# Patient Record
Sex: MALE | Race: WHITE | NOT HISPANIC OR LATINO | Employment: OTHER | URBAN - METROPOLITAN AREA
[De-identification: names, ages, dates, MRNs, and addresses within clinical notes are randomized per-mention and may not be internally consistent; named-entity substitution may affect disease eponyms.]

---

## 2017-02-08 DIAGNOSIS — Z79.899 ENCOUNTER FOR LONG-TERM (CURRENT) USE OF MEDICATIONS: ICD-10-CM

## 2017-02-08 LAB — OSMOLALITY UR: 614 MMOL/KG (ref 100–1200)

## 2017-02-08 PROCEDURE — 83935 ASSAY OF URINE OSMOLALITY: CPT | Performed by: PSYCHIATRY & NEUROLOGY

## 2017-02-13 ENCOUNTER — OFFICE VISIT (OUTPATIENT)
Dept: PSYCHIATRY | Facility: CLINIC | Age: 57
End: 2017-02-13
Attending: PSYCHIATRY & NEUROLOGY
Payer: MEDICARE

## 2017-02-13 VITALS
SYSTOLIC BLOOD PRESSURE: 107 MMHG | DIASTOLIC BLOOD PRESSURE: 64 MMHG | HEART RATE: 98 BPM | BODY MASS INDEX: 22.37 KG/M2 | WEIGHT: 160.4 LBS

## 2017-02-13 DIAGNOSIS — F25.0 SCHIZOAFFECTIVE DISORDER, BIPOLAR TYPE (H): Primary | ICD-10-CM

## 2017-02-13 PROCEDURE — 99212 OFFICE O/P EST SF 10 MIN: CPT | Mod: ZF

## 2017-02-13 NOTE — MR AVS SNAPSHOT
After Visit Summary   2/13/2017    Caden Bush    MRN: [id number]           Patient Information     Date Of Birth          1960        Visit Information        Provider Department      2/13/2017 1:30 PM Yeny Treviño MD Psychiatry Clinic         Follow-ups after your visit        Follow-up notes from your care team     Return in about 2 months (around 4/13/2017).      Your next 10 appointments already scheduled     Apr 21, 2017  1:30 PM CDT   Schizophrenia Follow Up with Yeny Treviño MD   Psychiatry Clinic ([facility])    [address]  [address] [facility] [id number] [phone]              Who to contact     Please call your clinic at [phone] to:    Ask questions about your health    Make or cancel appointments    Discuss your medicines    Learn about your test results    Speak to your doctor   If you have compliments or concerns about an experience at your clinic, or if you wish to file a complaint, please contact [facility] Patient Relations at [phone] or email us at [email or web address]         Additional Information About Your Visit        MyChart Information     PitchEnginet gives you secure access to your electronic health record. If you see a primary care provider, you can also send messages to your care team and make appointments. If you have questions, please call your primary care clinic.  If you do not have a primary care provider, please call [phone] and they will assist you.      PitchEnginet is an electronic gateway that provides easy, online access to your medical records. With Mygeni, you can request a clinic appointment, read your test results, renew a prescription or communicate with your care team.     To access your existing account, please contact your [facility] or call [phone] for assistance.        Care EveryWhere ID     This is your  Care EveryWhere ID. This could be used by other organizations to access your [place] medical records  [id number]        Your Vitals Were     Pulse BMI (Body Mass Index)                98 22.37 kg/m2           Blood Pressure from Last 3 Encounters:   02/13/17 107/64   12/20/16 116/68   12/19/16 129/82    Weight from Last 3 Encounters:   02/13/17 72.8 kg (160 lb 6.4 oz)   12/20/16 69.4 kg (153 lb)   12/19/16 72.1 kg (159 lb)              Today, you had the following     No orders found for display       Primary Care Provider Office Phone # Fax #    Leonard Dai [phone]       [facility] [address] [facility] [address]        Thank you!     Thank you for choosing PSYCHIATRY CLINIC  for your care. Our goal is always to provide you with excellent care. Hearing back from our patients is one way we can continue to improve our services. Please take a few minutes to complete the written survey that you may receive in the mail after your visit with us. Thank you!             Your Updated Medication List - Protect others around you: Learn how to safely use, store and throw away your medicines at www.disposemymeds.org.          This list is accurate as of: 2/13/17  1:49 PM.  Always use your most recent med list.                   Brand Name Dispense Instructions for use    ACE/ARB NOT PRESCRIBED (INTENTIONAL)      ACE & ARB not prescribed due to Risk for drug interaction- lithium toxicity in the past       aspirin 81 MG EC tablet      Take 81 mg by mouth daily.       cholecalciferol 1000 UNIT tablet    vitamin D     Take 1 tablet by mouth daily.       FISH OIL      1 capsule daily       glipiZIDE 5 MG 24 hr tablet    GLUCOTROL XL    90 tablet    TAKE 1 TABLET BY MOUTH DAILY       lithium 450 MG CR tablet    ESKALITH    90 tablet    Take 1 tablet (450 mg) by mouth At Bedtime       metFORMIN 500 MG 24 hr tablet    GLUCOPHAGE-XR    360 tablet    TAKE 2 TABLETS BY MOUTH TWICE DAILY        ONE TOUCH ULTRA test strip   Generic drug:  blood glucose monitoring     100 each    TEST ONCE DAILY AS DIRECTED       pioglitazone 45 MG tablet    ACTOS    90 tablet    TAKE 1 TABLET BY MOUTH DAILY       risperiDONE 2 MG tablet    risperDAL    90 tablet    Take 1 tablet (2 mg) by mouth At Bedtime       simvastatin 40 MG tablet    ZOCOR    90 tablet    TAKE ONE TABLET BY MOUTH EVERY NIGHT AT BEDTIME       traZODone 100 MG tablet    DESYREL    90 tablet    Take 0.5-1 tablets ( mg) by mouth nightly as needed for sleep       venlafaxine 150 MG 24 hr capsule    EFFEXOR-XR    180 capsule    Take 2 capsules (300 mg) by mouth daily

## 2017-02-13 NOTE — NURSING NOTE
Chief Complaint   Patient presents with     Recheck Medication   schizoaffective disorder    Reviewed allergies, smoking status, and pharmacy preference  Administered abuse screening questions   Obtained weight, blood pressure and heart rate

## 2017-02-13 NOTE — PROGRESS NOTES
"PSYCHIATRY CLINIC TRANSFER EVALUATION NOTE          30 minute medication management   Transferring from Dr. Hill for ongoing management of schizoaffective disorder.  The initial DIAG EVAL was 11/26/08.  Date of most recent Transfer Evaluation is 08/01/2016.    INTERIM HISTORY                                                     PSYCH CRITICAL ITEM HISTORY includes suicidal ideation, psychosis [sxs include AH and paranoia], mutiple psychotropic trials and psych hosp (>5).  Mental health issues were first experienced in late 20s and mental health care was first received at that time.    Caden Bush is a 56 year old male who was last seen in clinic on 12/19/16 at which time no medication changes were made. The patient reports good treatment adherence.  History was provided by patient who was a adequate historian.  Since the last visit:  - States that he his doing \"pretty good\". No concerns today. Mood has been euthymic. Enjoying going to the movies, playing Bingo, walking, shopping.   - Anxiety is not bad.   - Looking into moving to a different housing location. Is on a list at [facility]. Was contacted and was told her is on the list and when availability comes up, he will get called. Thinks it would be a more convenient place to live in terms of transportation on buses. They also have a pool there, which he is interested in.   - No psychotic thought.   - Keeping active by walking and doing housework.   - Spending time with family occasionally.     RECENT SYMPTOMS:   DEPRESSION:  none  DENIES- depressed mood, anhedonia, insomnia , low energy, indecisiveness, feeling worthless, suicidal ideation , overwhelmed and feeling hopeless, appetite change, weight gain /loss and poor concentration /memory;  PSYCHOSIS:  none;  DENIES- hallucinations, delusions, ideas of reference and disorganized behavior  EATING DISORDER: none     SUBSTANCE USE:     ALCOHOL-  never       TOBACCO- none        CAFFEINE- 1-2 cups/day of " coffee, 1-2 sodas/day                      CANNABIS- none  OTHER ILLICIT DRUGS- none    Financial Support- SSDI for schizoaffective disorder and diabetes.     Living Situation- Currently living by self in subsidized apartment in [place] since Feb 2011         Children- none      MEDICAL ROS:  Reports excessive diaphoresis.    Denies muscle twitches, restlessness, tremor and shiver, none, akathisia, dystonia and apparent TD, polydipsia, nocturia and weight gain, headache, dry mouth, nausea, diarrhea and wt gain.    PSYCH and CD Critical Summary Points since July 2016           None    PAST PSYCH MED TRIALS   see EMR Problem List: Hx of psychiatric care    MEDICAL / SURGICAL HISTORY                                   MEDICAL TEAM:          PCP- Dr. Dai with [facility]                    Therapist-  Yeny Goff at [facility]                Ophthalmologist: Jacob Lieberman with [facility]    Patient Active Problem List   Diagnosis     Eczema     Retinopathy     Schizoaffective disorder, bipolar type (H)     Polyp of colon, adenomatous     Health Care Home     Diabetic retinopathy associated with type 2 diabetes mellitus (H)     DM (diabetes mellitus), type 2 with ophthalmic complications (H)     Hx of psychiatric care       ALLERGY                                Review of patient's allergies indicates no known allergies.    MEDICATIONS                               Current Outpatient Prescriptions   Medication Sig Dispense Refill     glipiZIDE (GLUCOTROL XL) 5 MG 24 hr tablet TAKE 1 TABLET BY MOUTH DAILY 90 tablet 1     metFORMIN (GLUCOPHAGE-XR) 500 MG 24 hr tablet TAKE 2 TABLETS BY MOUTH TWICE DAILY 360 tablet 1     lithium (ESKALITH) 450 MG CR tablet Take 1 tablet (450 mg) by mouth At Bedtime 90 tablet 1     venlafaxine (EFFEXOR-XR) 150 MG 24 hr capsule Take 2 capsules (300 mg) by mouth daily 180 capsule 1     risperiDONE (RISPERDAL) 2 MG tablet Take 1 tablet (2 mg) by mouth At Bedtime  90 tablet 1     traZODone (DESYREL) 100 MG tablet Take 0.5-1 tablets ( mg) by mouth nightly as needed for sleep 90 tablet 1     simvastatin (ZOCOR) 40 MG tablet TAKE ONE TABLET BY MOUTH EVERY NIGHT AT BEDTIME 90 tablet 3     ONE TOUCH ULTRA test strip TEST ONCE DAILY AS DIRECTED 100 each 3     pioglitazone (ACTOS) 45 MG tablet TAKE 1 TABLET BY MOUTH DAILY 90 tablet 1     ACE/ARB NOT PRESCRIBED, INTENTIONAL, ACE & ARB not prescribed due to Risk for drug interaction- lithium toxicity in the past       FISH OIL 1 capsule daily        aspirin 81 MG EC tablet Take 81 mg by mouth daily.       cholecalciferol (VITAMIN D) 1000 UNIT tablet Take 1 tablet by mouth daily.         VITALS   /64 (BP Location: Right leg, Patient Position: Chair, Cuff Size: Adult Regular)  Pulse 98  Wt 72.8 kg (160 lb 6.4 oz)  BMI 22.37 kg/m2     MENTAL STATUS EXAM                                                             Alertness: alert  and oriented  Appearance: adequately groomed  Behavior/Demeanor: cooperative, pleasant and calm, with good  eye contact  Speech: normal  Language: intact and no problems  Psychomotor: sits forward or near front of chair  Mood:  good  Affect: restricted and does smile appropriately throughout interview at times; was congruent to mood; was congruent to content  Thought Process/Associations: unremarkable  Thought Content:  Denies suicidal ideation, violent ideation and psychotic thought  Perception:  Denies hallucinations  Insight: good  Judgment: good  Cognition:  does appear grossly intact; formal cognitive testing was not done    LABS and DATA     LITHIUM LABS    [level, renal, SG, TSH, WBC]  q6 mo  Recent Labs   Lab Test  12/20/16   1301  06/09/16   1030  12/21/15   1413  09/16/15   1408   LITHIUM  0.6  0.5*  0.5*  0.5*     Recent Labs   Lab Test  12/20/16   1301  06/09/16   1030  12/21/15   1413   01/26/15   1059   CR  1.21  1.26*  1.11   < >  1.15   GFRESTIMATED   --   59*  68   --   66   NA  142   140  133   < >  135   PEE  10.1  9.4  9.2   < >  10.2*    < > = values in this interval not displayed.     Recent Labs   Lab Test  06/09/16   1040  12/21/15   1435   SG  1.014  1.007     Recent Labs   Lab Test  06/09/16   1030  12/21/15   1413  01/26/15   1059   TSH  1.70  1.69  3.39     ANTIPSYCHOTIC LABS   [glu, A1C, lipids (focus LDL), liver enzymes, WBC, ANEU, Hgb, plts]  q12mo  Recent Labs   Lab Test  12/20/16   1301  12/13/16   0949  06/09/16   1030   GLC  195*   --   181*   A1C   --   6.6*  7.3*     Recent Labs   Lab Test  06/09/16   1030  06/03/15   1015   CHOL  125  174   TRIG  55  106   LDL  45  90   HDL  69  63     Recent Labs   Lab Test  12/20/16   1301  06/09/16   1030   AST  14  15   ALT  14  27   ALKPHOS  72  98     Recent Labs   Lab Test  12/20/16   1240  06/09/16   1030  12/21/15   1413   WBC  3.7*  4.7  4.6   ANEU   --   3.5  3.4   HGB  13.6  14.7  13.4   PLT  205  175  155         PHQ9 TODAY = 0   PHQ-9 SCORE 8/1/2016 10/24/2016 12/19/2016   Total Score - - -   Total Score 3 2 1   Total Score - - -         PSYCHIATRIC DIAGNOSES                                                                                                   Schizoaffective disorder, bipolar type    ASSESSMENT                                     Pertinent Background:   See most recent Transfer Evaluation as dated above.  Stable on regimen of Lithium, Effexor, and Risperdal since 2011. Had previously been on a Lithium dose of 900mg but suffered Lithium toxicity and was hospitalized in July, 2012 after Lisinopril was added to his regimen. Lithium dose has been stable since that time (though lithium level does seem to fluctuate in setting of reported good med adherence). Trial of Risperdal dose decrease had been tried, with subsequent increase in psychosis sx. Effexor last increased in 2010 for low mood, with benefit noted. Effexor dose successfully decreased March 2015 d/t stable mood. Lithium 450 mg has been adequate for mood  stabilization although could consider increasing if mood instability becomes a problem in the future.       TODAY: Caden reports doing well with stable mood. No concerns at this time. Previous discussion with Dr. Hill included Caden returning to work with his PCP, due to stability, but Caden prefers to continue to work with a psychiatrist, and notes significant concern regarding decompensation in the past when not seeing a psychiatrist. Discussion today surrounded ways that patient could continue to stay active and seek out socialization. Will follow up again in 2 months for continued med management and brief psychotherapy.     Abnormal labs; pt follows with PCP for diabetes and elevated A1c.      PSYCHOTROPIC DRUG INTERACTIONS:   Concurrent use of VENLAFAXINE and ANTIPLATELET AGENTS (ASPIRIN) may result in an increased risk of bleeding  Concurrent use of LITHIUM and NSAIDS may result in lithium toxicity.  Concurrent use of LITHIUM and DOPAMINE-2 ANTAGONISTS (RISPERDAL) may result in weakness, dyskinesias, increased extrapyramidal symptoms, encephalopathy, and brain damage.   Concurrent use of LITHIUM and TRAZODONE may result in increased risk of serotonin syndrome (hypertension, hyperthermia, myoclonus, mental status changes).  Concurrent use of RISPERIDONE and SIMVASTATIN may result in increased simvastatin serum concentrations with an increased risk of myopathy or rhabdomyolysis.   Concurrent use of TRAZODONE and VENLAFAXINE may result in an increased risk of serotonin syndrome (hypertension, hyperthermia, myoclonus, mental status changes).    Management:  Monitoring for adverse effects, routine vitals, routine labs and patient is aware of risks, minimal use of trazodone.      PLAN                                                                                                       1) PSYCHOTROPIC MEDICATIONS:  - Continue lithium CR 450mg QHS  - Continue risperidone 2mg QHS  - Continue Effexor XR 300mg  daily  - Continue trazodone 50mg QHS PRN (uses very infrequently, about once per month)    2) THERAPY:  Seeing psychologist Yeny Goff at [facility] since 11/2015. Sees once per month.     3) LABS NEXT DUE:    CMP, TSH, lithium level, UA, urine osm Next due June 2017  CBC, fasting lipids, HbA1c Next due Dec 2017       RATING SCALES:    none    4) REFERRALS [CD, medical, other]:  none    5) :  none Previously had a CCM until 2011. Has SW consult on 10/20/15, not interested in CM or [facility] worker services at this time and seems to be managing well on his own.     6) RTC: 2 months    7) CRISIS NUMBERS: Provided in AVS today      TREATMENT RISK STATEMENT:  The risks, benefits, alternatives and potential adverse effects have been discussed and are understood by the patient/ patient's guardian. The pt understands the risks of using street drugs or alcohol.  There are no medical contraindications, the pt agrees to treatment with the ability to do so.  The patient understands to call 911 or come to the nearest ED if life threatening or urgent symptoms present.    WHODAS 2.0  08/01/2016  total score = N/A; [a 12-item WHODAS 2.0 assessment was not completed by the pt today and/or recorded in EPIC].       RESIDENT:   Yeny Treviño MD    Patient staffed in clinic with Dr. Patricio who will sign the note.  Supervisor is Dr. Porras.     I saw the patient with the resident, and participated in key portions of the service, including the mental status examination and developing the plan of care. I reviewed key portions of the history with the resident. I agree with the findings and plan as documented in this note.    Brittney Patricio MD

## 2017-02-15 ASSESSMENT — PATIENT HEALTH QUESTIONNAIRE - PHQ9: SUM OF ALL RESPONSES TO PHQ QUESTIONS 1-9: 0

## 2017-04-05 ENCOUNTER — DOCUMENTATION ONLY (OUTPATIENT)
Dept: PHARMACY | Facility: PHYSICIAN GROUP | Age: 57
End: 2017-04-05

## 2017-04-05 NOTE — PROGRESS NOTES
Clinical Consult:                                                    Caden Bush is a 56 year old male coming in for a clinical pharmacist consult.  He was referred to me from Dr. Dai.     Reason for Consult: pt is out of scope for MTM at this time, no longer ACO.     Discussion: Pt is here to check in on med list. Reviewed med list with him today and no changes made to list.     /64 today.     Blood sugars 138, 157, 154 per his report. Last a1c=6.6.     Denies any physical or psychiatric issues at this time.     Plan:  1. Pt will set up lab work for June- fasting for cholesterol, then a1c, microalbumin and CMP.     Aliyah Angeles, Pharm.D, BCACP  Medication Therapy Management Pharmacist  [phone]

## 2017-04-05 NOTE — PATIENT INSTRUCTIONS
1. Would set up fasting lab work for June for cholesterol, metabolic panel (kidney function) and a1c recheck.       Aliyah Angeles, Pharm.D, [facility]  Medication Therapy Management Pharmacist  [phone]

## 2017-04-21 ENCOUNTER — OFFICE VISIT (OUTPATIENT)
Dept: PSYCHIATRY | Facility: CLINIC | Age: 57
End: 2017-04-21
Attending: PSYCHIATRY & NEUROLOGY
Payer: MEDICARE

## 2017-04-21 VITALS
BODY MASS INDEX: 23.29 KG/M2 | DIASTOLIC BLOOD PRESSURE: 77 MMHG | HEART RATE: 86 BPM | WEIGHT: 167 LBS | SYSTOLIC BLOOD PRESSURE: 121 MMHG

## 2017-04-21 DIAGNOSIS — Z79.899 ENCOUNTER FOR LONG-TERM (CURRENT) USE OF MEDICATIONS: Primary | ICD-10-CM

## 2017-04-21 DIAGNOSIS — F25.0 SCHIZOAFFECTIVE DISORDER, BIPOLAR TYPE (H): ICD-10-CM

## 2017-04-21 PROCEDURE — 99212 OFFICE O/P EST SF 10 MIN: CPT | Mod: ZF

## 2017-04-21 RX ORDER — RISPERIDONE 2 MG/1
2 TABLET ORAL AT BEDTIME
Qty: 90 TABLET | Refills: 1 | Status: SHIPPED | OUTPATIENT
Start: 2017-04-21 | End: 2017-09-11

## 2017-04-21 RX ORDER — VENLAFAXINE HYDROCHLORIDE 150 MG/1
300 CAPSULE, EXTENDED RELEASE ORAL DAILY
Qty: 180 CAPSULE | Refills: 1 | Status: SHIPPED | OUTPATIENT
Start: 2017-04-21 | End: 2017-09-11

## 2017-04-21 RX ORDER — LITHIUM CARBONATE 450 MG
450 TABLET, EXTENDED RELEASE ORAL AT BEDTIME
Qty: 90 TABLET | Refills: 1 | Status: SHIPPED | OUTPATIENT
Start: 2017-04-21 | End: 2017-09-11

## 2017-04-21 RX ORDER — TRAZODONE HYDROCHLORIDE 100 MG/1
50-100 TABLET ORAL
Qty: 90 TABLET | Refills: 1 | Status: SHIPPED | OUTPATIENT
Start: 2017-04-21 | End: 2017-09-11

## 2017-04-21 NOTE — NURSING NOTE
Chief Complaint   Patient presents with     Recheck Medication     schizoaffective disorder, bipolar type     Reviewed allergies, smoking status, and pharmacy preference  Administered abuse screening questions   Obtained weight, blood pressure and heart rate

## 2017-04-21 NOTE — MR AVS SNAPSHOT
After Visit Summary   4/21/2017    Caden Bush    MRN: [id number]           Patient Information     Date Of Birth          1960        Visit Information        Provider Department      4/21/2017 1:30 PM Yeny Treviño MD Psychiatry Clinic        Today's Diagnoses     Encounter for long-term (current) use of medications    -  1    Schizoaffective disorder, bipolar type (H)           Follow-ups after your visit        Who to contact     Please call your clinic at [phone] to:    Ask questions about your health    Make or cancel appointments    Discuss your medicines    Learn about your test results    Speak to your doctor   If you have compliments or concerns about an experience at your clinic, or if you wish to file a complaint, please contact [facility] Patient Relations at [phone] or email us at [email or web address]         Additional Information About Your Visit        MyChart Information     PicLyft gives you secure access to your electronic health record. If you see a primary care provider, you can also send messages to your care team and make appointments. If you have questions, please call your primary care clinic.  If you do not have a primary care provider, please call [phone] and they will assist you.      DCWafers is an electronic gateway that provides easy, online access to your medical records. With DCWafers, you can request a clinic appointment, read your test results, renew a prescription or communicate with your care team.     To access your existing account, please contact your [facility] or call [phone] for assistance.        Care EveryWhere ID     This is your Care EveryWhere ID. This could be used by other organizations to access your [place] medical records  [id number]        Your Vitals Were     Pulse BMI (Body Mass Index)                86 23.29 kg/m2           Blood Pressure from  Last 3 Encounters:   04/21/17 121/77   02/13/17 107/64   12/20/16 116/68    Weight from Last 3 Encounters:   04/21/17 75.8 kg (167 lb)   02/13/17 72.8 kg (160 lb 6.4 oz)   12/20/16 69.4 kg (153 lb)                 Where to get your medicines      These medications were sent to [id number] - [facility] [address] AT [facility] [address], [facility] [address]     Phone:  [phone]     lithium 450 MG CR tablet    risperiDONE 2 MG tablet    traZODone 100 MG tablet    venlafaxine 150 MG 24 hr capsule          Primary Care Provider Office Phone # Fax #    Leonard Dai [phone]       [facility] [address]  [facility] [address]        Thank you!     Thank you for choosing PSYCHIATRY CLINIC  for your care. Our goal is always to provide you with excellent care. Hearing back from our patients is one way we can continue to improve our services. Please take a few minutes to complete the written survey that you may receive in the mail after your visit with us. Thank you!             Your Updated Medication List - Protect others around you: Learn how to safely use, store and throw away your medicines at www.disposemymeds.org.          This list is accurate as of: 4/21/17 11:59 PM.  Always use your most recent med list.                   Brand Name Dispense Instructions for use    ACE/ARB NOT PRESCRIBED (INTENTIONAL)      ACE & ARB not prescribed due to Risk for drug interaction- lithium toxicity in the past       aspirin 81 MG EC tablet      Take 81 mg by mouth daily.       cholecalciferol 1000 UNIT tablet    vitamin D     Take 1 tablet by mouth daily.       FISH OIL      1 capsule daily       glipiZIDE 5 MG 24 hr tablet    GLUCOTROL XL    90 tablet    TAKE 1 TABLET BY MOUTH DAILY       lithium 450 MG CR tablet    ESKALITH    90 tablet    Take 1 tablet (450 mg) by mouth At Bedtime       metFORMIN 500 MG 24 hr tablet    GLUCOPHAGE-XR     360 tablet    TAKE 2 TABLETS BY MOUTH TWICE DAILY       ONE TOUCH ULTRA test strip   Generic drug:  blood glucose monitoring     100 each    TEST ONCE DAILY AS DIRECTED       pioglitazone 45 MG tablet    ACTOS    90 tablet    TAKE 1 TABLET BY MOUTH DAILY       risperiDONE 2 MG tablet    risperDAL    90 tablet    Take 1 tablet (2 mg) by mouth At Bedtime       simvastatin 40 MG tablet    ZOCOR    90 tablet    TAKE ONE TABLET BY MOUTH EVERY NIGHT AT BEDTIME       traZODone 100 MG tablet    DESYREL    90 tablet    Take 0.5-1 tablets ( mg) by mouth nightly as needed for sleep       venlafaxine 150 MG 24 hr capsule    EFFEXOR-XR    180 capsule    Take 2 capsules (300 mg) by mouth daily

## 2017-04-21 NOTE — PROGRESS NOTES
"PSYCHIATRY CLINIC PROGRESS NOTE          30 minute medication management   Transferring from Dr. Hill for ongoing management of schizoaffective disorder.  The initial DIAG EVAL was 11/26/08.  Date of most recent Transfer Evaluation is 08/01/2016.    INTERIM HISTORY                                                     PSYCH CRITICAL ITEM HISTORY includes suicidal ideation, psychosis [sxs include AH and paranoia], mutiple psychotropic trials and psych hosp (>5).  Mental health issues were first experienced in late 20s and mental health care was first received at that time.    Caden Bush is a 57 year old male who was last seen in clinic on 2/13/17 at which time no medication changes were made. The patient reports good treatment adherence.  History was provided by patient who was a adequate historian.  Since the last visit:  - States that he his doing \"pretty good\". No concerns today. Mood has been euthymic,other than a day here and there where he feels down for a short time. Never last more than a day. Still enjoying watching movies, participating in activities in his housing complex.   - Anxiety is not bad. No psychotic thought.   - Keeping active by walking and doing housework.   - Spending time with family occasionally.  - Using trazodone about 2-3 nights per week. Feels like sleep is good. Not sleeping too much during the days, noting that when his depression gets worse he takes more naps. .     RECENT SYMPTOMS:   DEPRESSION:  none  DENIES- depressed mood, anhedonia, insomnia , low energy, indecisiveness, feeling worthless, suicidal ideation , overwhelmed and feeling hopeless, appetite change, weight gain /loss and poor concentration /memory;  PSYCHOSIS:  none;  DENIES- hallucinations, delusions, ideas of reference and disorganized behavior  EATING DISORDER: none     SUBSTANCE USE:     ALCOHOL-  never       TOBACCO- none        CAFFEINE- 1-2 cups/day of coffee, 1-2 sodas/day                      CANNABIS- " none  OTHER ILLICIT DRUGS- none    Financial Support- SSDI for schizoaffective disorder and diabetes.     Living Situation- Currently living by self in subsidized apartment in [place] since Feb 2011         Children- none      MEDICAL ROS:  Reports excessive diaphoresis.    Denies muscle twitches, restlessness, tremor and shiver, none, akathisia, dystonia and apparent TD, polydipsia, nocturia and weight gain, headache, dry mouth, nausea, diarrhea and wt gain.    PSYCH and CD Critical Summary Points since July 2016           None    PAST PSYCH MED TRIALS   see EMR Problem List: Hx of psychiatric care    MEDICAL / SURGICAL HISTORY                                   MEDICAL TEAM:          PCP- Dr. Dai with [facility]                    Therapist-  Yeny Goff at [facility]                Ophthalmologist: Jacob Lieberman with [facility]    Patient Active Problem List   Diagnosis     Eczema     Retinopathy     Schizoaffective disorder, bipolar type (H)     Polyp of colon, adenomatous     Health Care Home     Diabetic retinopathy associated with type 2 diabetes mellitus (H)     DM (diabetes mellitus), type 2 with ophthalmic complications (H)     Hx of psychiatric care       ALLERGY                                Review of patient's allergies indicates no known allergies.    MEDICATIONS                               Current Outpatient Prescriptions   Medication Sig Dispense Refill     pioglitazone (ACTOS) 45 MG tablet TAKE 1 TABLET BY MOUTH DAILY 90 tablet 1     glipiZIDE (GLUCOTROL XL) 5 MG 24 hr tablet TAKE 1 TABLET BY MOUTH DAILY 90 tablet 1     metFORMIN (GLUCOPHAGE-XR) 500 MG 24 hr tablet TAKE 2 TABLETS BY MOUTH TWICE DAILY 360 tablet 1     lithium (ESKALITH) 450 MG CR tablet Take 1 tablet (450 mg) by mouth At Bedtime 90 tablet 1     venlafaxine (EFFEXOR-XR) 150 MG 24 hr capsule Take 2 capsules (300 mg) by mouth daily 180 capsule 1     risperiDONE (RISPERDAL) 2 MG tablet Take 1 tablet  (2 mg) by mouth At Bedtime 90 tablet 1     traZODone (DESYREL) 100 MG tablet Take 0.5-1 tablets ( mg) by mouth nightly as needed for sleep 90 tablet 1     simvastatin (ZOCOR) 40 MG tablet TAKE ONE TABLET BY MOUTH EVERY NIGHT AT BEDTIME 90 tablet 3     ONE TOUCH ULTRA test strip TEST ONCE DAILY AS DIRECTED 100 each 3     ACE/ARB NOT PRESCRIBED, INTENTIONAL, ACE & ARB not prescribed due to Risk for drug interaction- lithium toxicity in the past       FISH OIL 1 capsule daily        aspirin 81 MG EC tablet Take 81 mg by mouth daily.       cholecalciferol (VITAMIN D) 1000 UNIT tablet Take 1 tablet by mouth daily.         VITALS   There were no vitals taken for this visit.     MENTAL STATUS EXAM                                                             Alertness: alert  and oriented  Appearance: adequately groomed  Behavior/Demeanor: cooperative, pleasant and calm, with good  eye contact  Speech: normal  Language: intact and no problems  Psychomotor: sits forward or near front of chair  Mood:  good  Affect: restricted and does smile appropriately throughout interview at times; was congruent to mood; was congruent to content  Thought Process/Associations: unremarkable  Thought Content:  Denies suicidal ideation, violent ideation and psychotic thought  Perception:  Denies hallucinations  Insight: good  Judgment: good  Cognition:  does appear grossly intact; formal cognitive testing was not done    LABS and DATA     LITHIUM LABS    [level, renal, SG, TSH, WBC]  q6 mo  Recent Labs   Lab Test  12/20/16   1301  06/09/16   1030  12/21/15   1413  09/16/15   1408   LITHIUM  0.6  0.5*  0.5*  0.5*     Recent Labs   Lab Test  12/20/16   1301  06/09/16   1030  12/21/15   1413   01/26/15   1059   CR  1.21  1.26*  1.11   < >  1.15   GFRESTIMATED   --   59*  68   --   66   NA  142  140  133   < >  135   PEE  10.1  9.4  9.2   < >  10.2*    < > = values in this interval not displayed.     Recent Labs   Lab Test  06/09/16   1040   12/21/15   1435   SG  1.014  1.007     Recent Labs   Lab Test  06/09/16   1030  12/21/15   1413  01/26/15   1059   TSH  1.70  1.69  3.39     ANTIPSYCHOTIC LABS   [glu, A1C, lipids (focus LDL), liver enzymes, WBC, ANEU, Hgb, plts]  q12mo  Recent Labs   Lab Test  12/20/16   1301  12/13/16   0949  06/09/16   1030   GLC  195*   --   181*   A1C   --   6.6*  7.3*     Recent Labs   Lab Test  06/09/16   1030  06/03/15   1015   CHOL  125  174   TRIG  55  106   LDL  45  90   HDL  69  63     Recent Labs   Lab Test  12/20/16   1301  06/09/16   1030   AST  14  15   ALT  14  27   ALKPHOS  72  98     Recent Labs   Lab Test  12/20/16   1240  06/09/16   1030  12/21/15   1413   WBC  3.7*  4.7  4.6   ANEU   --   3.5  3.4   HGB  13.6  14.7  13.4   PLT  205  175  155       PHQ9 TODAY = 0   PHQ-9 SCORE 10/24/2016 12/19/2016 2/13/2017   Total Score - - -   Total Score 2 1 0   Total Score - - -       PSYCHIATRIC DIAGNOSES                                                                                                   Schizoaffective disorder, bipolar type    ASSESSMENT                                     Pertinent Background:   See most recent Transfer Evaluation as dated above.  Stable on regimen of Lithium, Effexor, and Risperdal since 2011. Had previously been on a Lithium dose of 900mg but suffered Lithium toxicity and was hospitalized in July, 2012 after Lisinopril was added to his regimen. Lithium dose has been stable since that time (though lithium level does seem to fluctuate in setting of reported good med adherence). Trial of Risperdal dose decrease had been tried, with subsequent increase in psychosis sx. Effexor last increased in 2010 for low mood, with benefit noted. Effexor dose successfully decreased March 2015 d/t stable mood. Lithium 450 mg has been adequate for mood stabilization although could consider increasing if mood instability becomes a problem in the future.       TODAY: Caden reports doing well with stable mood.  No concerns at this time. Continues to engage in daily activities and enjoying watching movies. Previous discussion with Dr. Hill included Caden returning to work with his PCP, due to stability, but Caden prefers to continue to work with a psychiatrist, and notes significant concern regarding decompensation in the past when not seeing a psychiatrist. Discussion today surrounded ways that patient could continue to stay active and seek out socialization. Will follow up in 3 months with next resident.     Abnormal labs; pt follows with PCP for diabetes and elevated A1c. Pt will return in June to get lithium labs.      PSYCHOTROPIC DRUG INTERACTIONS:    VENLAFAXINE and ANTIPLATELET AGENTS (ASPIRIN) may result in an increased risk of bleeding  LITHIUM and NSAIDS may result in lithium toxicity.  LITHIUM and DOPAMINE-2 ANTAGONISTS (RISPERDAL) may result in weakness, dyskinesias, increased extrapyramidal symptoms, encephalopathy, and brain damage.    LITHIUM and TRAZODONE may result in increased risk of serotonin syndrome (hypertension, hyperthermia, myoclonus, mental status changes).  RISPERIDONE and SIMVASTATIN may result in increased simvastatin serum concentrations with an increased risk of myopathy or rhabdomyolysis.   TRAZODONE and VENLAFAXINE may result in an increased risk of serotonin syndrome (hypertension, hyperthermia, myoclonus, mental status changes).  Management:  Monitoring for adverse effects, routine vitals, routine labs and patient is aware of risks, minimal use of trazodone.      PLAN                                                                                                       1) PSYCHOTROPIC MEDICATIONS:  - Continue lithium CR 450mg QHS  - Continue risperidone 2mg QHS  - Continue Effexor XR 300mg daily  - Continue trazodone 50mg QHS PRN (uses very infrequently, about once per month)    2) THERAPY:  Seeing psychologist Yeny Goff at [facility] since 11/2015. Sees once per month.      3) LABS NEXT DUE:    CMP, TSH, lithium level, UA, urine osm Next due June 2017 - orders placed and pt will return in June to get labs  CBC, fasting lipids, HbA1c Next due Dec 2017       RATING SCALES:    none    4) REFERRALS [CD, medical, other]:  none    5) :  none Previously had a CCM until 2011. Has SW consult on 10/20/15, not interested in CM or [facility] worker services at this time and seems to be managing well on his own.     6) RTC: 3 months with next resident    7) CRISIS NUMBERS: Provided routinely in AVS today      TREATMENT RISK STATEMENT:  The risks, benefits, alternatives and potential adverse effects have been discussed and are understood by the patient/ patient's guardian. The pt understands the risks of using street drugs or alcohol.  There are no medical contraindications, the pt agrees to treatment with the ability to do so.  The patient understands to call 911 or come to the nearest ED if life threatening or urgent symptoms present.       RESIDENT:   Yeny Treviño MD    Patient staffed in clinic with Dr. Porras who will sign the note.  Supervisor is Dr. Porras.  I saw the patient with the resident, and participated in key portions of the service, including the mental status examination and developing the plan of care. I reviewed key portions of the history with the resident. I agree with the findings and plan as documented in this note.    Maty Porras

## 2017-04-22 ASSESSMENT — PATIENT HEALTH QUESTIONNAIRE - PHQ9: SUM OF ALL RESPONSES TO PHQ QUESTIONS 1-9: 0

## 2017-05-10 ENCOUNTER — OFFICE VISIT (OUTPATIENT)
Dept: FAMILY MEDICINE | Facility: CLINIC | Age: 57
End: 2017-05-10

## 2017-05-10 VITALS
DIASTOLIC BLOOD PRESSURE: 60 MMHG | HEART RATE: 65 BPM | BODY MASS INDEX: 22.73 KG/M2 | OXYGEN SATURATION: 99 % | WEIGHT: 163 LBS | SYSTOLIC BLOOD PRESSURE: 102 MMHG

## 2017-05-10 DIAGNOSIS — M25.532 LEFT WRIST PAIN: Primary | ICD-10-CM

## 2017-05-10 DIAGNOSIS — G56.92 NEUROPATHY OF LEFT UPPER EXTREMITY: ICD-10-CM

## 2017-05-10 PROCEDURE — 99213 OFFICE O/P EST LOW 20 MIN: CPT | Performed by: FAMILY MEDICINE

## 2017-05-10 PROCEDURE — 73110 X-RAY EXAM OF WRIST: CPT | Performed by: FAMILY MEDICINE

## 2017-05-10 NOTE — PROGRESS NOTES
Problem(s) Oriented visit        SUBJECTIVE:                                                    Caden Bush is a 57 year old male who presents to clinic today for complaint of not being able to move his wrist upward. He denies any injury. The wrist aches a bit. He woke up a week ago with the wrist not able to move properly.       Problem list, Medication list, Allergies, and Medical/Social/Surgical histories reviewed in EPIC and updated as appropriate.   Additional history: as documented    ROS:  5 point ROS completed and negative except noted above, including Gen, CV, Resp, GI, MS      Histories:   Patient Active Problem List   Diagnosis     Eczema     Retinopathy     Schizoaffective disorder, bipolar type (H)     Polyp of colon, adenomatous     Health Care Home     Diabetic retinopathy associated with type 2 diabetes mellitus (H)     DM (diabetes mellitus), type 2 with ophthalmic complications (H)     Hx of psychiatric care     Past Surgical History:   Procedure Laterality Date     APPENDECTOMY       COLONOSCOPY  2012    2 adenomatous polyps       Social History   Substance Use Topics     Smoking status: Never Smoker     Smokeless tobacco: Never Used     Alcohol use No     Family History   Problem Relation Age of Onset     OSTEOPOROSIS Mother      OSTEOPOROSIS Father      Asthma Father      Cancer - colorectal Father 70     Breast Cancer Mother 78     Rheumatoid Arthritis Sister            OBJECTIVE:                                                    /60  Pulse 65  Wt 73.9 kg (163 lb)  SpO2 99%  BMI 22.73 kg/m2  Body mass index is 22.73 kg/(m^2).   GENERAL APPEARANCE: Alert, no acute distress  MS: no peripheral edema  MS: No tenderness to palpation. He is only able to extend slightly beyond neutral actively, but passive extension is full and done without tenderness. Full active flexion is capable. Normal hand  is noted. Sensation is normal in the bilateral upper extremities.  NEURO: Alert, oriented,  speech and mentation normal  PSYCH: mentation appears normal, affect and mood normal   Labs Resulted Today:   Results for orders placed or performed in visit on 02/08/17   Osmolality urine   Result Value Ref Range    Urine Osmolality 614 100 - 1200 mmol/kg     ASSESSMENT/PLAN:                                                        Caden was seen today for hand/wrist stiffness.    Diagnoses and all orders for this visit:    Left wrist pain  -     X-ray lt Wrist G/E 3 vws*    Neuropathy of left upper extremity  -     Referral to [facility], [facility].  Xray is normal. Suspect nerve related issue, refer for EMG to further evaluate.           The following health maintenance items are reviewed in Epic and correct as of today:  Health Maintenance   Topic Date Due     NEDRA QUESTIONNAIRE 1 YEAR  12/18/2016     LIPID MONITORING Q1 YEAR( NO INBASKET)  06/09/2017     PHQ-9 Q1 MONTH (NO INBASKET)  05/21/2017     A1C Q6 MO( NO INBASKET)  06/13/2017     MICROALBUMIN Q1 YEAR( NO INBASKET)  06/17/2017     INFLUENZA VACCINE (SYSTEM ASSIGNED)  09/01/2017     EYE EXAM Q1 YEAR( NO INBASKET)  12/06/2017     FOOT EXAM Q1 YEAR( NO INBASKET)  12/20/2017     CREATININE Q1 YEAR (NO INBASKET)  12/20/2017     TSH W/ FREE T4 REFLEX Q2 YEAR (NO INBASKET)  06/09/2018     ADVANCE DIRECTIVE PLANNING Q5 YRS (NO INBASKET)  12/17/2019     COLONOSCOPY Q5 YR INBASKET MESSAGE  06/19/2020     TETANUS IMMUNIZATION (SYSTEM ASSIGNED)  12/18/2025     PNEUMOVAX 1X HI RISK PATIENT < 65 (NO IB MSG)  Completed     HEPATITIS C SCREENING  Completed       Annie Hart MD  [facility]  Family Practice  [facility]  [phone]    For any issues my office # is [phone]

## 2017-05-10 NOTE — MR AVS SNAPSHOT
After Visit Summary   5/10/2017    Caden Bush    MRN: [id number]           Patient Information     Date Of Birth          1960        Visit Information        Provider Department      5/10/2017 9:15 AM Annie Hart MD [facility]        Today's Diagnoses     Left wrist pain    -  1    Neuropathy of left upper extremity           Follow-ups after your visit        Additional Services     Referral to [facility]       Referral to [facility]  Phone:  [phone]  Reason for Referral:  EMG    Please be aware that coverage of these services is subject to the terms and limitations of your health insurance plan.  Call member services at your health plan with any benefit or coverage questions.                  Who to contact     If you have questions or need follow up information about today's clinic visit or your schedule please contact [facility] directly at [phone].  Normal or non-critical lab and imaging results will be communicated to you by [employer], letter or phone within 4 business days after the clinic has received the results. If you do not hear from us within 7 days, please contact the clinic through [employer] or phone. If you have a critical or abnormal lab result, we will notify you by phone as soon as possible.  Submit refill requests through [employer] or call your pharmacy and they will forward the refill request to us. Please allow 3 business days for your refill to be completed.          Additional Information About Your Visit        MyChart Information     [employer] gives you secure access to your electronic health record. If you see a primary care provider, you can also send messages to your care team and make appointments. If you have questions, please call your primary care clinic.  If you do not have a primary care provider, please call [phone] and they will assist you.        Care  EveryWhere ID     This is your Care EveryWhere ID. This could be used by other organizations to access your [place] medical records  [id number]        Your Vitals Were     Pulse Pulse Oximetry BMI (Body Mass Index)             65 99% 22.73 kg/m2          Blood Pressure from Last 3 Encounters:   05/10/17 102/60   12/20/16 116/68   06/17/16 115/68    Weight from Last 3 Encounters:   05/10/17 73.9 kg (163 lb)   12/20/16 69.4 kg (153 lb)   06/17/16 73.5 kg (162 lb)              We Performed the Following     Referral to [facility]     X-ray lt Wrist G/E 3 vws*        Primary Care Provider Office Phone # Fax #    Leonard Dai [phone]       [facility] [address]  [facility] [address]        Thank you!     Thank you for choosing [facility]  for your care. Our goal is always to provide you with excellent care. Hearing back from our patients is one way we can continue to improve our services. Please take a few minutes to complete the written survey that you may receive in the mail after your visit with us. Thank you!             Your Updated Medication List - Protect others around you: Learn how to safely use, store and throw away your medicines at www.disposemymeds.org.          This list is accurate as of: 5/10/17  1:15 PM.  Always use your most recent med list.                   Brand Name Dispense Instructions for use    ACE/ARB NOT PRESCRIBED (INTENTIONAL)      ACE & ARB not prescribed due to Risk for drug interaction- lithium toxicity in the past       aspirin 81 MG EC tablet      Take 81 mg by mouth daily.       cholecalciferol 1000 UNIT tablet    vitamin D     Take 1 tablet by mouth daily.       FISH OIL      1 capsule daily       glipiZIDE 5 MG 24 hr tablet    GLUCOTROL XL    90 tablet    TAKE 1 TABLET BY MOUTH DAILY       lithium 450 MG CR tablet    ESKALITH    90 tablet    Take 1 tablet (450 mg) by mouth At Bedtime        metFORMIN 500 MG 24 hr tablet    GLUCOPHAGE-XR    360 tablet    TAKE 2 TABLETS BY MOUTH TWICE DAILY       ONE TOUCH ULTRA test strip   Generic drug:  blood glucose monitoring     100 each    TEST ONCE DAILY AS DIRECTED       pioglitazone 45 MG tablet    ACTOS    90 tablet    TAKE 1 TABLET BY MOUTH DAILY       risperiDONE 2 MG tablet    risperDAL    90 tablet    Take 1 tablet (2 mg) by mouth At Bedtime       simvastatin 40 MG tablet    ZOCOR    90 tablet    TAKE ONE TABLET BY MOUTH EVERY NIGHT AT BEDTIME       traZODone 100 MG tablet    DESYREL    90 tablet    Take 0.5-1 tablets ( mg) by mouth nightly as needed for sleep       venlafaxine 150 MG 24 hr capsule    EFFEXOR-XR    180 capsule    Take 2 capsules (300 mg) by mouth daily

## 2017-05-11 ASSESSMENT — PATIENT HEALTH QUESTIONNAIRE - PHQ9: SUM OF ALL RESPONSES TO PHQ QUESTIONS 1-9: 0

## 2017-05-15 ENCOUNTER — MEDICAL CORRESPONDENCE (OUTPATIENT)
Dept: FAMILY MEDICINE | Facility: CLINIC | Age: 57
End: 2017-05-15

## 2017-05-17 ENCOUNTER — TRANSFERRED RECORDS (OUTPATIENT)
Dept: FAMILY MEDICINE | Facility: CLINIC | Age: 57
End: 2017-05-17

## 2017-05-19 ENCOUNTER — TELEPHONE (OUTPATIENT)
Dept: FAMILY MEDICINE | Facility: CLINIC | Age: 57
End: 2017-05-19

## 2017-05-19 DIAGNOSIS — R94.131 ABNORMAL EMG: Primary | ICD-10-CM

## 2017-05-19 DIAGNOSIS — G56.92 NEUROPATHY OF LEFT UPPER EXTREMITY: ICD-10-CM

## 2017-05-19 NOTE — TELEPHONE ENCOUNTER
Patient returned phone call for results.  Patient notified of abnormal EMG results per Dr. Hart.  Per Dr. Hart referral entered for MRI cervical spine for further eval, advised patient that our office will call with MRI results.  Becca Abarca

## 2017-05-19 NOTE — TELEPHONE ENCOUNTER
----- Message from Annie Hart MD sent at 5/17/2017 12:45 PM CDT -----  Abnormal EMG, but uncertain where the problem originates. Recommend MRI of the cervical spine as the next step.

## 2017-05-30 ENCOUNTER — TRANSFERRED RECORDS (OUTPATIENT)
Dept: FAMILY MEDICINE | Facility: CLINIC | Age: 57
End: 2017-05-30

## 2017-06-01 ENCOUNTER — TELEPHONE (OUTPATIENT)
Dept: FAMILY MEDICINE | Facility: CLINIC | Age: 57
End: 2017-06-01

## 2017-06-01 DIAGNOSIS — R94.131 ABNORMAL EMG: Primary | ICD-10-CM

## 2017-06-01 DIAGNOSIS — G56.92 NEUROPATHY OF LEFT UPPER EXTREMITY: ICD-10-CM

## 2017-06-01 NOTE — TELEPHONE ENCOUNTER
Patient notified of results per Dr. Hart.  Referral entered and faxed to [facility].  Patient will f/u if no improvement with patient.  Becca Abarca

## 2017-06-01 NOTE — TELEPHONE ENCOUNTER
----- Message from Annie Hart MD sent at 5/31/2017  7:40 PM CDT -----  MRI shows multiple levels of mild cervical disc disease, but nothing to explain left arm symptoms.  Recommend referral to physical therapy, and if failure to improve, refer to neurosurgery.

## 2017-06-07 ENCOUNTER — OFFICE VISIT (OUTPATIENT)
Dept: FAMILY MEDICINE | Facility: CLINIC | Age: 57
End: 2017-06-07

## 2017-06-07 VITALS
WEIGHT: 164 LBS | HEART RATE: 108 BPM | SYSTOLIC BLOOD PRESSURE: 94 MMHG | DIASTOLIC BLOOD PRESSURE: 60 MMHG | OXYGEN SATURATION: 95 % | BODY MASS INDEX: 22.87 KG/M2

## 2017-06-07 DIAGNOSIS — E11.3299 MILD NONPROLIFERATIVE DIABETIC RETINOPATHY ASSOCIATED WITH TYPE 2 DIABETES MELLITUS, MACULAR EDEMA PRESENCE UNSPECIFIED: Primary | ICD-10-CM

## 2017-06-07 PROCEDURE — 82570 ASSAY OF URINE CREATININE: CPT | Performed by: FAMILY MEDICINE

## 2017-06-07 PROCEDURE — 36415 COLL VENOUS BLD VENIPUNCTURE: CPT | Performed by: FAMILY MEDICINE

## 2017-06-07 PROCEDURE — 82044 UR ALBUMIN SEMIQUANTITATIVE: CPT | Performed by: FAMILY MEDICINE

## 2017-06-07 PROCEDURE — 99214 OFFICE O/P EST MOD 30 MIN: CPT | Performed by: FAMILY MEDICINE

## 2017-06-07 NOTE — MR AVS SNAPSHOT
After Visit Summary   6/7/2017    Caden Bush    MRN: [id number]           Patient Information     Date Of Birth          1960        Visit Information        Provider Department      6/7/2017 9:45 AM Leonard Dai MD [facility]        Today's Diagnoses     Mild nonproliferative diabetic retinopathy associated with type 2 diabetes mellitus, macular edema presence unspecified (H)    -  1       Follow-ups after your visit        Your next 10 appointments already scheduled     Jun 09, 2017 11:20 AM CDT   FLEX Extremity with Faiza Gregory PT   [facility] ([facility] [place]  )    [address] [place], [place] [id number]  [place] [address]   [phone]            Jun 16, 2017 10:40 AM CDT   FLEX Extremity with Faiza Gregory PT   [facility] ([facility] [place]  )    [address] [place], [place] [id number]  [place] [address]   [phone]            Jun 23, 2017 10:40 AM CDT   FELX Extremity with Faiza Gregory PT   [facility] ([facility] [place]  )    [address] [place], [place] [id number]  [place] [address]   [phone]            Jun 30, 2017 12:45 PM CDT   Schizophrenia Follow Up with Nam Kessler MD   [facility] ([facility])    [place] [id number] [facility] [address] [phone]              Who to contact     If you have questions or need follow up information about today's clinic visit or your schedule please contact [facility] directly at [phone].  Normal or non-critical lab and imaging results will be communicated to you by MyChart, letter or phone within 4 business days after the clinic has received the results. If you do not hear from us within 7 days, please contact the clinic through MyChart or phone. If you have a critical or abnormal lab result, we will notify you by phone as soon as possible.  Submit  refill requests through [employer] or call your pharmacy and they will forward the refill request to us. Please allow 3 business days for your refill to be completed.          Additional Information About Your Visit        Home [employer] Information     [employer] gives you secure access to your electronic health record. If you see a primary care provider, you can also send messages to your care team and make appointments. If you have questions, please call your primary care clinic.  If you do not have a primary care provider, please call [phone] and they will assist you.        Care EveryWhere ID     This is your Care EveryWhere ID. This could be used by other organizations to access your [facility] medical records  [id number]        Your Vitals Were     Pulse Pulse Oximetry BMI (Body Mass Index)             108 95% 22.87 kg/m2          Blood Pressure from Last 3 Encounters:   06/07/17 94/60   05/10/17 102/60   04/21/17 121/77    Weight from Last 3 Encounters:   06/07/17 74.4 kg (164 lb)   05/10/17 73.9 kg (163 lb)   04/21/17 75.8 kg (167 lb)              We Performed the Following     Hemoglobin A1C ([facility])     Lipid Panel ([facility])     Microalbumin ([facility])        Primary Care Provider Office Phone # Fax #    Leonard Dai [phone]       [facility] [address]  [facility] [address]        Thank you!     Thank you for choosing [facility]  for your care. Our goal is always to provide you with excellent care. Hearing back from our patients is one way we can continue to improve our services. Please take a few minutes to complete the written survey that you may receive in the mail after your visit with us. Thank you!             Your Updated Medication List - Protect others around you: Learn how to safely use, store and throw away your medicines at www.disposemymeds.org.          This list is accurate as of: 6/7/17  9:57 AM.  Always use your most recent med list.                    Brand Name Dispense Instructions for use    ACE/ARB NOT PRESCRIBED (INTENTIONAL)      ACE & ARB not prescribed due to Risk for drug interaction- lithium toxicity in the past       aspirin 81 MG EC tablet      Take 81 mg by mouth daily.       cholecalciferol 1000 UNIT tablet    vitamin D     Take 1 tablet by mouth daily.       FISH OIL      1 capsule daily       glipiZIDE 5 MG 24 hr tablet    GLUCOTROL XL    90 tablet    TAKE 1 TABLET BY MOUTH DAILY       lithium 450 MG CR tablet    ESKALITH    90 tablet    Take 1 tablet (450 mg) by mouth At Bedtime       metFORMIN 500 MG 24 hr tablet    GLUCOPHAGE-XR    360 tablet    TAKE 2 TABLETS BY MOUTH TWICE DAILY       ONE TOUCH ULTRA test strip   Generic drug:  blood glucose monitoring     100 each    TEST ONCE DAILY AS DIRECTED       pioglitazone 45 MG tablet    ACTOS    90 tablet    TAKE 1 TABLET BY MOUTH DAILY       risperiDONE 2 MG tablet    risperDAL    90 tablet    Take 1 tablet (2 mg) by mouth At Bedtime       simvastatin 40 MG tablet    ZOCOR    90 tablet    TAKE ONE TABLET BY MOUTH EVERY NIGHT AT BEDTIME       traZODone 100 MG tablet    DESYREL    90 tablet    Take 0.5-1 tablets ( mg) by mouth nightly as needed for sleep       venlafaxine 150 MG 24 hr capsule    EFFEXOR-XR    180 capsule    Take 2 capsules (300 mg) by mouth daily

## 2017-06-07 NOTE — PROGRESS NOTES
SUBJECTIVE: Caden Bush is a 57 year old male who is here for a DM 2 visit.   Lab Results   Component Value Date    A1C 6.6 12/13/2016    A1C 7.3 06/09/2016    A1C 7.3 12/18/2015    A1C 6.7 09/16/2015    A1C 9.1 06/03/2015     Glucose monitoring is done tid, with am sugars averaging 200 and pm 150. Compliance has been good with diet and medications. Has been feeling well, without polyuria, sensory or new visual changes. He tries to walk everyday. No foot issues.    GENERAL: No change in weight, sleep or appetite.  Normal energy.  No fever or chills  RESP: No coughing, wheezing or shortness of breath  CV: No chest pains or palpitations  GI: No nausea, vomiting,  heartburn, abdominal pain, diarrhea, constipation or change in bowel habits  : No urinary frequency or dysuria, bladder or kidney problems  OBJECTIVE: BP 94/60  Pulse 108  Wt 74.4 kg (164 lb)  SpO2 95%  BMI 22.87 kg/m2   Constitutional: healthy, alert and no distress  Cardiovascular: PMI normal. No lifts, heaves, or thrills. RRR. No murmurs, clicks gallops or rub, No edema or JVD.  Respiratory:  Good diaphragmatic excursion. Lungs clear  Skin: no suspicious lesions or rashes  Neurologic: Karnes not done  Psychiatric: affect slightly flat and mentation appears normal.    (E11.2188) Mild nonproliferative diabetic retinopathy associated with type 2 diabetes mellitus, macular edema presence unspecified (H)  (primary encounter diagnosis)  Comment: overall doing fairly well, good A1c in the past  Plan: Lipid Panel ([facility]), Hemoglobin A1C         ([facility]), Microalbumin ([facility])

## 2017-06-08 LAB
CHOLEST SERPL-MCNC: 143 MG/DL (ref 100–199)
HBA1C MFR BLD: 8.2 % (ref 4.8–5.6)
HDLC SERPL-MCNC: 76 MG/DL
LDL/HDL RATIO: 0.6 RATIO UNITS (ref 0–3.6)
LDLC SERPL CALC-MCNC: 46 MG/DL (ref 0–99)
TRIGL SERPL-MCNC: 105 MG/DL (ref 0–149)
VLDLC SERPL CALC-MCNC: 21 MG/DL (ref 5–40)

## 2017-06-09 ENCOUNTER — THERAPY VISIT (OUTPATIENT)
Dept: PHYSICAL THERAPY | Facility: CLINIC | Age: 57
End: 2017-06-09
Payer: MEDICARE

## 2017-06-09 DIAGNOSIS — G56.92 NEUROPATHY OF LEFT UPPER EXTREMITY: Primary | ICD-10-CM

## 2017-06-09 LAB
A/C RATIO MG/G: <30 MG/G
ALBUMIN (URINE) MG/L: 10 MG/L
INTERPRETATION: NORMAL
URINE CREATININE MG/DL - QUEST: 100 MG/DL

## 2017-06-09 PROCEDURE — G8981 BODY POS CURRENT STATUS: HCPCS | Mod: GP | Performed by: PHYSICAL THERAPIST

## 2017-06-09 PROCEDURE — 97161 PT EVAL LOW COMPLEX 20 MIN: CPT | Mod: GP | Performed by: PHYSICAL THERAPIST

## 2017-06-09 PROCEDURE — G8982 BODY POS GOAL STATUS: HCPCS | Mod: GP | Performed by: PHYSICAL THERAPIST

## 2017-06-09 PROCEDURE — 97110 THERAPEUTIC EXERCISES: CPT | Mod: GP | Performed by: PHYSICAL THERAPIST

## 2017-06-09 NOTE — PROGRESS NOTES
Subjective:    HPI Comments: C/C:  Intermittent left posterior shoulder dull pain that is provoked with laying down.  Pain is not daily.  Pt did not report what will relieve.  Denies numbness, tingling, change in sensation.  Hx:  4/17-Suffered an episode of high blood sugers. Feels may be related.  Has undergone an EMG that showed abnormal findings-please see EPIC. Checks blood sugars almost daily.  PMH:  No previous hx of arm pain or injury.  No hx of neck injury or problems.  Pt is type II diabetic.  Suffers from mental illness.  See EPIC.  General health-Good.  Pt does not work.  Walks daily.      The history is provided by the patient.                       Objective:    Standing Alignment:    Cervical/Thoracic:  Forward head, thoracic kyphosis increased and cervical spine lateral flex L  Shoulder/UE:  Rounded shoulders                                  Cervical/Thoracic Evaluation    AROM:  AROM Cervical:    Flexion:            80%  Extension:       50%  Rotation:         Left: 70%     Right: 70%  Side Bend:      Left: 80%     Right:  70%        Cervical Myotomes:        C4 (shrug):  Left: 5    Right: 5  C5 (Deltoid):  Left: 5    Right: 5  C6 (Biceps):  Left: 5    Right: 5  C7 (Triceps):  Left: 5    Right: 5  C8 (Thumb Ext): Left: 5    Right: 5  T1 (Intrinsics): Left: 5    Right: 5                       Shoulder Evaluation:  ROM:  AROM:                          Extension/Internal Rotation:  Left:  ToT10    Right:  To T10    PROM:    Flexion:  Left:  150    Right: 150      Abduction:  Left:  90    Right:  90      External Rotation:  Left:  60    Right:  60                    Strength:        Abduction:  Left: 5/5   Pain:-    Right: 5/5      Pain:-  Adduction:  Left: 5/5     Pain:-    Right: 5/5      Pain:-  Internal Rotation:  Left:5/5      Pain:-    Right: 5/5      Pain:-  External Rotation:   Left:5/5      Pain:-   Right:5/5      Pain:-      Horizontal Adduction:  Right:/5     Pain:-        Special Tests:   Special tests assessed shoulder: 3+/5 left wrist extesnors.                                           General     ROS    Assessment/Plan:      Patient is a 57 year old male with Left arm complaints.    Patient has the following significant findings with corresponding treatment plan.                Diagnosis 1:  left upper extremity neuropathy    Pain -  manual therapy, self management, education and home program  Decreased strength - therapeutic exercise and therapeutic activities  Decreased function - therapeutic activities  Impaired posture - neuro re-education    Therapy Evaluation Codes:   1) History comprised of:   Personal factors that impact the plan of care:      None.    Comorbidity factors that impact the plan of care are:      Mental illness.     Medications impacting care: None.  2) Examination of Body Systems comprised of:   Body structures and functions that impact the plan of care:      Shoulder and Wrist.   Activity limitations that impact the plan of care are:      Laying down.  3) Clinical presentation characteristics are:   Stable/Uncomplicated.  4) Decision-Making    Low complexity using standardized patient assessment instrument and/or measureable assessment of functional outcome.  Cumulative Therapy Evaluation is: Low complexity.    Previous and current functional limitations:  (See Goal Flow Sheet for this information)    Short term and Long term goals: (See Goal Flow Sheet for this information)     Communication ability:  Patient appears to be able to clearly communicate and understand verbal and written communication and follow directions correctly.  Treatment Explanation - The following has been discussed with the patient:   RX ordered/plan of care  Anticipated outcomes  Possible risks and side effects  This patient would benefit from PT intervention to resume normal activities.   Rehab potential is good.    Frequency:  1 X week, once daily  Duration:  for 8 weeks  Discharge Plan:  Achieve  all LTG.  Independent in home treatment program.  Reach maximal therapeutic benefit.    Please refer to the daily flowsheet for treatment today, total treatment time and time spent performing 1:1 timed codes.

## 2017-06-09 NOTE — PROGRESS NOTES
Subjective:    Patient is a 57 year old male presenting with rehab left ankle/foot hpi.                                      Pertinent medical history includes:  Diabetes, mental illness and depression.  Medical allergies: no.  Other surgeries include:  None reported.  Current medications:  Anti-depressants.  Current occupation is none.        Barriers include:  None as reported by patient.    Red flags:  None as reported by patient.                        Objective:    System    Physical Exam    General     ROS    Assessment/Plan:

## 2017-06-09 NOTE — LETTER
DEPARTMENT OF HEALTH AND HUMAN SERVICES  CENTERS FOR MEDICARE & MEDICAID SERVICES    PLAN/UPDATED PLAN OF PROGRESS FOR OUTPATIENT REHABILITATION  PATIENTS NAME:  Caden Bush   : 1960  PR[facility] NUMBER:    [id number]  [id number]  PROVIDER NAME[facility] PHYSICAL THERAPY  MEDICAL RECORD NUMBER: [id number]   START OF CARE DATE:  SOC Date: 17   TYPE:  PT  PRIMARY/TREATMENT DIAGNOSIS: (Pertinent Medical Diagnosis)  Neuropathy of left upper extremity  VISITS FROM START OF CARE:  1 Rxs Used: 1   Subjective:  HPI Comments: C/C:  Intermittent left posterior shoulder dull pain that is provoked with laying down.  Pain is not daily. Md orders 2017.  Pt did not report what will relieve.  Denies numbness, tingling, change in sensation.Hx:  -Suffered an episode of high blood sugers. Feels may be related.  Has undergone an EMG that showed abnormal findings-please see EPIC. Checks blood sugars almost daily.PMH:  No previous hx of arm pain or injury.  No hx of neck injury or problems.  Pt is type II diabetic.  Suffers from mental illness.  See EPIC.General health-Good.  Pt does not work.  Walks daily.  The history is provided by the patient.  Pertinent medical history includes:  Diabetes, mental illness and depression.  Medical allergies: no.  Other surgeries include:  None reported.  Current medications:  Anti-depressants.  Current occupation is none.    Barriers include:  None as reported by patient.Red flags:  None as reported by patient.  Objective:  Standing Alignment:    Cervical/Thoracic:  Forward head, thoracic kyphosis increased and cervical spine lateral flex L  Shoulder/UE:  Rounded shoulders  Cervical/Thoracic Evaluation  AROM:  AROM Cervical:  Flexion:            80%  Extension:       50%  Rotation:         Left: 70%     Right: 70%  Side Bend:      Left: 80%     Right:  70%  Cervical Myotomes:    C4 (shrug):  Left: 5    Right: 5  C5 (Deltoid):  Left: 5     Right: 5  C6 (Biceps):  Left: 5    Right: 5  C7 (Triceps):  Left: 5    Right: 5  C8 (Thumb Ext): Left: 5    Right: 5  T1 (Intrinsics): Left: 5    Right: 5  Shoulder Evaluation:  ROM:  AROM:    Extension/Internal Rotation:  Left:  ToT10    Right:  To T10    PROM:    Flexion:  Left:  150    Right: 150    PATIENTS NAME:  Caden Bush   : 1960    Abduction:  Left:  90    Right:  90  External Rotation:  Left:  60    Right:  60  Strength:    Abduction:  Left: 5/5   Pain:-    Right: 5/5      Pain:-  Adduction:  Left: 55     Pain:-    Right:       Pain:-  Internal Rotation:  Left:      Pain:-    Right:       Pain:-  External Rotation:   Left:      Pain:-   Right:      Pain:-    Horizontal Adduction:  Right:/5     Pain:-  Special Tests:  Special tests assessed shoulder: 3+/5 left wrist extesnors.  Assessment/Plan:    Patient is a 57 year old male with Left arm complaints.    Patient has the following significant findings with corresponding treatment plan.                Diagnosis 1:  left upper extremity neuropathy    Pain -  manual therapy, self management, education and home program  Decreased strength - therapeutic exercise and therapeutic activities  Decreased function - therapeutic activities  Impaired posture - neuro re-education  Therapy Evaluation Codes:   1) History comprised of:   Personal factors that impact the plan of care:      None.    Comorbidity factors that impact the plan of care are:      Mental illness.     Medications impacting care: None.  2) Examination of Body Systems comprised of:   Body structures and functions that impact the plan of care:      Shoulder and Wrist.   Activity limitations that impact the plan of care are:      Laying down.  3) Clinical presentation characteristics are:   Stable/Uncomplicated.  4) Decision-Making    Low complexity using standardized patient assessment instrument and/or measureable assessment of functional outcome.  Cumulative Therapy Evaluation  "is: Low complexity.  Previous and current functional limitations:  (See Goal Flow Sheet for this information)    Short term and Long term goals: (See Goal Flow Sheet for this information)   Communication ability:  Patient appears to be able to clearly communicate and understand verbal and written communication and follow directions correctly.  Treatment Explanation - The following has been discussed with the patient:   RX ordered/plan of care  Anticipated outcomes  Possible risks and side effects  This patient would benefit from PT intervention to resume normal activities.   Rehab potential is good.  Frequency:  1 X week, once daily  PATIENTS NAME:  Caden Bush   : 1960    Duration:  for 8 weeks  Discharge Plan:  Achieve all LTG.  Independent in home treatment program.  Reach maximal therapeutic benefit.    Caregiver Signature/Credentials _____________________________ Date ________       Treating Provider: Faiza Gregory PT, ScD, MOMT  I have reviewed and certified the need for these services and plan of treatment while under my care.        PHYSICIAN'S SIGNATURE:   ____________________________________  Date___________    Leonard Dai    Certification period:  Beginning of Cert date period: 17 to  End of Cert period date: 17     Functional Level Progress Report: Please see attached \"Goal Flow sheet for Functional level.\"    ____X____ Continue Services or       ________ DC Services                Service dates: From  SOC Date: 17 date to present                         "

## 2017-06-16 ENCOUNTER — THERAPY VISIT (OUTPATIENT)
Dept: PHYSICAL THERAPY | Facility: CLINIC | Age: 57
End: 2017-06-16
Payer: MEDICARE

## 2017-06-16 DIAGNOSIS — G56.92 NEUROPATHY OF LEFT UPPER EXTREMITY: ICD-10-CM

## 2017-06-16 PROCEDURE — 97110 THERAPEUTIC EXERCISES: CPT | Mod: GP | Performed by: PHYSICAL THERAPIST

## 2017-06-16 PROCEDURE — 97140 MANUAL THERAPY 1/> REGIONS: CPT | Mod: GP | Performed by: PHYSICAL THERAPIST

## 2017-06-20 DIAGNOSIS — Z76.0 ENCOUNTER FOR MEDICATION REFILL: ICD-10-CM

## 2017-06-20 RX ORDER — METFORMIN HCL 500 MG
TABLET, EXTENDED RELEASE 24 HR ORAL
Qty: 360 TABLET | Refills: 3 | Status: SHIPPED | OUTPATIENT
Start: 2017-06-20 | End: 2018-06-15

## 2017-06-20 NOTE — TELEPHONE ENCOUNTER
Pending Prescriptions:                       Disp   Refills    metFORMIN (GLUCOPHAGE-XR) 500 MG 24 hr tab*360 ta*0        Sig: TAKE 2 TABLETS BY MOUTH TWICE DAILY    Last OV 6/7/17  Lipid 6/7/17  CMP, TSH, CBC 12/20/16

## 2017-06-30 ENCOUNTER — OFFICE VISIT (OUTPATIENT)
Dept: PSYCHIATRY | Facility: CLINIC | Age: 57
End: 2017-06-30
Attending: PSYCHIATRY & NEUROLOGY
Payer: MEDICARE

## 2017-06-30 VITALS
BODY MASS INDEX: 21.95 KG/M2 | HEART RATE: 90 BPM | DIASTOLIC BLOOD PRESSURE: 72 MMHG | SYSTOLIC BLOOD PRESSURE: 120 MMHG | WEIGHT: 157.4 LBS

## 2017-06-30 DIAGNOSIS — F25.0 SCHIZOAFFECTIVE DISORDER, BIPOLAR TYPE (H): Primary | ICD-10-CM

## 2017-06-30 PROCEDURE — 99212 OFFICE O/P EST SF 10 MIN: CPT | Mod: ZF

## 2017-06-30 NOTE — PROGRESS NOTES
PSYCHIATRY CLINIC TRANSFER NOTE   The initial diagnostic evaluation was on 11/26/08 and the last transfer of care evaluation was 06/30/17.    Pertinent Background:  This patient first experienced mental health issues in his late 20s , initially obtained care at that time and has received treatment for Schizoaffective Disorder, bipolar type.  See transfer evaluation for detailed history.  Notably, this pt has been stable on regimen of Lithium, Effexor and Risperdal since 2011. Historically, lithium dose 450 mg has been adequate for mood stabilization although an increase could reasonably be considered if needed.  Psych critical item history includes suicidal ideation, psychosis [sxs include AH and paranoia], mutiple psychotropic trials and psych hosp (>5).     INTERIM HISTORY                                                 Caden Bush is a 57 year old male who was last seen in clinic on 4/21/17 at which time patient reported stable mood. No medication changes were made.     The patient reports good treatment adherence.  History was provided by patient who was a good historian.  Since the last visit:  Patient reports that he has been doing well. He has been going for walks, being watchful with carbs, has gone to see some movies. He plays with his sister's dogs and cats. He has applied for a new apartment where there will be pool, movie theater, transportation and chapel. He also has friends there. He is seeing his therapist regularly. He has been taking his medications regularly. He denies side affects.     RECENT SYMPTOMS:   DEPRESSION: reports-none;  DENIES- suicidal ideation  PSYCHOSIS:  reports-none;  DENIES- hallucinations and delusions  EATING DISORDER: none    RECENT SUBSTANCE USE:     ALCOHOL-  never         TOBACCO- none        CAFFEINE- 1-2 cups/day of coffee, 1-2 sodas/day     OPIOIDS- none      NARCAN KIT- N/A          CANNABIS- none          OTHER ILLICIT DRUGS- none     CURRENT SOCIAL  HISTORY:  Financial Support-  SSDI for schizoaffective disorder and diabetes.     Living Situation- Currently living by self in subsidized apartment in [place] since Feb 2011.   Children- none     MEDICAL ROS:  Reports none.  Denies GI [nausea, diarrhea, constipation,  ], headache, sedation, tremor and confusion.      SUBSTANCE USE HISTORY                                                                             REVIEWED FROM LAST PT TRANSFER EVAL 8/2016 BY Dr. FOWLER. PATIENT CONFIRMS THE LIST BELOW.    Past Use- none  Treatment [#, most recent] - none  Medical Consequences [withdrawal, sz etc] - none  HIV/Hepatitis- none  Legal Consequences- none    PSYCHIATRIC HISTORY     REVIEWED FROM LAST PT TRANSFER EVAL 8/2016 BY Dr. FOWLER. PATIENT CONFIRMS THE LIST BELOW.    SIB [method, most recent]- none  Suicidal Ideation Hx [passive, active]- Remote hx of passive SI  Suicide Attempt [#, recent, method]:   #- N/A   Most Recent- N/A     Violence/Aggression Hx- none  Psychosis Hx- hx of auditory hallucinations and paranoia  Psych Hosp [ #, most recent, committed]- about 4-5 times in the past. Last hospitalization was aug 1, 2012 due to lIthium toxicity. Last mental health admission was June 2009.   ECT [#, most recent]- none     Pertinent Outpatient Treatment: Has done DBT in the past, as well as group therapy and individual therapy.       Eating Disorder: none     SOCIAL and FAMILY HISTORY                                          patient reported     REVIEWED FROM LAST PT TRANSFER EVAL 8/2016 BY Dr. FOWLER. PATIENT CONFIRMS THE LIST BELOW.  Financial Support- SSDI for schizoaffective disorder and diabetes.  Living Situation/Family/Relationships- Currently living by self in subsidized apartment in [place];  Children- none  Trauma History (self-report)- none  Legal- none, but some financial problems for which he is working with   Social/Spiritual Support- family (parents and sister) and people in his  apartment building.   Early History/Education-  Born in [place], lived in [place]. Lived in [place] from 1st grade, then came back and finished at [employer]. Went to [employer], then vocational school, then went to [employer], then [employer], then graduated [employer] with BA in Business Administration.     Family Mental Health History-  Parents had issues with depression, unsure of diagnoses; Great grandfather with alcoholism, possible OCD.     Family Legal History-  none    PAST PSYCH MED TRIALS         Drug / Start Date   Dose (mg)   DC Reason / Date     Lithium   900   current     Risperdal      current     Abilify           Seroquel           Effexor      current     Zoloft           Wellbutrin           Buspar           trazodone      current     hydroxyzine           Ambien           Maybe Zyprexa, and Prozac, can't remember         Had previously been on a Lithium dose of 900mg but suffered Lithium toxicity and was hospitalized in July, 2012 after Lisinopril was added to his regimen. Trial of Risperdal dose decrease had been tried, with subsequent increase in psychosis sx, now at Risperdal  2 mg qhs. Effexor last increased in 2010 for low mood, with benefit noted and later successfully decreased in March 2015 to Effexor 300 mg  d/t stable mood.     MEDICAL / SURGICAL HISTORY                                   CARE TEAM:   PCP- Dr. Dai with [facility]    Therapist-  Yeny Goff at [facility]      Ophthalmologist: Jacob Lieberman with [facility]    Patient Active Problem List   Diagnosis     Eczema     Retinopathy     Schizoaffective disorder, bipolar type (H)     Polyp of colon, adenomatous     Health Care Home     Diabetic retinopathy associated with type 2 diabetes mellitus (H)     DM (diabetes mellitus), type 2 with ophthalmic complications (H)     Hx of psychiatric care     Neuropathy of left upper extremity     ALLERGY                                Review of patient's  allergies indicates no known allergies.  MEDICATIONS                               Current Outpatient Prescriptions   Medication Sig Dispense Refill     metFORMIN (GLUCOPHAGE-XR) 500 MG 24 hr tablet TAKE 2 TABLETS BY MOUTH TWICE DAILY 360 tablet 3     glipiZIDE (GLUCOTROL XL) 5 MG 24 hr tablet TAKE 1 TABLET BY MOUTH DAILY 90 tablet 1     lithium (ESKALITH) 450 MG CR tablet Take 1 tablet (450 mg) by mouth At Bedtime 90 tablet 1     venlafaxine (EFFEXOR-XR) 150 MG 24 hr capsule Take 2 capsules (300 mg) by mouth daily 180 capsule 1     risperiDONE (RISPERDAL) 2 MG tablet Take 1 tablet (2 mg) by mouth At Bedtime 90 tablet 1     traZODone (DESYREL) 100 MG tablet Take 0.5-1 tablets ( mg) by mouth nightly as needed for sleep 90 tablet 1     pioglitazone (ACTOS) 45 MG tablet TAKE 1 TABLET BY MOUTH DAILY 90 tablet 1     simvastatin (ZOCOR) 40 MG tablet TAKE ONE TABLET BY MOUTH EVERY NIGHT AT BEDTIME 90 tablet 3     ONE TOUCH ULTRA test strip TEST ONCE DAILY AS DIRECTED 100 each 3     ACE/ARB NOT PRESCRIBED, INTENTIONAL, ACE & ARB not prescribed due to Risk for drug interaction- lithium toxicity in the past       FISH OIL 1 capsule daily        aspirin 81 MG EC tablet Take 81 mg by mouth daily.       cholecalciferol (VITAMIN D) 1000 UNIT tablet Take 1 tablet by mouth daily.       VITALS   /72  Pulse 90  Wt 71.4 kg (157 lb 6.4 oz)  BMI 21.95 kg/m2   MENTAL STATUS EXAM                                                             Alertness: alert  and oriented  Appearance: well groomed  Behavior/Demeanor: cooperative, pleasant and calm, with good  eye contact   Speech: normal  Language: intact and no problems  Psychomotor: normal or unremarkable  Mood: description consistent with euthymia  Affect: full range; was congruent to mood; was congruent to content  Thought Process/Associations: unremarkable  Thought Content:  Reports none;   Denies suicidal ideation  and psychotic thought  Perception:   Reports none;   Denies auditory hallucinations (NO;  command-NO;  following command-NO and visual hallucinations (NO)  Insight: fair  Judgment: good  Cognition: does  appear grossly intact; formal cognitive testing was not done    LABS and DATA     PHQ9 TODAY = 0  PHQ-9 SCORE 2/13/2017 4/21/2017 5/10/2017   Total Score - - -   Total Score 0 0 0       RATING SCALES:  AIMS next due date needs to be determined    LITHIUM LABS     [level, renal, SG, TSH, WBC]    q6 mo  Recent Labs   Lab Test  12/20/16   1301  06/09/16   1030  12/21/15   1413  09/16/15   1408   LITHIUM  0.6  0.5*  0.5*  0.5*     Recent Labs   Lab Test  12/20/16   1301  06/09/16   1030  12/21/15   1413   01/26/15   1059   CR  1.21  1.26*  1.11   < >  1.15   GFRESTIMATED   --   59*  68   --   66   NA  142  140  133   < >  135   PEE  10.1  9.4  9.2   < >  10.2*    < > = values in this interval not displayed.     Recent Labs   Lab Test  06/09/16   1040  12/21/15   1435   SG  1.014  1.007     Recent Labs   Lab Test  06/09/16   1030  12/21/15   1413  01/26/15   1059   TSH  1.70  1.69  3.39     Recent Labs   Lab Test  12/20/16   1240  06/09/16   1030   WBC  3.7*  4.7     ANTIPSYCHOTIC LABS   [glu, A1C, lipids (focus LDL), liver enzymes, WBC, ANEU, Hgb, plts]    q12 mo  Recent Labs   Lab Test  06/07/17   1004  12/20/16   1301  12/13/16   0949  06/09/16   1030   GLC   --   195*   --   181*   A1C  8.2*   --   6.6*  7.3*     Recent Labs   Lab Test  06/07/17   1004  06/09/16   1030   CHOL  143  125   TRIG  105  55   LDL  46  45   HDL  76  69     Recent Labs   Lab Test  12/20/16   1301  06/09/16   1030   AST  14  15   ALT  14  27   ALKPHOS  72  98     Recent Labs   Lab Test  12/20/16   1240  06/09/16   1030  12/21/15   1413   WBC  3.7*  4.7  4.6   ANEU   --   3.5  3.4   HGB  13.6  14.7  13.4   PLT  205  175  155     DIAGNOSES                                                                                                   Schizoaffective disorder, bipolar type    ASSESSMENT                                        TODAY: Caden reports doing well with stable mood. No concerns at this time.     Abnormal labs - Elevated A1C; pt follows with PCP for diabetes. Pt will get lithium lab drawn, ordered on this visit.      [place] PRESCRIPTION MONITORING PROGRAM [] was not checked today and revealed: N/A    PSYCHOTROPIC DRUG INTERACTIONS:    VENLAFAXINE and ANTIPLATELET AGENTS (ASPIRIN) may result in an increased risk of bleeding  LITHIUM and NSAIDS may result in lithium toxicity.  LITHIUM and DOPAMINE-2 ANTAGONISTS (RISPERDAL) may result in weakness, dyskinesias, increased extrapyramidal symptoms, encephalopathy, and brain damage.    LITHIUM and TRAZODONE may result in increased risk of serotonin syndrome   RISPERIDONE and SIMVASTATIN may result in increased simvastatin serum concentrations with an increased risk of myopathy or rhabdomyolysis.   TRAZODONE and VENLAFAXINE may result in an increased risk of serotonin syndrome     MANAGEMENT:  Monitoring for adverse effects, routine vitals, routine labs, minimal use of [trazodone] and patient is aware of risks      PLAN                                                                                                       1) PSYCHOTROPIC MEDICATIONS:  - Continue lithium CR 450mg QHS  - Continue risperidone 2mg QHS  - Continue Effexor XR 300mg daily  - Continue trazodone 50mg QHS PRN (uses about x2 per week)    2) THERAPY:  Seeing psychologist Yeny Goff at [facility] since 11/2015. Sees once per month.     3) LABS NEXT DUE: Serum Li Level order placed today-asked pt to get it in [facility] in the next                                  1-2 weeks.                                  Will determine when other lithium and antipsychotic labs are due       RATING SCALES:  AIMS next due date needs to be determined    4) REFERRALS [MICD, medical etc.]:  none    5) MTM REFERRAL [PharmD]:  none    6) :  none Previously had a CCM until 2011. Has SW  consult on 10/20/15, not interested in CM or [facility] worker services at this time and seems to be managing well on his own.     7) RTC: 2 months    8) CRISIS NUMBERS:   Provided routinely in AVS    TREATMENT RISK STATEMENT:  The risks, benefits, alternatives and potential adverse effects have been discussed and are understood by the patient/ patient's guardian. The pt understands the risks of using street drugs or alcohol.  There are no medical contraindications, the pt agrees to treatment with the ability to do so.  The patient understands to call 911 or come to the nearest ED if life threatening or urgent symptoms present.    WHODAS 2.0  06/30/17  total score = N/A; [a 12-item WHODAS 2.0 assessment has not been completed by the pt and/or recorded in EPIC].    Nam Kessler MD    Patient staffed in clinic with Dr. Porras who will sign the note.  Supervisor is Dr. Bradley.  I saw the patient with the resident, and participated in key portions of the service, including the mental status examination and developing the plan of care. I reviewed key portions of the history with the resident. I agree with the findings and plan as documented in this note.    Maty Porras

## 2017-06-30 NOTE — MR AVS SNAPSHOT
After Visit Summary   6/30/2017    Caden Bush    MRN: [id number]           Patient Information     Date Of Birth          1960        Visit Information        Provider Department      6/30/2017 12:45 PM Nam Kessler MD Psychiatry Clinic        Today's Diagnoses     Schizoaffective disorder, bipolar type (H)    -  1      Care Instructions    No Med Changes were made    Get Li lab completed 10-12 hours after last dose, at any [place] lab.    Follow-up in 2 months              Follow-ups after your visit        Follow-up notes from your care team     Return in about 2 months (around 8/30/2017).      Your next 10 appointments already scheduled     Jul 07, 2017 11:20 AM CDT   FLEX Extremity with Faiza Gregory, PT   [facility] ([facility]  )    [address] [place] [address] [id number]  [facility] [address] [phone]            Aug 30, 2017  1:45 PM CDT   Schizophrenia Follow Up with Nam Kessler MD   Psychiatry Clinic ([email or web address] Clinics)    [place] [id number] [facility] [address] [phone]              Who to contact     Please call your clinic at [phone] to:    Ask questions about your health    Make or cancel appointments    Discuss your medicines    Learn about your test results    Speak to your doctor   If you have compliments or concerns about an experience at your clinic, or if you wish to file a complaint, please contact [facility] Patient Relations at [phone] or email us at [email or web address]         Additional Information About Your Visit        MyChart Information     [employer] gives you secure access to your electronic health record. If you see a primary care provider, you can also send messages to your care team and make appointments. If you have questions, please call your primary care clinic.  If you do not have a primary care provider, please  call [phone] and they will assist you.      [employer] is an electronic gateway that provides easy, online access to your medical records. With [employer], you can request a clinic appointment, read your test results, renew a prescription or communicate with your care team.     To access your existing account, please contact your [facility] or call [phone] for assistance.        Care EveryWhere ID     This is your Care EveryWhere ID. This could be used by other organizations to access your [facility] medical records  [id number]        Your Vitals Were     Pulse BMI (Body Mass Index)                90 21.95 kg/m2           Blood Pressure from Last 3 Encounters:   06/30/17 120/72   06/07/17 94/60   05/10/17 102/60    Weight from Last 3 Encounters:   06/30/17 71.4 kg (157 lb 6.4 oz)   06/07/17 74.4 kg (164 lb)   05/10/17 73.9 kg (163 lb)              We Performed the Following     Lithium Level        Primary Care Provider Office Phone # Fax #    Leonard Dai [phone]       [facility] [address] [facility] [address]        Equal Access to Services     JR URIAS : Hadii aad anselmo hadasho Soomaali, waaxda luqadaha, qaybta kaalmada adeegyada, marcia woods. So [facility] [phone].    ATENCIÓN: Si habla español, tiene a magaña disposición servicios gratuitos de asistencia lingüística. Maricruz al [phone].    We comply with applicable federal civil rights laws and [place] laws. We do not discriminate on the basis of race, color, national origin, age, disability sex, sexual orientation or gender identity.            Thank you!     Thank you for choosing PSYCHIATRY CLINIC  for your care. Our goal is always to provide you with excellent care. Hearing back from our patients is one way we can continue to improve our services. Please take a few minutes to complete the written survey that you may receive in the mail after  your visit with us. Thank you!             Your Updated Medication List - Protect others around you: Learn how to safely use, store and throw away your medicines at www.disposemymeds.org.          This list is accurate as of: 6/30/17 11:59 PM.  Always use your most recent med list.                   Brand Name Dispense Instructions for use Diagnosis    ACE/ARB NOT PRESCRIBED (INTENTIONAL)      ACE & ARB not prescribed due to Risk for drug interaction- lithium toxicity in the past        aspirin 81 MG EC tablet      Take 81 mg by mouth daily.        cholecalciferol 1000 UNIT tablet    vitamin D     Take 1 tablet by mouth daily.        FISH OIL      1 capsule daily        glipiZIDE 5 MG 24 hr tablet    GLUCOTROL XL    90 tablet    TAKE 1 TABLET BY MOUTH DAILY    Encounter for medication refill       lithium 450 MG CR tablet    ESKALITH    90 tablet    Take 1 tablet (450 mg) by mouth At Bedtime    Schizoaffective disorder, bipolar type (H)       metFORMIN 500 MG 24 hr tablet    GLUCOPHAGE-XR    360 tablet    TAKE 2 TABLETS BY MOUTH TWICE DAILY    Encounter for medication refill       ONE TOUCH ULTRA test strip   Generic drug:  blood glucose monitoring     100 each    TEST ONCE DAILY AS DIRECTED    Encounter for medication refill       pioglitazone 45 MG tablet    ACTOS    90 tablet    TAKE 1 TABLET BY MOUTH DAILY    Encounter for medication refill       risperiDONE 2 MG tablet    risperDAL    90 tablet    Take 1 tablet (2 mg) by mouth At Bedtime    Schizoaffective disorder, bipolar type (H)       simvastatin 40 MG tablet    ZOCOR    90 tablet    TAKE ONE TABLET BY MOUTH EVERY NIGHT AT BEDTIME    Encounter for medication refill       traZODone 100 MG tablet    DESYREL    90 tablet    Take 0.5-1 tablets ( mg) by mouth nightly as needed for sleep    Schizoaffective disorder, bipolar type (H)       venlafaxine 150 MG 24 hr capsule    EFFEXOR-XR    180 capsule    Take 2 capsules (300 mg) by mouth daily     Schizoaffective disorder, bipolar type (H)

## 2017-06-30 NOTE — PATIENT INSTRUCTIONS
No Med Changes were made    Get Li lab completed 10-12 hours after last dose, at any [place] lab.    Follow-up in 2 months

## 2017-07-06 ASSESSMENT — PATIENT HEALTH QUESTIONNAIRE - PHQ9: SUM OF ALL RESPONSES TO PHQ QUESTIONS 1-9: 0

## 2017-07-11 DIAGNOSIS — Z79.899 ENCOUNTER FOR LONG-TERM (CURRENT) USE OF MEDICATIONS: Primary | ICD-10-CM

## 2017-07-11 PROCEDURE — 36415 COLL VENOUS BLD VENIPUNCTURE: CPT | Performed by: FAMILY MEDICINE

## 2017-07-12 LAB
ALBUMIN SERPL-MCNC: 4.2 G/DL (ref 3.5–5.5)
ALBUMIN/GLOB SERPL: 2.5 {RATIO} (ref 1.2–2.2)
ALP SERPL-CCNC: 72 IU/L (ref 39–117)
ALT SERPL-CCNC: 12 IU/L (ref 0–44)
AST SERPL-CCNC: 14 IU/L (ref 0–40)
BILIRUB SERPL-MCNC: 0.3 MG/DL (ref 0–1.2)
BUN SERPL-MCNC: 28 MG/DL (ref 6–24)
BUN/CREATININE RATIO: 24 (ref 9–20)
CALCIUM SERPL-MCNC: 9.7 MG/DL (ref 8.7–10.2)
CHLORIDE SERPLBLD-SCNC: 104 MMOL/L (ref 96–106)
CHOLEST SERPL-MCNC: 126 MG/DL (ref 100–199)
CREAT SERPL-MCNC: 1.17 MG/DL (ref 0.76–1.27)
EGFR IF AFRICN AM: 79 ML/MIN/1.73
EGFR IF NONAFRICN AM: 68 ML/MIN/1.73
GLOBULIN, TOTAL: 1.7 G/DL (ref 1.5–4.5)
GLUCOSE SERPL-MCNC: 234 MG/DL (ref 65–99)
HDLC SERPL-MCNC: 76 MG/DL
LDL/HDL RATIO: 0.5 RATIO UNITS (ref 0–3.6)
LDLC SERPL CALC-MCNC: 40 MG/DL (ref 0–99)
LITHIUM SERPL-SCNC: 0.6 MMOL/L (ref 0.6–1.2)
POTASSIUM SERPL-SCNC: 4.9 MMOL/L (ref 3.5–5.2)
PROT SERPL-MCNC: 5.9 G/DL (ref 6–8.5)
SODIUM SERPL-SCNC: 141 MMOL/L (ref 134–144)
T4 FREE SERPL-MCNC: 1.12 NG/DL (ref 0.82–1.77)
TOTAL CO2: 23 MMOL/L (ref 18–28)
TRIGL SERPL-MCNC: 49 MG/DL (ref 0–149)
TSH BLD-ACNC: 1.55 UIU/ML (ref 0.45–4.5)
VLDLC SERPL CALC-MCNC: 10 MG/DL (ref 5–40)

## 2017-07-13 LAB — OSMOLALITY, URINE: 279 MOSMOL/KG

## 2017-09-11 ENCOUNTER — OFFICE VISIT (OUTPATIENT)
Dept: PSYCHIATRY | Facility: CLINIC | Age: 57
End: 2017-09-11
Attending: PSYCHIATRY & NEUROLOGY
Payer: MEDICARE

## 2017-09-11 VITALS
DIASTOLIC BLOOD PRESSURE: 75 MMHG | WEIGHT: 157.4 LBS | HEART RATE: 109 BPM | SYSTOLIC BLOOD PRESSURE: 148 MMHG | BODY MASS INDEX: 21.95 KG/M2

## 2017-09-11 DIAGNOSIS — F25.0 SCHIZOAFFECTIVE DISORDER, BIPOLAR TYPE (H): ICD-10-CM

## 2017-09-11 PROCEDURE — 99212 OFFICE O/P EST SF 10 MIN: CPT | Mod: ZF

## 2017-09-11 RX ORDER — TRAZODONE HYDROCHLORIDE 100 MG/1
50-100 TABLET ORAL
Qty: 30 TABLET | Refills: 2 | Status: SHIPPED | OUTPATIENT
Start: 2017-09-11 | End: 2017-11-27

## 2017-09-11 RX ORDER — LITHIUM CARBONATE 300 MG/1
300 TABLET, FILM COATED, EXTENDED RELEASE ORAL AT BEDTIME
Qty: 30 TABLET | Refills: 2 | Status: SHIPPED | OUTPATIENT
Start: 2017-09-11 | End: 2017-11-27 | Stop reason: DRUGHIGH

## 2017-09-11 RX ORDER — RISPERIDONE 2 MG/1
2 TABLET ORAL AT BEDTIME
Qty: 30 TABLET | Refills: 2 | Status: SHIPPED | OUTPATIENT
Start: 2017-09-11 | End: 2017-11-27

## 2017-09-11 RX ORDER — VENLAFAXINE HYDROCHLORIDE 150 MG/1
300 CAPSULE, EXTENDED RELEASE ORAL DAILY
Qty: 60 CAPSULE | Refills: 2 | Status: SHIPPED | OUTPATIENT
Start: 2017-09-11 | End: 2017-11-27

## 2017-09-11 NOTE — NURSING NOTE
Chief Complaint   Patient presents with     Recheck Medication       Schizoaffective disorder, bipolar type     Reviewed allergies, smoking status, and pharmacy preference  Administered abuse screening question  Obtained weight, blood pressure and heart rate

## 2017-09-11 NOTE — MR AVS SNAPSHOT
After Visit Summary   9/11/2017    Caden Bush    MRN: [id number]           Patient Information     Date Of Birth          1960        Visit Information        Provider Department      9/11/2017 1:15 PM Nam Kessler MD Psychiatry Clinic        Today's Diagnoses     Schizoaffective disorder, bipolar type (H)           Follow-ups after your visit        Follow-up notes from your care team     Return in about 4 weeks (around 10/9/2017).      Your next 10 appointments already scheduled     Oct 25, 2017  1:45 PM CDT   Schizophrenia Follow Up with Nam Kessler MD   Psychiatry Clinic ([facility])    [address]  [address] [facility] [id number] [phone]              Who to contact     Please call your clinic at [phone] to:    Ask questions about your health    Make or cancel appointments    Discuss your medicines    Learn about your test results    Speak to your doctor   If you have compliments or concerns about an experience at your clinic, or if you wish to file a complaint, please contact [facility] Patient Relations at [phone] or email us at [email or web address]         Additional Information About Your Visit        MyChart Information     [employer] gives you secure access to your electronic health record. If you see a primary care provider, you can also send messages to your care team and make appointments. If you have questions, please call your primary care clinic.  If you do not have a primary care provider, please call [phone] and they will assist you.      [employer] is an electronic gateway that provides easy, online access to your medical records. With [employer], you can request a clinic appointment, read your test results, renew a prescription or communicate with your care team.     To access your existing account, please contact your [facility] or call  [phone] for assistance.        Care EveryWhere ID     This is your Care EveryWhere ID. This could be used by other organizations to access your [place] medical records  [id number]        Your Vitals Were     Pulse BMI (Body Mass Index)                109 21.95 kg/m2           Blood Pressure from Last 3 Encounters:   09/11/17 148/75   06/30/17 120/72   06/07/17 94/60    Weight from Last 3 Encounters:   09/11/17 71.4 kg (157 lb 6.4 oz)   06/30/17 71.4 kg (157 lb 6.4 oz)   06/07/17 74.4 kg (164 lb)              Today, you had the following     No orders found for display         Today's Medication Changes          These changes are accurate as of: 9/11/17 11:59 PM.  If you have any questions, ask your nurse or doctor.               These medicines have changed or have updated prescriptions.        Dose/Directions    lithium 300 MG CR tablet   Commonly known as:  ESKALITH/LITHOBID   This may have changed:    - medication strength  - how much to take   Used for:  Schizoaffective disorder, bipolar type (H)   Changed by:  Nam Kessler MD        Dose:  300 mg   Take 1 tablet (300 mg) by mouth At Bedtime   Quantity:  30 tablet   Refills:  2            Where to get your medicines      These medications were sent to [facility] [address] [place], [place] - [address] AT [facility] [address]  [address] [facility] [address]     Phone:  [phone]     lithium 300 MG CR tablet    risperiDONE 2 MG tablet    traZODone 100 MG tablet    venlafaxine 150 MG 24 hr capsule                Primary Care Provider Office Phone # Fax #    Leonard Dai [phone] [address]  [facility] [address]        Equal Access to Services     JR URIAS AH: Pat Baeza, waargentinada luqadaha, qaybta kaalmada tatum, marcia woods. So [facility] [phone].    ATENCIÓN: Si habla español, tiene a magaña disposición servicios gratuitos de asistencia  lingüística. Maricruz al [phone].    We comply with applicable federal civil rights laws and [place] laws. We do not discriminate on the basis of race, color, national origin, age, disability sex, sexual orientation or gender identity.            Thank you!     Thank you for choosing PSYCHIATRY CLINIC  for your care. Our goal is always to provide you with excellent care. Hearing back from our patients is one way we can continue to improve our services. Please take a few minutes to complete the written survey that you may receive in the mail after your visit with us. Thank you!             Your Updated Medication List - Protect others around you: Learn how to safely use, store and throw away your medicines at www.disposemymeds.org.          This list is accurate as of: 9/11/17 11:59 PM.  Always use your most recent med list.                   Brand Name Dispense Instructions for use Diagnosis    ACE/ARB NOT PRESCRIBED (INTENTIONAL)      ACE & ARB not prescribed due to Risk for drug interaction- lithium toxicity in the past        aspirin 81 MG EC tablet      Take 81 mg by mouth daily.        cholecalciferol 1000 UNIT tablet    vitamin D     Take 1 tablet by mouth daily.        FISH OIL      1 capsule daily        glipiZIDE 5 MG 24 hr tablet    GLUCOTROL XL    90 tablet    TAKE 1 TABLET BY MOUTH DAILY    Encounter for medication refill       lithium 300 MG CR tablet    ESKALITH/LITHOBID    30 tablet    Take 1 tablet (300 mg) by mouth At Bedtime    Schizoaffective disorder, bipolar type (H)       metFORMIN 500 MG 24 hr tablet    GLUCOPHAGE-XR    360 tablet    TAKE 2 TABLETS BY MOUTH TWICE DAILY    Encounter for medication refill       ONE TOUCH ULTRA test strip   Generic drug:  blood glucose monitoring     100 each    TEST ONCE DAILY AS DIRECTED    Encounter for medication refill       pioglitazone 45 MG tablet    ACTOS    90 tablet    TAKE 1 TABLET BY MOUTH DAILY    Encounter for medication refill        risperiDONE 2 MG tablet    risperDAL    30 tablet    Take 1 tablet (2 mg) by mouth At Bedtime    Schizoaffective disorder, bipolar type (H)       simvastatin 40 MG tablet    ZOCOR    90 tablet    TAKE ONE TABLET BY MOUTH EVERY NIGHT AT BEDTIME    Encounter for medication refill       traZODone 100 MG tablet    DESYREL    30 tablet    Take 0.5-1 tablets ( mg) by mouth nightly as needed for sleep    Schizoaffective disorder, bipolar type (H)       venlafaxine 150 MG 24 hr capsule    EFFEXOR-XR    60 capsule    Take 2 capsules (300 mg) by mouth daily    Schizoaffective disorder, bipolar type (H)

## 2017-09-11 NOTE — PROGRESS NOTES
PSYCHIATRY CLINIC PROGRESS NOTE   The initial diagnostic evaluation was on 11/26/08 and the last transfer of care evaluation was 06/30/17.    Pertinent Background:  This patient first experienced mental health issues in his late 20s , initially obtained care at that time and has received treatment for Schizoaffective Disorder, bipolar type.  See transfer evaluation for detailed history.  Notably, this pt has been stable on regimen of Lithium, Effexor and Risperdal since 2011. Historically, lithium dose 450 mg has been adequate for mood stabilization although an increase could reasonably be considered if needed.    Psych critical item history includes suicidal ideation, psychosis [sxs include AH and paranoia], mutiple psychotropic trials and psych hosp (>5).     INTERIM HISTORY                                                 Caden Bush is a 57 year old male who was last seen in clinic on 6/30/17 at which time patient reported stable mood. No medication changes were made.     The patient reports good treatment adherence.  History was provided by patient who was a good historian.  Since the last visit:  Patient reports that he has been doing well. He has been going for walks, being watchful with carbs, has gone to see some movies. He plays with his sister's dogs and cats. He met with sisters on labor day weekend. He has applied for a new apartment where there will be pool, movie theater, transportation and chapel. He also has friends there. He is seeing his therapist regularly. He has been taking his medications regularly. He denies side affects. He will be following up with pcp for diabetes.    RECENT SYMPTOMS:   DEPRESSION: reports-none;  DENIES- suicidal ideation  PSYCHOSIS:  reports-none;  DENIES- hallucinations and delusions  EATING DISORDER: none    RECENT SUBSTANCE USE:     ALCOHOL-  never         TOBACCO- none        CAFFEINE- 1-2 cups/day of coffee, 1-2 sodas/day     OPIOIDS- none      NARCAN KIT- N/A           CANNABIS- none          OTHER ILLICIT DRUGS- none     CURRENT SOCIAL HISTORY:  Financial Support-  SSDI for schizoaffective disorder and diabetes.     Living Situation- Currently living by self in subsidized apartment in [place] since Feb 2011.   Children- none     MEDICAL ROS:  Reports none.  Denies GI [nausea, diarrhea, constipation,  ], headache, sedation, tremor and confusion.        PSYCH AND CD CRITICAL ITEM SUMMARY     Sept 2017 - Decrease Li from 450 mg to 300 mg qhs (BUN/cr was 24.)    PAST PSYCH MED TRIALS     Pl see  Problem list -Hx of Psychiatric care    MEDICAL / SURGICAL HISTORY                                   CARE TEAM:   PCP- Dr. Dai with [facility]    Therapist-  Yeny Goff at [facility]      Ophthalmologist: Jacob Lieberman with [facility]    Patient Active Problem List   Diagnosis     Eczema     Retinopathy     Schizoaffective disorder, bipolar type (H)     Polyp of colon, adenomatous     Health Care Home     Diabetic retinopathy associated with type 2 diabetes mellitus (H)     DM (diabetes mellitus), type 2 with ophthalmic complications (H)     Hx of psychiatric care     Neuropathy of left upper extremity     ALLERGY                                Review of patient's allergies indicates no known allergies.  MEDICATIONS                               Current Outpatient Prescriptions   Medication Sig Dispense Refill     pioglitazone (ACTOS) 45 MG tablet TAKE 1 TABLET BY MOUTH DAILY 90 tablet 1     metFORMIN (GLUCOPHAGE-XR) 500 MG 24 hr tablet TAKE 2 TABLETS BY MOUTH TWICE DAILY 360 tablet 3     glipiZIDE (GLUCOTROL XL) 5 MG 24 hr tablet TAKE 1 TABLET BY MOUTH DAILY 90 tablet 1     lithium (ESKALITH) 450 MG CR tablet Take 1 tablet (450 mg) by mouth At Bedtime 90 tablet 1     venlafaxine (EFFEXOR-XR) 150 MG 24 hr capsule Take 2 capsules (300 mg) by mouth daily 180 capsule 1     risperiDONE (RISPERDAL) 2 MG tablet Take 1 tablet (2 mg) by mouth At Bedtime 90  tablet 1     traZODone (DESYREL) 100 MG tablet Take 0.5-1 tablets ( mg) by mouth nightly as needed for sleep 90 tablet 1     simvastatin (ZOCOR) 40 MG tablet TAKE ONE TABLET BY MOUTH EVERY NIGHT AT BEDTIME 90 tablet 3     ONE TOUCH ULTRA test strip TEST ONCE DAILY AS DIRECTED 100 each 3     ACE/ARB NOT PRESCRIBED, INTENTIONAL, ACE & ARB not prescribed due to Risk for drug interaction- lithium toxicity in the past       FISH OIL 1 capsule daily        aspirin 81 MG EC tablet Take 81 mg by mouth daily.       cholecalciferol (VITAMIN D) 1000 UNIT tablet Take 1 tablet by mouth daily.       VITALS   /75  Pulse 109  Wt 71.4 kg (157 lb 6.4 oz)  BMI 21.95 kg/m2   MENTAL STATUS EXAM                                                             Alertness: alert  and oriented  Appearance: well groomed  Behavior/Demeanor: cooperative, pleasant and calm, with good  eye contact   Speech: normal  Language: intact and no problems  Psychomotor: normal or unremarkable  Mood: description consistent with euthymia  Affect: full range; was congruent to mood; was congruent to content  Thought Process/Associations: unremarkable  Thought Content:  Reports none;   Denies suicidal ideation  and psychotic thought  Perception:   Reports none;  Denies auditory hallucinations (NO;  command-NO;  following command-NO and visual hallucinations (NO)  Insight: fair  Judgment: good  Cognition: does  appear grossly intact; formal cognitive testing was not done    LABS and DATA     PHQ9 TODAY = 2  PHQ-9 SCORE 4/21/2017 5/10/2017 6/30/2017   Total Score - - -   Total Score 0 0 0   Total Score - - -       RATING SCALES:  AIMS next due date needs to be determined    LITHIUM LABS     [level, renal, SG, TSH, WBC]    q6 mo  Recent Labs   Lab Test  07/11/17   1034  12/20/16   1301  06/09/16   1030  12/21/15   1413   LITHIUM  0.6  0.6  0.5*  0.5*     Recent Labs   Lab Test  07/11/17   1034  12/20/16   1301  06/09/16   1030  12/21/15   1413    01/26/15   1059   CR  1.17  1.21  1.26*  1.11   < >  1.15   GFRESTIMATED   --    --   59*  68   --   66   NA  141  142  140  133   < >  135   PEE  9.7  10.1  9.4  9.2   < >  10.2*    < > = values in this interval not displayed.   **BUN 7/11/17: 28 mg/dL (rr 6-24) - HIGH**  **BUN/Creatinine ratio 7/11/17: 24 (rr 9-20) - HIGH**     BUN 12/20/16: 21 mg/dL      BUN/Creatinine ratio 12/20/16: 17    Recent Labs   Lab Test  06/09/16   1040  12/21/15   1435   SG  1.014  1.007     Recent Labs   Lab Test  06/09/16   1030  12/21/15   1413  01/26/15   1059   TSH  1.70  1.69  3.39     Recent Labs   Lab Test  12/20/16   1240  06/09/16   1030   WBC  3.7*  4.7     ANTIPSYCHOTIC LABS   [glu, A1C, lipids (focus LDL), liver enzymes, WBC, ANEU, Hgb, plts]    q12 mo  Recent Labs   Lab Test  07/11/17   1034  06/07/17   1004  12/20/16   1301  12/13/16   0949   GLC  234*   --   195*   --    A1C   --   8.2*   --   6.6*     Recent Labs   Lab Test  07/11/17   1034  06/07/17   1004   CHOL  126  143   TRIG  49  105   LDL  40  46   HDL  76  76     Recent Labs   Lab Test  07/11/17   1034  12/20/16   1301   AST  14  14   ALT  12  14   ALKPHOS  72  72     Recent Labs   Lab Test  12/20/16   1240  06/09/16   1030  12/21/15   1413   WBC  3.7*  4.7  4.6   ANEU   --   3.5  3.4   HGB  13.6  14.7  13.4   PLT  205  175  155     DIAGNOSES                                                                                                   Schizoaffective disorder, bipolar type, MRE uncertain, euthymic without current psychotic features.    ASSESSMENT                                       TODAY: Caden reports doing well with stable mood. No concerns at this time.     Abnormal labs - Elevated BUN and BUN/Cr 24, Will contact PCP. Li dose reduced to 300 mg qhs from 450 mg qhs, with plan to likely gradually discontinue Li in light of renal dysfunction. Future potential to increase risperidone for compensatory mood stabilizing regimen.  Patient advised to notify us  if mood changes noticed - discussed particular signs and symptoms to be vigilant for.     [place] PRESCRIPTION MONITORING PROGRAM [] was not checked today and revealed: N/A    PSYCHOTROPIC DRUG INTERACTIONS:    VENLAFAXINE and ANTIPLATELET AGENTS (ASPIRIN) may result in an increased risk of bleeding  LITHIUM and NSAIDS may result in lithium toxicity.  LITHIUM and DOPAMINE-2 ANTAGONISTS (RISPERDAL) may result in weakness, dyskinesias, increased extrapyramidal symptoms, encephalopathy, and brain damage.    LITHIUM and TRAZODONE may result in increased risk of serotonin syndrome   RISPERIDONE and SIMVASTATIN may result in increased simvastatin serum concentrations with an increased risk of myopathy or rhabdomyolysis.   TRAZODONE and VENLAFAXINE may result in an increased risk of serotonin syndrome     MANAGEMENT:  Monitoring for adverse effects, routine vitals, routine labs, minimal use of [trazodone] and patient is aware of risks      PLAN                                                                                                       1) PSYCHOTROPIC MEDICATIONS:  - Decrease Lithium from 450 mg to CR 300mg QHS (due to elevated BUN and BUN/Cr ratio)  - Continue risperidone 2mg QHS  - Continue Effexor XR 300mg daily  - Continue trazodone 50mg QHS PRN (uses about x2 per week)    2) THERAPY:  Seeing psychologist Yeny Goff at [facility] since 11/2015. Sees once per month.     3) LABS NEXT DUE: Anti-psychotic, Li labs Jan 2018       RATING SCALES:  AIMS next due date needs to be determined    4) REFERRALS [MICD, medical etc.]:  Recommended follow-up with primary care provider for further evaluation and recommendations re: renal dysfunction, in context of diabetes and Lithium regimen.      5) MTM REFERRAL [PharmD]:  none    6) :  none Previously had a CCM until 2011. Has SW consult on 10/20/15, not interested in CM or [facility] worker services at this time and seems to be managing well on his own.      7) RTC: 1 months    8) CRISIS NUMBERS:   Provided routinely in AVS    TREATMENT RISK STATEMENT:  The risks, benefits, alternatives and potential adverse effects have been discussed and are understood by the patient. The pt understands the risks of using street drugs or alcohol.  There are no medical contraindications, the pt agrees to treatment with the ability to do so.  The patient understands to call 911 or come to the nearest ED if life threatening or urgent symptoms present.      PSYCHIATRY CLINIC INDIVIDUAL PSYCHOTHERAPY NOTE                                    16   Start time: 1:15  End time: 1:40  Subjective: This supportive psychotherapy session addressed issues related to goals of therapy.  Patient's reaction: Preparatory in the context of mental status appropriate for ambulatory setting.  Progress: fair to good  Plan: RTC 2 months  Psychotherapy services during this visit included  myself and patient  TREATMENT  PLAN          SYMPTOMS;PROBLEMS   MEASURABLE GOALS;    FUNCTIONAL IMPROVEMENT INTERVENTIONS;    GAINS MADE DISCHARGE CRITERIA   Depression: depressed mood   get 7-8 hours of restful sleep every night Medications  Therapy  Lifestyle changes marked symptom improvement         Nma Kessler MD    Patient was staffed by attending Dr. Phillips. My supervisor is Dr. Braswell.      Supervisor Attestation:  I saw the patient with the resident, and participated in key portions of the service, including the mental status examination and developing the plan of care. I reviewed key portions of the history with the resident. I agree with the findings and plan as documented in this note.  Antolin Phillips MD

## 2017-09-12 ASSESSMENT — PATIENT HEALTH QUESTIONNAIRE - PHQ9: SUM OF ALL RESPONSES TO PHQ QUESTIONS 1-9: 2

## 2017-09-15 DIAGNOSIS — E11.319 DIABETIC RETINOPATHY ASSOCIATED WITH TYPE 2 DIABETES MELLITUS (H): Primary | ICD-10-CM

## 2017-09-15 PROCEDURE — 36415 COLL VENOUS BLD VENIPUNCTURE: CPT | Performed by: FAMILY MEDICINE

## 2017-09-16 LAB — HBA1C MFR BLD: 6.8 % (ref 4.8–5.6)

## 2017-10-05 DIAGNOSIS — Z76.0 ENCOUNTER FOR MEDICATION REFILL: ICD-10-CM

## 2017-11-27 ENCOUNTER — OFFICE VISIT (OUTPATIENT)
Dept: PSYCHIATRY | Facility: CLINIC | Age: 57
End: 2017-11-27
Attending: PSYCHIATRY & NEUROLOGY
Payer: MEDICARE

## 2017-11-27 VITALS
DIASTOLIC BLOOD PRESSURE: 85 MMHG | HEART RATE: 111 BPM | SYSTOLIC BLOOD PRESSURE: 150 MMHG | WEIGHT: 156.2 LBS | BODY MASS INDEX: 21.79 KG/M2

## 2017-11-27 DIAGNOSIS — F25.0 SCHIZOAFFECTIVE DISORDER, BIPOLAR TYPE (H): ICD-10-CM

## 2017-11-27 PROCEDURE — 99212 OFFICE O/P EST SF 10 MIN: CPT | Mod: ZF

## 2017-11-27 RX ORDER — VENLAFAXINE HYDROCHLORIDE 150 MG/1
300 CAPSULE, EXTENDED RELEASE ORAL DAILY
Qty: 60 CAPSULE | Refills: 2 | Status: SHIPPED | OUTPATIENT
Start: 2017-11-27 | End: 2017-12-21

## 2017-11-27 RX ORDER — LITHIUM CARBONATE 150 MG/1
150 CAPSULE ORAL DAILY
Qty: 30 CAPSULE | Refills: 0 | Status: SHIPPED | OUTPATIENT
Start: 2017-11-27 | End: 2017-11-27

## 2017-11-27 RX ORDER — RISPERIDONE 3 MG/1
3 TABLET ORAL AT BEDTIME
Qty: 30 TABLET | Refills: 2 | Status: SHIPPED | OUTPATIENT
Start: 2017-11-27 | End: 2017-12-21

## 2017-11-27 RX ORDER — LITHIUM CARBONATE 150 MG/1
150 CAPSULE ORAL AT BEDTIME
Qty: 30 CAPSULE | Refills: 0 | Status: SHIPPED | OUTPATIENT
Start: 2017-11-27 | End: 2017-12-21

## 2017-11-27 RX ORDER — TRAZODONE HYDROCHLORIDE 100 MG/1
50-100 TABLET ORAL
Qty: 30 TABLET | Refills: 2 | Status: SHIPPED | OUTPATIENT
Start: 2017-11-27 | End: 2017-12-22

## 2017-11-27 NOTE — MR AVS SNAPSHOT
After Visit Summary   11/27/2017    Caden Bush    MRN: [id number]           Patient Information     Date Of Birth          1960        Visit Information        Provider Department      11/27/2017 1:45 PM Nam Kessler MD Psychiatry Clinic         Follow-ups after your visit        Your next 10 appointments already scheduled     Dec 21, 2017 10:15 AM CST   Adult Med Follow UP with Nam Kessler MD   Psychiatry Clinic ([facility])    [place] [id number]  [address] [facility] [address] [phone]            Dec 27, 2017 10:00 AM CST   [facility] with Leonard Dai MD   [facility]    [address] [place] [address] [phone]              Who to contact     Please call your clinic at [phone] to:    Ask questions about your health    Make or cancel appointments    Discuss your medicines    Learn about your test results    Speak to your doctor   If you have compliments or concerns about an experience at your clinic, or if you wish to file a complaint, please contact [facility] Patient Relations at [phone] or email us at [email or web address]         Additional Information About Your Visit        MyChart Information     [employer] gives you secure access to your electronic health record. If you see a primary care provider, you can also send messages to your care team and make appointments. If you have questions, please call your primary care clinic.  If you do not have a primary care provider, please call [phone] and they will assist you.      [employer] is an electronic gateway that provides easy, online access to your medical records. With [employer], you can request a clinic appointment, read your test results, renew a prescription or communicate with your care team.     To access your existing account, please contact your [facility] or call [phone] for assistance.        Care EveryWhere ID     This is your Care EveryWhere ID. This could be used by other organizations to access your [place] medical records  [id number]        Your Vitals Were     Pulse BMI (Body Mass Index)                111 21.79 kg/m2           Blood Pressure from Last 3 Encounters:   11/27/17 150/85   09/11/17 148/75   06/30/17 120/72    Weight from Last 3 Encounters:   11/27/17 70.9 kg (156 lb 3.2 oz)   09/11/17 71.4 kg (157 lb 6.4 oz)   06/30/17 71.4 kg (157 lb 6.4 oz)              Today, you had the following     No orders found for display       Primary Care Provider Office Phone # Fax #    Leonard Dai [phone] [address] [facility] [address]        Equal Access to Services     [facility]AC[facility] : Hadii aad ku hadasho Soomaali, waaxda luqadaha, qaybta kaalmada adeegyada, waxay toreyin hayrafaelan miguelina diaz . So [facility] [phone].    ATENCIÓN: Si habla español, tiene a magaña disposición servicios gratuitos de asistencia lingüística. Llame al [phone].    We comply with applicable federal civil rights laws and [place] laws. We do not discriminate on the basis of race, color, national origin, age, disability, sex, sexual orientation, or gender identity.            Thank you!     Thank you for choosing PSYCHIATRY CLINIC  for your care. Our goal is always to provide you with excellent care. Hearing back from our patients is one way we can continue to improve our services. Please take a few minutes to complete the written survey that you may receive in the mail after your visit with us. Thank you!             Your Updated Medication List - Protect others around you: Learn how to safely use, store and throw away your medicines at www.disposemymeds.org.          This list is accurate as of: 11/27/17  2:56 PM.  Always use your most recent med list.                   Brand Name Dispense Instructions for use  Diagnosis    ACE/ARB/ARNI NOT PRESCRIBED (INTENTIONAL)      ACE & ARB not prescribed due to Risk for drug interaction- lithium toxicity in the past        aspirin 81 MG EC tablet      Take 81 mg by mouth daily.        blood glucose monitoring test strip    ONETOUCH ULTRA    100 each    TEST ONCE DAILY AS DIRECTED    Encounter for medication refill       cholecalciferol 1000 UNIT tablet    vitamin D3     Take 1 tablet by mouth daily.        FISH OIL      1 capsule daily        glipiZIDE 5 MG 24 hr tablet    GLUCOTROL XL    90 tablet    TAKE 1 TABLET BY MOUTH DAILY    Encounter for medication refill       lithium 300 MG CR tablet    ESKALITH/LITHOBID    30 tablet    Take 1 tablet (300 mg) by mouth At Bedtime    Schizoaffective disorder, bipolar type (H)       metFORMIN 500 MG 24 hr tablet    GLUCOPHAGE-XR    360 tablet    TAKE 2 TABLETS BY MOUTH TWICE DAILY    Encounter for medication refill       pioglitazone 45 MG tablet    ACTOS    90 tablet    TAKE 1 TABLET BY MOUTH DAILY    Encounter for medication refill       risperiDONE 2 MG tablet    risperDAL    30 tablet    Take 1 tablet (2 mg) by mouth At Bedtime    Schizoaffective disorder, bipolar type (H)       * simvastatin 40 MG tablet    ZOCOR    90 tablet    TAKE ONE TABLET BY MOUTH EVERY NIGHT AT BEDTIME    Encounter for medication refill       * simvastatin 40 MG tablet    ZOCOR    90 tablet    TAKE ONE TABLET BY MOUTH EVERY NIGHT AT BEDTIME    Encounter for medication refill       traZODone 100 MG tablet    DESYREL    30 tablet    Take 0.5-1 tablets ( mg) by mouth nightly as needed for sleep    Schizoaffective disorder, bipolar type (H)       venlafaxine 150 MG 24 hr capsule    EFFEXOR-XR    60 capsule    Take 2 capsules (300 mg) by mouth daily    Schizoaffective disorder, bipolar type (H)       * Notice:  This list has 2 medication(s) that are the same as other medications prescribed for you. Read the directions carefully, and ask your doctor or other care  provider to review them with you.

## 2017-11-27 NOTE — PROGRESS NOTES
PSYCHIATRY CLINIC PROGRESS NOTE   The initial diagnostic evaluation was on 11/26/08 and the last transfer of care evaluation was 06/30/17.    Pertinent Background:  This patient first experienced mental health issues in his late 20s , initially obtained care at that time and has received treatment for Schizoaffective Disorder, bipolar type.  See transfer evaluation for detailed history.  Notably, this pt has been stable on regimen of Lithium, Effexor and Risperdal since 2011. Historically, lithium dose 450 mg has been adequate for mood stabilization although an increase could reasonably be considered if needed.    Psych critical item history includes suicidal ideation, psychosis [sxs include AH and paranoia], mutiple psychotropic trials and psych hosp (>5).     INTERIM HISTORY                                                 Caden Bush is a 57 year old male who was last seen in clinic on 9/11/17 at which time patient reported stable mood. No medication changes were made.     The patient reports good treatment adherence.  History was provided by patient who was a good historian.  Since the last visit:  - Patient reports that he has been doing well.   - Went to see a movie, goes to mall for walks  - TG with family, watched foot ball game  - Haven't seen therapist since October. MA ran out, sent needed documents today to reinstate. Report SW help not necessary in this.  - He has been taking his medications regularly. He denies side affects.  - No changes in mood.    RECENT SYMPTOMS:   DEPRESSION: reports-none;  DENIES- suicidal ideation  PSYCHOSIS:  reports-none;  DENIES- hallucinations and delusions  EATING DISORDER: none    RECENT SUBSTANCE USE:     ALCOHOL-  never         TOBACCO- none        CAFFEINE- 1-2 cups/day of coffee, 1-2 sodas/day     OPIOIDS- none      NARCAN KIT- N/A          CANNABIS- none          OTHER ILLICIT DRUGS- none     CURRENT SOCIAL HISTORY:  Financial Support-  SSDI for schizoaffective  disorder and diabetes.     Living Situation- Currently living by self in subsidized apartment in [place] since Feb 2011.   Children- none     MEDICAL ROS:  Reports none.  Denies GI [nausea, diarrhea, constipation,  ], headache, sedation, tremor and confusion.        PSYCH AND CD CRITICAL ITEM SUMMARY     Sept 2017 - Decrease Li from 450 mg to 300 mg qhs (BUN/cr was 24.)  Nov 2017 - Decrease Li to 150 mg, Increase Risperidone to 3 mg    PAST PSYCH MED TRIALS     Pl see  Problem list -Hx of Psychiatric care    MEDICAL / SURGICAL HISTORY                                   CARE TEAM:   PCP- Dr. Dai with [facility]    [facility]      Ophthalmologist: Jacob Lieberman with [facility]    Patient Active Problem List   Diagnosis     Eczema     Retinopathy     Schizoaffective disorder, bipolar type (H)     Polyp of colon, adenomatous     Health Care Home     Diabetic retinopathy associated with type 2 diabetes mellitus (H)     DM (diabetes mellitus), type 2 with ophthalmic complications (H)     Hx of psychiatric care     Neuropathy of left upper extremity     ALLERGY                                Review of patient's allergies indicates no known allergies.  MEDICATIONS                               Current Outpatient Prescriptions   Medication Sig Dispense Refill     simvastatin (ZOCOR) 40 MG tablet TAKE ONE TABLET BY MOUTH EVERY NIGHT AT BEDTIME 90 tablet 3     blood glucose monitoring (ONE TOUCH ULTRA) test strip TEST ONCE DAILY AS DIRECTED 100 each 3     traZODone (DESYREL) 100 MG tablet Take 0.5-1 tablets ( mg) by mouth nightly as needed for sleep 30 tablet 2     venlafaxine (EFFEXOR-XR) 150 MG 24 hr capsule Take 2 capsules (300 mg) by mouth daily 60 capsule 2     risperiDONE (RISPERDAL) 2 MG tablet Take 1 tablet (2 mg) by mouth At Bedtime 30 tablet 2     lithium (ESKALITH/LITHOBID) 300 MG CR tablet Take 1 tablet (300 mg) by mouth At Bedtime 30 tablet 2      pioglitazone (ACTOS) 45 MG tablet TAKE 1 TABLET BY MOUTH DAILY 90 tablet 1     metFORMIN (GLUCOPHAGE-XR) 500 MG 24 hr tablet TAKE 2 TABLETS BY MOUTH TWICE DAILY 360 tablet 3     glipiZIDE (GLUCOTROL XL) 5 MG 24 hr tablet TAKE 1 TABLET BY MOUTH DAILY 90 tablet 1     simvastatin (ZOCOR) 40 MG tablet TAKE ONE TABLET BY MOUTH EVERY NIGHT AT BEDTIME 90 tablet 3     ACE/ARB NOT PRESCRIBED, INTENTIONAL, ACE & ARB not prescribed due to Risk for drug interaction- lithium toxicity in the past       FISH OIL 1 capsule daily        aspirin 81 MG EC tablet Take 81 mg by mouth daily.       cholecalciferol (VITAMIN D) 1000 UNIT tablet Take 1 tablet by mouth daily.       VITALS   /85  Pulse 111  Wt 70.9 kg (156 lb 3.2 oz)  BMI 21.79 kg/m2   MENTAL STATUS EXAM                                                             Alertness: alert  and oriented  Appearance: casually groomed and malodorous  Behavior/Demeanor: cooperative, pleasant and calm, with good  eye contact   Speech: normal  Language: intact and no problems  Psychomotor: normal or unremarkable  Mood: description consistent with euthymia  Affect: full range; was congruent to mood; was congruent to content  Thought Process/Associations: unremarkable  Thought Content:  Reports none;   Denies suicidal ideation  and psychotic thought  Perception:   Reports none;  Denies auditory hallucinations (NO;  command-NO;  following command-NO and visual hallucinations (NO)  Insight: fair  Judgment: good  Cognition: does  appear grossly intact; formal cognitive testing was not done    LABS and DATA     PHQ9 TODAY = 0  PHQ-9 SCORE 5/10/2017 6/30/2017 9/11/2017   Total Score - - -   Total Score 0 0 2   Total Score - - -       RATING SCALES:  AIMS next due date needs to be determined    LITHIUM LABS     [level, renal, SG, TSH, WBC]    q6 mo  Recent Labs   Lab Test  07/11/17   1034  12/20/16   1301  06/09/16   1030  12/21/15   1413   LITHIUM  0.6  0.6  0.5*  0.5*     Recent Labs    Lab Test  07/11/17   1034  12/20/16   1301  06/09/16   1030  12/21/15   1413   01/26/15   1059   CR  1.17  1.21  1.26*  1.11   < >  1.15   GFRESTIMATED   --    --   59*  68   --   66   NA  141  142  140  133   < >  135   PEE  9.7  10.1  9.4  9.2   < >  10.2*    < > = values in this interval not displayed.   **BUN 7/11/17: 28 mg/dL (rr 6-24) - HIGH**  **BUN/Creatinine ratio 7/11/17: 24 (rr 9-20) - HIGH**     BUN 12/20/16: 21 mg/dL      BUN/Creatinine ratio 12/20/16: 17    Recent Labs   Lab Test  06/09/16   1040  12/21/15   1435   SG  1.014  1.007     Recent Labs   Lab Test  06/09/16   1030  12/21/15   1413  01/26/15   1059   TSH  1.70  1.69  3.39     Recent Labs   Lab Test  12/20/16   1240  06/09/16   1030   WBC  3.7*  4.7     ANTIPSYCHOTIC LABS   [glu, A1C, lipids (focus LDL), liver enzymes, WBC, ANEU, Hgb, plts]    q12 mo  Recent Labs   Lab Test  09/15/17   0956  07/11/17   1034  06/07/17   1004  12/20/16   1301   GLC   --   234*   --   195*   A1C  6.8*   --   8.2*   --      Recent Labs   Lab Test  07/11/17   1034  06/07/17   1004   CHOL  126  143   TRIG  49  105   LDL  40  46   HDL  76  76     Recent Labs   Lab Test  07/11/17   1034  12/20/16   1301   AST  14  14   ALT  12  14   ALKPHOS  72  72     Recent Labs   Lab Test  12/20/16   1240  06/09/16   1030  12/21/15   1413   WBC  3.7*  4.7  4.6   ANEU   --   3.5  3.4   HGB  13.6  14.7  13.4   PLT  205  175  155     DIAGNOSES                                                                                                   Schizoaffective disorder, bipolar type, MRE uncertain, euthymic without current psychotic features.    ASSESSMENT                                       TODAY: Caden reports doing well with stable mood. No concerns at this time.     Abnormal labs - Elevated BUN 28. Li dose further reduced with plan to likely gradually discontinue Li in light of renal dysfunction. increased risperidone for compensatory mood stabilizing regimen.    Patient advised to  notify us if mood changes noticed - discussed particular signs and symptoms to be vigilant for.     [place] PRESCRIPTION MONITORING PROGRAM [] was not checked today and revealed: N/A    PSYCHOTROPIC DRUG INTERACTIONS:    VENLAFAXINE and ANTIPLATELET AGENTS (ASPIRIN) may result in an increased risk of bleeding  LITHIUM and NSAIDS may result in lithium toxicity.  LITHIUM and DOPAMINE-2 ANTAGONISTS (RISPERDAL) may result in weakness, dyskinesias, increased extrapyramidal symptoms, encephalopathy, and brain damage.    LITHIUM and TRAZODONE may result in increased risk of serotonin syndrome   RISPERIDONE and SIMVASTATIN may result in increased simvastatin serum concentrations with an increased risk of myopathy or rhabdomyolysis.   TRAZODONE and VENLAFAXINE may result in an increased risk of serotonin syndrome     MANAGEMENT:  Monitoring for adverse effects, routine vitals, routine labs, minimal use of [trazodone] and patient is aware of risks      PLAN                                                                                                       1) PSYCHOTROPIC MEDICATIONS:  - Decrease Lithium from 300 mg to CR 150mg QHS (due to elevated BUN and BUN/Cr ratio)  - Increase risperidone from 2 mg to 3mg QHS  - Continue Effexor XR 300mg daily  - Continue trazodone 50mg QHS PRN (uses about x2 per week)    2) THERAPY:  Seeing psychologist Yeny Goff at [facility] since 11/2015. Sees once per month.     3) LABS NEXT DUE: Anti-psychotic, Li labs Jan 2018       RATING SCALES:  AIMS next due date needs to be determined    4) REFERRALS [MICD, medical etc.]:  Recommended follow-up with primary care provider for further evaluation and recommendations re: renal dysfunction, in context of diabetes and Lithium regimen.      5) MTM REFERRAL [PharmD]:  none    6) :  none Previously had a CCM until 2011. Has SW consult on 10/20/15, not interested in CM or [facility] worker services at this time and seems to be  managing well on his own.     7) RTC: 1 months    8) CRISIS NUMBERS:   Provided routinely in AVS    TREATMENT RISK STATEMENT:  The risks, benefits, alternatives and potential adverse effects have been discussed and are understood by the patient. The pt understands the risks of using street drugs or alcohol.  There are no medical contraindications, the pt agrees to treatment with the ability to do so.  The patient understands to call 911 or come to the nearest ED if life threatening or urgent symptoms present.      PSYCHIATRY CLINIC INDIVIDUAL PSYCHOTHERAPY NOTE                                    16   Start time: 1:15  End time: 1:40  Subjective: This supportive psychotherapy session addressed issues related to goals of therapy.  Patient's reaction: Preparatory in the context of mental status appropriate for ambulatory setting.  Progress: fair to good  Plan: RTC 1 month  Psychotherapy services during this visit included  myself and patient  TREATMENT  PLAN          SYMPTOMS;PROBLEMS   MEASURABLE GOALS;    FUNCTIONAL IMPROVEMENT INTERVENTIONS;    GAINS MADE DISCHARGE CRITERIA   Depression: depressed mood   get 7-8 hours of restful sleep every night Medications  Therapy  Lifestyle changes marked symptom improvement     Nam Kessler MD    Patient was staffed by attending Dr. Phillips. My supervisor is Dr. Braswell.    Supervisor Attestation:  I saw the patient with the resident, and participated in key portions of the service, including the mental status examination and developing the plan of care. I reviewed key portions of the history with the resident. I agree with the findings and plan as documented in this note.  Antolin Phillips MD

## 2017-11-28 ASSESSMENT — PATIENT HEALTH QUESTIONNAIRE - PHQ9: SUM OF ALL RESPONSES TO PHQ QUESTIONS 1-9: 0

## 2017-12-08 ENCOUNTER — TRANSFERRED RECORDS (OUTPATIENT)
Dept: FAMILY MEDICINE | Facility: CLINIC | Age: 57
End: 2017-12-08

## 2017-12-21 ENCOUNTER — OFFICE VISIT (OUTPATIENT)
Dept: PSYCHIATRY | Facility: CLINIC | Age: 57
End: 2017-12-21
Attending: PSYCHIATRY & NEUROLOGY
Payer: MEDICARE

## 2017-12-21 ENCOUNTER — TELEPHONE (OUTPATIENT)
Dept: PSYCHIATRY | Facility: CLINIC | Age: 57
End: 2017-12-21

## 2017-12-21 VITALS
WEIGHT: 155.8 LBS | BODY MASS INDEX: 21.73 KG/M2 | DIASTOLIC BLOOD PRESSURE: 68 MMHG | SYSTOLIC BLOOD PRESSURE: 133 MMHG | HEART RATE: 87 BPM

## 2017-12-21 DIAGNOSIS — F25.0 SCHIZOAFFECTIVE DISORDER, BIPOLAR TYPE (H): ICD-10-CM

## 2017-12-21 PROCEDURE — 99212 OFFICE O/P EST SF 10 MIN: CPT | Mod: ZF

## 2017-12-21 RX ORDER — VENLAFAXINE HYDROCHLORIDE 150 MG/1
150 CAPSULE, EXTENDED RELEASE ORAL DAILY
Qty: 30 CAPSULE | Refills: 0 | Status: SHIPPED | OUTPATIENT
Start: 2017-12-21 | End: 2018-12-26

## 2017-12-21 RX ORDER — RISPERIDONE 3 MG/1
3 TABLET ORAL AT BEDTIME
Qty: 30 TABLET | Refills: 2 | Status: SHIPPED | OUTPATIENT
Start: 2017-12-21 | End: 2018-01-22

## 2017-12-21 RX ORDER — VENLAFAXINE HYDROCHLORIDE 75 MG/1
75 CAPSULE, EXTENDED RELEASE ORAL DAILY
Qty: 30 CAPSULE | Refills: 2 | Status: SHIPPED | OUTPATIENT
Start: 2017-12-21 | End: 2018-01-22

## 2017-12-21 RX ORDER — LITHIUM CARBONATE 150 MG/1
150 CAPSULE ORAL AT BEDTIME
Qty: 30 CAPSULE | Refills: 2 | Status: SHIPPED | OUTPATIENT
Start: 2017-12-21 | End: 2018-01-22 | Stop reason: ALTCHOICE

## 2017-12-21 ASSESSMENT — PATIENT HEALTH QUESTIONNAIRE - PHQ9: SUM OF ALL RESPONSES TO PHQ QUESTIONS 1-9: 0

## 2017-12-21 NOTE — MR AVS SNAPSHOT
After Visit Summary   12/21/2017    Caden Bush    MRN: [id number]           Patient Information     Date Of Birth          1960        Visit Information        Provider Department      12/21/2017 10:15 AM Nam Kessler MD Psychiatry Clinic        Today's Diagnoses     Schizoaffective disorder, bipolar type (H)           Follow-ups after your visit        Follow-up notes from your care team     Return in about 4 weeks (around 1/18/2018).      Your next 10 appointments already scheduled     Jan 22, 2018 12:45 PM CST   Adult Med Follow UP with Nam Kessler MD   Psychiatry Clinic ([facility])    [address]  [address] [facility] [address] [phone]            Mar 13, 2018 10:30 AM CDT   LAB with [facility]   [facility]    [address] [place] [address] [phone]           Please do not eat 10-12 hours before your appointment if you are coming in fasting for labs on lipids, cholesterol, or glucose (sugar). This does not apply to pregnant women. Water, hot tea and black coffee (with nothing added) are okay. Do not drink other fluids, diet soda or chew gum.              Who to contact     Please call your clinic at [phone] to:    Ask questions about your health    Make or cancel appointments    Discuss your medicines    Learn about your test results    Speak to your doctor   If you have compliments or concerns about an experience at your clinic, or if you wish to file a complaint, please contact [facility] Patient Relations at [phone] or email us at [email or web address]         Additional Information About Your Visit        MyChart Information     "[employer]t gives you secure access to your electronic health record. If you see a primary care provider, you can also send messages to your care team and make appointments. If you have questions,  please call your primary care clinic.  If you do not have a primary care provider, please call [phone] and they will assist you.      [employer] is an electronic gateway that provides easy, online access to your medical records. With [employer], you can request a clinic appointment, read your test results, renew a prescription or communicate with your care team.     To access your existing account, please contact your [facility] or call [phone] for assistance.        Care EveryWhere ID     This is your Care EveryWhere ID. This could be used by other organizations to access your [place] medical records  [id number]        Your Vitals Were     Pulse BMI (Body Mass Index)                87 21.73 kg/m2           Blood Pressure from Last 3 Encounters:   12/27/17 118/58   12/21/17 133/68   11/27/17 150/85    Weight from Last 3 Encounters:   12/27/17 69.9 kg (154 lb)   12/21/17 70.7 kg (155 lb 12.8 oz)   11/27/17 70.9 kg (156 lb 3.2 oz)              Today, you had the following     No orders found for display         Today's Medication Changes          These changes are accurate as of: 12/21/17 11:59 PM.  If you have any questions, ask your nurse or doctor.               These medicines have changed or have updated prescriptions.        Dose/Directions    * venlafaxine 150 MG 24 hr capsule   Commonly known as:  EFFEXOR-XR   This may have changed:    - how much to take  - additional instructions   Used for:  Schizoaffective disorder, bipolar type (H)   Changed by:  Nam Kessler MD        Dose:  150 mg   Take 1 capsule (150 mg) by mouth daily Please take with 75 mg for a total of 225 mg daily   Quantity:  30 capsule   Refills:  0       * venlafaxine 75 MG 24 hr capsule   Commonly known as:  EFFEXOR XR   This may have changed:  You were already taking a medication with the same name, and this prescription was added. Make sure you understand how and when to take each.   Used for:   Schizoaffective disorder, bipolar type (H)   Changed by:  Nam Kessler MD        Dose:  75 mg   Take 1 capsule (75 mg) by mouth daily Please take with Effexor 150 mg for a total dose of 225 mg daily   Quantity:  30 capsule   Refills:  2       * Notice:  This list has 2 medication(s) that are the same as other medications prescribed for you. Read the directions carefully, and ask your doctor or other care provider to review them with you.         Where to get your medicines      These medications were sent to [facility] [address] [facility] [address] AT [facility] [address]  [address], [facility] [address]     Phone:  [phone]     lithium 150 MG capsule    risperiDONE 3 MG tablet    venlafaxine 150 MG 24 hr capsule    venlafaxine 75 MG 24 hr capsule                Primary Care Provider Office Phone # Fax #    Leonard Dai [phone] [address]  [facility] [address]        Equal Access to Services     [facility] AH: Hadii aad ku hadasho Soomaali, waaxda luqadaha, qaybta kaalmada adeegyada, waxay idiin hayaan miguelina diaz . So [facility] [phone].    ATENCIÓN: Si habla español, tiene a magaña disposición servicios gratuitos de asistencia lingüística. Llame al [phone].    We comply with applicable federal civil rights laws and [place] laws. We do not discriminate on the basis of race, color, national origin, age, disability, sex, sexual orientation, or gender identity.            Thank you!     Thank you for choosing PSYCHIATRY CLINIC  for your care. Our goal is always to provide you with excellent care. Hearing back from our patients is one way we can continue to improve our services. Please take a few minutes to complete the written survey that you may receive in the mail after your visit with us. Thank you!             Your Updated Medication List - Protect others around you: Learn how to safely use, store and throw away your  medicines at www.disposemymeds.org.          This list is accurate as of: 12/21/17 11:59 PM.  Always use your most recent med list.                   Brand Name Dispense Instructions for use Diagnosis    ACE/ARB/ARNI NOT PRESCRIBED (INTENTIONAL)      ACE & ARB not prescribed due to Risk for drug interaction- lithium toxicity in the past        aspirin 81 MG EC tablet      Take 81 mg by mouth daily.        blood glucose monitoring test strip    ONETOUCH ULTRA    100 each    TEST ONCE DAILY AS DIRECTED    Encounter for medication refill       cholecalciferol 1000 UNIT tablet    vitamin D3     Take 1 tablet by mouth daily.        FISH OIL      1 capsule daily        glipiZIDE 5 MG 24 hr tablet    GLUCOTROL XL    90 tablet    TAKE 1 TABLET BY MOUTH DAILY    Encounter for medication refill       lithium 150 MG capsule     30 capsule    Take 1 capsule (150 mg) by mouth At Bedtime    Schizoaffective disorder, bipolar type (H)       metFORMIN 500 MG 24 hr tablet    GLUCOPHAGE-XR    360 tablet    TAKE 2 TABLETS BY MOUTH TWICE DAILY    Encounter for medication refill       pioglitazone 45 MG tablet    ACTOS    90 tablet    TAKE 1 TABLET BY MOUTH DAILY    Encounter for medication refill       risperiDONE 3 MG tablet    risperDAL    30 tablet    Take 1 tablet (3 mg) by mouth At Bedtime    Schizoaffective disorder, bipolar type (H)       * simvastatin 40 MG tablet    ZOCOR    90 tablet    TAKE ONE TABLET BY MOUTH EVERY NIGHT AT BEDTIME    Encounter for medication refill       * simvastatin 40 MG tablet    ZOCOR    90 tablet    TAKE ONE TABLET BY MOUTH EVERY NIGHT AT BEDTIME    Encounter for medication refill       * venlafaxine 150 MG 24 hr capsule    EFFEXOR-XR    30 capsule    Take 1 capsule (150 mg) by mouth daily Please take with 75 mg for a total of 225 mg daily    Schizoaffective disorder, bipolar type (H)       * venlafaxine 75 MG 24 hr capsule    EFFEXOR XR    30 capsule    Take 1 capsule (75 mg) by mouth daily Please  take with Effexor 150 mg for a total dose of 225 mg daily    Schizoaffective disorder, bipolar type (H)       * Notice:  This list has 4 medication(s) that are the same as other medications prescribed for you. Read the directions carefully, and ask your doctor or other care provider to review them with you.

## 2017-12-21 NOTE — NURSING NOTE
Chief Complaint   Patient presents with     Recheck Medication     Schizoaffective disorder, bipolar type      Reviewed allergies, smoking status, and pharmacy preference  Obtained weight, blood pressure and heart rate

## 2017-12-21 NOTE — PROGRESS NOTES
PSYCHIATRY CLINIC PROGRESS NOTE   The initial diagnostic evaluation was on 11/26/08 and the last transfer of care evaluation was 06/30/17.    Pertinent Background:  This patient first experienced mental health issues in his late 20s , initially obtained care at that time and has received treatment for Schizoaffective Disorder, bipolar type.  See transfer evaluation for detailed history.  Notably, this pt has been stable on regimen of Lithium, Effexor and Risperdal since 2011. Historically, lithium dose 450 mg has been adequate for mood stabilization although an increase could reasonably be considered if needed.    Psych critical item history includes suicidal ideation, psychosis [sxs include AH and paranoia], mutiple psychotropic trials and psych hosp (>5).     INTERIM HISTORY                                                 Caden Bush is a 57 year old male who was last seen in clinic on 11/27/17 at which time patient reported stable mood.Li was reduced, planned taper due to increase in BUN/Cr. Risperidone was increased. The patient reports good treatment adherence.  History was provided by patient who was a good historian.  Since the last visit:  - Patient reports that he has been doing well.    -MA is extended on dec 4th  -Meeting mother, sister for vaibhav  -Denies elevated mood, lower mood states, denies anxiety. Has been steady  -Walks in malls  - Planning to go to [facility]  - He has been taking his medications regularly. He denies side affects.  -Going to therapist once monthly    RECENT SYMPTOMS:   DEPRESSION: reports-none;  DENIES- suicidal ideation  PSYCHOSIS:  reports-none;  DENIES- hallucinations and delusions  EATING DISORDER: none    RECENT SUBSTANCE USE:     ALCOHOL-  never           TOBACCO- none          CAFFEINE- 1-2 cups/day of coffee, 1-2 sodas/day     OPIOIDS- none      NARCAN KIT- N/A          CANNABIS- none            OTHER ILLICIT DRUGS- none     CURRENT SOCIAL  HISTORY:  Financial Support-  SSDI for schizoaffective disorder and diabetes.       Living Situation- Currently living by self in subsidized apartment in [place] since Feb 2011.     Children- none     MEDICAL ROS:  Reports none.  Denies GI [nausea, diarrhea, constipation,  ], headache, sedation, tremor and confusion.      PSYCH AND CD CRITICAL ITEM SUMMARY     Sept 2017 - Decrease Li from 450 mg to 300 mg qhs (BUN/cr was 24.)  Nov 2017 - Decrease Li to 150 mg, Increase Risperidone to 3 mg    PAST PSYCH MED TRIALS     Pl see  Problem list -Hx of Psychiatric care    MEDICAL / SURGICAL HISTORY                                   CARE TEAM:   PCP- [facility]. Dai with [facility]    Therapist-  Yeny Goff at [facility]      Ophthalmologist: Jacob Lieberman with [facility]    Patient Active Problem List   Diagnosis     Eczema     Retinopathy     Schizoaffective disorder, bipolar type (H)     Polyp of colon, adenomatous     Health Care Home     Diabetic retinopathy associated with type 2 diabetes mellitus (H)     DM (diabetes mellitus), type 2 with ophthalmic complications (H)     Hx of psychiatric care     Neuropathy of left upper extremity     ALLERGY                                Review of patient's allergies indicates no known allergies.  MEDICATIONS                               Current Outpatient Prescriptions   Medication Sig Dispense Refill     risperiDONE (RISPERDAL) 3 MG tablet Take 1 tablet (3 mg) by mouth At Bedtime 30 tablet 2     traZODone (DESYREL) 100 MG tablet Take 0.5-1 tablets ( mg) by mouth nightly as needed for sleep 30 tablet 2     venlafaxine (EFFEXOR-XR) 150 MG 24 hr capsule Take 2 capsules (300 mg) by mouth daily 60 capsule 2     lithium 150 MG capsule Take 1 capsule (150 mg) by mouth At Bedtime 30 capsule 0     simvastatin (ZOCOR) 40 MG tablet TAKE ONE TABLET BY MOUTH EVERY NIGHT AT BEDTIME 90 tablet 3     blood glucose monitoring (ONE TOUCH ULTRA) test strip TEST  ONCE DAILY AS DIRECTED 100 each 3     pioglitazone (ACTOS) 45 MG tablet TAKE 1 TABLET BY MOUTH DAILY 90 tablet 1     metFORMIN (GLUCOPHAGE-XR) 500 MG 24 hr tablet TAKE 2 TABLETS BY MOUTH TWICE DAILY 360 tablet 3     glipiZIDE (GLUCOTROL XL) 5 MG 24 hr tablet TAKE 1 TABLET BY MOUTH DAILY 90 tablet 1     simvastatin (ZOCOR) 40 MG tablet TAKE ONE TABLET BY MOUTH EVERY NIGHT AT BEDTIME 90 tablet 3     ACE/ARB NOT PRESCRIBED, INTENTIONAL, ACE & ARB not prescribed due to Risk for drug interaction- lithium toxicity in the past       FISH OIL 1 capsule daily        aspirin 81 MG EC tablet Take 81 mg by mouth daily.       cholecalciferol (VITAMIN D) 1000 UNIT tablet Take 1 tablet by mouth daily.       VITALS   /68  Pulse 87  Wt 70.7 kg (155 lb 12.8 oz)  BMI 21.73 kg/m2   MENTAL STATUS EXAM                                                             Alertness: alert  and oriented  Appearance: casually groomed and malodorous  Behavior/Demeanor: cooperative, pleasant and calm, with good  eye contact   Speech: normal  Language: intact and no problems  Psychomotor: normal or unremarkable  Mood: description consistent with euthymia  Affect: full range; was congruent to mood; was congruent to content  Thought Process/Associations: unremarkable  Thought Content:  Reports none;   Denies suicidal ideation  and psychotic thought  Perception:   Reports none;  Denies auditory hallucinations (NO;  command-NO;  following command-NO and visual hallucinations (NO)  Insight: fair  Judgment: good  Cognition: does  appear grossly intact; formal cognitive testing was not done    LABS and DATA     PHQ9 TODAY = 0  PHQ-9 SCORE 6/30/2017 9/11/2017 11/27/2017   Total Score - - -   Total Score 0 2 0   Total Score - - -       RATING SCALES:  AIMS next due date needs to be determined    LITHIUM LABS     [level, renal, SG, TSH, WBC]    q6 mo  Recent Labs   Lab Test  07/11/17   1034  12/20/16   1301  06/09/16   1030  12/21/15   1413   LITHIUM   0.6  0.6  0.5*  0.5*     Recent Labs   Lab Test  07/11/17   1034  12/20/16   1301  06/09/16   1030  12/21/15   1413   01/26/15   1059   CR  1.17  1.21  1.26*  1.11   < >  1.15   GFRESTIMATED   --    --   59*  68   --   66   NA  141  142  140  133   < >  135   PEE  9.7  10.1  9.4  9.2   < >  10.2*    < > = values in this interval not displayed.   **BUN 7/11/17: 28 mg/dL (rr 6-24) - HIGH**  **BUN/Creatinine ratio 7/11/17: 24 (rr 9-20) - HIGH**     BUN 12/20/16: 21 mg/dL      BUN/Creatinine ratio 12/20/16: 17    Recent Labs   Lab Test  06/09/16   1040  12/21/15   1435   SG  1.014  1.007     Recent Labs   Lab Test  06/09/16   1030  12/21/15   1413  01/26/15   1059   TSH  1.70  1.69  3.39     Recent Labs   Lab Test  12/20/16   1240  06/09/16   1030   WBC  3.7*  4.7     ANTIPSYCHOTIC LABS   [glu, A1C, lipids (focus LDL), liver enzymes, WBC, ANEU, Hgb, plts]    q12 mo  Recent Labs   Lab Test  09/15/17   0956  07/11/17   1034  06/07/17   1004  12/20/16   1301   GLC   --   234*   --   195*   A1C  6.8*   --   8.2*   --      Recent Labs   Lab Test  07/11/17   1034  06/07/17   1004   CHOL  126  143   TRIG  49  105   LDL  40  46   HDL  76  76     Recent Labs   Lab Test  07/11/17   1034  12/20/16   1301   AST  14  14   ALT  12  14   ALKPHOS  72  72     Recent Labs   Lab Test  12/20/16   1240  06/09/16   1030  12/21/15   1413   WBC  3.7*  4.7  4.6   ANEU   --   3.5  3.4   HGB  13.6  14.7  13.4   PLT  205  175  155     DIAGNOSES                                                                                                   Schizoaffective disorder, bipolar type, MRE uncertain, euthymic without current psychotic features.    ASSESSMENT                                       TODAY: Caden reports doing well with stable mood. No concerns at this time.     Li dose is going to be left at 150 mg  and tapered this visit. Treatment team would investigate hx of psychiatric medication, symptoms to clearly understand the last manic, psychotic  episodes and resulting med changes and their effects.     Given that Li was reduced in the last visit due to concern in kidney function, risperidone will be continued at 3 mg and Effexor will be reduced to 225 mg to prevent an elevated mood state. Further investigation of patient's hx, renal function, will determine the future dosing of Li, Risperidone and Effexor.     Patient advised to notify us if mood changes noticed - discussed particular signs and symptoms to be vigilant for.     [place] PRESCRIPTION MONITORING PROGRAM [] was not checked today and revealed: N/A    PSYCHOTROPIC DRUG INTERACTIONS:    VENLAFAXINE and ANTIPLATELET AGENTS (ASPIRIN) may result in an increased risk of bleeding  LITHIUM and NSAIDS may result in lithium toxicity.  LITHIUM and DOPAMINE-2 ANTAGONISTS (RISPERDAL) may result in weakness, dyskinesias, increased extrapyramidal symptoms, encephalopathy, and brain damage.    LITHIUM and TRAZODONE may result in increased risk of serotonin syndrome   RISPERIDONE and SIMVASTATIN may result in increased simvastatin serum concentrations with an increased risk of myopathy or rhabdomyolysis.   TRAZODONE and VENLAFAXINE may result in an increased risk of serotonin syndrome     MANAGEMENT:  Monitoring for adverse effects, routine vitals, routine labs, minimal use of [trazodone] and patient is aware of risks      PLAN                                                                                                       1) PSYCHOTROPIC MEDICATIONS:  - Cont' Lithium CR 150mg QHS  - Cont' risperidone 3mg QHS  - Decrease Effexor XR  From 300mg daily to 225 mg daily  - Continue trazodone 50mg QHS PRN (uses about x2 per week)    2) THERAPY:  Seeing psychologist Yeny Goff at [facility] since 11/2015. Sees once per month.     3) LABS NEXT DUE: Anti-psychotic, Li labs Jan 2018       RATING SCALES:  AIMS next due date needs to be determined    4) REFERRALS [MICD, medical etc.]:  Recommended follow-up with  primary care provider for further evaluation and recommendations re: renal dysfunction, in context of diabetes and Lithium regimen.      5) MTM REFERRAL [PharmD]:  none    6) :  none Previously had a CCM until 2011. Has SW consult on 10/20/15, not interested in CM or [facility] worker services at this time and seems to be managing well on his own.     7) RTC: 1 months    8) CRISIS NUMBERS:   Provided routinely in AVS    TREATMENT RISK STATEMENT:  The risks, benefits, alternatives and potential adverse effects have been discussed and are understood by the patient. The pt understands the risks of using street drugs or alcohol.  There are no medical contraindications, the pt agrees to treatment with the ability to do so.  The patient understands to call 911 or come to the nearest ED if life threatening or urgent symptoms present.    PSYCHIATRY CLINIC INDIVIDUAL PSYCHOTHERAPY NOTE                                    16   Start time - 1:15        End time - 1:40  Date reviewed - 09/11/2017       Date next due - 09/2018    Subjective: This supportive psychotherapy session addressed issues related to goals of therapy.  Patient's reaction: Preparatory in the context of mental status appropriate for ambulatory setting.  Progress: fair to good  Plan: RTC 1 month  Psychotherapy services during this visit included  myself and patient  TREATMENT  PLAN          SYMPTOMS;PROBLEMS   MEASURABLE GOALS;    FUNCTIONAL IMPROVEMENT INTERVENTIONS;    GAINS MADE DISCHARGE CRITERIA   Depression: depressed mood   get 7-8 hours of restful sleep every night Medications  Therapy  Lifestyle changes marked symptom improvement     Nam Kessler MD    Patient was staffed by attending Dr. Porras. My supervisor is Dr. Braswell.  I saw the patient with the resident, and participated in key portions of the service, including the mental status examination and developing the plan of care. I reviewed key portions of the history with the resident.  I agree with the findings and plan as documented in this note.    Maty Porras

## 2017-12-21 NOTE — TELEPHONE ENCOUNTER
From: Tri Finley   Sent: 12/20/2017  12:19 PM    To: Aimee Johnson, RN   Subject: Cancel Med Refill                                 Pt's pharmacy is trying to fill Trazodone for the 3rd time but pt said Dr. Grover has told him he does not need to be on it anymore, he is asking that we contact the pharmacy and cancel the med refill.     Ok to leave detailed VM: Yes

## 2017-12-22 NOTE — TELEPHONE ENCOUNTER
-Writer called pharmacy who agrees to close out the RFs.    -Removed from med tab.   -Notified pt via  at [phone].

## 2017-12-27 ENCOUNTER — OFFICE VISIT (OUTPATIENT)
Dept: FAMILY MEDICINE | Facility: CLINIC | Age: 57
End: 2017-12-27

## 2017-12-27 VITALS
OXYGEN SATURATION: 97 % | DIASTOLIC BLOOD PRESSURE: 58 MMHG | BODY MASS INDEX: 21.56 KG/M2 | HEART RATE: 95 BPM | TEMPERATURE: 98.2 F | WEIGHT: 154 LBS | HEIGHT: 71 IN | SYSTOLIC BLOOD PRESSURE: 118 MMHG

## 2017-12-27 DIAGNOSIS — Z00.00 MEDICARE ANNUAL WELLNESS VISIT, SUBSEQUENT: Primary | ICD-10-CM

## 2017-12-27 PROCEDURE — G0439 PPPS, SUBSEQ VISIT: HCPCS | Performed by: FAMILY MEDICINE

## 2017-12-27 RX ORDER — TRAZODONE HYDROCHLORIDE 100 MG/1
TABLET ORAL PRN
Refills: 1 | COMMUNITY
Start: 2017-09-11 | End: 2018-01-22

## 2017-12-27 ASSESSMENT — ANXIETY QUESTIONNAIRES
7. FEELING AFRAID AS IF SOMETHING AWFUL MIGHT HAPPEN: NOT AT ALL
2. NOT BEING ABLE TO STOP OR CONTROL WORRYING: NOT AT ALL
IF YOU CHECKED OFF ANY PROBLEMS ON THIS QUESTIONNAIRE, HOW DIFFICULT HAVE THESE PROBLEMS MADE IT FOR YOU TO DO YOUR WORK, TAKE CARE OF THINGS AT HOME, OR GET ALONG WITH OTHER PEOPLE: NOT DIFFICULT AT ALL
GAD7 TOTAL SCORE: 0
1. FEELING NERVOUS, ANXIOUS, OR ON EDGE: NOT AT ALL
3. WORRYING TOO MUCH ABOUT DIFFERENT THINGS: NOT AT ALL
5. BEING SO RESTLESS THAT IT IS HARD TO SIT STILL: NOT AT ALL
6. BECOMING EASILY ANNOYED OR IRRITABLE: NOT AT ALL

## 2017-12-27 ASSESSMENT — PATIENT HEALTH QUESTIONNAIRE - PHQ9: 5. POOR APPETITE OR OVEREATING: NOT AT ALL

## 2017-12-27 NOTE — MR AVS SNAPSHOT
After Visit Summary   12/27/2017    Caden Bush    MRN: [id number]           Patient Information     Date Of Birth          1960        Visit Information        Provider Department      12/27/2017 10:00 AM Leonard aDi MD [facility]        Today's Diagnoses     Medicare annual wellness visit, subsequent    -  1      Care Instructions      Preventive Health Recommendations:       Male Ages 65 and over    Yearly exam:             See your health care provider every year in order to  o   Review health changes.   o   Discuss preventive care.    o   Review your medicines if your doctor has prescribed any.    Talk with your health care provider about whether you should have a test to screen for prostate cancer (PSA).    Every 3 years, have a diabetes test (fasting glucose). If you are at risk for diabetes, you should have this test more often.    Every 5 years, have a cholesterol test. Have this test more often if you are at risk for high cholesterol or heart disease.     Every 10 years, have a colonoscopy. Or, have a yearly FIT test (stool test). These exams will check for colon cancer.    Talk to with your health care provider about screening for Abdominal Aortic Aneurysm if you have a family history of AAA or have a history of smoking.  Shots:     Get a flu shot each year.     Get a tetanus shot every 10 years.     Talk to your doctor about your pneumonia vaccines. There are now two you should receive - Pneumovax (PPSV 23) and Prevnar (PCV 13).    Talk to your doctor about a shingles vaccine.     Talk to your doctor about the hepatitis B vaccine.  Nutrition:     Eat at least 5 servings of fruits and vegetables each day.     Eat whole-grain bread, whole-wheat pasta and brown rice instead of white grains and rice.     Talk to your doctor about Calcium and Vitamin D.   Lifestyle    Exercise for at least 150 minutes a week (30 minutes a day, 5 days a week). This will help you control  your weight and prevent disease.     Limit alcohol to one drink per day.     No smoking.     Wear sunscreen to prevent skin cancer.     See your dentist every six months for an exam and cleaning.     See your eye doctor every 1 to 2 years to screen for conditions such as glaucoma, macular degeneration and cataracts.          Follow-ups after your visit        Your next 10 appointments already scheduled     Jan 22, 2018 12:45 PM [facility] [[email or web address]]   Adult Med Follow UP with Nam Kessler MD   Psychiatry Clinic ([facility])    [address]  [address] [facility] [address] [phone]              Who to contact     If you have questions or need follow up information about today's clinic visit or your schedule please contact [facility] directly at [phone].  Normal or non-critical lab and imaging results will be communicated to you by [employer], letter or phone within 4 business days after the clinic has received the results. If you do not hear from us within 7 days, please contact the clinic through [employer] or phone. If you have a critical or abnormal lab result, we will notify you by phone as soon as possible.  Submit refill requests through [employer] or call your pharmacy and they will forward the refill request to us. Please allow 3 business days for your refill to be completed.          Additional Information About Your Visit        [employer] Information     [employer] gives you secure access to your electronic health record. If you see a primary care provider, you can also send messages to your care team and make appointments. If you have questions, please call your primary care clinic.  If you do not have a primary care provider, please call [phone] and they will assist you.        Care EveryWhere ID     This is your Care EveryWhere ID. This could be used by other organizations to access your [place] medical records  [id number]        Your Vitals Were      "Pulse Temperature Height Pulse Oximetry BMI (Body Mass Index)       95 98.2  F (36.8  C) 1.791 m (5' 10.5\") 97% 21.78 kg/m2        Blood Pressure from Last 3 Encounters:   12/27/17 118/58   12/21/17 133/68   11/27/17 150/85    Weight from Last 3 Encounters:   12/27/17 69.9 kg (154 lb)   12/21/17 70.7 kg (155 lb 12.8 oz)   11/27/17 70.9 kg (156 lb 3.2 oz)              Today, you had the following     No orders found for display       Primary Care Provider Office Phone # Fax #    Leonard Dai [phone] [address]  [facility] [address]        Equal Access to Services     [facility]: Hadii ethan briones hadasho Soomaali, waaxda luqadaha, qaybta kaalmada adeegyada, marcia diaz . So [facility] [phone].    ATENCIÓN: Si habla español, tiene a magaña disposición servicios gratuitos de asistencia lingüística. Maricruz al [phone].    We comply with applicable federal civil rights laws and [place] laws. We do not discriminate on the basis of race, color, national origin, age, disability, sex, sexual orientation, or gender identity.            Thank you!     Thank you for choosing [facility]  for your care. Our goal is always to provide you with excellent care. Hearing back from our patients is one way we can continue to improve our services. Please take a few minutes to complete the written survey that you may receive in the mail after your visit with us. Thank you!             Your Updated Medication List - Protect others around you: Learn how to safely use, store and throw away your medicines at www.disposemymeds.org.          This list is accurate as of: 12/27/17 10:40 AM.  Always use your most recent med list.                   Brand Name Dispense Instructions for use Diagnosis    ACE/ARB/ARNI NOT PRESCRIBED (INTENTIONAL)      ACE & ARB not prescribed due to Risk for drug interaction- lithium toxicity in the past        aspirin 81 MG EC tablet      " Take 81 mg by mouth daily.        blood glucose monitoring test strip    ONETOUCH ULTRA    100 each    TEST ONCE DAILY AS DIRECTED    Encounter for medication refill       cholecalciferol 1000 UNIT tablet    vitamin D3     Take 1 tablet by mouth daily.        FISH OIL      1 capsule daily        glipiZIDE 5 MG 24 hr tablet    GLUCOTROL XL    90 tablet    TAKE 1 TABLET BY MOUTH DAILY    Encounter for medication refill       lithium 150 MG capsule     30 capsule    Take 1 capsule (150 mg) by mouth At Bedtime    Schizoaffective disorder, bipolar type (H)       metFORMIN 500 MG 24 hr tablet    GLUCOPHAGE-XR    360 tablet    TAKE 2 TABLETS BY MOUTH TWICE DAILY    Encounter for medication refill       pioglitazone 45 MG tablet    ACTOS    90 tablet    TAKE 1 TABLET BY MOUTH DAILY    Encounter for medication refill       risperiDONE 3 MG tablet    risperDAL    30 tablet    Take 1 tablet (3 mg) by mouth At Bedtime    Schizoaffective disorder, bipolar type (H)       * simvastatin 40 MG tablet    ZOCOR    90 tablet    TAKE ONE TABLET BY MOUTH EVERY NIGHT AT BEDTIME    Encounter for medication refill       * simvastatin 40 MG tablet    ZOCOR    90 tablet    TAKE ONE TABLET BY MOUTH EVERY NIGHT AT BEDTIME    Encounter for medication refill       traZODone 100 MG tablet    DESYREL     as needed        * venlafaxine 150 MG 24 hr capsule    EFFEXOR-XR    30 capsule    Take 1 capsule (150 mg) by mouth daily Please take with 75 mg for a total of 225 mg daily    Schizoaffective disorder, bipolar type (H)       * venlafaxine 75 MG 24 hr capsule    EFFEXOR XR    30 capsule    Take 1 capsule (75 mg) by mouth daily Please take with Effexor 150 mg for a total dose of 225 mg daily    Schizoaffective disorder, bipolar type (H)       * Notice:  This list has 4 medication(s) that are the same as other medications prescribed for you. Read the directions carefully, and ask your doctor or other care provider to review them with you.

## 2017-12-27 NOTE — PROGRESS NOTES
SUBJECTIVE:   Caden Bush is a 57 year old male who presents for Preventive Visit.    Are you in the first 12 months of your Medicare Part B coverage?  No    Healthy Habits:    Do you get at least three servings of calcium containing foods daily (dairy, green leafy vegetables, etc.)? yes and no, taking calcium and/or vitamin D supplement: yes     Amount of exercise or daily activities, outside of work: .3 day(s) per week, occasionally     Problems taking medications regularly No    Medication side effects: No    Have you had an eye exam in the past two years? yes    Do you see a dentist twice per year? yes    Do you have sleep apnea, excessive snoring or daytime drowsiness?no      Ability to successfully perform activities of daily living: Yes, no assistance needed    Home safety:  none identified     Hearing impairment: No    Fall risk:  Fallen 2 or more times in the past year?: No  Any fall with injury in the past year?: No        HEARING FREQUENCY:   Right Ear: Passed  Left Ear:Passed      COGNITIVE SCREEN  1) Repeat 3 items (Banana, Sunrise, Chair)    2) Clock draw: NORMAL  3) 3 item recall: Recalls 2 objects   Results: NORMAL clock, 1-2 items recalled: COGNITIVE IMPAIRMENT LESS LIKELY    Mini-CogTM Copyright S Carlos. Licensed by the author for use in [facility]; reprinted with permission (maria [email or web address]). All rights reserved.              Reviewed and updated as needed this visit by Provider        Social History   Substance Use Topics     Smoking status: Never Smoker     Smokeless tobacco: Never Used     Alcohol use No       If you drink alcohol do you typically have >3 drinks per day or >7 drinks per week?                         Today's PHQ-2 Score:   PHQ-2 ( 1999 [employer]) 12/27/2017 12/20/2016   Q1: Little interest or pleasure in doing things 0 0   Q2: Feeling down, depressed or hopeless 0 1   PHQ-2 Score 0 1         Current providers sharing in care for this patient include:   Patient Care  Team:  Leonard Dai MD as PCP - General (Family Practice)  Maty Porras MD as MD (Psychiatry)  Yeny Treviño MD as Resident (Psychiatry)    The following health maintenance items are reviewed in Epic and correct as of today:  Health Maintenance   Topic Date Due     MEDICARE ANNUAL WELLNESS VISIT  12/18/2016     NEDRA QUESTIONNAIRE 1 YEAR  12/18/2016     FOOT EXAM Q1 YEAR  12/20/2017     PHQ-9 Q1 MONTH  01/21/2018     A1C Q6 MO  03/15/2018     MICROALBUMIN Q1 YEAR  06/07/2018     CREATININE Q1 YEAR  07/11/2018     LIPID MONITORING Q1 YEAR  07/11/2018     EYE EXAM Q1 YEAR  12/08/2018     TSH W/ FREE T4 REFLEX Q2 YEAR  07/11/2019     ADVANCE DIRECTIVE PLANNING Q5 YRS  12/17/2019     COLONOSCOPY Q5 YR  06/19/2020     TETANUS IMMUNIZATION (SYSTEM ASSIGNED)  12/18/2025     PNEUMOVAX 1X HI RISK PATIENT < 65 (NO IB MSG)  Completed     INFLUENZA VACCINE (SYSTEM ASSIGNED)  Addressed     HEPATITIS C SCREENING  Completed     Patient Active Problem List   Diagnosis     Eczema     Retinopathy     Schizoaffective disorder, bipolar type (H)     Polyp of colon, adenomatous     Health Care Home     Diabetic retinopathy associated with type 2 diabetes mellitus (H)     DM (diabetes mellitus), type 2 with ophthalmic complications (H)     Hx of psychiatric care     Neuropathy of left upper extremity     Past Surgical History:   Procedure Laterality Date     APPENDECTOMY       COLONOSCOPY  2012    2 adenomatous polyps       Social History   Substance Use Topics     Smoking status: Never Smoker     Smokeless tobacco: Never Used     Alcohol use No     Family History   Problem Relation Age of Onset     OSTEOPOROSIS Mother      OSTEOPOROSIS Father      Asthma Father      Cancer - colorectal Father 70     Breast Cancer Mother 78     Rheumatoid Arthritis Sister          Current Outpatient Prescriptions   Medication Sig Dispense Refill     risperiDONE (RISPERDAL) 3 MG tablet Take 1 tablet (3 mg) by mouth At Bedtime 30 tablet 2      venlafaxine (EFFEXOR-XR) 150 MG 24 hr capsule Take 1 capsule (150 mg) by mouth daily Please take with 75 mg for a total of 225 mg daily 30 capsule 0     lithium 150 MG capsule Take 1 capsule (150 mg) by mouth At Bedtime 30 capsule 2     venlafaxine (EFFEXOR XR) 75 MG 24 hr capsule Take 1 capsule (75 mg) by mouth daily Please take with Effexor 150 mg for a total dose of 225 mg daily 30 capsule 2     simvastatin (ZOCOR) 40 MG tablet TAKE ONE TABLET BY MOUTH EVERY NIGHT AT BEDTIME 90 tablet 3     blood glucose monitoring (ONE TOUCH ULTRA) test strip TEST ONCE DAILY AS DIRECTED 100 each 3     pioglitazone (ACTOS) 45 MG tablet TAKE 1 TABLET BY MOUTH DAILY 90 tablet 1     metFORMIN (GLUCOPHAGE-XR) 500 MG 24 hr tablet TAKE 2 TABLETS BY MOUTH TWICE DAILY 360 tablet 3     glipiZIDE (GLUCOTROL XL) 5 MG 24 hr tablet TAKE 1 TABLET BY MOUTH DAILY 90 tablet 1     simvastatin (ZOCOR) 40 MG tablet TAKE ONE TABLET BY MOUTH EVERY NIGHT AT BEDTIME 90 tablet 3     ACE/ARB NOT PRESCRIBED, INTENTIONAL, ACE & ARB not prescribed due to Risk for drug interaction- lithium toxicity in the past       FISH OIL 1 capsule daily        aspirin 81 MG EC tablet Take 81 mg by mouth daily.       cholecalciferol (VITAMIN D) 1000 UNIT tablet Take 1 tablet by mouth daily.       traZODone (DESYREL) 100 MG tablet as needed  1             ROS:  C: NEGATIVE for fever, chills, change in weight  I: NEGATIVE for worrisome rashes, moles or lesions  E: NEGATIVE for vision changes or irritation  E/M: NEGATIVE for ear, mouth and throat problems  R: NEGATIVE for significant cough or SOB  B: NEGATIVE for masses, tenderness or discharge  CV: NEGATIVE for chest pain, palpitations or peripheral edema  GI: NEGATIVE for nausea, abdominal pain, heartburn, or change in bowel habits  : NEGATIVE for frequency, dysuria, or hematuria  M: NEGATIVE for significant arthralgias or myalgia  N: NEGATIVE for weakness, dizziness or paresthesias  E: dry skin  H: NEGATIVE for bleeding  "problems  P: NEGATIVE for changes in mood or affect. Seeing psychiatry, and recent reductionin his meds    OBJECTIVE:   /58  Pulse 95  Temp 98.2  F (36.8  C)  Ht 1.791 m (5' 10.5\")  Wt 69.9 kg (154 lb)  SpO2 97%  BMI 21.78 kg/m2 Estimated body mass index is 21.78 kg/(m^2) as calculated from the following:    Height as of this encounter: 1.791 m (5' 10.5\").    Weight as of this encounter: 69.9 kg (154 lb).  EXAM:   GENERAL: healthy, alert and no distress  EYES: Eyes grossly normal to inspection, PERRL and conjunctivae and sclerae normal  HENT: ear canals and TM's normal, nose and mouth without ulcers or lesions  NECK: no adenopathy, no asymmetry, masses, or scars and thyroid normal to palpation  RESP: lungs clear to auscultation - no rales, rhonchi or wheezes  CV: regular rate and rhythm, normal S1 S2, no S3 or S4, no murmur, click or rub, no peripheral edema and peripheral pulses strong  ABDOMEN: soft, nontender, no hepatosplenomegaly, no masses and bowel sounds normal  MS: no gross musculoskeletal defects noted, no edema  SKIN: no suspicious lesions or rashes  NEURO: Normal strength and tone, mentation intact and speech normal  PSYCH: mentation appears normal, affect normal/bright    ASSESSMENT / PLAN:       ICD-10-CM    1. Medicare annual wellness visit, subsequent Z00.00        End of Life Planning:  Patient currently has an advanced directive: No.  I have verified the patient's ablity to prepare an advanced directive/make health care decisions.  Literature was provided to assist patient in preparing an advanced directive.    COUNSELING:          Estimated body mass index is 21.78 kg/(m^2) as calculated from the following:    Height as of this encounter: 1.791 m (5' 10.5\").    Weight as of this encounter: 69.9 kg (154 lb).       reports that he has never smoked. He has never used smokeless tobacco.      Appropriate preventive services were discussed with this patient, including applicable screening as " appropriate for cardiovascular disease, diabetes, osteopenia/osteoporosis, and glaucoma.  As appropriate for age/gender, discussed screening for colorectal cancer, prostate cancer, breast cancer, and cervical cancer. Checklist reviewing preventive services available has been given to the patient.    Reviewed patients plan of care and provided an AVS. The Basic Care Plan (routine screening as documented in Health Maintenance) for Caden meets the Care Plan requirement. This Care Plan has been established and reviewed with the Patient.    Counseling Resources:  ATP IV Guidelines  Pooled Cohorts Equation Calculator  Breast Cancer Risk Calculator  FRAX Risk Assessment  ICSI Preventive Guidelines  Dietary Guidelines for Americans, 2010  USDA's MyPlate  ASA Prophylaxis  Lung CA Screening    Leonard Dai MD  [facility]

## 2017-12-28 ASSESSMENT — ANXIETY QUESTIONNAIRES: GAD7 TOTAL SCORE: 0

## 2018-01-22 ENCOUNTER — OFFICE VISIT (OUTPATIENT)
Dept: PSYCHIATRY | Facility: CLINIC | Age: 58
End: 2018-01-22
Attending: PSYCHIATRY & NEUROLOGY
Payer: MEDICARE

## 2018-01-22 VITALS
SYSTOLIC BLOOD PRESSURE: 127 MMHG | DIASTOLIC BLOOD PRESSURE: 70 MMHG | HEART RATE: 78 BPM | WEIGHT: 154.2 LBS | BODY MASS INDEX: 21.81 KG/M2

## 2018-01-22 DIAGNOSIS — F25.0 SCHIZOAFFECTIVE DISORDER, BIPOLAR TYPE (H): ICD-10-CM

## 2018-01-22 DIAGNOSIS — Z79.899 ENCOUNTER FOR LONG-TERM (CURRENT) USE OF MEDICATIONS: ICD-10-CM

## 2018-01-22 PROCEDURE — G0463 HOSPITAL OUTPT CLINIC VISIT: HCPCS | Mod: ZF

## 2018-01-22 RX ORDER — VENLAFAXINE HYDROCHLORIDE 150 MG/1
150 CAPSULE, EXTENDED RELEASE ORAL DAILY
Qty: 30 CAPSULE | Refills: 2 | Status: SHIPPED | OUTPATIENT
Start: 2018-01-22 | End: 2018-04-06

## 2018-01-22 RX ORDER — RISPERIDONE 3 MG/1
3 TABLET ORAL AT BEDTIME
Qty: 30 TABLET | Refills: 2 | Status: SHIPPED | OUTPATIENT
Start: 2018-01-22 | End: 2018-04-06

## 2018-01-22 RX ORDER — VENLAFAXINE HYDROCHLORIDE 75 MG/1
75 CAPSULE, EXTENDED RELEASE ORAL DAILY
Qty: 30 CAPSULE | Refills: 2 | Status: SHIPPED | OUTPATIENT
Start: 2018-01-22 | End: 2018-04-06

## 2018-01-22 RX ORDER — TRAZODONE HYDROCHLORIDE 100 MG/1
TABLET ORAL
Qty: 90 TABLET | Refills: 1 | Status: SHIPPED | OUTPATIENT
Start: 2018-01-22 | End: 2018-04-06

## 2018-01-22 ASSESSMENT — PAIN SCALES - GENERAL: PAINLEVEL: NO PAIN (0)

## 2018-01-22 ASSESSMENT — PATIENT HEALTH QUESTIONNAIRE - PHQ9: SUM OF ALL RESPONSES TO PHQ QUESTIONS 1-9: 0

## 2018-01-22 NOTE — PROGRESS NOTES
PSYCHIATRY CLINIC PROGRESS NOTE   The initial diagnostic evaluation was on 11/26/08 and the last transfer of care evaluation was 06/30/17.    Pertinent Background:  This patient first experienced mental health issues in his late 20s , initially obtained care at that time and has received treatment for Schizoaffective Disorder, bipolar type.  See transfer evaluation for detailed history.  Notably, this pt has been stable on regimen of Lithium, Effexor and Risperdal since 2011. Historically, lithium dose 450 mg has been adequate for mood stabilization although an increase could reasonably be considered if needed.    Psych critical item history includes suicidal ideation, psychosis [sxs include AH and paranoia], mutiple psychotropic trials and psych hosp (>5).     INTERIM HISTORY                                                 Caden Bush is a 57 year old male who was last seen in clinic on 12/21/2017 at which time patient reported stable mood. Effexor was decreased to 225 mg. Li taper held - more chart review planned. The patient reports good treatment adherence.  History was provided by patient who was a good historian.  Since the last visit:  -Patient had a good holiday, not stressful at all. [place] with mom and sister, cousin's HW party  - Patient reports that he has been doing well. Doing puzzles, watching TV   - Sleep - 10:30 - 8 am. Soundly.  - Denies elevated mood, lower mood states, denies anxiety. Has been steady  - Haven't walked in malls - due to weather  - Havent seen Mikayla Groves as planned  - He has been taking his medications regularly. He denies side affects. Mouth is still mosit, I am not jittery  - Going to therapist once monthly    RECENT SYMPTOMS:   DEPRESSION: reports-none;  DENIES- suicidal ideation  PSYCHOSIS:  reports-none;  DENIES- hallucinations and delusions  EATING DISORDER: none    RECENT SUBSTANCE USE:     ALCOHOL-  never           TOBACCO- none          CAFFEINE- 1-2  cups/day of coffee, 1-2 sodas/day     OPIOIDS- none      NARCAN KIT- N/A          CANNABIS- none            OTHER ILLICIT DRUGS- none     CURRENT SOCIAL HISTORY:  Financial Support-  SSDI for schizoaffective disorder and diabetes.       Living Situation- Currently living by self in subsidized apartment in [place] since Feb 2011.     Children- none     MEDICAL ROS:  Reports none.  Denies GI [nausea, diarrhea, constipation,  ], headache, sedation, tremor and confusion.      PSYCH AND CD CRITICAL ITEM SUMMARY     Sept 2017 - Decrease Li from 450 mg to 300 mg qhs (BUN/cr was 24.)  Nov 2017 - Decrease Li to 150 mg, Increase Risperidone to 3 mg    PAST PSYCH MED TRIALS     Pl see  Problem list -Hx of Psychiatric care    MEDICAL / SURGICAL HISTORY                                   CARE TEAM:   PCP- Dr. Dai with [facility]    Therapist-  Yeny Goff at [facility]      Ophthalmologist: Jacob Lieberman with [facility]    Patient Active Problem List   Diagnosis     Eczema     Retinopathy     Schizoaffective disorder, bipolar type (H)     Polyp of colon, adenomatous     Health Care Home     Diabetic retinopathy associated with type 2 diabetes mellitus (H)     DM (diabetes mellitus), type 2 with ophthalmic complications (H)     Hx of psychiatric care     Neuropathy of left upper extremity     ALLERGY                                Review of patient's allergies indicates no known allergies.  MEDICATIONS                               Current Outpatient Prescriptions   Medication Sig Dispense Refill     traZODone (DESYREL) 100 MG tablet as needed  1     risperiDONE (RISPERDAL) 3 MG tablet Take 1 tablet (3 mg) by mouth At Bedtime 30 tablet 2     lithium 150 MG capsule Take 1 capsule (150 mg) by mouth At Bedtime 30 capsule 2     simvastatin (ZOCOR) 40 MG tablet TAKE ONE TABLET BY MOUTH EVERY NIGHT AT BEDTIME 90 tablet 3     blood glucose monitoring (ONE TOUCH ULTRA) test strip TEST ONCE DAILY AS  DIRECTED 100 each 3     pioglitazone (ACTOS) 45 MG tablet TAKE 1 TABLET BY MOUTH DAILY 90 tablet 1     metFORMIN (GLUCOPHAGE-XR) 500 MG 24 hr tablet TAKE 2 TABLETS BY MOUTH TWICE DAILY 360 tablet 3     glipiZIDE (GLUCOTROL XL) 5 MG 24 hr tablet TAKE 1 TABLET BY MOUTH DAILY 90 tablet 1     simvastatin (ZOCOR) 40 MG tablet TAKE ONE TABLET BY MOUTH EVERY NIGHT AT BEDTIME 90 tablet 3     ACE/ARB NOT PRESCRIBED, INTENTIONAL, ACE & ARB not prescribed due to Risk for drug interaction- lithium toxicity in the past       FISH OIL 1 capsule daily        aspirin 81 MG EC tablet Take 81 mg by mouth daily.       cholecalciferol (VITAMIN D) 1000 UNIT tablet Take 1 tablet by mouth daily.       venlafaxine (EFFEXOR XR) 75 MG 24 hr capsule Take 1 capsule (75 mg) by mouth daily Please take with Effexor 150 mg for a total dose of 225 mg daily 30 capsule 2     VITALS   /70  Pulse 78  Wt 69.9 kg (154 lb 3.2 oz)  BMI 21.81 kg/m2   MENTAL STATUS EXAM                                                             Alertness: alert  and oriented  Appearance: casually groomed and malodorous  Behavior/Demeanor: cooperative, pleasant and calm, with good  eye contact   Speech: normal  Language: intact and no problems  Psychomotor: normal or unremarkable  Mood: description consistent with euthymia  Affect: full range; was congruent to mood; was congruent to content  Thought Process/Associations: unremarkable  Thought Content:  Reports none;   Denies suicidal ideation  and psychotic thought  Perception:   Reports none;  Denies auditory hallucinations (NO;  command-NO;  following command-NO and visual hallucinations (NO)  Insight: fair  Judgment: good  Cognition: does  appear grossly intact; formal cognitive testing was not done    LABS and DATA     PHQ9 TODAY = 0  PHQ-9 SCORE 9/11/2017 11/27/2017 12/21/2017   Total Score - - -   Total Score 2 0 0   Total Score - - -       RATING SCALES:  AIMS next due date needs to be determined    LITHIUM  LABS     [level, renal, SG, TSH, WBC]    q6 mo  Recent Labs   Lab Test  07/11/17   1034  12/20/16   1301  06/09/16   1030  12/21/15   1413   LITHIUM  0.6  0.6  0.5*  0.5*     Recent Labs   Lab Test  07/11/17   1034  12/20/16   1301  06/09/16   1030  12/21/15   1413   01/26/15   1059   CR  1.17  1.21  1.26*  1.11   < >  1.15   GFRESTIMATED   --    --   59*  68   --   66   NA  141  142  140  133   < >  135   PEE  9.7  10.1  9.4  9.2   < >  10.2*    < > = values in this interval not displayed.   **BUN 7/11/17: 28 mg/dL (rr 6-24) - HIGH**  **BUN/Creatinine ratio 7/11/17: 24 (rr 9-20) - HIGH**     BUN 12/20/16: 21 mg/dL      BUN/Creatinine ratio 12/20/16: 17    Recent Labs   Lab Test  06/09/16   1040  12/21/15   1435   SG  1.014  1.007     Recent Labs   Lab Test  06/09/16   1030  12/21/15   1413  01/26/15   1059   TSH  1.70  1.69  3.39     Recent Labs   Lab Test  12/20/16   1240  06/09/16   1030   WBC  3.7*  4.7     ANTIPSYCHOTIC LABS   [glu, A1C, lipids (focus LDL), liver enzymes, WBC, ANEU, Hgb, plts]    q12 mo  Recent Labs   Lab Test  09/15/17   0956  07/11/17   1034  06/07/17   1004  12/20/16   1301   GLC   --   234*   --   195*   A1C  6.8*   --   8.2*   --      Recent Labs   Lab Test  07/11/17   1034  06/07/17   1004   CHOL  126  143   TRIG  49  105   LDL  40  46   HDL  76  76     Recent Labs   Lab Test  07/11/17   1034  12/20/16   1301   AST  14  14   ALT  12  14   ALKPHOS  72  72     Recent Labs   Lab Test  12/20/16   1240  06/09/16   1030  12/21/15   1413   WBC  3.7*  4.7  4.6   ANEU   --   3.5  3.4   HGB  13.6  14.7  13.4   PLT  205  175  155     DIAGNOSES                                                                                                   Schizoaffective disorder, bipolar type, MRE uncertain, euthymic without current psychotic features.    ASSESSMENT                                       TODAY: Caden reports doing well with stable mood, denies elevated, depressed mood. No increase in thirst, no  increase in urination. Tremor stopped. Doesn't feel blunted or foggy. Is able to enjoy movies, reading magazines, word puzzles, socializing with family.    Thorough chart review of records in epic done prior to patient's appointment. Patient has records since 2009 in [facility]. Two hospitalizations 2009 (Depression, SI, Psychosis), 2012 (Li toxicity 2/2 Lisinopril use).  No mention of hypomania or wolf in chart. Since 2012 patient has been on Li  mg qhs, Risperidone 2 mg qhs, Effexor 300 mg daily. Lower dose of Risperidone was tried in the past and patient became paranoid.    Patient has historic diagnosis of Schizoaffective disorder Bipolar type but no record of hypomanic and manic symptoms in chart, nor, per patient's recollection. He was diagnosed with Schizophrenia at the age of 28 when he heard voices, he was started on Lithium. He remembers that he was taken off Li and was on a trial of Wellbutrin, Zyprexa and other medications that he doesn't remember.    Diagnosis will have to be further clarified - DDX - Schizophrenia, Schizoaffective disorder w/ unipolar depression,  Depression w/ psychotic features moderate, recurrent in remission.    Given that patient is tolerating the lowering of Li dose, as per original plan, Li will be stopped. Risperidone 3 mg QHS will be continued, Effexor 225 mg daily will be continued. Patient has been taking Trazodone 50 mg qhs PRN daily- This will now be scheduled.     Patient advised to notify us if mood changes noticed - discussed particular signs and symptoms to be vigilant for.     [place] PRESCRIPTION MONITORING PROGRAM [] was not checked today and revealed: N/A    PSYCHOTROPIC DRUG INTERACTIONS:    VENLAFAXINE and ANTIPLATELET AGENTS (ASPIRIN) may result in an increased risk of bleeding  LITHIUM and NSAIDS may result in lithium toxicity.  LITHIUM and DOPAMINE-2 ANTAGONISTS (RISPERDAL) may result in weakness, dyskinesias, increased extrapyramidal symptoms, encephalopathy,  and brain damage.    LITHIUM and TRAZODONE may result in increased risk of serotonin syndrome   RISPERIDONE and SIMVASTATIN may result in increased simvastatin serum concentrations with an increased risk of myopathy or rhabdomyolysis.   TRAZODONE and VENLAFAXINE may result in an increased risk of serotonin syndrome     MANAGEMENT:  Monitoring for adverse effects, routine vitals, routine labs, minimal use of [trazodone] and patient is aware of risks      PLAN                                                                                                       1) PSYCHOTROPIC MEDICATIONS:  - Stop Lithium CR 150mg QHS - Planned taper  - Cont' risperidone 3mg QHS  - Cont Effexor XR  225 mg daily  - Change from PRN to scheduled trazodone 50mg QHS    2) THERAPY:  Seeing psychologist Yeny Goff at [facility] since 11/2015. Sees once per month.     3) LABS NEXT DUE: Anti-psychotic, Li labs Jan 2018 - ordered       RATING SCALES:  AIMS next due date needs to be determined    4) REFERRALS [MICD, medical etc.]:  Recommended follow-up with primary care provider for further evaluation and recommendations re: renal dysfunction, in context of diabetes and Lithium regimen.      5) MTM REFERRAL [PharmD]:  none    6) :  none Previously had a CCM until 2011. Has SW consult on 10/20/15, not interested in CM or [facility] worker services at this time and seems to be managing well on his own.     7) RTC: 1 months    8) CRISIS NUMBERS:   Provided routinely in AVS    TREATMENT RISK STATEMENT:  The risks, benefits, alternatives and potential adverse effects have been discussed and are understood by the patient. The pt understands the risks of using street drugs or alcohol.  There are no medical contraindications, the pt agrees to treatment with the ability to do so.  The patient understands to call 911 or come to the nearest ED if life threatening or urgent symptoms present.    PSYCHIATRY CLINIC INDIVIDUAL PSYCHOTHERAPY  NOTE                                    16   Start time - 1:15        End time - 1:40  Date reviewed - 09/11/2017       Date next due - 09/2018    Subjective: This supportive psychotherapy session addressed issues related to goals of therapy.  Patient's reaction: Preparatory in the context of mental status appropriate for ambulatory setting.  Progress: fair to good  Plan: RTC 1 month  Psychotherapy services during this visit included  myself and patient  TREATMENT  PLAN          SYMPTOMS;PROBLEMS   MEASURABLE GOALS;    FUNCTIONAL IMPROVEMENT INTERVENTIONS;    GAINS MADE DISCHARGE CRITERIA   Depression: depressed mood   get 7-8 hours of restful sleep every night Medications  Therapy  Lifestyle changes marked symptom improvement     Nam Kessler MD    Patient was staffed by attending Dr. Phillips who will sign the note. My supervisor is Dr. Braswell.    Supervisor Attestation:  I saw the patient with the resident, and participated in key portions of the service, including the mental status examination and developing the plan of care. I reviewed key portions of the history with the resident. I agree with the findings and plan as documented in this note.  Antolin Phillips MD

## 2018-01-22 NOTE — NURSING NOTE
Chief Complaint   Patient presents with     Recheck Medication     Schizoaffective disorder, bipolar type     Reviewed Allergies, Medications, Pharmacy, Smoking Status, and Pain Level  Administered Abuse Screening Questions   Obtained Weight, Blood Pressure, Heart Rate

## 2018-01-22 NOTE — MR AVS SNAPSHOT
After Visit Summary   1/22/2018    Caden Bush    MRN: [id number]           Patient Information     Date Of Birth          1960        Visit Information        Provider Department      1/22/2018 12:45 PM Nam Kessler MD Psychiatry Clinic        Today's Diagnoses     Encounter for long-term (current) use of medications        Schizoaffective disorder, bipolar type (H)           Follow-ups after your visit        Follow-up notes from your care team     Return in about 2 weeks (around 2/5/2018).      Your next 10 appointments already scheduled     Feb 08, 2018 10:15 AM CST   Adult Med Follow UP with Nam Kessler MD   Psychiatry Clinic ([facility])    [address]  [address] [facility] [address] [phone]            Mar 13, 2018 10:30 AM CDT   [facility]    [address] [place] [address] [phone]           Please do not eat 10-12 hours before your appointment if you are coming in fasting for labs on lipids, cholesterol, or glucose (sugar). This does not apply to pregnant women. Water, hot tea and black coffee (with nothing added) are okay. Do not drink other fluids, diet soda or chew gum.              Who to contact     Please call your clinic at [phone] to:    Ask questions about your health    Make or cancel appointments    Discuss your medicines    Learn about your test results    Speak to your doctor   If you have compliments or concerns about an experience at your clinic, or if you wish to file a complaint, please contact [facility] Patient Relations at [phone] or email us at [email or web address]         Additional Information About Your Visit        MyChart Information     Make Meaningt gives you secure access to your electronic health record. If you see a primary care provider, you can also send messages to your care  team and make appointments. If you have questions, please call your primary care clinic.  If you do not have a primary care provider, please call [phone] and they will assist you.      Pocket is an electronic gateway that provides easy, online access to your medical records. With Pocket, you can request a clinic appointment, read your test results, renew a prescription or communicate with your care team.     To access your existing account, please contact your [facility] or call [phone] for assistance.        Care EveryWhere ID     This is your Care EveryWhere ID. This could be used by other organizations to access your [facility] medical records  [id number]        Your Vitals Were     Pulse BMI (Body Mass Index)                78 21.81 kg/m2           Blood Pressure from Last 3 Encounters:   01/22/18 127/70   12/27/17 118/58   12/21/17 133/68    Weight from Last 3 Encounters:   01/22/18 69.9 kg (154 lb 3.2 oz)   12/27/17 69.9 kg (154 lb)   12/21/17 70.7 kg (155 lb 12.8 oz)              We Performed the Following     URINE SODIUM          Today's Medication Changes          These changes are accurate as of 1/22/18 11:59 PM.  If you have any questions, ask your nurse or doctor.               These medicines have changed or have updated prescriptions.        Dose/Directions    traZODone 100 MG tablet   Commonly known as:  DESYREL   This may have changed:    - when to take this  - reasons to take this  - additional instructions   Used for:  Schizoaffective disorder, bipolar type (H)   Changed by:  Nam Kessler MD         mg at night   Quantity:  90 tablet   Refills:  1       * venlafaxine 150 MG 24 hr capsule   Commonly known as:  EFFEXOR-XR   This may have changed:  Another medication with the same name was added. Make sure you understand how and when to take each.   Used for:  Schizoaffective disorder, bipolar type (H)   Changed by:  Nam Kessler MD         Dose:  150 mg   Take 1 capsule (150 mg) by mouth daily Please take with 75 mg for a total of 225 mg daily   Quantity:  30 capsule   Refills:  0       * venlafaxine 75 MG 24 hr capsule   Commonly known as:  EFFEXOR XR   This may have changed:  Another medication with the same name was added. Make sure you understand how and when to take each.   Used for:  Schizoaffective disorder, bipolar type (H)   Changed by:  Nam Kessler MD        Dose:  75 mg   Take 1 capsule (75 mg) by mouth daily Please take with Effexor 150 mg for a total dose of 225 mg daily   Quantity:  30 capsule   Refills:  2       * venlafaxine 150 MG 24 hr capsule   Commonly known as:  EFFEXOR XR   This may have changed:  You were already taking a medication with the same name, and this prescription was added. Make sure you understand how and when to take each.   Used for:  Schizoaffective disorder, bipolar type (H)   Changed by:  Nam Kessler MD        Dose:  150 mg   Take 1 capsule (150 mg) by mouth daily Along with a 75 mg for a total of 225 mg daily dose.   Quantity:  30 capsule   Refills:  2       * Notice:  This list has 3 medication(s) that are the same as other medications prescribed for you. Read the directions carefully, and ask your doctor or other care provider to review them with you.      Stop taking these medicines if you haven't already. Please contact your care team if you have questions.     lithium 150 MG capsule   Stopped by:  Nam Kessler MD                Where to get your medicines      These medications were sent to [facility] [id number] [place] [address] [address] AT [place] [id number] [facility] [address]     Phone:  [phone]     risperiDONE 3 MG tablet    traZODone 100 MG tablet    venlafaxine 150 MG 24 hr capsule    venlafaxine 75 MG 24 hr capsule                Primary Care Provider Office Phone # Fax #    Leonard Dai [phone] [address]  [facility] [address]        Equal Access to Services     ERICA URIAS : Hadii ethan briones lorettatrinity Naifali, waaxda luqadaha, qaybta jorge amamarcia pattersonleanneremy woods. So [facility] [phone].    ATENCIÓN: Si habla español, tiene a magaña disposición servicios gratuitos de asistencia lingüística. Llame al [phone].    We comply with applicable federal civil rights laws and [place] laws. We do not discriminate on the basis of race, color, national origin, age, disability, sex, sexual orientation, or gender identity.            Thank you!     Thank you for choosing PSYCHIATRY CLINIC  for your care. Our goal is always to provide you with excellent care. Hearing back from our patients is one way we can continue to improve our services. Please take a few minutes to complete the written survey that you may receive in the mail after your visit with us. Thank you!             Your Updated Medication List - Protect others around you: Learn how to safely use, store and throw away your medicines at www.disposemymeds.org.          This list is accurate as of 1/22/18 11:59 PM.  Always use your most recent med list.                   Brand Name Dispense Instructions for use Diagnosis    ACE/ARB/ARNI NOT PRESCRIBED (INTENTIONAL)      ACE & ARB not prescribed due to Risk for drug interaction- lithium toxicity in the past        aspirin 81 MG EC tablet      Take 81 mg by mouth daily.        blood glucose monitoring test strip    ONETOUCH ULTRA    100 each    TEST ONCE DAILY AS DIRECTED    Encounter for medication refill       cholecalciferol 1000 UNIT tablet    vitamin D3     Take 1 tablet by mouth daily.        FISH OIL      1 capsule daily        glipiZIDE 5 MG 24 hr tablet    GLUCOTROL XL    90 tablet    TAKE 1 TABLET BY MOUTH DAILY    Encounter for medication refill       metFORMIN 500 MG 24 hr tablet    GLUCOPHAGE-XR    360 tablet    TAKE 2 TABLETS BY MOUTH TWICE DAILY    Encounter for medication  refill       pioglitazone 45 MG tablet    ACTOS    90 tablet    TAKE 1 TABLET BY MOUTH DAILY    Encounter for medication refill       risperiDONE 3 MG tablet    risperDAL    30 tablet    Take 1 tablet (3 mg) by mouth At Bedtime    Schizoaffective disorder, bipolar type (H)       * simvastatin 40 MG tablet    ZOCOR    90 tablet    TAKE ONE TABLET BY MOUTH EVERY NIGHT AT BEDTIME    Encounter for medication refill       * simvastatin 40 MG tablet    ZOCOR    90 tablet    TAKE ONE TABLET BY MOUTH EVERY NIGHT AT BEDTIME    Encounter for medication refill       traZODone 100 MG tablet    DESYREL    90 tablet     mg at night    Schizoaffective disorder, bipolar type (H)       * venlafaxine 150 MG 24 hr capsule    EFFEXOR-XR    30 capsule    Take 1 capsule (150 mg) by mouth daily Please take with 75 mg for a total of 225 mg daily    Schizoaffective disorder, bipolar type (H)       * venlafaxine 75 MG 24 hr capsule    EFFEXOR XR    30 capsule    Take 1 capsule (75 mg) by mouth daily Please take with Effexor 150 mg for a total dose of 225 mg daily    Schizoaffective disorder, bipolar type (H)       * venlafaxine 150 MG 24 hr capsule    EFFEXOR XR    30 capsule    Take 1 capsule (150 mg) by mouth daily Along with a 75 mg for a total of 225 mg daily dose.    Schizoaffective disorder, bipolar type (H)       * Notice:  This list has 5 medication(s) that are the same as other medications prescribed for you. Read the directions carefully, and ask your doctor or other care provider to review them with you.

## 2018-01-30 ENCOUNTER — CARE COORDINATION (OUTPATIENT)
Dept: PSYCHIATRY | Facility: CLINIC | Age: 58
End: 2018-01-30

## 2018-01-30 DIAGNOSIS — Z79.899 ENCOUNTER FOR LONG-TERM (CURRENT) USE OF MEDICATIONS: ICD-10-CM

## 2018-01-30 LAB
ALBUMIN SERPL-MCNC: 3.8 G/DL (ref 3.4–5)
ALP SERPL-CCNC: 79 U/L (ref 40–150)
ALT SERPL W P-5'-P-CCNC: 16 U/L (ref 0–70)
ANION GAP SERPL CALCULATED.3IONS-SCNC: 7 MMOL/L (ref 3–14)
AST SERPL W P-5'-P-CCNC: 14 U/L (ref 0–45)
BILIRUB SERPL-MCNC: 0.4 MG/DL (ref 0.2–1.3)
BUN SERPL-MCNC: 21 MG/DL (ref 7–30)
CALCIUM SERPL-MCNC: 9.3 MG/DL (ref 8.5–10.1)
CHLORIDE SERPL-SCNC: 107 MMOL/L (ref 94–109)
CO2 SERPL-SCNC: 26 MMOL/L (ref 20–32)
CREAT SERPL-MCNC: 1.2 MG/DL (ref 0.66–1.25)
ERYTHROCYTE [DISTWIDTH] IN BLOOD BY AUTOMATED COUNT: 14 % (ref 10–15)
GFR SERPL CREATININE-BSD FRML MDRD: 62 ML/MIN/1.7M2
GLUCOSE SERPL-MCNC: 195 MG/DL (ref 70–99)
HCT VFR BLD AUTO: 39.2 % (ref 40–53)
HGB BLD-MCNC: 12.1 G/DL (ref 13.3–17.7)
MCH RBC QN AUTO: 27 PG (ref 26.5–33)
MCHC RBC AUTO-ENTMCNC: 30.9 G/DL (ref 31.5–36.5)
MCV RBC AUTO: 88 FL (ref 78–100)
PLATELET # BLD AUTO: 179 10E9/L (ref 150–450)
POTASSIUM SERPL-SCNC: 4.5 MMOL/L (ref 3.4–5.3)
PROT SERPL-MCNC: 6.8 G/DL (ref 6.8–8.8)
RBC # BLD AUTO: 4.48 10E12/L (ref 4.4–5.9)
SODIUM SERPL-SCNC: 140 MMOL/L (ref 133–144)
TSH SERPL DL<=0.005 MIU/L-ACNC: 0.72 MU/L (ref 0.4–4)
WBC # BLD AUTO: 2.8 10E9/L (ref 4–11)

## 2018-01-30 PROCEDURE — 36415 COLL VENOUS BLD VENIPUNCTURE: CPT | Performed by: FAMILY MEDICINE

## 2018-01-30 PROCEDURE — 80053 COMPREHEN METABOLIC PANEL: CPT | Performed by: FAMILY MEDICINE

## 2018-01-30 PROCEDURE — 85027 COMPLETE CBC AUTOMATED: CPT | Performed by: FAMILY MEDICINE

## 2018-01-30 PROCEDURE — 84443 ASSAY THYROID STIM HORMONE: CPT | Performed by: FAMILY MEDICINE

## 2018-01-30 NOTE — PROGRESS NOTES
Pt returns call promptly.  States he stopped taking Lithium on 1/22/18.  Yesterday was anxious and stressed about getting lab work done so took Lithium 300 mg.  Today is not as anxious and does not plan to take any lithium tonight.      Reviewed labs and pt was surprised that labs were ordered under his PCP Dr. Dai as his understanding was that Dr. Kessler had ordered them.  Pt verifies that he was fasting for labs and did not have anything to eat or drink except water the morning of lab drawn.  Did not take his metformin, Actos, or Glipizide before lab draw.  Monitors BG's at home and readings have been in the 200's.  Will notify Dr. Kessler of lab results and call pt back with recommendations.  Pt confirms detailed msg can be left at [phone].

## 2018-01-30 NOTE — PROGRESS NOTES
worsening sx  Received: Yesterday       Jayne Merritt, Aimee YANCEY RN       Phone Number: [phone]                     The pt is the caller. He said that his lithium was lowered, and starting today he is having very high anxiety because of it. Okay to leave a detailed message.

## 2018-01-30 NOTE — PROGRESS NOTES
Appt history:  Last seen:  1/22/18  RTC:  1 month  Cancel:  none  No-show:  none  Next appt:  2/8/18    Last appt note unsigned but per plan:  - Stop Lithium CR 150mg QHS - Planned taper  - Cont' risperidone 3mg QHS  - Cont Effexor XR  225 mg daily  - Continue trazodone 50mg QHS PRN    Returned call to pt [phone]:  -No answer at this number  -LVM requesting c/b to discuss further  -Clinic number provided for c/b

## 2018-01-31 NOTE — PROGRESS NOTES
LVM for pt relaying below information from Dr. Kessler.  Clinic number provided for c/b if needed with questions or with any change in mood.

## 2018-01-31 NOTE — PROGRESS NOTES
Message  Received: Yesterday       Nam Kessler MD Blake, Katherine J RN       Caller: Unspecified (Yesterday, 11:16 AM)                     Cecilia - Labs WNL. It is ok to stop Lithium, that was the plan for the patient per clinic attending. (fyi only- he has no documented hx of hypomanic,manic episodes).     He is on Risperidone to cover Psychotic features. If his mood becomes elevated or he is depressed and has SI he needs to contact clinic or go to a ER nearby.     Thank you,   Shant

## 2018-02-08 ENCOUNTER — OFFICE VISIT (OUTPATIENT)
Dept: PSYCHIATRY | Facility: CLINIC | Age: 58
End: 2018-02-08
Attending: PSYCHIATRY & NEUROLOGY
Payer: MEDICARE

## 2018-02-08 VITALS
DIASTOLIC BLOOD PRESSURE: 75 MMHG | SYSTOLIC BLOOD PRESSURE: 137 MMHG | BODY MASS INDEX: 20.74 KG/M2 | WEIGHT: 146.6 LBS | HEART RATE: 111 BPM

## 2018-02-08 DIAGNOSIS — F25.0 SCHIZOAFFECTIVE DISORDER, BIPOLAR TYPE (H): Primary | ICD-10-CM

## 2018-02-08 PROCEDURE — G0463 HOSPITAL OUTPT CLINIC VISIT: HCPCS | Mod: ZF

## 2018-02-08 ASSESSMENT — PAIN SCALES - GENERAL: PAINLEVEL: NO PAIN (0)

## 2018-02-08 NOTE — PROGRESS NOTES
PSYCHIATRY CLINIC PROGRESS NOTE   The initial diagnostic evaluation was on 11/26/08 and the last transfer of care evaluation was 06/30/17.    Pertinent Background:  This patient first experienced mental health issues in his late 20s , initially obtained care at that time and has received treatment for Schizoaffective Disorder, bipolar type.  See transfer evaluation for detailed history.  Notably, this pt has been stable on regimen of Lithium, Effexor and Risperdal since 2011. Historically, lithium dose 450 mg has been adequate for mood stabilization although an increase could reasonably be considered if needed.    Psych critical item history includes suicidal ideation, psychosis [sxs include AH and paranoia], mutiple psychotropic trials and psych hosp (>5).     INTERIM HISTORY                                                 Caden Bush is a 57 year old male who was last seen in clinic on 1/22/2018  at which time patient reported stable mood. Li was stopped,per plan,  Risperidone was increased to 3 mg. The patient reports good treatment adherence.  History was provided by patient who was a good historian.  Since the last visit:  - Stopped Lithium 1/22/2017. Reports Stable mood. No AH, VH. Feels some what anxious, fidgety (chronic)  - Difficulty falling asleep, takes an hour to sleep. Able to sustain good sleep, sleeps for >=8 hr. Sleeps from 10 pm-9 am. Well rested in the morning.  - Going to therapist once monthly    RECENT SYMPTOMS:   DEPRESSION: reports-none;  DENIES- suicidal ideation  PSYCHOSIS:  reports-none;  DENIES- hallucinations and delusions  EATING DISORDER: none    RECENT SUBSTANCE USE:     ALCOHOL-  never           TOBACCO- none          CAFFEINE- 1-2 cups/day of coffee, 1-2 sodas/day     OPIOIDS- none      NARCAN KIT- N/A          CANNABIS- none            OTHER ILLICIT DRUGS- none     CURRENT SOCIAL HISTORY:  Financial Support-  SSDI for schizoaffective disorder and diabetes.       Living  Situation- Currently living by self in subsidized apartment in [place] since Feb 2011.     Children- none     MEDICAL ROS:  Reports none.  Denies GI [nausea, diarrhea, constipation,  ], headache, sedation, tremor and confusion.      PSYCH AND CD CRITICAL ITEM SUMMARY     Sept 2017 - Decrease Li from 450 mg to 300 mg qhs (BUN/cr was 24.)  Nov 2017 - Decrease Li to 150 mg, Increase Risperidone to 3 mg  Feb 2018 - Stop Li    PAST PSYCH MED TRIALS     Pl see  Problem list -Hx of Psychiatric care    MEDICAL / SURGICAL HISTORY                                   CARE TEAM:   PCP- [facility]    Therapist-  Yeny Goff at [facility]      Ophthalmologist: Jacob Lieberman with [facility]    Patient Active Problem List   Diagnosis     Eczema     Retinopathy     Schizoaffective disorder, bipolar type (H)     Polyp of colon, adenomatous     Health Care Home     Diabetic retinopathy associated with type 2 diabetes mellitus (H)     DM (diabetes mellitus), type 2 with ophthalmic complications (H)     Hx of psychiatric care     Neuropathy of left upper extremity     ALLERGY                                Review of patient's allergies indicates no known allergies.  MEDICATIONS                               Current Outpatient Prescriptions   Medication Sig Dispense Refill     risperiDONE (RISPERDAL) 3 MG tablet Take 1 tablet (3 mg) by mouth At Bedtime 30 tablet 2     traZODone (DESYREL) 100 MG tablet  mg at night 90 tablet 1     venlafaxine (EFFEXOR XR) 75 MG 24 hr capsule Take 1 capsule (75 mg) by mouth daily Please take with Effexor 150 mg for a total dose of 225 mg daily 30 capsule 2     venlafaxine (EFFEXOR XR) 150 MG 24 hr capsule Take 1 capsule (150 mg) by mouth daily Along with a 75 mg for a total of 225 mg daily dose. 30 capsule 2     simvastatin (ZOCOR) 40 MG tablet TAKE ONE TABLET BY MOUTH EVERY NIGHT AT BEDTIME 90 tablet 3     blood glucose monitoring (ONE TOUCH ULTRA) test  strip TEST ONCE DAILY AS DIRECTED 100 each 3     pioglitazone (ACTOS) 45 MG tablet TAKE 1 TABLET BY MOUTH DAILY 90 tablet 1     metFORMIN (GLUCOPHAGE-XR) 500 MG 24 hr tablet TAKE 2 TABLETS BY MOUTH TWICE DAILY 360 tablet 3     glipiZIDE (GLUCOTROL XL) 5 MG 24 hr tablet TAKE 1 TABLET BY MOUTH DAILY 90 tablet 1     simvastatin (ZOCOR) 40 MG tablet TAKE ONE TABLET BY MOUTH EVERY NIGHT AT BEDTIME 90 tablet 3     ACE/ARB NOT PRESCRIBED, INTENTIONAL, ACE & ARB not prescribed due to Risk for drug interaction- lithium toxicity in the past       FISH OIL 1 capsule daily        aspirin 81 MG EC tablet Take 81 mg by mouth daily.       cholecalciferol (VITAMIN D) 1000 UNIT tablet Take 1 tablet by mouth daily.       VITALS   /75  Pulse 111  Wt 66.5 kg (146 lb 9.6 oz)  BMI 20.74 kg/m2   MENTAL STATUS EXAM                                                             Alertness: alert  and oriented  Appearance: casually groomed and malodorous  Behavior/Demeanor: cooperative, pleasant and calm, with good  eye contact   Speech: normal  Language: intact and no problems  Psychomotor: normal or unremarkable  Mood: description consistent with euthymia  Affect: full range; was congruent to mood; was congruent to content  Thought Process/Associations: unremarkable  Thought Content:  Reports none;   Denies suicidal ideation  and psychotic thought  Perception:   Reports none;  Denies auditory hallucinations (NO;  command-NO;  following command-NO and visual hallucinations (NO)  Insight: fair  Judgment: good  Cognition: does  appear grossly intact; formal cognitive testing was not done    LABS and DATA     PHQ9 TODAY = 0  PHQ-9 SCORE 11/27/2017 12/21/2017 1/22/2018   Total Score - - -   Total Score 0 0 0   Total Score - - -       RATING SCALES:  AIMS next due date needs to be determined    LITHIUM LABS     [level, renal, SG, TSH, WBC]    q6 mo  Recent Labs   Lab Test  07/11/17   1034  12/20/16   1301  06/09/16   1030  12/21/15   1413    LITHIUM  0.6  0.6  0.5*  0.5*     Recent Labs   Lab Test  01/30/18   0923  07/11/17   1034  12/20/16   1301  06/09/16   1030  12/21/15   1413   CR  1.20  1.17  1.21  1.26*  1.11   GFRESTIMATED  62   --    --   59*  68   NA  140  141  142  140  133   PEE  9.3  9.7  10.1  9.4  9.2   **BUN 7/11/17: 28 mg/dL (rr 6-24) - HIGH**  **BUN/Creatinine ratio 7/11/17: 24 (rr 9-20) - HIGH**     BUN 12/20/16: 21 mg/dL      BUN/Creatinine ratio 12/20/16: 17    Recent Labs   Lab Test  06/09/16   1040  12/21/15   1435   SG  1.014  1.007     Recent Labs   Lab Test  01/30/18   0923  06/09/16   1030  12/21/15   1413   TSH  0.72  1.70  1.69     Recent Labs   Lab Test  01/30/18   0923  12/20/16   1240   WBC  2.8*  3.7*     ANTIPSYCHOTIC LABS   [glu, A1C, lipids (focus LDL), liver enzymes, WBC, ANEU, Hgb, plts]    q12 mo  Recent Labs   Lab Test  01/30/18   0923  09/15/17   0956  07/11/17   1034  06/07/17   1004   GLC  195*   --   234*   --    A1C   --   6.8*   --   8.2*     Recent Labs   Lab Test  07/11/17   1034  06/07/17   1004   CHOL  126  143   TRIG  49  105   LDL  40  46   HDL  76  76     Recent Labs   Lab Test  01/30/18   0923  07/11/17   1034   AST  14  14   ALT  16  12   ALKPHOS  79  72     Recent Labs   Lab Test  01/30/18   0923  12/20/16   1240  06/09/16   1030  12/21/15   1413   WBC  2.8*  3.7*  4.7  4.6   ANEU   --    --   3.5  3.4   HGB  12.1*  13.6  14.7  13.4   PLT  179  205  175  155     DIAGNOSES                                                                                                   Schizoaffective disorder w/ unipolar depression, Vs. Depression w/ psychotic features moderate,    ASSESSMENT                                       TODAY: Caden reports doing well with stable mood, denies elevated, depressed mood. No AH, VH, disorganized thoughts. No increase in thirst, no increase in urination. Tremor stopped. Doesn't feel blunted or foggy. Is able to enjoy movies, reading magazines, word puzzles, socializing with  family.     Li will be stopped per plan (BUN elevated 7/17 to 24, past hx of Li toxicity, unclear dx). Risperidone 3 mg QHS will be continued, Effexor 225 mg daily will be continued. Patient has been taking Trazodone 100 mg qhs. Sleep hygiine discussed.Patient advised to notify us if mood changes noticed - discussed particular signs and symptoms to be vigilant for.    Med HX:  Thorough chart review of records in epic done prior to patient's appointment. Patient has records since 2009 in [facility]. Two hospitalizations 2009 (Depression, SI, Psychosis), 2012 (Li toxicity 2/2 Lisinopril use).  No mention of hypomania or wolf in chart. Since 2012 patient has been on Li  mg qhs, Risperidone 2 mg qhs, Effexor 300 mg daily. Lower dose of Risperidone was tried in the past and patient became paranoid. Patient has historic diagnosis of Schizoaffective disorder Bipolar type but no record of hypomanic and manic symptoms in chart, nor, per patient's recollection. He was diagnosed with Schizophrenia at the age of 28 when he heard voices, he was started on Lithium. He remembers that he was taken off Li and was on a trial of Wellbutrin, Zyprexa and other medications that he doesn't remember.  Diagnosis will have to be further clarified - DDX - Schizophrenia, Schizoaffective disorder w/ unipolar depression,  Depression w/ psychotic features moderate, recurrent in remission.       [place] PRESCRIPTION MONITORING PROGRAM [] was not checked today and revealed: N/A    PSYCHOTROPIC DRUG INTERACTIONS:    VENLAFAXINE and ANTIPLATELET AGENTS (ASPIRIN) may result in an increased risk of bleeding  RISPERIDONE and SIMVASTATIN may result in increased simvastatin serum concentrations with an increased risk of myopathy or rhabdomyolysis.   TRAZODONE and VENLAFAXINE may result in an increased risk of serotonin syndrome     MANAGEMENT:  Monitoring for adverse effects, routine vitals, routine labs, minimal use of [trazodone] and patient is aware of  risks      PLAN                                                                                                       1) PSYCHOTROPIC MEDICATIONS:  - Stop Lithium CR 150mg QHS - Planned taper  - Cont' risperidone 3mg QHS  - Cont Effexor XR  225 mg daily  - Cont' Trazodone 100 mg qhs    2) THERAPY:  Seeing psychologist Yeny Goff at [facility] since 11/2015. Sees once per month.     3) LABS NEXT DUE: Anti-psychotic, Li labs Jan 2018 - ordered       RATING SCALES:  AIMS next due date needs to be determined    4) REFERRALS [MICD, medical etc.]:  Recommended follow-up with primary care provider for further evaluation and recommendations re: renal dysfunction, in context of diabetes and Lithium regimen.      5) MTM REFERRAL [PharmD]:  none    6) :  none Previously had a CCM until 2011. Has SW consult on 10/20/15, not interested in CM or [facility] worker services at this time and seems to be managing well on his own.     7) RTC: 1 months    8) CRISIS NUMBERS:   Provided routinely in AVS    TREATMENT RISK STATEMENT:  The risks, benefits, alternatives and potential adverse effects have been discussed and are understood by the patient. The pt understands the risks of using street drugs or alcohol.  There are no medical contraindications, the pt agrees to treatment with the ability to do so.  The patient understands to call 911 or come to the nearest ED if life threatening or urgent symptoms present.    PSYCHIATRY CLINIC INDIVIDUAL PSYCHOTHERAPY NOTE                                    16   Start time - 10:15      End time -10:41  Date reviewed - 09/11/2017       Date next due - 09/2018    Subjective: This supportive psychotherapy session addressed issues related to goals of therapy.  Patient's reaction: Preparatory in the context of mental status appropriate for ambulatory setting.  Progress: fair to good  Plan: RTC 1 month  Psychotherapy services during this visit included  myself and patient  TREATMENT   PLAN          SYMPTOMS;PROBLEMS   MEASURABLE GOALS;    FUNCTIONAL IMPROVEMENT INTERVENTIONS;    GAINS MADE DISCHARGE CRITERIA   Sleep 7-8 h of sleep  Feeling restful during the day Medications  Education on diet, exercise  Minimize Caffeine intake marked symptom improvement   Depression: depressed mood   get 7-8 hours of restful sleep every night   Doing 2 fun things per day  Feeling good about self Medications  Therapy  Lifestyle changes  Increase Social support  Movies, Mall walking marked symptom improvement     Nam Kessler MD    Patient was staffed by attending Dr. Porras who will sign the note. My supervisor is Dr. Braswell.      I saw the patient with the resident, and participated in key portions of the service, including the mental status examination and developing the plan of care. I reviewed key portions of the history with the resident. I agree with the findings and plan as documented in this note.    Maty Porras

## 2018-02-08 NOTE — NURSING NOTE
Chief Complaint   Patient presents with     Recheck Medication     Schizoaffective disorder, bipolar type     Reviewed Allergies, Medications, Pharmacy, Smoking Status, and Pain Level   Obtained Weight, Blood Pressure, Heart Rate

## 2018-02-08 NOTE — MR AVS SNAPSHOT
After Visit Summary   2/8/2018    Caden Bush    MRN: [id number]           Patient Information     Date Of Birth          1960        Visit Information        Provider Department      2/8/2018 10:15 AM Nam Kessler MD Psychiatry Clinic        Care Instructions    Thank you for coming to the PSYCHIATRY CLINIC.    Lab Testing:  If you had lab testing today and your results are reassuring or normal they will be mailed to you or sent through [employer] within 7 days.   If the lab tests need quick action we will call you with the results.  The phone number we will call with results is # [phone] (home) . If this is not the best number please call our clinic and change the number.    Medication Refills:  If you need any refills please call your pharmacy and they will contact us. Our fax number for refills is [phone]. Please allow three business for refill processing.   If you need to  your refill at a new pharmacy, please contact the new pharmacy directly. The new pharmacy will help you get your medications transferred.     Scheduling:  If you have any concerns about today's visit or wish to schedule another appointment please call our office during normal business hours [phone] (8-5:00 M-F)    Contact Us:  Please call [phone] during business hours (8-5:00 M-F).  If after clinic hours, or on the weekend, please call  [phone].    Financial Assistance [phone]  [facility] [phone]  [facility] [phone]  Medical Records [phone]      MENTAL HEALTH CRISIS NUMBERS:  [facility]:   [facility] - [phone]   Crisis Residence [facility] [place] - [facility] - [phone]   Walk-In Counseling [facility] [place] - [phone]   COPE 24/7 [place] Mobile Team for Adults - [[phone]]; Child - [[phone]]     Crisis Connection - [phone]     [facility]:   [facility] - [phone]   Walk-in counseling [facility]  [facility] - [phone]   Walk-in counseling [facility] - [phone]   Crisis Residence  [facility] - [phone]   Urgent Care Adult Mental Health:   --Drop-in, 24/7 crisis line, and [facility] Mobile Team [[phone]]    CRISIS TEXT LINE: Text [phone] from anywhere, anytime, any crisis 24/7;    OR SEE www.crisistextline.org     Poison Control Center - [phone]    CHILD: [facility] needs assessment team - [phone]     Trans Lifeline - [phone]; or [facility] - 1-514.657.9692    If you have a medical emergency please call 911or go to the nearest ER.                    _____________________________________________    Again thank you for choosing PSYCHIATRY CLINIC and please let us know how we can best partner with you to improve you and your family's health.  You may be receiving a survey in the mail regarding this appointment. We would love to have your feedback, both positive and negative, so please fill out the survey and return it using the provided envolpe. The survey is done by an external company, so your answers are anonymous.             Follow-ups after your visit        Your next 10 appointments already scheduled     Mar 01, 2018 10:15 AM CST   Adult Med Follow UP with Nam Kessler MD   Psychiatry Clinic ([facility])    [address]  [address] [facility] [address] [phone]            Mar 13, 2018 10:30 AM CDT   LAB with  LAB   [facility]    [address] [place] [address] [phone]           Please do not eat 10-12 hours before your appointment if you are coming in fasting for labs on lipids, cholesterol, or glucose (sugar). This does not apply to pregnant women. Water, hot tea and black coffee (with nothing added) are okay. Do not drink other fluids, diet soda or chew gum.              Who to contact      Please call your clinic at [phone] to:    Ask questions about your health    Make or cancel appointments    Discuss your medicines    Learn about your test results    Speak to your doctor   If you have compliments or concerns about an experience at your clinic, or if you wish to file a complaint, please contact [facility] Patient Relations at [phone] or email us at [email or web address]         Additional Information About Your Visit        [employer] Information     [employer] gives you secure access to your electronic health record. If you see a primary care provider, you can also send messages to your care team and make appointments. If you have questions, please call your primary care clinic.  If you do not have a primary care provider, please call [phone] and they will assist you.      Vator is an electronic gateway that provides easy, online access to your medical records. With [employer]ator, you can request a clinic appointment, read your test results, renew a prescription or communicate with your care team.     To access your existing account, please contact your [facility] or call [phone] for assistance.        Care EveryWhere ID     This is your Care EveryWhere ID. This could be used by other organizations to access your [place] medical records  [id number]        Your Vitals Were     Pulse BMI (Body Mass Index)                111 20.74 kg/m2           Blood Pressure from Last 3 Encounters:   02/08/18 137/75   01/22/18 127/70   12/27/17 118/58    Weight from Last 3 Encounters:   02/08/18 66.5 kg (146 lb 9.6 oz)   01/22/18 69.9 kg (154 lb 3.2 oz)   12/27/17 69.9 kg (154 lb)              Today, you had the following     No orders found for display       Primary Care Provider Office Phone # Fax #    Leonard Dai [phone] [address]  [facility] [address]        Equal Access to Services      ERICA URIAS : Hadii aad ku carolina Baeza, waaxda luqadaha, qaybta kaaldebby tazkennygrisel, marcia morrisonleanneremy diaz . So [facility] [phone].    ATENCIÓN: Si habla español, tiene a magaña disposición servicios gratuitos de asistencia lingüística. Josemanuelame al [phone].    We comply with applicable federal civil rights laws and [place] laws. We do not discriminate on the basis of race, color, national origin, age, disability, sex, sexual orientation, or gender identity.            Thank you!     Thank you for choosing PSYCHIATRY CLINIC  for your care. Our goal is always to provide you with excellent care. Hearing back from our patients is one way we can continue to improve our services. Please take a few minutes to complete the written survey that you may receive in the mail after your visit with us. Thank you!             Your Updated Medication List - Protect others around you: Learn how to safely use, store and throw away your medicines at www.disposemymeds.org.          This list is accurate as of 2/8/18 11:04 AM.  Always use your most recent med list.                   Brand Name Dispense Instructions for use Diagnosis    ACE/ARB/ARNI NOT PRESCRIBED (INTENTIONAL)      ACE & ARB not prescribed due to Risk for drug interaction- lithium toxicity in the past        aspirin 81 MG EC tablet      Take 81 mg by mouth daily.        blood glucose monitoring test strip    ONETOUCH ULTRA    100 each    TEST ONCE DAILY AS DIRECTED    Encounter for medication refill       cholecalciferol 1000 UNIT tablet    vitamin D3     Take 1 tablet by mouth daily.        FISH OIL      1 capsule daily        glipiZIDE 5 MG 24 hr tablet    GLUCOTROL XL    90 tablet    TAKE 1 TABLET BY MOUTH DAILY    Encounter for medication refill       metFORMIN 500 MG 24 hr tablet    GLUCOPHAGE-XR    360 tablet    TAKE 2 TABLETS BY MOUTH TWICE DAILY    Encounter for medication refill       pioglitazone 45 MG tablet    ACTOS    90 tablet    TAKE  1 TABLET BY MOUTH DAILY    Encounter for medication refill       risperiDONE 3 MG tablet    risperDAL    30 tablet    Take 1 tablet (3 mg) by mouth At Bedtime    Schizoaffective disorder, bipolar type (H)       * simvastatin 40 MG tablet    ZOCOR    90 tablet    TAKE ONE TABLET BY MOUTH EVERY NIGHT AT BEDTIME    Encounter for medication refill       * simvastatin 40 MG tablet    ZOCOR    90 tablet    TAKE ONE TABLET BY MOUTH EVERY NIGHT AT BEDTIME    Encounter for medication refill       traZODone 100 MG tablet    DESYREL    90 tablet     mg at night    Schizoaffective disorder, bipolar type (H)       * venlafaxine 75 MG 24 hr capsule    EFFEXOR XR    30 capsule    Take 1 capsule (75 mg) by mouth daily Please take with Effexor 150 mg for a total dose of 225 mg daily    Schizoaffective disorder, bipolar type (H)       * venlafaxine 150 MG 24 hr capsule    EFFEXOR XR    30 capsule    Take 1 capsule (150 mg) by mouth daily Along with a 75 mg for a total of 225 mg daily dose.    Schizoaffective disorder, bipolar type (H)       * Notice:  This list has 4 medication(s) that are the same as other medications prescribed for you. Read the directions carefully, and ask your doctor or other care provider to review them with you.

## 2018-02-08 NOTE — PATIENT INSTRUCTIONS
Thank you for coming to the PSYCHIATRY CLINIC.    Lab Testing:  If you had lab testing today and your results are reassuring or normal they will be mailed to you or sent through "[employer] within 7 days.   If the lab tests need quick action we will call you with the results.  The phone number we will call with results is # [phone] (home) . If this is not the best number please call our clinic and change the number.    Medication Refills:  If you need any refills please call your pharmacy and they will contact us. Our fax number for refills is [phone]. Please allow three business for refill processing.   If you need to  your refill at a new pharmacy, please contact the new pharmacy directly. The new pharmacy will help you get your medications transferred.     Scheduling:  If you have any concerns about today's visit or wish to schedule another appointment please call our office during normal business hours [phone] (8-5:00 M-F)    Contact Us:  Please call [phone] during business hours (8-5:00 M-F).  If after clinic hours, or on the weekend, please call  [phone].    Financial Assistance [phone]  [employer] Billing [phone]  [employer] Billing [phone]  Medical Records [phone]      MENTAL HEALTH CRISIS NUMBERS:  [facility]:   [facility] - [phone]   Crisis Residence [facility] - [phone]   Walk-In Counseling [facility] [phone]   COPE 24/7 [place] Mobile Team for Adults - [[phone]]; Child - [[phone]]     Crisis Connection - [phone]     [facility]:   [facility] - [phone]   Walk-in counseling [facility] - [phone]   Walk-in counseling [facility] - [phone]   Crisis Residence [facility] - [phone]   Urgent Care Adult Mental Health:   --Drop-in, 24/7 crisis line, and [place] Mobile Team [[phone]]    CRISIS TEXT  LINE: Text [phone] from anywhere, anytime, any crisis 24/7;    OR SEE www.crisistextline.org     Poison Control Center - [phone]    CHILD: [facility] needs assessment team - [phone]     [facility] - [phone]; or [employer] [facility] - [phone]    If you have a medical emergency please call 911or go to the nearest ER.                    _____________________________________________    Again thank you for choosing PSYCHIATRY CLINIC and please let us know how we can best partner with you to improve you and your family's health.  You may be receiving a survey in the mail regarding this appointment. We would love to have your feedback, both positive and negative, so please fill out the survey and return it using the provided envolpe. The survey is done by an external company, so your answers are anonymous.

## 2018-02-09 ASSESSMENT — PATIENT HEALTH QUESTIONNAIRE - PHQ9: SUM OF ALL RESPONSES TO PHQ QUESTIONS 1-9: 0

## 2018-02-16 DIAGNOSIS — Z76.0 ENCOUNTER FOR MEDICATION REFILL: ICD-10-CM

## 2018-02-16 RX ORDER — PIOGLITAZONEHYDROCHLORIDE 45 MG/1
TABLET ORAL
Qty: 90 TABLET | Refills: 0 | Status: SHIPPED | OUTPATIENT
Start: 2018-02-16 | End: 2018-05-11

## 2018-03-01 ENCOUNTER — OFFICE VISIT (OUTPATIENT)
Dept: PSYCHIATRY | Facility: CLINIC | Age: 58
End: 2018-03-01
Attending: PSYCHIATRY & NEUROLOGY
Payer: MEDICARE

## 2018-03-01 VITALS
DIASTOLIC BLOOD PRESSURE: 62 MMHG | SYSTOLIC BLOOD PRESSURE: 144 MMHG | WEIGHT: 142.8 LBS | HEART RATE: 118 BPM | BODY MASS INDEX: 20.2 KG/M2

## 2018-03-01 DIAGNOSIS — F25.1 SCHIZOAFFECTIVE DISORDER, DEPRESSIVE TYPE (H): Primary | ICD-10-CM

## 2018-03-01 PROCEDURE — G0463 HOSPITAL OUTPT CLINIC VISIT: HCPCS | Mod: ZF

## 2018-03-01 ASSESSMENT — PAIN SCALES - GENERAL: PAINLEVEL: NO PAIN (0)

## 2018-03-01 NOTE — NURSING NOTE
Chief Complaint   Patient presents with     Recheck Medication     Schizoaffective disorder, bipolar type      Reviewed allergies, medications, pharmacy and smoking status.  Administered abuse screening questions   Obtained weight, pain level, blood pressure and heart rate

## 2018-03-01 NOTE — PROGRESS NOTES
PSYCHIATRY CLINIC PROGRESS NOTE   The initial diagnostic evaluation was on 11/26/08 and the last transfer of care evaluation was 06/30/17.    Pertinent Background:  This patient first experienced mental health issues in his late 20s , initially obtained care at that time and has received treatment for Schizoaffective Disorder, bipolar type.  See transfer evaluation for detailed history.  Notably, this pt has been stable on regimen of Lithium, Effexor and Risperdal since 2011. Historically, lithium dose 450 mg has been adequate for mood stabilization although an increase could reasonably be considered if needed.    Psych critical item history includes suicidal ideation, psychosis [sxs include AH and paranoia], mutiple psychotropic trials and psych hosp (>5).     INTERIM HISTORY                                                 Caden Bush is a 57 year old male who was last seen in clinic on 2/8/2018 at which time patient reported stable mood. Li taper continued. The patient reports good treatment adherence.  History was provided by patient who was a good historian.  Since the last visit:  - Stopped Lithium 1/22/2017. Reports Stable mood. No AH, VH.No elevated or depressed mood  - Living with parents for 2 weeks as his apt is getting pest control  In the bldg.  - Improved sleep. Able to fall asleep sustain good sleep, sleeps for >=8 hr. Sleeps from 10 pm-9 am. Well rested in the morning.  - Going to therapist once monthly  - Appetite slightly reduced - food is getting boring. I cook  - Wood puzzles, walking, meeting people.     RECENT SYMPTOMS:   DEPRESSION: reports-none;  DENIES- suicidal ideation  PSYCHOSIS:  reports-none;  DENIES- hallucinations and delusions  EATING DISORDER: none  KINGA: Denies elevated mood ,insomnia, racing thoughts, restlessness    RECENT SUBSTANCE USE:     ALCOHOL-  never           TOBACCO- none          CAFFEINE- 1-2 cups/day of coffee, 1-2 sodas/day     OPIOIDS- none      NARCAN KIT- N/A           CANNABIS- none            OTHER ILLICIT DRUGS- none     CURRENT SOCIAL HISTORY:  Financial Support-  SSDI for schizoaffective disorder and diabetes.       Living Situation- Currently living by self in subsidized apartment in [place] since Feb 2011.     Children- none     MEDICAL ROS:  Reports none.  Denies GI [nausea, diarrhea, constipation,  ], headache, sedation, tremor and confusion.      PSYCH AND CD CRITICAL ITEM SUMMARY     Sept 2017 - Decrease Li from 450 mg to 300 mg qhs (BUN/cr was 24.)  Nov 2017 - Decrease Li to 150 mg, Increase Risperidone to 3 mg  Feb 2018 - Stop Li    PAST PSYCH MED TRIALS     Pl see  Problem list -Hx of Psychiatric care    MEDICAL / SURGICAL HISTORY                                   CARE TEAM:   PCP- Dr. Dai with [facility]    Therapist-  Yeny Goff at [facility]      Ophthalmologist: Jacob Lieberman with [facility]    Patient Active Problem List   Diagnosis     Eczema     Retinopathy     Schizoaffective disorder, bipolar type (H)     Polyp of colon, adenomatous     Health Care Home     Diabetic retinopathy associated with type 2 diabetes mellitus (H)     DM (diabetes mellitus), type 2 with ophthalmic complications (H)     Hx of psychiatric care     Neuropathy of left upper extremity     ALLERGY                                Review of patient's allergies indicates no known allergies.  MEDICATIONS                               Current Outpatient Prescriptions   Medication Sig Dispense Refill     pioglitazone (ACTOS) 45 MG tablet TAKE 1 TABLET BY MOUTH DAILY 90 tablet 0     risperiDONE (RISPERDAL) 3 MG tablet Take 1 tablet (3 mg) by mouth At Bedtime 30 tablet 2     traZODone (DESYREL) 100 MG tablet  mg at night 90 tablet 1     venlafaxine (EFFEXOR XR) 75 MG 24 hr capsule Take 1 capsule (75 mg) by mouth daily Please take with Effexor 150 mg for a total dose of 225 mg daily 30 capsule 2     venlafaxine (EFFEXOR XR) 150 MG 24 hr capsule Take 1  capsule (150 mg) by mouth daily Along with a 75 mg for a total of 225 mg daily dose. 30 capsule 2     simvastatin (ZOCOR) 40 MG tablet TAKE ONE TABLET BY MOUTH EVERY NIGHT AT BEDTIME 90 tablet 3     blood glucose monitoring (ONE TOUCH ULTRA) test strip TEST ONCE DAILY AS DIRECTED 100 each 3     metFORMIN (GLUCOPHAGE-XR) 500 MG 24 hr tablet TAKE 2 TABLETS BY MOUTH TWICE DAILY 360 tablet 3     glipiZIDE (GLUCOTROL XL) 5 MG 24 hr tablet TAKE 1 TABLET BY MOUTH DAILY 90 tablet 1     simvastatin (ZOCOR) 40 MG tablet TAKE ONE TABLET BY MOUTH EVERY NIGHT AT BEDTIME 90 tablet 3     ACE/ARB NOT PRESCRIBED, INTENTIONAL, ACE & ARB not prescribed due to Risk for drug interaction- lithium toxicity in the past       FISH OIL 1 capsule daily        aspirin 81 MG EC tablet Take 81 mg by mouth daily.       cholecalciferol (VITAMIN D) 1000 UNIT tablet Take 1 tablet by mouth daily.       VITALS   /62  Pulse 118  Wt 64.8 kg (142 lb 12.8 oz)  BMI 20.2 kg/m2   MENTAL STATUS EXAM                                                             Alertness: alert  and oriented  Appearance: casually groomed and malodorous  Behavior/Demeanor: cooperative, pleasant and calm, with good  eye contact   Speech: normal  Language: intact and no problems  Psychomotor: normal or unremarkable  Mood: description consistent with euthymia  Affect: full range; was congruent to mood; was congruent to content  Thought Process/Associations: unremarkable  Thought Content:  Reports none;   Denies suicidal ideation  and psychotic thought  Perception:   Reports none;  Denies auditory hallucinations (NO;  command-NO;  following command-NO and visual hallucinations (NO)  Insight: fair  Judgment: good  Cognition: does  appear grossly intact; formal cognitive testing was not done    LABS and DATA     PHQ9 TODAY = 1  PHQ-9 SCORE 12/21/2017 1/22/2018 2/8/2018   Total Score - - -   Total Score 0 0 0   Total Score - - -       RATING SCALES:  AIMS next due date needs to  be determined    LITHIUM LABS     [level, renal, SG, TSH, WBC]    q6 mo  Recent Labs   Lab Test  07/11/17   1034  12/20/16   1301  06/09/16   1030  12/21/15   1413   LITHIUM  0.6  0.6  0.5*  0.5*     Recent Labs   Lab Test  01/30/18   0923  07/11/17   1034  12/20/16   1301  06/09/16   1030  12/21/15   1413   CR  1.20  1.17  1.21  1.26*  1.11   GFRESTIMATED  62   --    --   59*  68   NA  140  141  142  140  133   PEE  9.3  9.7  10.1  9.4  9.2   **BUN 7/11/17: 28 mg/dL (rr 6-24) - HIGH**  **BUN/Creatinine ratio 7/11/17: 24 (rr 9-20) - HIGH**     BUN 12/20/16: 21 mg/dL      BUN/Creatinine ratio 12/20/16: 17    Recent Labs   Lab Test  06/09/16   1040  12/21/15   1435   SG  1.014  1.007     Recent Labs   Lab Test  01/30/18   0923  06/09/16   1030  12/21/15   1413   TSH  0.72  1.70  1.69     Recent Labs   Lab Test  01/30/18   0923  12/20/16   1240   WBC  2.8*  3.7*     ANTIPSYCHOTIC LABS   [glu, A1C, lipids (focus LDL), liver enzymes, WBC, ANEU, Hgb, plts]    q12 mo  Recent Labs   Lab Test  01/30/18   0923  09/15/17   0956  07/11/17   1034  06/07/17   1004   GLC  195*   --   234*   --    A1C   --   6.8*   --   8.2*     Recent Labs   Lab Test  07/11/17   1034  06/07/17   1004   CHOL  126  143   TRIG  49  105   LDL  40  46   HDL  76  76     Recent Labs   Lab Test  01/30/18   0923  07/11/17   1034   AST  14  14   ALT  16  12   ALKPHOS  79  72     Recent Labs   Lab Test  01/30/18   0923  12/20/16   1240  06/09/16   1030  12/21/15   1413   WBC  2.8*  3.7*  4.7  4.6   ANEU   --    --   3.5  3.4   HGB  12.1*  13.6  14.7  13.4   PLT  179  205  175  155     DIAGNOSES                                                                                                   Schizoaffective disorder w/ unipolar depression, Vs. Depression w/ psychotic features moderate Vs. Schizophrenia w/ predominant negative symtpoms    ASSESSMENT                                       TODAY: Caden reports doing well with stable mood, denies elevated,  depressed mood. No AH, VH, disorganized thoughts. No increase in thirst, no increase in urination. Tremor stopped. Doesn't feel blunted or foggy. Is able to enjoy movies, reading magazines, word puzzles, socializing with family.     Li stopped per plan (BUN elevated 7/17 to 24, past hx of Li toxicity, unclear dx). Risperidone 3 mg QHS will be continued, Effexor 225 mg daily will be continued. Patient has been taking Trazodone 100 mg qhs. Sleeping well. Patient advised to notify us if mood changes noticed - discussed particular signs and symptoms to be vigilant for. Has support of family who live close by.    HR elevated today - Pt reported walking to the clinic and taking the stairs. Denies anxiety, chest pain, SOB.    Med HX:  Thorough chart review of records in epic done prior to patient's appointment. Patient has records since 2009 in [facility]. Two hospitalizations 2009 (Depression, SI, Psychosis), 2012 (Li toxicity 2/2 Lisinopril use).  No mention of hypomania or wolf in chart. Since 2012 patient has been on Li  mg qhs, Risperidone 2 mg qhs, Effexor 300 mg daily. Lower dose of Risperidone was tried in the past and patient became paranoid. Patient has historic diagnosis of Schizoaffective disorder Bipolar type but no record of hypomanic and manic symptoms in chart, nor, per patient's recollection. He was diagnosed with Schizophrenia at the age of 28 when he heard voices, he was started on Lithium. He remembers that he was taken off Li and was on a trial of Wellbutrin, Zyprexa and other medications that he doesn't remember.    Diagnosis will have to be further clarified - DDX - Schizophrenia, Schizoaffective disorder w/ unipolar depression,  Depression w/ psychotic features moderate, recurrent in remission.     [place] PRESCRIPTION MONITORING PROGRAM [] was not checked today and revealed: N/A    PSYCHOTROPIC DRUG INTERACTIONS:   VENLAFAXINE and ANTIPLATELET AGENTS (ASPIRIN) may result in an increased risk of  bleeding  RISPERIDONE and SIMVASTATIN may result in increased simvastatin serum concentrations with an increased risk of myopathy or rhabdomyolysis.   TRAZODONE and VENLAFAXINE may result in an increased risk of serotonin syndrome     MANAGEMENT:  Monitoring for adverse effects, routine vitals, routine labs, minimal use of [trazodone] and patient is aware of risks      PLAN                                                                                                       1) PSYCHOTROPIC MEDICATIONS:  - Cont' risperidone 3mg QHS  - Cont Effexor XR  225 mg daily  - Cont' Trazodone 100 mg qhs    2) THERAPY:  Seeing psychologist Yeny Goff at [facility] since 11/2015. Sees once per month.     3) LABS NEXT DUE: Anti-psychotic, Li labs Jan 2018 - ordered       RATING SCALES:  AIMS: 0 3/1/2018    4) REFERRALS [MICD, medical etc.]:  Recommended follow-up with primary care provider for further evaluation and recommendations re: renal dysfunction, in context of diabetes and Lithium regimen.      5) MTM REFERRAL [PharmD]:  none    6) :  none Previously had a CCM until 2011. Has SW consult on 10/20/15, not interested in CM or [facility] worker services at this time and seems to be managing well on his own.     7) RTC: 1 months    8) CRISIS NUMBERS:   Provided routinely in AVS    TREATMENT RISK STATEMENT:  The risks, benefits, alternatives and potential adverse effects have been discussed and are understood by the patient. The pt understands the risks of using street drugs or alcohol.  There are no medical contraindications, the pt agrees to treatment with the ability to do so.  The patient understands to call 911 or come to the nearest ED if life threatening or urgent symptoms present.    PSYCHIATRY CLINIC INDIVIDUAL PSYCHOTHERAPY NOTE                                    16   Start time - 10:15      End time -10:41  Date reviewed - 09/11/2017       Date next due - 09/2018    Subjective: This supportive  psychotherapy session addressed issues related to goals of therapy.  Patient's reaction: Preparatory in the context of mental status appropriate for ambulatory setting.  Progress: fair to good  Plan: RTC 1 month  Psychotherapy services during this visit included  myself and patient  TREATMENT  PLAN          SYMPTOMS;PROBLEMS   MEASURABLE GOALS;    FUNCTIONAL IMPROVEMENT INTERVENTIONS;    GAINS MADE DISCHARGE CRITERIA   Sleep 7-8 h of sleep  Feeling restful during the day Medications  Education on diet, exercise  Minimize Caffeine intake marked symptom improvement   Depression: depressed mood   get 7-8 hours of restful sleep every night   Doing 2 fun things per day  Feeling good about self Medications  Therapy  Lifestyle changes  Increase Social support  Movies, Mall walking marked symptom improvement     Nam Kessler MD    Patient was staffed by attending Dr. Porras who will sign the note. My supervisor is Dr. Braswell.  I saw the patient with the resident, and participated in key portions of the service, including the mental status examination and developing the plan of care. I reviewed key portions of the history with the resident. I agree with the findings and plan as documented in this note.    Maty Porras

## 2018-03-01 NOTE — MR AVS SNAPSHOT
After Visit Summary   3/1/2018    Caden Bush    MRN: [id number]           Patient Information     Date Of Birth          1960        Visit Information        Provider Department      3/1/2018 10:15 AM Nam Kessler MD Psychiatry Clinic        Today's Diagnoses     Schizoaffective disorder, depressive type (H)    -  1       Follow-ups after your visit        Follow-up notes from your care team     Return in about 4 weeks (around 3/29/2018).      Your next 10 appointments already scheduled     Mar 19, 2018 10:45 AM CDT   [facility]    [address] [place] [address] [phone]           Please do not eat 10-12 hours before your appointment if you are coming in fasting for labs on lipids, cholesterol, or glucose (sugar). This does not apply to pregnant women. Water, hot tea and black coffee (with nothing added) are okay. Do not drink other fluids, diet soda or chew gum.            Apr 06, 2018  1:45 PM CDT   Adult Med Follow UP with Nam Kessler MD   Psychiatry Clinic ([facility])    [place] [id number] [address] [address] [phone]              Who to contact     Please call your clinic at [phone] to:    Ask questions about your health    Make or cancel appointments    Discuss your medicines    Learn about your test results    Speak to your doctor            Additional Information About Your Visit        MyChart Information     [employer] gives you secure access to your electronic health record. If you see a primary care provider, you can also send messages to your care team and make appointments. If you have questions, please call your primary care clinic.  If you do not have a primary care provider, please call [phone] and they will assist you.      [employer] is an electronic gateway that provides easy, online access to your medical records. With  [employer], you can request a clinic appointment, read your test results, renew a prescription or communicate with your care team.     To access your existing account, please contact your [facility] or call [phone] for assistance.        Care EveryWhere ID     This is your Care EveryWhere ID. This could be used by other organizations to access your [place] medical records  [id number]        Your Vitals Were     Pulse BMI (Body Mass Index)                118 20.2 kg/m2           Blood Pressure from Last 3 Encounters:   03/01/18 144/62   02/08/18 137/75   01/22/18 127/70    Weight from Last 3 Encounters:   03/01/18 64.8 kg (142 lb 12.8 oz)   02/08/18 66.5 kg (146 lb 9.6 oz)   01/22/18 69.9 kg (154 lb 3.2 oz)              Today, you had the following     No orders found for display       Primary Care Provider Office Phone # Fax #    Leonard Dai [phone] [address] [facility] [address]        Equal Access to Services     [facility]: Hadii aad ku hadasho Soomaali, waaxda luqadaha, qaybta kaalmada adeegyada, marcia diaz . So [facility] [phone].    ATENCIÓN: Si habla español, tiene a magaña disposición servicios gratjamesonos de asistencia lingüística. Maricruz al [phone].    We comply with applicable federal civil rights laws and [place] laws. We do not discriminate on the basis of race, color, national origin, age, disability, sex, sexual orientation, or gender identity.            Thank you!     Thank you for choosing PSYCHIATRY CLINIC  for your care. Our goal is always to provide you with excellent care. Hearing back from our patients is one way we can continue to improve our services. Please take a few minutes to complete the written survey that you may receive in the mail after your visit with us. Thank you!             Your Updated Medication List - Protect others around you: Learn how to safely use, store and  throw away your medicines at www.disposemymeds.org.          This list is accurate as of 3/1/18 11:59 PM.  Always use your most recent med list.                   Brand Name Dispense Instructions for use Diagnosis    ACE/ARB/ARNI NOT PRESCRIBED (INTENTIONAL)      ACE & ARB not prescribed due to Risk for drug interaction- lithium toxicity in the past        aspirin 81 MG EC tablet      Take 81 mg by mouth daily.        blood glucose monitoring test strip    ONETOUCH ULTRA    100 each    TEST ONCE DAILY AS DIRECTED    Encounter for medication refill       cholecalciferol 1000 UNIT tablet    vitamin D3     Take 1 tablet by mouth daily.        FISH OIL      1 capsule daily        glipiZIDE 5 MG 24 hr tablet    GLUCOTROL XL    90 tablet    TAKE 1 TABLET BY MOUTH DAILY    Encounter for medication refill       metFORMIN 500 MG 24 hr tablet    GLUCOPHAGE-XR    360 tablet    TAKE 2 TABLETS BY MOUTH TWICE DAILY    Encounter for medication refill       pioglitazone 45 MG tablet    ACTOS    90 tablet    TAKE 1 TABLET BY MOUTH DAILY    Encounter for medication refill       risperiDONE 3 MG tablet    risperDAL    30 tablet    Take 1 tablet (3 mg) by mouth At Bedtime    Schizoaffective disorder, bipolar type (H)       * simvastatin 40 MG tablet    ZOCOR    90 tablet    TAKE ONE TABLET BY MOUTH EVERY NIGHT AT BEDTIME    Encounter for medication refill       * simvastatin 40 MG tablet    ZOCOR    90 tablet    TAKE ONE TABLET BY MOUTH EVERY NIGHT AT BEDTIME    Encounter for medication refill       traZODone 100 MG tablet    DESYREL    90 tablet     mg at night    Schizoaffective disorder, bipolar type (H)       * venlafaxine 75 MG 24 hr capsule    EFFEXOR XR    30 capsule    Take 1 capsule (75 mg) by mouth daily Please take with Effexor 150 mg for a total dose of 225 mg daily    Schizoaffective disorder, bipolar type (H)       * venlafaxine 150 MG 24 hr capsule    EFFEXOR XR    30 capsule    Take 1 capsule (150 mg) by mouth  daily Along with a 75 mg for a total of 225 mg daily dose.    Schizoaffective disorder, bipolar type (H)       * Notice:  This list has 4 medication(s) that are the same as other medications prescribed for you. Read the directions carefully, and ask your doctor or other care provider to review them with you.

## 2018-03-02 ASSESSMENT — PATIENT HEALTH QUESTIONNAIRE - PHQ9: SUM OF ALL RESPONSES TO PHQ QUESTIONS 1-9: 1

## 2018-03-19 DIAGNOSIS — E11.319 DIABETIC RETINOPATHY ASSOCIATED WITH TYPE 2 DIABETES MELLITUS (H): Primary | ICD-10-CM

## 2018-03-20 DIAGNOSIS — Z76.0 ENCOUNTER FOR MEDICATION REFILL: ICD-10-CM

## 2018-03-20 LAB — HBA1C MFR BLD: 8.2 % (ref 4.8–5.6)

## 2018-03-20 RX ORDER — GLIPIZIDE 5 MG/1
TABLET, FILM COATED, EXTENDED RELEASE ORAL
Qty: 90 TABLET | Refills: 0 | Status: SHIPPED | OUTPATIENT
Start: 2018-03-20 | End: 2018-04-17 | Stop reason: ALTCHOICE

## 2018-03-22 ENCOUNTER — TELEPHONE (OUTPATIENT)
Dept: PSYCHIATRY | Facility: CLINIC | Age: 58
End: 2018-03-22

## 2018-03-26 ENCOUNTER — TELEPHONE (OUTPATIENT)
Dept: FAMILY MEDICINE | Facility: CLINIC | Age: 58
End: 2018-03-26

## 2018-03-26 NOTE — TELEPHONE ENCOUNTER
Per Dr Dai - patient had not scheduled a f/up DM appt due to abnormal A1C lab results .  The A1c is not as good as before. Please make an appointment to go over this. Dr Dai     Left message to call back [facility] [place] to set up DM /lab f/up appt soon  March 26, 2018  Lucy Guzmán MA

## 2018-04-06 ENCOUNTER — OFFICE VISIT (OUTPATIENT)
Dept: PSYCHIATRY | Facility: CLINIC | Age: 58
End: 2018-04-06
Attending: PSYCHIATRY & NEUROLOGY
Payer: MEDICARE

## 2018-04-06 VITALS
HEART RATE: 94 BPM | WEIGHT: 146.2 LBS | DIASTOLIC BLOOD PRESSURE: 60 MMHG | BODY MASS INDEX: 20.68 KG/M2 | SYSTOLIC BLOOD PRESSURE: 98 MMHG

## 2018-04-06 DIAGNOSIS — F25.0 SCHIZOAFFECTIVE DISORDER, BIPOLAR TYPE (H): ICD-10-CM

## 2018-04-06 PROCEDURE — G0463 HOSPITAL OUTPT CLINIC VISIT: HCPCS | Mod: ZF

## 2018-04-06 RX ORDER — TRAZODONE HYDROCHLORIDE 50 MG/1
50 TABLET, FILM COATED ORAL AT BEDTIME
Qty: 30 TABLET | Refills: 2 | Status: SHIPPED | OUTPATIENT
Start: 2018-04-06 | End: 2018-04-10

## 2018-04-06 RX ORDER — VENLAFAXINE HYDROCHLORIDE 75 MG/1
75 CAPSULE, EXTENDED RELEASE ORAL DAILY
Qty: 30 CAPSULE | Refills: 2 | Status: SHIPPED | OUTPATIENT
Start: 2018-04-06 | End: 2018-07-18

## 2018-04-06 RX ORDER — VENLAFAXINE HYDROCHLORIDE 150 MG/1
150 CAPSULE, EXTENDED RELEASE ORAL DAILY
Qty: 30 CAPSULE | Refills: 2 | Status: SHIPPED | OUTPATIENT
Start: 2018-04-06 | End: 2018-06-07

## 2018-04-06 RX ORDER — RISPERIDONE 3 MG/1
3 TABLET ORAL AT BEDTIME
Qty: 30 TABLET | Refills: 2 | Status: SHIPPED | OUTPATIENT
Start: 2018-04-06 | End: 2018-06-07

## 2018-04-06 ASSESSMENT — PAIN SCALES - GENERAL: PAINLEVEL: MODERATE PAIN (4)

## 2018-04-06 NOTE — PATIENT INSTRUCTIONS
Thank you for coming to the PSYCHIATRY CLINIC.    Lab Testing:  If you had lab testing today and your results are reassuring or normal they will be mailed to you or sent through [employer] within 7 days.   If the lab tests need quick action we will call you with the results.  The phone number we will call with results is # [phone] (home) . If this is not the best number please call our clinic and change the number.    Medication Refills:  If you need any refills please call your pharmacy and they will contact us. Our fax number for refills is [phone]. Please allow three business for refill processing.   If you need to  your refill at a new pharmacy, please contact the new pharmacy directly. The new pharmacy will help you get your medications transferred.     Scheduling:  If you have any concerns about today's visit or wish to schedule another appointment please call our office during normal business hours [phone] (8-5:00 M-F)    Contact Us:  Please call [phone] during business hours (8-5:00 M-F).  If after clinic hours, or on the weekend, please call  [phone].    Financial Assistance [phone]  [employer] Billing [phone]  [employer] Billing [phone]  Medical Records [phone]      MENTAL HEALTH CRISIS NUMBERS:  [facility]:   [facility] - [phone]   Crisis Residence [facility] - [phone]   Walk-In Counseling [facility] [phone]   COPE 24/7 [place] Mobile Team for Adults - [[phone]]; Child - [[phone]]     Crisis Connection - [phone]     [facility]:   [facility] - [phone]   Walk-in counseling [facility] - [phone]   Walk-in counseling [facility] - [phone]   Crisis Residence [facility] - [phone]   Urgent Care Adult Mental Health:   --Drop-in, 24/7 crisis line, and [place] Mobile Team [[phone]]    CRISIS TEXT  LINE: Text [phone] from anywhere, anytime, any crisis 24/7;    OR SEE www.crisistextline.org     Poison Control Center - [phone]    CHILD: [facility] needs assessment team - [phone]     [facility] - [phone]; or [employer] [facility] - [phone]    If you have a medical emergency please call 911or go to the nearest ER.                    _____________________________________________    Again thank you for choosing PSYCHIATRY CLINIC and please let us know how we can best partner with you to improve you and your family's health.  You may be receiving a survey in the mail regarding this appointment. We would love to have your feedback, both positive and negative, so please fill out the survey and return it using the provided envolpe. The survey is done by an external company, so your answers are anonymous.

## 2018-04-06 NOTE — MR AVS SNAPSHOT
After Visit Summary   4/6/2018    Caden Bush    MRN: [id number]           Patient Information     Date Of Birth          1960        Visit Information        Provider Department      4/6/2018 1:45 PM Nam Kessler MD [facility]iatry [facility]        Today's Diagnoses     Schizoaffective disorder, bipolar type (H)          Care Instructions    Thank you for coming to the PSYCHIATRY CLINIC.    Lab Testing:  If you had lab testing today and your results are reassuring or normal they will be mailed to you or sent through [employer] within 7 days.   If the lab tests need quick action we will call you with the results.  The phone number we will call with results is # [phone] (home) . If this is not the best number please call our clinic and change the number.    Medication Refills:  If you need any refills please call your pharmacy and they will contact us. Our fax number for refills is [phone]. Please allow three business for refill processing.   If you need to  your refill at a new pharmacy, please contact the new pharmacy directly. The new pharmacy will help you get your medications transferred.     Scheduling:  If you have any concerns about today's visit or wish to schedule another appointment please call our office during normal business hours [phone] (8-5:00 M-F)    Contact Us:  Please call [phone] during business hours (8-5:00 M-F).  If after clinic hours, or on the weekend, please call  [phone].    Financial Assistance [phone]  [employer] Billing [phone]  [place] Billing [phone]  Medical Records [phone]      MENTAL HEALTH CRISIS NUMBERS:  [facility]:   [facility] - [phone]   Crisis Residence [facility] - [phone]   Walk-In Counseling Center [facility] - [phone]   COPE 24/7 [place] Mobile Team for Adults - [[phone]]; Child - [[phone]]     Crisis Connection - [phone]     [facility]:    [facility] - [phone]   Walk-in counseling [facility] - [phone]   Walk-in counseling [facility] - [phone]   Crisis Residence [facility] - [phone]   Urgent Care Adult Mental Health:   --Drop-in, 24/7 crisis line, and [facility] Mobile Team [[phone]]    CRISIS TEXT LINE: Text [phone] from anywhere, anytime, any crisis 24/7;    OR SEE www.crisistextline.org     Poison Control Center - [phone]    CHILD: [facility] needs assessment team - [phone]     [facility] Lifeline - [phone]; or [facility] - [phone]    If you have a medical emergency please call 911or go to the nearest ER.                    _____________________________________________    Again thank you for choosing PSY[facility]ATRY CLINIC and please let us know how we can best partner with you to improve you and your family's health.  You may be receiving a survey in the mail regarding this appointment. We would love to have your feedback, both positive and negative, so please fill out the survey and return it using the provided envolpe. The survey is done by an external company, so your answers are anonymous.             Follow-ups after your visit        Follow-up notes from your care team     Return in about 6 weeks (around 5/18/2018).      Your next 10 appointments already scheduled     Apr 17, 2018 11:30 AM CDT   Office Visit with Annie Hart MD   [facility] ([facility])    [address] [place] [address] [phone]           Bring a current list of meds and any records pertaining to this visit. For Physicals, please bring immunization records and any forms needing to be filled out. Please arrive 10 minutes early to complete paperwork.            May 10, 2018  1:45 PM CDT   Adult Med Follow UP with Nam Kessler MD   Psychiatry Clinic ([facility])    [facility]  [address]  [address] [id number] [phone]              Future tests that were ordered for you today     Open Future Orders        Priority Expected Expires Ordered    Lipid panel reflex to direct LDL Fasting Routine 6/5/2018 8/4/2018 4/6/2018            Who to contact     Please call your clinic at [phone] to:    Ask questions about your health    Make or cancel appointments    Discuss your medicines    Learn about your test results    Speak to your doctor            Additional Information About Your Visit        [facility] Information     [facility] gives you secure access to your electronic health record. If you see a primary care provider, you can also send messages to your care team and make appointments. If you have questions, please call your primary care clinic.  If you do not have a primary care provider, please call [phone] and they will assist you.      [facility] is an electronic gateway that provides easy, online access to your medical records. With [facility], you can request a clinic appointment, read your test results, renew a prescription or communicate with your care team.     To access your existing account, please contact your [facility] or call [phone] for assistance.        Care EveryWhere ID     This is your Care EveryWhere ID. This could be used by other organizations to access your [place] medical records  [id number]        Your Vitals Were     Pulse BMI (Body Mass Index)                94 20.68 kg/m2           Blood Pressure from Last 3 Encounters:   04/06/18 98/60   03/01/18 144/62   02/08/18 137/75    Weight from Last 3 Encounters:   04/06/18 66.3 kg (146 lb 3.2 oz)   03/01/18 64.8 kg (142 lb 12.8 oz)   02/08/18 66.5 kg (146 lb 9.6 oz)                 Today's Medication Changes          These changes are accurate as of 4/6/18  2:57 PM.  If you have any questions, ask your nurse or doctor.                These medicines have changed or have updated prescriptions.        Dose/Directions    traZODone 50 MG tablet   Commonly known as:  DESYREL   This may have changed:    - medication strength  - how much to take  - how to take this  - when to take this   Used for:  Schizoaffective disorder, bipolar type (H)   Changed by:  Nam Kessler MD        Dose:  50 mg   Take 1 tablet (50 mg) by mouth At Bedtime  mg at night   Quantity:  30 tablet   Refills:  2            Where to get your medicines      These medications were sent to [facility] [address] [facility] [address] AT [facility] [address]  [address], [facility] [address]     Phone:  [phone]     risperiDONE 3 MG tablet    traZODone 50 MG tablet    venlafaxine 150 MG 24 hr capsule    venlafaxine 75 MG 24 hr capsule                Primary Care Provider Office Phone # Fax #    Annie Jeniffer Hart [phone] [address] [place] [place] [address]        Equal Access to Services     [facility] AH: Hadii ethan briones hadasho Sopatience, waaxda luqadaha, qaybta kaalmada aderocíoyagrisel, marcia diaz . So [facility] [phone].    ATENCIÓN: Si habla español, tiene a magaña disposición servicios gratuitos de asistencia lingüística. [facility] [phone].    We comply with applicable federal civil rights laws and [place] laws. We do not discriminate on the basis of race, color, national origin, age, disability, sex, sexual orientation, or gender identity.            Thank you!     Thank you for choosing PSYCHIATRY CLINIC  for your care. Our goal is always to provide you with excellent care. Hearing back from our patients is one way we can continue to improve our services. Please take a few minutes to complete the written survey that you may receive in the mail after your visit with us. Thank you!             Your Updated Medication List - Protect others around you: Learn how to safely use,  store and throw away your medicines at www.disposemymeds.org.          This list is accurate as of 4/6/18  2:57 PM.  Always use your most recent med list.                   Brand Name Dispense Instructions for use Diagnosis    ACE/ARB/ARNI NOT PRESCRIBED (INTENTIONAL)      ACE & ARB not prescribed due to Risk for drug interaction- lithium toxicity in the past        aspirin 81 MG EC tablet      Take 81 mg by mouth daily.        blood glucose monitoring test strip    ONETOUCH ULTRA    100 each    TEST ONCE DAILY AS DIRECTED    Encounter for medication refill       cholecalciferol 1000 UNIT tablet    vitamin D3     Take 1 tablet by mouth daily.        FISH OIL      1 capsule daily        glipiZIDE 5 MG 24 hr tablet    GLUCOTROL XL    90 tablet    TAKE 1 TABLET BY MOUTH EVERY DAY    Encounter for medication refill       metFORMIN 500 MG 24 hr tablet    GLUCOPHAGE-XR    360 tablet    TAKE 2 TABLETS BY MOUTH TWICE DAILY    Encounter for medication refill       pioglitazone 45 MG tablet    ACTOS    90 tablet    TAKE 1 TABLET BY MOUTH DAILY    Encounter for medication refill       risperiDONE 3 MG tablet    risperDAL    30 tablet    Take 1 tablet (3 mg) by mouth At Bedtime    Schizoaffective disorder, bipolar type (H)       * simvastatin 40 MG tablet    ZOCOR    90 tablet    TAKE ONE TABLET BY MOUTH EVERY NIGHT AT BEDTIME    Encounter for medication refill       * simvastatin 40 MG tablet    ZOCOR    90 tablet    TAKE ONE TABLET BY MOUTH EVERY NIGHT AT BEDTIME    Encounter for medication refill       traZODone 50 MG tablet    DESYREL    30 tablet    Take 1 tablet (50 mg) by mouth At Bedtime  mg at night    Schizoaffective disorder, bipolar type (H)       * venlafaxine 75 MG 24 hr capsule    EFFEXOR XR    30 capsule    Take 1 capsule (75 mg) by mouth daily Please take with Effexor 150 mg for a total dose of 225 mg daily    Schizoaffective disorder, bipolar type (H)       * venlafaxine 150 MG 24 hr capsule    EFFEXOR  XR    30 capsule    Take 1 capsule (150 mg) by mouth daily Along with a 75 mg for a total of 225 mg daily dose.    Schizoaffective disorder, bipolar type (H)       * Notice:  This list has 4 medication(s) that are the same as other medications prescribed for you. Read the directions carefully, and ask your doctor or other care provider to review them with you.

## 2018-04-06 NOTE — NURSING NOTE
Chief Complaint   Patient presents with     RECHECK     Schizoaffective disorder, depressive type
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
## 2018-04-07 ASSESSMENT — PATIENT HEALTH QUESTIONNAIRE - PHQ9: SUM OF ALL RESPONSES TO PHQ QUESTIONS 1-9: 4

## 2018-04-10 ENCOUNTER — TELEPHONE (OUTPATIENT)
Dept: PSYCHIATRY | Facility: CLINIC | Age: 58
End: 2018-04-10

## 2018-04-10 RX ORDER — TRAZODONE HYDROCHLORIDE 50 MG/1
50 TABLET, FILM COATED ORAL AT BEDTIME
Qty: 30 TABLET | Refills: 2 | Status: SHIPPED | OUTPATIENT
Start: 2018-04-10 | End: 2018-06-07

## 2018-04-10 NOTE — TELEPHONE ENCOUNTER
----- Message from Becca Pimentel sent at 4/9/2018  4:15 PM CDT -----  Regarding: script clarification/Kessler  Contact: [phone]  Tyler is calling. Trazodone 50 MG needs clarification. The directions say to take 1 50 MG tablet at bedtime  MG at night. Ok to leave detailed message.

## 2018-04-10 NOTE — TELEPHONE ENCOUNTER
-Pt seen 4/6/18   -Visit note is unsigned  -Rx sent to pharmacy as    traZODone (DESYREL) 50 MG tablet 30 tablet 2 4/6/2018  No   Sig: Take 1 tablet (50 mg) by mouth At Bedtime  mg at night   Class: E-Prescribe       -Will notify Dr. Kessler and seek clarification of directions

## 2018-04-11 NOTE — TELEPHONE ENCOUNTER
Message  Received: Yesterday       Nam Kessler MD Blake, Katherine J, RN       Caller: Unspecified (Yesterday,  9:08 AM)                     Done!

## 2018-04-17 ENCOUNTER — OFFICE VISIT (OUTPATIENT)
Dept: FAMILY MEDICINE | Facility: CLINIC | Age: 58
End: 2018-04-17

## 2018-04-17 VITALS
BODY MASS INDEX: 20.09 KG/M2 | SYSTOLIC BLOOD PRESSURE: 128 MMHG | DIASTOLIC BLOOD PRESSURE: 62 MMHG | WEIGHT: 142 LBS | HEART RATE: 98 BPM

## 2018-04-17 DIAGNOSIS — E11.3393 TYPE 2 DIABETES MELLITUS WITH BOTH EYES AFFECTED BY MODERATE NONPROLIFERATIVE RETINOPATHY WITHOUT MACULAR EDEMA, WITHOUT LONG-TERM CURRENT USE OF INSULIN (H): ICD-10-CM

## 2018-04-17 DIAGNOSIS — F25.0 SCHIZOAFFECTIVE DISORDER, BIPOLAR TYPE (H): Primary | ICD-10-CM

## 2018-04-17 DIAGNOSIS — D12.6 ADENOMATOUS POLYP OF COLON, UNSPECIFIED PART OF COLON: ICD-10-CM

## 2018-04-17 DIAGNOSIS — E78.00 HYPERCHOLESTEROLEMIA: ICD-10-CM

## 2018-04-17 DIAGNOSIS — R01.1 NEWLY RECOGNIZED MURMUR: ICD-10-CM

## 2018-04-17 PROCEDURE — 99214 OFFICE O/P EST MOD 30 MIN: CPT | Performed by: FAMILY MEDICINE

## 2018-04-17 PROCEDURE — 99207 C FOOT EXAM  NO CHARGE: CPT | Performed by: FAMILY MEDICINE

## 2018-04-17 RX ORDER — INSULIN GLARGINE 100 [IU]/ML
10 INJECTION, SOLUTION SUBCUTANEOUS DAILY
Qty: 15 ML | Refills: 1 | Status: SHIPPED | OUTPATIENT
Start: 2018-04-17 | End: 2018-06-25

## 2018-04-17 NOTE — MR AVS SNAPSHOT
After Visit Summary   4/17/2018    Caden Bush    MRN: [id number]           Patient Information     Date Of Birth          1960        Visit Information        Provider Department      4/17/2018 11:30 AM Annie Hart MD [facility]        Today's Diagnoses     Schizoaffective disorder, bipolar type (H)    -  1    Type 2 diabetes mellitus with both eyes affected by moderate nonproliferative retinopathy without macular edema, without long-term current use of insulin (H)        Newly recognized murmur          Care Instructions    Make appointment to meet with Aliyah to learn how to use insulin.  Keep track every morning of your fasting blood sugar. We will make adjustments to your dose of Basaglar according to these.    Keep taking metformin and pioglitazone.  Stop Glipizide.          Follow-ups after your visit        Your next 10 appointments already scheduled     May 10, 2018  1:45 PM CDT   Adult Med Follow UP with Nam Kessler MD   Psychiatry Clinic ([facility])    [place] [id number]  [address] [place] [id number] [phone]              Who to contact     If you have questions or need follow up information about today's clinic visit or your schedule please contact [facility] directly at [phone].  Normal or non-critical lab and imaging results will be communicated to you by MyChart, letter or phone within 4 business days after the clinic has received the results. If you do not hear from us within 7 days, please contact the clinic through MyChart or phone. If you have a critical or abnormal lab result, we will notify you by phone as soon as possible.  Submit refill requests through [employer] or call your pharmacy and they will forward the refill request to us. Please allow 3 business days for your refill to be completed.          Additional Information About Your Visit        MyChart Information      [employer] gives you secure access to your electronic health record. If you see a primary care provider, you can also send messages to your care team and make appointments. If you have questions, please call your primary care clinic.  If you do not have a primary care provider, please call [phone] and they will assist you.        Care EveryWhere ID     This is your Care EveryWhere ID. This could be used by other organizations to access your [facility] medical records  [id number]        Your Vitals Were     Pulse BMI (Body Mass Index)                98 20.09 kg/m2           Blood Pressure from Last 3 Encounters:   04/17/18 128/62   04/06/18 98/60   03/01/18 144/62    Weight from Last 3 Encounters:   04/17/18 64.4 kg (142 lb)   04/06/18 66.3 kg (146 lb 3.2 oz)   03/01/18 64.8 kg (142 lb 12.8 oz)              We Performed the Following     FOOT EXAM     Lipid Profile     Lithium Level     Microalbumin (RMG)     TSH with free T4 reflex          Today's Medication Changes          These changes are accurate as of 4/17/18 11:59 AM.  If you have any questions, ask your nurse or doctor.               Start taking these medicines.        Dose/Directions    BASAGLAR 100 UNIT/ML injection   Used for:  Type 2 diabetes mellitus with both eyes affected by moderate nonproliferative retinopathy without macular edema, without long-term current use of insulin (H)   Started by:  Annie Hart MD        Dose:  10 Units   Inject 10 Units Subcutaneous daily We will make further adjustments as we see your response   Quantity:  15 mL   Refills:  1         Stop taking these medicines if you haven't already. Please contact your care team if you have questions.     glipiZIDE 5 MG 24 hr tablet   Commonly known as:  GLUCOTROL XL   Stopped by:  Annie Hart MD                Where to get your medicines      [facility] [address] [address] [facility]   [address] [address]     Phone:  [phone]     BASAGLAR 100 UNIT/ML injection                Primary Care Provider Office Phone # Fax #    Annie Jeniffer Hart [phone] [address] [place] [address]        Equal Access to Services     [facility]: Hadii aad ku hadasho Soomaali, waaxda luqadaha, qaybta kaalmada adeegyada, waxay idiin hayaan adeeg kharash lasarbjitsuzi . So [facility] [phone].    ATENCIÓN: Si habla español, tiene a magaña disposición servicios gratuitos de asistencia lingüística. Maricruz al [phone].    We comply with applicable federal civil rights laws and [place] laws. We do not discriminate on the basis of race, color, national origin, age, disability, sex, sexual orientation, or gender identity.            Thank you!     Thank you for choosing [facility]  for your care. Our goal is always to provide you with excellent care. Hearing back from our patients is one way we can continue to improve our services. Please take a few minutes to complete the written survey that you may receive in the mail after your visit with us. Thank you!             Your Updated Medication List - Protect others around you: Learn how to safely use, store and throw away your medicines at www.disposemymeds.org.          This list is accurate as of 4/17/18 11:59 AM.  Always use your most recent med list.                   Brand Name Dispense Instructions for use Diagnosis    ACE/ARB/ARNI NOT PRESCRIBED (INTENTIONAL)      ACE & ARB not prescribed due to Risk for drug interaction- lithium toxicity in the past        aspirin 81 MG EC tablet      Take 81 mg by mouth daily.        BASAGLAR 100 UNIT/ML injection     15 mL    Inject 10 Units Subcutaneous daily We will make further adjustments as we see your response    Type 2 diabetes mellitus with both eyes affected by moderate nonproliferative retinopathy without macular edema, without long-term current  use of insulin (H)       blood glucose monitoring test strip    ONETOUCH ULTRA    100 each    TEST ONCE DAILY AS DIRECTED    Encounter for medication refill       cholecalciferol 1000 UNIT tablet    vitamin D3     Take 1 tablet by mouth daily.        FISH OIL      1 capsule daily        metFORMIN 500 MG 24 hr tablet    GLUCOPHAGE-XR    360 tablet    TAKE 2 TABLETS BY MOUTH TWICE DAILY    Encounter for medication refill       pioglitazone 45 MG tablet    ACTOS    90 tablet    TAKE 1 TABLET BY MOUTH DAILY    Encounter for medication refill       risperiDONE 3 MG tablet    risperDAL    30 tablet    Take 1 tablet (3 mg) by mouth At Bedtime    Schizoaffective disorder, bipolar type (H)       simvastatin 40 MG tablet    ZOCOR    90 tablet    TAKE ONE TABLET BY MOUTH EVERY NIGHT AT BEDTIME    Encounter for medication refill       traZODone 50 MG tablet    DESYREL    30 tablet    Take 1 tablet (50 mg) by mouth At Bedtime    Schizoaffective disorder, bipolar type (H)       * venlafaxine 75 MG 24 hr capsule    EFFEXOR XR    30 capsule    Take 1 capsule (75 mg) by mouth daily Please take with Effexor 150 mg for a total dose of 225 mg daily    Schizoaffective disorder, bipolar type (H)       * venlafaxine 150 MG 24 hr capsule    EFFEXOR XR    30 capsule    Take 1 capsule (150 mg) by mouth daily Along with a 75 mg for a total of 225 mg daily dose.    Schizoaffective disorder, bipolar type (H)       * Notice:  This list has 2 medication(s) that are the same as other medications prescribed for you. Read the directions carefully, and ask your doctor or other care provider to review them with you.

## 2018-04-17 NOTE — PROGRESS NOTES
Problem(s) Oriented visit        SUBJECTIVE:                                                    Caden Bush is a 58 year old male who presents to clinic today for recheck of diabetes. He take pioglitazone and glipizide and metformin. He tolerates these well. He denies any hypoglycemias. He walks for exercise, about 30 minutes typically every day. His numbers tend to be higher in the evening and lower in the morning. He understands diabetic diet. He denies any peripheral neuropathy. He sees eye specialist annually. Readings are as follows:  AM fasting: range 128-309; HS range: .      Problem list, Medication list, Allergies, and Medical/Social/Surgical histories reviewed in [facility] and updated as appropriate.   Additional history: as documented    ROS:  5 point ROS completed and negative except noted above, including Gen, CV, Resp, GI, MS      Histories:   Patient Active Problem List   Diagnosis     Eczema     Retinopathy     Schizoaffective disorder, bipolar type (H)     Polyp of colon, adenomatous     Health Care Home     Hx of psychiatric care     Neuropathy of left upper extremity     Type 2 diabetes mellitus with both eyes affected by moderate nonproliferative retinopathy without macular edema, without long-term current use of insulin (H)     Past Surgical History:   Procedure Laterality Date     APPENDECTOMY       COLONOSCOPY  2012    2 adenomatous polyps       Social History   Substance Use Topics     Smoking status: Never Smoker     Smokeless tobacco: Never Used     Alcohol use No     Family History   Problem Relation Age of Onset     OSTEOPOROSIS Mother      OSTEOPOROSIS Father      Asthma Father      Cancer - colorectal Father 70     Breast Cancer Mother 78     Rheumatoid Arthritis Sister            OBJECTIVE:                                                    /62  Pulse 98  Wt 64.4 kg (142 lb)  BMI 20.09 kg/m2  Body mass index is 20.09 kg/(m^2).   GENERAL APPEARANCE: Alert, no acute  distress  NECK: No adenopathy,masses or thyromegaly  RESP: lungs clear to auscultation   CV: regular rhythm, rate-normal, grade 2/6 systolic murmur heard best over the upper left sternal border  ABDOMEN: soft, no organomegaly, masses or tenderness  MS: extremities normal, no peripheral edema, normal peripheral pulses. Normal monofilament test bilaterally.  NEURO: Alert, oriented, speech and mentation normal  PSYCH: mentation appears normal, affect is flat, mood normal   Labs Resulted Today:   Results for orders placed or performed in visit on 04/17/18   Lipid Panel ([facility])   Result Value Ref Range    Cholesterol 139 100 - 199 mg/dL    Triglycerides 45 0 - 149 mg/dL    HDL Cholesterol 85 >39 mg/dL    VLDL Cholesterol [place] 9 5 - 40 mg/dL    LDL Cholesterol Calculated 45 0 - 99 mg/dL    LDL/HDL Ratio 0.5 0.0 - 3.6 ratio    Narrative    Performed at:  01  [facility] [address], [place], [place]  [id number]  : Marquise Correa MD, Phone:  [phone]   [place] ([place])  Serum ([facility])   Result Value Ref Range    Lithium Level <0.1 (L) 0.6 - 1.2 mmol/L    Narrative    Performed at:  01 - [facility]  [address] [place] [address]  [id number]  : Marquise Correa MD, Phone:  [phone]   Comp. Metabolic Panel (14) ([facility])   Result Value Ref Range    Glucose 150 (H) 65 - 99 mg/dL    Urea Nitrogen 19 6 - 24 mg/dL    Creatinine 1.10 0.76 - 1.27 mg/dL    eGFR If NonAfricn Am 73 >59 mL/min/1.73    eGFR If Africn Am 84 >59 mL/min/1.73    BUN/Creatinine Ratio 17 9 - 20    Sodium 139 134 - 144 mmol/L    Potassium 4.7 3.5 - 5.2 mmol/L    Chloride 99 96 - 106 mmol/L    Total CO2 24 18 - 28 mmol/L    Calcium 9.6 8.7 - 10.2 mg/dL    Protein Total 6.5 6.0 - 8.5 g/dL    Albumin 4.5 3.5 - 5.5 g/dL    Globulin, Total 2.0 1.5 - 4.5 g/dL    A/G Ratio 2.3 (H) 1.2 - 2.2    Bilirubin Total 0.3 0.0 - 1.2 mg/dL    Alkaline Phosphatase 73 39 - 117 IU/L    AST 15 0 - 40 IU/L    ALT 12 0 - 44 IU/L    Narrative     Performed at:  01 - [facility] [address], [place], [place]  [id number]  : Marquise Correa MD, Phone:  [phone]   TSH ([facility])   Result Value Ref Range    TSH 1.270 0.450 - 4.500 uIU/mL    Narrative    Performed at:  01 - [facility] [address], [place], [place]  [id number]  : Marquise Correa MD, Phone:  [phone]   Thyroxine (T4) Free  Direct  S ([facility])   Result Value Ref Range    T4 Free 1.30 0.82 - 1.77 ng/dL    Narrative    Performed at:  01 - [facility] [address], [place], [place]  [id number]  : Marquise Correa MD, Phone:  [phone]     ASSESSMENT/PLAN:                                                        Caden was seen today for diabetes.    Diagnoses and all orders for this visit:    Schizoaffective disorder, bipolar type (H)  -     Lithium ([facility])  Serum ([facility])    Type 2 diabetes mellitus with both eyes affected by moderate nonproliferative retinopathy without macular edema, without long-term current use of insulin (H)  -     FOOT EXAM  -     BASAGLAR 100 UNIT/ML injection; Inject 10 Units Subcutaneous daily We will make further adjustments as we see your response (Patient taking differently: Inject 12 Units Subcutaneous daily We will make further adjustments as we see your response)  -     Comp. Metabolic Panel (14) ([facility])  -     TSH ([facility])  -     Thyroxine (T4) Free  Direct  S ([facility])  -     MED THERAPY MANAGE REFERRAL  -     Microalbumin ([facility]); Future  -     FOOT EXAM    Newly recognized murmur  -     Echocardiogram Complete; Future, f/u pending ECHO results.    Adenomatous polyp of colon, unspecified part of colon  Refer for colonoscopy.    Hypercholesterolemia  -     Lipid Panel ([facility])  -     Comp. Metabolic Panel (14) ([facility])  Continue simvastatin.      Patient Instructions   Make appointment to meet with Aliyah to learn how to use insulin.  Keep track every morning of your fasting blood sugar. We will make adjustments  to your dose of Basaglar according to these.    Keep taking metformin and pioglitazone.  Stop Glipizide.      The following health maintenance items are reviewed in Epic and correct as of today:  Health Maintenance   Topic Date Due     HIV SCREEN (SYSTEM ASSIGNED)  04/10/1977     MICROALBUMIN Q1 YEAR  06/07/2018     PHQ-9 Q1 MONTH  06/10/2018     A1C Q6 MO  09/19/2018     EYE EXAM Q1 YEAR  12/08/2018     MEDICARE ANNUAL WELLNESS VISIT  12/27/2018     NEDRA QUESTIONNAIRE 1 YEAR  12/27/2018     FOOT EXAM Q1 YEAR  04/17/2019     CREATININE Q1 YEAR  04/17/2019     LIPID MONITORING Q1 YEAR  04/17/2019     ADVANCE DIRECTIVE PLANNING Q5 YRS  12/17/2019     TSH W/ FREE T4 REFLEX Q2 YEAR  04/17/2020     COLONOSCOPY Q5 YR  06/19/2020     TETANUS IMMUNIZATION (SYSTEM ASSIGNED)  12/18/2025     PNEUMOVAX 1X HI RISK PATIENT < 65 (NO IB MSG)  Completed     INFLUENZA VACCINE  Completed     HEPATITIS C SCREENING  Completed       Annie Hart MD  [facility]  Family Practice  [facility]  [phone]    For any issues my office # is [phone]         none

## 2018-04-18 LAB
ALBUMIN SERPL-MCNC: 4.5 G/DL (ref 3.5–5.5)
ALBUMIN/GLOB SERPL: 2.3 {RATIO} (ref 1.2–2.2)
ALP SERPL-CCNC: 73 IU/L (ref 39–117)
ALT SERPL-CCNC: 12 IU/L (ref 0–44)
AST SERPL-CCNC: 15 IU/L (ref 0–40)
BILIRUB SERPL-MCNC: 0.3 MG/DL (ref 0–1.2)
BUN SERPL-MCNC: 19 MG/DL (ref 6–24)
BUN/CREATININE RATIO: 17 (ref 9–20)
CALCIUM SERPL-MCNC: 9.6 MG/DL (ref 8.7–10.2)
CHLORIDE SERPLBLD-SCNC: 99 MMOL/L (ref 96–106)
CHOLEST SERPL-MCNC: 139 MG/DL (ref 100–199)
CREAT SERPL-MCNC: 1.1 MG/DL (ref 0.76–1.27)
EGFR IF AFRICN AM: 84 ML/MIN/1.73
EGFR IF NONAFRICN AM: 73 ML/MIN/1.73
GLOBULIN, TOTAL: 2 G/DL (ref 1.5–4.5)
GLUCOSE SERPL-MCNC: 150 MG/DL (ref 65–99)
HDLC SERPL-MCNC: 85 MG/DL
LDL/HDL RATIO: 0.5 RATIO (ref 0–3.6)
LDLC SERPL CALC-MCNC: 45 MG/DL (ref 0–99)
LITHIUM SERPL-SCNC: <0.1 MMOL/L (ref 0.6–1.2)
POTASSIUM SERPL-SCNC: 4.7 MMOL/L (ref 3.5–5.2)
PROT SERPL-MCNC: 6.5 G/DL (ref 6–8.5)
SODIUM SERPL-SCNC: 139 MMOL/L (ref 134–144)
T4 FREE SERPL-MCNC: 1.3 NG/DL (ref 0.82–1.77)
TOTAL CO2: 24 MMOL/L (ref 18–28)
TRIGL SERPL-MCNC: 45 MG/DL (ref 0–149)
TSH BLD-ACNC: 1.27 UIU/ML (ref 0.45–4.5)
VLDLC SERPL CALC-MCNC: 9 MG/DL (ref 5–40)

## 2018-04-30 ENCOUNTER — OFFICE VISIT (OUTPATIENT)
Dept: PHARMACY | Facility: PHYSICIAN GROUP | Age: 58
End: 2018-04-30
Payer: MEDICAID

## 2018-04-30 VITALS — SYSTOLIC BLOOD PRESSURE: 136 MMHG | DIASTOLIC BLOOD PRESSURE: 80 MMHG

## 2018-04-30 DIAGNOSIS — E63.9 NUTRITIONAL DEFICIENCY: ICD-10-CM

## 2018-04-30 DIAGNOSIS — E11.3393 TYPE 2 DIABETES MELLITUS WITH BOTH EYES AFFECTED BY MODERATE NONPROLIFERATIVE RETINOPATHY WITHOUT MACULAR EDEMA, WITHOUT LONG-TERM CURRENT USE OF INSULIN (H): Primary | ICD-10-CM

## 2018-04-30 DIAGNOSIS — E78.5 HYPERLIPIDEMIA LDL GOAL <100: ICD-10-CM

## 2018-04-30 DIAGNOSIS — F25.0 SCHIZOAFFECTIVE DISORDER, BIPOLAR TYPE (H): ICD-10-CM

## 2018-04-30 PROCEDURE — 99607 MTMS BY PHARM ADDL 15 MIN: CPT | Performed by: PHARMACIST

## 2018-04-30 PROCEDURE — 99605 MTMS BY PHARM NP 15 MIN: CPT | Performed by: PHARMACIST

## 2018-04-30 RX ORDER — CONTAINER,EMPTY
1 EACH MISCELLANEOUS DAILY
Qty: 1 EACH | Refills: 0 | Status: SHIPPED | OUTPATIENT
Start: 2018-04-30 | End: 2018-05-07

## 2018-04-30 NOTE — PROGRESS NOTES
SUBJECTIVE/OBJECTIVE:                           Caden Bush is a 58 year old male coming in for an initial visit for Medication Therapy Management.  He was referred to me from Dr. Hart.     Chief Complaint: Start insulin    Allergies/ADRs: Reviewed in Epic  Tobacco: No tobacco use  Alcohol: not currently using  Caffeine: no caffeine  Activity: walks at the mall  PMH: Reviewed in Epic    Medication Adherence/Access:  no issues reported  Patient uses pill box(es).  Patient takes medications 2 time(s) per day.     Diabetes:  Pt currently taking metformin ER 500mg- 2 BID, glipizide XL 5mg daily (going to be stopping this) and Pioglitazone 45mg daily. Here to learn basaglar 10 units daily. Pt is not experiencing side effects. Got the prescription for the insulin,but did not get needles.   SMBG: one time daily.   Ranges (patient reported): 272 today, 120-300s  Patient is not experiencing hypoglycemia  Recent symptoms of high blood sugar? none  Eye exam: up to date  Foot exam: up to date  Pt is not taking an ACEi/ARB at this time- hx of lithium toxicity (just off this in Jan 2018).  Aspirin: Taking 81mg daily and denies side effects    Schizoaffective disorder: Sees psychiatry. Currently on venlafaxine 225mg daily (down from 300mg before), trazodone 100mg daily (trying to get down to 50mg daily) and risperidone 3mg daily. Denies issues at this time, feels his mood and mental health has been doing well. No longer on the lithium since January and feels he is doing well. 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   Sees therapist monthly.     Supplements: Currently taking vitamin D, fish oil daily. Denies issues at this time.     Hyperlipidemia: Current therapy includes simvastatin 40mg once daily.  Pt reports no significant myalgias or other side effects.    Current labs include:  Today's Vitals: /80  BP Readings from Last 3 Encounters:   04/17/18 128/62   12/27/17 118/58   06/07/17 94/60     Lab Results    Component Value Date    A1C 8.2 03/19/2018   .    ASSESSMENT:                             Current medications were reviewed today.     Medication Adherence: good, no issues identified    Diabetes: Needs Improvement. Patient is not meeting A1c goal of < 7%. Self monitoring of blood glucose is not at goal of fasting  mg/dL. Pt would benefit from starting basal insulin- Reviewed appropriate use, benefits, risks, administration, storage and monitoring at length. Also due for microalbumin- patient plans to leave sample today, may be able to reconsider ACE/ARB as he is no longer on the lithium.    Schizoaffective disorder: Stable.     Supplements: Stable.    Hyperlipidemia: Stable.    PLAN:                            1. Start Basaglar 10 units, will increase to 12 units in 3 days if fasting sugars are >150mg/dl.    2. Sharps container and pen needles ordered for administration.    3. Microalbumin due.     I spent 30 minutes with this patient today. All changes were made via collaborative practice agreement with Annie Hart. A copy of the visit note was provided to the patient's primary care provider.    Will follow up in 1 week via phone.    The patient was given a summary of these recommendations as an after visit summary.     Aliyah Angeles, Pharm.D, BCACP  Medication Therapy Management Pharmacist  [phone]

## 2018-04-30 NOTE — MR AVS SNAPSHOT
After Visit Summary   4/30/2018    Caden Bush    MRN: [id number]           Patient Information     Date Of Birth          1960        Visit Information        Provider Department      4/30/2018 11:00 AM Aliyah Angeles RPH [facility]        Today's Diagnoses     Type 2 diabetes mellitus with both eyes affected by moderate nonproliferative retinopathy without macular edema, without long-term current use of insulin (H)    -  1      Care Instructions    Recommendations from today's MTM visit:                                                    MTM (medication therapy management) is a service provided by a clinical pharmacist designed to help you get the most of out of your medicines.     1. Start Basaglar insulin 10 units once daily.     2. Check sugars every morning.    3. If morning sugars are still over 150 in 3 days, okay to increase to 12 units per day.     Next MTM visit:  I will call you on Monday, May 7th at 10am    To schedule another MTM appointment, please call the clinic directly or you may call the MTM scheduling line at [phone] or toll-free at [phone].     My Clinical Pharmacist's contact information:                                                      It was a pleasure seeing you today!  Please feel free to contact me with any questions or concerns you have.      Aliyah Angeles, Pharm.D, [facility]  Medication Therapy Management Pharmacist  [phone]    You may receive a survey about the MTM services you received.  I would appreciate your feedback to help me serve you better in the future. Please fill it out and return it when you can. Your comments will be anonymous.                    Follow-ups after your visit        Your next 10 appointments already scheduled     May 01, 2018 12:45 PM CDT   Ech Complete with [id number]   [facility] CV Echocardiography ([facility])    [address] [facility]  [address] [id number] [phone]           1. Please bring or wear a comfortable two-piece outfit. 2. You may eat, drink and take your normal medicines. 3. For any questions that cannot be answered, please contact the ordering physician            May 07, 2018 10:00 AM CDT   TELEMEDICINE with Aliyah Angeles RPH   [facility] ([facility])    [address] [place] [address] [phone]           Note: this is not an onsite visit; there is no need to come to the facility.            May 10, 2018  1:45 PM CDT   Adult Med Follow UP with Nam Kessler MD   [facility] ([facility])    [place] [id number]  [address] [id number] [phone]            Jul 17, 2018 10:00 AM CDT   Office Visit with Annie Hart MD   [facility]    [address] [place] [address] [phone]           Bring a current list of meds and any records pertaining to this visit. For Physicals, please bring immunization records and any forms needing to be filled out. Please arrive 10 minutes early to complete paperwork.              Who to contact     If you have questions or need follow up information about today's clinic visit or your schedule please contact [facility] directly at [phone].  Normal or non-critical lab and imaging results will be communicated to you by MyChart, letter or phone within 4 business days after the clinic has received the results. If you do not hear from us within 7 days, please contact the clinic through MyChart or phone. If you have a critical or abnormal lab result, we will notify you by phone as soon as possible.  Submit refill requests through [employer] or call your pharmacy and they will forward the refill request to us. Please allow 3 business days for your refill to be completed.          Additional  Information About Your Visit        [employer] Information     [employer] gives you secure access to your electronic health record. If you see a primary care provider, you can also send messages to your care team and make appointments. If you have questions, please call your primary care clinic.  If you do not have a primary care provider, please call [phone] and they will assist you.        Care EveryWhere ID     This is your Care EveryWhere ID. This could be used by other organizations to access your [facility] medical records  [id number]         Blood Pressure from Last 3 Encounters:   04/17/18 128/62   12/27/17 118/58   06/07/17 94/60    Weight from Last 3 Encounters:   04/17/18 142 lb (64.4 kg)   12/27/17 154 lb (69.9 kg)   06/07/17 164 lb (74.4 kg)              Today, you had the following     No orders found for display         Today's Medication Changes          These changes are accurate as of 4/30/18 11:20 AM.  If you have any questions, ask your nurse or doctor.               Start taking these medicines.        Dose/Directions    insulin pen needle 32G X 4 MM   Commonly known as:  BD BRENDAN U/F   Used for:  Type 2 diabetes mellitus with both eyes affected by moderate nonproliferative retinopathy without macular edema, without long-term current use of insulin (H)        Use 1 daily as directed.   Quantity:  100 each   Refills:  11       Sharps Container Misc   Used for:  Type 2 diabetes mellitus with both eyes affected by moderate nonproliferative retinopathy without macular edema, without long-term current use of insulin (H)        Dose:  1 each   1 each daily   Quantity:  1 each   Refills:  0         These medicines have changed or have updated prescriptions.        Dose/Directions    traZODone 50 MG tablet   Commonly known as:  DESYREL   This may have changed:  how much to take   Used for:  Schizoaffective disorder, bipolar type (H)        Dose:  50 mg   Take 1 tablet (50 mg) by mouth At Bedtime    Quantity:  30 tablet   Refills:  2            Where to get your medicines      These medications were sent to [id number] - [place], [place] - [address] AT [facility] [address], [facility] [address]     Phone:  [phone]     insulin pen needle 32G X 4 MM    [facility]                Primary Care Provider Office Phone # Fax #    Annie Jeniffer Hart [phone] [address] [place] [address]        Equal Access to Services     [facility]: Hadii aad ku hadasho Soomaali, waaxda luqadaha, qaybta kaalmada adeegyada, waxay idiin hayaan aderocío diaz . So [facility] [phone].    ATENCIÓN: Si habla español, tiene a magaña disposición servicios gratuitos de asistencia lingüística. [facility] [phone].    We comply with applicable federal civil rights laws and [place] laws. We do not discriminate on the basis of race, color, national origin, age, disability, sex, sexual orientation, or gender identity.            Thank you!     Thank you for choosing [facility]  for your care. Our goal is always to provide you with excellent care. Hearing back from our patients is one way we can continue to improve our services. Please take a few minutes to complete the written survey that you may receive in the mail after your visit with us. Thank you!             Your Updated Medication List - Protect others around you: Learn how to safely use, store and throw away your medicines at www.disposemymeds.org.          This list is accurate as of 4/30/18 11:20 AM.  Always use your most recent med list.                   Brand Name Dispense Instructions for use Diagnosis    ACE/ARB/ARNI NOT PRESCRIBED (INTENTIONAL)      ACE & ARB not prescribed due to Risk for drug interaction- lithium toxicity in the past        aspirin 81 MG EC tablet      Take 81 mg by mouth daily.        BASAGLAR 100 UNIT/ML injection     15 mL    Inject  10 Units Subcutaneous daily We will make further adjustments as we see your response    Type 2 diabetes mellitus with both eyes affected by moderate nonproliferative retinopathy without macular edema, without long-term current use of insulin (H)       blood glucose monitoring test strip    ONETOUCH ULTRA    100 each    TEST ONCE DAILY AS DIRECTED    Encounter for medication refill       cholecalciferol 1000 UNIT tablet    vitamin D3     Take 1 tablet by mouth daily.        FISH OIL      1 capsule daily        insulin pen needle 32G X 4 MM    BD BRENDAN U/F    100 each    Use 1 daily as directed.    Type 2 diabetes mellitus with both eyes affected by moderate nonproliferative retinopathy without macular edema, without long-term current use of insulin (H)       metFORMIN 500 MG 24 hr tablet    GLUCOPHAGE-XR    360 tablet    TAKE 2 TABLETS BY MOUTH TWICE DAILY    Encounter for medication refill       pioglitazone 45 MG tablet    ACTOS    90 tablet    TAKE 1 TABLET BY MOUTH DAILY    Encounter for medication refill       risperiDONE 3 MG tablet    risperDAL    30 tablet    Take 1 tablet (3 mg) by mouth At Bedtime    Schizoaffective disorder, bipolar type (H)       Sharps Container Misc     1 each    1 each daily    Type 2 diabetes mellitus with both eyes affected by moderate nonproliferative retinopathy without macular edema, without long-term current use of insulin (H)       simvastatin 40 MG tablet    ZOCOR    90 tablet    TAKE ONE TABLET BY MOUTH EVERY NIGHT AT BEDTIME    Encounter for medication refill       traZODone 50 MG tablet    DESYREL    30 tablet    Take 1 tablet (50 mg) by mouth At Bedtime    Schizoaffective disorder, bipolar type (H)       * venlafaxine 75 MG 24 hr capsule    EFFEXOR XR    30 capsule    Take 1 capsule (75 mg) by mouth daily Please take with Effexor 150 mg for a total dose of 225 mg daily    Schizoaffective disorder, bipolar type (H)       * venlafaxine 150 MG 24 hr capsule    EFFEXOR XR    30  capsule    Take 1 capsule (150 mg) by mouth daily Along with a 75 mg for a total of 225 mg daily dose.    Schizoaffective disorder, bipolar type (H)       * Notice:  This list has 2 medication(s) that are the same as other medications prescribed for you. Read the directions carefully, and ask your doctor or other care provider to review them with you.

## 2018-04-30 NOTE — PATIENT INSTRUCTIONS
Recommendations from today's MTM visit:                                                    MTM (medication therapy management) is a service provided by a clinical pharmacist designed to help you get the most of out of your medicines.     1. Start Basaglar insulin 10 units once daily.     2. Check sugars every morning.    3. If morning sugars are still over 150 in 3 days, okay to increase to 12 units per day.     Next MTM visit:  I will call you on Monday, May 7th at 10am    To schedule another MTM appointment, please call the clinic directly or you may call the MTM scheduling line at [phone] or toll-free at [phone].     My Clinical Pharmacist's contact information:                                                      It was a pleasure seeing you today!  Please feel free to contact me with any questions or concerns you have.      Aliyah Angeles, Pharm.D, [facility]  Medication Therapy Management Pharmacist  [phone]    You may receive a survey about the MTM services you received.  I would appreciate your feedback to help me serve you better in the future. Please fill it out and return it when you can. Your comments will be anonymous.

## 2018-05-01 ENCOUNTER — HOSPITAL ENCOUNTER (OUTPATIENT)
Dept: CARDIOLOGY | Facility: CLINIC | Age: 58
Discharge: HOME | End: 2018-05-01
Attending: FAMILY MEDICINE | Admitting: FAMILY MEDICINE
Payer: MEDICARE

## 2018-05-01 DIAGNOSIS — R01.1 NEWLY RECOGNIZED MURMUR: ICD-10-CM

## 2018-05-01 PROCEDURE — 93306 TTE W/DOPPLER COMPLETE: CPT | Mod: 26 | Performed by: INTERNAL MEDICINE

## 2018-05-01 PROCEDURE — 93306 TTE W/DOPPLER COMPLETE: CPT

## 2018-05-02 ENCOUNTER — TELEPHONE (OUTPATIENT)
Dept: FAMILY MEDICINE | Facility: CLINIC | Age: 58
End: 2018-05-02

## 2018-05-02 NOTE — TELEPHONE ENCOUNTER
Called and discussed ECHO results with patient.  Explained mitral valve disease to patient who verbalized understanding.  Patient is scheduled for appointment with Dr. Hart 7/17/18, advised to call sooner with questions. Becca Abarca

## 2018-05-02 NOTE — TELEPHONE ENCOUNTER
----- Message from Annie Hart MD sent at 5/1/2018  7:26 PM CDT -----  Mild mitral valve disease, otherwise normal.  Recheck in 3 years.

## 2018-05-03 ENCOUNTER — PATIENT OUTREACH (OUTPATIENT)
Dept: CARE COORDINATION | Facility: CLINIC | Age: 58
End: 2018-05-03

## 2018-05-03 NOTE — PROGRESS NOTES
"Clinic Care Coordination Contact    OUTREACH    Referral Information:  Referral Source: Self-patient/Caregiver    Primary Diagnosis: Behavioral Health    Chief Complaint   Patient presents with     Clinic Care Coordination - Initial     Community Support Options     Universal Utilization: Some outside Utilization, No Care Everywhere Access  Utilization    Last refreshed: 5/3/2018  4:02 PM:  No Show Count (past year) 0       Last refreshed: 5/3/2018  4:02 PM:  ED visits 0       Last refreshed: 5/3/2018  4:02 PM:  Hospital admissions 0          Current as of: 5/3/2018  4:02 PM         Clinical Concerns:  Current Medical Concerns:  Pt calling today to inquire about getting additional help in his home. Pt reports he just isn't moving like he used to. Pt was recently seen in clinic in April by psychiatry, PCP, and MTM. Pt has been receiving education for a recheck on diabetic management and was connected with MTM to help with teaching/education. Pt at that time had reported minimal symptoms to providers. Pt reports \"slowing down.\" Inquired if this was mental health related or physcial and pt had a difficult time explaining cause. Discussed [facility] services and waiver programs and pt was interested in both. Attempted to three way call the county together, however was on hold for an extended period of time. [facility] [place] will e-mail pt the information about waiver programs and make a referral for [facility] services.   Current Behavioral Concerns: None noted at this time  Education Provided to patient: [facility] Services including PCA, waiver programs, etc. [facility]-mental health support   Health Maintenance Reviewed: Due/Overdue     Medication Management:  Pt reports adherence to regimen, Pt is working with psychiatry for MH management and MTM for diabetic education-denies any financial barriers    Functional Status:  Mobility Status: Independent  Equipment Currently Used at Home: none, grab bar  Transportation means:: Regular car   "   Psychosocial:  Current living arrangement:: I live alone  Type of residence:: Apartment-subsidized housing  Financial/Insurance: Medicare, Medical Assistance-pt will contact [facility] to see if he is eligible for waiver services     Resources and Interventions:  Current Resources: Psychology- Liliane Barker? In [place] off of [address]  Advanced Care Plans/Directives on file:: No  Referrals Placed: Community Resources, [facility], Mental Health     Goals:   Goals        General    Psychosocial (pt-stated)     Notes - Note created  5/3/2018  4:09 PM by Kaylen Reynoso LSW    Goal Statement: [place] Cho[place] to see if I qualify for waiver services to support me in the community by 8/2018.  [place] of [place]  Supportive Steps to Achieve: Call [facility]-  CC sent e-mail with info  Barriers: feeling tired,worn out  Strengths: asked for assistance,feels comfortable alling  Date to Achieve By: 8/2018          Patient/Caregiver understanding: Pt reports understanding and denies any additional questions or concerns at this times. [facility] engaged in AIDET communication during encounter.  Outreach Frequency: monthly  Future Appointments              In 4 days Aliyah Angeles RPH [facility], Critical access hospital    In 1 week Nam Kessler MD Psychiatry Clinic, [facility]IN    In 2 months Annie Hart MD [facility], [facility]           Plan: [facility] e-mailed information to pt. Pt to call the front door tomorrow. [facility] will make a referral for [facility]. Follow-up in 2 weeks.    NATALIE Nichols  Clinic Care Coordinator  [phone]  [email or web address]

## 2018-05-03 NOTE — LETTER
[facility]  Health Care Home - Complex Care Plan  About Me  Patient Name:  Caden Farmer    YOB: 1960  Age:   58 year old   Justin MRN: [id number] Telephone Information:     Home Phone [phone]   Mobile Not on file.       Address:    [address]  [facility] [address] Email address:  [email or web address]      Emergency Contact(s)  Name Relationship Lgl Grd Work Phone Home Phone Mobile Phone   1. PEPPER FARMER Mother   [phone]            Primary language:  English     needed? No    Language Services - Radha [phone]  Other communication barriers:    Preferred Method of Communication:     Current living arrangement: I live alone  Mobility Status/ Medical Equipment: Independent    Health Maintenance  Health Maintenance Reviewed: Due/Overdue     My Access Plan  Medical Emergency 911   Primary Clinic Line   - [phone]   24 Hour Appointment Line [phone]   24 Hour Nurse Line [phone]   Preferred Urgent Care     Preferred [facility]  [phone]   Preferred Pharmacy [facility] [address] [place] [address] [address] AT [facility] [address]     Behavioral Health Crisis Line Crisis Connection, [phone] or 911     My Care Team Members  Patient Care Team       Relationship Specialty Notifications Start End    Annie Hart MD PCP - General Family Practice  3/19/18     Phone: [phone] Fax: [phone] [address] [place] [address]    Maty Porras MD MD Psychiatry  8/17/15     Comment:  Attending provider    Phone: [phone] Fax: [phone] [address] [facility] [address]    Yeny Treviño MD Resident Psychiatry Admissions 16     Phone: [phone] Fax: [phone]         [facility] [address] [facility] [address]    Nam Kessler MD Resident Student in [facility] health care  education/training program  1/22/18     Phone: [phone] Fax: [phone]         [facility] [id number] [facility] [address]    Linda Bradley MD MD Psychiatry  1/22/18     Phone: [phone] Fax: [phone]         [address] [facility] [address]    Kaylen Reynoso LSW Lead Care Coordinator Primary Care - CC  5/3/18     Phone: [phone] Fax: [phone]               My Care Plans  Self Management and Treatment Plan  Goals (Comments)  Goals        General    Psychosocial (pt-stated)     Notes - Note created  5/3/2018  4:09 PM by Kaylen Reynoso LSW    Goal Statement: [place] if [place] qualify for waiver services to support me in the community by 8/2018.  [place]  Supportive Steps to Achieve: Call [facility] sent e-mail with info  Barriers: feeling tired,worn out  Strengths: asked for assistance,feels comfortable alling  Date to Achieve By: 8/2018                  My Medical and Care Information  Problem List   Patient Active Problem List   Diagnosis     Eczema     Retinopathy     Schizoaffective disorder, bipolar type (H)     Polyp of colon, adenomatous     Health Care Home     Hx of psychiatric care     Neuropathy of left upper extremity     Type 2 diabetes mellitus with both eyes affected by moderate nonproliferative retinopathy without macular edema, without long-term current use of insulin (H)      Current Medications and Allergies:  See printed Medication Report    Care Coordination Start Date: 5/3/2018   Frequency of Care Coordination: monthly   Form Last Updated: 05/03/2018

## 2018-05-07 ENCOUNTER — ALLIED HEALTH/NURSE VISIT (OUTPATIENT)
Dept: PHARMACY | Facility: PHYSICIAN GROUP | Age: 58
End: 2018-05-07
Payer: MEDICAID

## 2018-05-07 DIAGNOSIS — E11.3393 TYPE 2 DIABETES MELLITUS WITH BOTH EYES AFFECTED BY MODERATE NONPROLIFERATIVE RETINOPATHY WITHOUT MACULAR EDEMA, WITHOUT LONG-TERM CURRENT USE OF INSULIN (H): Primary | ICD-10-CM

## 2018-05-07 PROCEDURE — 99606 MTMS BY PHARM EST 15 MIN: CPT | Performed by: PHARMACIST

## 2018-05-07 NOTE — MR AVS SNAPSHOT
After Visit Summary   5/7/2018    Caden Bush    MRN: [id number]           Patient Information     Date Of Birth          1960        Visit Information        Provider Department      5/7/2018 10:00 AM Aliyah Angeles RPH [facility]        Today's Diagnoses     Type 2 diabetes mellitus with both eyes affected by moderate nonproliferative retinopathy without macular edema, without long-term current use of insulin (H)    -  1       Follow-ups after your visit        Your next 10 appointments already scheduled     May 10, 2018  1:45 PM CDT   Adult Med Follow UP with Nam Kessler MD   Psychiatry Clinic ([facility])    [address]  [address] [id number] [phone]            May 14, 2018  1:30 PM CDT   TELEMEDICINE with Aliyah Angeles RPH   [facility] ([facility])    [address] [place] [address] [phone]           Note: this is not an onsite visit; there is no need to come to the facility.            Jul 17, 2018 10:00 AM CDT   Office Visit with Annie Hart MD   [facility] ([facility]    [address] [place] [address] [phone]           Bring a current list of meds and any records pertaining to this visit. For Physicals, please bring immunization records and any forms needing to be filled out. Please arrive 10 minutes early to complete paperwork.              Who to contact     If you have questions or need follow up information about today's clinic visit or your schedule please contact [facility] directly at [phone].  Normal or non-critical lab and imaging results will be communicated to you by MyChart, letter or phone within 4 business days after the clinic has received the results. If you do not hear from us within 7 days, please contact the clinic through MyChart or phone.  If you have a critical or abnormal lab result, we will notify you by phone as soon as possible.  Submit refill requests through [employer] or call your pharmacy and they will forward the refill request to us. Please allow 3 business days for your refill to be completed.          Additional Information About Your Visit        CarbonFlow[employer] Information     [employer] gives you secure access to your electronic health record. If you see a primary care provider, you can also send messages to your care team and make appointments. If you have questions, please call your primary care clinic.  If you do not have a primary care provider, please call [phone] and they will assist you.        Care EveryWhere ID     This is your Care EveryWhere ID. This could be used by other organizations to access your [facility] medical records  [id number]         Blood Pressure from Last 3 Encounters:   04/30/18 136/80   04/17/18 128/62   12/27/17 118/58    Weight from Last 3 Encounters:   04/17/18 142 lb (64.4 kg)   12/27/17 154 lb (69.9 kg)   06/07/17 164 lb (74.4 kg)              Today, you had the following     No orders found for display         Today's Medication Changes          These changes are accurate as of 5/7/18 10:18 AM.  If you have any questions, ask your nurse or doctor.               These medicines have changed or have updated prescriptions.        Dose/Directions    BASAGLAR 100 UNIT/ML injection   This may have changed:    - how much to take  - additional instructions   Used for:  Type 2 diabetes mellitus with both eyes affected by moderate nonproliferative retinopathy without macular edema, without long-term current use of insulin (H)        Dose:  10 Units   Inject 10 Units Subcutaneous daily We will make further adjustments as we see your response   Quantity:  15 mL   Refills:  1       traZODone 50 MG tablet   Commonly known as:  DESYREL   This may have changed:  how much to take   Used for:  Schizoaffective disorder, bipolar  type (H)        Dose:  50 mg   Take 1 tablet (50 mg) by mouth At Bedtime   Quantity:  30 tablet   Refills:  2                Primary Care Provider Office Phone # Fax #    Annie Jeniffer Hart [phone] [address] [place] [address]        Goals        General    Psychosocial (pt-stated)     Notes - Note created  5/3/2018  4:09 PM by Kaylen Reynoso LSW    Goal Statement: [place] if [place] qualify for waiver services to support me in the community by 8/2018.  [place]  Supportive Steps to Achieve: Call [facility] sent e-mail with info  Barriers: feeling tired,worn out  Strengths: asked for assistance,feels comfortable alling  Date to Achieve By: 8/2018          Equal Access to Services     ERICA URIAS : Hadii ethan briones hadasho Soomaali, waaxda luqadaha, qaybta kaalmada aderocíoyada, marcia diaz . So [facility] [phone].    ATENCIÓN: Si habla español, tiene a magaña disposición servicios gratuitos de asistencia lingüística. Llame al [phone].    We comply with applicable federal civil rights laws and [place] laws. We do not discriminate on the basis of race, color, national origin, age, disability, sex, sexual orientation, or gender identity.            Thank you!     Thank you for choosing [facility]  for your care. Our goal is always to provide you with excellent care. Hearing back from our patients is one way we can continue to improve our services. Please take a few minutes to complete the written survey that you may receive in the mail after your visit with us. Thank you!             Your Updated Medication List - Protect others around you: Learn how to safely use, store and throw away your medicines at www.disposemymeds.org.          This list is accurate as of 5/7/18 10:18 AM.  Always use your most recent med list.                   Brand  Name Dispense Instructions for use Diagnosis    ACE/ARB/ARNI NOT PRESCRIBED (INTENTIONAL)      ACE & ARB not prescribed due to Risk for drug interaction- lithium toxicity in the past        aspirin 81 MG EC tablet      Take 81 mg by mouth daily.        BASAGLAR 100 UNIT/ML injection     15 mL    Inject 10 Units Subcutaneous daily We will make further adjustments as we see your response    Type 2 diabetes mellitus with both eyes affected by moderate nonproliferative retinopathy without macular edema, without long-term current use of insulin (H)       blood glucose monitoring test strip    ONETOUCH ULTRA    100 each    TEST ONCE DAILY AS DIRECTED    Encounter for medication refill       cholecalciferol 1000 UNIT tablet    vitamin D3     Take 1 tablet by mouth daily.        FISH OIL      1 capsule daily        insulin pen needle 32G X 4 MM    BD BRENDAN U/F    100 each    Use 1 daily as directed.    Type 2 diabetes mellitus with both eyes affected by moderate nonproliferative retinopathy without macular edema, without long-term current use of insulin (H)       metFORMIN 500 MG 24 hr tablet    GLUCOPHAGE-XR    360 tablet    TAKE 2 TABLETS BY MOUTH TWICE DAILY    Encounter for medication refill       pioglitazone 45 MG tablet    ACTOS    90 tablet    TAKE 1 TABLET BY MOUTH DAILY    Encounter for medication refill       risperiDONE 3 MG tablet    risperDAL    30 tablet    Take 1 tablet (3 mg) by mouth At Bedtime    Schizoaffective disorder, bipolar type (H)       simvastatin 40 MG tablet    ZOCOR    90 tablet    TAKE ONE TABLET BY MOUTH EVERY NIGHT AT BEDTIME    Encounter for medication refill       traZODone 50 MG tablet    DESYREL    30 tablet    Take 1 tablet (50 mg) by mouth At Bedtime    Schizoaffective disorder, bipolar type (H)       * venlafaxine 75 MG 24 hr capsule    EFFEXOR XR    30 capsule    Take 1 capsule (75 mg) by mouth daily Please take with Effexor 150 mg for a total dose of 225 mg daily    Schizoaffective  disorder, bipolar type (H)       * venlafaxine 150 MG 24 hr capsule    EFFEXOR XR    30 capsule    Take 1 capsule (150 mg) by mouth daily Along with a 75 mg for a total of 225 mg daily dose.    Schizoaffective disorder, bipolar type (H)       * Notice:  This list has 2 medication(s) that are the same as other medications prescribed for you. Read the directions carefully, and ask your doctor or other care provider to review them with you.

## 2018-05-07 NOTE — PROGRESS NOTES
[facility] referral faxed to [facility] at [phone].    Kaylen Reynoso, [facility]  Clinic Care Coordinator  [phone]  [email or web address]

## 2018-05-10 ENCOUNTER — OFFICE VISIT (OUTPATIENT)
Dept: PSYCHIATRY | Facility: CLINIC | Age: 58
End: 2018-05-10
Attending: PSYCHIATRY & NEUROLOGY
Payer: MEDICARE

## 2018-05-10 VITALS
BODY MASS INDEX: 19.49 KG/M2 | WEIGHT: 137.8 LBS | DIASTOLIC BLOOD PRESSURE: 74 MMHG | HEART RATE: 89 BPM | SYSTOLIC BLOOD PRESSURE: 112 MMHG

## 2018-05-10 DIAGNOSIS — F39 MOOD DISORDER (H): Primary | ICD-10-CM

## 2018-05-10 PROCEDURE — G0463 HOSPITAL OUTPT CLINIC VISIT: HCPCS | Mod: ZF

## 2018-05-10 ASSESSMENT — PAIN SCALES - GENERAL: PAINLEVEL: SEVERE PAIN (6)

## 2018-05-10 NOTE — PROGRESS NOTES
PSYCHIATRY CLINIC PROGRESS NOTE   The initial diagnostic evaluation was on 11/26/08 and the last transfer of care evaluation was 06/30/17.    Pertinent Background:  This patient first experienced mental health issues in his late 20s , initially obtained care at that time and has received treatment for Schizoaffective Disorder, bipolar type.  See transfer evaluation for detailed history.  Notably, this pt has been stable on regimen of Lithium, Effexor and Risperdal since 2011. Historically, lithium dose 450 mg has been adequate for mood stabilization although an increase could reasonably be considered if needed.    Psych critical item history includes suicidal ideation, psychosis [sxs include AH and paranoia], mutiple psychotropic trials and psych hosp (>5).     INTERIM HISTORY                                                 Caden Bush is a 58 year old male who was last seen in clinic on 4.6.18 at which time patient reported stable mood. The patient reports good treatment adherence.  History was provided by patient who was a good historian.  Since the last visit:  - Lives in his apt now since April 16th. Construction noise during the day is disturbing  - Taking Trazodone 100 mg that is helping but makes him sedated during the day. Sleeps from 9:30 pm - 10:30 am. Am feels light headed. Fasting BG improved from 150-200s to <120.  -- May 1st Insulin started for DM. Pt. Concerned about weight loss, poor nutrition. Dr. Goel PCP's office is trying to help with getting additional help  - Reports Stable mood. Feels lonely. No AH, VH.No elevated or depressed mood    RECENT SYMPTOMS:   DEPRESSION: reports-none;  DENIES- suicidal ideation  PSYCHOSIS:  reports-none;  DENIES- hallucinations and delusions  EATING DISORDER: none  KINGA: Denies elevated mood ,insomnia, racing thoughts, restlessness    RECENT SUBSTANCE USE:     ALCOHOL-  never           TOBACCO- none          CAFFEINE- 1-2 cups/day of coffee, 1-2 sodas/day      OPIOIDS- none      NARCAN KIT- N/A          CANNABIS- none            OTHER ILLICIT DRUGS- none     CURRENT SOCIAL HISTORY:  Financial Support-  SSDI for schizoaffective disorder and diabetes.       Living Situation- Currently living by self in subsidized apartment in [place] since Feb 2011.     Children- none     MEDICAL ROS:  Reports none.  Denies GI [nausea, diarrhea, constipation,  ], headache, sedation, tremor and confusion.      PSYCH AND CD CRITICAL ITEM SUMMARY     Sept 2017 - Decrease Li from 450 mg to 300 mg qhs (BUN/cr was 24.)  Nov 2017 - Decrease Li to 150 mg, Increase Risperidone to 3 mg  Feb 2018 - Stop Li  April 2018 - Cut Trazodone to 50 mg from 100 mg    PAST PSYCH MED TRIALS     Pl see  Problem list -Hx of Psychiatric care    MEDICAL / SURGICAL HISTORY                                   CARE TEAM:   PCP- Dr. Dai with [facility]    Therapist-  Yeny Goff at [facility]      Ophthalmologist: Jacob Lieberman with [facility]    Patient Active Problem List   Diagnosis     Eczema     Retinopathy     Schizoaffective disorder, bipolar type (H)     Polyp of colon, adenomatous     Health Care Home     Hx of psychiatric care     Neuropathy of left upper extremity     Type 2 diabetes mellitus with both eyes affected by moderate nonproliferative retinopathy without macular edema, without long-term current use of insulin (H)     ALLERGY                                Review of patient's allergies indicates no known allergies.  MEDICATIONS                               Current Outpatient Prescriptions   Medication Sig Dispense Refill     ACE/ARB NOT PRESCRIBED, INTENTIONAL, ACE & ARB not prescribed due to Risk for drug interaction- lithium toxicity in the past       aspirin 81 MG EC tablet Take 81 mg by mouth daily.       BASAGLAR 100 UNIT/ML injection Inject 10 Units Subcutaneous daily We will make further adjustments as we see your response (Patient taking differently: Inject  12 Units Subcutaneous daily We will make further adjustments as we see your response) 15 mL 1     blood glucose monitoring (ONE TOUCH ULTRA) test strip TEST ONCE DAILY AS DIRECTED 100 each 3     cholecalciferol (VITAMIN D) 1000 UNIT tablet Take 1 tablet by mouth daily.       FISH OIL 1 capsule daily        insulin pen needle (BD BRENDAN U/F) 32G X 4 MM Use 1 daily as directed. 100 each 11     metFORMIN (GLUCOPHAGE-XR) 500 MG 24 hr tablet TAKE 2 TABLETS BY MOUTH TWICE DAILY 360 tablet 3     pioglitazone (ACTOS) 45 MG tablet TAKE 1 TABLET BY MOUTH DAILY 90 tablet 0     risperiDONE (RISPERDAL) 3 MG tablet Take 1 tablet (3 mg) by mouth At Bedtime 30 tablet 2     simvastatin (ZOCOR) 40 MG tablet TAKE ONE TABLET BY MOUTH EVERY NIGHT AT BEDTIME 90 tablet 3     traZODone (DESYREL) 50 MG tablet Take 1 tablet (50 mg) by mouth At Bedtime (Patient taking differently: Take  mg by mouth At Bedtime ) 30 tablet 2     venlafaxine (EFFEXOR XR) 150 MG 24 hr capsule Take 1 capsule (150 mg) by mouth daily Along with a 75 mg for a total of 225 mg daily dose. 30 capsule 2     venlafaxine (EFFEXOR XR) 75 MG 24 hr capsule Take 1 capsule (75 mg) by mouth daily Please take with Effexor 150 mg for a total dose of 225 mg daily 30 capsule 2     VITALS   /74  Pulse 89  Wt 62.5 kg (137 lb 12.8 oz)  BMI 19.49 kg/m2   MENTAL STATUS EXAM                                                             Alertness: alert  and oriented  Appearance: casually groomed and malodorous  Behavior/Demeanor: cooperative, pleasant and calm, with good  eye contact   Speech: normal  Language: intact and no problems  Psychomotor: normal or unremarkable  Mood: description consistent with euthymia  Affect: full range; was congruent to mood; was congruent to content  Thought Process/Associations: unremarkable  Thought Content:  Reports none;   Denies suicidal ideation  and psychotic thought  Perception:   Reports none;  Denies auditory hallucinations (NO;   command-NO;  following command-NO and visual hallucinations (NO)  Insight: fair  Judgment: good  Cognition: does  appear grossly intact; formal cognitive testing was not done    LABS and DATA     PHQ9 TODAY = 6  PHQ-9 SCORE 2/8/2018 3/1/2018 4/6/2018   Total Score - - -   Total Score 0 1 4   Total Score - - -       RATING SCALES:  AIMS next due date needs to be determined    LITHIUM LABS     [level, renal, SG, TSH, WBC]    q6 mo  Recent Labs   Lab Test  04/17/18   1356  07/11/17   1034  12/20/16   1301  06/09/16   1030   LITHIUM  <0.1*  0.6  0.6  0.5*     Recent Labs   Lab Test  04/17/18   1356  01/30/18   0923  07/11/17   1034   06/09/16   1030  12/21/15   1413   CR  1.10  1.20  1.17   < >  1.26*  1.11   GFRESTIMATED   --   62   --    --   59*  68   NA  139  140  141   < >  140  133   PEE  9.6  9.3  9.7   < >  9.4  9.2    < > = values in this interval not displayed.   **BUN 7/11/17: 28 mg/dL (rr 6-24) - HIGH**  **BUN/Creatinine ratio 7/11/17: 24 (rr 9-20) - HIGH**     BUN 12/20/16: 21 mg/dL      BUN/Creatinine ratio 12/20/16: 17    Recent Labs   Lab Test  06/09/16   1040  12/21/15   1435   SG  1.014  1.007     Recent Labs   Lab Test  01/30/18   0923  06/09/16   1030  12/21/15   1413   TSH  0.72  1.70  1.69     Recent Labs   Lab Test  01/30/18   0923  12/20/16   1240   WBC  2.8*  3.7*     ANTIPSYCHOTIC LABS   [glu, A1C, lipids (focus LDL), liver enzymes, WBC, ANEU, Hgb, plts]    q12 mo  Recent Labs   Lab Test  04/17/18   1356  03/19/18   1116  01/30/18   0923  09/15/17   0956   GLC  150*   --   195*   --    A1C   --   8.2*   --   6.8*     Recent Labs   Lab Test  04/17/18   1356  07/11/17   1034   CHOL  139  126   TRIG  45  49   LDL  45  40   HDL  85  76     Recent Labs   Lab Test  04/17/18   1356  01/30/18   0923   AST  15  14   ALT  12  16   ALKPHOS  73  79     Recent Labs   Lab Test  01/30/18   0923  12/20/16   1240  06/09/16   1030  12/21/15   1413   WBC  2.8*  3.7*  4.7  4.6   ANEU   --    --   3.5  3.4   HGB   12.1*  13.6  14.7  13.4   PLT  179  205  175  155     DIAGNOSES                                                                                                   Depression w/ psychotic features moderate Vs.  Schizophrenia w/ predominant negative symtpoms    ASSESSMENT                                       TODAY: Caden reports he moved back to his apartment in April. He is lonely. Stable mood, denies elevated, depressed mood. No AH, VH, disorganized thoughts. Feels groggy in the mornings. He maintains a flat affect (chronic). He reports that he was started on insulin Apr 30th by PCP. He has lost 30lb  In a year (?). LONI Reynoso and  PCP  working with patient to secure some resources (NH eval?) Pt not interested in NH.     Patient seemed worried - context of moving back to apartment, feeling lonely, not eating, recent insulin for DM. He looked weak. Sleep was likely disturbed by increasing freq of urination. Will monitor this with improvement in A1C, now that pt is on Insulin.     Trazodone will be reduced to 50 mg qhs to see if it helps improve morning sedation (Pt did not do it last time). Pt encouraged to discuss Nutrition consult and Glucerna with PCP. I will write to Kaylen Reynoso to include some nutrition counseling, meals on wheels etc.    Med HX:  Thorough chart review of records in epic done prior to patient's appointment. [facility]. Two hospitalizations 2009 (Depression, SI, Psychosis), 2012 (Li toxicity 2/2 Lisinopril use).  No mention of hypomania or wolf in chart. Since 2012 patient has been on Li  mg qhs, Risperidone 2 mg qhs, Effexor 300 mg daily. Lower dose of Risperidone was tried in the past and patient became paranoid.     Future considerations: Patient has historic diagnosis of Schizoaffective disorder Bipolar type but no record of hypomanic and manic symptoms in chart nor, per patient's recollection -  - Bipolar unlikely,. He has no hx of psychosis outside  of depressive episodes - Schizoaffective disorder unlikely. He was diagnosed with Schizophrenia at the age of 28 when he heard voices, he was started on Lithium. He remembers that he was taken off Li and was on a trial of Wellbutrin, Zyprexa and other medications that he doesn't remember. Now he has been off of Li for a 3 months. No manic symptoms. It is reassuring. A trial of Wellbutrin may be considered in the future.    Diagnosis will have to be further clarified - DDX - Schizophrenia, Depression w/ psychotic features moderate, recurrent in remission.     [place] was not checked today and revealed: N/A    PSYCHOTROPIC DRUG INTERACTIONS:   VENLAFAXINE and ANTIPLATELET AGENTS (ASPIRIN) may result in an increased risk of bleeding  RISPERIDONE and SIMVASTATIN may result in increased simvastatin serum concentrations with an increased risk of myopathy or rhabdomyolysis.   TRAZODONE and VENLAFAXINE may result in an increased risk of serotonin syndrome     MANAGEMENT:  Monitoring for adverse effects, routine vitals, routine labs, minimal use of [trazodone] and patient is aware of risks      PLAN                                                                                                       1) PSYCHOTROPIC MEDICATIONS:  - Cont risperidone 3mg QHS  - Cont Effexor XR  225 mg daily  - Decrease Trazodone to  50 mg qhs (Pt forgot to reduce this last visit)    2) THERAPY:  Seeing psychologist Yeny Goff at [facility] since 11/2015. Sees once per month.     3) LABS NEXT DUE: Feb 2019       RATING SCALES:  AIMS: 0 3/1/2018    4) REFERRALS [MICD, medical etc.]:  Recommended follow-up with primary care provider for further evaluation and recommendations re: renal dysfunction, in context of diabetes and Lithium regimen.      5) MTM REFERRAL [PharmD]:  none    6) :  none Previously had a CCM until 2011. Has SW consult on 10/20/15, not interested in CM or [facility] worker  services at this time and seems to be managing well on his own.     7) RTC: 1 months    8) CRISIS NUMBERS:   Provided routinely in AVS    TREATMENT RISK STATEMENT:  The risks, benefits, alternatives and potential adverse effects have been discussed and are understood by the patient. The pt understands the risks of using street drugs or alcohol.  There are no medical contraindications, the pt agrees to treatment with the ability to do so.  The patient understands to call 911 or come to the nearest ED if life threatening or urgent symptoms present.    PSYCHIATRY CLINIC INDIVIDUAL PSYCHOTHERAPY NOTE                                    16   Start time - 1:44 pm     End time -2:00  Date reviewed - 5/10/18      Date next due - 5/10/19    Subjective: This supportive psychotherapy session addressed issues related to goals of therapy.  Patient's reaction: Preparatory in the context of mental status appropriate for ambulatory setting.  Progress: fair to good  Plan: RTC 1 month  Psychotherapy services during this visit included  myself and patient  TREATMENT  PLAN          SYMPTOMS;PROBLEMS   MEASURABLE GOALS;    FUNCTIONAL IMPROVEMENT INTERVENTIONS;    GAINS MADE DISCHARGE CRITERIA   Sleep 7-8 h of sleep  Feeling restful during the day Medications  Education on diet, exercise  Minimize Caffeine intake marked symptom improvement   Depression: depressed mood   get 7-8 hours of restful sleep every night   Doing 2 fun things per day  Feeling good about self Medications  Therapy  Lifestyle changes  Increase Social support  Movies, Mall walking marked symptom improvement     Nam Kessler MD    Patient was staffed by attending Dr. Phillips who will sign the note. My supervisor is Dr. Braswell.    Supervisor Attestation:  I saw Caden Bush with the resident, and participated in key portions of the service, including the mental status examination and developing the plan of care. I reviewed key portions of the history with the  resident. I agree with the findings and plan as documented in this note.  Antolin Phillips MD

## 2018-05-10 NOTE — NURSING NOTE
Chief Complaint   Patient presents with     Recheck Medication     Schizoaffective disorder, bipolar type

## 2018-05-10 NOTE — MR AVS SNAPSHOT
After Visit Summary   5/10/2018    Caden Bush    MRN: [id number]           Patient Information     Date Of Birth          1960        Visit Information        Provider Department      5/10/2018 1:45 PM Nam Kessler MD Psychiatry Clinic        Today's Diagnoses     Mood disorder (H)    -  1       Follow-ups after your visit        Follow-up notes from your care team     Return in about 4 weeks (around 6/7/2018).      Your next 10 appointments already scheduled     Jun 04, 2018  2:00 PM CDT   TELEMEDICINE with Aliyah [facility] ([facility])    [address] [place] [address] [phone]           Note: this is not an onsite visit; there is no need to come to the facility.            Jun 07, 2018 10:15 AM CDT   Adult Med Follow UP with Nam Kessler MD   Psychiatry Clinic ([facility])    [place] [id number]  [address] [place] [address] [phone]            Jul 17, 2018 10:00 AM CDT   Office Visit with Annie Hart MD   [facility]    [address] [place] [address] [phone]           Bring a current list of meds and any records pertaining to this visit. For Physicals, please bring immunization records and any forms needing to be filled out. Please arrive 10 minutes early to complete paperwork.              Who to contact     Please call your clinic at [phone] to:    Ask questions about your health    Make or cancel appointments    Discuss your medicines    Learn about your test results    Speak to your doctor            Additional Information About Your Visit        MyChart Information     Claim[employer] gives you secure access to your electronic health record. If you see a primary care provider, you can also send messages to your care team and make appointments. If you have questions, please call your primary  [facility] clinic.  If you do not have a primary care provider, please call [phone] and they will assist you.      WebSafety is an electronic gateway that provides easy, online access to your medical records. With WebSafety, you can request a clinic appointment, read your test results, renew a prescription or communicate with your care team.     To access your existing account, please contact your [facility] or call [phone] for assistance.        Care EveryWhere ID     This is your Care EveryWhere ID. This could be used by other organizations to access your [facility] medical records  [id number]        Your Vitals Were     Pulse BMI (Body Mass Index)                89 19.49 kg/m2           Blood Pressure from Last 3 Encounters:   05/10/18 112/74   04/30/18 136/80   04/17/18 128/62    Weight from Last 3 Encounters:   05/10/18 62.5 kg (137 lb 12.8 oz)   04/17/18 64.4 kg (142 lb)   04/06/18 66.3 kg (146 lb 3.2 oz)              Today, you had the following     No orders found for display         Today's Medication Changes          These changes are accurate as of 5/10/18 11:59 PM.  If you have any questions, ask your nurse or doctor.               These medicines have changed or have updated prescriptions.        Dose/Directions    BASAGLAR 100 UNIT/ML injection   This may have changed:    - how much to take  - additional instructions   Used for:  Type 2 diabetes mellitus with both eyes affected by moderate nonproliferative retinopathy without macular edema, without long-term current use of insulin (H)        Dose:  10 Units   Inject 10 Units Subcutaneous daily We will make further adjustments as we see your response   Quantity:  15 mL   Refills:  1       traZODone 50 MG tablet   Commonly known as:  DESYREL   This may have changed:  how much to take   Used for:  Schizoaffective disorder, bipolar type (H)        Dose:  50 mg   Take 1 tablet (50 mg) by mouth At Bedtime   Quantity:  30 tablet    Refills:  2                Primary Care Provider Office Phone # Fax #    Annie Jeniffer Hart [phone] [address] [place] [address]        Goals        General    Psychosocial (pt-stated)     Notes - Note created  5/3/2018  4:09 PM by Kaylen Reynoso LSW    Goal Statement: I [place] a [place] Cho[place] to see if I qualify for waiver services to support me in the community by 8/2018.  Measure of Success: [place] completed  Supportive Steps to Achieve: Call [facility] sent e-mail with info  Barriers: feeling tired,worn out  Strengths: asked for assistance,feels comfortable alling  Date to Achieve By: 8/2018          Equal Access to Services     ERICA URIAS : Hadii ethan briones hadasho Solukeali, waaxda luqadaha, qaybta kaalmada adeegyada, waxazar woods. So [facility] [phone].    ATENCIÓN: Si habla español, tiene a magaña disposición servicios gratuitos de asistencia lingüística. [facility] [phone].    We comply with applicable federal civil rights laws and [place] laws. We do not discriminate on the basis of race, color, national origin, age, disability, sex, sexual orientation, or gender identity.            Thank you!     Thank you for choosing PSYCHIATRY CLINIC  for your care. Our goal is always to provide you with excellent care. Hearing back from our patients is one way we can continue to improve our services. Please take a few minutes to complete the written survey that you may receive in the mail after your visit with us. Thank you!             Your Updated Medication List - Protect others around you: Learn how to safely use, store and throw away your medicines at www.disposemymeds.org.          This list is accurate as of 5/10/18 11:59 PM.  Always use your most recent med list.                   Brand Name Dispense Instructions for use Diagnosis    ACE/ARB/ARNI NOT PRESCRIBED (INTENTIONAL)      ACE & ARB not  prescribed due to Risk for drug interaction- lithium toxicity in the past        aspirin 81 MG EC tablet      Take 81 mg by mouth daily.        BASAGLAR 100 UNIT/ML injection     15 mL    Inject 10 Units Subcutaneous daily We will make further adjustments as we see your response    Type 2 diabetes mellitus with both eyes affected by moderate nonproliferative retinopathy without macular edema, without long-term current use of insulin (H)       blood glucose monitoring test strip    ONETOUCH ULTRA    100 each    TEST ONCE DAILY AS DIRECTED    Encounter for medication refill       cholecalciferol 1000 UNIT tablet    vitamin D3     Take 1 tablet by mouth daily.        FISH OIL      1 capsule daily        insulin pen needle 32G X 4 MM    BD BRENDAN U/F    100 each    Use 1 daily as directed.    Type 2 diabetes mellitus with both eyes affected by moderate nonproliferative retinopathy without macular edema, without long-term current use of insulin (H)       metFORMIN 500 MG 24 hr tablet    GLUCOPHAGE-XR    360 tablet    TAKE 2 TABLETS BY MOUTH TWICE DAILY    Encounter for medication refill       risperiDONE 3 MG tablet    risperDAL    30 tablet    Take 1 tablet (3 mg) by mouth At Bedtime    Schizoaffective disorder, bipolar type (H)       simvastatin 40 MG tablet    ZOCOR    90 tablet    TAKE ONE TABLET BY MOUTH EVERY NIGHT AT BEDTIME    Encounter for medication refill       traZODone 50 MG tablet    DESYREL    30 tablet    Take 1 tablet (50 mg) by mouth At Bedtime    Schizoaffective disorder, bipolar type (H)       * venlafaxine 75 MG 24 hr capsule    EFFEXOR XR    30 capsule    Take 1 capsule (75 mg) by mouth daily Please take with Effexor 150 mg for a total dose of 225 mg daily    Schizoaffective disorder, bipolar type (H)       * venlafaxine 150 MG 24 hr capsule    EFFEXOR XR    30 capsule    Take 1 capsule (150 mg) by mouth daily Along with a 75 mg for a total of 225 mg daily dose.    Schizoaffective disorder, bipolar  type (H)       * Notice:  This list has 2 medication(s) that are the same as other medications prescribed for you. Read the directions carefully, and ask your doctor or other care provider to review them with you.

## 2018-05-11 ENCOUNTER — PATIENT OUTREACH (OUTPATIENT)
Dept: CARE COORDINATION | Facility: CLINIC | Age: 58
End: 2018-05-11

## 2018-05-11 DIAGNOSIS — Z76.0 ENCOUNTER FOR MEDICATION REFILL: ICD-10-CM

## 2018-05-11 RX ORDER — PIOGLITAZONEHYDROCHLORIDE 45 MG/1
TABLET ORAL
Qty: 90 TABLET | Refills: 0 | Status: SHIPPED | OUTPATIENT
Start: 2018-05-11 | End: 2018-08-08

## 2018-05-11 NOTE — PROGRESS NOTES
Clinic Care Coordination Contact  [facility]/Voicemail       Clinical Data: Care Coordinator Outreach- Received a message from Dr. Kessler to reach out to patient about meal delivery for assistance with meal and diabetic management.   Outreach attempted x 1.  Left message on voicemail with call back information and requested return call.  Plan: Care Coordinator will try to reach patient again in 3-5 business days.    Kaylen Reynoso [facility]  Clinic Care Coordinator  [phone]  [email or web address]

## 2018-05-14 ENCOUNTER — ALLIED HEALTH/NURSE VISIT (OUTPATIENT)
Dept: PHARMACY | Facility: PHYSICIAN GROUP | Age: 58
End: 2018-05-14
Payer: MEDICAID

## 2018-05-14 DIAGNOSIS — F25.0 SCHIZOAFFECTIVE DISORDER, BIPOLAR TYPE (H): ICD-10-CM

## 2018-05-14 DIAGNOSIS — E11.3393 TYPE 2 DIABETES MELLITUS WITH BOTH EYES AFFECTED BY MODERATE NONPROLIFERATIVE RETINOPATHY WITHOUT MACULAR EDEMA, WITHOUT LONG-TERM CURRENT USE OF INSULIN (H): Primary | ICD-10-CM

## 2018-05-14 PROCEDURE — 99606 MTMS BY PHARM EST 15 MIN: CPT | Performed by: PHARMACIST

## 2018-05-14 ASSESSMENT — ACTIVITIES OF DAILY LIVING (ADL): DEPENDENT_IADLS:: TRANSPORTATION

## 2018-05-14 NOTE — MR AVS SNAPSHOT
After Visit Summary   5/14/2018    Caden Bush    MRN: [id number]           Patient Information     Date Of Birth          1960        Visit Information        Provider Department      5/14/2018 1:30 PM Aliyah Angeles RPH [facility]        Today's Diagnoses     Type 2 diabetes mellitus with both eyes affected by moderate nonproliferative retinopathy without macular edema, without long-term current use of insulin (H)    -  1    Schizoaffective disorder, bipolar type (H)           Follow-ups after your visit        Your next 10 appointments already scheduled     May 21, 2018  1:30 PM CDT   TELEMEDICINE with Aliyah BARRON Angeles   [facility] ([facility])    [address] [place] [address] [phone]           Note: this is not an onsite visit; there is no need to come to the facility.            Jun 07, 2018 10:15 AM CDT   Adult Med Follow UP with Nam Kessler MD   Psychiatry Clinic ([facility])    [place] [id number]  [address] [place] [id number] [phone]            Jul 17, 2018 10:00 AM CDT   [facility] Visit with Annie Hart MD   [facility]    [address] [place] [address] [phone]           Bring a current list of meds and any records pertaining to this visit. For Physicals, please bring immunization records and any forms needing to be filled out. Please arrive 10 minutes early to complete paperwork.              Who to contact     If you have questions or need follow up information about today's clinic visit or your schedule please contact [facility] directly at [phone].  Normal or non-critical lab and imaging results will be communicated to you by MyChart, letter or phone within 4 business days after the clinic has received the results. If you do not hear from us within 7 days,  please contact the clinic through [employer] or phone. If you have a critical or abnormal lab result, we will notify you by phone as soon as possible.  Submit refill requests through [employer] or call your pharmacy and they will forward the refill request to us. Please allow 3 business days for your refill to be completed.          Additional Information About Your Visit        [employer] Information     [employer] gives you secure access to your electronic health record. If you see a primary care provider, you can also send messages to your care team and make appointments. If you have questions, please call your primary care clinic.  If you do not have a primary care provider, please call [phone] and they will assist you.        Care EveryWhere ID     This is your Care EveryWhere ID. This could be used by other organizations to access your [facility] medical records  [id number]         Blood Pressure from Last 3 Encounters:   04/30/18 136/80   04/17/18 128/62   12/27/17 118/58    Weight from Last 3 Encounters:   04/17/18 142 lb (64.4 kg)   12/27/17 154 lb (69.9 kg)   06/07/17 164 lb (74.4 kg)              Today, you had the following     No orders found for display         Today's Medication Changes          These changes are accurate as of 5/14/18  2:02 PM.  If you have any questions, ask your nurse or doctor.               These medicines have changed or have updated prescriptions.        Dose/Directions    BASAGLAR 100 UNIT/ML injection   This may have changed:    - how much to take  - additional instructions   Used for:  Type 2 diabetes mellitus with both eyes affected by moderate nonproliferative retinopathy without macular edema, without long-term current use of insulin (H)        Dose:  10 Units   Inject 10 Units Subcutaneous daily We will make further adjustments as we see your response   Quantity:  15 mL   Refills:  1       traZODone 50 MG tablet   Commonly known as:  DESYREL   This may have changed:  how much  to take   Used for:  Schizoaffective disorder, bipolar type (H)        Dose:  50 mg   Take 1 tablet (50 mg) by mouth At Bedtime   Quantity:  30 tablet   Refills:  2                Primary Care Provider Office Phone # Fax #    Annie Jeniffer Hart [phone] [address] [place] [address]        Goals        General    Psychosocial (pt-stated)     Notes - Note created  5/3/2018  4:09 PM by Kaylen Reynoso LSW    Goal Statement: [place] to see if I qualify for waiver services to support me in the community by 8/2018.  [place]  Supportive Steps to Achieve: Call [facility] sent e-mail with info  Barriers: feeling tired,worn out  Strengths: asked for assistance,feels comfortable alling  Date to Achieve By: 8/2018          Equal Access to Services     [facility] CampusJACOBO : Hadii ethan briones hadasho Sop[employer], waaxda luqadaha, qaybta kaalmada adeegyada, marcia braden hayeladio diaz . So [facility] [phone].    ATENCIÓN: Si habla español, tiene a magaña disposición servicios gratuitos de asistencia lingüística. Llame al [phone].    We comply with applicable federal civil rights laws and [place] laws. We do not discriminate on the basis of race, color, national origin, age, disability, sex, sexual orientation, or gender identity.            Thank you!     Thank you for choosing [facility]  for your care. Our goal is always to provide you with excellent care. Hearing back from our patients is one way we can continue to improve our services. Please take a few minutes to complete the written survey that you may receive in the mail after your visit with us. Thank you!             Your Updated Medication List - Protect others around you: Learn how to safely use, store and throw away your medicines at www.disposemymeds.org.          This list is accurate as of 5/14/18  2:02 PM.  Always  use your most recent med list.                   Brand Name Dispense Instructions for use Diagnosis    ACE/ARB/ARNI NOT PRESCRIBED (INTENTIONAL)      ACE & ARB not prescribed due to Risk for drug interaction- lithium toxicity in the past        aspirin 81 MG EC tablet      Take 81 mg by mouth daily.        BASAGLAR 100 UNIT/ML injection     15 mL    Inject 10 Units Subcutaneous daily We will make further adjustments as we see your response    Type 2 diabetes mellitus with both eyes affected by moderate nonproliferative retinopathy without macular edema, without long-term current use of insulin (H)       blood glucose monitoring test strip    ONETOUCH ULTRA    100 each    TEST ONCE DAILY AS DIRECTED    Encounter for medication refill       cholecalciferol 1000 UNIT tablet    vitamin D3     Take 1 tablet by mouth daily.        FISH OIL      1 capsule daily        insulin pen needle 32G X 4 MM    BD BRENDAN U/F    100 each    Use 1 daily as directed.    Type 2 diabetes mellitus with both eyes affected by moderate nonproliferative retinopathy without macular edema, without long-term current use of insulin (H)       metFORMIN 500 MG 24 hr tablet    GLUCOPHAGE-XR    360 tablet    TAKE 2 TABLETS BY MOUTH TWICE DAILY    Encounter for medication refill       pioglitazone 45 MG tablet    ACTOS    90 tablet    TAKE 1 TABLET BY MOUTH DAILY    Encounter for medication refill       risperiDONE 3 MG tablet    risperDAL    30 tablet    Take 1 tablet (3 mg) by mouth At Bedtime    Schizoaffective disorder, bipolar type (H)       simvastatin 40 MG tablet    ZOCOR    90 tablet    TAKE ONE TABLET BY MOUTH EVERY NIGHT AT BEDTIME    Encounter for medication refill       traZODone 50 MG tablet    DESYREL    30 tablet    Take 1 tablet (50 mg) by mouth At Bedtime    Schizoaffective disorder, bipolar type (H)       * venlafaxine 75 MG 24 hr capsule    EFFEXOR XR    30 capsule    Take 1 capsule (75 mg) by mouth daily Please take with Effexor 150  mg for a total dose of 225 mg daily    Schizoaffective disorder, bipolar type (H)       * venlafaxine 150 MG 24 hr capsule    EFFEXOR XR    30 capsule    Take 1 capsule (150 mg) by mouth daily Along with a 75 mg for a total of 225 mg daily dose.    Schizoaffective disorder, bipolar type (H)       * Notice:  This list has 2 medication(s) that are the same as other medications prescribed for you. Read the directions carefully, and ask your doctor or other care provider to review them with you.

## 2018-05-14 NOTE — PROGRESS NOTES
"SUBJECTIVE/OBJECTIVE:                Caden Bush is a 58 year old male called for a follow-up visit for Medication Therapy Management.  He was referred to me from Dr. Hart.     Chief Complaint: Follow up from our visit on 5/7.    Tobacco: No tobacco use  Alcohol: not currently using    Medication Adherence/Access:  no issues reported    Diabetes:  Pt currently taking metformin ER 500mg- 2 BID, Pioglitazone 45mg daily and basaglar 12 units in the AM. Pt is not experiencing side effects. Doing well with the injection, denies issues.   Stopped Glipizide 5mg with the start of insulin per PCP.  SMBG: one time daily.   Ranges (patient reported):  Fasting (oldest to newest): 106, 127, 80, 141, 97 (160 after eating), 158  Patient is not experiencing hypoglycemia.   Recent symptoms of high blood sugar? none  Eye exam: up to date  Foot exam: up to date  Pt is not taking an ACEi/ARB at this time- hx of lithium toxicity (just off this in Jan 2018).  Aspirin: Taking 81mg daily and denies side effects  Meals:   9am breakfast,   12 lunch  5pm dinner, meat salad usually  Sometimes a snack- fruit  Weight continues to decline and he is bothered by the thoughts around being \"thin\" and having high sugars. He doesn't know what to do to stop loosing weight because if he eats \"my sugars go high\".     Schizoaffective disorder: Sees psychiatry. Currently on venlafaxine 225mg daily (down from 300mg before), trazodone 50mg daily (trying to get off this, by not using every night) and risperidone 3mg daily. Denies issues at this time, feels his mood and mental health has been doing well. No longer on the lithium since January and feels he is doing well. 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   Sees therapist monthly.     Current labs include:  Today's Vitals: There were no vitals taken for this visit.  BP Readings from Last 3 Encounters:   04/30/18 136/80   04/17/18 128/62   12/27/17 118/58       ASSESSMENT:              Current " medications were reviewed today as discussed above.      Medication Adherence: good, no issues identified    Diabetes: Needs Improvement. Patient is not meeting A1c goal of < 7%. Self monitoring of blood glucose is not at goal of fasting  mg/dL. Pt would benefit from ongoing efforts to watch diet, but need to increase calories and protein as he continues to loose weight. Has referral for [facility] to discuss meal options as well or other services he might qualify for to help him manage his diet better on his own.     Schizoaffective disorder: Stable, continued work with psychiatry.      PLAN:                  1. Diet higher in protein and healthy fats encouraged. Discussed options.     2. Patient to call Kaylen IVEY back regarding other supports.     I spent 15 minutes with this patient today. All changes were made via collaborative practice agreement with Annie Hart. A copy of the visit note was provided to the patient's primary care provider.     Will follow up in 1 week per patient request- likely need CDE nutritionist referral next week.    The patient declined a summary of these recommendations as an after visit summary.    Aliyah Angeles, Pharm.D, BCACP  Medication Therapy Management Pharmacist  [phone]

## 2018-05-14 NOTE — PROGRESS NOTES
LONI connected with patient and he is open to CDE RD referral, will place that now to have patient get scheduled.    Aliyah Angeles, Pharm.D, [facility]  Medication Therapy Management Pharmacist  [phone]

## 2018-05-14 NOTE — PROGRESS NOTES
"Clinic Care Coordination Contact    Clinic Care Coordination Contact  OUTREACH    Referral Information:  Referral Source: Self-patient/Caregiver    Primary Diagnosis: Psychosocial    Chief Complaint   Patient presents with     Clinic Care Coordination - Follow-up     Mental Health      [place] Utilization: No concerns  Utilization    Last refreshed: 5/14/2018  2:22 PM:  No Show Count (past year) 0       Last refreshed: 5/14/2018  2:22 PM:  ED visits 0       Last refreshed: 5/14/2018  2:22 PM:  Hospital admissions 0          Current as of: 5/14/2018  2:22 PM         Clinical Concerns:  Current Medical Concerns: Pt is a new diabetic and is having a difficult time adjusting to recent dietary changes. Pt is working with MTM on medication management and dietary recommendations. Pt has recently had a significant loss of weight and is interested in meal delivery. Discussed waiver services with the Critical access hospital and the various options that are available. Pt unfortunately can not afford meal delivery without Critical access hospital assistance. Pt reports that he contacted the Critical access hospital and should have an assessment completed in 4-5 weeks.  Current Behavioral Concerns: Loss of weight-discussed diabetic educator and pt is agreeable.    Education Provided to patient: Diabetic Educator, JANNA, HORACIO kelly, Meal delivery   Health Maintenance Reviewed: Due/Overdue     Living Situation:  Current living arrangement:: I live alone- pt reports that he is interested in moving. Pt has been looking at locations in [place] and has three apartment buildings in mind. Discussed [facility] services and how they can assist with housing search if needed.  Type of residence:: Apartment-subsidized housing    Diet/Exercise/Sleep:  Diet:: Regular-pt reports that he \"is tired of cooking\" pt has recently lost weight and unsure of the cause will refer to diabetic educator to review dietary reccomendations    Transportation:  Transportation concerns: No  Transportation means:: Regular " "car     Psychosocial:  Mental health DX:: Yes  Mental health DX how managed:: Medication, Outpatient Counseling, Psychiatry  Mental health management concerns: Yes- Pt reports that people in the building are bullying him about his recent weight loss. Pt reports people in the building have said, \"I should come over and shove food in you throat\" \"what do you have cancer.\" \"Are you anorexic\" Discussed coping skills and support options. Pt plans to meet with his counselor this week encouraged pt to discuss these interactions with her in order to gain coping skills or get insight on a good way to respond. Contacted [facility] who reports that they will contact pt in the next two days to set-up an intake appointment for [facility] services.  Informal Support system:: Family  Financial/Insurance: Medicare/Medical Assistance     Goals:   Goals        General    Psychosocial (pt-stated)     Notes - Note created  5/3/2018  4:09 PM by Kaylen Reynoso LSW    Goal Statement: I will set-up a [employer] assessment to see if I qualify for waiver services to support me in the community by 8/2018.  [place]  Supportive Steps to Achieve: Call [facility] sent e-mail with info  Barriers: feeling tired,worn out  Strengths: asked for assistance,feels comfortable alling  Date to Achieve By: 8/2018            Patient/Caregiver understanding: Pt reports understanding and denies any additional questions or concerns at this times.  CC engaged in A[employer] communication during encounter.    Outreach Frequency: monthly  Future Appointments              In 1 week Aliyah Angeles RPH [facility]    In 3 weeks Nam Kessler MD [facility], [facility]    In 2 months Annie Hart MD [facility], [facility]          Plan: Pt will meet with diabetic educator about dietary choices.  CC to follow-up in 2 weeks.    Kaylen Reynoso, " [facility]  Clinic Care Coordinator  [phone]  [email or web address]

## 2018-05-15 ASSESSMENT — PATIENT HEALTH QUESTIONNAIRE - PHQ9: SUM OF ALL RESPONSES TO PHQ QUESTIONS 1-9: 6

## 2018-05-21 ENCOUNTER — TELEPHONE (OUTPATIENT)
Dept: PSYCHIATRY | Facility: CLINIC | Age: 58
End: 2018-05-21

## 2018-05-21 ENCOUNTER — ALLIED HEALTH/NURSE VISIT (OUTPATIENT)
Dept: PHARMACY | Facility: PHYSICIAN GROUP | Age: 58
End: 2018-05-21
Payer: MEDICAID

## 2018-05-21 DIAGNOSIS — E11.3393 TYPE 2 DIABETES MELLITUS WITH BOTH EYES AFFECTED BY MODERATE NONPROLIFERATIVE RETINOPATHY WITHOUT MACULAR EDEMA, WITHOUT LONG-TERM CURRENT USE OF INSULIN (H): Primary | ICD-10-CM

## 2018-05-21 PROCEDURE — 99606 MTMS BY PHARM EST 15 MIN: CPT | Performed by: PHARMACIST

## 2018-05-21 NOTE — PROGRESS NOTES
SUBJECTIVE/OBJECTIVE:                Caden Bush is a 58 year old male called for a follow-up visit for Medication Therapy Management.  He was referred to me from Dr. Hart.     Chief Complaint: Follow up from our visit on 5/14.    Tobacco: No tobacco use  Alcohol: not currently using    Medication Adherence/Access:  no issues reported    Diabetes:  Pt currently taking metformin ER 500mg- 2 BID, Pioglitazone 45mg daily and basaglar 12 units in the AM. Pt is not experiencing side effects. Doing well with the injection, denies issues.   Stopped Glipizide 5mg with the start of insulin per PCP. Continues to worry about what to eat. CDE referral placed last visit, he has not gotten a call on this yet.   SMBG: one time daily. Ranges (patient reported):  Fasting (oldest to newest):   61, 205, 93, 134, 110, 136, 144  Had one low blood sugar in last week.   Recent symptoms of high blood sugar? none  Eye exam: up to date  Foot exam: up to date  Pt is not taking an ACEi/ARB at this time- hx of lithium toxicity (just off this in Jan 2018).  Aspirin: Taking 81mg daily and denies side effects  Meals:   9am breakfast,   12 lunch  5pm dinner, meat salad usually  Sometimes a snack- fruit      ASSESSMENT:              Current medications were reviewed today as discussed above.      Medication Adherence: good, no issues identified    Diabetes: Needs Improvement. Patient is not meeting A1c goal of < 7%. Pt would benefit from CDE visit to help with meal planning and diet choices- number given to patient to call and set up. Continue current medication doses.      PLAN:                  1. Number given for CDE referral    2. No changes at this time.     I spent 10 mins with this patient today. All changes were made via collaborative practice agreement with Annie Hart. A copy of the visit note was provided to the patient's primary care provider.     Will follow up in 2 weeks.    The patient declined a summary of these  recommendations as an after visit summary.    Aliyah Angeles, Pharm.D, [facility]  Medication Therapy Management Pharmacist  [phone]

## 2018-05-21 NOTE — MR AVS SNAPSHOT
After Visit Summary   5/21/2018    Caden Bush    MRN: [id number]           Patient Information     Date Of Birth          1960        Visit Information        Provider Department      5/21/2018 1:30 PM Aliyah Angeles [facility]        Today's Diagnoses     Type 2 diabetes mellitus with both eyes affected by moderate nonproliferative retinopathy without macular edema, without long-term current use of insulin (H)    -  1       Follow-ups after your visit        Your next 10 appointments already scheduled     Jun 04, 2018  2:00 PM CDT   TELEMEDICINE with Aliyah Angeles RPH   [facility] ([facility]    [address] [place] [address] [phone]           Note: this is not an onsite visit; there is no need to come to the facility.            Jun 07, 2018 10:15 AM CDT   Adult Med Follow UP with Nam Kessler MD   Psychiatry Clinic ([facility])    [place] [id number]  [address] [place] [address] [phone]            Jul 17, 2018 10:00 AM CDT   Office Visit with Annie Hart MD   [facility]    [address] [place] [address] [phone]           Bring a current list of meds and any records pertaining to this visit. For Physicals, please bring immunization records and any forms needing to be filled out. Please arrive 10 minutes early to complete paperwork.              Who to contact     If you have questions or need follow up information about today's clinic visit or your schedule please contact [facility] directly at [phone].  Normal or non-critical lab and imaging results will be communicated to you by MyChart, letter or phone within 4 business days after the clinic has received the results. If you do not hear from us within 7 days, please contact the clinic through MyChart or phone.  If you have a critical or abnormal lab result, we will notify you by phone as soon as possible.  Submit refill requests through [employer] or call your pharmacy and they will forward the refill request to us. Please allow 3 business days for your refill to be completed.          Additional Information About Your Visit        [employer] Information     [employer] gives you secure access to your electronic health record. If you see a primary care provider, you can also send messages to your care team and make appointments. If you have questions, please call your primary care clinic.  If you do not have a primary care provider, please call [phone] and they will assist you.        Care EveryWhere ID     This is your Care EveryWhere ID. This could be used by other organizations to access your [facility] medical records  [id number]         Blood Pressure from Last 3 Encounters:   04/30/18 136/80   04/17/18 128/62   12/27/17 118/58    Weight from Last 3 Encounters:   04/17/18 142 lb (64.4 kg)   12/27/17 154 lb (69.9 kg)   06/07/17 164 lb (74.4 kg)              Today, you had the following     No orders found for display         Today's Medication Changes          These changes are accurate as of 5/21/18 11:59 PM.  If you have any questions, ask your nurse or doctor.               These medicines have changed or have updated prescriptions.        Dose/Directions    BASAGLAR 100 UNIT/ML injection   This may have changed:    - how much to take  - additional instructions   Used for:  Type 2 diabetes mellitus with both eyes affected by moderate nonproliferative retinopathy without macular edema, without long-term current use of insulin (H)        Dose:  10 Units   Inject 10 Units Subcutaneous daily We will make further adjustments as we see your response   Quantity:  15 mL   Refills:  1       traZODone 50 MG tablet   Commonly known as:  DESYREL   This may have changed:  how much to take   Used for:  Schizoaffective disorder,  bipolar type (H)        Dose:  50 mg   Take 1 tablet (50 mg) by mouth At Bedtime   Quantity:  30 tablet   Refills:  2                Primary Care Provider Office Phone # Fax #    Annie Jeniffer Hart [phone] [address] [place] [address]        Goals        General    Psychosocial (pt-stated)     Notes - Note created  5/3/2018  4:09 PM by Kaylen Reynoso LSW    Goal Statement: [place] to see if I qualify for waiver services to support me in the community by 8/2018.  [place] of [place]  Supportive Steps to Achieve: Call [facility] sent e-mail with info  Barriers: feeling tired,worn out  Strengths: asked for assistance,feels comfortable alling  Date to Achieve By: 8/2018          Equal Access to Services     ERICA URIAS : Hadii ethan briones hadasho Soomaali, waaxda luqadaha, qaybta kaalmada aderocíoyada, marcia diaz . So [facility] [phone].    ATENCIÓN: Si habla español, tiene a magaña disposición servicios gratuitos de asistencia lingüística. Llame al [phone].    We comply with applicable federal civil rights laws and [place] laws. We do not discriminate on the basis of race, color, national origin, age, disability, sex, sexual orientation, or gender identity.            Thank you!     Thank you for choosing [facility]  for your care. Our goal is always to provide you with excellent care. Hearing back from our patients is one way we can continue to improve our services. Please take a few minutes to complete the written survey that you may receive in the mail after your visit with us. Thank you!             Your Updated Medication List - Protect others around you: Learn how to safely use, store and throw away your medicines at www.disposemymeds.org.          This list is accurate as of 5/21/18 11:59 PM.  Always use your most recent med list.                    Brand Name Dispense Instructions for use Diagnosis    ACE/ARB/ARNI NOT PRESCRIBED (INTENTIONAL)      ACE & ARB not prescribed due to Risk for drug interaction- lithium toxicity in the past        aspirin 81 MG EC tablet      Take 81 mg by mouth daily.        BASAGLAR 100 UNIT/ML injection     15 mL    Inject 10 Units Subcutaneous daily We will make further adjustments as we see your response    Type 2 diabetes mellitus with both eyes affected by moderate nonproliferative retinopathy without macular edema, without long-term current use of insulin (H)       blood glucose monitoring test strip    ONETOUCH ULTRA    100 each    TEST ONCE DAILY AS DIRECTED    Encounter for medication refill       cholecalciferol 1000 UNIT tablet    vitamin D3     Take 1 tablet by mouth daily.        FISH OIL      1 capsule daily        insulin pen needle 32G X 4 MM    BD BRENDAN U/F    100 each    Use 1 daily as directed.    Type 2 diabetes mellitus with both eyes affected by moderate nonproliferative retinopathy without macular edema, without long-term current use of insulin (H)       metFORMIN 500 MG 24 hr tablet    GLUCOPHAGE-XR    360 tablet    TAKE 2 TABLETS BY MOUTH TWICE DAILY    Encounter for medication refill       pioglitazone 45 MG tablet    ACTOS    90 tablet    TAKE 1 TABLET BY MOUTH DAILY    Encounter for medication refill       risperiDONE 3 MG tablet    risperDAL    30 tablet    Take 1 tablet (3 mg) by mouth At Bedtime    Schizoaffective disorder, bipolar type (H)       simvastatin 40 MG tablet    ZOCOR    90 tablet    TAKE ONE TABLET BY MOUTH EVERY NIGHT AT BEDTIME    Encounter for medication refill       traZODone 50 MG tablet    DESYREL    30 tablet    Take 1 tablet (50 mg) by mouth At Bedtime    Schizoaffective disorder, bipolar type (H)       * venlafaxine 75 MG 24 hr capsule    EFFEXOR XR    30 capsule    Take 1 capsule (75 mg) by mouth daily Please take with Effexor 150 mg for a total dose of 225 mg daily     Schizoaffective disorder, bipolar type (H)       * venlafaxine 150 MG 24 hr capsule    EFFEXOR XR    30 capsule    Take 1 capsule (150 mg) by mouth daily Along with a 75 mg for a total of 225 mg daily dose.    Schizoaffective disorder, bipolar type (H)       * Notice:  This list has 2 medication(s) that are the same as other medications prescribed for you. Read the directions carefully, and ask your doctor or other care provider to review them with you.

## 2018-05-21 NOTE — TELEPHONE ENCOUNTER
"Social Work   Incoming/Outgoing Call  [facility]    Outgoing Call To: Caden Bush    Reason for Call:  Requested by patient's provider, Dr. Kessler. Patient is looking for volunteering, /room mate, CM, Housing .    Response/Plan:  SW introduced self and reason for call. He is most interested in housekeeping services. He reports that he has to clean out his storage closet and he often leaves mail sitting unopened on his tables. He has a CADI waiver assessment scheduled for this Wednesday.    SW checked in about volunteering and offered ideas of both within his senior living building and at a local CSP, which could also provide additional support and activities. 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 He has been to [facility], a [facility] in [place] and near his home, and states it is \"kind of depressing\" as people sit around all day and sleep. Patient likely not eligible for case management services due to long term stability and no recent hospitalizations or likely crisis visits.    Patient has appointment with Dr. Kessler on 6/7/18. LONI will check on status of waiver assessment at appointment and identify next steps. Caden may be interested in Critical access hospital referral if waiver is not approved. SW provided name and contact information.      Will route to patient's current psychiatric provider(s) as an FYI.   Please call or EPIC message with any questions or concerns.    STERLING Greer, [facility]  [phone]      "

## 2018-06-04 ENCOUNTER — ALLIED HEALTH/NURSE VISIT (OUTPATIENT)
Dept: PHARMACY | Facility: PHYSICIAN GROUP | Age: 58
End: 2018-06-04
Payer: MEDICAID

## 2018-06-04 DIAGNOSIS — E11.3393 TYPE 2 DIABETES MELLITUS WITH BOTH EYES AFFECTED BY MODERATE NONPROLIFERATIVE RETINOPATHY WITHOUT MACULAR EDEMA, WITHOUT LONG-TERM CURRENT USE OF INSULIN (H): Primary | ICD-10-CM

## 2018-06-04 PROCEDURE — 99606 MTMS BY PHARM EST 15 MIN: CPT | Performed by: PHARMACIST

## 2018-06-04 NOTE — MR AVS SNAPSHOT
After Visit Summary   6/4/2018    Caden Bush    MRN: [id number]           Patient Information     Date Of Birth          1960        Visit Information        Provider Department      6/4/2018 2:00 PM Aliyah Angeles RPH [facility]        Today's Diagnoses     Type 2 diabetes mellitus with both eyes affected by moderate nonproliferative retinopathy without macular edema, without long-term current use of insulin (H)    -  1       Follow-ups after your visit        Your next 10 appointments already scheduled     Jun 07, 2018 10:15 AM CDT   Adult Med Follow UP with Nam Kessler MD   Psychiatry Clinic ([facility])    [address]  [address] [id number] [phone]            Jun 25, 2018  2:00 PM CDT   TELEMEDICINE with Aliyah Angeles RPH   [facility] ([facility])    [address] [place] [address] [phone]           Note: this is not an onsite visit; there is no need to come to the facility.            Jul 17, 2018 10:00 AM CDT   Office Visit with Annie Hart MD   [facility] ([facility])    [address] [place] [address] [phone]           Bring a current list of meds and any records pertaining to this visit. For Physicals, please bring immunization records and any forms needing to be filled out. Please arrive 10 minutes early to complete paperwork.              Who to contact     If you have questions or need follow up information about today's clinic visit or your schedule please contact [facility] directly at [phone].  Normal or non-critical lab and imaging results will be communicated to you by MyChart, letter or phone within 4 business days after the clinic has received the results. If you do not hear from us within 7 days, please contact the clinic through MyChart or phone.  If you have a critical or abnormal lab result, we will notify you by phone as soon as possible.  Submit refill requests through [employer] or call your pharmacy and they will forward the refill request to us. Please allow 3 business days for your refill to be completed.          Additional Information About Your Visit        [employer] Information     [employer] gives you secure access to your electronic health record. If you see a primary care provider, you can also send messages to your care team and make appointments. If you have questions, please call your primary care clinic.  If you do not have a primary care provider, please call [phone] and they will assist you.        Care EveryWhere ID     This is your Care EveryWhere ID. This could be used by other organizations to access your [place] medical records  [id number]         Blood Pressure from Last 3 Encounters:   04/30/18 136/80   04/17/18 128/62   12/27/17 118/58    Weight from Last 3 Encounters:   04/17/18 142 lb (64.4 kg)   12/27/17 154 lb (69.9 kg)   06/07/17 164 lb (74.4 kg)              Today, you had the following     No orders found for display         Today's Medication Changes          These changes are accurate as of 6/4/18  2:22 PM.  If you have any questions, ask your nurse or doctor.               These medicines have changed or have updated prescriptions.        Dose/Directions    BASAGLAR 100 UNIT/ML injection   This may have changed:    - how much to take  - additional instructions   Used for:  Type 2 diabetes mellitus with both eyes affected by moderate nonproliferative retinopathy without macular edema, without long-term current use of insulin (H)        Dose:  10 Units   Inject 10 Units Subcutaneous daily We will make further adjustments as we see your response   Quantity:  15 mL   Refills:  1       traZODone 50 MG tablet   Commonly known as:  DESYREL   This may have changed:  how much to take   Used for:  Schizoaffective disorder, bipolar  type (H)        Dose:  50 mg   Take 1 tablet (50 mg) by mouth At Bedtime   Quantity:  30 tablet   Refills:  2                Primary Care Provider Office Phone # Fax #    Annie Jeniffer Hart [phone] [address] [place] [address]        Goals        General    Psychosocial (pt-stated)     Notes - Note created  5/3/2018  4:09 PM by Kaylen Reynoso LSW    Goal Statement: [place] if [place] qualify for waiver services to support me in the community by 8/2018.  [place] of [place]  Supportive Steps to Achieve: Call [facility] sent e-mail with info  Barriers: feeling tired,worn out  Strengths: asked for assistance,feels comfortable alling  Date to Achieve By: 8/2018          Equal Access to Services     ERICA URIAS : Hadii ethan briones hadasho Soomaali, waaxda luqadaha, qaybta kaalmada aderocíoyada, marcia diaz . So [facility] [phone].    ATENCIÓN: Si habla español, tiene a magaña disposición servicios gratuitos de asistencia lingüística. Llame al [phone].    We comply with applicable federal civil rights laws and [place] laws. We do not discriminate on the basis of race, color, national origin, age, disability, sex, sexual orientation, or gender identity.            Thank you!     Thank you for choosing [facility]  for your care. Our goal is always to provide you with excellent care. Hearing back from our patients is one way we can continue to improve our services. Please take a few minutes to complete the written survey that you may receive in the mail after your visit with us. Thank you!             Your Updated Medication List - Protect others around you: Learn how to safely use, store and throw away your medicines at www.disposemymeds.org.          This list is accurate as of 6/4/18  2:22 PM.  Always use your most recent med list.                   Brand  Name Dispense Instructions for use Diagnosis    ACE/ARB/ARNI NOT PRESCRIBED (INTENTIONAL)      ACE & ARB not prescribed due to Risk for drug interaction- lithium toxicity in the past        aspirin 81 MG EC tablet      Take 81 mg by mouth daily.        BASAGLAR 100 UNIT/ML injection     15 mL    Inject 10 Units Subcutaneous daily We will make further adjustments as we see your response    Type 2 diabetes mellitus with both eyes affected by moderate nonproliferative retinopathy without macular edema, without long-term current use of insulin (H)       blood glucose monitoring test strip    ONETOUCH ULTRA    100 each    TEST ONCE DAILY AS DIRECTED    Encounter for medication refill       cholecalciferol 1000 UNIT tablet    vitamin D3     Take 1 tablet by mouth daily.        FISH OIL      1 capsule daily        insulin pen needle 32G X 4 MM    BD BRENDAN U/F    100 each    Use 1 daily as directed.    Type 2 diabetes mellitus with both eyes affected by moderate nonproliferative retinopathy without macular edema, without long-term current use of insulin (H)       metFORMIN 500 MG 24 hr tablet    GLUCOPHAGE-XR    360 tablet    TAKE 2 TABLETS BY MOUTH TWICE DAILY    Encounter for medication refill       pioglitazone 45 MG tablet    ACTOS    90 tablet    TAKE 1 TABLET BY MOUTH DAILY    Encounter for medication refill       risperiDONE 3 MG tablet    risperDAL    30 tablet    Take 1 tablet (3 mg) by mouth At Bedtime    Schizoaffective disorder, bipolar type (H)       simvastatin 40 MG tablet    ZOCOR    90 tablet    TAKE ONE TABLET BY MOUTH EVERY NIGHT AT BEDTIME    Encounter for medication refill       traZODone 50 MG tablet    DESYREL    30 tablet    Take 1 tablet (50 mg) by mouth At Bedtime    Schizoaffective disorder, bipolar type (H)       * venlafaxine 75 MG 24 hr capsule    EFFEXOR XR    30 capsule    Take 1 capsule (75 mg) by mouth daily Please take with Effexor 150 mg for a total dose of 225 mg daily    Schizoaffective  disorder, bipolar type (H)       * venlafaxine 150 MG 24 hr capsule    EFFEXOR XR    30 capsule    Take 1 capsule (150 mg) by mouth daily Along with a 75 mg for a total of 225 mg daily dose.    Schizoaffective disorder, bipolar type (H)       * Notice:  This list has 2 medication(s) that are the same as other medications prescribed for you. Read the directions carefully, and ask your doctor or other care provider to review them with you.

## 2018-06-04 NOTE — PROGRESS NOTES
SUBJECTIVE/OBJECTIVE:                Caden Bush is a 58 year old male called for a follow-up visit for Medication Therapy Management.  He was referred to me from Dr. Hart.     Chief Complaint: Follow up from our visit on 5/21.  Patient requests close follow up on sugars to help stay on track.     Tobacco: No tobacco use  Alcohol: not currently using    Medication Adherence/Access:  no issues reported    Diabetes:  Pt currently taking metformin ER 500mg- 2 BID, Pioglitazone 45mg daily and basaglar 12 units in the AM. Pt is not experiencing side effects. Doing well with the injection, denies issues.   Stopped Glipizide 5mg with the start of insulin per PCP. Continues to worry about what to eat. CDE referral placed last visit, he has not gotten a call on this yet.   SMBG: one time daily. Ranges (patient reported):  Fasting Newest to oldest:  126, 97, 82, 99, 103, 121, 92, 100, 95, 114, 89, 107, 115, 107, 125, 100,    Recent symptoms of high blood sugar? none  Eye exam: up to date  Foot exam: up to date  Pt is not taking an ACEi/ARB at this time- hx of lithium toxicity (just off this in Jan 2018).  Aspirin: Taking 81mg daily and denies side effects    Lab Results   Component Value Date    A1C 8.2 03/19/2018   .    ASSESSMENT:              Current medications were reviewed today as discussed above.      Medication Adherence: good, no issues identified    Diabetes: Stable. Patient is not meeting A1c goal of < 7%. Self monitoring of blood glucose is at goal of fasting  mg/dL. Pt would benefit from checking occasionally 2 hours after dinner to check for post-prandial spikes.      PLAN:                  1. Start checking post-prandial readings.     I spent 15 minutes with this patient today. All changes were made via collaborative practice agreement with Annie Hart. A copy of the visit note was provided to the patient's primary care provider.     Will follow up in 3 weeks, has a1c scheduled for 7/17 with  PCP.    The patient declined a summary of these recommendations as an after visit summary.    Aliyah Angeles, Pharm.D, [facility]  Medication Therapy Management Pharmacist  [phone]

## 2018-06-05 ENCOUNTER — TELEPHONE (OUTPATIENT)
Dept: PSYCHIATRY | Facility: CLINIC | Age: 58
End: 2018-06-05

## 2018-06-05 ENCOUNTER — PATIENT OUTREACH (OUTPATIENT)
Dept: CARE COORDINATION | Facility: CLINIC | Age: 58
End: 2018-06-05

## 2018-06-06 ASSESSMENT — ACTIVITIES OF DAILY LIVING (ADL): DEPENDENT_IADLS:: TRANSPORTATION

## 2018-06-06 NOTE — PROGRESS NOTES
Clinic Care Coordination Contact    Pt calling to report that he wasn't able to get a hold of the diabetic education scheduling line. [facility] called the number provided and was able to get a hold of someone and requested they contact the patient to schedule. Updated pt that someone will call him today to schedule.  Contacted [facility] to inquire about hold up with [facility].    Kaylen Reynoso SAYRA  Clinic Care Coordinator  [phone]  [email or web address]

## 2018-06-06 NOTE — PROGRESS NOTES
Clinic Care Coordination Contact  Clinic Care Coordination Contact  OUTREACH    Referral Information:  Referral Source: Self-patient/Caregiver    Primary Diagnosis: Psychosocial    Chief Complaint   Patient presents with     Clinic Care Coordination - Follow-up     Check-in      Clinical Concerns:  Current Medical Concerns:  Pt has been working with [facility] to manage Type 2 diabetes. MTM had placed a referral for Diabetic Education in May and hasn't heard from them yet. Provided pt with the number for scheduling and pt will call to set up an appointment.     Health Maintenance Reviewed: Due/Overdue     Medication Management:  Pt reports adherence and denies any concerns     Functional Status:  Dependent ADLs:: Ambulation-no assistive device  Dependent IADLs:: Transportation  Bed or wheelchair confined:: No  Mobility Status: Independent    Living Situation:  Current living arrangement:: I live alone  Type of residence:: Apartment    Diet/Exercise/Sleep:  Diet:: Regular  Inadequate nutrition: Yes- pt has experienced a recent weight loss and was calling today to ask for [employer] contact number to set up meals. Explained the process of CADI and how meals are paid for. Pt will not be able to set meals up on his own. Pt will have to wait until he is assigned a  and a budget. -Pt voiced understanding.  Exercise:: Currently not exercising    Transportation:  Transportation concerns: No  Transportation means:: Regular car     Psychosocial:  Mental health DX:: Yes  Mental health DX how managed:: Medication, Outpatient Counseling, Psychiatrist  Mental health management concern: Yes- Pt reports that he was screened by [facility] and is still waiting to hear back from them about being assigned a worker. Pt reports that a friend recently passed away and he hasn't heard much about it. Pt is having a difficult time coping with the loss. Discussed various support options. Pt has good support with family  and has a counseling apt next Wednesday. [facility] and pt will reassess during next outreach.   Informal Support system:: Family  Financial/Insurance: Pt has Medicare and Medical Assistance. Pt reports that he was recently screened for a CADI waiver, but hasn't heard if he has been approved yet.      Resources and Interventions:  Current Resources: No formal supports. Pt is in the process of getting a CADI waiver and an [facility] worker.  Equipment Currently Used at Home: none, grab bar     Goals:   Goals        General    Psychosocial (pt-stated)     Notes - Note created  5/3/2018  4:09 PM by Kaylen Reynoso LSW    Goal Statement: [place] qualify for waiver services to support me in the community by 8/2018.  [place] of [place]  Supportive Steps to Achieve: Call [facility]- [facility] sent e-mail with info  Barriers: feeling tired,worn out  Strengths: asked for assistance,feels comfortable alling  Date to Achieve By: 8/2018            Patient/Caregiver understanding: Pt reports understanding and denies any additional questions or concerns at this times. [facility] engaged in AIDET communication during encounter.  Outreach Frequency: monthly  Future Appointments              Tomorrow Nam Kessler MD Psychiatry Clinic, [facility]    In 2 weeks Aliyah Angeles RPH [facility], [facility]    In 1 month Annie Hart MD [facility], [facility]          Plan: Pt will call if he has difficulty with any of the services providers. [facility] will follow-up in 4 weeks to ensure the start of services.    NATALIE Nichols  Clinic Care Coordinator  [phone]  [email or web address]

## 2018-06-07 ENCOUNTER — OFFICE VISIT (OUTPATIENT)
Dept: PSYCHIATRY | Facility: CLINIC | Age: 58
End: 2018-06-07
Attending: PSYCHIATRY & NEUROLOGY
Payer: MEDICARE

## 2018-06-07 ENCOUNTER — ALLIED HEALTH/NURSE VISIT (OUTPATIENT)
Dept: PSYCHIATRY | Facility: CLINIC | Age: 58
End: 2018-06-07
Payer: MEDICARE

## 2018-06-07 VITALS
DIASTOLIC BLOOD PRESSURE: 70 MMHG | HEART RATE: 85 BPM | BODY MASS INDEX: 19.89 KG/M2 | SYSTOLIC BLOOD PRESSURE: 113 MMHG | WEIGHT: 140.6 LBS

## 2018-06-07 DIAGNOSIS — Z71.89 COUNSELING AND COORDINATION OF CARE: Primary | ICD-10-CM

## 2018-06-07 DIAGNOSIS — F33.1 MODERATE EPISODE OF RECURRENT MAJOR DEPRESSIVE DISORDER (H): Primary | ICD-10-CM

## 2018-06-07 DIAGNOSIS — F25.0 SCHIZOAFFECTIVE DISORDER, BIPOLAR TYPE (H): ICD-10-CM

## 2018-06-07 PROCEDURE — G0463 HOSPITAL OUTPT CLINIC VISIT: HCPCS | Mod: ZF

## 2018-06-07 RX ORDER — VENLAFAXINE HYDROCHLORIDE 150 MG/1
150 CAPSULE, EXTENDED RELEASE ORAL DAILY
Qty: 30 CAPSULE | Refills: 2 | Status: SHIPPED | OUTPATIENT
Start: 2018-06-07 | End: 2018-09-27

## 2018-06-07 RX ORDER — RISPERIDONE 2 MG/1
2 TABLET ORAL AT BEDTIME
Qty: 30 TABLET | Refills: 1 | Status: SHIPPED | OUTPATIENT
Start: 2018-06-07 | End: 2018-07-13

## 2018-06-07 RX ORDER — TRAZODONE HYDROCHLORIDE 50 MG/1
50 TABLET, FILM COATED ORAL
Qty: 30 TABLET | Refills: 2 | Status: SHIPPED | OUTPATIENT
Start: 2018-06-07 | End: 2019-02-18

## 2018-06-07 ASSESSMENT — PAIN SCALES - GENERAL: PAINLEVEL: NO PAIN (0)

## 2018-06-07 NOTE — MR AVS SNAPSHOT
After Visit Summary   6/7/2018    Caden Bush    MRN: [id number]           Patient Information     Date Of Birth          1960        Visit Information        Provider Department      6/7/2018 10:15 AM Nam Kessler MD Psychiatry Clinic        Care Instructions    Thank you for coming to the PSYCHIATRY CLINIC.    Lab Testing:  If you had lab testing today and your results are reassuring or normal they will be mailed to you or sent through WishLink within 7 days.   If the lab tests need quick action we will call you with the results.  The phone number we will call with results is # [phone] (home) . If this is not the best number please call our clinic and change the number.    Medication Refills:  If you need any refills please call your pharmacy and they will contact us. Our fax number for refills is [phone]. Please allow three business for refill processing.   If you need to  your refill at a new pharmacy, please contact the new pharmacy directly. The new pharmacy will help you get your medications transferred.     Scheduling:  If you have any concerns about today's visit or wish to schedule another appointment please call our office during normal business hours [phone] (8-5:00 M-F)    Contact Us:  Please call [phone] during business hours (8-5:00 M-F).  If after clinic hours, or on the weekend, please call  [phone].    Financial Assistance [phone]  [facility] [phone]  [facility] [phone]  Medical Records [phone]      MENTAL HEALTH CRISIS NUMBERS:  [facility]:   [facility] - [phone]   Crisis Residence [facility] - [phone]   Walk-In Counseling Center [facility] - [phone]   COPE 24/7 [facility] Mobile Team for Adults - [[phone]]; Child - [[phone]]     Crisis Connection - [phone]     [facility]:   [facility] - [phone]   Walk-in counseling [facility]  [facility] - [phone]   Walk-in counseling [facility] - [phone]   Crisis Residence [facility] - [phone]   Urgent Care Adult Mental Health:   --Drop-in, 24/7 crisis line, and Pr[facility] Mobile Team [[phone]]    CRISIS TEXT LINE: Text [phone] from anywhere, anytime, any crisis 24/7;    OR SEE www.crisistextline.org     Poison Control Center - 1-863.387.2355    CHILD: [facility] needs assessment team - [phone]     Trans Lifeline - 1-761.751.6066; or [employer] Lifeline - 1-767.786.4998    If you have a medical emergency please call 911or go to the nearest ER.                    _____________________________________________    Again thank you for choosing PSYCHIATRY CLINIC and please let us know how we can best partner with you to improve you and your family's health.  You may be receiving a survey in the mail regarding this appointment. We would love to have your feedback, both positive and negative, so please fill out the survey and return it using the provided envelope. The survey is done by an external company, so your answers are anonymous.             Follow-ups after your visit        Your next 10 appointments already scheduled     Jun 19, 2018 10:30 AM CDT   Diabetic Education with  DIABETIC ED RESOURCE   [facility] ([facility])    [address] [place] [address] [phone]            Jun 25, 2018  2:00 PM CDT   TELEMEDICINE with Aliyah Angeles RPH   [facility] ([facility])    [address] [place] [address] [phone]           Note: this is not an onsite visit; there is no need to come to the facility.            Jul 13, 2018  1:10 PM CDT   Adult Med Follow UP with Natali Dang MD   Psychiatry Clinic ([facility])    [address]  [address]   [phone]            Jul 17, 2018 10:00 AM CDT   Office Visit with Annie Hart MD   [facility] ([facility])    [address] [place] [address] [phone]           Bring a current list of meds and any records pertaining to this visit. For Physicals, please bring immunization records and any forms needing to be filled out. Please arrive 10 minutes early to complete paperwork.              Who to contact     Please call your clinic at [phone] to:    Ask questions about your health    Make or cancel appointments    Discuss your medicines    Learn about your test results    Speak to your doctor            Additional Information About Your Visit        Vibes Information     Vibes gives you secure access to your electronic health record. If you see a primary care provider, you can also send messages to your care team and make appointments. If you have questions, please call your primary care clinic.  If you do not have a primary care provider, please call [phone] and they will assist you.      Vibes is an electronic gateway that provides easy, online access to your medical records. With Vibes, you can request a clinic appointment, read your test results, renew a prescription or communicate with your care team.     To access your existing account, please contact your [facility] or call [phone] for assistance.        Care EveryWhere ID     This is your Care EveryWhere ID. This could be used by other organizations to access your [place] medical records  [id number]        Your Vitals Were     Pulse BMI (Body Mass Index)                85 19.89 kg/m2           Blood Pressure from Last 3 Encounters:   06/07/18 113/70   05/10/18 112/74   04/30/18 136/80    Weight from Last 3 Encounters:   06/07/18 63.8 kg (140 lb 9.6 oz)   05/10/18 62.5 kg (137 lb 12.8 oz)   04/17/18 64.4 kg (142 lb)              Today,  you had the following     No orders found for display         Today's Medication Changes          These changes are accurate as of 6/7/18 11:08 AM.  If you have any questions, ask your nurse or doctor.               These medicines have changed or have updated prescriptions.        Dose/Directions    BASAGLAR 100 UNIT/ML injection   This may have changed:    - how much to take  - additional instructions   Used for:  Type 2 diabetes mellitus with both eyes affected by moderate nonproliferative retinopathy without macular edema, without long-term current use of insulin (H)        Dose:  10 Units   Inject 10 Units Subcutaneous daily We will make further adjustments as we see your response   Quantity:  15 mL   Refills:  1       traZODone 50 MG tablet   Commonly known as:  DESYREL   This may have changed:  how much to take   Used for:  Schizoaffective disorder, bipolar type (H)        Dose:  50 mg   Take 1 tablet (50 mg) by mouth At Bedtime   Quantity:  30 tablet   Refills:  2                Primary Care Provider Office Phone # Fax #    Annie Jeniffer Hart [phone] [address] [place] [address]        Goals        General    Psychosocial (pt-stated)     Notes - Note created  5/3/2018  4:09 PM by Kaylen Reynoso LSW    Goal Statement: [place] if [place] qualify for waiver services to support me in the community by 8/2018.  [place] of [place]  Supportive Steps to Achieve: Call [facility] sent e-mail with info  Barriers: feeling tired,worn out  Strengths: asked for assistance,feels comfortable alling  Date to Achieve By: 8/2018          Equal Access to Services     JR URIAS AH: Pat Baeza, waaxda luqadaha, qaybta kaalmada ademaribel, marcia woods. [facility] [phone].    ATENCIÓN: Si habla español, tiene a magaña disposición servicios gratuitos de asistencia  lingüística. Maricruz al [phone].    We comply with applicable federal civil rights laws and [place] laws. We do not discriminate on the basis of race, color, national origin, age, disability, sex, sexual orientation, or gender identity.            Thank you!     Thank you for choosing PSYCHIATRY CLINIC  for your care. Our goal is always to provide you with excellent care. Hearing back from our patients is one way we can continue to improve our services. Please take a few minutes to complete the written survey that you may receive in the mail after your visit with us. Thank you!             Your Updated Medication List - Protect others around you: Learn how to safely use, store and throw away your medicines at www.disposemymeds.org.          This list is accurate as of 6/7/18 11:08 AM.  Always use your most recent med list.                   Brand Name Dispense Instructions for use Diagnosis    ACE/ARB/ARNI NOT PRESCRIBED (INTENTIONAL)      ACE & ARB not prescribed due to Risk for drug interaction- lithium toxicity in the past        aspirin 81 MG EC tablet      Take 81 mg by mouth daily.        BASAGLAR 100 UNIT/ML injection     15 mL    Inject 10 Units Subcutaneous daily We will make further adjustments as we see your response    Type 2 diabetes mellitus with both eyes affected by moderate nonproliferative retinopathy without macular edema, without long-term current use of insulin (H)       blood glucose monitoring test strip    ONETOUCH ULTRA    100 each    TEST ONCE DAILY AS DIRECTED    Encounter for medication refill       cholecalciferol 1000 UNIT tablet    vitamin D3     Take 1 tablet by mouth daily.        FISH OIL      1 capsule daily        insulin pen needle 32G X 4 MM    BD BRENDAN U/F    100 each    Use 1 daily as directed.    Type 2 diabetes mellitus with both eyes affected by moderate nonproliferative retinopathy without macular edema, without long-term current use of insulin (H)       metFORMIN 500  MG 24 hr tablet    GLUCOPHAGE-XR    360 tablet    TAKE 2 TABLETS BY MOUTH TWICE DAILY    Encounter for medication refill       pioglitazone 45 MG tablet    ACTOS    90 tablet    TAKE 1 TABLET BY MOUTH DAILY    Encounter for medication refill       risperiDONE 3 MG tablet    risperDAL    30 tablet    Take 1 tablet (3 mg) by mouth At Bedtime    Schizoaffective disorder, bipolar type (H)       simvastatin 40 MG tablet    ZOCOR    90 tablet    TAKE ONE TABLET BY MOUTH EVERY NIGHT AT BEDTIME    Encounter for medication refill       traZODone 50 MG tablet    DESYREL    30 tablet    Take 1 tablet (50 mg) by mouth At Bedtime    Schizoaffective disorder, bipolar type (H)       * venlafaxine 75 MG 24 hr capsule    EFFEXOR XR    30 capsule    Take 1 capsule (75 mg) by mouth daily Please take with Effexor 150 mg for a total dose of 225 mg daily    Schizoaffective disorder, bipolar type (H)       * venlafaxine 150 MG 24 hr capsule    EFFEXOR XR    30 capsule    Take 1 capsule (150 mg) by mouth daily Along with a 75 mg for a total of 225 mg daily dose.    Schizoaffective disorder, bipolar type (H)       * Notice:  This list has 2 medication(s) that are the same as other medications prescribed for you. Read the directions carefully, and ask your doctor or other care provider to review them with you.

## 2018-06-07 NOTE — MR AVS SNAPSHOT
After Visit Summary   6/7/2018    Caden Bush    MRN: [id number]           Patient Information     Date Of Birth          1960        Visit Information        Provider Department      6/7/2018 10:30 AM Glenna Miranda [facility]        Today's Diagnoses     Counseling and coordination of care    -  1       Follow-ups after your visit        Your next 10 appointments already scheduled     Jun 19, 2018 10:30 AM CDT   Diabetic Education with  DIABETIC ED RESOURCE   [facility]    [address] [place] [address]   [phone]            Jun 25, 2018  2:00 PM CDT   TELEMEDICINE with Aliyah Angeles [facility] ([facility])    [address] [place] [address] [phone]           Note: this is not an onsite visit; there is no need to come to the facility.            Jul 13, 2018  1:10 PM CDT   Adult Med Follow UP with Natali Dang MD   Psychiatry Clinic ([facility])    [place] [id number]  [address] [address] [phone]            Jul 17, 2018 10:00 AM CDT   Office Visit with Annie Hart MD   [facility] ([facility])    [address] [place] [address] [phone]           Bring a current list of meds and any records pertaining to this visit. For Physicals, please bring immunization records and any forms needing to be filled out. Please arrive 10 minutes early to complete paperwork.              Who to contact     Please call your clinic at [phone] to:    Ask questions about your health    Make or cancel appointments    Discuss your medicines    Learn about your test results    Speak to your doctor            Additional Information About Your Visit        MyChart Information     [employer] gives you secure access to your  electronic health record. If you see a primary care provider, you can also send messages to your care team and make appointments. If you have questions, please call your primary care clinic.  If you do not have a primary care provider, please call [phone] and they will assist you.      [email or web address] is an electronic gateway that provides easy, online access to your medical records. With [email or web address], you can request a clinic appointment, read your test results, renew a prescription or communicate with your care team.     To access your existing account, please contact your [facility] or call [phone] for assistance.        Care EveryWhere ID     This is your Care EveryWhere ID. This could be used by other organizations to access your [place] medical records  [id number]         Blood Pressure from Last 3 Encounters:   06/07/18 113/70   05/10/18 112/74   04/30/18 136/80    Weight from Last 3 Encounters:   06/07/18 63.8 kg (140 lb 9.6 oz)   05/10/18 62.5 kg (137 lb 12.8 oz)   04/17/18 64.4 kg (142 lb)              Today, you had the following     No orders found for display         Today's Medication Changes          These changes are accurate as of 6/7/18 11:59 PM.  If you have any questions, ask your nurse or doctor.               These medicines have changed or have updated prescriptions.        Dose/Directions    BASAGLAR 100 UNIT/ML injection   This may have changed:    - how much to take  - additional instructions   Used for:  Type 2 diabetes mellitus with both eyes affected by moderate nonproliferative retinopathy without macular edema, without long-term current use of insulin (H)        Dose:  10 Units   Inject 10 Units Subcutaneous daily We will make further adjustments as we see your response   Quantity:  15 mL   Refills:  1       risperiDONE 2 MG tablet   Commonly known as:  risperDAL   This may have changed:    - medication strength  - how much to take   Changed by:  [place]  MD Nam        Dose:  2 mg   Take 1 tablet (2 mg) by mouth At Bedtime   Quantity:  30 tablet   Refills:  1       traZODone 50 MG tablet   Commonly known as:  DESYREL   This may have changed:    - when to take this  - reasons to take this   Used for:  Schizoaffective disorder, bipolar type (H)   Changed by:  Nam Kessler MD        Dose:  50 mg   Take 1 tablet (50 mg) by mouth nightly as needed for sleep   Quantity:  30 tablet   Refills:  2            Where to get your medicines      These medications were sent to [facility] [address] AT [facility] [address]  [address], [facility] [address]     Phone:  [phone]     risperiDONE 2 MG tablet    traZODone 50 MG tablet    venlafaxine 150 MG 24 hr capsule                Primary Care Provider Office Phone # Fax #    Annie Jeniffer Hart [phone] [address] [place] [address]        Goals        General    Psychosocial (pt-stated)     Notes - Note created  5/3/2018  4:09 PM by Kaylen Reynoso, NATALIE    Goal Statement: [place] if [place] qualify for waiver services to support me in the community by 8/2018.  [place] of [place]  Supportive Steps to Achieve: Call [facility] sent e-mail with info  Barriers: feeling tired,worn out  Strengths: asked for assistance,feels comfortable alling  Date to Achieve By: 8/2018          Equal Access to Services     ERICA URIAS AH: Hadii ethan briones hadasho Soomaali, waaxda luqadaha, qaybta kaalmada adeegyada, marcia idiin hayaan aderocío woods. So [facility] [phone].    ATENCIÓN: Si habla español, tiene a magaña disposición servicios gratuitos de asistencia lingüística. Llame al [phone].    We comply with applicable federal civil rights laws and [place] laws. We do not discriminate on the basis of race, color, national origin, age, disability, sex, sexual  orientation, or gender identity.            Thank you!     Thank you for choosing PSYCHIATRY CLINIC  for your care. Our goal is always to provide you with excellent care. Hearing back from our patients is one way we can continue to improve our services. Please take a few minutes to complete the written survey that you may receive in the mail after your visit with us. Thank you!             Your Updated Medication List - Protect others around you: Learn how to safely use, store and throw away your medicines at www.disposemymeds.org.          This list is accurate as of 6/7/18 11:59 PM.  Always use your most recent med list.                   Brand Name Dispense Instructions for use Diagnosis    ACE/ARB/ARNI NOT PRESCRIBED (INTENTIONAL)      ACE & ARB not prescribed due to Risk for drug interaction- lithium toxicity in the past        aspirin 81 MG EC tablet      Take 81 mg by mouth daily.        BASAGLAR 100 UNIT/ML injection     15 mL    Inject 10 Units Subcutaneous daily We will make further adjustments as we see your response    Type 2 diabetes mellitus with both eyes affected by moderate nonproliferative retinopathy without macular edema, without long-term current use of insulin (H)       blood glucose monitoring test strip    ONETOUCH ULTRA    100 each    TEST ONCE DAILY AS DIRECTED    Encounter for medication refill       cholecalciferol 1000 UNIT tablet    vitamin D3     Take 1 tablet by mouth daily.        FISH OIL      1 capsule daily        insulin pen needle 32G X 4 MM    BD BRENDAN U/F    100 each    Use 1 daily as directed.    Type 2 diabetes mellitus with both eyes affected by moderate nonproliferative retinopathy without macular edema, without long-term current use of insulin (H)       metFORMIN 500 MG 24 hr tablet    GLUCOPHAGE-XR    360 tablet    TAKE 2 TABLETS BY MOUTH TWICE DAILY    Encounter for medication refill       pioglitazone 45 MG tablet    ACTOS    90 tablet    TAKE 1 TABLET BY MOUTH DAILY     Encounter for medication refill       risperiDONE 2 MG tablet    risperDAL    30 tablet    Take 1 tablet (2 mg) by mouth At Bedtime        simvastatin 40 MG tablet    ZOCOR    90 tablet    TAKE ONE TABLET BY MOUTH EVERY NIGHT AT BEDTIME    Encounter for medication refill       traZODone 50 MG tablet    DESYREL    30 tablet    Take 1 tablet (50 mg) by mouth nightly as needed for sleep    Schizoaffective disorder, bipolar type (H)       * venlafaxine 75 MG 24 hr capsule    EFFEXOR XR    30 capsule    Take 1 capsule (75 mg) by mouth daily Please take with Effexor 150 mg for a total dose of 225 mg daily    Schizoaffective disorder, bipolar type (H)       * venlafaxine 150 MG 24 hr capsule    EFFEXOR XR    30 capsule    Take 1 capsule (150 mg) by mouth daily Along with a 75 mg for a total of 225 mg daily dose.    Schizoaffective disorder, bipolar type (H)       * Notice:  This list has 2 medication(s) that are the same as other medications prescribed for you. Read the directions carefully, and ask your doctor or other care provider to review them with you.

## 2018-06-07 NOTE — PATIENT INSTRUCTIONS
Thank you for coming to the PSYCHIATRY CLINIC.    Lab Testing:  If you had lab testing today and your results are reassuring or normal they will be mailed to you or sent through y prime within 7 days.   If the lab tests need quick action we will call you with the results.  The phone number we will call with results is # [phone] (home) . If this is not the best number please call our clinic and change the number.    Medication Refills:  If you need any refills please call your pharmacy and they will contact us. Our fax number for refills is [phone]. Please allow three business for refill processing.   If you need to  your refill at a new pharmacy, please contact the new pharmacy directly. The new pharmacy will help you get your medications transferred.     Scheduling:  If you have any concerns about today's visit or wish to schedule another appointment please call our office during normal business hours [phone] (8-5:00 M-F)    Contact Us:  Please call [phone] during business hours (8-5:00 M-F).  If after clinic hours, or on the weekend, please call  [phone].    Financial Assistance [phone]  [facility] [phone]  [facility] Billing [phone]  Medical Records [phone]      MENTAL HEALTH CRISIS NUMBERS:  [facility]:   [facility] - [phone]   Crisis Residence [facility] - [phone]   Walk-In Counseling [facility] [phone]   COPE 24/7 [place] Mobile Team for Adults - [[phone]]; Child - [[phone]]     Crisis Connection - [phone]     [facility]:   [facility] - [phone]   Walk-in counseling [facility] - [phone]   Walk-in counseling [facility] - [phone]   Crisis Residence [facility] - [phone]   Urgent Care Adult Mental Health:   --Drop-in, 24/7 crisis line, and [place] Mobile Team [[phone]]    CRISIS TEXT  LINE: Text [phone] from anywhere, anytime, any crisis 24/7;    OR SEE [email or web address]     Poison Control Center - [phone]    CHILD: [facility] needs assessment team - [phone]     [facility] - [phone]; or [facility] - [phone]    If you have a medical emergency please call 911or go to the nearest ER.                    _____________________________________________    Again thank you for choosing PSYCHIATRY CLINIC and please let us know how we can best partner with you to improve you and your family's health.  You may be receiving a survey in the mail regarding this appointment. We would love to have your feedback, both positive and negative, so please fill out the survey and return it using the provided envelope. The survey is done by an external company, so your answers are anonymous.

## 2018-06-08 ASSESSMENT — PATIENT HEALTH QUESTIONNAIRE - PHQ9: SUM OF ALL RESPONSES TO PHQ QUESTIONS 1-9: 6

## 2018-06-11 NOTE — PROGRESS NOTES
Social Work Consultation  [facility]      Patient Name:  Caden Bush  /Age:  1960 (58 year old)    Presenting SW Need(s)/ Reason for visit:  SW collaborated with patient via phone on support services. SW following up during meeting with provider.   Collaborated With:    -patient  -Dr. Kessler    Intervention:  Participated in part of the patient's psychiatry appt with Dr Kessler.    -Brief update on services. Caden reported that he did hear back from [employer] waiver  and he should have an answer in one to two weeks regarding services. SW provided some education on what [employer] services can provide, including ILS, meals, nurse, etc. There is some minimal case management services, but this is typically related to coordinating waiver services.  -Discussion on other possible services. Caden is not interested in community support services. He used to enjoy spending some times in the lounge area of his apartment, but the building is now using this for storage and there is no access to the pool table.       Social Work Assessment:  Caden would benefit from additional supports in the community. He would likely benefit most from waiver services, including ILS, nursing, PCA, and possible meal services. Caden would also benefit from increased interactions with others, but may have difficulty finding options to meet his needs.    Plan:  Caden will update writer after he received more information about whether is eligible for waiver services. If he is not eligible, SW will provide referrals for [facility] and possibly case management services.    Will route to patient's psychiatric provider(s) as an FYI.    Glenna Miranda, [facility]    This is a non-billable encounter as it was solely for the purposes of outreach and/or care coordination.

## 2018-06-11 NOTE — TELEPHONE ENCOUNTER
Social Work   Incoming/Outgoing Call  [facility]    Incoming From:  Caden Bush    Reason for Call:  Caden is calling because he is interested in starting Community Health services and would like writer's assistance. SW briefly reviewed notes. Caden had waiver assessment in late May. Caden has not heard back from the . SW encouraged him to call  to see if he was eligible for services. If he is not eligible, SW will assist with Community Health referral.     Of note, Caden has a care coordinator working with him through PCP that set up waiver referral.     Response/Plan:  Caden has appointment 6/7/18 in clinic. SW will follow up with him at appointment.      Will route to patient's current psychiatric provider(s) as an FYI.   Please call or EPIC message with any questions or concerns.    STERLING Greer, [facility]  [phone]

## 2018-06-15 DIAGNOSIS — Z76.0 ENCOUNTER FOR MEDICATION REFILL: ICD-10-CM

## 2018-06-15 RX ORDER — METFORMIN HCL 500 MG
TABLET, EXTENDED RELEASE 24 HR ORAL
Qty: 360 TABLET | Refills: 0 | Status: SHIPPED | OUTPATIENT
Start: 2018-06-15 | End: 2018-09-10

## 2018-06-15 RX ORDER — METFORMIN HCL 500 MG
TABLET, EXTENDED RELEASE 24 HR ORAL
Qty: 360 TABLET | Refills: 0 | Status: SHIPPED | OUTPATIENT
Start: 2018-06-15 | End: 2018-06-21

## 2018-06-15 RX ORDER — GLIPIZIDE 5 MG/1
TABLET, FILM COATED, EXTENDED RELEASE ORAL
Qty: 90 TABLET | Refills: 0 | Status: SHIPPED | OUTPATIENT
Start: 2018-06-15 | End: 2018-06-25

## 2018-06-19 ENCOUNTER — ALLIED HEALTH/NURSE VISIT (OUTPATIENT)
Dept: EDUCATION SERVICES | Facility: CLINIC | Age: 58
End: 2018-06-19
Payer: MEDICARE

## 2018-06-19 DIAGNOSIS — E11.3393 TYPE 2 DIABETES MELLITUS WITH BOTH EYES AFFECTED BY MODERATE NONPROLIFERATIVE RETINOPATHY WITHOUT MACULAR EDEMA, WITHOUT LONG-TERM CURRENT USE OF INSULIN (H): Primary | ICD-10-CM

## 2018-06-19 PROCEDURE — G0108 DIAB MANAGE TRN  PER INDIV: HCPCS

## 2018-06-19 NOTE — Clinical Note
Hi Dr. Hart,  Patient has been having frequent lows at night. Suspect this is due to very low intake of carbohydrate at dinner. We could consider decreasing his basaglar as well to air on the side of caution. Please let me know what you think and I can follow up with patient. Thank you! Ericka Mark RD, LD

## 2018-06-19 NOTE — PROGRESS NOTES
Diabetes Self Management Training: Individual Review Visit    Caden Bush presents today for education and evaluation of glucose control related to Type 2 diabetes.    He is accompanied by self    Patient's diabetes management related comments/concerns: pharmacist told him to come in to better understand how to manage diabetes with food     Patient's emotional response to diabetes: expresses readiness to learn    Patient would like this visit to be focused around the following diabetes-related behaviors and goals: Healthy Eating and Being Active    ASSESSMENT:  Patient Problem List and Family Medical History reviewed for relevant medical history, current medical status, and diabetes risk factors.    Current Diabetes Management per Patient:  Taking diabetes medications?   yes:     Diabetes Medication(s)     Biguanides Sig    metFORMIN (GLUCOPHAGE-XR) 500 MG 24 hr tablet TAKE 2 TABLETS BY MOUTH TWICE DAILY         Insulin Sig    BASAGLAR 100 UNIT/ML injection Inject 10 Units Subcutaneous daily We will make further adjustments as we see your response     Patient taking differently:  Inject 12 Units Subcutaneous daily We will make further adjustments as we see your response    Sulfonylureas Sig    glipiZIDE (GLUCOTROL XL) 5 MG 24 hr tablet TAKE 1 TABLET BY MOUTH EVERY DAY  HELD    Insulin Sensitizing Agents Sig    pioglitazone (ACTOS) 45 MG tablet TAKE 1 TABLET BY MOUTH DAILY      Glipizide is on hold with start of insulin    Do you have any difficulty affording your medications or glucose monitoring supplies?     No     *Abbreviated insulin dose documentation key: Insulin trade name (hnnogfxro-bzkog-eqyygs-bedtime) - i.e. Humalog 5-5-5-0 (Humalog 5 units at breakfast, 5 units at lunch, and 5 units at dinner).     Patient glucose self monitoring as follows: one to two time daily.   BG meter: One Touch Ultra 2 meter  BG results:        BG values are: difficult to assess - some are in goal but he is having frequent low  "BGs at night  Patient's most recent   Lab Results   Component Value Date    A1C 8.2 03/19/2018    is not meeting goal of <7.0    Nutrition:  Patient eats 3 meals per day. Snacks are usually fruit, almonds, & pistachios.     Breakfast - oatmeal, eggs, coffee,1 cup milk, water   AM snack - fruit, water  Lunch - hot dogs, black beans, cookie OR salad OR meat, 1 cup milk   PM snack - fruit   Dinner - chicken tenders, coleslaw without dressing OR 2 chicken breast OR 2 salmon fillets, 1/4 c black beans      Beverages: coffee, milk, water, unsweetened iced tea     Cultural/Restorationist diet restrictions: No     Biggest Challenge to Healthy Eating: knowing what to eat    Physical Activity:    Type: walk   Duration: 30 minutes  Frequency: 3-4 days/week    Diabetes Risk Factors:  age over 45 years    Relevant co-morbidities and related health problems:  Significant for:  depression, neuropathy and retinopathy    Diabetes Complications:  Acute Complications: At risk for hypoglycemia? yes  Symptoms of low blood sugar? shaking, confusion and patient states he just feels off   Frequency of hypoglycemia: regularly at night, he will wake up and test his BG  Patient carries a carbohydrate source with them regularly: No   Type of carbohydrate: At night he will utilize fruit or crackers to treat hypoglycemia     Recent health service and resource utilization related to diabetes (hyperglycemia, hypoglycemia, etc):   None    Vitals:  There were no vitals taken for this visit.  Estimated body mass index is 19.89 kg/(m^2) as calculated from the following:    Height as of 12/27/17: 1.791 m (5' 10.5\").    Weight as of 6/7/18: 63.8 kg (140 lb 9.6 oz).   Last 3 BP:   BP Readings from Last 3 Encounters:   04/30/18 136/80   04/17/18 128/62   12/27/17 118/58       History   Smoking Status     Never Smoker   Smokeless Tobacco     Never Used       Labs:  Lab Results   Component Value Date    A1C 8.2 03/19/2018     Lab Results   Component Value Date " "    04/17/2018     Lab Results   Component Value Date    LDL 45 04/17/2018     HDL Cholesterol   Date Value Ref Range Status   04/17/2018 85 >39 mg/dL Final   ]  GFR Estimate   Date Value Ref Range Status   01/30/2018 62 >60 mL/min/1.7m2 Final     Comment:     Non  GFR Calc     GFR Estimate If Black   Date Value Ref Range Status   01/30/2018 75 >60 mL/min/1.7m2 Final     Comment:      GFR Calc     Lab Results   Component Value Date    CR 1.10 04/17/2018     No results found for: MICROALBUMIN    Socio/Economic/Cultural Considerations:    Support system: not assessed    Cultural Influences/Ethnic Background:  American      Health Literacy/Numeracy:  \"With diabetes, it's helpful to use forms and log books to write down blood sugars and what you're eating at times to help understand how foods affect your blood sugars. With this in mind how confident are you at filling out medical forms, such as these, by yourself?  Not Assessed    Health Beliefs and Attitudes:   Patient Activation Measure Survey Score:  No flowsheet data found.    Stage of Change: ACTION (Actively working towards change)      Diabetes knowledge and skills assessment:     Patient is knowledgeable in diabetes management concepts related to: Healthy Eating, Being Active, Monitoring, Taking Medication and Problem Solving    Patient needs further education on the following diabetes management concepts: Healthy Eating, Being Active and Problem Solving    Barriers to Learning Assessment: No Barriers identified    Based on learning assessment above, most appropriate setting for further diabetes education would be: Group class or Individual setting.    INTERVENTION:   Discussion - reviewed diet plan. Commended patient on already reading labels, working to manage CHO intake and his understanding of which foods do contain CHO. Patient is very restrictive with CHO, especially at evening meal, which is likely contributing to " low BGs at night. Discussed need for some CHO at each meal, no more than 60-75 grams/meal but at least 30 grams. Patient states this will help him gain weight and writer agreed. Reviewed foods that he could add to his meals to ensure he is getting at least some CHO - mashed potatoes, english muffin, hot dog or burger bun. He states that he was told the insulin may help him gain weight too. Writer agreed. Discussed treatment of hypoglycemia and importance of carrying fast acting CHO source with him, especially when he walks. He is familiar with products like glucose tablets and is willing to pick these up.     Education provided today on:  AADE Self-Care Behaviors:  Healthy Eating: carbohydrate counting, consistency in amount, composition, and timing of food intake, portion control, plate planning method and label reading  Being Active: relationship to blood glucose, describe appropriate activity program and precautions to take  Problem Solving: low blood glucose - causes, signs/symptoms, treatment and prevention, carrying a carbohydrate source at all times and when to call health care provider    Opportunities for ongoing education and support in diabetes-self management were discussed.    Pt verbalized understanding of concepts discussed and recommendations provided today.       Education Materials Provided:  Carbohydrate Counting, Hypoglycemia Signs and Symptoms and My Plate Planner    PLAN:  See Patient Instructions for co-developed, patient-stated behavior change goals.  Could consider decrease to insulin - basaglar return to 10 units at HS but I suspect that low BGs are likely food related  AVS printed and provided to patient today.    FOLLOW-UP:  Follow-up as needed.   Chart routed to referring provider.    Ongoing plan for education and support: Written resources (magazines, books, etc.) and Follow-up with primary care provider    Ericka Mark RD, LD   Time Spent: 30 minutes  Encounter Type:  Individual    Any diabetes medication dose changes were made via the CDE Protocol and Collaborative Practice Agreement with the patient's referring provider. A copy of this encounter was shared with the provider.

## 2018-06-19 NOTE — MR AVS SNAPSHOT
After Visit Summary   6/19/2018    Caden Bush    MRN: [id number]           Patient Information     Date Of Birth          1960        Visit Information        Provider Department      6/19/2018 10:30 AM  DIABETIC ED RESOURCE [facility] [place]        Care Instructions    My Diabetes Care Goals:    Healthy Eating: I will add at least 30 grams of carbohydrate to my dinner meal, with a goal of no more than 60-75 grams of carbohydrate at all meals.  Problem Solving: I will carry a carbohydrate source with me when exercising to treat low blood sugars.     Follow up:  Call ([phone]), e-mail ([email or web address]), or send Amarat message with questions, concerns or if follow-up is needed.     Bring blood glucose meter and logbook with you to all doctor and follow-up appointments.     [facility] for the [facility] Area:  For Your Diabetes Education and Nutrition Appointments Call:  [phone]   For Diabetes Education or Nutrition Related Questions:   Phone: [phone]  E-mail: [email or web address]  Fax: [phone]   If you need a medication refill please contact your pharmacy. Please allow 3 business days for your refills to be completed.    Instructions for emailing the Diabetes Educators    If you need to communicate a non-urgent message to a Diabetes Educator via email, please send to [email or web address].    Please follow the following email guidelines:    Subject line: Secure: your clinic name (example: Secure: [facility])  In the email please include: First name, middle initial, last name and date of birth.    We will be in touch with you within one (1) business day.             Follow-ups after your visit        Your next 10 appointments already scheduled     Jun 25, 2018  2:00 PM CDT   TELEMEDICINE with Aliyah Angeles RPH   [facility] ([facility])    [address] [place]  [place] [id number] [phone]           Note: this is not an onsite visit; there is no need to come to the facility.            Jul 13, 2018  1:10 PM CDT   Adult Med Follow UP with Natali Dang MD   Psychiatry Clinic ([facility])    [address]  [address] [address]   [phone]            Jul 17, 2018 10:00 AM CDT   Office Visit with Annie Hart MD   [facility]    [address] [place] [id number] [phone]           Bring a current list of meds and any records pertaining to this visit. For Physicals, please bring immunization records and any forms needing to be filled out. Please arrive 10 minutes early to complete paperwork.              Who to contact     If you have questions or need follow up information about today's clinic visit or your schedule please contact [facility] directly at [phone].  Normal or non-critical lab and imaging results will be communicated to you by [employer], letter or phone within 4 business days after the clinic has received the results. If you do not hear from us within 7 days, please contact the clinic through [employer] or phone. If you have a critical or abnormal lab result, we will notify you by phone as soon as possible.  Submit refill requests through [employer] or call your pharmacy and they will forward the refill request to us. Please allow 3 business days for your refill to be completed.          Additional Information About Your Visit        [employer] Information     [employer] gives you secure access to your electronic health record. If you see a primary care provider, you can also send messages to your care team and make appointments. If you have questions, please call your primary care clinic.  If you do not have a primary care provider, please call [phone] and they will assist you.        Care EveryWhere ID      This is your Care EveryWhere ID. This could be used by other organizations to access your [facility] medical records  [id number]         Blood Pressure from Last 3 Encounters:   04/30/18 136/80   04/17/18 128/62   12/27/17 118/58    Weight from Last 3 Encounters:   04/17/18 64.4 kg (142 lb)   12/27/17 69.9 kg (154 lb)   06/07/17 74.4 kg (164 lb)              Today, you had the following     No orders found for display         Today's Medication Changes          These changes are accurate as of 6/19/18 10:33 AM.  If you have any questions, ask your nurse or doctor.               These medicines have changed or have updated prescriptions.        Dose/Directions    BASAGLAR 100 UNIT/ML injection   This may have changed:    - how much to take  - additional instructions   Used for:  Type 2 diabetes mellitus with both eyes affected by moderate nonproliferative retinopathy without macular edema, without long-term current use of insulin (H)        Dose:  10 Units   Inject 10 Units Subcutaneous daily We will make further adjustments as we see your response   Quantity:  15 mL   Refills:  1                Primary Care Provider Office Phone # Fax #    Annie Jeniffer Hart [phone] [address] [place] [address]        Goals        General    Psychosocial (pt-stated)     Notes - Note created  5/3/2018  4:09 PM by Kaylen Reynoso LSW    Goal Statement: I will set-up a [place] Choices assessment to see if I qualify for waiver services to support me in the community by 8/2018.  Measure of Success: [place] completed  Supportive Steps to Achieve: Call [facility] sent e-mail with info  Barriers: feeling tired,worn out  Strengths: asked for assistance,feels comfortable alling  Date to Achieve By: 8/2018          Equal Access to Services     ERICA URIAS AH: Pat Baeza, ruslan daly, sloan winn, marcia woods.  So [facility] [phone].    ATENCIÓN: Si stefano devi, tiene a magaña disposición servicios gratuitos de asistencia lingüística. Maricruz al [phone].    We comply with applicable federal civil rights laws and [place] laws. We do not discriminate on the basis of race, color, national origin, age, disability, sex, sexual orientation, or gender identity.            Thank you!     Thank you for choosing [facility]  for your care. Our goal is always to provide you with excellent care. Hearing back from our patients is one way we can continue to improve our services. Please take a few minutes to complete the written survey that you may receive in the mail after your visit with us. Thank you!             Your Updated Medication List - Protect others around you: Learn how to safely use, store and throw away your medicines at www.disposemymeds.org.          This list is accurate as of 6/19/18 10:33 AM.  Always use your most recent med list.                   Brand Name Dispense Instructions for use Diagnosis    ACE/ARB/ARNI NOT PRESCRIBED (INTENTIONAL)      ACE & ARB not prescribed due to Risk for drug interaction- lithium toxicity in the past        aspirin 81 MG EC tablet      Take 81 mg by mouth daily.        BASAGLAR 100 UNIT/ML injection     15 mL    Inject 10 Units Subcutaneous daily We will make further adjustments as we see your response    Type 2 diabetes mellitus with both eyes affected by moderate nonproliferative retinopathy without macular edema, without long-term current use of insulin (H)       blood glucose monitoring test strip    ONETOUCH ULTRA    100 each    TEST ONCE DAILY AS DIRECTED    Encounter for medication refill       cholecalciferol 1000 UNIT tablet    vitamin D3     Take 1 tablet by mouth daily.        FISH OIL      1 capsule daily        glipiZIDE 5 MG 24 hr tablet    GLUCOTROL XL    90 tablet    TAKE 1 TABLET BY MOUTH EVERY DAY    Encounter for medication refill        insulin pen needle 32G X 4 MM    BD BRENDAN U/F    100 each    Use 1 daily as directed.    Type 2 diabetes mellitus with both eyes affected by moderate nonproliferative retinopathy without macular edema, without long-term current use of insulin (H)       * metFORMIN 500 MG 24 hr tablet    GLUCOPHAGE-XR    360 tablet    TAKE 2 TABLETS BY MOUTH TWICE DAILY    Encounter for medication refill       * metFORMIN 500 MG 24 hr tablet    GLUCOPHAGE-XR    360 tablet    TAKE 2 TABLETS BY MOUTH TWICE DAILY    Encounter for medication refill       pioglitazone 45 MG tablet    ACTOS    90 tablet    TAKE 1 TABLET BY MOUTH DAILY    Encounter for medication refill       risperiDONE 2 MG tablet    risperDAL    30 tablet    Take 1 tablet (2 mg) by mouth At Bedtime        simvastatin 40 MG tablet    ZOCOR    90 tablet    TAKE ONE TABLET BY MOUTH EVERY NIGHT AT BEDTIME    Encounter for medication refill       traZODone 50 MG tablet    DESYREL    30 tablet    Take 1 tablet (50 mg) by mouth nightly as needed for sleep    Schizoaffective disorder, bipolar type (H)       * venlafaxine 75 MG 24 hr capsule    EFFEXOR XR    30 capsule    Take 1 capsule (75 mg) by mouth daily Please take with Effexor 150 mg for a total dose of 225 mg daily    Schizoaffective disorder, bipolar type (H)       * venlafaxine 150 MG 24 hr capsule    EFFEXOR XR    30 capsule    Take 1 capsule (150 mg) by mouth daily Along with a 75 mg for a total of 225 mg daily dose.    Schizoaffective disorder, bipolar type (H)       * Notice:  This list has 4 medication(s) that are the same as other medications prescribed for you. Read the directions carefully, and ask your doctor or other care provider to review them with you.

## 2018-06-19 NOTE — PATIENT INSTRUCTIONS
My Diabetes Care Goals:    Healthy Eating: I will add at least 30 grams of carbohydrate to my dinner meal, with a goal of no more than 60-75 grams of carbohydrate at all meals.  Problem Solving: I will carry a carbohydrate source with me when exercising to treat low blood sugars.     Follow up:  Call ([phone]), e-mail ([email or web address]), or send MiniTimehart message with questions, concerns or if follow-up is needed.     Bring blood glucose meter and logbook with you to all doctor and follow-up appointments.     [facility] for the [facility]:  For Your Diabetes Education and Nutrition Appointments Call:  [phone]   For Diabetes Education or Nutrition Related Questions:   Phone: [phone]  E-mail: [email or web address]  Fax: [phone]   If you need a medication refill please contact your pharmacy. Please allow 3 business days for your refills to be completed.    Instructions for emailing the Diabetes Educators    If you need to communicate a non-urgent message to a Diabetes Educator via email, please send to [email or web address].    Please follow the following email guidelines:    Subject line: Secure: your clinic name (example: Secure: [facility])  In the email please include: First name, middle initial, last name and date of birth.    We will be in touch with you within one (1) business day.

## 2018-06-21 DIAGNOSIS — Z79.4 TYPE 2 DIABETES MELLITUS WITH BOTH EYES AFFECTED BY MODERATE NONPROLIFERATIVE RETINOPATHY WITHOUT MACULAR EDEMA, WITH LONG-TERM CURRENT USE OF INSULIN (H): Primary | ICD-10-CM

## 2018-06-21 DIAGNOSIS — E11.3393 TYPE 2 DIABETES MELLITUS WITH BOTH EYES AFFECTED BY MODERATE NONPROLIFERATIVE RETINOPATHY WITHOUT MACULAR EDEMA, WITH LONG-TERM CURRENT USE OF INSULIN (H): Primary | ICD-10-CM

## 2018-06-21 NOTE — TELEPHONE ENCOUNTER
putting in a new prescription and noticed that there was a duplicate request for this metformin.  They where sent to two different pharmacies.  Called [facility] and  informed that this was a duplicate and to delete.    Rafal Duke,   [facility]  [phone]

## 2018-06-25 ENCOUNTER — VIRTUAL VISIT (OUTPATIENT)
Dept: EDUCATION SERVICES | Facility: CLINIC | Age: 58
End: 2018-06-25
Payer: MEDICARE

## 2018-06-25 ENCOUNTER — ALLIED HEALTH/NURSE VISIT (OUTPATIENT)
Dept: PHARMACY | Facility: PHYSICIAN GROUP | Age: 58
End: 2018-06-25
Payer: MEDICAID

## 2018-06-25 DIAGNOSIS — E11.3393 TYPE 2 DIABETES MELLITUS WITH BOTH EYES AFFECTED BY MODERATE NONPROLIFERATIVE RETINOPATHY WITHOUT MACULAR EDEMA, WITHOUT LONG-TERM CURRENT USE OF INSULIN (H): ICD-10-CM

## 2018-06-25 PROCEDURE — 99606 MTMS BY PHARM EST 15 MIN: CPT | Performed by: PHARMACIST

## 2018-06-25 RX ORDER — INSULIN GLARGINE 100 [IU]/ML
7 INJECTION, SOLUTION SUBCUTANEOUS AT BEDTIME
Qty: 15 ML | Refills: 1 | Status: SHIPPED | OUTPATIENT
Start: 2018-06-25 | End: 2019-02-20

## 2018-06-25 NOTE — PROGRESS NOTES
SUBJECTIVE/OBJECTIVE:                Caden Bush is a 58 year old male called for a follow-up visit for Medication Therapy Management.  He was referred to me from .     Chief Complaint: Follow up from our visit on 6/4.    Tobacco: No tobacco use  Alcohol: not currently using    Medication Adherence/Access:  no issues reported    Diabetes:  Pt currently taking metformin ER 500mg- 2 BID, Pioglitazone 45mg daily and basaglar 12 units in the AM. Pt is not experiencing side effects. Met with CDE to discuss diet and found that his sugars are dipping at night before bed and he is not likely eating enough either.   Stopped Glipizide 5mg with the start of insulin per PCP, however there was an auto request on this medication, so it got back on his medication list. He is NOT taking this medication.   CDE had recommended to PCP to decrease the insulin to 7 units due to PM lows, patient just got this message today, so has not decreased his dose yet.   SMBG: one time daily. Ranges (patient reported):  Sunday 194 (after dinner),   Saturday 142 (after dinner)  Friday 99 (after dinner)  Thursday 68 at night (21st)  94 (after meal)  Recent symptoms of high blood sugar? none  Eye exam: up to date  Foot exam: up to date  Pt is not taking an ACEi/ARB at this time- hx of lithium toxicity (just off this in Jan 2018).  Aspirin: Taking 81mg daily and denies side effects  Lab Results   Component Value Date    A1C 8.2 03/19/2018    A1C 6.8 09/15/2017    A1C 8.2 06/07/2017    A1C 6.6 12/13/2016    A1C 7.3 06/09/2016      Current labs include:    BP Readings from Last 3 Encounters:   04/30/18 136/80   04/17/18 128/62   12/27/17 118/58       ASSESSMENT:              Current medications were reviewed today as discussed above.      Medication Adherence: good, no issues identified    Diabetes: Stable. Patient is not meeting A1c goal of < 7%, however has had significant decline in sugars since the last a1c and continues to have some lower  than 70mg/dl blood sugars. He will be decreasing his insulin to 7 units daily as encouraged by CDE.      PLAN:                  1. Decrease insulin as planned to 7 units, but also encouraged him to balance his eating, as eating slightly more at dinner should also help prevent the pm lows.     I spent 15 minutes with this patient today. All changes were made via collaborative practice agreement with Annie Hart. A copy of the visit note was provided to the patient's primary care provider.     Will follow up in 4 weeks- seeing PCP in 2 weeks.    The patient declined a summary of these recommendations as an after visit summary.    Aliyah Angeles, Pharm.D, [facility]  Medication Therapy Management Pharmacist  [phone]

## 2018-06-25 NOTE — MR AVS SNAPSHOT
After Visit Summary   6/25/2018    Caden Bush    MRN: [id number]           Patient Information     Date Of Birth          1960        Visit Information        Provider Department      6/25/2018 7:30 AM CP DIABETIC ED RESOURCE [facility]        Today's Diagnoses     Type 2 diabetes mellitus with both eyes affected by moderate nonproliferative retinopathy without macular edema, without long-term current use of insulin (H)           Follow-ups after your visit        Your next 10 appointments already scheduled     Jun 25, 2018  2:00 PM CDT   TELEMEDICINE with Aliyah Angeles [facility] ([facility])    [address] [place] [id number] [phone]           Note: this is not an onsite visit; there is no need to come to the facility.            Jul 13, 2018  1:10 PM CDT   Adult Med Follow UP with Natali Dang MD   Psychiatry Clinic ([facility])    [place] [id number]  [address] [place] [id number] [phone]            Jul 17, 2018 10:00 AM CDT   Office Visit with Annie Hart MD   [facility] ([facility])    [address] [place] [address] [phone]           Bring a current list of meds and any records pertaining to this visit. For Physicals, please bring immunization records and any forms needing to be filled out. Please arrive 10 minutes early to complete paperwork.              Who to contact     If you have questions or need follow up information about today's clinic visit or your schedule please contact [facility] directly at [phone].  Normal or non-critical lab and imaging results will be communicated to you by MyChart, letter or phone within 4 business days after the clinic has received the results. If you do not hear from us within 7 days, please  contact the clinic through [employer] or phone. If you have a critical or abnormal lab result, we will notify you by phone as soon as possible.  Submit refill requests through [employer] or call your pharmacy and they will forward the refill request to us. Please allow 3 business days for your refill to be completed.          Additional Information About Your Visit        Axis Threehart Information     [employer] gives you secure access to your electronic health record. If you see a primary care provider, you can also send messages to your care team and make appointments. If you have questions, please call your primary care clinic.  If you do not have a primary care provider, please call [phone] and they will assist you.        Care EveryWhere ID     This is your Care EveryWhere ID. This could be used by other organizations to access your [facility] medical records  [id number]         Blood Pressure from Last 3 Encounters:   04/30/18 136/80   04/17/18 128/62   12/27/17 118/58    Weight from Last 3 Encounters:   04/17/18 64.4 kg (142 lb)   12/27/17 69.9 kg (154 lb)   06/07/17 74.4 kg (164 lb)              Today, you had the following     No orders found for display         Today's Medication Changes          These changes are accurate as of 6/25/18 10:44 AM.  If you have any questions, ask your nurse or doctor.               These medicines have changed or have updated prescriptions.        Dose/Directions    BASAGLAR 100 UNIT/ML injection   This may have changed:    - how much to take  - when to take this   Used for:  Type 2 diabetes mellitus with both eyes affected by moderate nonproliferative retinopathy without macular edema, without long-term current use of insulin (H)        Dose:  7 Units   Inject 7 Units Subcutaneous At Bedtime We will make further adjustments as we see your response   Quantity:  15 mL   Refills:  1            Where to get your medicines      These medications were sent to "[facility] [id number] -  [place], [place] - [address] AT [facility] [address]  [address], [facility] [address]     Phone:  [phone]     BASAGLAR 100 UNIT/ML injection                Primary Care Provider Office Phone # Fax #    Annie Jeniffer Hart [phone] [address] [place] [address]        Goals        General    Psychosocial (pt-stated)     Notes - Note created  5/3/2018  4:09 PM by Kaylen Reynoso LSW    Goal Statement: [place] to see if I qualify for waiver services to support me in the community by 8/2018.  Measure of Success: [place]  Supportive Steps to Achieve: Call [facility] sent e-mail with info  Barriers: feeling tired,worn out  Strengths: asked for assistance,feels comfortable alling  Date to Achieve By: 8/2018          Equal Access to Services     ERICA URIAS AH: Hadii ethan ku hadasho Soomaali, waaxda luqadaha, qaybta kaalmada adeegyada, waxay sampson woods. So [facility] [phone].    ATENCIÓN: Si habla español, tiene a magaña disposición servicios gratuitos de asistencia lingüística. Llame al [phone].    We comply with applicable federal civil rights laws and [place] laws. We do not discriminate on the basis of race, color, national origin, age, disability, sex, sexual orientation, or gender identity.            Thank you!     Thank you for choosing [facility]  for your care. Our goal is always to provide you with excellent care. Hearing back from our patients is one way we can continue to improve our services. Please take a few minutes to complete the written survey that you may receive in the mail after your visit with us. Thank you!             Your Updated Medication List - Protect others around you: Learn how to safely use, store and throw away your medicines at www.disposemymeds.org.          This list is accurate as of 6/25/18 10:44  AM.  Always use your most recent med list.                   Brand Name Dispense Instructions for use Diagnosis    ACE/ARB/ARNI NOT PRESCRIBED (INTENTIONAL)      ACE & ARB not prescribed due to Risk for drug interaction- lithium toxicity in the past        aspirin 81 MG EC tablet      Take 81 mg by mouth daily.        BASAGLAR 100 UNIT/ML injection     15 mL    Inject 7 Units Subcutaneous At Bedtime We will make further adjustments as we see your response    Type 2 diabetes mellitus with both eyes affected by moderate nonproliferative retinopathy without macular edema, without long-term current use of insulin (H)       blood glucose monitoring test strip    ONETOUCH ULTRA    100 each    TEST ONCE DAILY AS DIRECTED    Encounter for medication refill       cholecalciferol 1000 UNIT tablet    vitamin D3     Take 1 tablet by mouth daily.        COMPLETE NEEDLE COLLECTION SYS Misc     1 each    1 each daily    Type 2 diabetes mellitus with both eyes affected by moderate nonproliferative retinopathy without macular edema, with long-term current use of insulin (H)       FISH OIL      1 capsule daily        glipiZIDE 5 MG 24 hr tablet    GLUCOTROL XL    90 tablet    TAKE 1 TABLET BY MOUTH EVERY DAY    Encounter for medication refill       insulin pen needle 32G X 4 MM    BD BRENADN U/F    100 each    Use 1 daily as directed.    Type 2 diabetes mellitus with both eyes affected by moderate nonproliferative retinopathy without macular edema, without long-term current use of insulin (H)       metFORMIN 500 MG 24 hr tablet    GLUCOPHAGE-XR    360 tablet    TAKE 2 TABLETS BY MOUTH TWICE DAILY    Encounter for medication refill       pioglitazone 45 MG tablet    ACTOS    90 tablet    TAKE 1 TABLET BY MOUTH DAILY    Encounter for medication refill       risperiDONE 2 MG tablet    risperDAL    30 tablet    Take 1 tablet (2 mg) by mouth At Bedtime        simvastatin 40 MG tablet    ZOCOR    90 tablet    TAKE ONE TABLET BY MOUTH EVERY  NIGHT AT BEDTIME    Encounter for medication refill       traZODone 50 MG tablet    DESYREL    30 tablet    Take 1 tablet (50 mg) by mouth nightly as needed for sleep    Schizoaffective disorder, bipolar type (H)       * venlafaxine 75 MG 24 hr capsule    EFFEXOR XR    30 capsule    Take 1 capsule (75 mg) by mouth daily Please take with Effexor 150 mg for a total dose of 225 mg daily    Schizoaffective disorder, bipolar type (H)       * venlafaxine 150 MG 24 hr capsule    EFFEXOR XR    30 capsule    Take 1 capsule (150 mg) by mouth daily Along with a 75 mg for a total of 225 mg daily dose.    Schizoaffective disorder, bipolar type (H)       * Notice:  This list has 2 medication(s) that are the same as other medications prescribed for you. Read the directions carefully, and ask your doctor or other care provider to review them with you.

## 2018-06-25 NOTE — PROGRESS NOTES
MD recommended decrease to patient's basaglar dosing. Called patient to update on this change. Patient states he hasn't has any lows since visit with diabetes educator but is agreeable to decreasing. He does have follow up with his PCP in <1 month for A1C recheck and BG review. Recommended decreasing dose starting this evening and reviewing with PCP at follow up. No further questions & patient agreeable to plan.   Ericka Mark RD, LD

## 2018-06-25 NOTE — MR AVS SNAPSHOT
After Visit Summary   6/25/2018    Caden Bush    MRN: [id number]           Patient Information     Date Of Birth          1960        Visit Information        Provider Department      6/25/2018 2:00 PM [facility]        Today's Diagnoses     Type 2 diabetes mellitus with both eyes affected by moderate nonproliferative retinopathy without macular edema, without long-term current use of insulin (H)           Follow-ups after your visit        Your next 10 appointments already scheduled     Jul 13, 2018  1:10 PM CDT   Adult Med Follow UP with Natali Dang MD   Psychiatry Clinic ([facility])    [address]  [address] [address] [phone]            Jul 17, 2018 10:00 AM CDT   Office Visit with Annie Hart MD   [facility] ([facility])    [address] [place] [address] [phone]           Bring a current list of meds and any records pertaining to this visit. For Physicals, please bring immunization records and any forms needing to be filled out. Please arrive 10 minutes early to complete paperwork.            Jul 30, 2018  2:00 PM CDT   TELEMEDICINE with Aliyah Angeles RPH   [facility] ([facility])    [address] [place] [address] [phone]           Note: this is not an onsite visit; there is no need to come to the facility.              Who to contact     If you have questions or need follow up information about today's clinic visit or your schedule please contact [facility] directly at [phone].  Normal or non-critical lab and imaging results will be communicated to you by MyChart, letter or phone within 4 business days after the clinic has received the results. If you do not hear from us within 7 days, please contact the clinic through [employer] or  phone. If you have a critical or abnormal lab result, we will notify you by phone as soon as possible.  Submit refill requests through [employer] or call your pharmacy and they will forward the refill request to us. Please allow 3 business days for your refill to be completed.          Additional Information About Your Visit        Learn[employer] Information     [employer] gives you secure access to your electronic health record. If you see a primary care provider, you can also send messages to your care team and make appointments. If you have questions, please call your primary care clinic.  If you do not have a primary care provider, please call [phone] and they will assist you.        Care EveryWhere ID     This is your Care EveryWhere ID. This could be used by other organizations to access your [facility] medical records  [id number]         Blood Pressure from Last 3 Encounters:   04/30/18 136/80   04/17/18 128/62   12/27/17 118/58    Weight from Last 3 Encounters:   04/17/18 142 lb (64.4 kg)   12/27/17 154 lb (69.9 kg)   06/07/17 164 lb (74.4 kg)              Today, you had the following     No orders found for display         Today's Medication Changes          These changes are accurate as of 6/25/18  8:01 PM.  If you have any questions, ask your nurse or doctor.               These medicines have changed or have updated prescriptions.        Dose/Directions    BASAGLAR 100 UNIT/ML injection   This may have changed:    - how much to take  - when to take this   Used for:  Type 2 diabetes mellitus with both eyes affected by moderate nonproliferative retinopathy without macular edema, without long-term current use of insulin (H)        Dose:  7 Units   Inject 7 Units Subcutaneous At Bedtime We will make further adjustments as we see your response   Quantity:  15 mL   Refills:  1            Where to get your medicines      These medications were sent to [facility] [address] [place], [place] - [address] AT  [address]  [address] [facility] [address]     Phone:  [phone]     BASAGLAR 100 UNIT/ML injection                Primary Care Provider Office Phone # Fax #    Annie Jeniffer Hart [phone] [address] [place] [address]        Goals        General    Psychosocial (pt-stated)     Notes - Note created  5/3/2018  4:09 PM by Kaylen Reynoso LSW    Goal Statement: [place] to see if I qualify for waiver services to support me in the community by 8/2018.  Measure of [place]: [place]  Supportive Steps to Achieve: Call [facility] sent e-mail with info  Barriers: feeling tired,worn out  Strengths: asked for assistance,feels comfortable alling  Date to Achieve By: 8/2018          Equal Access to Services     ERICA URIAS AH: Hadii aad anselmo hadasho Soomaali, waaxda luqadaha, qaybta kaalmada adeegyada, waxay toreyin hayeladio diaz . So [facility] [phone].    ATENCIÓN: Si habla español, tiene a magaña disposición servicios gratuitos de asistencia lingüística. Maricruz al [phone].    We comply with applicable federal civil rights laws and [place] laws. We do not discriminate on the basis of race, color, national origin, age, disability, sex, sexual orientation, or gender identity.            Thank you!     Thank you for choosing [facility]  for your care. Our goal is always to provide you with excellent care. Hearing back from our patients is one way we can continue to improve our services. Please take a few minutes to complete the written survey that you may receive in the mail after your visit with us. Thank you!             Your Updated Medication List - Protect others around you: Learn how to safely use, store and throw away your medicines at www.disposemymeds.org.          This list is accurate as of 6/25/18  8:01 PM.  Always use your most recent med list.                    Brand Name Dispense Instructions for use Diagnosis    ACE/ARB/ARNI NOT PRESCRIBED (INTENTIONAL)      ACE & ARB not prescribed due to Risk for drug interaction- lithium toxicity in the past        aspirin 81 MG EC tablet      Take 81 mg by mouth daily.        BASAGLAR 100 UNIT/ML injection     15 mL    Inject 7 Units Subcutaneous At Bedtime We will make further adjustments as we see your response    Type 2 diabetes mellitus with both eyes affected by moderate nonproliferative retinopathy without macular edema, without long-term current use of insulin (H)       blood glucose monitoring test strip    ONETOUCH ULTRA    100 each    TEST ONCE DAILY AS DIRECTED    Encounter for medication refill       cholecalciferol 1000 UNIT tablet    vitamin D3     Take 1 tablet by mouth daily.        COMPLETE NEEDLE COLLECTION SYS Misc     1 each    1 each daily    Type 2 diabetes mellitus with both eyes affected by moderate nonproliferative retinopathy without macular edema, with long-term current use of insulin (H)       FISH OIL      1 capsule daily        insulin pen needle 32G X 4 MM    BD BRENDAN U/F    100 each    Use 1 daily as directed.    Type 2 diabetes mellitus with both eyes affected by moderate nonproliferative retinopathy without macular edema, without long-term current use of insulin (H)       metFORMIN 500 MG 24 hr tablet    GLUCOPHAGE-XR    360 tablet    TAKE 2 TABLETS BY MOUTH TWICE DAILY    Encounter for medication refill       pioglitazone 45 MG tablet    ACTOS    90 tablet    TAKE 1 TABLET BY MOUTH DAILY    Encounter for medication refill       risperiDONE 2 MG tablet    risperDAL    30 tablet    Take 1 tablet (2 mg) by mouth At Bedtime        simvastatin 40 MG tablet    ZOCOR    90 tablet    TAKE ONE TABLET BY MOUTH EVERY NIGHT AT BEDTIME    Encounter for medication refill       traZODone 50 MG tablet    DESYREL    30 tablet    Take 1 tablet (50 mg) by mouth nightly as needed for sleep    Schizoaffective  disorder, bipolar type (H)       * venlafaxine 75 MG 24 hr capsule    EFFEXOR XR    30 capsule    Take 1 capsule (75 mg) by mouth daily Please take with Effexor 150 mg for a total dose of 225 mg daily    Schizoaffective disorder, bipolar type (H)       * venlafaxine 150 MG 24 hr capsule    EFFEXOR XR    30 capsule    Take 1 capsule (150 mg) by mouth daily Along with a 75 mg for a total of 225 mg daily dose.    Schizoaffective disorder, bipolar type (H)       * Notice:  This list has 2 medication(s) that are the same as other medications prescribed for you. Read the directions carefully, and ask your doctor or other care provider to review them with you.

## 2018-07-13 ENCOUNTER — OFFICE VISIT (OUTPATIENT)
Dept: PSYCHIATRY | Facility: CLINIC | Age: 58
End: 2018-07-13
Attending: PSYCHIATRY & NEUROLOGY
Payer: MEDICARE

## 2018-07-13 ENCOUNTER — PATIENT OUTREACH (OUTPATIENT)
Dept: CARE COORDINATION | Facility: CLINIC | Age: 58
End: 2018-07-13

## 2018-07-13 VITALS
SYSTOLIC BLOOD PRESSURE: 128 MMHG | HEART RATE: 81 BPM | BODY MASS INDEX: 20.2 KG/M2 | DIASTOLIC BLOOD PRESSURE: 73 MMHG | WEIGHT: 142.8 LBS

## 2018-07-13 DIAGNOSIS — F25.0 SCHIZOAFFECTIVE DISORDER, BIPOLAR TYPE (H): ICD-10-CM

## 2018-07-13 PROCEDURE — G0463 HOSPITAL OUTPT CLINIC VISIT: HCPCS | Mod: ZF

## 2018-07-13 RX ORDER — RISPERIDONE 3 MG/1
3 TABLET ORAL AT BEDTIME
Qty: 30 TABLET | Refills: 1 | Status: SHIPPED | OUTPATIENT
Start: 2018-07-13 | End: 2018-09-07

## 2018-07-13 ASSESSMENT — ACTIVITIES OF DAILY LIVING (ADL): DEPENDENT_IADLS:: TRANSPORTATION

## 2018-07-13 ASSESSMENT — PAIN SCALES - GENERAL: PAINLEVEL: NO PAIN (0)

## 2018-07-13 NOTE — PROGRESS NOTES
"PSYCHIATRY CLINIC TRANSFER DIAGNOSTIC ASSESSMENT     CARE TEAM:  PCP-Annie Hart   Therapist[facility]  [facility]      Ophthalmologist: Jacob Lieberman with [facility]        Community support: [facility], [phone]  Caden Bush is a 58 year old male who prefers the name Caden & pronouns he.    The initial diagnostic evaluation was on 11/26/08 .   Date of the most recent transfer of care eval is 07/13/2018.    Pertinent Background:  This patient first experienced mental health issues in his late 20s , initially obtained care at that time and has received treatment for Schizoaffective Disorder, bipolar type. His diagnosis has changed through the time and needs to be further investigated.  Notably, this pt was stable on regimen of Lithium, Effexor and Risperdal since 2011. However due to complications of chronic diabetes, Lithium was discontinued in 2017.     Psych critical item history includes suicidal ideation, psychosis [sxs include AH and paranoia], mutiple psychotropic trials and psych hosp (>5).     INTERIM HISTORY                                                                                              4, 4   The patient reports good treatment adherence.  History was provided by the patient who was a good historian.  The last visit ended with following change in June 2018 - Reduce Risperidone to 2 mg qhs ( due to day time sedation)    Since the last visit:   - Caden denies any sadness, but states that \"I don't remember the last time I laughed\". He is reportedly isolative. Denies mood swings or elated mood. He is trying to socialize to decrease the isolation with partial success. He attended the graduation party of his niece. He says he has social anxiety and he contributes this to lacking social skills.   - Patient reports that he sleeps 9-10 every night. He goes to bed at about 11pm. The dose of risperidone was decreased last month in order to address " morning sleepiness which was thought to be caused by risperidone. Patient denies any noticeable change in his morning alertness.  - Patient has noticed increased anxiety after decreasing risperidone dose.   - Patient is managing his life independently (cooking, cleaning and grocery shopping). Enjoys photography however he has not done it for a long time.  - Diabetes is under control. He attended the DM educational session recently.     RECENT SYMPTOMS:   DEPRESSION:  reports-anhedonia, low energy and hypersomnia;  DENIES- suicidal ideation and self-destructive thoughts  KINGA/HYPOMANIA:  reports-none;  DENIES- increased energy, decreased sleep need, increased activity and grandiosity  PSYCHOSIS:  reports-none;  DENIES- delusions, auditory hallucinations and visual hallucinations  DYSREGULATION:  reports-none;  DENIES- suicidal ideation, violent ideation and SIB  PANIC ATTACK:  none   ANXIETY:  excessive worry and social anxiety  TRAUMA RELATED:  none  COMPULSIVE:  none  SLEEP:  As mentioned above  EATING DISORDER: none    RECENT SUBSTANCE USE:     ALCOHOL-  never   TOBACCO- none  CAFFEINE- 1-2 cups/day of coffee, 1-2 sodas/day     OPIOIDS- none      NARCAN KIT- N/A          CANNABIS- none  OTHER ILLICIT DRUGS- none      CURRENT SOCIAL HISTORY:  FINANCIAL SUPPORT- SSDI for schizoaffective disorder and diabetes  CHILDREN- none      LIVING SITUATION- Currently living by self in subsidized apartment in [place] since Feb 2011.      SOCIAL/ SPIRITUAL SUPPORT- few friends and family     FEELS SAFE AT HOME- yes     MEDICAL ROS (2,10):  Reports none.  Denies GI [nausea, diarrhea, constipation,  ], headache, sedation, tremor and confusion.    SUBSTANCE USE HISTORY                                                                             Past Use- none  Treatment [#, most recent] - none  Medical Consequences [withdrawal, sz etc] - none  HIV/Hepatitis- none  Legal Consequences- none    PSYCHIATRIC HISTORY     SIB [method,  most recent]- none  Suicidal Ideation Hx [passive, active]- Remote hx of passive SI  Suicide Attempt [#, recent, method]:   #- N/A   Most Recent- N/A    Violence/Aggression Hx- none  Psychosis Hx- hx of auditory hallucinations and paranoia  Psych Hosp [ #, most recent, committed]- about 4-5 times in the past. Last hospitalization was aug 1, 2012 due to lIthium toxicity. Last mental health admission was June 2009.   ECT [#, most recent]- none  Pertinent Outpatient Treatment: Has dis DBT in the past, as well as group therapy and individual therapy.    Eating Disorder: none     SOCIAL and FAMILY HISTORY                           patient reported                          1ea, 1ea   Financial Support- as mentioned above.  Living Situation/Family/Relationships- as mentioned above.    Children- none  Trauma History (self-report)- none  Legal- none, but some financial problems for which he is working with   Social/Spiritual Support- family (parents and sister) and people in his apartment building.   Early History/Education-  Born in [place], lived in [place]. Lived in [place] from 1st grade, then came back and finished at [employer]. Went to [employer], then vocational school, then went to [employer], then [employer], then graduated [employer] with BA in Business Administration.    Family Mental Health History-  Parents had issues with depression, unsure of diagnoses; Great grandfather with alcoholism, possible OCD.     PAST PSYCH MED TRIALS   see EMR Problem List: Hx of psychiatric care    MEDICAL / SURGICAL HISTORY                                   Neurologic Hx [head injury etc]: none  Patient Active Problem List   Diagnosis     Eczema     Retinopathy     Moderate episode of recurrent major depressive disorder (H)     Polyp of colon, adenomatous     Health Care Home     Hx of psychiatric care     Neuropathy of left upper extremity     Type 2 diabetes mellitus with both eyes affected by moderate nonproliferative  retinopathy without macular edema, without long-term current use of insulin (H)       Past Surgical History:   Procedure Laterality Date     APPENDECTOMY       COLONOSCOPY  2012    2 adenomatous polyps        ALLERGY                                Review of patient's allergies indicates no known allergies.  MEDICATIONS                               Current Outpatient Prescriptions   Medication Sig Dispense Refill     ACE/ARB NOT PRESCRIBED, INTENTIONAL, ACE & ARB not prescribed due to Risk for drug interaction- lithium toxicity in the past       aspirin 81 MG EC tablet Take 81 mg by mouth daily.       BASAGLAR 100 UNIT/ML injection Inject 7 Units Subcutaneous At Bedtime We will make further adjustments as we see your response 15 mL 1     blood glucose monitoring (ONE TOUCH ULTRA) test strip TEST ONCE DAILY AS DIRECTED 100 each 3     cholecalciferol (VITAMIN D) 1000 UNIT tablet Take 1 tablet by mouth daily.       FISH OIL 1 capsule daily        insulin pen needle (BD BRENDAN U/F) 32G X 4 MM Use 1 daily as directed. 100 each 11     metFORMIN (GLUCOPHAGE-XR) 500 MG 24 hr tablet TAKE 2 TABLETS BY MOUTH TWICE DAILY 360 tablet 0     pioglitazone (ACTOS) 45 MG tablet TAKE 1 TABLET BY MOUTH DAILY 90 tablet 0     risperiDONE (RISPERDAL) 2 MG tablet Take 1 tablet (2 mg) by mouth At Bedtime 30 tablet 1     Sharps Container (COMPLETE NEEDLE COLLECTION SYS) MISC 1 each daily 1 each 1     simvastatin (ZOCOR) 40 MG tablet TAKE ONE TABLET BY MOUTH EVERY NIGHT AT BEDTIME 90 tablet 3     traZODone (DESYREL) 50 MG tablet Take 1 tablet (50 mg) by mouth nightly as needed for sleep 30 tablet 2     venlafaxine (EFFEXOR XR) 150 MG 24 hr capsule Take 1 capsule (150 mg) by mouth daily Along with a 75 mg for a total of 225 mg daily dose. 30 capsule 2     venlafaxine (EFFEXOR XR) 75 MG 24 hr capsule Take 1 capsule (75 mg) by mouth daily Please take with Effexor 150 mg for a total dose of 225 mg daily 30 capsule 2     VITALS                                                                                                                                   3, 3   /73  Pulse 81  Wt 64.8 kg (142 lb 12.8 oz)  BMI 20.2 kg/m2   MENTAL STATUS EXAM                                                                                      9, 14 cog gs   Alertness: alert  and oriented  Appearance: well groomed  Behavior/Demeanor: cooperative, pleasant and calm, with good  eye contact   Speech: normal  Language: intact and no problems  Psychomotor: normal or unremarkable  Mood: description consistent with euthymia  Affect: full range; was congruent to mood; was congruent to content  Thought Process/Associations: unremarkable  Thought Content:  Reports none;   Denies suicidal ideation  and psychotic thought  Perception:   Reports none;  Denies auditory hallucinations (NO;  command-NO;  following command-NO and visual hallucinations (NO)  Insight: fair  Judgment: good  Cognition: does  appear grossly intact; formal cognitive testing was not done  Gait and Station: unremarkable    LABS and DATA     PHQ9 TODAY = 2  PHQ-9 SCORE 4/6/2018 5/10/2018 6/7/2018   Total Score - - -   Total Score 4 6 6   Total Score - - -     ANTIPSYCHOTIC LABS  [glu, A1C, lipids (ezequiel LDL), liver enzymes, WBC, ANEU, Hgb, plts]  q12 mo  Recent Labs   Lab Test  07/17/18   1126  04/17/18   1356  03/19/18   1116  01/30/18   0923   GLC   --   150*   --   195*   A1C  6.7*   --   8.2*   --      Recent Labs   Lab Test  04/17/18   1356  07/11/17   1034   CHOL  139  126   TRIG  45  49   LDL  45  40   HDL  85  76     Recent Labs   Lab Test  04/17/18   1356  01/30/18   0923   AST  15  14   ALT  12  16   ALKPHOS  73  79     Recent Labs   Lab Test  01/30/18   0923  12/20/16   1240  06/09/16   1030  12/21/15   1413   WBC  2.8*  3.7*  4.7  4.6   ANEU   --    --   3.5  3.4   HGB  12.1*  13.6  14.7  13.4   PLT  179  205  175  155     DIAGNOSIS     Schizoaffective disorder, bipolar type    ASSESSMENT                                                                                                           m2, h3      Today patient denied any mood swing or psychosis while reported anhedonia, isolation and insomnia. He denied safety concern, reported compliance to medication and denied side effect of the medications. He reported that the previously preceived side effect of the risperidone (sedation) did not improve after a month long trial of decreasing risperidone dose. He contributed the increased social anxiety to decrease dose of risperidone and lack of social skills. Patient was historically stable on lithium, Wellbuterin and risperidone. Lithium was discontinued in 2017 due to chronic complications of DM. Due to continued depression, and fatigue, another dopamine modulator may be considered as adjunct treatment, such as Abilify or Rexulti. They are also less implicated than Risperidone in weight gain (he has DM, on insulin). However prior to switching the medication, we discussed the trial of increasing risperidone dose to original 3 mg. The etiology of his insomnia is not well understood and the sleep study was discussed and encouraged. To decrease isolation, group therapy was recommended and well received.     [place] PRESCRIPTION MONITORING PROGRAM [] was not checked today and revealed: N/A  PSYCHOTROPIC DRUG INTERACTIONS:   -  VENLAFAXINE and ANTIPLATELET AGENTS may result in an increased risk of bleeding.  -  ASPIRIN and ORAL HYPOGLYCEMICS may result in increased risk of hypoglycemia.    -  RISPERIDONE and SIMVASTATIN may result in increased simvastatin serum concentrations with an increased risk of myopathy or rhabdomyolysis.  - TRAZODONE and VENLAFAXINE may result in an increased risk of serotonin syndrome.  MANAGEMENT:  Monitoring for adverse effects     PLAN                                                                                                                       m2, h3     1) PSYCHOTROPIC MEDICATIONS:  -  Increase risperidone from 2mg to 3 mg QHS  - Cont Effexor XR  225 mg daily  - Cont Trazodone to 50 mg qhs prn (insomnia)     2) THERAPY:    - Seeing psychologist Yeny [facility] at [facility] 11/[facility]. Sees once per month.      3) LABS NEXT DUE: Feb 2019       RATING SCALES:  AIMS: 0 3/1/2018     4) REFERRALS [MICD, medical etc.]:    Recommended follow-up with primary care provider for further evaluation and recommendations re: renal dysfunction, in context of diabetes and Lithium regimen  - Group Therapy with Dr. Colby.   - Sleep study      5) MTM REFERRAL [PharmD]:  none     6) :  none Previously had a CCM until 2011. Has SW consult on 10/20/15, not interested in CM or [facility] worker services at this time and seems to be managing well on his own.      7) RTC: 1 months     8) CRISIS NUMBERS:   Provided routinely in AVS    TREATMENT RISK STATEMENT:  The risks, benefits, alternatives and potential adverse effects have been discussed and are understood by the pt. The pt understands the risks of using street drugs or alcohol. There are no medical contraindications, the pt agrees to treatment with the ability to do so. The pt knows to call the clinic for any problems or to access emergency care if needed.  Medical and substance use concerns are documented above.  Psychotropic drug interaction check was done, including changes made today.    PROVIDER: Natali Dang MD    Patient staffed in clinic with Dr. Porras who will sign the note.  Supervisor is Dr. Bradley.  I saw the patient with the resident, and participated in key portions of the service, including the mental status examination and developing the plan of care. I reviewed key portions of the history with the resident. I agree with the findings and plan as documented in this note.    Maty Porras

## 2018-07-13 NOTE — MR AVS SNAPSHOT
After Visit Summary   7/13/2018    Caden Bush    MRN: [id number]           Patient Information     Date Of Birth          1960        Visit Information        Provider Department      7/13/2018 1:10 PM Natali Powell MD [facility]iatry [facility]        Today's Diagnoses     Schizoaffective disorder, bipolar type (H)          Care Instructions    1) PSYCHOTROPIC MEDICATIONS:  - Increase risperidone to 3 mg QHS  - Cont Effexor XR  225 mg daily  - Cont Trazodone to 50 mg qhs prn (insomnia)     2) THERAPY:    - Seeing psychologist Yeny Goff at [facility] since 11/2015. Sees once per month.      3) LABS NEXT DUE: Feb 2019       RATING SCALES:  AIMS: 0 3/1/2018     4) REFERRALS [MICD, medical etc.]:    Recommended follow-up with primary care provider for further evaluation and recommendations re: renal dysfunction, in context of diabetes and Lithium regimen  - Group Therapy with Dr. Colby.       5) MTM REFERRAL [PharmD]:  none     6) :  none Previously had a CCM until 2011. Has SW consult on 10/20/15, not interested in CM or [facility] worker services at this time and seems to be managing well on his own.      7) RTC: 1 months     8) CRISIS NUMBERS:   Provided routinely in AVS    Thank you for coming to the PSYCHIATRY CLINIC.    Lab Testing:  **If you had lab testing today and your results are reassuring or normal they will be mailed to you or sent through [employer] within 7 days.   **If the lab tests need quick action we will call you with the results.  The phone number we will call with results is # [phone] (home) . If this is not the best number please call our clinic and change the number.    Medication Refills:  If you need any refills please call your pharmacy and they will contact us. Please allow three business for refill processing.   If you need to  your refill at a new pharmacy, please contact the new pharmacy directly. The new pharmacy will help you get your  medications transferred.     Scheduling:  If you have any concerns about today's visit or wish to schedule another appointment please call our office during normal business hours [phone] (8-5:00 M-F)    Contact Us:  Please call [phone] during business hours (8-5:00 M-F).  If after clinic hours, or on the weekend, please call  [phone].      MENTAL HEALTH CRISIS NUMBERS:  [facility]:   [facility] - [phone]   Crisis Residence [facility] - [phone]   Walk-In Counseling [facility] - [phone]   COPE 24/7 [facility] Mobile Team for Adults - [[phone]]; Child - [[phone]]     Crisis Connection - [phone]     [facility]:   [facility] - [phone]   Walk-in counseling [facility] - [phone]   Walk-in counseling [facility] - [phone]   Crisis Residence [facility] - [phone]   Urgent Care Adult Mental Health:   --Drop-in, 24/7 crisis line, and Prasad Tyler Mobile Team [[phone]]    CRISIS TEXT LINE: Text [phone] from anywhere, anytime, any crisis 24/7;    OR SEE www.crisistextline.org     Poison Control Center - [phone]    CHILD: [facility] needs assessment team - [phone]     [facility] - [phone]; or [facility] - [phone]    If you have a medical emergency please call 911or go to the nearest ER.                    _____________________________________________    Again thank you for choosing PSYCHIATRY CLINIC and please let us know how we can best partner with you to improve you and your family's health.  You may be receiving a survey in the mail regarding this appointment. We would love to have your feedback, both positive and negative, so please fill out the survey and return it using the provided envolpe. The survey is done by an external company, so your answers are anonymous.               Follow-ups after your  visit        Your next 10 appointments already scheduled     Jul 17, 2018 10:00 AM CDT   Office Visit with Annie Hart MD   [facility]    [address] [place] [address] [phone]           Bring a current list of meds and any records pertaining to this visit. For Physicals, please bring immunization records and any forms needing to be filled out. Please arrive 10 minutes early to complete paperwork.            Jul 30, 2018  2:00 PM CDT   TELEMEDICINE with Aliyah Angeles [facility] ([facility]    [address] [place] [address] [phone]           Note: this is not an onsite visit; there is no need to come to the facility.              Who to contact     Please call your clinic at [phone] to:    Ask questions about your health    Make or cancel appointments    Discuss your medicines    Learn about your test results    Speak to your doctor            Additional Information About Your Visit        MUJIN Information     MUJIN gives you secure access to your electronic health record. If you see a primary care provider, you can also send messages to your care team and make appointments. If you have questions, please call your primary care clinic.  If you do not have a primary care provider, please call [phone] and they will assist you.      MUJIN is an electronic gateway that provides easy, online access to your medical records. With MUJIN, you can request a clinic appointment, read your test results, renew a prescription or communicate with your care team.     To access your existing account, please contact your [facility] or call [phone] for assistance.        Care EveryWhere ID     This is your Care EveryWhere ID. This could be used by other organizations to access your [facility] medical records  [id number]        Your Vitals Were      Pulse BMI (Body Mass Index)                81 20.2 kg/m2           Blood Pressure from Last 3 Encounters:   07/13/18 128/73   06/07/18 113/70   05/10/18 112/74    Weight from Last 3 Encounters:   07/13/18 64.8 kg (142 lb 12.8 oz)   06/07/18 63.8 kg (140 lb 9.6 oz)   05/10/18 62.5 kg (137 lb 12.8 oz)              Today, you had the following     No orders found for display         Today's Medication Changes          These changes are accurate as of 7/13/18  2:02 PM.  If you have any questions, ask your nurse or doctor.               These medicines have changed or have updated prescriptions.        Dose/Directions    risperiDONE 3 MG tablet   Commonly known as:  risperDAL   This may have changed:    - medication strength  - how much to take   Used for:  Schizoaffective disorder, bipolar type (H)   Changed by:  Natali Powell MD        Dose:  3 mg   Take 1 tablet (3 mg) by mouth At Bedtime   Quantity:  30 tablet   Refills:  1            Where to get your medicines      These medications [facility] [id number] [place] [address] [address] AT [facility] & [address]  [address] [facility] [address]     Phone:  [phone]     risperiDONE 3 MG tablet                Primary Care Provider Office Phone # Fax #    Annie Jeniffer Hart [phone] [address] [place] [place] [address]        Goals        General    Psychosocial (pt-stated)     Notes - Note created  5/3/2018  4:09 PM by Kaylen Reynoso LSW    Goal Statement: [place] to see if I qualify for waiver services to support me in the community by 8/2018.  Measure of Success: [place]  Supportive Steps to Achieve: Call [facility] sent e-mail with info  Barriers: feeling tired,worn out  Strengths: asked for assistance,feels comfortable alling  Date to Achieve By: 8/2018          Equal Access to Services     ERICA URIAS AH: Pat aad  anselmo Baeza, wanena lindseyneeraj, qainna kaventura winn, marcia toreyin hayaasuzi mcgheerocío princeleanneremy diaz peggy. So [facility] [phone].    ATENCIÓN: Si roxannla marito, tiene a magaña disposición servicios gratuitos de asistencia lingüística. Maricruz al [phone].    We comply with applicable federal civil rights laws and [place] laws. We do not discriminate on the basis of race, color, national origin, age, disability, sex, sexual orientation, or gender identity.            Thank you!     Thank you for choosing PSYCHIATRY CLINIC  for your care. Our goal is always to provide you with excellent care. Hearing back from our patients is one way we can continue to improve our services. Please take a few minutes to complete the written survey that you may receive in the mail after your visit with us. Thank you!             Your Updated Medication List - Protect others around you: Learn how to safely use, store and throw away your medicines at www.disposemymeds.org.          This list is accurate as of 7/13/18  2:02 PM.  Always use your most recent med list.                   Brand Name Dispense Instructions for use Diagnosis    ACE/ARB/ARNI NOT PRESCRIBED (INTENTIONAL)      ACE & ARB not prescribed due to Risk for drug interaction- lithium toxicity in the past        aspirin 81 MG EC tablet      Take 81 mg by mouth daily.        BASAGLAR 100 UNIT/ML injection     15 mL    Inject 7 Units Subcutaneous At Bedtime We will make further adjustments as we see your response    Type 2 diabetes mellitus with both eyes affected by moderate nonproliferative retinopathy without macular edema, without long-term current use of insulin (H)       blood glucose monitoring test strip    ONETOUCH ULTRA    100 each    TEST ONCE DAILY AS DIRECTED    Encounter for medication refill       cholecalciferol 1000 UNIT tablet    vitamin D3     Take 1 tablet by mouth daily.        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    Type 2 diabetes mellitus with both eyes affected by moderate nonproliferative retinopathy without macular edema, without long-term current use of insulin (H)       metFORMIN 500 MG 24 hr tablet    GLUCOPHAGE-XR    360 tablet    TAKE 2 TABLETS BY MOUTH TWICE DAILY    Encounter for medication refill       pioglitazone 45 MG tablet    ACTOS    90 tablet    TAKE 1 TABLET BY MOUTH DAILY    Encounter for medication refill       risperiDONE 3 MG tablet    risperDAL    30 tablet    Take 1 tablet (3 mg) by mouth At Bedtime    Schizoaffective disorder, bipolar type (H)       simvastatin 40 MG tablet    ZOCOR    90 tablet    TAKE ONE TABLET BY MOUTH EVERY NIGHT AT BEDTIME    Encounter for medication refill       traZODone 50 MG tablet    DESYREL    30 tablet    Take 1 tablet (50 mg) by mouth nightly as needed for sleep    Schizoaffective disorder, bipolar type (H)       * venlafaxine 75 MG 24 hr capsule    EFFEXOR XR    30 capsule    Take 1 capsule (75 mg) by mouth daily Please take with Effexor 150 mg for a total dose of 225 mg daily    Schizoaffective disorder, bipolar type (H)       * venlafaxine 150 MG 24 hr capsule    EFFEXOR XR    30 capsule    Take 1 capsule (150 mg) by mouth daily Along with a 75 mg for a total of 225 mg daily dose.    Schizoaffective disorder, bipolar type (H)       * Notice:  This list has 2 medication(s) that are the same as other medications prescribed for you. Read the directions carefully, and ask your doctor or other care provider to review them with you.

## 2018-07-13 NOTE — PROGRESS NOTES
Clinic Care Coordination Contact  Clinic Care Coordination Contact  OUTREACH    Referral Information:  Referral Source: Self-patient/Caregiver    Primary Diagnosis: Psychosocial    Chief Complaint   Patient presents with     Clinic Care Coordination - Follow-up     Community Support Services     Clinical Concerns:  Current Medical Concerns: Pt has a diagnosis of schizoaffective disorder and new diagnosis of diabetes.  LONI PEREZ was working with pt on community support services. Outreach to follow-up on progress with [facility] services.  Health Maintenance Reviewed: Due/Overdue      Psychosocial:  Confucianist or spiritual beliefs that impact treatment:: No  Mental health DX:: Yes  Mental health DX how managed:: Medication, Outpatient Counseling, Psychiatrist, [facility]- Pt had his initial meeting with his [facility] worker Josh and established goals they would like to work towards.  Informal Support system:: Family  Financial/Insurance: Pt has been approved for the CADI waiver services. Pt's  is Christa Durán [phone]. They have had an initial meeting, but still need to set-up the services that patient would like to have. Pt's top pick is for meal delivery/support.      Resources and Interventions:  Current Resources: Community Resources: [facility] Worker  Supplies used at home:: None  Equipment Currently Used at Home: none, grab bar  Patient/Caregiver understanding: Pt reports understanding and denies any additional questions or concerns at this times. LONI PEREZ engaged in AIDET communication during encounter.    Future Appointments              In 4 days Annie Hart MD [facility], [facility]    In 2 weeks Aliyah Angeles [facility], [facility]    In 1 month Natali Powell MD Psychiatry Clinic, [facility] CLIN          Plan: Pt achieved goals that were set and denies any additional concerns at this time. No further outreaches will be made at this time unless a  new referral is made or a change in the pt's status occurs. Patient was provided with this writer's contact information and encouraged to call with any questions or concerns.    Kaylen Reynoso [facility]  Clinic Care Coordinator  [phone]  [email or web address]

## 2018-07-13 NOTE — NURSING NOTE
Chief Complaint   Patient presents with     Recheck Medication     Moderate episode of recurrent major depressive disorder

## 2018-07-13 NOTE — PATIENT INSTRUCTIONS
1) PSYCHOTROPIC MEDICATIONS:  - Increase risperidone to 3 mg QHS  - Cont Effexor XR  225 mg daily  - Cont Trazodone to 50 mg qhs prn (insomnia)     2) THERAPY:    - Seeing psychologist Y[facility]y Goff at [facility] since 11/2015. Sees once per month.      3) LABS NEXT DUE: Feb 2019       RATING SCALES:  AIMS: 0 3/1/2018     4) REFERRALS [MICD, medical etc.]:    Recommended follow-up with primary care provider for further evaluation and recommendations re: renal dysfunction, in context of diabetes and Lithium regimen  - Group Therapy with Dr. Colby.       5) MTM REFERRAL [PharmD]:  none     6) :  none Previously had a CCM until 2011. Has SW consult on 10/20/15, not interested in CM or [facility] worker services at this time and seems to be managing well on his own.      7) RTC: 1 months     8) CRISIS NUMBERS:   Provided routinely in AVS    Thank you for coming to the PSYCHIATRY CLINIC.    Lab Testing:  **If you had lab testing today and your results are reassuring or normal they will be mailed to you or sent through [employer] within 7 days.   **If the lab tests need quick action we will call you with the results.  The phone number we will call with results is # [phone] (home) . If this is not the best number please call our clinic and change the number.    Medication Refills:  If you need any refills please call your pharmacy and they will contact us. Please allow three business for refill processing.   If you need to  your refill at a new pharmacy, please contact the new pharmacy directly. The new pharmacy will help you get your medications transferred.     Scheduling:  If you have any concerns about today's visit or wish to schedule another appointment please call our office during normal business hours [phone] (8-5:00 M-F)    Contact Us:  Please call [phone] during business hours (8-5:00 M-F).  If after clinic hours, or on the weekend, please call  [phone].      MENTAL HEALTH  CRISIS NUMBERS:  [facility]:   [facility] - [phone]   Crisis Residence [facility] - [phone]   Walk-In Counseling Center [facility] - [phone]   COPE 24/7 [facility] Mobile Team for Adults - [[phone]]; Child - [[phone]]     Crisis Connection - [phone]     [facility] - [phone]   Walk-in counseling [facility] - [phone]   Walk-in counseling [facility] - [phone]   Crisis Residence [facility] - [phone]   Urgent Care Adult Mental Health:   --Drop-in, 24/7 crisis line, and Prasad Tyler Mobile Team [[phone]]    CRISIS TEXT LINE: Text [phone] from anywhere, anytime, any crisis 24/7;    OR SEE www.crisistextline.org     Poison Control Center - [phone]    CHILD: [facility] needs assessment team - [phone]     [facility] Lifeline - [phone]; or [employer] Lifeline - [phone]    If you have a medical emergency please call 911or go to the nearest ER.                    _____________________________________________    Again thank you for choosing PSYCHIATRY CLINIC and please let us know how we can best partner with you to improve you and your family's health.  You may be receiving a survey in the mail regarding this appointment. We would love to have your feedback, both positive and negative, so please fill out the survey and return it using the provided envolpe. The survey is done by an external company, so your answers are anonymous.

## 2018-07-17 ENCOUNTER — OFFICE VISIT (OUTPATIENT)
Dept: FAMILY MEDICINE | Facility: CLINIC | Age: 58
End: 2018-07-17

## 2018-07-17 VITALS
WEIGHT: 137 LBS | SYSTOLIC BLOOD PRESSURE: 136 MMHG | HEART RATE: 98 BPM | DIASTOLIC BLOOD PRESSURE: 80 MMHG | BODY MASS INDEX: 19.38 KG/M2

## 2018-07-17 DIAGNOSIS — I10 BENIGN ESSENTIAL HYPERTENSION: ICD-10-CM

## 2018-07-17 DIAGNOSIS — E11.3393 TYPE 2 DIABETES MELLITUS WITH BOTH EYES AFFECTED BY MODERATE NONPROLIFERATIVE RETINOPATHY WITHOUT MACULAR EDEMA, WITHOUT LONG-TERM CURRENT USE OF INSULIN (H): Primary | ICD-10-CM

## 2018-07-17 DIAGNOSIS — B35.1 ONYCHOMYCOSIS: ICD-10-CM

## 2018-07-17 LAB
A/C RATIO MG/G: ABNORMAL MG/G
ALBUMIN (URINE) MG/L: 10 MG/L
INTERPRETATION: ABNORMAL
URINE CREATININE MG/DL - QUEST: 50 MG/DL

## 2018-07-17 PROCEDURE — 82570 ASSAY OF URINE CREATININE: CPT | Performed by: FAMILY MEDICINE

## 2018-07-17 PROCEDURE — 82044 UR ALBUMIN SEMIQUANTITATIVE: CPT | Performed by: FAMILY MEDICINE

## 2018-07-17 PROCEDURE — 99207 C FOOT EXAM  NO CHARGE: CPT | Performed by: FAMILY MEDICINE

## 2018-07-17 PROCEDURE — 99213 OFFICE O/P EST LOW 20 MIN: CPT | Performed by: FAMILY MEDICINE

## 2018-07-17 RX ORDER — LISINOPRIL 5 MG/1
5 TABLET ORAL DAILY
Qty: 90 TABLET | Refills: 3 | Status: SHIPPED | OUTPATIENT
Start: 2018-07-17 | End: 2018-08-20

## 2018-07-17 ASSESSMENT — PATIENT HEALTH QUESTIONNAIRE - PHQ9: SUM OF ALL RESPONSES TO PHQ QUESTIONS 1-9: 2

## 2018-07-17 NOTE — LETTER
My Depression Action Plan  Name: Caden Bush   Date of Birth 1960  Date: 7/17/2018    My doctor: Annie Hart   My clinic: [facility] [address] [place] [address] [phone]          GREEN    ZONE   Good Control    What it looks like:     Things are going generally well. You have normal up s and down s. You may even feel depressed from time to time, but bad moods usually last less than a day.   What you need to do:  1. Continue to care for yourself (see self care plan)  2. Check your depression survival kit and update it as needed  3. Follow your physician s recommendations including any medication.  4. Do not stop taking medication unless you consult with your physician first.           YELLOW         ZONE Getting Worse    What it looks like:     Depression is starting to interfere with your life.     It may be hard to get out of bed; you may be starting to isolate yourself from others.    Symptoms of depression are starting to last most all day and this has happened for several days.     You may have suicidal thoughts but they are not constant.   What you need to do:     1. Call your care team, your response to treatment will improve if you keep your care team informed of your progress. Yellow periods are signs an adjustment may need to be made.     2. Continue your self-care, even if you have to fake it!    3. Talk to someone in your support network    4. Open up your depression survival kit           RED    ZONE Medical Alert - Get Help    What it looks like:     Depression is seriously interfering with your life.     You may experience these or other symptoms: You can t get out of bed most days, can t work or engage in other necessary activities, you have trouble taking care of basic hygiene, or basic responsibilities, thoughts of suicide or death that will not go away, self-injurious behavior.     What you need to do:  1. Call your care team and  request a same-day appointment. If they are not available (weekends or after hours) call your local crisis line, emergency room or 911.            Depression Self Care Plan / Survival Kit    Self-Care for Depression  Here s the deal. Your body and mind are really not as separate as most people think.  What you do and think affects how you feel and how you feel influences what you do and think. This means if you do things that people who feel good do, it will help you feel better.  Sometimes this is all it takes.  There is also a place for medication and therapy depending on how severe your depression is, so be sure to consult with your medical provider and/ or Behavioral Health Consultant if your symptoms are worsening or not improving.     In order to better manage my stress, I will:    Exercise  Get some form of exercise, every day. This will help reduce pain and release endorphins, the  feel good  chemicals in your brain. This is almost as good as taking antidepressants!  This is not the same as joining a gym and then never going! (they count on that by the way ) It can be as simple as just going for a walk or doing some gardening, anything that will get you moving.      Hygiene   Maintain good hygiene (Get out of bed in the morning, Make your bed, Brush your teeth, Take a shower, and Get dressed like you were going to work, even if you are unemployed).  If your clothes don't fit try to get ones that do.    Diet  I will strive to eat foods that are good for me, drink plenty of water, and avoid excessive sugar, caffeine, alcohol, and other mood-altering substances.  Some foods that are helpful in depression are: complex carbohydrates, B vitamins, flaxseed, fish or fish oil, fresh fruits and vegetables.    Psychotherapy  I agree to participate in Individual Therapy (if recommended).    Medication  If prescribed medications, I agree to take them.  Missing doses can result in serious side effects.  I understand that  drinking alcohol, or other illicit drug use, may cause potential side effects.  I will not stop my medication abruptly without first discussing it with my provider.    Staying Connected With Others  I will stay in touch with my friends, family members, and my primary care provider/team.    Use your imagination  Be creative.  We all have a creative side; it doesn t matter if it s oil painting, sand castles, or mud pies! This will also kick up the endorphins.    Witness Beauty  (AKA stop and smell the roses) Take a look outside, even in mid-winter. Notice colors, textures. Watch the squirrels and birds.     Service to others  Be of service to others.  There is always someone else in need.  By helping others we can  get out of ourselves  and remember the really important things.  This also provides opportunities for practicing all the other parts of the program.    Humor  Laugh and be silly!  Adjust your TV habits for less news and crime-drama and more comedy.    Control your stress  Try breathing deep, massage therapy, biofeedback, and meditation. Find time to relax each day.     My support system    Clinic Contact:  Phone number:    Contact 1:  Phone number:    Contact 2:  Phone number:    Lutheran/:  Phone number:    Therapist:  Phone number:    Local crisis center:    Phone number:    Other community support:  Phone number:

## 2018-07-17 NOTE — MR AVS SNAPSHOT
After Visit Summary   7/17/2018    Caden Bush    MRN: [id number]           Patient Information     Date Of Birth          1960        Visit Information        Provider Department      7/17/2018 10:00 AM Annie Hart MD [facility]        Today's Diagnoses     Type 2 diabetes mellitus with both eyes affected by moderate nonproliferative retinopathy without macular edema, without long-term current use of insulin (H)    -  1    Benign essential hypertension        Onychomycosis           Follow-ups after your visit        Your next 10 appointments already scheduled     Jul 30, 2018  2:00 PM CDT   [facility]INE with [facility] ([facility])    [address] [place] [address] [phone]           Note: this is not an onsite visit; there is no need to come to the facility.            Aug 17, 2018 10:10 AM CDT   Adult Med Follow UP with Natali Dang MD   Psychiatry Clinic ([facility])    [place] [id number]  [address] [place] [address] [phone]              Who to contact     If you have questions or need follow up information about today's clinic visit or your schedule please contact [facility] directly at [phone].  Normal or non-critical lab and imaging results will be communicated to you by MyChart, letter or phone within 4 business days after the clinic has received the results. If you do not hear from us within 7 days, please contact the clinic through MyChart or phone. If you have a critical or abnormal lab result, we will notify you by phone as soon as possible.  Submit refill requests through [employer] or call your pharmacy and they will forward the refill request to us. Please allow 3 business days for your refill to be completed.          Additional Information About Your Visit        MyChart Information     [employer]  gives you secure access to your electronic health record. If you see a primary care provider, you can also send messages to your care team and make appointments. If you have questions, please call your primary care clinic.  If you do not have a primary care provider, please call [phone] and they will assist you.        Care EveryWhere ID     This is your Care EveryWhere ID. This could be used by other organizations to access your [facility] medical records  [id number]        Your Vitals Were     Pulse BMI (Body Mass Index)                98 19.38 kg/m2           Blood Pressure from Last 3 Encounters:   07/17/18 136/80   07/13/18 128/73   06/07/18 113/70    Weight from Last 3 Encounters:   07/17/18 62.1 kg (137 lb)   07/13/18 64.8 kg (142 lb 12.8 oz)   06/07/18 63.8 kg (140 lb 9.6 oz)              We Performed the Following     FOOT EXAM     Hemoglobin A1C ([facility])     Microalbumin ([facility])          Today's Medication Changes          These changes are accurate as of 7/17/18  9:28 PM.  If you have any questions, ask your nurse or doctor.               Start taking these medicines.        Dose/Directions    lisinopril 5 MG tablet   Commonly known as:  PRINIVIL/ZESTRIL   Used for:  Benign essential hypertension   Started by:  Annie Hart MD        Dose:  5 mg   Take 1 tablet (5 mg) by mouth daily   Quantity:  90 tablet   Refills:  3            Where to get your medicines      These medications were sent to [facility] [address] [place] [address] [address] AT [facility] [address]  [address] [facility] [address]     Phone:  [phone]     lisinopril 5 MG tablet                Primary Care Provider Office Phone # Fax #    Annie Hart [phone] [address] [place] [address]        Equal Access to Services     ERICA URIAS AH: Hadii aad ku hadasho Soomaali, waaxda luqadaha, qaybta kaaldebby winn, marcia saleh  lagiovany owods. So [facility] [phone].    ATENCIÓN: Si roxannla marito, tiene a magaña disposición servicios gratuitos de asistencia lingüística. Maricruz al [phone].    We comply with applicable federal civil rights laws and [place] laws. We do not discriminate on the basis of race, color, national origin, age, disability, sex, sexual orientation, or gender identity.            Thank you!     Thank you for choosing [facility]  for your care. Our goal is always to provide you with excellent care. Hearing back from our patients is one way we can continue to improve our services. Please take a few minutes to complete the written survey that you may receive in the mail after your visit with us. Thank you!             Your Updated Medication List - Protect others around you: Learn how to safely use, store and throw away your medicines at www.disposemymeds.org.          This list is accurate as of 7/17/18  9:28 PM.  Always use your most recent med list.                   Brand Name Dispense Instructions for use Diagnosis    ACE/ARB/ARNI NOT PRESCRIBED (INTENTIONAL)      ACE & ARB not prescribed due to Risk for drug interaction- lithium toxicity in the past        aspirin 81 MG EC tablet      Take 81 mg by mouth daily.        BASAGLAR 100 UNIT/ML injection     15 mL    Inject 7 Units Subcutaneous At Bedtime We will make further adjustments as we see your response    Type 2 diabetes mellitus with both eyes affected by moderate nonproliferative retinopathy without macular edema, without long-term current use of insulin (H)       blood glucose monitoring test strip    ONETOUCH ULTRA    100 each    TEST ONCE DAILY AS DIRECTED    Encounter for medication refill       cholecalciferol 1000 UNIT tablet    vitamin D3     Take 1 tablet by mouth daily.        COMPLETE NEEDLE COLLECTION SYS Misc     1 each    1 each daily    Type 2 diabetes mellitus with both eyes affected by moderate nonproliferative retinopathy without macular  edema, with long-term current use of insulin (H)       FISH OIL      1 capsule daily        insulin pen needle 32G X 4 MM    BD BRENDAN U/F    100 each    Use 1 daily as directed.    Type 2 diabetes mellitus with both eyes affected by moderate nonproliferative retinopathy without macular edema, without long-term current use of insulin (H)       lisinopril 5 MG tablet    PRINIVIL/ZESTRIL    90 tablet    Take 1 tablet (5 mg) by mouth daily    Benign essential hypertension       metFORMIN 500 MG 24 hr tablet    GLUCOPHAGE-XR    360 tablet    TAKE 2 TABLETS BY MOUTH TWICE DAILY    Encounter for medication refill       pioglitazone 45 MG tablet    ACTOS    90 tablet    TAKE 1 TABLET BY MOUTH DAILY    Encounter for medication refill       risperiDONE 3 MG tablet    risperDAL    30 tablet    Take 1 tablet (3 mg) by mouth At Bedtime    Schizoaffective disorder, bipolar type (H)       simvastatin 40 MG tablet    ZOCOR    90 tablet    TAKE ONE TABLET BY MOUTH EVERY NIGHT AT BEDTIME    Encounter for medication refill       traZODone 50 MG tablet    DESYREL    30 tablet    Take 1 tablet (50 mg) by mouth nightly as needed for sleep    Schizoaffective disorder, bipolar type (H)       * venlafaxine 75 MG 24 hr capsule    EFFEXOR XR    30 capsule    Take 1 capsule (75 mg) by mouth daily Please take with Effexor 150 mg for a total dose of 225 mg daily    Schizoaffective disorder, bipolar type (H)       * venlafaxine 150 MG 24 hr capsule    EFFEXOR XR    30 capsule    Take 1 capsule (150 mg) by mouth daily Along with a 75 mg for a total of 225 mg daily dose.    Schizoaffective disorder, bipolar type (H)       * Notice:  This list has 2 medication(s) that are the same as other medications prescribed for you. Read the directions carefully, and ask your doctor or other care provider to review them with you.

## 2018-07-17 NOTE — PROGRESS NOTES
Problem(s) Oriented visit        SUBJECTIVE:                                                    Caden Bush is a 58 year old male who presents to clinic today for diabetes check. He had been getting symptomatic low readings in the 50-60s so his basaglar was lowered to 7 units daily. His typical am fasting blood sugar is . Only a couple readings of 200, 120-130s is most common. He takes daily aspirin. His last A1c was 8.2. He is following diabetic diet. He gets exercise with walking, typically 150 minutes weekly. He does not take anything for blood pressure. He thinks he might have taken lisinopril in the past. He thought it may have made him dizzy.    Problem list, Medication list, Allergies, and Medical/Social/Surgical histories reviewed in EPIC and updated as appropriate.   Additional history: as documented    ROS:  5 point ROS completed and negative except noted above, including Gen, CV, Resp, GI, MS      Histories:   Patient Active Problem List   Diagnosis     Eczema     Retinopathy     Moderate episode of recurrent major depressive disorder (H)     Polyp of colon, adenomatous     Health Care Home     Hx of psychiatric care     Neuropathy of left upper extremity     Type 2 diabetes mellitus with both eyes affected by moderate nonproliferative retinopathy without macular edema, without long-term current use of insulin (H)     Past Surgical History:   Procedure Laterality Date     APPENDECTOMY       COLONOSCOPY  2012    2 adenomatous polyps       Social History   Substance Use Topics     Smoking status: Never Smoker     Smokeless tobacco: Never Used     Alcohol use No     Family History   Problem Relation Age of Onset     Osteoperosis Mother      Osteoperosis Father      Asthma Father      Cancer - colorectal Father 70     Breast Cancer Mother 78     Rheumatoid Arthritis Sister            OBJECTIVE:                                                    /80  Pulse 98  Wt 62.1 kg (137 lb)  BMI 19.38  kg/m2  Body mass index is 19.38 kg/(m^2).   GENERAL APPEARANCE: Alert, no acute distress  NECK: No adenopathy,masses or thyromegaly  RESP: lungs clear to auscultation   CV: normal rate, regular rhythm, no murmur or gallop  MS: extremities normal, no peripheral edema  MS: normal monofilament test bilaterally  NEURO: Alert, oriented, speech and mentation normal  PSYCH: mentation appears normal, affect and mood normal   Labs Resulted Today:   Results for orders placed or performed during the hospital encounter of 18   Echocardiogram Complete    Narrative    [id number]  [facility]  [id number]  [id number]^MARCIA^FE^KYUNG        [facility] [place]  [facility]ch[facility]  [address]  [place] [address]  Phone [phone]  Fax [phone]        Name: JOSE MANUEL FARMER  MRN: [id number]  : 1960  Study Date: 2018 12:47 PM  Age: 58 yrs  Gender: Male  Patient Location: [facility]  Reason For Study: Newly recognized murmur  Ordering Physician: FE KENNEDY  Referring Physician: FE KENNEDY  Performed By: Glenna Turner,     BSA: 1.8 m2  Height: 70 in  Weight: 142 lb  BP: 136/80 mmHg  _____________________________________________________________________________  __        Procedure  Complete Echo Adult.  _____________________________________________________________________________  __        Interpretation Summary     Left ventricular systolic function is normal.  The visual ejection fraction is estimated at 55-60%.  The mitral valve leaflets are mildly thickened.  Mild mitral valve prolapse, anterior leaflet  There is trace mitral regurgitation.  There is no comparison study available.  _____________________________________________________________________________  __        Left Ventricle  The left ventricle is normal in size. Proximal septal thickening is noted.  Left ventricular systolic function is normal. The visual ejection  fraction is  estimated at 55-60%. Grade I or early diastolic dysfunction. No regional wall  motion abnormalities noted.     Right Ventricle  Borderline right ventricular enlargement. The right ventricular systolic  function is normal.     Atria  Normal left atrial size. Right atrial size is normal. Chiari network (normal  variant) is noted.     Mitral Valve  The mitral valve leaflets are mildly thickened. Mild mitral valve prolapse,  anterior leaflet. There is trace mitral regurgitation. There is no mitral  valve stenosis.     Tricuspid Valve  The tricuspid valve is not well visualized, but is grossly normal. Right  ventricular systolic pressure could not be approximated due to inadequate  tricuspid regurgitation.        Aortic Valve  The aortic valve is trileaflet. No aortic regurgitation is present. No aortic  stenosis is present.     Pulmonic Valve  The pulmonic valve is not well visualized. There is trace to mild pulmonic  valvular regurgitation.     Vessels  (The upper limit of normal is 3.7cm for aortic root diameter.). The IVC is  normal in size and reactivity with respiration, suggesting normal central  venous pressure.     Pericardium  The pericardium appears normal.     _____________________________________________________________________________  __  MMode/2D Measurements & Calculations  IVSd: 1.1 cm  LVIDd: 3.5 cm  LVIDs: 2.3 cm  LVPWd: 1.1 cm  FS: 34.9 %  LV mass(C)d: 120.4 grams  LV mass(C)dI: 66.7 grams/m2  Ao root diam: 3.7 cm  LA dimension: 2.9 cm  asc Aorta Diam: 3.4 cm  LA/Ao: 0.77     RWT: 0.65        Doppler Measurements & Calculations  MV E max brittany: 86.3 cm/sec  MV A max brittany: 111.4 cm/sec  MV E/A: 0.77  MV dec time: 0.15 sec  PA acc time: 0.13 sec  E/E' av.0  Lateral E/e': 6.1  Medial E/e': 11.9           _____________________________________________________________________________  __           Report approved by: Darrin Beckett 2018 01:52 PM        ASSESSMENT/PLAN:                                                         Caden was seen today for diabetes.    Diagnoses and all orders for this visit:    Type 2 diabetes mellitus with both eyes affected by moderate nonproliferative retinopathy without macular edema, without long-term current use of insulin (H)  -     FOOT EXAM  -     Microalbumin ([facility])  -     Hemoglobin A1C ([facility])  Follow up pending A1c results. Continue current medication.     Benign essential hypertension  -     lisinopril (PRINIVIL/ZESTRIL) 5 MG tablet; Take 1 tablet (5 mg) by mouth daily  Poor tolerance previously was likely due to dose being too high. Start low and go slow on dose adjustment.    Onychomycosis  Options to treat or not were discussed and he does not want to treat.     There are no Patient Instructions on file for this visit.    The following health maintenance items are reviewed in Epic and correct as of today:  Health Maintenance   Topic Date Due     DEPRESSION ACTION PLAN Q1 YR  04/10/1977     HIV SCREEN (SYSTEM ASSIGNED)  04/10/1977     MICROALBUMIN Q1 YEAR  06/07/2018     PHQ-9 Q1 MONTH  08/17/2018     INFLUENZA VACCINE (1) 09/01/2018     A1C Q6 MO  09/19/2018     EYE EXAM Q1 YEAR  12/08/2018     MEDICARE ANNUAL WELLNESS VISIT  12/27/2018     NEDRA QUESTIONNAIRE 1 YEAR  12/27/2018     CREATININE Q1 YEAR  04/17/2019     LIPID MONITORING Q1 YEAR  04/17/2019     FOOT EXAM Q1 YEAR  05/27/2019     ADVANCE DIRECTIVE PLANNING Q5 YRS  12/17/2019     TSH W/ FREE T4 REFLEX Q2 YEAR  04/17/2020     COLONOSCOPY Q5 YR  06/19/2020     TETANUS IMMUNIZATION (SYSTEM ASSIGNED)  12/18/2025     PNEUMOVAX 1X HI RISK PATIENT < 65 (NO IB MSG)  Completed     HEPATITIS C SCREENING  Completed       Annie Hart MD  [facility]  Family Practice  [facility]  [phone]    For any issues my office # is [phone]

## 2018-07-18 DIAGNOSIS — F25.0 SCHIZOAFFECTIVE DISORDER, BIPOLAR TYPE (H): ICD-10-CM

## 2018-07-18 LAB — HBA1C MFR BLD: 6.7 % (ref 4.8–5.6)

## 2018-07-18 NOTE — TELEPHONE ENCOUNTER
Medication requested: venlafaxine (EFFEXOR XR) 75 MG   Last refilled: 6/19/18  Qty: 30      Last seen: 7/13/18  RTC: 1 mos  Cancel: 0  No-show: 0  Next appt: 8/17/18    Refill decision:  30 DAY RF PENDING   Refill pended and routed to RN for review/determination due to : LAST RF BY LUIS MUNGUIA. LAST VISIT  7/13/18 Natali Powell MD  WITH OPEN / UNSIGNED NOTE?    NO DIRECTION WHO TO ROUTE FOR THIS MD?   THANKS

## 2018-07-19 RX ORDER — VENLAFAXINE HYDROCHLORIDE 75 MG/1
75 CAPSULE, EXTENDED RELEASE ORAL DAILY
Qty: 30 CAPSULE | Refills: 0 | Status: SHIPPED | OUTPATIENT
Start: 2018-07-19 | End: 2018-09-27

## 2018-07-30 ENCOUNTER — ALLIED HEALTH/NURSE VISIT (OUTPATIENT)
Dept: PHARMACY | Facility: PHYSICIAN GROUP | Age: 58
End: 2018-07-30
Payer: MEDICAID

## 2018-07-30 DIAGNOSIS — E11.3393 TYPE 2 DIABETES MELLITUS WITH BOTH EYES AFFECTED BY MODERATE NONPROLIFERATIVE RETINOPATHY WITHOUT MACULAR EDEMA, WITHOUT LONG-TERM CURRENT USE OF INSULIN (H): Primary | ICD-10-CM

## 2018-07-30 PROCEDURE — 99606 MTMS BY PHARM EST 15 MIN: CPT | Performed by: PHARMACIST

## 2018-07-30 NOTE — MR AVS SNAPSHOT
After Visit Summary   7/30/2018    Caden Bush    MRN: [id number]           Patient Information     Date Of Birth          1960        Visit Information        Provider Department      7[facility]30/2018 2:00 [facility]        Today's Diagnoses     Type 2 diabetes mellitus with both eyes affected by moderate nonproliferative retinopathy without macular edema, without long-term current use of insulin (H)    -  1       Follow-ups after your visit        Your next 10 appointments already scheduled     Aug 13, 2018  2:00 PM CDT   TELEMEDICINE with Aliyah Angeles RPH   [facility] ([facility])    [address] [place] [address] [phone]           Note: this is not an onsite visit; there is no need to come to the facility.            Aug 17, 2018 10:10 AM CDT   Adult Med Follow UP with Natali Dang MD   Psychiatry Clinic ([facility])    [place] [id number]  [address] [place] [address] [phone]              Who to contact     If you have questions or need follow up information about today's clinic visit or your schedule please contact [facility] directly at [phone].  Normal or non-critical lab and imaging results will be communicated to you by Meep[employer]art, letter or phone within 4 business days after the clinic has received the results. If you do not hear from us within 7 days, please contact the clinic through Meep[employer]art or phone. If you have a critical or abnormal lab result, we will notify you by phone as soon as possible.  Submit refill requests through [employer] or call your pharmacy and they will forward the refill request to us. Please allow 3 business days for your refill to be completed.          Additional Information About Your Visit        [employer] Information     [employer] gives you secure access to your electronic health record.  If you see a primary care provider, you can also send messages to your care team and make appointments. If you have questions, please call your primary care clinic.  If you do not have a primary care provider, please call [phone] and they will assist you.        Care EveryWhere ID     This is your Care EveryWhere ID. This could be used by other organizations to access your [facility] medical records  [id number]         Blood Pressure from Last 3 Encounters:   07/17/18 136/80   04/30/18 136/80   04/17/18 128/62    Weight from Last 3 Encounters:   07/17/18 137 lb (62.1 kg)   04/17/18 142 lb (64.4 kg)   12/27/17 154 lb (69.9 kg)              Today, you had the following     No orders found for display       Primary Care Provider Office Phone # Fax #    Annie Jeniffer Hart [phone] [address] [place] [address]        Equal Access to Services     ERICA URIAS : Hadii aad ku hadasho Soomaali, waaxda luqadaha, qaybta kaalmada adeegyada, waxay idiin hayaan adeeg kharash la'rafaelan . So [facility] [phone].    ATENCIÓN: Si habla español, tiene a magaña disposición servicios gratuitos de asistencia lingüística. Llame al [phone].    We comply with applicable federal civil rights laws and [place] laws. We do not discriminate on the basis of race, color, national origin, age, disability, sex, sexual orientation, or gender identity.            Thank you!     Thank you for choosing [facility]  for your care. Our goal is always to provide you with excellent care. Hearing back from our patients is one way we can continue to improve our services. Please take a few minutes to complete the written survey that you may receive in the mail after your visit with us. Thank you!             Your Updated Medication List - Protect others around you: Learn how to safely use, store and throw away your medicines at www.disposemymeds.org.          This list is accurate as of 7/30/18  11:59 PM.  Always use your most recent med list.                   Brand Name Dispense Instructions for use Diagnosis    aspirin 81 MG EC tablet      Take 81 mg by mouth daily.        BASAGLAR 100 UNIT/ML injection     15 mL    Inject 7 Units Subcutaneous At Bedtime We will make further adjustments as we see your response    Type 2 diabetes mellitus with both eyes affected by moderate nonproliferative retinopathy without macular edema, without long-term current use of insulin (H)       blood glucose monitoring test strip    ONETOUCH ULTRA    100 each    TEST ONCE DAILY AS DIRECTED    Encounter for medication refill       cholecalciferol 1000 UNIT tablet    vitamin D3     Take 1 tablet by mouth daily.        COMPLETE NEEDLE COLLECTION SYS Misc     1 each    1 each daily    Type 2 diabetes mellitus with both eyes affected by moderate nonproliferative retinopathy without macular edema, with long-term current use of insulin (H)       FISH OIL      1 capsule daily        insulin pen needle 32G X 4 MM    BD BRENDAN U/F    100 each    Use 1 daily as directed.    Type 2 diabetes mellitus with both eyes affected by moderate nonproliferative retinopathy without macular edema, without long-term current use of insulin (H)       lisinopril 5 MG tablet    PRINIVIL/ZESTRIL    90 tablet    Take 1 tablet (5 mg) by mouth daily    Benign essential hypertension       metFORMIN 500 MG 24 hr tablet    GLUCOPHAGE-XR    360 tablet    TAKE 2 TABLETS BY MOUTH TWICE DAILY    Encounter for medication refill       pioglitazone 45 MG tablet    ACTOS    90 tablet    TAKE 1 TABLET BY MOUTH DAILY    Encounter for medication refill       risperiDONE 3 MG tablet    risperDAL    30 tablet    Take 1 tablet (3 mg) by mouth At Bedtime    Schizoaffective disorder, bipolar type (H)       simvastatin 40 MG tablet    ZOCOR    90 tablet    TAKE ONE TABLET BY MOUTH EVERY NIGHT AT BEDTIME    Encounter for medication refill       traZODone 50 MG tablet    DESYREL     30 tablet    Take 1 tablet (50 mg) by mouth nightly as needed for sleep    Schizoaffective disorder, bipolar type (H)       * venlafaxine 150 MG 24 hr capsule    EFFEXOR XR    30 capsule    Take 1 capsule (150 mg) by mouth daily Along with a 75 mg for a total of 225 mg daily dose.    Schizoaffective disorder, bipolar type (H)       * venlafaxine 75 MG 24 hr capsule    EFFEXOR XR    30 capsule    Take 1 capsule (75 mg) by mouth daily Please take with Effexor 150 mg for a total dose of 225 mg daily    Schizoaffective disorder, bipolar type (H)       * Notice:  This list has 2 medication(s) that are the same as other medications prescribed for you. Read the directions carefully, and ask your doctor or other care provider to review them with you.

## 2018-07-30 NOTE — PROGRESS NOTES
SUBJECTIVE/OBJECTIVE:                Caden Bush is a 58 year old male called for a follow-up visit for Medication Therapy Management.  He was referred to me from Dr. Hart.     Chief Complaint: Follow up from our visit on 6/25.  Phone check in per his request.     Tobacco: No tobacco use  Alcohol: not currently using    Medication Adherence/Access:  no issues reported    Diabetes:  Pt currently taking metformin ER 500mg- 2 BID, Pioglitazone 45mg daily and basaglar 7 units in the AM. Pt is not experiencing side effects. Met with CDE to discuss diet and this has been very helpful to him.   Stopped Glipizide 5mg with the start of insulin per PCP and has not needed this to be restarted.   SMBG: one time daily. Ranges (patient reported):  Numbers at goal = list of actual numbers did not save to note  Recent symptoms of high blood sugar? none  Eye exam: up to date  Foot exam: up to date  Pt is now taking an ACEi/ARB and denies issues - hx of lithium toxicity w/ Ace previously, but has been off lithium now since Jan 2018.  Aspirin: Taking 81mg daily and denies side effects  Lab Results   Component Value Date    A1C 6.7 07/17/2018    A1C 8.2 03/19/2018    A1C 6.8 09/15/2017    A1C 8.2 06/07/2017    A1C 6.6 12/13/2016       ASSESSMENT:              Current medications were reviewed today as discussed above.      Medication Adherence: good, no issues identified    Diabetes: Stable. Patient is meeting A1c goal of < 7%. Self monitoring of blood glucose is at goal of fasting  mg/dL.     PLAN:                  1. No changes at this time.    I spent 15 minutes with this patient today. All changes were made via collaborative practice agreement with Annie Hart. A copy of the visit note was provided to the patient's primary care provider.     Will follow up in 2 weeks per patient request- he did not want to go longer.    The patient declined a summary of these recommendations as an after visit summary.    Aliyah  Karthik, Pharm.D, [facility]  Medication Therapy Management Pharmacist  [phone]

## 2018-08-08 DIAGNOSIS — Z76.0 ENCOUNTER FOR MEDICATION REFILL: ICD-10-CM

## 2018-08-08 RX ORDER — PIOGLITAZONEHYDROCHLORIDE 45 MG/1
TABLET ORAL
Qty: 90 TABLET | Refills: 0 | Status: SHIPPED | OUTPATIENT
Start: 2018-08-08 | End: 2018-11-04

## 2018-08-13 ENCOUNTER — ALLIED HEALTH/NURSE VISIT (OUTPATIENT)
Dept: PHARMACY | Facility: PHYSICIAN GROUP | Age: 58
End: 2018-08-13
Payer: MEDICAID

## 2018-08-13 DIAGNOSIS — E11.3393 TYPE 2 DIABETES MELLITUS WITH BOTH EYES AFFECTED BY MODERATE NONPROLIFERATIVE RETINOPATHY WITHOUT MACULAR EDEMA, WITHOUT LONG-TERM CURRENT USE OF INSULIN (H): Primary | ICD-10-CM

## 2018-08-13 PROCEDURE — 99606 MTMS BY PHARM EST 15 MIN: CPT | Performed by: PHARMACIST

## 2018-08-13 NOTE — PROGRESS NOTES
SUBJECTIVE/OBJECTIVE:                Caden Bush is a 58 year old male called for a follow-up visit for Medication Therapy Management.  He was referred to me from Dr. Hart.     Chief Complaint: Follow up from our visit on 7/30.    Tobacco: No tobacco use  Alcohol: not currently using    Medication Adherence/Access:  no issues reported    Diabetes:  Pt currently taking metformin ER 500mg- 2 BID, Pioglitazone 45mg daily and basaglar 7 units in the AM. Pt is not experiencing side effects. Met with CDE to discuss diet and this has been very helpful to him.   Stopped Glipizide 5mg with the start of insulin per PCP and has not needed this to be restarted.   SMBG: one time daily, mostly in the evenings. Ranges (patient reported):  156mg/dl (control solution reported on meter, but he used his blood?)  162 (was 11pm), 106, 125, 97, 140, 114, 81, 87, 82, 98, 64 (after walking), 103  Recent symptoms of high blood sugar? None  Having some dizziness mostly in the AM, wondering if it is from lisinopril. He is not checking sugars when he has the episodes, but the symptoms do improve when he eats something.   Eye exam: up to date  Foot exam: up to date  Pt is now taking an ACEi/ARB and denies issues - hx of lithium toxicity w/ Ace previously, but has been off lithium now since Jan 2018.  Aspirin: Taking 81mg daily and denies side effects  Lab Results   Component Value Date    A1C 6.7 07/17/2018    A1C 8.2 03/19/2018    A1C 6.8 09/15/2017    A1C 8.2 06/07/2017    A1C 6.6 12/13/2016       Today's Vitals: There were no vitals taken for this visit.- phone visit    ASSESSMENT:              Current medications were reviewed today as discussed above.      Medication Adherence: good, no issues identified    Diabetes: Needs Improvement. Patient is meeting A1c goal of < 7%. Self monitoring of blood glucose is at goal of <150mg/dl post-prandial, but did have one reading dip under 70mg/dl.  Pt would benefit from checking sugars when he is  feeling dizzy to determine if this is a low sugar or possibly blood pressure thing. Discussed hydration and correction of low sugars today again.     PLAN:                  1. Start checking sugars when feeling dizzy. May need to reduce insulin dose further.     FYI- [employer] forms coming to be filled out by PCP and staff.      I spent 15 minutes with this patient today. All changes were made via collaborative practice agreement with Dr. Hart. A copy of the visit note was provided to the patient's primary care provider.     Will follow up in 2 weeks.    The patient declined a summary of these recommendations as an after visit summary.    Aliyah Angeles, Pharm.D, BCACP  Medication Therapy Management Pharmacist  [phone]

## 2018-08-13 NOTE — MR AVS SNAPSHOT
After Visit Summary   8/13/2018    Caden Bush    MRN: [id number]           Patient Information     Date Of Birth          1960        Visit Information        Provider Department      8/13/2018 2:00 PM Aliyah [facility]        Today's Diagnoses     Type 2 diabetes mellitus with both eyes affected by moderate nonproliferative retinopathy without macular edema, without long-term current use of insulin (H)    -  1       Follow-ups after your visit        Your next 10 appointments already scheduled     Aug 17, 2018 10:10 AM CDT   Adult Med Follow UP with Natali Dang MD   Psychiatry Clinic ([facility])    [address]  [address] [address] [phone]            Aug 27, 2018  2:30 PM CDT   TELEMEDICINE with Aliyah Angeles RPH   [facility] ([facility])    [address] [place] [address] [phone]           Note: this is not an onsite visit; there is no need to come to the facility.              Who to contact     If you have questions or need follow up information about today's clinic visit or your schedule please contact [facility] directly at [phone].  Normal or non-critical lab and imaging results will be communicated to you by SeeSpacehart, letter or phone within 4 business days after the clinic has received the results. If you do not hear from us within 7 days, please contact the clinic through SeeSpacehart or phone. If you have a critical or abnormal lab result, we will notify you by phone as soon as possible.  Submit refill requests through GoPago or call your pharmacy and they will forward the refill request to us. Please allow 3 business days for your refill to be completed.          Additional Information About Your Visit        GoPago Information     GoPago gives you secure access to your electronic health record.  If you see a primary care provider, you can also send messages to your care team and make appointments. If you have questions, please call your primary care clinic.  If you do not have a primary care provider, please call [phone] and they will assist you.        Care EveryWhere ID     This is your Care EveryWhere ID. This could be used by other organizations to access your [place] medical records  [id number]         Blood Pressure from Last 3 Encounters:   07/17/18 136/80   04/30/18 136/80   04/17/18 128/62    Weight from Last 3 Encounters:   07/17/18 137 lb (62.1 kg)   04/17/18 142 lb (64.4 kg)   12/27/17 154 lb (69.9 kg)              Today, you had the following     No orders found for display       Primary Care Provider Office Phone # Fax #    Annie Jeniffer Hart [phone] [address] [place] [place] [address]        Equal Access to Services     ERICA URIAS : Hadii aad ku hadasho Soomaali, waaxda luqadaha, qaybta kaalmada adeegyada, waxay idiin hayaan adeeg kharash la'rafaelan . So [facility] [phone].    ATENCIÓN: Si habla español, tiene a magaña disposición servicios gratuitos de asistencia lingüística. Llame al [phone].    We comply with applicable federal civil rights laws and [place] laws. We do not discriminate on the basis of race, color, national origin, age, disability, sex, sexual orientation, or gender identity.            Thank you!     Thank you for choosing [facility]  for your care. Our goal is always to provide you with excellent care. Hearing back from our patients is one way we can continue to improve our services. Please take a few minutes to complete the written survey that you may receive in the mail after your visit with us. Thank you!             Your Updated Medication List - Protect others around you: Learn how to safely use, store and throw away your medicines at www.disposemymeds.org.          This list is accurate as of 8/13/18  11:59 PM.  Always use your most recent med list.                   Brand Name Dispense Instructions for use Diagnosis    aspirin 81 MG EC tablet      Take 81 mg by mouth daily.        BASAGLAR 100 UNIT/ML injection     15 mL    Inject 7 Units Subcutaneous At Bedtime We will make further adjustments as we see your response    Type 2 diabetes mellitus with both eyes affected by moderate nonproliferative retinopathy without macular edema, without long-term current use of insulin (H)       blood glucose monitoring test strip    ONETOUCH ULTRA    100 each    TEST ONCE DAILY AS DIRECTED    Encounter for medication refill       cholecalciferol 1000 UNIT tablet    vitamin D3     Take 1 tablet by mouth daily.        COMPLETE NEEDLE COLLECTION SYS Misc     1 each    1 each daily    Type 2 diabetes mellitus with both eyes affected by moderate nonproliferative retinopathy without macular edema, with long-term current use of insulin (H)       FISH OIL      1 capsule daily        insulin pen needle 32G X 4 MM    BD BRENDAN U/F    100 each    Use 1 daily as directed.    Type 2 diabetes mellitus with both eyes affected by moderate nonproliferative retinopathy without macular edema, without long-term current use of insulin (H)       lisinopril 5 MG tablet    PRINIVIL/ZESTRIL    90 tablet    Take 1 tablet (5 mg) by mouth daily    Benign essential hypertension       metFORMIN 500 MG 24 hr tablet    GLUCOPHAGE-XR    360 tablet    TAKE 2 TABLETS BY MOUTH TWICE DAILY    Encounter for medication refill       pioglitazone 45 MG tablet    ACTOS    90 tablet    TAKE 1 TABLET BY MOUTH DAILY    Encounter for medication refill       risperiDONE 3 MG tablet    risperDAL    30 tablet    Take 1 tablet (3 mg) by mouth At Bedtime    Schizoaffective disorder, bipolar type (H)       simvastatin 40 MG tablet    ZOCOR    90 tablet    TAKE ONE TABLET BY MOUTH EVERY NIGHT AT BEDTIME    Encounter for medication refill       traZODone 50 MG tablet    DESYREL     30 tablet    Take 1 tablet (50 mg) by mouth nightly as needed for sleep    Schizoaffective disorder, bipolar type (H)       * venlafaxine 150 MG 24 hr capsule    EFFEXOR XR    30 capsule    Take 1 capsule (150 mg) by mouth daily Along with a 75 mg for a total of 225 mg daily dose.    Schizoaffective disorder, bipolar type (H)       * venlafaxine 75 MG 24 hr capsule    EFFEXOR XR    30 capsule    Take 1 capsule (75 mg) by mouth daily Please take with Effexor 150 mg for a total dose of 225 mg daily    Schizoaffective disorder, bipolar type (H)       * Notice:  This list has 2 medication(s) that are the same as other medications prescribed for you. Read the directions carefully, and ask your doctor or other care provider to review them with you.

## 2018-08-17 ENCOUNTER — OFFICE VISIT (OUTPATIENT)
Dept: PSYCHIATRY | Facility: CLINIC | Age: 58
End: 2018-08-17
Attending: PSYCHIATRY & NEUROLOGY
Payer: MEDICARE

## 2018-08-17 VITALS
BODY MASS INDEX: 19.86 KG/M2 | WEIGHT: 140.4 LBS | HEART RATE: 112 BPM | SYSTOLIC BLOOD PRESSURE: 99 MMHG | DIASTOLIC BLOOD PRESSURE: 63 MMHG

## 2018-08-17 DIAGNOSIS — F25.0 SCHIZOAFFECTIVE DISORDER, BIPOLAR TYPE (H): Primary | ICD-10-CM

## 2018-08-17 PROCEDURE — G0463 HOSPITAL OUTPT CLINIC VISIT: HCPCS | Mod: ZF

## 2018-08-17 ASSESSMENT — PAIN SCALES - GENERAL: PAINLEVEL: NO PAIN (0)

## 2018-08-17 NOTE — MR AVS SNAPSHOT
After Visit Summary   8/17/2018    Caden Bush    MRN: [id number]           Patient Information     Date Of Birth          1960        Visit Information        Provider Department      8/17/2018 10:10 AM Natali Powell MD [facility]        Today's Diagnoses     Schizoaffective disorder, bipolar type (H)    -  1      Care Instructions    Stop taking Lisinopril  Eat and drink fluids  Continue psychiatric medications as before    RTC: 1-2 month          Follow-ups after your visit        Your next 10 appointments already scheduled     Sep 05, 2018 10:45 AM CDT   SHORT with Annie Hart MD   [facility] ([facility])    [address] [place] [id number] [phone]            Sep 05, 2018 11:00 AM CDT   SHORT with Aliyah Angeles [facility] ([facility])    [address] [place] [address] [phone]            Oct 15, 2018  2:10 PM CDT   Adult Med Follow UP with Natali Dang MD   Psychiatry Clinic ([facility])    [place] [id number]  [address] [address] [phone]              Who to contact     Please call your clinic at [phone] to:    Ask questions about your health    Make or cancel appointments    Discuss your medicines    Learn about your test results    Speak to your doctor            Additional Information About Your Visit        Pretio Interactivehart Information     XATA gives you secure access to your electronic health record. If you see a primary care provider, you can also send messages to your care team and make appointments. If you have questions, please call your primary care clinic.  If you do not have a primary care provider, please call [phone] and they will assist you.      XATA is an electronic gateway that provides easy, online access to your medical records. With XATA, you can request  a clinic appointment, read your test results, renew a prescription or communicate with your care team.     To access your existing account, please contact your [facility] or call [phone] for assistance.        Care EveryWhere ID     This is your Care EveryWhere ID. This could be used by other organizations to access your [place] medical records  [id number]        Your Vitals Were     Pulse BMI (Body Mass Index)                112 19.86 kg/m2           Blood Pressure from Last 3 Encounters:   08/17/18 99/63   07/17/18 136/80   07/13/18 128/73    Weight from Last 3 Encounters:   08/17/18 63.7 kg (140 lb 6.4 oz)   07/17/18 62.1 kg (137 lb)   07/13/18 64.8 kg (142 lb 12.8 oz)              Today, you had the following     No orders found for display       Primary Care Provider Office Phone # Fax #    Annie Jeniffer Hart [phone] [address] [place] [address]        Equal Access to Services     [facility] Center[facility] : Hadii aad ku hadasho Soomaali, waaxda luqadaha, qaybta kaalmada adeegyada, waxazar braden hayeladio diaz . So [facility] [place] [facility] [phone].    ATENCIÓN: Si habla español, tiene a magaña disposición servicios gratuitos de asistencia lingüística. Llame al [phone].    We comply with applicable federal civil rights laws and [place] laws. We do not discriminate on the basis of race, color, national origin, age, disability, sex, sexual orientation, or gender identity.            Thank you!     Thank you for choosing [facility]  for your care. Our goal is always to provide you with excellent care. Hearing back from our patients is one way we can continue to improve our services. Please take a few minutes to complete the written survey that you may receive in the mail after your visit with us. Thank you!             Your Updated Medication List - Protect others around you: Learn how to safely use, store and throw away your medicines  at www.disposemymeds.org.          This list is accurate as of 8/17/18 11:59 PM.  Always use your most recent med list.                   Brand Name Dispense Instructions for use Diagnosis    aspirin 81 MG EC tablet      Take 81 mg by mouth daily.        BASAGLAR 100 UNIT/ML injection     15 mL    Inject 7 Units Subcutaneous At Bedtime We will make further adjustments as we see your response    Type 2 diabetes mellitus with both eyes affected by moderate nonproliferative retinopathy without macular edema, without long-term current use of insulin (H)       blood glucose monitoring test strip    ONETOUCH ULTRA    100 each    TEST ONCE DAILY AS DIRECTED    Encounter for medication refill       cholecalciferol 1000 UNIT tablet    vitamin D3     Take 1 tablet by mouth daily.        COMPLETE NEEDLE COLLECTION SYS Misc     1 each    1 each daily    Type 2 diabetes mellitus with both eyes affected by moderate nonproliferative retinopathy without macular edema, with long-term current use of insulin (H)       FISH OIL      1 capsule daily        insulin pen needle 32G X 4 MM    BD BRENDAN U/F    100 each    Use 1 daily as directed.    Type 2 diabetes mellitus with both eyes affected by moderate nonproliferative retinopathy without macular edema, without long-term current use of insulin (H)       metFORMIN 500 MG 24 hr tablet    GLUCOPHAGE-XR    360 tablet    TAKE 2 TABLETS BY MOUTH TWICE DAILY    Encounter for medication refill       pioglitazone 45 MG tablet    ACTOS    90 tablet    TAKE 1 TABLET BY MOUTH DAILY    Encounter for medication refill       risperiDONE 3 MG tablet    risperDAL    30 tablet    Take 1 tablet (3 mg) by mouth At Bedtime    Schizoaffective disorder, bipolar type (H)       simvastatin 40 MG tablet    ZOCOR    90 tablet    TAKE ONE TABLET BY MOUTH EVERY NIGHT AT BEDTIME    Encounter for medication refill       traZODone 50 MG tablet    DESYREL    30 tablet    Take 1 tablet (50 mg) by mouth nightly as needed  for sleep    Schizoaffective disorder, bipolar type (H)       * venlafaxine 150 MG 24 hr capsule    EFFEXOR XR    30 capsule    Take 1 capsule (150 mg) by mouth daily Along with a 75 mg for a total of 225 mg daily dose.    Schizoaffective disorder, bipolar type (H)       * venlafaxine 75 MG 24 hr capsule    EFFEXOR XR    30 capsule    Take 1 capsule (75 mg) by mouth daily Please take with Effexor 150 mg for a total dose of 225 mg daily    Schizoaffective disorder, bipolar type (H)       * Notice:  This list has 2 medication(s) that are the same as other medications prescribed for you. Read the directions carefully, and ask your doctor or other care provider to review them with you.

## 2018-08-17 NOTE — PATIENT INSTRUCTIONS
Stop taking Lisinopril  Eat and drink fluids  Continue psychiatric medications as before    RTC: 1-2 month

## 2018-08-17 NOTE — PROGRESS NOTES
[facility] PSYCHIATRY CLINIC PROGRESS NOTE     CARE TEAM:  PCP- Annie Hart   Therap[facility]  [facility]      Ophthalmologist: Jacob Lieberman with [facility]          Community support: [facility], [phone]    Caden Bush is a 58 year old male who prefers the name Caden & pronouns he.  The initial diagnostic evaluation was on 11/26/08 .   Date of the most recent transfer of care bisi is 07/13/2018.     Pertinent Background:  This patient first experienced mental health issues in his late 20s , initially obtained care at that time and has received treatment for Schizoaffective Disorder, bipolar type. His diagnosis has changed through the time and needs to be further investigated.  Notably, this pt was stable on regimen of Lithium, Effexor and Risperdal since 2011. However due to complications of chronic diabetes, Lithium was discontinued in 2017.  reviewed the chart and did not think he is a good case for group therapy due to limited insight and his concrete thinking. She recommended [facility] for groups and activities for him. The etiology of his insomnia is not well understood and the sleep study was discussed and encouraged. Sleep has improved on 3mg of risperidone while psychotic sxs are also better managed.     Psych critical item history includes suicidal ideation, psychosis [sxs include AH and paranoia], mutiple psychotropic trials and psych hosp (>5).     INTERIM HISTORY                                                                                                                 4, 4   The patient reports good treatment adherence.  History was provided by the patient who was a good historian.  The last visit ended with the following med change: Increased risperidone.   Since the last visit, patient reports:   - Reports feeling pretty good. He keep busy, and enjoys doing puzzles. Social anxiety is managed. Has started to talk to people at his  residence.   - He saw his provider for DM sx f/u and this visit was reassuring. He was started on Lisinopril to prevent nephrological sequela of DM and since then patient has experienced dizziness.   -  Newly diagnosed heart murmur (April) was also discussed. It is stable. He is following this with outpatient provider and will need to be reevaluated every three years.   - Sleep has improved. He waked up a couple times every nmight, to void. He falls sleep very fast afterwards. If not, he takes trazodone to fall back sleep. He has not taken the trazodone for two weels now.   - He has communicated with his CM to find volunteering positions to enhance his chance of socializing.     RECENT SYMPTOMS:   DEPRESSION:  reports-anhedonia, low energy;  DENIES-  Hypersomnia, suicidal ideation and self-destructive thoughts  KINGA/HYPOMANIA:  reports-none;  DENIES- increased energy, decreased sleep need, increased activity and grandiosity  PSYCHOSIS:  reports-none;  DENIES- delusions, auditory hallucinations and visual hallucinations  DYSREGULATION:  reports-none;  DENIES- suicidal ideation, violent ideation and SIB  PANIC ATTACK:  none   ANXIETY:   social anxiety IMPROVED  TRAUMA RELATED:  none  COMPULSIVE:  none  SLEEP:  As mentioned above  EATING DISORDER: none     RECENT SUBSTANCE USE:     ALCOHOL-  never   TOBACCO- none  CAFFEINE- 1 cups/day of coffee, 1 sodas/ in a while  OPIOIDS- none      NARCAN KIT- N/A          CANNABIS- none  OTHER ILLICIT DRUGS- none      CURRENT SOCIAL HISTORY:  FINANCIAL SUPPORT- SSDI for schizoaffective disorder and diabetes  CHILDREN- none      LIVING SITUATION- Currently living by self in subsidized apartment in [place] since Feb 2011.      SOCIAL/ SPIRITUAL SUPPORT- few friends and family     FEELS SAFE AT HOME- yes      MEDICAL ROS (2,10):  Reports uncommon arthritis pain alleviate with tylenol, dizzyness Denies GI [nausea, diarrhea, constipation,  ], headache, sedation, tremor and  confusion.    PSYCH and CD Critical Summary Points since July 2018           {Increased Risperidone    PAST PSYCH MED TRIALS   see EMR Problem List: Hx of psychiatric care    MEDICAL / SURGICAL HISTORY                                 Neurologic Hx- none  Patient Active Problem List   Diagnosis     Eczema     Retinopathy     Moderate episode of recurrent major depressive disorder (H)     Polyp of colon, adenomatous     Health Care Home     Hx of psychiatric care     Neuropathy of left upper extremity     Type 2 diabetes mellitus with both eyes affected by moderate nonproliferative retinopathy without macular edema, without long-term current use of insulin (H)       Major Surgery- Appendectomy    ALLERGY                                Review of patient's allergies indicates no known allergies.  MEDICATIONS                               Current Outpatient Prescriptions   Medication Sig Dispense Refill     aspirin 81 MG EC tablet Take 81 mg by mouth daily.       BASAGLAR 100 UNIT/ML injection Inject 7 Units Subcutaneous At Bedtime We will make further adjustments as we see your response 15 mL 1     blood glucose monitoring (ONE TOUCH ULTRA) test strip TEST ONCE DAILY AS DIRECTED 100 each 3     cholecalciferol (VITAMIN D) 1000 UNIT tablet Take 1 tablet by mouth daily.       FISH OIL 1 capsule daily        insulin pen needle (BD BRENDAN U/F) 32G X 4 MM Use 1 daily as directed. 100 each 11     lisinopril (PRINIVIL/ZESTRIL) 5 MG tablet Take 1 tablet (5 mg) by mouth daily 90 tablet 3     metFORMIN (GLUCOPHAGE-XR) 500 MG 24 hr tablet TAKE 2 TABLETS BY MOUTH TWICE DAILY 360 tablet 0     pioglitazone (ACTOS) 45 MG tablet TAKE 1 TABLET BY MOUTH DAILY 90 tablet 0     risperiDONE (RISPERDAL) 3 MG tablet Take 1 tablet (3 mg) by mouth At Bedtime 30 tablet 1     Sharps Container (COMPLETE NEEDLE COLLECTION SYS) MISC 1 each daily 1 each 1     simvastatin (ZOCOR) 40 MG tablet TAKE ONE TABLET BY MOUTH EVERY NIGHT AT BEDTIME 90 tablet 3      venlafaxine (EFFEXOR XR) 150 MG 24 hr capsule Take 1 capsule (150 mg) by mouth daily Along with a 75 mg for a total of 225 mg daily dose. 30 capsule 2     venlafaxine (EFFEXOR XR) 75 MG 24 hr capsule Take 1 capsule (75 mg) by mouth daily Please take with Effexor 150 mg for a total dose of 225 mg daily 30 capsule 0     traZODone (DESYREL) 50 MG tablet Take 1 tablet (50 mg) by mouth nightly as needed for sleep (Patient not taking: Reported on 7/30/2018) 30 tablet 2     VITALS                                                                                                                          3, 3   BP 99/63  Pulse 112  Wt 63.7 kg (140 lb 6.4 oz)  BMI 19.86 kg/m2   MENTAL STATUS EXAM                                                                                           9, 14 cog gs     Alertness: alert  and oriented  Appearance: well groomed  Behavior/Demeanor: cooperative, pleasant and calm, with good  eye contact   Speech: normal  Language: intact and no problems  Psychomotor: normal or unremarkable  Mood: description consistent with euthymia  Affect: full range; was congruent to mood; was congruent to content  Thought Process/Associations: unremarkable  Thought Content:  Reports none;   Denies suicidal ideation  and psychotic thought  Perception:   Reports none;  Denies auditory hallucinations (NO;  command-NO;  following command-NO and visual hallucinations (NO)  Insight: fair  Judgment: good  Cognition: does  appear grossly intact; formal cognitive testing was not done  Gait and Station: unremarkable    LABS and DATA     PHQ9 TODAY = 2  PHQ-9 SCORE 5/10/2018 6/7/2018 7/13/2018   Total Score - - -   Total Score 6 6 2   Total Score - - -       ANTIPSYCHOTIC LABS  [glu, A1C, lipids (ezequiel LDL), liver enzymes, WBC, ANEU, Hgb, plts]  q12 mo  Recent Labs   Lab Test  07/17/18   1126  04/17/18   1356  03/19/18   1116  01/30/18   0923   GLC   --   150*   --   195*   A1C  6.7*   --   8.2*   --      Recent Labs   Lab  Test  04/17/18   1356  07/11/17   1034   CHOL  139  126   TRIG  45  49   LDL  45  40   HDL  85  76     Recent Labs   Lab Test  04/17/18   1356  01/30/18   0923   AST  15  14   ALT  12  16   ALKPHOS  73  79     Recent Labs   Lab Test  01/30/18   0923  12/20/16   1240  06/09/16   1030  12/21/15   1413   WBC  2.8*  3.7*  4.7  4.6   ANEU   --    --   3.5  3.4   HGB  12.1*  13.6  14.7  13.4   PLT  179  205  175  155       DIAGNOSIS     Schizoaffective disorder, bipolar type    ASSESSMENT                                                                                                                   m2, h3     TODAY:  Patient reported improved depression, anxiety and psychotic symptoms including paranoia after increasing the risperidone dose. His sleep has improved and he does not think he needs a sleep study. Trazodone still helps with insomnia. Denies SI/SIB/HI. No concern regarding increasing Risperidone dose. No EPSE. Continued same meds. Has enough medications for two months. He will ask the pharmacy to request refill.     Patient reports dizziness after starting Lisinopril and he was found to have low BPs before the session. The BPs improved by drinking water, eating a cookie and walking. He was discouraged of taking Lisinopril. His PCP was notified.      Patient  asked for group therapy. Writer notified Dr. Colby who reported that patient used to participate in the groups in 2009. The electronic chart does not go beyond that that time.  reviewed the chart and notified me that he is not a good case for group therapy due to limited insight and his concrete thinking. She recommended [facility] for groups and activities for him.    Future consideration:  For continued depression, and fatigue, another dopamine modulator may be considered as adjunct treatment, such as Abilify or Rexulti. They are also less implicated than Risperidone in weight gain (he has DM, on insulin). The etiology of his insomnia is not well  understood and the sleep study was discussed and encouraged. Sleep has improved on 3mg of risperidone while psychotic sxs are better managed.     [place] PRESCRIPTION MONITORING PROGRAM [] was not checked today and revealed: N/A    PSYCHOTROPIC DRUG INTERACTIONS:   VENLAFAXINE and ANTIPLATELET AGENTS may result in an increased risk of bleeding.  RISPERIDONE and SIMVASTATIN may result in increased simvastatin serum concentrations -with an increased risk of myopathy or rhabdomyolysis.  TRAZODONE and VENLAFAXINE may result in an increased risk of serotonin syndrome.  MANAGEMENT:  Monitoring for adverse effects     PLAN                                                                                                                                m2, h3     1) PSYCHOTROPIC MEDICATIONS:  - Continue risperidone from 3 mg QHS  - Cont Effexor XR  225 mg daily  - Cont trazodone to 50 mg qhs prn (insomnia)      2) THERAPY:    - Seeing psychologist Yeny Goff at [facility] since 11/2015. Sees once per month.       3) LABS NEXT DUE: Feb 2019     RATING SCALES:  AIMS: 0 at 3/1/2018      4) REFERRALS:     -None today. Previously had a CCM until 2011. Has SW consult on 10/20/15, not interested in CM or [facility] worker services at this time and seems to be managing well on his own.    7) RTC: 1-2 months      8) CRISIS NUMBERS:   Provided routinely in AVS    TREATMENT RISK STATEMENT:  The risks, benefits, alternatives and potential adverse effects have been discussed and are understood by the pt. The pt understands the risks of using street drugs or alcohol. There are no medical contraindications, the pt agrees to treatment with the ability to do so. The pt knows to call the clinic for any problems or to access emergency care if needed.  Medical and substance use concerns are documented above.  Psychotropic drug interaction check was done, including changes made today.     PROVIDER:  Natali Dang  MD    Patient staffed in clinic with Dr. Porras who will sign the note.  Supervisor is Dr. Bradley.  I saw the patient with the resident, and participated in key portions of the service, including the mental status examination and developing the plan of care. I reviewed key portions of the history with the resident. I agree with the findings and plan as documented in this note.    Maty Porras

## 2018-08-17 NOTE — NURSING NOTE
Chief Complaint   Patient presents with     RECHECK     Schizoaffective disorder, bipolar type

## 2018-08-20 ENCOUNTER — TELEPHONE (OUTPATIENT)
Dept: FAMILY MEDICINE | Facility: CLINIC | Age: 58
End: 2018-08-20

## 2018-08-20 NOTE — TELEPHONE ENCOUNTER
Called patient and informed him that he can stop taking the Lisinopril. (See notes below)   His BP has been running low.  Patient will stop lisinopril and he made an appointment for a follow up BP check with Dr Hart.

## 2018-08-20 NOTE — TELEPHONE ENCOUNTER
----- Message from Annie Hart MD sent at 8/20/2018  9:56 AM CDT -----  Thank you so much for this information. He seems to be quite sensitive to lisinopril. I agree with your plan to stop the lisinopril. We will call him. It would be appropriate for us to check his blood pressure again off medication.    Annie Hart MD    ----- Message -----     From: Natali Powell MD     Sent: 8/17/2018  11:26 AM       To: Annie Hart MD    Greetings Annie,    I just met Caden at our clinic today.His Blood Pressure was as follow:    #1: 84/53   #2 (in 10 min): 83/51HR 118  #3 (in 10 min and after eating cookies and drinking fluids): 99/63     He reports dizziness and denies any other sxs. He reports walking more than winter time and he has lost about two pounds in the past month. He seemed to be thinner.     He has started taking Lisinopril 5 mg daily per your recomms and since then he feels dizzy.    I recommended to stop taking the Lisinopril and call you.     He preferred to receive a call from your clinic.     Thanks  Best  Natali Dang MD  PGY3 psychiatry resident

## 2018-08-22 ENCOUNTER — MEDICAL CORRESPONDENCE (OUTPATIENT)
Dept: FAMILY MEDICINE | Facility: CLINIC | Age: 58
End: 2018-08-22

## 2018-08-24 ASSESSMENT — PATIENT HEALTH QUESTIONNAIRE - PHQ9: SUM OF ALL RESPONSES TO PHQ QUESTIONS 1-9: 2

## 2018-08-27 ENCOUNTER — ALLIED HEALTH/NURSE VISIT (OUTPATIENT)
Dept: PHARMACY | Facility: PHYSICIAN GROUP | Age: 58
End: 2018-08-27
Payer: MEDICAID

## 2018-08-27 DIAGNOSIS — R42 DIZZINESS: ICD-10-CM

## 2018-08-27 DIAGNOSIS — E11.3393 TYPE 2 DIABETES MELLITUS WITH BOTH EYES AFFECTED BY MODERATE NONPROLIFERATIVE RETINOPATHY WITHOUT MACULAR EDEMA, WITHOUT LONG-TERM CURRENT USE OF INSULIN (H): Primary | ICD-10-CM

## 2018-08-27 PROCEDURE — 99606 MTMS BY PHARM EST 15 MIN: CPT | Performed by: PHARMACIST

## 2018-08-27 NOTE — MR AVS SNAPSHOT
After Visit Summary   8/27/2018    Caden Bush    MRN: [id number]           Patient Information     Date Of Birth          1960        Visit Information        Provider Department      8/27/2018 2:30 PM Aliyah Angeles [facility]        Today's Diagnoses     Type 2 diabetes mellitus with both eyes affected by moderate nonproliferative retinopathy without macular edema, without long-term current use of insulin (H)    -  1    Dizziness           Follow-ups after your visit        Your next 10 appointments already scheduled     Sep 05, 2018 10:45 AM CDT   [facility] with Annie Hart MD   [facility]    [address] [place] [address] [phone]            Sep 05, 2018 11:00 AM CDT   SHORT with Aliyah Angeles RPH   [facility] ([facility])    [address] [place] [address] [phone]            Oct 15, 2018  2:10 PM CDT   Adult Med Follow UP with Natali Dang MD   Psychiatry Clinic ([facility])    [place] [id number]  [facility] [address] [address] [phone]              Who to contact     If you have questions or need follow up information about today's clinic visit or your schedule please contact [facility] directly at [phone].  Normal or non-critical lab and imaging results will be communicated to you by MyChart, letter or phone within 4 business days after the clinic has received the results. If you do not hear from us within 7 days, please contact the clinic through MyChart or phone. If you have a critical or abnormal lab result, we will notify you by phone as soon as possible.  Submit refill requests through [employer] or call your pharmacy and they will forward the refill request to us. Please allow 3 business days for your refill to be completed.          Additional Information  About Your Visit        [employer] Information     [employer] gives you secure access to your electronic health record. If you see a primary care provider, you can also send messages to your care team and make appointments. If you have questions, please call your primary care clinic.  If you do not have a primary care provider, please call [phone] and they will assist you.        Care EveryWhere ID     This is your Care EveryWhere ID. This could be used by other organizations to access your [facility] medical records  [id number]         Blood Pressure from Last 3 Encounters:   07/17/18 136/80   04/30/18 136/80   04/17/18 128/62    Weight from Last 3 Encounters:   07/17/18 137 lb (62.1 kg)   04/17/18 142 lb (64.4 kg)   12/27/17 154 lb (69.9 kg)              Today, you had the following     No orders found for display       Primary Care Provider Office Phone # Fax #    Annie Jeniffer Hart [phone] [address] [place] [address]        Equal Access to Services     [facility]: Hadii aad ku hadasho Soomaali, waaxda luqadaha, qaybta kaalmada adeegyada, marcia diaz . So [facility] [phone].    ATENCIÓN: Si habla español, tiene a magaña disposición servicios gratuitos de asistencia lingüística. Llame al [phone].    We comply with applicable federal civil rights laws and [place] laws. We do not discriminate on the basis of race, color, national origin, age, disability, sex, sexual orientation, or gender identity.            Thank you!     Thank you for choosing [facility]  for your care. Our goal is always to provide you with excellent care. Hearing back from our patients is one way we can continue to improve our services. Please take a few minutes to complete the written survey that you may receive in the mail after your visit with us. Thank you!             Your Updated Medication List - Protect others around you: Learn how to  safely use, store and throw away your medicines at www.disposemymeds.org.          This list is accurate as of 8/27/18 11:59 PM.  Always use your most recent med list.                   Brand Name Dispense Instructions for use Diagnosis    ACE/ARB/ARNI NOT PRESCRIBED (INTENTIONAL)      Please choose reason not prescribed, below- hypotension        aspirin 81 MG EC tablet      Take 81 mg by mouth daily.        BASAGLAR 100 UNIT/ML injection     15 mL    Inject 7 Units Subcutaneous At Bedtime We will make further adjustments as we see your response    Type 2 diabetes mellitus with both eyes affected by moderate nonproliferative retinopathy without macular edema, without long-term current use of insulin (H)       blood glucose monitoring test strip    ONETOUCH ULTRA    100 each    TEST ONCE DAILY AS DIRECTED    Encounter for medication refill       cholecalciferol 1000 UNIT tablet    vitamin D3     Take 1 tablet by mouth daily.        COMPLETE NEEDLE COLLECTION SYS Misc     1 each    1 each daily    Type 2 diabetes mellitus with both eyes affected by moderate nonproliferative retinopathy without macular edema, with long-term current use of insulin (H)       FISH OIL      1 capsule daily        insulin pen needle 32G X 4 MM    BD BRENDAN U/F    100 each    Use 1 daily as directed.    Type 2 diabetes mellitus with both eyes affected by moderate nonproliferative retinopathy without macular edema, without long-term current use of insulin (H)       metFORMIN 500 MG 24 hr tablet    GLUCOPHAGE-XR    360 tablet    TAKE 2 TABLETS BY MOUTH TWICE DAILY    Encounter for medication refill       pioglitazone 45 MG tablet    ACTOS    90 tablet    TAKE 1 TABLET BY MOUTH DAILY    Encounter for medication refill       risperiDONE 3 MG tablet    risperDAL    30 tablet    Take 1 tablet (3 mg) by mouth At Bedtime    Schizoaffective disorder, bipolar type (H)       simvastatin 40 MG tablet    ZOCOR    90 tablet    TAKE ONE TABLET BY MOUTH  EVERY NIGHT AT BEDTIME    Encounter for medication refill       traZODone 50 MG tablet    DESYREL    30 tablet    Take 1 tablet (50 mg) by mouth nightly as needed for sleep    Schizoaffective disorder, bipolar type (H)       * venlafaxine 150 MG 24 hr capsule    EFFEXOR XR    30 capsule    Take 1 capsule (150 mg) by mouth daily Along with a 75 mg for a total of 225 mg daily dose.    Schizoaffective disorder, bipolar type (H)       * venlafaxine 75 MG 24 hr capsule    EFFEXOR XR    30 capsule    Take 1 capsule (75 mg) by mouth daily Please take with Effexor 150 mg for a total dose of 225 mg daily    Schizoaffective disorder, bipolar type (H)       * Notice:  This list has 2 medication(s) that are the same as other medications prescribed for you. Read the directions carefully, and ask your doctor or other care provider to review them with you.

## 2018-08-27 NOTE — PROGRESS NOTES
SUBJECTIVE/OBJECTIVE:                Caden Bush is a 58 year old male called for a follow-up visit for Medication Therapy Management.  He was referred to me from Dr. Hart.     Chief Complaint: Follow up from our visit on 8/13.  Patient requests frequent calls to help stay on track with his medications.     Tobacco: No tobacco use  Alcohol: not currently using    Medication Adherence/Access:  no issues reported    Dizziness: No longer on lisinopril due to dizziness- has done much better off the medication.   112/60mmHg off the lisinopril p.68 when checked by traveling nurse    Diabetes:  Pt currently taking metformin ER 500mg- 2 BID, Pioglitazone 45mg daily and basaglar 7 units in the AM. Pt is not experiencing side effects. Met with CDE to discuss diet and this has been very helpful to him, continues to have low weight and not always eating enough.    Stopped Glipizide 5mg with the start of insulin per PCP and has not needed this to be restarted.   SMBG: one time daily, mostly in the evenings. Ranges (patient reported): newest to oldest 130 (AM),   8/27- 130 (AM),   8/26- 136 (8pm), 76 (12:48am),   8/25- 82 (10pm),   8/24- 68 (1:41am)  8/23- 101 (am) and 177 (7:40pm)  8/22- 135 (9pm)  8/21- 80 (7am),   8/19- 115 (8:45am)  8/18- 91 (8am)  8/17- 76 (7am),   8/16 - 100   8/15- 76 (7am)  8/14- 91 (10am)  Recent symptoms of high blood sugar? None  No longer having dizziness off the lisinopril  Eye exam: up to date  Foot exam: up to date  Pt is now taking an ACEi/ARB and denies issues - hx of lithium toxicity w/ Ace previously, but has been off lithium now since Jan 2018.  Aspirin: Taking 81mg daily and denies side effects  Weight 140lbs at psychiatry visit - patient reported    Lab Results   Component Value Date    A1C 6.7 07/17/2018    A1C 8.2 03/19/2018    A1C 6.8 09/15/2017    A1C 8.2 06/07/2017    A1C 6.6 12/13/2016     Today's Vitals: There were no vitals taken for this visit.- phone visit.      ASSESSMENT:               Current medications were reviewed today as discussed above.      Medication Adherence: good, no issues identified    Dizziness: Resolved. Continue off lisinopril.    Diabetes: Needs Improvement. Patient is meeting A1c goal of < 7%. Self monitoring of blood glucose is not at goal of fasting  mg/dL. Pt would benefit from adding in a light bedtime snack to prevent overnight lows. We did discuss that he may need even lower insulin doses, however he is not interested in decreasing this today.        PLAN:                  1. Add in bedtime snack and continue to watch for lows.     I spent 15 minutes with this patient today. All changes were made via collaborative practice agreement with Dr. Hart. A copy of the visit note was provided to the patient's primary care provider.     Will follow up in 2 weeks per patient request- seeing PCP on 9/5.    The patient was given a summary of these recommendations as an after visit summary.    Aliyah Angeles, Pharm.D, BCACP  Medication Therapy Management Pharmacist  [phone]

## 2018-09-03 DIAGNOSIS — E11.3393 TYPE 2 DIABETES MELLITUS WITH BOTH EYES AFFECTED BY MODERATE NONPROLIFERATIVE RETINOPATHY WITHOUT MACULAR EDEMA, WITHOUT LONG-TERM CURRENT USE OF INSULIN (H): Primary | ICD-10-CM

## 2018-09-04 NOTE — TELEPHONE ENCOUNTER
ONETOUCH ULTRA test strip   LAST  Med check 7/17/18   last labs(for RX) 7/17/18  Next  appt scheduled =  9/5/18 appt  Lucy Guzmán MA    Lab Results   Component Value Date    A1C 6.7 07/17/2018    A1C 8.2 03/19/2018    A1C 6.8 09/15/2017    A1C 8.2 06/07/2017    A1C 6.6 12/13/2016

## 2018-09-05 ENCOUNTER — OFFICE VISIT (OUTPATIENT)
Dept: FAMILY MEDICINE | Facility: CLINIC | Age: 58
End: 2018-09-05

## 2018-09-05 ENCOUNTER — OFFICE VISIT (OUTPATIENT)
Dept: PHARMACY | Facility: PHYSICIAN GROUP | Age: 58
End: 2018-09-05
Payer: MEDICAID

## 2018-09-05 VITALS
SYSTOLIC BLOOD PRESSURE: 96 MMHG | DIASTOLIC BLOOD PRESSURE: 52 MMHG | WEIGHT: 143 LBS | HEART RATE: 108 BPM | BODY MASS INDEX: 20.23 KG/M2

## 2018-09-05 DIAGNOSIS — E11.3393 TYPE 2 DIABETES MELLITUS WITH BOTH EYES AFFECTED BY MODERATE NONPROLIFERATIVE RETINOPATHY WITHOUT MACULAR EDEMA, WITHOUT LONG-TERM CURRENT USE OF INSULIN (H): Primary | ICD-10-CM

## 2018-09-05 DIAGNOSIS — I95.1 ORTHOSTATIC HYPOTENSION: Primary | ICD-10-CM

## 2018-09-05 DIAGNOSIS — D64.9 NORMOCYTIC ANEMIA: ICD-10-CM

## 2018-09-05 DIAGNOSIS — I34.1 MITRAL VALVE PROLAPSE: ICD-10-CM

## 2018-09-05 DIAGNOSIS — R42 DIZZINESS: ICD-10-CM

## 2018-09-05 DIAGNOSIS — F25.0 SCHIZOAFFECTIVE DISORDER, BIPOLAR TYPE (H): ICD-10-CM

## 2018-09-05 DIAGNOSIS — D72.819 LEUKOPENIA, UNSPECIFIED TYPE: ICD-10-CM

## 2018-09-05 LAB
% GRANULOCYTES: 69.6 % (ref 42.2–75.2)
HCT VFR BLD AUTO: 35.5 % (ref 39–51)
HEMOGLOBIN: 11.5 G/DL (ref 13.4–17.5)
LYMPHOCYTES NFR BLD AUTO: 25.2 % (ref 20.5–51.1)
MCH RBC QN AUTO: 28.2 PG (ref 27–31)
MCHC RBC AUTO-ENTMCNC: 32.4 G/DL (ref 33–37)
MCV RBC AUTO: 86.9 FL (ref 80–100)
MONOCYTES NFR BLD AUTO: 5.2 % (ref 1.7–9.3)
PLATELET # BLD AUTO: 168 K/UL (ref 140–450)
RBC # BLD AUTO: 4.08 X10/CMM (ref 4.2–5.9)
WBC # BLD AUTO: 2.8 X10/CMM (ref 3.8–11)

## 2018-09-05 PROCEDURE — 99606 MTMS BY PHARM EST 15 MIN: CPT | Performed by: PHARMACIST

## 2018-09-05 PROCEDURE — 85025 COMPLETE CBC W/AUTO DIFF WBC: CPT | Performed by: FAMILY MEDICINE

## 2018-09-05 PROCEDURE — 36415 COLL VENOUS BLD VENIPUNCTURE: CPT | Performed by: FAMILY MEDICINE

## 2018-09-05 PROCEDURE — 99214 OFFICE O/P EST MOD 30 MIN: CPT | Performed by: FAMILY MEDICINE

## 2018-09-05 NOTE — PATIENT INSTRUCTIONS
Recommendations from today's MTM visit:                                                        1. **    2. **    3. **    4. **    5. **    Next MTM visit: **    To schedule another MTM appointment, please call the clinic directly or you may call the MTM scheduling line at [phone] or toll-free at [phone].     My Clinical Pharmacist's contact information:                                                      It was a pleasure seeing you today!  Please feel free to contact me with any questions or concerns you have.      Aliyah Angeles, Pharm.D, [facility]  Medication Therapy Management Pharmacist  [phone]    You may receive a survey about the MTM services you received.  I would appreciate your feedback to help me serve you better in the future. Please fill it out and return it when you can. Your comments will be anonymous.

## 2018-09-05 NOTE — PROGRESS NOTES
SUBJECTIVE/OBJECTIVE:                Caden Bush is a 58 year old male coming in for a follow-up visit for Medication Therapy Management.  He was referred to me from Dr. Hart.     Chief Complaint: Follow up from our visit on 8/27.  Pt seeing PCP today for BP recheck.     Tobacco: No tobacco use  Alcohol: not currently using    Medication Adherence/Access:  no issues reported    Dizziness: No longer on lisinopril due to dizziness- checking orthostatic BP today with PCP. Patient continues on risperidone for his schizophrenia - taking 3mg daily at this time and has been on at least 2mg since before 2015. He is no longer on lithium since January. Sees psychiatry regularly.    BP Readings from Last 3 Encounters:   09/05/18 100/58   07/17/18 136/80   04/30/18 136/80       Diabetes:  Pt currently taking metformin ER 500mg- 2 BID, Pioglitazone 45mg daily and basaglar 7 units in the AM. Pt is not experiencing side effects. Met with CDE to discuss diet and this has been very helpful to him, continues to have low weight and not always eating enough.    Stopped Glipizide 5mg with the start of insulin per PCP and has not needed this to be restarted.   SMBG: one time daily, mostly in the evenings. Ranges (patient reported): newest to oldest   112 this AM.  125, 97, 118, 118, 111, 166, 125  Recent symptoms of high blood sugar? None  No longer having dizziness off the lisinopril  Eye exam: up to date  Foot exam: up to date  Pt is now taking an ACEi/ARB and denies issues - hx of lithium toxicity w/ Ace previously, but has been off lithium now since Jan 2018.  Aspirin: Taking 81mg daily and denies side effects  Weight 143lbs today.     Lab Results   Component Value Date    A1C 6.7 07/17/2018    A1C 8.2 03/19/2018    A1C 6.8 09/15/2017    A1C 8.2 06/07/2017    A1C 6.6 12/13/2016       Today's Vitals: There were no vitals taken for this visit.- see PCP encounter.       ASSESSMENT:              Current medications were reviewed today  as discussed above.      Medication Adherence: good, no issues identified    Dizziness: needs improvement, risperidone can cause orthostatic hypotension and may be a contributing factor, however this is not new and the dose has not been changed, so less likely to be the main issue, but could contribute. Hydration discussed further today with patient as well.    Diabetes: Stable. Patient is meeting A1c goal of < 7%. Self monitoring of blood glucose is at goal of fasting  mg/dL. Pt would benefit from no changes at this time.      PLAN:                  1. No changes this time, but PCP evaluating and checking labs regarding the dizziness.    I spent 15 minutes with this patient today. All changes were made via collaborative practice agreement with Dr. Hart. A copy of the visit note was provided to the patient's primary care provider.     Will follow up in 2 weeks per patient request.    The patient declined summary of these recommendations as an after visit summary.    Aliyah Angeles, Pharm.D, BCACP  Medication Therapy Management Pharmacist  [phone]

## 2018-09-05 NOTE — MR AVS SNAPSHOT
After Visit Summary   9/5/2018    Caden Bush    MRN: [id number]           Patient Information     Date Of Birth          1960        Visit Information        Provider Department      9/5/2018 11:00 AM Aliyah Angeles RPH [facility]        Today's Diagnoses     Type 2 diabetes mellitus with both eyes affected by moderate nonproliferative retinopathy without macular edema, without long-term current use of insulin (H)    -  1    Dizziness          Care Instructions    Recommendations from today's MTM visit:                                                        1. **    2. **    3. **    4. **    5. **    Next MTM visit: **    To schedule another MTM appointment, please call the clinic directly or you may call the MTM scheduling line at [phone] or toll-free at [phone].     My Clinical Pharmacist's contact information:                                                      It was a pleasure seeing you today!  Please feel free to contact me with any questions or concerns you have.      Aliyah Angeles, Pharm.D, [facility]  Medication Therapy Management Pharmacist  [phone]    You may receive a survey about the MT services you received.  I would appreciate your feedback to help me serve you better in the future. Please fill it out and return it when you can. Your comments will be anonymous.                  Follow-ups after your visit        Your next 10 appointments already scheduled     Sep 17, 2018  1:30 PM CDT   TELEMEDICINE with Aliyah Angeles RPH   [facility] ([facility]    [address] [place] [address] [phone]           Note: this is not an onsite visit; there is no need to come to the facility.            Oct 15, 2018  2:10 PM CDT   Adult Med Follow UP with Natali Dang MD   Psychiatry Clinic ([facility])    [address]  [id number] [phone]              Who to contact     If you have questions or need follow up information about today's clinic visit or your schedule please contact [facility] directly at [phone].  Normal or non-critical lab and imaging results will be communicated to you by MyChart, letter or phone within 4 business days after the clinic has received the results. If you do not hear from us within 7 days, please contact the clinic through [employer] or phone. If you have a critical or abnormal lab result, we will notify you by phone as soon as possible.  Submit refill requests through [employer] or call your pharmacy and they will forward the refill request to us. Please allow 3 business days for your refill to be completed.          Additional Information About Your Visit        [employer] Information     [employer] gives you secure access to your electronic health record. If you see a primary care provider, you can also send messages to your care team and make appointments. If you have questions, please call your primary care clinic.  If you do not have a primary care provider, please call [phone] and they will assist you.        Care EveryWhere ID     This is your Care EveryWhere ID. This could be used by other organizations to access your [place] medical records  [id number]         Blood Pressure from Last 3 Encounters:   09/05/18 96/52   07/17/18 136/80   04/30/18 136/80    Weight from Last 3 Encounters:   09/05/18 143 lb (64.9 kg)   07/17/18 137 lb (62.1 kg)   04/17/18 142 lb (64.4 kg)              Today, you had the following     No orders found for display       Primary Care Provider Office Phone # Fax #    Annie Jeniffer Hart [phone] [address] [place] [address]        Equal Access to Services     ERICA URIAS : Pat Baeza, ruslan daly, qaybta kaventura winn, marcia woods. So [facility]  [phone].    ATENCIÓN: Si stefano devi, tiene a magaña disposición servicios gratuitos de asistencia lingüística. Maricruz pope [phone].    We comply with applicable federal civil rights laws and [place] laws. We do not discriminate on the basis of race, color, national origin, age, disability, sex, sexual orientation, or gender identity.            Thank you!     Thank you for choosing [facility]  for your care. Our goal is always to provide you with excellent care. Hearing back from our patients is one way we can continue to improve our services. Please take a few minutes to complete the written survey that you may receive in the mail after your visit with us. Thank you!             Your Updated Medication List - Protect others around you: Learn how to safely use, store and throw away your medicines at www.disposemymeds.org.          This list is accurate as of 9/5/18 11:54 AM.  Always use your most recent med list.                   Brand Name Dispense Instructions for use Diagnosis    ACE/ARB/ARNI NOT PRESCRIBED (INTENTIONAL)      Please choose reason not prescribed, below- hypotension        aspirin 81 MG EC tablet      Take 81 mg by mouth daily.        BASAGLAR 100 UNIT/ML injection     15 mL    Inject 7 Units Subcutaneous At Bedtime We will make further adjustments as we see your response    Type 2 diabetes mellitus with both eyes affected by moderate nonproliferative retinopathy without macular edema, without long-term current use of insulin (H)       cholecalciferol 1000 UNIT tablet    vitamin D3     Take 1 tablet by mouth daily.        COMPLETE NEEDLE COLLECTION SYS Misc     1 each    1 each daily    Type 2 diabetes mellitus with both eyes affected by moderate nonproliferative retinopathy without macular edema, with long-term current use of insulin (H)       FISH OIL      1 capsule daily        insulin pen needle 32G X 4 MM    BD BRENDAN U/F    100 each    Use 1 daily as directed.    Type  2 diabetes mellitus with both eyes affected by moderate nonproliferative retinopathy without macular edema, without long-term current use of insulin (H)       metFORMIN 500 MG 24 hr tablet    GLUCOPHAGE-XR    360 tablet    TAKE 2 TABLETS BY MOUTH TWICE DAILY    Encounter for medication refill       ONETOUCH ULTRA test strip   Generic drug:  blood glucose monitoring     100 strip    TEST ONCE DAILY AS DIRECTED    Type 2 diabetes mellitus with both eyes affected by moderate nonproliferative retinopathy without macular edema, without long-term current use of insulin (H)       pioglitazone 45 MG tablet    ACTOS    90 tablet    TAKE 1 TABLET BY MOUTH DAILY    Encounter for medication refill       risperiDONE 3 MG tablet    risperDAL    30 tablet    Take 1 tablet (3 mg) by mouth At Bedtime    Schizoaffective disorder, bipolar type (H)       simvastatin 40 MG tablet    ZOCOR    90 tablet    TAKE ONE TABLET BY MOUTH EVERY NIGHT AT BEDTIME    Encounter for medication refill       traZODone 50 MG tablet    DESYREL    30 tablet    Take 1 tablet (50 mg) by mouth nightly as needed for sleep    Schizoaffective disorder, bipolar type (H)       * venlafaxine 150 MG 24 hr capsule    EFFEXOR XR    30 capsule    Take 1 capsule (150 mg) by mouth daily Along with a 75 mg for a total of 225 mg daily dose.    Schizoaffective disorder, bipolar type (H)       * venlafaxine 75 MG 24 hr capsule    EFFEXOR XR    30 capsule    Take 1 capsule (75 mg) by mouth daily Please take with Effexor 150 mg for a total dose of 225 mg daily    Schizoaffective disorder, bipolar type (H)       * Notice:  This list has 2 medication(s) that are the same as other medications prescribed for you. Read the directions carefully, and ask your doctor or other care provider to review them with you.

## 2018-09-05 NOTE — MR AVS SNAPSHOT
After Visit Summary   9/5/2018    Caden Bush    MRN: [id number]           Patient Information     Date Of Birth          1960        Visit Information        Provider Department      9/5/2018 10:45 AM Annie Hart MD [facility]        Today's Diagnoses     Orthostatic hypotension    -  1    Mitral valve prolapse        Normocytic anemia        Leukopenia, unspecified type        Schizoaffective disorder, bipolar type (H)           Follow-ups after your visit        Your next 10 appointments already scheduled     Sep 17, 2018  1:30 PM CDT   TELEMEDICINE with Aliyah Angeles [facility] ([facility])    [address] [place] [address] [phone]           Note: this is not an onsite visit; there is no need to come to the facility.            Oct 15, 2018  2:10 PM CDT   Adult Med Follow UP with Natali Dang MD   Psychiatry Clinic ([facility])    [place] [id number] [place] [address] [phone]              Who to contact     If you have questions or need follow up information about today's clinic visit or your schedule please contact [facility] directly at [phone].  Normal or non-critical lab and imaging results will be communicated to you by [employer], letter or phone within 4 business days after the clinic has received the results. If you do not hear from us within 7 days, please contact the clinic through [employer] or phone. If you have a critical or abnormal lab result, we will notify you by phone as soon as possible.  Submit refill requests through [employer] or call your pharmacy and they will forward the refill request to us. Please allow 3 business days for your refill to be completed.          Additional Information About Your Visit        [employer] Information     [employer] gives you secure access to your electronic health record.  If you see a primary care provider, you can also send messages to your care team and make appointments. If you have questions, please call your primary care clinic.  If you do not have a primary care provider, please call [phone] and they will assist you.        Care EveryWhere ID     This is your Care EveryWhere ID. This could be used by other organizations to access your [place] medical records  [id number]        Your Vitals Were     Pulse BMI (Body Mass Index)                108 20.23 kg/m2           Blood Pressure from Last 3 Encounters:   09/05/18 96/52   08/17/18 99/63   07/17/18 136/80    Weight from Last 3 Encounters:   09/05/18 64.9 kg (143 lb)   08/17/18 63.7 kg (140 lb 6.4 oz)   07/17/18 62.1 kg (137 lb)              We Performed the Following     Basic Metabolic Panel (8) ([facility])     CBC with Diff/Plt ([facility])     Iron and TIBC ([facility])     Vitamin B12 and Folate ([facility])        Primary Care Provider Office Phone # Fax #    Annie Jeniffer Hart [phone] [address] [place] [address]        Equal Access to Services     ERICA URIAS AH: Hadii ethan briones hadasho Soomaali, waaxda luqadaha, qaybta kaalmada adeegyada, marcia woods. So [facility] [phone].    ATENCIÓN: Si habla español, tiene a magaña disposición servicios gratuitos de asistencia lingüística. Llame al [phone].    We comply with applicable federal civil rights laws and [place] laws. We do not discriminate on the basis of race, color, national origin, age, disability, sex, sexual orientation, or gender identity.            Thank you!     Thank you for choosing [facility]  for your care. Our goal is always to provide you with excellent care. Hearing back from our patients is one way we can continue to improve our services. Please take a few minutes to complete the written survey that you may receive in the mail after your visit with us. Thank you!              Your Updated Medication List - Protect others around you: Learn how to safely use, store and throw away your medicines at www.disposemymeds.org.          This list is accurate as of 9/5/18  3:53 PM.  Always use your most recent med list.                   Brand Name Dispense Instructions for use Diagnosis    ACE/ARB/ARNI NOT PRESCRIBED (INTENTIONAL)      Please choose reason not prescribed, below- hypotension        aspirin 81 MG EC tablet      Take 81 mg by mouth daily.        BASAGLAR 100 UNIT/ML injection     15 mL    Inject 7 Units Subcutaneous At Bedtime We will make further adjustments as we see your response    Type 2 diabetes mellitus with both eyes affected by moderate nonproliferative retinopathy without macular edema, without long-term current use of insulin (H)       cholecalciferol 1000 UNIT tablet    vitamin D3     Take 1 tablet by mouth daily.        COMPLETE NEEDLE COLLECTION SYS Misc     1 each    1 each daily    Type 2 diabetes mellitus with both eyes affected by moderate nonproliferative retinopathy without macular edema, with long-term current use of insulin (H)       FISH OIL      1 capsule daily        insulin pen needle 32G X 4 MM    BD BRENDAN U/F    100 each    Use 1 daily as directed.    Type 2 diabetes mellitus with both eyes affected by moderate nonproliferative retinopathy without macular edema, without long-term current use of insulin (H)       metFORMIN 500 MG 24 hr tablet    GLUCOPHAGE-XR    360 tablet    TAKE 2 TABLETS BY MOUTH TWICE DAILY    Encounter for medication refill       ONETOUCH ULTRA test strip   Generic drug:  blood glucose monitoring     100 strip    TEST ONCE DAILY AS DIRECTED    Type 2 diabetes mellitus with both eyes affected by moderate nonproliferative retinopathy without macular edema, without long-term current use of insulin (H)       pioglitazone 45 MG tablet    ACTOS    90 tablet    TAKE 1 TABLET BY MOUTH DAILY    Encounter for medication refill       risperiDONE  3 MG tablet    risperDAL    30 tablet    Take 1 tablet (3 mg) by mouth At Bedtime    Schizoaffective disorder, bipolar type (H)       simvastatin 40 MG tablet    ZOCOR    90 tablet    TAKE ONE TABLET BY MOUTH EVERY NIGHT AT BEDTIME    Encounter for medication refill       traZODone 50 MG tablet    DESYREL    30 tablet    Take 1 tablet (50 mg) by mouth nightly as needed for sleep    Schizoaffective disorder, bipolar type (H)       * venlafaxine 150 MG 24 hr capsule    EFFEXOR XR    30 capsule    Take 1 capsule (150 mg) by mouth daily Along with a 75 mg for a total of 225 mg daily dose.    Schizoaffective disorder, bipolar type (H)       * venlafaxine 75 MG 24 hr capsule    EFFEXOR XR    30 capsule    Take 1 capsule (75 mg) by mouth daily Please take with Effexor 150 mg for a total dose of 225 mg daily    Schizoaffective disorder, bipolar type (H)       * Notice:  This list has 2 medication(s) that are the same as other medications prescribed for you. Read the directions carefully, and ask your doctor or other care provider to review them with you.

## 2018-09-05 NOTE — PROGRESS NOTES
Problem(s) Oriented visit        SUBJECTIVE:                                                    Caden Bush is a 58 year old male who presents to clinic today for recheck of blood pressure. He does get lightheaded occasionally. This is most common if he stands after being seated for a while. He is not on anything for blood pressure. He previously took lisinopril but it was stopped due to hypotension. He drinks about 5 glasses of water daily. He reports having normal appetite.  He denies any symptoms related to low blood sugars.    He previously was on lithium but stopped it in January. He is now on risperdal.     He has history of diabetes type 2.  Blood sugars have been in good range.  He meets with Aliyah after this visit.      Problem list, Medication list, Allergies, and Medical/Social/Surgical histories reviewed in [facility] and updated as appropriate.   Additional history: as documented    ROS:  5 point ROS completed and negative except noted above, including Gen, CV, Resp, GI, MS      Histories:   Patient Active Problem List   Diagnosis     Eczema     Retinopathy     Moderate episode of recurrent major depressive disorder (H)     Polyp of colon, adenomatous     Health Care Home     Hx of psychiatric care     Neuropathy of left upper extremity     Type 2 diabetes mellitus with both eyes affected by moderate nonproliferative retinopathy without macular edema, without long-term current use of insulin (H)     Schizoaffective disorder, bipolar type (H)     Past Surgical History:   Procedure Laterality Date     APPENDECTOMY       COLONOSCOPY  2012    2 adenomatous polyps       Social History   Substance Use Topics     Smoking status: Never Smoker     Smokeless tobacco: Never Used     Alcohol use No     Family History   Problem Relation Age of Onset     Osteoporosis Mother      Osteoporosis Father      Asthma Father      Cancer - colorectal Father 70     Breast Cancer Mother 78     Rheumatoid Arthritis Sister             OBJECTIVE:                                                    BP 96/52 (BP Location: Left arm, Patient Position: Standing, Cuff Size: Adult Regular)  Pulse 108  Wt 64.9 kg (143 lb)  BMI 20.23 kg/m2  Body mass index is 20.23 kg/(m^2).   GENERAL APPEARANCE: Alert, no acute distress  EYES: PERRL, EOM normal, conjunctiva and lids normal  NECK: No adenopathy,masses or thyromegaly  RESP: lungs clear to auscultation   CV: regular rhythm, rate-normal, grade 2/6  systolic murmur heard best over the lower sternal border  MS: extremities normal, no peripheral edema  NEURO: alert, oriented  PSYCH: flat affect is noted, this is patient's normal baseline   Labs Resulted Today:   Results for orders placed or performed in visit on 09/05/18   CBC with Diff/Plt ([facility])   Result Value Ref Range    WBC x10/cmm 2.8 (A) 3.8 - 11.0 x10/cmm    % Lymphocytes 25.2 20.5 - 51.1 %    % Monocytes 5.2 1.7 - 9.3 %    % Granulocytes 69.6 42.2 - 75.2 %    RBC x10/cmm 4.08 (A) 4.2 - 5.9 x10/cmm    Hemoglobin 11.5 (A) 13.4 - 17.5 g/dl    Hematocrit 35.5 (A) 39 - 51 %    MCV 86.9 80 - 100 fL    MCH 28.2 27.0 - 31.0 pg    MCHC 32.4 (A) 33.0 - 37.0 g/dL    Platelet Count 168 140 - 450 K/uL     ASSESSMENT/PLAN:                                                        Caden was seen today for hypertension.    Diagnoses and all orders for this visit:    Orthostatic hypotension, etiology uncertain  -     CBC with Diff/Plt ([facility])  -     Basic Metabolic Panel (8) ([facility])  Recommend good hydration.  Moves slowly from sit to stand.    Mitral valve prolapse  This was documented by echocardiogram earlier this year, and should not be a component in his current symptoms.    Normocytic anemia  Etiology uncertain, will check iron studies and B12.  No obvious site of bleeding noted.    Leukopenia, unspecified type  Possibly due to Risperdal. Need to follow.    Schizoaffective disorder, bipolar type (H)  Uncertain if Risperdal could be cause of orthostatic  hypotension.  Recommend discussion with his psychiatrist.      There are no Patient Instructions on file for this visit.    The following health maintenance items are reviewed in Epic and correct as of today:  Health Maintenance   Topic Date Due     HIV SCREEN (SYSTEM ASSIGNED)  04/10/1977     PHQ-9 Q1 MONTH  09/17/2018     EYE EXAM Q1 YEAR  12/08/2018     MEDICARE ANNUAL WELLNESS VISIT  12/27/2018     NEDRA QUESTIONNAIRE 1 YEAR  12/27/2018     A1C Q6 MO  01/17/2019     CREATININE Q1 YEAR  04/17/2019     LIPID MONITORING Q1 YEAR  04/17/2019     FOOT EXAM Q1 YEAR  07/17/2019     MICROALBUMIN Q1 YEAR  07/17/2019     DEPRESSION ACTION PLAN Q1 YR  07/17/2019     ADVANCE DIRECTIVE PLANNING Q5 YRS  12/17/2019     TSH W/ FREE T4 REFLEX Q2 YEAR  04/17/2020     COLONOSCOPY Q5 YR  06/19/2020     TETANUS IMMUNIZATION (SYSTEM ASSIGNED)  12/18/2025     PNEUMOVAX 1X HI RISK PATIENT < 65 (NO IB MSG)  Completed     INFLUENZA VACCINE  Completed     HEPATITIS C SCREENING  Completed       Annie Hart MD  [facility]  Family Practice  [facility]  [phone]    For any issues my office # is [phone]

## 2018-09-06 ENCOUNTER — TELEPHONE (OUTPATIENT)
Dept: FAMILY MEDICINE | Facility: CLINIC | Age: 58
End: 2018-09-06

## 2018-09-06 DIAGNOSIS — E11.3393 TYPE 2 DIABETES MELLITUS WITH BOTH EYES AFFECTED BY MODERATE NONPROLIFERATIVE RETINOPATHY WITHOUT MACULAR EDEMA, WITHOUT LONG-TERM CURRENT USE OF INSULIN (H): ICD-10-CM

## 2018-09-06 LAB
BUN SERPL-MCNC: 14 MG/DL (ref 6–24)
BUN/CREATININE RATIO: 13 (ref 9–20)
CALCIUM SERPL-MCNC: 9.7 MG/DL (ref 8.7–10.2)
CHLORIDE SERPLBLD-SCNC: 105 MMOL/L (ref 96–106)
CREAT SERPL-MCNC: 1.11 MG/DL (ref 0.76–1.27)
EGFR IF AFRICN AM: 84 ML/MIN/1.73
EGFR IF NONAFRICN AM: 72 ML/MIN/1.73
FOLATE: 12.6 NG/ML
GLUCOSE SERPL-MCNC: 214 MG/DL (ref 65–99)
IRON BINDING CAP: 350 UG/DL (ref 250–450)
IRON SATURATION: 12 % (ref 15–55)
IRON: 41 UG/DL (ref 38–169)
POTASSIUM SERPL-SCNC: 4.8 MMOL/L (ref 3.5–5.2)
SODIUM SERPL-SCNC: 143 MMOL/L (ref 134–144)
TOTAL CO2: 25 MMOL/L (ref 20–29)
UIBC: 309 UG/DL (ref 111–343)
VIT B12 SERPL-MCNC: 213 PG/ML (ref 232–1245)

## 2018-09-06 NOTE — TELEPHONE ENCOUNTER
Patient called requesting prescription for BID blood glucose test strip.  This prescription sent to pharmacy.  Becca Abarca

## 2018-09-07 DIAGNOSIS — F25.0 SCHIZOAFFECTIVE DISORDER, BIPOLAR TYPE (H): ICD-10-CM

## 2018-09-07 RX ORDER — RISPERIDONE 3 MG/1
3 TABLET ORAL AT BEDTIME
Qty: 10 TABLET | Refills: 0 | Status: SHIPPED | OUTPATIENT
Start: 2018-09-07 | End: 2018-09-27

## 2018-09-07 NOTE — TELEPHONE ENCOUNTER
Medication requested:  risperiDONE (RISPERDAL) 3 MG   Last refilled:  8/10/18  Qty: 30      Last seen: 8/17/18  RTC: 1-2 MOS  Cancel: 0  No-show: 0  Next appt: 10/15/18    Refill decision: Refilled for 30 days per protocol / UNTIL APPT.

## 2018-09-10 DIAGNOSIS — E11.3393 TYPE 2 DIABETES MELLITUS WITH BOTH EYES AFFECTED BY MODERATE NONPROLIFERATIVE RETINOPATHY WITHOUT MACULAR EDEMA, WITHOUT LONG-TERM CURRENT USE OF INSULIN (H): Primary | ICD-10-CM

## 2018-09-10 NOTE — TELEPHONE ENCOUNTER
metFORMIN (GLUCOPHAGE-XR) 500 MG 24 hr tablet   LAST  Med check 9/5/18   last labs(for RX) 9/5/18  Next  appt scheduled =  10/11/18 lab only  Lucy Guzmán MA    Lab Results   Component Value Date    A1C 6.7 07/17/2018    A1C 8.2 03/19/2018    A1C 6.8 09/15/2017    A1C 8.2 06/07/2017    A1C 6.6 12/13/2016

## 2018-09-11 RX ORDER — METFORMIN HCL 500 MG
TABLET, EXTENDED RELEASE 24 HR ORAL
Qty: 360 TABLET | Refills: 1 | Status: SHIPPED | OUTPATIENT
Start: 2018-09-11 | End: 2019-03-25 | Stop reason: ALTCHOICE

## 2018-09-13 DIAGNOSIS — E11.3393 TYPE 2 DIABETES MELLITUS WITH BOTH EYES AFFECTED BY MODERATE NONPROLIFERATIVE RETINOPATHY WITHOUT MACULAR EDEMA, WITHOUT LONG-TERM CURRENT USE OF INSULIN (H): ICD-10-CM

## 2018-09-14 ENCOUNTER — TELEPHONE (OUTPATIENT)
Dept: PSYCHIATRY | Facility: CLINIC | Age: 58
End: 2018-09-14

## 2018-09-14 NOTE — TELEPHONE ENCOUNTER
Called Caden back and tried to talk to him. He told me that October 4th will not work for him. I went through many other openings and he told me that he rather meet in November and that he is going to call and reschedule.     Also he asked about the group. He was not happy that he is not a good fit for the group.He asked why he is not a good fit for the group.I explained that it is partially based on knowing him  from last group experienceand his previous response to the group and I was also explaining that some groups might not be beneficial for him for the issues of current participants in the group. At that time he seemed he was not able to hear me and there were silence at his side. I disconnected the call and called back twice and left a voice mail, explaining the situation.     Natali Dang MD  PGY3 Psychiatry Resident  Pager: [phone]

## 2018-09-17 ENCOUNTER — ALLIED HEALTH/NURSE VISIT (OUTPATIENT)
Dept: PHARMACY | Facility: PHYSICIAN GROUP | Age: 58
End: 2018-09-17
Payer: MEDICAID

## 2018-09-17 DIAGNOSIS — R42 DIZZINESS: ICD-10-CM

## 2018-09-17 DIAGNOSIS — E11.3393 TYPE 2 DIABETES MELLITUS WITH BOTH EYES AFFECTED BY MODERATE NONPROLIFERATIVE RETINOPATHY WITHOUT MACULAR EDEMA, WITHOUT LONG-TERM CURRENT USE OF INSULIN (H): Primary | ICD-10-CM

## 2018-09-17 PROCEDURE — 99606 MTMS BY PHARM EST 15 MIN: CPT | Performed by: PHARMACIST

## 2018-09-17 RX ORDER — FERROUS SULFATE 325(65) MG
325 TABLET ORAL DAILY
COMMUNITY
End: 2019-10-18

## 2018-09-17 NOTE — PROGRESS NOTES
SUBJECTIVE/OBJECTIVE:                Caden Bush is a 58 year old male called for a follow-up visit for Medication Therapy Management.  He was referred to me from Dr. Hart.     Chief Complaint: Follow up from our visit on 9/5.      Tobacco: No tobacco use  Alcohol: not currently using    Medication Adherence/Access:  no issues reported    Dizziness: No longer on lisinopril due to dizziness- Saw PCP for this on 9/5 and was found to have orthostatic hypotension. Labs revealed low iron and B12 so supplements were started for him. He is taking these daily at lunch now and is scheduled for lab only on 10/11.   Patient continues on risperidone for his schizophrenia - taking 3mg daily at this time and has been on at least 2mg since before 2015. He is no longer on lithium since January. Sees psychiatry regularly.    BP Readings from Last 3 Encounters:   09/05/18 100/58   07/17/18 136/80   04/30/18 136/80       Diabetes:  Pt currently taking metformin ER 500mg- 2 BID, Pioglitazone 45mg daily and basaglar 7 units in the AM. Pt is not experiencing side effects. Met with CDE to discuss diet and this has been very helpful to him, continues to have low weight and not always eating enough.    Stopped Glipizide 5mg with the start of insulin per PCP and has not needed this to be restarted.   SMBG: one time daily, mostly in the evenings. Ranges (patient reported): newest to oldest   284 yesterday  103, 98, 184, 148, -, 116, 134, 104, 199, 112  Recent symptoms of high blood sugar? None  No longer having dizziness off the lisinopril  Eye exam: up to date  Foot exam: up to date  Pt is now taking an ACEi/ARB and denies issues - hx of lithium toxicity w/ Ace previously, but has been off lithium now since Jan 2018.  Aspirin: Taking 81mg daily and denies side effects    Lab Results   Component Value Date    A1C 6.7 07/17/2018    A1C 8.2 03/19/2018    A1C 6.8 09/15/2017    A1C 8.2 06/07/2017    A1C 6.6 12/13/2016       Today's Vitals:  There were no vitals taken for this visit.- phone visit      ASSESSMENT:              Current medications were reviewed today as discussed above.      Medication Adherence: good, no issues identified    Dizziness: Unimproved, has plan for follow up on low labs and seeing psychiatry on 9/27.     Diabetes: Stable, Patient is meeting A1c goal of < 7%. Self monitoring of blood glucose is not at goal of fasting  mg/dL. Pt would benefit from ongoing diet efforts and following low carb plan, while preventing further weight loss. No change in medication regimen today.      PLAN:                  1. No changes at this time.     I spent 15 minutes with this patient today. All changes were made via collaborative practice agreement with Dr. Hart. A copy of the visit note was provided to the patient's primary care provider.     Will follow up in 2 weeks per patient request.    The patient was given a summary of these recommendations as an after visit summary.    Aliyah Angeles, Pharm.D, BCACP  Medication Therapy Management Pharmacist  [phone]

## 2018-09-17 NOTE — MR AVS SNAPSHOT
After Visit Summary   9/17/2018    Caden Bush    MRN: [id number]           Patient Information     Date Of Birth          1960        Visit Information        Provider Department      9/17/2018 1:30 PM [facility]        Today's Diagnoses     Type 2 diabetes mellitus with both eyes affected by moderate nonproliferative retinopathy without macular edema, without long-term current use of insulin (H)    -  1    Dizziness           Follow-ups after your visit        Your next 10 appointments already scheduled     Sep 27, 2018  2:00 PM CDT   Adult Med Follow UP with Natali Dang MD   Psychiatry Clinic ([facility])    [address]  [address] [address] [phone]            Oct 01, 2018  1:30 PM CDT   TELEMEDICINE with Aliyah Angeles RPH   [facility] ([facility])    [address] [place] [address] [phone]           Note: this is not an onsite visit; there is no need to come to the facility.            Oct 11, 2018 10:00 AM CDT   LAB with RF LAB   [facility] ([facility])    [address] [place] [address] [phone]           Please do not eat 10-12 hours before your appointment if you are coming in fasting for labs on lipids, cholesterol, or glucose (sugar). This does not apply to pregnant women. Water, hot tea and black coffee (with nothing added) are okay. Do not drink other fluids, diet soda or chew gum.              Who to contact     If you have questions or need follow up information about today's clinic visit or your schedule please contact [facility] directly at [phone].  Normal or non-critical lab and imaging results will be communicated to you by MyChart, letter or phone within 4 business days after the clinic has received the results. If you do not hear  from us within 7 days, please contact the clinic through [employer] or phone. If you have a critical or abnormal lab result, we will notify you by phone as soon as possible.  Submit refill requests through [employer] or call your pharmacy and they will forward the refill request to us. Please allow 3 business days for your refill to be completed.          Additional Information About Your Visit        [employer] Information     [employer] gives you secure access to your electronic health record. If you see a primary care provider, you can also send messages to your care team and make appointments. If you have questions, please call your primary care clinic.  If you do not have a primary care provider, please call [phone] and they will assist you.        Care EveryWhere ID     This is your Care EveryWhere ID. This could be used by other organizations to access your [facility] medical records  [id number]         Blood Pressure from Last 3 Encounters:   09/05/18 96/52   07/17/18 136/80   04/30/18 136/80    Weight from Last 3 Encounters:   09/05/18 143 lb (64.9 kg)   07/17/18 137 lb (62.1 kg)   04/17/18 142 lb (64.4 kg)              Today, you had the following     No orders found for display       Primary Care Provider Office Phone # Fax #    Annie Jeniffer Hart [phone] [address] [place] [address]        Equal Access to Services     [facility] : Hadii aad ku hadasho Soomaali, waaxda luqadaha, qaybta kaalmada adeegyada, marcia diaz . So [facility] [phone].    ATENCIÓN: Si habla español, tiene a magaña disposición servicios gratuitos de asistencia lingüística. Llame al [phone].    We comply with applicable federal civil rights laws and [place] laws. We do not discriminate on the basis of race, color, national origin, age, disability, sex, sexual orientation, or gender identity.            Thank you!     Thank you for choosing [facility]  GROUP  for your care. Our goal is always to provide you with excellent care. Hearing back from our patients is one way we can continue to improve our services. Please take a few minutes to complete the written survey that you may receive in the mail after your visit with us. Thank you!             Your Updated Medication List - Protect others around you: Learn how to safely use, store and throw away your medicines at www.disposemymeds.org.          This list is accurate as of 9/17/18  2:05 PM.  Always use your most recent med list.                   Brand Name Dispense Instructions for use Diagnosis    ACE/ARB/ARNI NOT PRESCRIBED (INTENTIONAL)      Please choose reason not prescribed, below- hypotension        aspirin 81 MG EC tablet      Take 81 mg by mouth daily.        B-12 1000 MCG Caps      Take 1 capsule by mouth daily        BASAGLAR 100 UNIT/ML injection     15 mL    Inject 7 Units Subcutaneous At Bedtime We will make further adjustments as we see your response    Type 2 diabetes mellitus with both eyes affected by moderate nonproliferative retinopathy without macular edema, without long-term current use of insulin (H)       blood glucose lancets standard    no brand specified    100 each    Use to test blood sugar 2 times daily or as directed.    Type 2 diabetes mellitus with both eyes affected by moderate nonproliferative retinopathy without macular edema, without long-term current use of insulin (H)       blood glucose monitoring meter device kit    no brand specified    1 kit    Use to test blood sugar 2 times daily or as directed.    Type 2 diabetes mellitus with both eyes affected by moderate nonproliferative retinopathy without macular edema, without long-term current use of insulin (H)       blood glucose monitoring test strip    no brand specified    100 strip    Use to test blood sugars 2 times daily or as directed    Type 2 diabetes mellitus with both eyes affected by moderate nonproliferative  retinopathy without macular edema, without long-term current use of insulin (H)       cholecalciferol 1000 UNIT tablet    vitamin D3     Take 1 tablet by mouth daily.        COMPLETE NEEDLE COLLECTION SYS Misc     1 each    1 each daily    Type 2 diabetes mellitus with both eyes affected by moderate nonproliferative retinopathy without macular edema, with long-term current use of insulin (H)       ferrous sulfate 325 (65 Fe) MG tablet    IRON     Take 325 mg by mouth daily        FISH OIL      1 capsule daily        insulin pen needle 32G X 4 MM    BD BRENDAN U/F    100 each    Use 1 daily as directed.    Type 2 diabetes mellitus with both eyes affected by moderate nonproliferative retinopathy without macular edema, without long-term current use of insulin (H)       metFORMIN 500 MG 24 hr tablet    GLUCOPHAGE-XR    360 tablet    TAKE 2 TABLETS BY MOUTH TWICE DAILY    Type 2 diabetes mellitus with both eyes affected by moderate nonproliferative retinopathy without macular edema, without long-term current use of insulin (H)       pioglitazone 45 MG tablet    ACTOS    90 tablet    TAKE 1 TABLET BY MOUTH DAILY    Encounter for medication refill       risperiDONE 3 MG tablet    risperDAL    10 tablet    Take 1 tablet (3 mg) by mouth At Bedtime    Schizoaffective disorder, bipolar type (H)       simvastatin 40 MG tablet    ZOCOR    90 tablet    TAKE ONE TABLET BY MOUTH EVERY NIGHT AT BEDTIME    Encounter for medication refill       traZODone 50 MG tablet    DESYREL    30 tablet    Take 1 tablet (50 mg) by mouth nightly as needed for sleep    Schizoaffective disorder, bipolar type (H)       * venlafaxine 150 MG 24 hr capsule    EFFEXOR XR    30 capsule    Take 1 capsule (150 mg) by mouth daily Along with a 75 mg for a total of 225 mg daily dose.    Schizoaffective disorder, bipolar type (H)       * venlafaxine 75 MG 24 hr capsule    EFFEXOR XR    30 capsule    Take 1 capsule (75 mg) by mouth daily Please take with Effexor 150  mg for a total dose of 225 mg daily    Schizoaffective disorder, bipolar type (H)       * Notice:  This list has 2 medication(s) that are the same as other medications prescribed for you. Read the directions carefully, and ask your doctor or other care provider to review them with you.

## 2018-09-27 ENCOUNTER — OFFICE VISIT (OUTPATIENT)
Dept: PSYCHIATRY | Facility: CLINIC | Age: 58
End: 2018-09-27
Attending: PSYCHIATRY & NEUROLOGY
Payer: MEDICARE

## 2018-09-27 VITALS
SYSTOLIC BLOOD PRESSURE: 151 MMHG | BODY MASS INDEX: 20.82 KG/M2 | DIASTOLIC BLOOD PRESSURE: 73 MMHG | HEART RATE: 91 BPM | WEIGHT: 147.2 LBS

## 2018-09-27 DIAGNOSIS — F25.0 SCHIZOAFFECTIVE DISORDER, BIPOLAR TYPE (H): Primary | ICD-10-CM

## 2018-09-27 PROCEDURE — G0463 HOSPITAL OUTPT CLINIC VISIT: HCPCS | Mod: ZF

## 2018-09-27 RX ORDER — RISPERIDONE 3 MG/1
3 TABLET ORAL AT BEDTIME
Qty: 30 TABLET | Refills: 1 | Status: SHIPPED | OUTPATIENT
Start: 2018-09-27 | End: 2018-11-21

## 2018-09-27 RX ORDER — VENLAFAXINE HYDROCHLORIDE 150 MG/1
150 CAPSULE, EXTENDED RELEASE ORAL DAILY
Qty: 30 CAPSULE | Refills: 1 | Status: SHIPPED | OUTPATIENT
Start: 2018-09-27 | End: 2018-11-21

## 2018-09-27 RX ORDER — VENLAFAXINE HYDROCHLORIDE 75 MG/1
75 CAPSULE, EXTENDED RELEASE ORAL DAILY
Qty: 30 CAPSULE | Refills: 1 | Status: SHIPPED | OUTPATIENT
Start: 2018-09-27 | End: 2018-11-28

## 2018-09-27 ASSESSMENT — PAIN SCALES - GENERAL: PAINLEVEL: MODERATE PAIN (5)

## 2018-09-27 NOTE — MR AVS SNAPSHOT
After Visit Summary   9/27/2018    Caden Bush    MRN: [id number]           Patient Information     Date Of Birth          1960        Visit Information        Provider Department      9/27/2018 2:00 PM Natali Powell MD Psychiatry Clinic        Today's Diagnoses     Schizoaffective disorder, bipolar type (H)    -  1       Follow-ups after your visit        Your next 10 appointments already scheduled     Dec 03, 2018  2:10 PM CST   Adult Med Follow UP with Natali Dang MD   Psychiatry Clinic ([facility])    [address]  [address] [address] [phone]            Jan 02, 2019 10:00 AM CST   PHYS[facility] with [facility] Hart MD   [facility]    [address] [place] [address] [phone]              Who to contact     Please call your clinic at [phone] to:    Ask questions about your health    Make or cancel appointments    Discuss your medicines    Learn about your test results    Speak to your doctor            Additional Information About Your Visit        [employer] Information     [employer] gives you secure access to your electronic health record. If you see a primary care provider, you can also send messages to your care team and make appointments. If you have questions, please call your primary care clinic.  If you do not have a primary care provider, please call [phone] and they will assist you.      [employer] is an electronic gateway that provides easy, online access to your medical records. With [employer], you can request a clinic appointment, read your test results, renew a prescription or communicate with your care team.     To access your existing account, please contact your [facility] or call [phone] for assistance.        Care EveryWhere ID     This is your Care EveryWhere ID. This could be used by other  organizations to access your [facility] medical records  [id number]        Your Vitals Were     Pulse BMI (Body Mass Index)                91 20.82 kg/m2           Blood Pressure from Last 3 Encounters:   09/27/18 151/73   09/05/18 96/52   08/17/18 99/63    Weight from Last 3 Encounters:   09/27/18 66.8 kg (147 lb 3.2 oz)   09/05/18 64.9 kg (143 lb)   08/17/18 63.7 kg (140 lb 6.4 oz)                 Where to get your medicines      These medications were sent to [facility] [address] [facility] [address] AT [facility] [address], [facility] [address]     Phone:  [phone]     risperiDONE 3 MG tablet    venlafaxine 150 MG 24 hr capsule    venlafaxine 75 MG 24 hr capsule          Primary Care Provider Office Phone # Fax #    Annie Hart [phone] [address] [place] [address]        Equal Access to Services     [facility] BRIDGETT : Hadii ethan briones hadasho Soomaali, waaxda luqadaha, qaybta kaalmada tatum, marcia woods. So [facility] [phone].    ATENCIÓN: Si habla español, tiene a magaña disposición servicios gratuitos de asistencia lingüística. [facility] [phone].    We comply with applicable federal civil rights laws and [place] laws. We do not discriminate on the basis of race, color, national origin, age, disability, sex, sexual orientation, or gender identity.            Thank you!     Thank you for choosing PSYCHIATRY CLINIC  for your care. Our goal is always to provide you with excellent care. Hearing back from our patients is one way we can continue to improve our services. Please take a few minutes to complete the written survey that you may receive in the mail after your visit with us. Thank you!             Your Updated Medication List - Protect others around you: Learn how to safely use, store and throw away your medicines at www.disposemymeds.org.          This list is accurate as of 9/27/18  11:59 PM.  Always use your most recent med list.                   Brand Name Dispense Instructions for use Diagnosis    ACE/ARB/ARNI NOT PRESCRIBED (INTENTIONAL)      Please choose reason not prescribed, below- hypotension        aspirin 81 MG EC tablet      Take 81 mg by mouth daily.        B-12 1000 MCG Caps      Take 1 capsule by mouth daily        BASAGLAR 100 UNIT/ML injection     15 mL    Inject 7 Units Subcutaneous At Bedtime We will make further adjustments as we see your response    Type 2 diabetes mellitus with both eyes affected by moderate nonproliferative retinopathy without macular edema, without long-term current use of insulin (H)       blood glucose lancets standard    no brand specified    100 each    Use to test blood sugar 2 times daily or as directed.    Type 2 diabetes mellitus with both eyes affected by moderate nonproliferative retinopathy without macular edema, without long-term current use of insulin (H)       blood glucose monitoring meter device kit    no brand specified    1 kit    Use to test blood sugar 2 times daily or as directed.    Type 2 diabetes mellitus with both eyes affected by moderate nonproliferative retinopathy without macular edema, without long-term current use of insulin (H)       blood glucose monitoring test strip    no brand specified    100 strip    Use to test blood sugars 2 times daily or as directed    Type 2 diabetes mellitus with both eyes affected by moderate nonproliferative retinopathy without macular edema, without long-term current use of insulin (H)       cholecalciferol 1000 UNIT tablet    vitamin D3     Take 1 tablet by mouth daily.        COMPLETE NEEDLE COLLECTION SYS Misc     1 each    1 each daily    Type 2 diabetes mellitus with both eyes affected by moderate nonproliferative retinopathy without macular edema, with long-term current use of insulin (H)       ferrous sulfate 325 (65 Fe) MG tablet    IRON     Take 325 mg by mouth daily        FISH  OIL      1 capsule daily        insulin pen needle 32G X 4 MM    BD BRENDAN U/F    100 each    Use 1 daily as directed.    Type 2 diabetes mellitus with both eyes affected by moderate nonproliferative retinopathy without macular edema, without long-term current use of insulin (H)       metFORMIN 500 MG 24 hr tablet    GLUCOPHAGE-XR    360 tablet    TAKE 2 TABLETS BY MOUTH TWICE DAILY    Type 2 diabetes mellitus with both eyes affected by moderate nonproliferative retinopathy without macular edema, without long-term current use of insulin (H)       pioglitazone 45 MG tablet    ACTOS    90 tablet    TAKE 1 TABLET BY MOUTH DAILY    Encounter for medication refill       risperiDONE 3 MG tablet    risperDAL    30 tablet    Take 1 tablet (3 mg) by mouth At Bedtime    Schizoaffective disorder, bipolar type (H)       simvastatin 40 MG tablet    ZOCOR    90 tablet    TAKE ONE TABLET BY MOUTH EVERY NIGHT AT BEDTIME    Encounter for medication refill       traZODone 50 MG tablet    DESYREL    30 tablet    Take 1 tablet (50 mg) by mouth nightly as needed for sleep    Schizoaffective disorder, bipolar type (H)       * venlafaxine 150 MG 24 hr capsule    EFFEXOR XR    30 capsule    Take 1 capsule (150 mg) by mouth daily Along with a 75 mg for a total of 225 mg daily dose.    Schizoaffective disorder, bipolar type (H)       * venlafaxine 75 MG 24 hr capsule    EFFEXOR XR    30 capsule    Take 1 capsule (75 mg) by mouth daily Please take with Effexor 150 mg for a total dose of 225 mg daily    Schizoaffective disorder, bipolar type (H)       * Notice:  This list has 2 medication(s) that are the same as other medications prescribed for you. Read the directions carefully, and ask your doctor or other care provider to review them with you.

## 2018-09-27 NOTE — PROGRESS NOTES
[facility] PSYCHIATRY CLINIC PROGRESS NOTE     CARE TEAM:  PCP- Annie Hart   [facility]      Ophthalmologist: Jacob Lieberman with [facility]          Community support: [facility], [phone]     Caden Bush is a 58 year old male who prefers the name Caden & pronouns he.  The initial diagnostic evaluation was on 11/26/08 .   Date of the most recent transfer of care eval is 07/13/2018.      Pertinent Background:  This patient first experienced mental health issues in his late 20s , initially obtained care at that time and has received treatment for Schizoaffective Disorder, bipolar type. His diagnosis has changed through the time and afterreviewing all the evidence in 9/2018 the current diagnosis was confirmed (several episodes of moderate to severe depression and one episode of two weeks long period of grandiosity,  pressured speech, overspending and sleeplessness with increased social anxiety and paranoia) . Notably, this pt was stable on regimen of Lithium, Effexor and Risperdal since 2011. However due to complications of chronic diabetes, Lithium was discontinued in 2017.  reviewed the chart and did not think he is a good case for group therapy due to limited insight and his concrete thinking. She recommended [facility] for groups and activities for him. The etiology of his insomnia is not well understood and the sleep study was discussed and encouraged. Sleep has improved on 3mg of risperidone while psychotic sxs are also better managed.      Previously had a CCM until 2011. Has SW consult on 10/20/15, not interested in CM or [facility] worker services at this time and seems to be managing well on his own.     Psych critical item history includes suicidal ideation, psychosis [sxs include AH and paranoia], mutiple psychotropic trials and psych hosp (>5).     INTERIM HISTORY                                                                                                                  4, 4   The patient reports good treatment adherence.  History was provided by the patient who was a good historian. The last visit ended with no change to the med regimen.   Since the last visit, Caden reports the following:   Reading politics and news. Does not appreciate the current political climate. Does not know who to trust. Walking to cope.   Feels the same. Not depressed. Not psychotic.   Went to [employer] and family gathering. Visited the [facility] with his family.    Sleep is okay  Hoping for volunteering   He thinks his meds are helpful and desires to keep them the same.   We reviewed his past medical history in details (~20min) and confirmed the current diagnosis with patient.   Still wondering about the possibility of participating in groups. I recommended outside groups and resources including coop. He agreed.     RECENT SYMPTOMS:   DEPRESSION:  reports-anhedonia, low energy;  DENIES- depression, memory of concentration problem. Insomnia or hypersomnia, suicidal ideation and self-destructive thoughts, appetite or weight change  KINGA/HYPOMANIA:  reports-none;  DENIES- increased energy, decreased sleep need, increased activity and grandiosity  PSYCHOSIS:  reports-none;  DENIES- delusions, auditory hallucinations and visual hallucinations  DYSREGULATION:  reports-none;  DENIES- suicidal ideation, violent ideation and SIB  PANIC ATTACK:  none   ANXIETY:   social anxiety is improved. Socializes more  TRAUMA RELATED:  none  COMPULSIVE:  none  SLEEP:  As mentioned above  EATING DISORDER: none      RECENT SUBSTANCE USE:     ALCOHOL-  never   TOBACCO- none  CAFFEINE- 1 cups/day of coffee, 1 sodas/ in a while  OPIOIDS- none      NARCAN KIT- N/A          CANNABIS- none  OTHER ILLICIT DRUGS- none      CURRENT SOCIAL HISTORY:  FINANCIAL SUPPORT- SSDI for schizoaffective disorder and diabetes  CHILDREN- none      LIVING SITUATION- Currently living by self in  subsidized apartment in [place] since Feb 2011.      SOCIAL/ SPIRITUAL SUPPORT- few friends and family     FEELS SAFE AT HOME- yes       MEDICAL ROS (2,10):  Reports none  Denies Pain, headache, dizziness, GI [nausea, diarrhea, constipation,  ], , sedation, tremor and confusion.    PSYCH and CD Critical Summary Points since July 2018 July: Increased Risperidone dose    PAST PSYCH MED TRIALS   see EMR Problem List: Hx of psychiatric care    MEDICAL / SURGICAL HISTORY                                 Neurologic Hx- no  Patient Active Problem List   Diagnosis     Eczema     Retinopathy     Moderate episode of recurrent major depressive disorder (H)     Polyp of colon, adenomatous     Health Care Home     Hx of psychiatric care     Neuropathy of left upper extremity     Type 2 diabetes mellitus with both eyes affected by moderate nonproliferative retinopathy without macular edema, without long-term current use of insulin (H)     Schizoaffective disorder, bipolar type (H)       Major Surgery- Appendectomy    ALLERGY                                Review of patient's allergies indicates no known allergies.  MEDICATIONS                               Current Outpatient Prescriptions   Medication Sig Dispense Refill     ACE/ARB/ARNI NOT PRESCRIBED, INTENTIONAL, Please choose reason not prescribed, below-  hypotension       aspirin 81 MG EC tablet Take 81 mg by mouth daily.       BASAGLAR 100 UNIT/ML injection Inject 7 Units Subcutaneous At Bedtime We will make further adjustments as we see your response 15 mL 1     blood glucose (NO BRAND SPECIFIED) lancets standard Use to test blood sugar 2 times daily or as directed. 100 each 11     blood glucose monitoring (NO BRAND SPECIFIED) meter device kit Use to test blood sugar 2 times daily or as directed. 1 kit 0     blood glucose monitoring (NO BRAND SPECIFIED) test strip Use to test blood sugars 2 times daily or as directed 100 strip 3     cholecalciferol (VITAMIN  D) 1000 UNIT tablet Take 1 tablet by mouth daily.       Cyanocobalamin (B-12) 1000 MCG CAPS Take 1 capsule by mouth daily       ferrous sulfate (IRON) 325 (65 Fe) MG tablet Take 325 mg by mouth daily       FISH OIL 1 capsule daily        insulin pen needle (BD BRENDAN U/F) 32G X 4 MM Use 1 daily as directed. 100 each 11     metFORMIN (GLUCOPHAGE-XR) 500 MG 24 hr tablet TAKE 2 TABLETS BY MOUTH TWICE DAILY 360 tablet 1     pioglitazone (ACTOS) 45 MG tablet TAKE 1 TABLET BY MOUTH DAILY 90 tablet 0     risperiDONE (RISPERDAL) 3 MG tablet Take 1 tablet (3 mg) by mouth At Bedtime 10 tablet 0     Sharps Container (COMPLETE NEEDLE COLLECTION SYS) MISC 1 each daily 1 each 1     simvastatin (ZOCOR) 40 MG tablet TAKE ONE TABLET BY MOUTH EVERY NIGHT AT BEDTIME 90 tablet 3     traZODone (DESYREL) 50 MG tablet Take 1 tablet (50 mg) by mouth nightly as needed for sleep 30 tablet 2     venlafaxine (EFFEXOR XR) 150 MG 24 hr capsule Take 1 capsule (150 mg) by mouth daily Along with a 75 mg for a total of 225 mg daily dose. 30 capsule 2     venlafaxine (EFFEXOR XR) 75 MG 24 hr capsule Take 1 capsule (75 mg) by mouth daily Please take with Effexor 150 mg for a total dose of 225 mg daily 30 capsule 0     VITALS                                                                                                                          3, 3   /73  Pulse 91  Wt 66.8 kg (147 lb 3.2 oz)  BMI 20.82 kg/m2   MENTAL STATUS EXAM                                                                                           9, 14 cog gs     Alertness: alert  and oriented  Appearance: well groomed  Behavior/Demeanor: cooperative, pleasant and calm, with good  eye contact   Speech: normal  Language: intact and no problems  Psychomotor: normal or unremarkable  Mood: description consistent with euthymia  Affect: full range; was congruent to mood; was congruent to content  Thought Process/Associations: unremarkable  Thought Content:   Reports none;   Denies suicidal ideation  and psychotic thought  Perception:   Reports none;  Denies auditory hallucinations (NO;  command-NO;  following command-NO and visual hallucinations (NO)  Insight: fair  Judgment: good  Cognition: does  appear grossly intact; formal cognitive testing was not done  Gait and Station: unremarkable     LABS and DATA     AIMS: 0 at 3/1/2018    PHQ9 TODAY = 1  PHQ-9 SCORE 6/7/2018 7/13/2018 8/17/2018   Total Score - - -   Total Score 6 2 2   Total Score - - -     ANTIPSYCHOTIC LABS  [glu, A1C, lipids (ezequiel LDL), liver enzymes, WBC, ANEU, Hgb, plts]  q12 mo  Recent Labs   Lab Test  09/05/18   1100  07/17/18   1126  04/17/18   1356  03/19/18   1116  01/30/18   0923  09/15/17   0956  07/11/17   1034  06/07/17   1004   GLC  214*   --   150*   --   195*   --   234*   --    A1C   --   6.7*   --   8.2*   --   6.8*   --   8.2*     Recent Labs   Lab Test  04/17/18   1356  07/11/17   1034  06/07/17   1004  06/09/16   1030   CHOL  139  126  143  125   TRIG  45  49  105  55   LDL  45  40  46  45   HDL  85  76  76  69     Recent Labs   Lab Test  04/17/18   1356  01/30/18   0923  07/11/17   1034  12/20/16   1301   AST  15  14  14  14   ALT  12  16  12  14   ALKPHOS  73  79  72  72     Recent Labs   Lab Test  10/11/18   1010  09/05/18   1123  01/30/18   0923  12/20/16   1240  06/09/16   1030  12/21/15   1413   08/01/12   1640   WBC  2.5*  2.8*  2.8*  3.7*  4.7  4.6   < >  5.5   ANEU   --    --    --    --   3.5  3.4   --   4.8   HGB  12.5*  11.5*  12.1*  13.6  14.7  13.4   < >  11.4*   PLT  136*  168  179  205  175  155   < >  180    < > = values in this interval not displayed.       DIAGNOSIS     Schizoaffective disorder, bipolar type, in remission re: mood and psychotic features    ASSESSMENT                                                                                                                   m2, h3     TODAY:    Patient reports stable mood, compliance to medication and denies  safety issues including self harm, SI, wolf or psychosis. Patient has been stable in current medication regimen for the past three months and reasonably advocates to continue this medication regimen.     We reviewed his past medical history in details (~20min) and confirmed the current diagnosis with patient (several episodes of moderate to severe depression and one episode of two weeks long period of grandiosity,  pressured speech, overspending and sleeplessness with increased social anxiety and paranoia). Patient agreed that the diagnosis of schizoaffective bipolar type fits and explains his psychiatric history.       [place] PRESCRIPTION MONITORING PROGRAM [] was not checked today:  not using controlled substances.    PSYCHOTROPIC DRUG INTERACTIONS:   VENLAFAXINE and ANTIPLATELET AGENTS may result in an increased risk of bleeding.  RISPERIDONE and SIMVASTATIN may result in increased simvastatin serum concentrations -with an increased risk of myopathy or rhabdomyolysis.  TRAZODONE and VENLAFAXINE may result in an increased risk of serotonin syndrome.  MANAGEMENT:  Monitoring for adverse effects     PLAN                                                                                                                                m2, h3     1) PSYCHOTROPIC MEDICATIONS:  - Continue risperidone 3 mg QHS  - Cont Effexor XR  225 mg daily  - Cont trazodone to 50 mg qhs prn (insomnia)      2) THERAPY:    - Seeing psychologist Yeny Goff at [facility] since 11/2015. Sees once per month.       3) LABS NEXT DUE: Feb 2019     RATING SCALES:  AIMS: 0 at 3/1/2018      4) REFERRALS:    none     7) RTC: 3 months      8) CRISIS NUMBERS:     Provided routinely in AVS  none    TREATMENT RISK STATEMENT:  The risks, benefits, alternatives and potential adverse effects have been discussed and are understood by the pt. The pt understands the risks of using street drugs or alcohol. There are no medical contraindications, the pt  agrees to treatment with the ability to do so. The pt knows to call the clinic for any problems or to access emergency care if needed.  Medical and substance use concerns are documented above.  Psychotropic drug interaction check was done, including changes made today.     PROVIDER:  Natali Dang MD    Patient staffed in clinic with Dr. Phillips who will sign the note.  Supervisor is Dr. Bradley.      Supervisor Attestation:  I met with Caden Bush along with the resident, and participated in key portions of the service, including the mental status examination and developing the plan of care. I reviewed key portions of the history with the resident. I agree with the findings and plan as documented in this note.  Additionally, will ask Dr. Dang to call Caden and encourage him to follow-up with his primary care provider for low WBC, Hgb and Plt counts; encourage him to have primary care provider contact us if there are any concerns about his psychiatric medications; and to advise Caden to seek immediate medical evaluation if he should develop fever +/- symptoms of infection, and that he should advise any medical evaluator of his low blood counts.  Antolin Phillips MD

## 2018-09-28 ENCOUNTER — PATIENT OUTREACH (OUTPATIENT)
Dept: CARE COORDINATION | Facility: CLINIC | Age: 58
End: 2018-09-28

## 2018-09-28 ASSESSMENT — ACTIVITIES OF DAILY LIVING (ADL): DEPENDENT_IADLS:: TRANSPORTATION

## 2018-09-28 NOTE — PROGRESS NOTES
Clinic Care Coordination Contact  Clinic Care Coordination Contact  OUTREACH    Referral Information:  Referral Source: Self-patient/Caregiver    Primary Diagnosis: Psychosocial    Chief Complaint   Patient presents with     Clinic Care Coordination - Follow-up     In-Home Counseling Services     Psychosocial:  Pt calling today due to concerns with current service arrangement with his [facility] services and in home counseling with [facility] services. Pt reports that he has a $403 bill from them in which they charge for mileage, assessment, and treatment hour. Pt reports that he doesn't want to have weekly visits due to the costs, but is having a difficult time communicating this to the providers. Pt has an appointment coming up on Wednesday with the counselor and will discuss his concerns and wishes then. Encouraged pt to call LONI PEREZ during the visit should he feel he isn't able to get his wishes for the new visit timeline across adequetly.   Mental health DX:: Yes  Mental health DX how managed:: Medication, Outpatient Counseling, Psychiatrist  Financial/Insurance: Clarified that pt does have a $400 spend down so will most likely be responsible for the entirety of the bill. Sent pt a VIDAL in the mail so that LONI PEREZ can communicate with [facility] about future visits and billing concerns.      Resources and Interventions:  Current Resources: Community Resources: ARM, County Worker  Supplies used at home:: Diabetic Supplies  Equipment Currently Used at Home: grab bar  Advanced Care Plans/Directives on file:: No  Advanced Care Plan/Directive Status: Considering [facility]    Patient/Caregiver understanding: Pt reports understanding and denies any additional questions or concerns at this times. LONI CC engaged in AIDET communication during encounter.    Future Appointments              In 3 days Aliyah Angeles RPH MTM [facility], NAZ ORDONEZ    In 1 week RF LAB [facility],  KELLEE BANSAL    In 2 months Natali Powell MD Psychiatry Clinic, [facility]          Plan:  CC will await for return VIDAL from pt to discuss information with [facility] (Fax: [phone]) and discuss billing as well as service agreement. Will be available to pt during visit next Wednesday.    Kaylen Reynoso [facility]  Clinic Care Coordinator  [phone]  [email or web address]

## 2018-09-29 ASSESSMENT — PATIENT HEALTH QUESTIONNAIRE - PHQ9: SUM OF ALL RESPONSES TO PHQ QUESTIONS 1-9: 1

## 2018-10-01 ENCOUNTER — ALLIED HEALTH/NURSE VISIT (OUTPATIENT)
Dept: PHARMACY | Facility: PHYSICIAN GROUP | Age: 58
End: 2018-10-01
Payer: MEDICAID

## 2018-10-01 ENCOUNTER — TELEPHONE (OUTPATIENT)
Dept: PHARMACY | Facility: PHYSICIAN GROUP | Age: 58
End: 2018-10-01

## 2018-10-01 DIAGNOSIS — R42 DIZZINESS: ICD-10-CM

## 2018-10-01 DIAGNOSIS — E11.3393 TYPE 2 DIABETES MELLITUS WITH BOTH EYES AFFECTED BY MODERATE NONPROLIFERATIVE RETINOPATHY WITHOUT MACULAR EDEMA, WITHOUT LONG-TERM CURRENT USE OF INSULIN (H): Primary | ICD-10-CM

## 2018-10-01 PROCEDURE — 99606 MTMS BY PHARM EST 15 MIN: CPT | Performed by: PHARMACIST

## 2018-10-01 NOTE — TELEPHONE ENCOUNTER
Left message for pt to call back and schedule follow up MTM visit.     Aliyah Angeles, Pharm.D, [facility]  Medication Therapy Management Pharmacist  [phone]

## 2018-10-01 NOTE — PROGRESS NOTES
SUBJECTIVE/OBJECTIVE:                Caden Bush is a 58 year old male called for a follow-up visit for Medication Therapy Management.  He was referred to me from Dr. Hart.     Chief Complaint: Follow up from our visit on 9/17.      Tobacco: No tobacco use  Alcohol: not currently using    Medication Adherence/Access:  no issues reported    Dizziness: getting better now on b 12 and iron daily. Has lab only scheduled 10/11.   No longer on lisinopril due to dizziness- Saw PCP for this on 9/5 and was found to have orthostatic hypotension.   Patient continues on risperidone for his schizophrenia - taking 3mg daily at this time and has been on at least 2mg since before 2015. He is no longer on lithium since January. Sees psychiatry regularly.      Diabetes:  Pt currently taking metformin ER 500mg- 2 BID, Pioglitazone 45mg daily and basaglar 7 units in the AM. Pt is not experiencing side effects. Met with CDE to discuss diet and this has been very helpful to him, continues to have low weight and not always eating enough.    Stopped Glipizide 5mg with the start of insulin per PCP and has not needed this to be restarted.   SMBG: one time daily, mostly in the evenings. Ranges (patient reported): newest to oldest   AM- 85, 101, 104, 96, 82, 95, 208, 99, 112, 107, 123, 118, 91,   PM (after dinner)- 98, 96, 166, 206, 158, 183, 191, 127, 169, 93, 117, 101, 187,   147lbs at psych visit.  Recent symptoms of high blood sugar? None  No longer having dizziness off the lisinopril  Eye exam: up to date  Foot exam: up to date  Pt is now taking an ACEi/ARB and denies issues - hx of lithium toxicity w/ Ace previously, but has been off lithium now since Jan 2018.  Aspirin: Taking 81mg daily and denies side effects    Lab Results   Component Value Date    A1C 6.7 07/17/2018    A1C 8.2 03/19/2018    A1C 6.8 09/15/2017    A1C 8.2 06/07/2017    A1C 6.6 12/13/2016     Today's Vitals: There were no vitals taken for this visit.- phone        ASSESSMENT:              Current medications were reviewed today as discussed above.      Medication Adherence: good, no issues identified    Dizziness: Stable.    Diabetes: Stable. Patient is meeting A1c goal of < 7%.    PLAN:                  1. No changes at this time.    I spent 15 minutes with this patient today. All changes were made via collaborative practice agreement with Dr. Hart. A copy of the visit note was provided to the patient's primary care provider.     Will follow up in 1 month- has lab only in 2 weeks.    The patient declined a summary of these recommendations as an after visit summary.    Aliyah Angeles, Pharm.D, [facility]  Medication Therapy Management Pharmacist  [phone]

## 2018-10-01 NOTE — MR AVS SNAPSHOT
After Visit Summary   10/1/2018    Caden Bush    MRN: [id number]           Patient Information     Date Of Birth          1960        Visit Information        Provider Department      10/1/2018 1:30 PM Aliyah Angeles RPH [facility]        Today's Diagnoses     Type 2 diabetes mellitus with both eyes affected by moderate nonproliferative retinopathy without macular edema, without long-term current use of insulin (H)    -  1    Dizziness           Follow-ups after your visit        Your next 10 appointments already scheduled     Oct 11, 2018 10:00 AM CDT   [facility]    [address] [place] [address] [phone]           Please do not eat 10-12 hours before your appointment if you are coming in fasting for labs on lipids, cholesterol, or glucose (sugar). This does not apply to pregnant women. Water, hot tea and black coffee (with nothing added) are okay. Do not drink other fluids, diet soda or chew gum.            Dec 03, 2018  2:10 PM CST   Adult Med Follow UP with Natali Dang MD   Psychiatry Clinic ([facility])    [place] [id number]  [address] [place] [address] [phone]              Who to contact     If you have questions or need follow up information about today's clinic visit or your schedule please contact [facility] directly at [phone].  Normal or non-critical lab and imaging results will be communicated to you by MyChart, letter or phone within 4 business days after the clinic has received the results. If you do not hear from us within 7 days, please contact the clinic through MyChart or phone. If you have a critical or abnormal lab result, we will notify you by phone as soon as possible.  Submit refill requests through p3dsystems or call your pharmacy and they will forward the refill request to us. Please  allow 3 business days for your refill to be completed.          Additional Information About Your Visit        MyChart Information     Coup[employer] gives you secure access to your electronic health record. If you see a primary care provider, you can also send messages to your care team and make appointments. If you have questions, please call your primary care clinic.  If you do not have a primary care provider, please call [phone] and they will assist you.        Care EveryWhere ID     This is your Care EveryWhere ID. This could be used by other organizations to access your [place] medical records  [id number]         Blood Pressure from Last 3 Encounters:   09/05/18 96/52   07/17/18 136/80   04/30/18 136/80    Weight from Last 3 Encounters:   09/05/18 143 lb (64.9 kg)   07/17/18 137 lb (62.1 kg)   04/17/18 142 lb (64.4 kg)              Today, you had the following     No orders found for display       Primary Care Provider Office Phone # Fax #    Annie Jeniffer Hart [phone] [address] [place] [address]        Equal Access to Services     [facility]: Hadii aad ku hadasho Soomaali, waaxda luqadaha, qaybta kaalmada adeegyada, marcia braden hayeladio diaz . So [facility] [phone].    ATENCIÓN: Si habla español, tiene a magaña disposición servicios gratuitos de asistencia lingüística. Ll[facility] [phone].    We comply with applicable federal civil rights laws and [place] laws. We do not discriminate on the basis of race, color, national origin, age, disability, sex, sexual orientation, or gender identity.            Thank you!     Thank you for choosing [facility]  for your care. Our goal is always to provide you with excellent care. Hearing back from our patients is one way we can continue to improve our services. Please take a few minutes to complete the written survey that you may receive in the mail after your visit with us. Thank you!              Your Updated Medication List - Protect others around you: Learn how to safely use, store and throw away your medicines at www.disposemymeds.org.          This list is accurate as of 10/1/18  2:44 PM.  Always use your most recent med list.                   Brand Name Dispense Instructions for use Diagnosis    ACE/ARB/ARNI NOT PRESCRIBED (INTENTIONAL)      Please choose reason not prescribed, below- hypotension        aspirin 81 MG EC tablet      Take 81 mg by mouth daily.        B-12 1000 MCG Caps      Take 1 capsule by mouth daily        BASAGLAR 100 UNIT/ML injection     15 mL    Inject 7 Units Subcutaneous At Bedtime We will make further adjustments as we see your response    Type 2 diabetes mellitus with both eyes affected by moderate nonproliferative retinopathy without macular edema, without long-term current use of insulin (H)       blood glucose lancets standard    no brand specified    100 each    Use to test blood sugar 2 times daily or as directed.    Type 2 diabetes mellitus with both eyes affected by moderate nonproliferative retinopathy without macular edema, without long-term current use of insulin (H)       blood glucose monitoring meter device kit    no brand specified    1 kit    Use to test blood sugar 2 times daily or as directed.    Type 2 diabetes mellitus with both eyes affected by moderate nonproliferative retinopathy without macular edema, without long-term current use of insulin (H)       blood glucose monitoring test strip    no brand specified    100 strip    Use to test blood sugars 2 times daily or as directed    Type 2 diabetes mellitus with both eyes affected by moderate nonproliferative retinopathy without macular edema, without long-term current use of insulin (H)       cholecalciferol 1000 UNIT tablet    vitamin D3     Take 1 tablet by mouth daily.        COMPLETE NEEDLE COLLECTION SYS Misc     1 each    1 each daily    Type 2 diabetes mellitus with both eyes affected by  moderate nonproliferative retinopathy without macular edema, with long-term current use of insulin (H)       ferrous sulfate 325 (65 Fe) MG tablet    IRON     Take 325 mg by mouth daily        FISH OIL      1 capsule daily        insulin pen needle 32G X 4 MM    BD BRENDAN U/F    100 each    Use 1 daily as directed.    Type 2 diabetes mellitus with both eyes affected by moderate nonproliferative retinopathy without macular edema, without long-term current use of insulin (H)       metFORMIN 500 MG 24 hr tablet    GLUCOPHAGE-XR    360 tablet    TAKE 2 TABLETS BY MOUTH TWICE DAILY    Type 2 diabetes mellitus with both eyes affected by moderate nonproliferative retinopathy without macular edema, without long-term current use of insulin (H)       pioglitazone 45 MG tablet    ACTOS    90 tablet    TAKE 1 TABLET BY MOUTH DAILY    Encounter for medication refill       risperiDONE 3 MG tablet    risperDAL    30 tablet    Take 1 tablet (3 mg) by mouth At Bedtime    Schizoaffective disorder, bipolar type (H)       simvastatin 40 MG tablet    ZOCOR    90 tablet    TAKE ONE TABLET BY MOUTH EVERY NIGHT AT BEDTIME    Encounter for medication refill       traZODone 50 MG tablet    DESYREL    30 tablet    Take 1 tablet (50 mg) by mouth nightly as needed for sleep    Schizoaffective disorder, bipolar type (H)       * venlafaxine 150 MG 24 hr capsule    EFFEXOR XR    30 capsule    Take 1 capsule (150 mg) by mouth daily Along with a 75 mg for a total of 225 mg daily dose.    Schizoaffective disorder, bipolar type (H)       * venlafaxine 75 MG 24 hr capsule    EFFEXOR XR    30 capsule    Take 1 capsule (75 mg) by mouth daily Please take with Effexor 150 mg for a total dose of 225 mg daily    Schizoaffective disorder, bipolar type (H)       * Notice:  This list has 2 medication(s) that are the same as other medications prescribed for you. Read the directions carefully, and ask your doctor or other care provider to review them with you.

## 2018-10-08 ENCOUNTER — PATIENT OUTREACH (OUTPATIENT)
Dept: CARE COORDINATION | Facility: CLINIC | Age: 58
End: 2018-10-08

## 2018-10-08 NOTE — PROGRESS NOTES
Clinic Care Coordination Contact  OUTREACH    Chief Complaint   Patient presents with     Clinic Care Coordination - Follow-up     In Home Services      Psychosocial:  Follow-up with pt today regarding in-home mental services. Pt reports that his meeting went well with his therapist last week and he has decided to keep the services in place.   Financial/Insurance: Pt will plan to pay for the bill at this time and potentially work on a payment plan for ongoing services.     Patient/Caregiver understanding: Pt reports understanding and denies any additional questions or concerns at this times. LONI CC engaged in AIDET communication during encounter.    Outreach Frequency: monthly  Future Appointments              In 3 days RF LAB [facility]    In 1 month Natali Powell MD Psychiatry Clinic, [facility]        Plan: LONI PEREZ will plan to [place] back with pt in 2 weeks to reaffirm that pt is comfortable with the service plan and financial spend down.     Kaylen Reynoso SAYRA  Clinic Care Coordinator  [phone]  [email or web address]

## 2018-10-11 DIAGNOSIS — E53.8 LOW SERUM VITAMIN B12: ICD-10-CM

## 2018-10-11 DIAGNOSIS — E61.1 LOW IRON: ICD-10-CM

## 2018-10-11 DIAGNOSIS — Z79.899 ENCOUNTER FOR LONG-TERM (CURRENT) USE OF MEDICATIONS: ICD-10-CM

## 2018-10-11 DIAGNOSIS — D64.9 NORMOCYTIC ANEMIA: Primary | ICD-10-CM

## 2018-10-11 LAB
% GRANULOCYTES: 63.1 % (ref 42.2–75.2)
HCT VFR BLD AUTO: 38 % (ref 39–51)
HEMOGLOBIN: 12.5 G/DL (ref 13.4–17.5)
LYMPHOCYTES NFR BLD AUTO: 29.2 % (ref 20.5–51.1)
MCH RBC QN AUTO: 29.5 PG (ref 27–31)
MCHC RBC AUTO-ENTMCNC: 33 G/DL (ref 33–37)
MCV RBC AUTO: 89.3 FL (ref 80–100)
MONOCYTES NFR BLD AUTO: 7.7 % (ref 1.7–9.3)
PLATELET # BLD AUTO: 136 K/UL (ref 140–450)
RBC # BLD AUTO: 4.26 X10/CMM (ref 4.2–5.9)
WBC # BLD AUTO: 2.5 X10/CMM (ref 3.8–11)

## 2018-10-11 PROCEDURE — 36415 COLL VENOUS BLD VENIPUNCTURE: CPT | Performed by: FAMILY MEDICINE

## 2018-10-11 PROCEDURE — 85025 COMPLETE CBC W/AUTO DIFF WBC: CPT | Performed by: FAMILY MEDICINE

## 2018-10-12 LAB
FOLATE: 14.9 NG/ML
IRON BINDING CAP: 359 UG/DL (ref 250–450)
IRON SATURATION: 12 % (ref 15–55)
IRON: 42 UG/DL (ref 38–169)
UIBC: 317 UG/DL (ref 111–343)
VIT B12 SERPL-MCNC: 1617 PG/ML (ref 232–1245)

## 2018-10-15 ENCOUNTER — TELEPHONE (OUTPATIENT)
Dept: PSYCHIATRY | Facility: CLINIC | Age: 58
End: 2018-10-15

## 2018-10-16 ENCOUNTER — DOCUMENTATION ONLY (OUTPATIENT)
Dept: PSYCHIATRY | Facility: CLINIC | Age: 58
End: 2018-10-16

## 2018-10-16 NOTE — PROGRESS NOTES
"Caden's blood dyscrasias was noted by attending. Covering resident called the patient and notified him to reach out to PCP. Please refer to 's note for further details of their conversation.    [facility] He then had high ANC and now the ANC is fluctuating from time to time. Good thing is that in the past few years ANC changes has stayed the same.     PCP is aware. 9/5/18 note from PCP :\"   Leukopenia, unspecified type  Possibly due to Risperdal. Need to follow.\"    We even tried going down on Risperidone for other reasons but he immediately got destabilized.     Per uptodate: Risperidone Side effect\"  Blood dyscrasias (<1% of cases): Leukopenia, neutropenia, and agranulocytosis (sometimes fatal) have been reported in clinical trials and postmarketing reports with antipsychotic use; presence of risk factors (eg, pre-existing low WBC or history of drug-induced leuko-/neutropenia) should prompt periodic blood count assessment. Discontinue therapy at first signs of blood dyscrasias or if absolute neutrophil count <1,000/mm3.\"    We will continue monitoring the CBC with Diff.    Pt seen and discussed with my attending, Dr. Phillips.   Natali Dang MD  PGY3 Psychiatry Resident  Pager: [phone]      "

## 2018-10-17 ENCOUNTER — ALLIED HEALTH/NURSE VISIT (OUTPATIENT)
Dept: PHARMACY | Facility: PHYSICIAN GROUP | Age: 58
End: 2018-10-17
Payer: MEDICAID

## 2018-10-17 DIAGNOSIS — E11.3393 TYPE 2 DIABETES MELLITUS WITH BOTH EYES AFFECTED BY MODERATE NONPROLIFERATIVE RETINOPATHY WITHOUT MACULAR EDEMA, WITHOUT LONG-TERM CURRENT USE OF INSULIN (H): Primary | ICD-10-CM

## 2018-10-17 PROCEDURE — 99606 MTMS BY PHARM EST 15 MIN: CPT | Performed by: PHARMACIST

## 2018-10-17 NOTE — TELEPHONE ENCOUNTER
Spoke with patient to update him about his lab work results. Recommended that he follows up with his primary care provider for low WBCs and low Platelets. He denied fever or other new symptoms. Discussed the importance of letting us know and getting an assessment if these symptoms were to develop. Patient demonstrated understanding of the plan. Had no additional concerns.     Plan:     1- follow up with primary for low WBC and low platelet  2- follow up with Dr. Dang on 12/3/18 for psychiatric management.     Arcenio Kidd,   PGY3 Resident

## 2018-10-17 NOTE — MR AVS SNAPSHOT
After Visit Summary   10/17/2018    Caden Buhs    MRN: [id number]           Patient Information     Date Of Birth          1960        Visit Information        Provider Department      10/17/2018 1:00 PM [facility]        Today's Diagnoses     Type 2 diabetes mellitus with both eyes affected by moderate nonproliferative retinopathy without macular edema, without long-term current use of insulin (H)    -  1       Follow-ups after your visit        Your next 10 appointments already scheduled     Oct 31, 2018  1:00 PM CDT   TELEMEDICINE with Aliyah Angeles RPH   [facility] ([facility]    [address] [place] [address] [phone]           Note: this is not an onsite visit; there is no need to come to the facility.            Dec 03, 2018  2:10 PM CST   Adult Med Follow UP with Natali Dang MD   Psychiatry Clinic ([facility])    [place] [id number]  [address] [place] [address] [phone]            Jan 02, 2019 10:00 AM CST   PHYSICAL with [facility] Hart MD   [facility]    [address] [place] [address] [phone]              Who to contact     If you have questions or need follow up information about today's clinic visit or your schedule please contact [facility] directly at [phone].  Normal or non-critical lab and imaging results will be communicated to you by MyChart, letter or phone within 4 business days after the clinic has received the results. If you do not hear from us within 7 days, please contact the clinic through MyChart or phone. If you have a critical or abnormal lab result, we will notify you by phone as soon as possible.  Submit refill requests through [employer] or call your pharmacy and they will forward the refill request to us. Please  allow 3 business days for your refill to be completed.          Additional Information About Your Visit        MyChart Information     [employer] gives you secure access to your electronic health record. If you see a primary care provider, you can also send messages to your care team and make appointments. If you have questions, please call your primary care clinic.  If you do not have a primary care provider, please call [phone] and they will assist you.        Care EveryWhere ID     This is your Care EveryWhere ID. This could be used by other organizations to access your [facility] medical records  [id number]         Blood Pressure from Last 3 Encounters:   09/05/18 96/52   07/17/18 136/80   04/30/18 136/80    Weight from Last 3 Encounters:   09/05/18 143 lb (64.9 kg)   07/17/18 137 lb (62.1 kg)   04/17/18 142 lb (64.4 kg)              Today, you had the following     No orders found for display       Primary Care Provider Office Phone # Fax #    Annie Jeniffer Hart [phone] [address] [place] [address]        Equal Access to Services     [facility]: Hadii aad ku hadasho Soomaali, waaxda luqadaha, qaybta kaalmada adeegyada, marcia braden hayeladio diaz . So [facility] [phone].    ATENCIÓN: Si habla español, tiene a magaña disposición servicios gratuitos de asistencia lingüística. LlKeenan Private [facility] [phone].    We comply with applicable federal civil rights laws and [place] laws. We do not discriminate on the basis of race, color, national origin, age, disability, sex, sexual orientation, or gender identity.            Thank you!     Thank you for choosing [facility]  for your care. Our goal is always to provide you with excellent care. Hearing back from our patients is one way we can continue to improve our services. Please take a few minutes to complete the written survey that you may receive in the mail after your visit with us. Thank you!              Your Updated Medication List - Protect others around you: Learn how to safely use, store and throw away your medicines at www.disposemymeds.org.          This list is accurate as of 10/17/18  1:26 PM.  Always use your most recent med list.                   Brand Name Dispense Instructions for use Diagnosis    ACE/ARB/ARNI NOT PRESCRIBED (INTENTIONAL)      Please choose reason not prescribed, below- hypotension        aspirin 81 MG EC tablet      Take 81 mg by mouth daily.        B-12 1000 MCG Caps      Take 1 capsule by mouth daily        BASAGLAR 100 UNIT/ML injection     15 mL    Inject 7 Units Subcutaneous At Bedtime We will make further adjustments as we see your response    Type 2 diabetes mellitus with both eyes affected by moderate nonproliferative retinopathy without macular edema, without long-term current use of insulin (H)       blood glucose lancets standard    no brand specified    100 each    Use to test blood sugar 2 times daily or as directed.    Type 2 diabetes mellitus with both eyes affected by moderate nonproliferative retinopathy without macular edema, without long-term current use of insulin (H)       blood glucose monitoring meter device kit    no brand specified    1 kit    Use to test blood sugar 2 times daily or as directed.    Type 2 diabetes mellitus with both eyes affected by moderate nonproliferative retinopathy without macular edema, without long-term current use of insulin (H)       blood glucose monitoring test strip    no brand specified    100 strip    Use to test blood sugars 2 times daily or as directed    Type 2 diabetes mellitus with both eyes affected by moderate nonproliferative retinopathy without macular edema, without long-term current use of insulin (H)       cholecalciferol 1000 UNIT tablet    vitamin D3     Take 1 tablet by mouth daily.        COMPLETE NEEDLE COLLECTION SYS Misc     1 each    1 each daily    Type 2 diabetes mellitus with both eyes affected by  moderate nonproliferative retinopathy without macular edema, with long-term current use of insulin (H)       ferrous sulfate 325 (65 Fe) MG tablet    IRON     Take 325 mg by mouth daily        FISH OIL      1 capsule daily        insulin pen needle 32G X 4 MM    BD BRENDAN U/F    100 each    Use 1 daily as directed.    Type 2 diabetes mellitus with both eyes affected by moderate nonproliferative retinopathy without macular edema, without long-term current use of insulin (H)       metFORMIN 500 MG 24 hr tablet    GLUCOPHAGE-XR    360 tablet    TAKE 2 TABLETS BY MOUTH TWICE DAILY    Type 2 diabetes mellitus with both eyes affected by moderate nonproliferative retinopathy without macular edema, without long-term current use of insulin (H)       pioglitazone 45 MG tablet    ACTOS    90 tablet    TAKE 1 TABLET BY MOUTH DAILY    Encounter for medication refill       risperiDONE 3 MG tablet    risperDAL    30 tablet    Take 1 tablet (3 mg) by mouth At Bedtime    Schizoaffective disorder, bipolar type (H)       simvastatin 40 MG tablet    ZOCOR    90 tablet    TAKE ONE TABLET BY MOUTH EVERY NIGHT AT BEDTIME    Encounter for medication refill       traZODone 50 MG tablet    DESYREL    30 tablet    Take 1 tablet (50 mg) by mouth nightly as needed for sleep    Schizoaffective disorder, bipolar type (H)       * venlafaxine 150 MG 24 hr capsule    EFFEXOR XR    30 capsule    Take 1 capsule (150 mg) by mouth daily Along with a 75 mg for a total of 225 mg daily dose.    Schizoaffective disorder, bipolar type (H)       * venlafaxine 75 MG 24 hr capsule    EFFEXOR XR    30 capsule    Take 1 capsule (75 mg) by mouth daily Please take with Effexor 150 mg for a total dose of 225 mg daily    Schizoaffective disorder, bipolar type (H)       * Notice:  This list has 2 medication(s) that are the same as other medications prescribed for you. Read the directions carefully, and ask your doctor or other care provider to review them with you.

## 2018-10-17 NOTE — PROGRESS NOTES
SUBJECTIVE/OBJECTIVE:                Caden Bush is a 58 year old male called for a follow-up visit for Medication Therapy Management.  He was referred to me from Dr. Hart.     Chief Complaint: Follow up from our visit on 10/1.  Likes to check in regularly on his sugars    Tobacco: No tobacco use  Alcohol: not currently using    Medication Adherence/Access:  no issues reported    Diabetes:  Pt currently taking metformin ER 500mg- 2 BID, Pioglitazone 45mg daily and basaglar 7 units in the AM. Pt is not experiencing side effects. Met with CDE to discuss diet and this has been very helpful to him, continues to have low weight and not always eating enough.    Stopped Glipizide 5mg with the start of insulin per PCP and has not needed this to be restarted.   SMBG: one time daily, mostly in the evenings. Ranges (patient reported): newest to oldest   Newest to oldest (fasting,evening)  68  97, 85  94, 145  93, 109  85, 86  98, 130  100, 78  90, 146  82, 157  ,-109  118, 142  131, 176  101, 250  107, 125  86, 85  122, 148    Recent symptoms of high blood sugar? None  Eye exam: up to date  Foot exam: up to date  Pt is nottaking an ACEi/ARB was taken off lisinopril due to dizziness and has not been restarted.  Also has hx of lithium toxicity w/ Ace previously, but has been off lithium now since Jan 2018.  Aspirin: Taking 81mg daily and denies side effects    Lab Results   Component Value Date    A1C 6.7 07/17/2018    A1C 8.2 03/19/2018    A1C 6.8 09/15/2017    A1C 8.2 06/07/2017    A1C 6.6 12/13/2016     Today's Vitals: There were no vitals taken for this visit.- phone       ASSESSMENT:              Current medications were reviewed today as discussed above.      Medication Adherence: good, no issues identified    Diabetes: Needs improvement, patient's fasting blood sugars have been at goal of , however recent dipped below 70 mg/dL this morning and trending at the lower end of normal so may benefit from reduction in  basal insulin.  It is possible patient may benefit from stopping basal insulin and restarting sulfonylurea at future visit.    PLAN:                  1.  Decrease basal insulin to 5 units once per day    I spent 10 minutes with this patient today. All changes were made via collaborative practice agreement with Dr. Hart. A copy of the visit note was provided to the patient's primary care provider.     Will follow up in 2 weeks per patient request.    The patient declined a summary of these recommendations as an after visit summary.    Chart documentation was completed in part with Dragon voice-recognition software. Even though reviewed, some grammatical, spelling, and word errors may remain.    Aliyah Angeles, Pharm.D, [facility] Center[facility]  Medication Therapy Management Pharmacist  [phone]

## 2018-10-23 ENCOUNTER — PATIENT OUTREACH (OUTPATIENT)
Dept: CARE COORDINATION | Facility: CLINIC | Age: 58
End: 2018-10-23

## 2018-10-23 NOTE — PROGRESS NOTES
Clinic Care Coordination Contact  [facility]/Voicemail       Clinical Data: Care Coordinator Outreach  Outreach attempted x 1.  Left message on voicemail with call back information and requested return call.  Plan: Care Coordinator will try to reach patient again in 3-5 business days.    NATALIE Pyle  Clinic Care Coordinator  [phone]  [email or web address]

## 2018-10-24 ENCOUNTER — PATIENT OUTREACH (OUTPATIENT)
Dept: CARE COORDINATION | Facility: CLINIC | Age: 58
End: 2018-10-24

## 2018-10-24 ASSESSMENT — ACTIVITIES OF DAILY LIVING (ADL): DEPENDENT_IADLS:: TRANSPORTATION

## 2018-10-24 NOTE — PROGRESS NOTES
Clinic Care Coordination Contact    OUTREACH    Referral Information: PCP  Referral Source: Self-patient/Caregiver    Primary Diagnosis: Psychosocial    Chief Complaint   Patient presents with     Clinic Care Coordination - Follow-up     psychosocial        Universal Utilization:  Utilization is appropriate  Utilization    Last refreshed: 10/24/2018 10:06 AM:  No Show Count (past year) 0       Last refreshed: 10/24/2018 10:06 AM:  ED visits 0       Last refreshed: 10/24/2018 10:06 AM:  Hospital admissions 0          Current as of: 10/24/2018 10:06 AM             Clinical Concerns:  Current Medical Concerns:  Patient has schizoaffective disorder bipolar type. Patient also has type 2 diabetes.   Current Behavioral Concerns: Patient reports having an [facility] worker through [employer]. Patient stated that they help review mail, and with independent living skills since he lives in his own apartment and has difficulties in these areas.      Living Situation:  Current living arrangement:: I live alone  Type of residence:: Apartment    Transportation:  Transportation means:: Metro mobility, Public transportation     Psychosocial:  Patient lives in own apartment. Patient is on a CADI waiver and has metro mobility, and CADI waiver case management through [facility]. Patient gets food support through the [facility].      Financial/Insurance: Medicare and Medical Assistance   Patient has a $403 medical spend down. Patient says he can afford it currently.      Community Resources: Mackenzie CURRY Worker  Supplies used at home:: Diabetic Supplies  Equipment Currently Used at Home: grab bar    Referrals Placed: None     Patient/Caregiver understanding: Patient reports understanding and denies any other concerns. Utilized [employer]ET communication during encounter    Outreach Frequency: monthly  Future Appointments              In 1 week [facility]    In 1 month Natali Powell MD  Psychiatry Clinic, [facility] CLIN    In 2 months Annie Hart MD [facility], [facility]          Plan: Patient had a no show appointment from [facility] worker this week and was concerned about it. Directed patient to contact [employer] and problem solve and either have worker be able to come out next week or request a new worker. Gave patient education that he has the right to choose a different agency if he chooses. If he wants to choose a different agency his [facility] worker can make that referral. Patient agreed that  Care coordinator can call back in a month to see if issues resolve or if there are any other needs that arise.     Lidia Pacheco, MSW, [facility]  Clinic Care Coordinator  [email or web address]  [phone]

## 2018-10-24 NOTE — PROGRESS NOTES
Clinic Care Coordination Contact  OUTREACH    Chief Complaint   Patient presents with     Clinic Care Coordination - Follow-up     Community Support [facility]      Psychosocial:  Pt returning SW CCs call. SW CC inquired about how community services are going. Pt reports that his [facility] worker did not show up yesterday to their scheduled visit and did not notify him of a cancellation. SW CC notified pt about transition out of clinic and provided information about replacement RAMON Goldstein. Pt was open to a call within the next few days to strategize next steps with community support team.      Patient/Caregiver understanding: Pt reports understanding and denies any additional questions or concerns at this times. SW CC engaged in AIDET communication during encounter.    Outreach Frequency: monthly  Future Appointments              In 1 week Aliyah Angeles RPH [facility], [facility]    In 1 month Natali Powell MD Psychiatry Clinic, [facility] CLIN    In 2 months Annie Hart MD [facility], [facility]          Plan: Warm Hand off to RAMON Goldstein.    NATALIE Pyle  Clinic Care Coordinator  [phone]  [email or web address]

## 2018-10-31 ENCOUNTER — ALLIED HEALTH/NURSE VISIT (OUTPATIENT)
Dept: PHARMACY | Facility: PHYSICIAN GROUP | Age: 58
End: 2018-10-31
Payer: MEDICAID

## 2018-10-31 DIAGNOSIS — E11.3393 TYPE 2 DIABETES MELLITUS WITH BOTH EYES AFFECTED BY MODERATE NONPROLIFERATIVE RETINOPATHY WITHOUT MACULAR EDEMA, WITHOUT LONG-TERM CURRENT USE OF INSULIN (H): Primary | ICD-10-CM

## 2018-10-31 PROCEDURE — 99606 MTMS BY PHARM EST 15 MIN: CPT | Performed by: PHARMACIST

## 2018-10-31 NOTE — PROGRESS NOTES
SUBJECTIVE/OBJECTIVE:                Caden Bush is a 58 year old male called for a follow-up visit for Medication Therapy Management.  He was referred to me from Dr. Hart.     Chief Complaint: Follow up from our visit on 10/17.      Tobacco: No tobacco use  Alcohol: not currently using    Medication Adherence/Access:  no issues reported      Diabetes:  Pt currently taking metformin ER 500mg- 2 BID, Pioglitazone 45mg daily and basaglar 7 units in the AM. Pt is not experiencing side effects. Met with CDE to discuss diet and this has been very helpful to him, continues to have low weight and not always eating enough.    Stopped Glipizide 5mg with the start of insulin per PCP and has not needed this to be restarted.   SMBG: one time daily, mostly in the evenings. Ranges (patient reported): newest to oldest   Newest to oldest (fasting,evening)    Last talked on 10/17 (fasting, evening oldest to newest)  68, 174  139, 99  136, 111  80, 176  95, 118  96, 220  77, 220  93, 167  84, 105  95, 275**10/26- 275 so decided to bump up to the 7 units again  119, 153  119, 111  102, 144  126, 227  101  Recent symptoms of high blood sugar? None  Eye exam: up to date  Foot exam: up to date  Pt is nottaking an ACEi/ARB was taken off lisinopril due to dizziness and has not been restarted.  Also has hx of lithium toxicity w/ Ace previously, but has been off lithium now since Jan 2018.  Aspirin: Taking 81mg daily and denies side effects    Lab Results   Component Value Date    A1C 6.7 07/17/2018    A1C 8.2 03/19/2018    A1C 6.8 09/15/2017    A1C 8.2 06/07/2017    A1C 6.6 12/13/2016     Today's Vitals: There were no vitals taken for this visit.-   Phone visit      ASSESSMENT:              Current medications were reviewed today as discussed above.      Medication Adherence: good, no issues identified    Diabetes: Stable. Patient is meeting A1c goal of < 7%. Self monitoring of blood glucose is at goal of fasting  mg/dL. Pt would  benefit from continue on the higher 7 units for now, but need to watch for the AM lows.       PLAN:                  1. Will continue at 7 units of Lantus for now, watching closely for AM lows       I spent 15 minutes with this patient today. All changes were made via collaborative practice agreement with Dr. Hart. A copy of the visit note was provided to the patient's primary care provider.     Will follow up in 3 weeks, patient prefers frequent check in calls.    The patient declined a summary of these recommendations as an after visit summary.    Aliyah Angeles, Pharm.D, [facility]  Medication Therapy Management Pharmacist  [phone]

## 2018-11-04 DIAGNOSIS — Z76.0 ENCOUNTER FOR MEDICATION REFILL: ICD-10-CM

## 2018-11-05 RX ORDER — SIMVASTATIN 40 MG
TABLET ORAL
Qty: 90 TABLET | Refills: 0 | Status: SHIPPED | OUTPATIENT
Start: 2018-11-05 | End: 2018-11-21

## 2018-11-07 ENCOUNTER — PATIENT OUTREACH (OUTPATIENT)
Dept: CARE COORDINATION | Facility: CLINIC | Age: 58
End: 2018-11-07

## 2018-11-07 ASSESSMENT — ACTIVITIES OF DAILY LIVING (ADL): DEPENDENT_IADLS:: INDEPENDENT

## 2018-11-07 NOTE — PROGRESS NOTES
"Clinic Care Coordination Contact  OUTREACH    Referral Information: Last patient outreach, patient was having issues with no show of [facility] worker. During today's follow up, patient identified interest in housing and [place] ([facility] to be closer to family and \"have more things to do\".   Referral Source: PCP    Primary Diagnosis: Psychosocial    Chief Complaint   Patient presents with     Clinic Care Coordination - Follow-up     Psychosocial        Universal Utilization: Utilization is appropriate  Utilization    Last refreshed: 11/5/2018 10:59 AM:  No Show Count (past year) 0       Last refreshed: 11/5/2018 10:59 AM:  ED visits 0       Last refreshed: 11/5/2018 10:59 AM:  Hospital admissions 0          Current as of: 11/5/2018 10:59 AM           Clinical Concerns:  Current Medical Concerns: Patient recently met with MTM to go over medication management. Diet was discussed as patient is lower weight and tends to not eat enough. Patient does get home delivered meals through [employer]. Patient states he understands he should eat a bit more daily.  Current Behavioral Concerns: none noted at this time    Medication Management: Patient sets up and administers medication.    Functional Status:  Dependent ADLs:: Independent  Dependent IADLs:: Independent  Mobility Status: Independent    Living Situation:  Patient lives in an apartment, he has a section 8 voucher that is project based. If he moves, he understands he will lose the section 8 voucher. Patient would like to move closer to family in the [facility] and has his name on the list for \"[employer] in [place]. Patient states this is subsidized and he will be able to afford it. Is open to other suggestions. He says starting January he will be getting $1,262 in social security.    Diet/Exercise/Sleep:  Patient is on a diabetic diet, gets meals through [employer]. Patient did not discuss sleep or " "exercise.    Transportation:  Patient gets waiver funding for transportation. He gets a bus card to use towards public transportation/metro mobility     Psychosocial:  Patient said that he [facility] not [facility] as he is independent now in the skills they were working on. Patient would ideally like to live closer to family in the [facility]. He knows a few people that live at \"[place]\" in [place] so he has his name on the wait list.    Patient is also considering going to [place] for more socialization     Goals:   Goals        General    Psychosocial (pt-stated)     Notes - Note created  11/7/2018  9:03 AM by Lidia Jose LGSW    I will move to a new apartment within six months, to be closer to family. (in the [facility] area). I understand that if I move I lose my section 8 voucher since its project based. My goal is to move to a subsidized/low income apartment.           Patient/Caregiver understanding: Patient reports understanding and denies any other concerns. Utilized AIDET communication during encounter    Outreach Frequency: monthly  Future Appointments              In 2 weeks Aliyah Angeles RPH [facility], [facility]    In 3 weeks Natali Powell MD Psychiatry Clinic, [facility]    In 1 month Annie Hart MD [facility]          Plan: Patient will look into subsidized housing in the [facility] area.  Care Coordinator will send patient resources for housing via email. Will follow up with patient in one month.    Patient considering going to [place] for socialization.    Lidia Pacheco, MSW, LGSW  Clinic Care Coordinator  [email or web address]  [phone]    "

## 2018-11-21 ENCOUNTER — ALLIED HEALTH/NURSE VISIT (OUTPATIENT)
Dept: PHARMACY | Facility: PHYSICIAN GROUP | Age: 58
End: 2018-11-21
Payer: MEDICAID

## 2018-11-21 DIAGNOSIS — E11.3393 TYPE 2 DIABETES MELLITUS WITH BOTH EYES AFFECTED BY MODERATE NONPROLIFERATIVE RETINOPATHY WITHOUT MACULAR EDEMA, WITHOUT LONG-TERM CURRENT USE OF INSULIN (H): Primary | ICD-10-CM

## 2018-11-21 DIAGNOSIS — F25.0 SCHIZOAFFECTIVE DISORDER, BIPOLAR TYPE (H): ICD-10-CM

## 2018-11-21 DIAGNOSIS — M19.90 ARTHRITIS: ICD-10-CM

## 2018-11-21 PROCEDURE — 99606 MTMS BY PHARM EST 15 MIN: CPT | Performed by: PHARMACIST

## 2018-11-21 RX ORDER — RISPERIDONE 3 MG/1
3 TABLET ORAL AT BEDTIME
Qty: 30 TABLET | Refills: 0 | Status: SHIPPED | OUTPATIENT
Start: 2018-11-21 | End: 2018-12-03

## 2018-11-21 RX ORDER — VENLAFAXINE HYDROCHLORIDE 150 MG/1
150 CAPSULE, EXTENDED RELEASE ORAL DAILY
Qty: 30 CAPSULE | Refills: 0 | Status: SHIPPED | OUTPATIENT
Start: 2018-11-21 | End: 2018-12-03

## 2018-11-21 NOTE — MR AVS SNAPSHOT
After Visit Summary   11/21/2018    Caden Bush    MRN: [id number]           Patient Information     Date Of Birth          1960        Visit Information        Provider Department      11/21/2018 1:00 PM Aliyah Angeles RPH [facility]        Today's Diagnoses     Type 2 diabetes mellitus with both eyes affected by moderate nonproliferative retinopathy without macular edema, without long-term current use of insulin (H)    -  1    Arthritis           Follow-ups after your visit        Follow-up notes from your care team     Return in about 2 weeks (around 12/5/2018) for Medication Therapy Management - Aliyah PharmD.      Your next 10 appointments already scheduled     Dec 03, 2018  2:10 PM CST   Adult Med Follow UP with Natali Dang MD   Psychiatry Clinic ([facility])    [address]  [address] [place] [address]   [phone]            Dec 05, 2018  1:30 PM CST   TELEMEDICINE with Aliyah Angeles RPH   [facility] ([facility])    [address] [place] [address] [phone]           Note: this is not an onsite visit; there is no need to come to the facility.            Jan 02, 2019 10:00 AM CST   PHYSICAL with Annie Hart MD   [facility] ([facility]    [address] [place] [address] [phone]              Who to contact     If you have questions or need follow up information about today's clinic visit or your schedule please contact [facility] directly at [phone].  Normal or non-critical lab and imaging results will be communicated to you by MyChart, letter or phone within 4 business days after the clinic has received the results. If you do not hear from us within 7 days, please contact the clinic through MyChart or phone. If you have a critical or abnormal lab result, we will  notify you by phone as soon as possible.  Submit refill requests through [employer] or call your pharmacy and they will forward the refill request to us. Please allow 3 business days for your refill to be completed.          Additional Information About Your Visit        [employer]ini[employer] Information     [employer] gives you secure access to your electronic health record. If you see a primary care provider, you can also send messages to your care team and make appointments. If you have questions, please call your primary care clinic.  If you do not have a primary care provider, please call [phone] and they will assist you.        Care EveryWhere ID     This is your Care EveryWhere ID. This could be used by other organizations to access your [place] medical records  [id number]         Blood Pressure from Last 3 Encounters:   09/05/18 96/52   07/17/18 136/80   04/30/18 136/80    Weight from Last 3 Encounters:   09/05/18 143 lb (64.9 kg)   07/17/18 137 lb (62.1 kg)   04/17/18 142 lb (64.4 kg)              Today, you had the following     No orders found for display         Today's Medication Changes          These changes are accurate as of 11/21/18  1:29 PM.  If you have any questions, ask your nurse or doctor.               These medicines have changed or have updated prescriptions.        Dose/Directions    insulin glargine 100 UNIT/ML pen   This may have changed:    - how much to take  - additional instructions   Used for:  Type 2 diabetes mellitus with both eyes affected by moderate nonproliferative retinopathy without macular edema, without long-term current use of insulin (H)        Dose:  7 Units   Inject 7 Units Subcutaneous At Bedtime We will make further adjustments as we see your response   Quantity:  15 mL   Refills:  1            Where to get your medicines      These medications were sent to [id number] - [place], [place] - [address] AT [facility] [address]  [address], [facility]  [address]     Phone:  [phone]     risperiDONE 3 MG tablet    venlafaxine 150 MG 24 hr capsule                Primary Care Provider Office Phone # Fax #    Annie Jeniffer Hart [phone] [address] [place] [place] [address]        Goals        General    Psychosocial (pt-stated)     Notes - Note created  11/7/2018  9:03 AM by Lidia Jose LGSW    I will move to a new apartment within six months, to be closer to family. (in the [facility] area). I understand that if I move I lose my section 8 voucher since its project based. My goal is to move to a subsidized/low income apartment.         Equal Access to Services     ERICA URIAS : Pat burgoso [employer], waaxda luqadaha, qaybta kaalmada adeegyada, marcia diaz . So [facility] [phone].    ATENCIÓN: Si habla español, tiene a magaña disposición servicios gratuitos de asistencia lingüística. Llame al [phone].    We comply with applicable federal civil rights laws and [place] laws. We do not discriminate on the basis of race, color, national origin, age, disability, sex, sexual orientation, or gender identity.            Thank you!     Thank you for choosing [facility]  for your care. Our goal is always to provide you with excellent care. Hearing back from our patients is one way we can continue to improve our services. Please take a few minutes to complete the written survey that you may receive in the mail after your visit with us. Thank you!             Your Updated Medication List - Protect others around you: Learn how to safely use, store and throw away your medicines at www.disposemymeds.org.          This list is accurate as of 11/21/18  1:29 PM.  Always use your most recent med list.                   Brand Name Dispense Instructions for use Diagnosis    ACE/ARB/ARNI NOT PRESCRIBED (INTENTIONAL)      Please choose reason not prescribed, below-  hypotension        acetaminophen 500 MG Caps      Take 2 capsules by mouth 3 times daily as needed        aspirin 81 MG EC tablet    ASA     Take 81 mg by mouth daily.        B-12 1000 MCG Caps      Take 1 capsule by mouth daily        blood glucose lancets standard    no brand specified    100 each    Use to test blood sugar 2 times daily or as directed.    Type 2 diabetes mellitus with both eyes affected by moderate nonproliferative retinopathy without macular edema, without long-term current use of insulin (H)       blood glucose monitoring meter device kit    no brand specified    1 kit    Use to test blood sugar 2 times daily or as directed.    Type 2 diabetes mellitus with both eyes affected by moderate nonproliferative retinopathy without macular edema, without long-term current use of insulin (H)       blood glucose monitoring test strip    no brand specified    100 strip    Use to test blood sugars 2 times daily or as directed    Type 2 diabetes mellitus with both eyes affected by moderate nonproliferative retinopathy without macular edema, without long-term current use of insulin (H)       cholecalciferol 1000 UNIT tablet    vitamin D3     Take 1 tablet by mouth daily.        COMPLETE NEEDLE COLLECTION SYS Misc     1 each    1 each daily    Type 2 diabetes mellitus with both eyes affected by moderate nonproliferative retinopathy without macular edema, with long-term current use of insulin (H)       ferrous sulfate 325 (65 Fe) MG tablet    IRON     Take 325 mg by mouth daily        FISH OIL      1 capsule daily        insulin glargine 100 UNIT/ML pen     15 mL    Inject 7 Units Subcutaneous At Bedtime We will make further adjustments as we see your response    Type 2 diabetes mellitus with both eyes affected by moderate nonproliferative retinopathy without macular edema, without long-term current use of insulin (H)       insulin pen needle 32G X 4 MM miscellaneous    BD BRENDAN U/F    100 each    Use 1 daily  as directed.    Type 2 diabetes mellitus with both eyes affected by moderate nonproliferative retinopathy without macular edema, without long-term current use of insulin (H)       metFORMIN 500 MG 24 hr tablet    GLUCOPHAGE-XR    360 tablet    TAKE 2 TABLETS BY MOUTH TWICE DAILY    Type 2 diabetes mellitus with both eyes affected by moderate nonproliferative retinopathy without macular edema, without long-term current use of insulin (H)       pioglitazone 45 MG tablet    ACTOS    90 tablet    TAKE 1 TABLET BY MOUTH DAILY    Encounter for medication refill       risperiDONE 3 MG tablet    risperDAL    30 tablet    Take 1 tablet (3 mg) by mouth At Bedtime    Schizoaffective disorder, bipolar type (H)       simvastatin 40 MG tablet    ZOCOR    90 tablet    TAKE ONE TABLET BY MOUTH EVERY NIGHT AT BEDTIME    Encounter for medication refill       traZODone 50 MG tablet    DESYREL    30 tablet    Take 1 tablet (50 mg) by mouth nightly as needed for sleep    Schizoaffective disorder, bipolar type (H)       * venlafaxine 75 MG 24 hr capsule    EFFEXOR XR    30 capsule    Take 1 capsule (75 mg) by mouth daily Please take with Effexor 150 mg for a total dose of 225 mg daily    Schizoaffective disorder, bipolar type (H)       * venlafaxine 150 MG 24 hr capsule    EFFEXOR XR    30 capsule    Take 1 capsule (150 mg) by mouth daily Along with a 75 mg for a total of 225 mg daily dose.    Schizoaffective disorder, bipolar type (H)       * Notice:  This list has 2 medication(s) that are the same as other medications prescribed for you. Read the directions carefully, and ask your doctor or other care provider to review them with you.

## 2018-11-21 NOTE — TELEPHONE ENCOUNTER
Medication requested: Venlafaxine  mg caps and Risperidone 3 mg tabs  Last refilled: 10-25-18  Qty: 30      Last seen: 9-27-18  RTC: 3 mo  Cancel: 0  No-show: 0  Next appt: 12-3-18    Refill decision:   Refilled for 30 days per protocol.      Kathleen M Doege RN

## 2018-11-21 NOTE — PROGRESS NOTES
SUBJECTIVE/OBJECTIVE:                Caden Bush is a 58 year old male called for a follow-up visit for Medication Therapy Management.  He was referred to me from Dr. Hart.     Chief Complaint: Follow up from our visit on 10/31.  Likes frequent check in calls to make sure his numbers are staying on track.     Tobacco: No tobacco use  Alcohol: not currently using    Medication Adherence/Access:  no issues reported    Diabetes:  Pt currently taking metformin ER 500mg- 2 BID, Pioglitazone 45mg daily and basaglar 7 units in the AM. Pt is not experiencing side effects. Met with CDE to discuss diet and this has been very helpful to him, continues to have low weight and not always eating enough.    Stopped Glipizide 5mg with the start of insulin per PCP and has not needed this to be restarted.   SMBG: one time daily, mostly in the evenings. Ranges (patient reported): newest to oldest   Newest to oldest (fasting,evening)  11/21=98  11/20= 132, 108  11/19=95, 133  11/18 77 at midnight  11/1729=990,  93  11/16=95, 120  11/15= 74 (4:30am), 165  11/14= 59 (1:40am) 71,  (11:14am) 86, 172  11/13= 103, 134  11/12=  none  11/11= 138, 147  11/10= 78 (11:15am),  136  11/9= 87, 91  11/8= 98, 121  11/7= 80 (4am), 254    Recent symptoms of high blood sugar? None  Eye exam: up to date  Foot exam: up to date  Pt is nottaking an ACEi/ARB was taken off lisinopril due to dizziness and has not been restarted.  Also has hx of lithium toxicity w/ Ace previously, but has been off lithium now since Jan 2018.  Aspirin: Taking 81mg daily and denies side effects    Lab Results   Component Value Date    A1C 6.7 07/17/2018    A1C 8.2 03/19/2018    A1C 6.8 09/15/2017    A1C 8.2 06/07/2017    A1C 6.6 12/13/2016     Today's Vitals: There were no vitals taken for this visit.-   Phone visit    Arthritis: having more leg pain lately, so has been using some acetaminophen as needed. Using only 2-3 tablets per day, wants to be sure this is okay.     Today's  Vitals: There were no vitals taken for this visit.- phone visit      ASSESSMENT:              Current medications were reviewed today as discussed above.      Medication Adherence: good, no issues identified    Diabetes: Stable. Patient is meeting A1c goal of < 7%, however is waking up several nights due to the sugars dropping on him, recommend decreasing basal insulin slightly to 6 units daily for now. He has been hesitant to reduce dose, but okay with 1 unit reduction.    Arthritis: Okay to use APAP and discussed staying around 6 or less tablets per day for now, which he is well under at this time.        PLAN:                  1. Decrease basaglar to 6 units daily    2. Okay to use APAP 2 tablets three times daily if needed for pain.    I spent 15 minutes with this patient today. All changes were made via collaborative practice agreement with Dr. Hart. A copy of the visit note was provided to the patient's primary care provider.     Will follow up in 2 weeks per patient request.    The patient declined a summary of these recommendations as an after visit summary.      Aliyah Angeles, Pharm.D, BCACP  Medication Therapy Management Pharmacist  [phone]

## 2018-11-28 DIAGNOSIS — F25.0 SCHIZOAFFECTIVE DISORDER, BIPOLAR TYPE (H): ICD-10-CM

## 2018-11-28 RX ORDER — VENLAFAXINE HYDROCHLORIDE 75 MG/1
75 CAPSULE, EXTENDED RELEASE ORAL DAILY
Qty: 30 CAPSULE | Refills: 0 | Status: SHIPPED | OUTPATIENT
Start: 2018-11-28 | End: 2018-12-03

## 2018-11-28 NOTE — TELEPHONE ENCOUNTER
Medication requested: Venlafaxine ER 75 mg caps and Risperidone 3 mg tabs  Last refilled: 10-31-18  Qty: 30       Last seen: 9-27-18  RTC: 3 mo  Cancel: 0  No-show: 0  Next appt: 12-3-18     Refill decision:   Refilled for 30 days per protocol.      Kathleen M Doege RN

## 2018-12-03 ENCOUNTER — OFFICE VISIT (OUTPATIENT)
Dept: PSYCHIATRY | Facility: CLINIC | Age: 58
End: 2018-12-03
Attending: PSYCHIATRY & NEUROLOGY
Payer: MEDICARE

## 2018-12-03 VITALS
SYSTOLIC BLOOD PRESSURE: 120 MMHG | WEIGHT: 144 LBS | BODY MASS INDEX: 20.37 KG/M2 | HEART RATE: 89 BPM | DIASTOLIC BLOOD PRESSURE: 75 MMHG

## 2018-12-03 DIAGNOSIS — F25.0 SCHIZOAFFECTIVE DISORDER, BIPOLAR TYPE (H): ICD-10-CM

## 2018-12-03 PROCEDURE — G0463 HOSPITAL OUTPT CLINIC VISIT: HCPCS | Mod: ZF

## 2018-12-03 RX ORDER — VENLAFAXINE HYDROCHLORIDE 75 MG/1
75 CAPSULE, EXTENDED RELEASE ORAL DAILY
Qty: 30 CAPSULE | Refills: 0 | Status: SHIPPED | OUTPATIENT
Start: 2018-12-03 | End: 2019-01-24

## 2018-12-03 RX ORDER — VENLAFAXINE HYDROCHLORIDE 150 MG/1
150 CAPSULE, EXTENDED RELEASE ORAL DAILY
Qty: 30 CAPSULE | Refills: 0 | Status: SHIPPED | OUTPATIENT
Start: 2018-12-03 | End: 2019-01-24

## 2018-12-03 RX ORDER — RISPERIDONE 3 MG/1
3 TABLET ORAL AT BEDTIME
Qty: 30 TABLET | Refills: 0 | Status: SHIPPED | OUTPATIENT
Start: 2018-12-03 | End: 2019-01-24

## 2018-12-03 ASSESSMENT — PAIN SCALES - GENERAL: PAINLEVEL: MILD PAIN (3)

## 2018-12-03 ASSESSMENT — PATIENT HEALTH QUESTIONNAIRE - PHQ9: SUM OF ALL RESPONSES TO PHQ QUESTIONS 1-9: 3

## 2018-12-03 NOTE — PROGRESS NOTES
[facility] PSYCHIATRY CLINIC PROGRESS NOTE     CARE TEAM:  PCP- Annie Hart   [facility]  [facility]      Ophthalmologist: Jacob Lieberman with [facility]          Community support: [facility], [phone]     Caden Bush is a 58 year old male who prefers the name Caden & pronouns he.  The initial diagnostic evaluation was on 11/26/08 .   Date of the most recent transfer of care eval is 07/13/2018.      Pertinent Background:  This patient first experienced mental health issues in his late 20s , initially obtained care at that time and has received treatment for Schizoaffective Disorder, bipolar type. His diagnosis has changed through the time and afterreviewing all the evidence in 9/2018 the current diagnosis was confirmed (several episodes of moderate to severe depression and one episode of two weeks long period of grandiosity,  pressured speech, overspending and sleeplessness with increased social anxiety and paranoia).   Previously had a CCM until 2011. Has SW consult on 10/20/15, not interested in CM or [facility] worker services at this time and seems to be managing well on his own.    #Mood/psychosis:  - Notably, this pt was stable on regimen of Lithium, Effexor and Risperdal since 2011. However due to complications of chronic diabetes, Lithium was discontinued in 2017.   -  reviewed the chart . Per chart review, patient was not fit for the current group. She recommended [facility] for groups and activities for him.   - We even tried going down on Risperidone for other reasons but he immediately got destabilized.    #Insomnia: The etiology of his insomnia is not well understood and the sleep study was discussed and encouraged. Sleep has improved on 3mg of risperidone while psychotic sxs are also better managed.      Psych critical item history includes suicidal ideation, psychosis [sxs include AH and paranoia], mutiple psychotropic trials and psych  hosp (>5).     INTERIM HISTORY                                                                                                                 4, 4   The patient reports good treatment adherence.  History was provided by the patient who was a good historian. The last visit ended with no change to the med regimen.   Since the last visit, Caden reports the following:   - Arthritis is limiting but he keeps an active life style.  - No depression, or psychosis. Very mild social anxiety.   - Sleeps well (~10-12 hours) utilizes PRN trazodone weekly with optimal results.   - Keeps busy with errands  - Desires to socialize. Asks for groups    RECENT SYMPTOMS:   DEPRESSION:  reports-anhedonia, low energy;  DENIES- depression, memory of concentration problem. Insomnia or hypersomnia, suicidal ideation and self-destructive thoughts, appetite or weight change  KINGA/HYPOMANIA:  reports-none;  DENIES- increased energy, decreased sleep need, increased activity and grandiosity  PSYCHOSIS:  reports-none;  DENIES- delusions, auditory hallucinations and visual hallucinations  DYSREGULATION:  reports-none;  DENIES- suicidal ideation, violent ideation and SIB  PANIC ATTACK:  none   ANXIETY:   social anxiety at baseline. Desires to socialize  TRAUMA RELATED:  none  COMPULSIVE:  none  SLEEP:  As mentioned above  EATING DISORDER: none      RECENT SUBSTANCE USE:     ALCOHOL-  never   TOBACCO- none  CAFFEINE- 1 cups/day of coffee, 1 sodas/ in a while  OPIOIDS- none      NARCAN KIT- N/A          CANNABIS- none  OTHER ILLICIT DRUGS- none      CURRENT SOCIAL HISTORY:  FINANCIAL SUPPORT- SSDI for schizoaffective disorder and diabetes  CHILDREN- none      LIVING SITUATION- Currently living by self in subsidized apartment in [place] since Feb 2011.      SOCIAL/ SPIRITUAL SUPPORT- few friends and family     FEELS SAFE AT HOME- yes       MEDICAL ROS (2,10):  Reports none  Denies Pain, headache, dizziness, GI [nausea, diarrhea, constipation,  ],  , sedation, tremor and confusion.    PSYCH and CD Critical Summary Points since July 2018 July: Increased Risperidone dose    PAST PSYCH MED TRIALS   see EMR Problem List: Hx of psychiatric care    MEDICAL / SURGICAL HISTORY                                 Neurologic Hx- no  Patient Active Problem List   Diagnosis     Eczema     Retinopathy     Moderate episode of recurrent major depressive disorder (H)     Polyp of colon, adenomatous     Health Care Home     Hx of psychiatric care     Neuropathy of left upper extremity     Type 2 diabetes mellitus with both eyes affected by moderate nonproliferative retinopathy without macular edema, without long-term current use of insulin (H)     Schizoaffective disorder, bipolar type (H)       Major Surgery- Appendectomy    ALLERGY                                Review of patient's allergies indicates no known allergies.  MEDICATIONS                               Current Outpatient Prescriptions   Medication Sig Dispense Refill     ACE/ARB/ARNI NOT PRESCRIBED, INTENTIONAL, Please choose reason not prescribed, below-  hypotension       acetaminophen 500 MG CAPS Take 2 capsules by mouth 3 times daily as needed       aspirin 81 MG EC tablet Take 81 mg by mouth daily.       BASAGLAR 100 UNIT/ML injection Inject 7 Units Subcutaneous At Bedtime We will make further adjustments as we see your response (Patient taking differently: Inject 6 Units Subcutaneous At Bedtime We will make further adjustments as we see your response) 15 mL 1     blood glucose (NO BRAND SPECIFIED) lancets standard Use to test blood sugar 2 times daily or as directed. 100 each 11     blood glucose monitoring (NO BRAND SPECIFIED) meter device kit Use to test blood sugar 2 times daily or as directed. 1 kit 0     blood glucose monitoring (NO BRAND SPECIFIED) test strip Use to test blood sugars 2 times daily or as directed 100 strip 3     cholecalciferol (VITAMIN D) 1000 UNIT tablet Take 1 tablet by mouth  daily.       Cyanocobalamin (B-12) 1000 MCG CAPS Take 1 capsule by mouth daily       ferrous sulfate (IRON) 325 (65 Fe) MG tablet Take 325 mg by mouth daily       FISH OIL 1 capsule daily        insulin pen needle (BD BRENDAN U/F) 32G X 4 MM Use 1 daily as directed. 100 each 11     metFORMIN (GLUCOPHAGE-XR) 500 MG 24 hr tablet TAKE 2 TABLETS BY MOUTH TWICE DAILY 360 tablet 1     pioglitazone (ACTOS) 45 MG tablet TAKE 1 TABLET BY MOUTH DAILY 90 tablet 0     risperiDONE (RISPERDAL) 3 MG tablet Take 1 tablet (3 mg) by mouth At Bedtime 30 tablet 0     Sharps Container (COMPLETE NEEDLE COLLECTION SYS) MISC 1 each daily 1 each 1     simvastatin (ZOCOR) 40 MG tablet TAKE ONE TABLET BY MOUTH EVERY NIGHT AT BEDTIME 90 tablet 3     traZODone (DESYREL) 50 MG tablet Take 1 tablet (50 mg) by mouth nightly as needed for sleep 30 tablet 2     venlafaxine (EFFEXOR XR) 150 MG 24 hr capsule Take 1 capsule (150 mg) by mouth daily Along with a 75 mg for a total of 225 mg daily dose. 30 capsule 0     venlafaxine (EFFEXOR XR) 75 MG 24 hr capsule Take 1 capsule (75 mg) by mouth daily Please take with Effexor 150 mg for a total dose of 225 mg daily 30 capsule 0     VITALS                                                                                                                          3, 3   /75  Pulse 89  Wt 65.3 kg (144 lb)  BMI 20.37 kg/m2   MENTAL STATUS EXAM                                                                                           9, 14 cog gs     Alertness: alert  and oriented  Appearance: well groomed  Behavior/Demeanor: cooperative, pleasant and calm, with good  eye contact   Speech: normal  Language: intact and no problems  Psychomotor: normal or unremarkable  Mood: description consistent with euthymia  Affect: full range; was congruent to mood; was congruent to content  Thought Process/Associations: unremarkable  Thought Content:  Reports none;   Denies suicidal ideation  and psychotic  thought  Perception:   Reports none;  Denies auditory hallucinations or visual hallucinations or delusions  Insight: fair  Judgment: good  Cognition: does  appear grossly intact; formal cognitive testing was not done  Gait and Station: unremarkable     LABS and DATA     AIMS: 0 at 3/1/2018    PHQ9 TODAY = 1  PHQ-9 SCORE 7/13/2018 8/17/2018 9/27/2018   PHQ-9 Total Score - - -   PHQ-9 Total Score 2 2 1   PHQ-9 Total Score - - -     ANTIPSYCHOTIC LABS  [glu, A1C, lipids (ezequiel LDL), liver enzymes, WBC, ANEU, Hgb, plts]  q12 mo  Recent Labs   Lab Test  09/05/18   1100  07/17/18   1126  04/17/18   1356  03/19/18   1116  01/30/18   0923  09/15/17   0956  07/11/17   1034  06/07/17   1004   GLC  214*   --   150*   --   195*   --   234*   --    A1C   --   6.7*   --   8.2*   --   6.8*   --   8.2*     Recent Labs   Lab Test  04/17/18   1356  07/11/17   1034  06/07/17   1004  06/09/16   1030   CHOL  139  126  143  125   TRIG  45  49  105  55   LDL  45  40  46  45   HDL  85  76  76  69     Recent Labs   Lab Test  04/17/18   1356  01/30/18   0923  07/11/17   1034  12/20/16   1301   AST  15  14  14  14   ALT  12  16  12  14   ALKPHOS  73  79  72  72     Recent Labs   Lab Test  10/11/18   1010  09/05/18   1123  01/30/18   0923  12/20/16   1240  06/09/16   1030  12/21/15   1413   08/01/12   1640   WBC  2.5*  2.8*  2.8*  3.7*  4.7  4.6   < >  5.5   ANEU   --    --    --    --   3.5  3.4   --   4.8   HGB  12.5*  11.5*  12.1*  13.6  14.7  13.4   < >  11.4*   PLT  136*  168  179  205  175  155   < >  180    < > = values in this interval not displayed.       DIAGNOSIS     Schizoaffective disorder, bipolar type, in remission    ASSESSMENT                                                                                                                   m2, h3     TODAY:    #Psychosis  #Mood  - Patient reports stable mood, compliance to medication and denies safety issues including self harm, SI, wolf or psychosis.  - Patient has been stable  in current medication regimen since summer 2018 (risperidone and venlafaxine)and reasonably advocates to continue this medication regimen.   - Denies abnormal movements. Reports joint stiffness in the context of arthritis.   - Continuing therapy  - Asks for groups:    - [facility] [employer] [facility] info provided    - He may dial [phone] for volunteer match    #Insomnia :  - Not a concern today   - Unclear etiology  - Sleep study is discussed and encouraged in the past  - Trazodone PRN weekly helps    #Leukopenia, unspecified type  #Low PLT count  #Anemia  - PCP is aware  - Possibly due to Risperdal.   - Has a physical with PCP on Jan 2nd when hw is also going to evaluate hematologic abnormalities.  - Receives iron replacement    #DM II  # Vitamin D def> replacement      [place] PRESCRIPTION MONITORING PROGRAM [] was not checked today:  not using controlled substances.    PSYCHOTROPIC DRUG INTERACTIONS:   VENLAFAXINE and ANTIPLATELET AGENTS may result in an increased risk of bleeding.  RISPERIDONE and SIMVASTATIN may result in increased simvastatin serum concentrations -with an increased risk of myopathy or rhabdomyolysis.  TRAZODONE and VENLAFAXINE may result in an increased risk of serotonin syndrome.    MANAGEMENT:  Monitoring for adverse effects     PLAN                                                                                                                                m2, h3     1) PSYCHOTROPIC MEDICATIONS:  - Continue risperidone 3 mg QHS  - Cont Effexor XR  225 mg daily  - Cont trazodone to 50 mg qhs prn (insomnia)      2) THERAPY:    - Seeing psychologist Yeny Goff at [facility] since 11/2015. Sees once a month.       3) LABS NEXT DUE: Feb 2019     RATING SCALES:  AIMS: 0 at 3/1/2018      4) REFERRALS:    none     7) RTC: 3 months      8) CRISIS NUMBERS:     Provided routinely in AVS  none    TREATMENT RISK STATEMENT:  The risks, benefits, alternatives and potential adverse  effects have been discussed and are understood by the pt. The pt understands the risks of using street drugs or alcohol. There are no medical contraindications, the pt agrees to treatment with the ability to do so. The pt knows to call the clinic for any problems or to access emergency care if needed.  Medical and substance use concerns are documented above.  Psychotropic drug interaction check was done, including changes made today.     PROVIDER:  Natali Dang MD    Patient staffed in clinic with Dr. Phillips who will sign the note.  Supervisor is Dr. Bradley.    Supervisor Attestation:  I met with Caden Bush along with the resident, and participated in key portions of the service, including the mental status examination and developing the plan of care. I reviewed key portions of the history with the resident. I agree with the findings and plan as documented in this note.  Antolin Phillips MD

## 2018-12-03 NOTE — MR AVS SNAPSHOT
After Visit Summary   12/3/2018    Caden Bush    MRN: [id number]           Patient Information     Date Of Birth          1960        Visit Information        Provider Department      12/3/2018 2:10 PM Natali Powell MD [facility]        Today's Diagnoses     Schizoaffective disorder, bipolar type (H)          Care Instructions    [facility]  - Tuesdays 1-1:45 pm  [address] [place] [address]  [phone]    [facility] : [phone]      MEDICATIONS:  - Continue risperidone 3 mg QHS  - Cont Effexor XR  225 mg daily  - Cont trazodone to 50 mg qhs prn (insomnia)    - Volunteer Match > Dial 411    Thank you for coming to the PSYCHIATRY CLINIC.    Lab Testing:  If you had lab testing today and your results are reassuring or normal they will be mailed to you or sent through Mobile365 (fka InphoMatch) within 7 days.   If the lab tests need quick action we will call you with the results.  The phone number we will call with results is # [phone] (home) . If this is not the best number please call our clinic and change the number.    Medication Refills:  If you need any refills please call your pharmacy and they will contact us. Our fax number for refills is [phone]. Please allow three business for refill processing.   If you need to  your refill at a new pharmacy, please contact the new pharmacy directly. The new pharmacy will help you get your medications transferred.     Scheduling:  If you have any concerns about today's visit or wish to schedule another appointment please call our office during normal business hours [phone] (8-5:00 M-F)    Contact Us:  Please call [phone] during business hours (8-5:00 M-F).  If after clinic hours, or on the weekend, please call  [phone].    Financial Assistance [phone]  [employer] Billing [phone]  [facility]illing [phone]  Medical Records [phone]      MENTAL HEALTH CRISIS  NUMBERS:  [facility]:   [facility] - [phone]   Crisis Residence [facility] [place] - [facility] - [phone]   Walk-In Counseling Center [facility] - [phone]   COPE 24/7 [place] Mobile Team for Adults - [[phone]]; Child - [[phone]]     Crisis Connection - [phone]     [facility] - [phone]   Walk-in counseling [facility] - [phone]   Walk-in counseling [facility] - [phone]   Crisis Residence [facility] - [phone]   Urgent Care Adult Mental Health:   --Drop-in, 24/7 crisis line, and [facility] Mobile Team [[phone]]    CRISIS TEXT LINE: Text [phone] from anywhere, anytime, any crisis 24/7;    OR SEE www.crisistextline.org     Poison Control Center - [phone]    CHILD: [facility] needs assessment team - [phone]     [facility] Lifeline - [phone]; or [employer] Lifeline - [phone]    If you have a medical emergency please call 911or go to the nearest ER.                    _____________________________________________    Again thank you for choosing PSYCHIATRY CLINIC and please let us know how we can best partner with you to improve you and your family's health.  You may be receiving a survey in the mail regarding this appointment. We would love to have your feedback, both positive and negative, so please fill out the survey and return it using the provided envelope. The survey is done by an external company, so your answers are anonymous.             Follow-ups after your visit        Your next 10 appointments already scheduled     Dec 05, 2018  1:30 PM CST   TELEMEDICINE with Aliyah Angeles RPH   [facility] ([facility])    [address] [place] [address] [phone]           Note: this is not an onsite visit; there is no need to come to the facility.            Jan 02, 2019 10:00 AM CST    PHYSICAL with Annie Hart MD   [facility]    [address] [place] [address] [phone]            Feb 07, 2019 10:30 AM CST   Adult Med Follow UP with Natali Dang MD   Psychiatry Clinic ([facility])    [address]  [address] [id number] [phone]              Who to contact     Please call your clinic at [phone] to:    Ask questions about your health    Make or cancel appointments    Discuss your medicines    Learn about your test results    Speak to your doctor            Additional Information About Your Visit        Kira [employer] Information     Agent Panda gives you secure access to your electronic health record. If you see a primary care provider, you can also send messages to your care team and make appointments. If you have questions, please call your primary care clinic.  If you do not have a primary care provider, please call [phone] and they will assist you.      Agent Panda is an electronic gateway that provides easy, online access to your medical records. With Agent Panda, you can request a clinic appointment, read your test results, renew a prescription or communicate with your care team.     To access your existing account, please contact your [facility] or call [phone] for assistance.        Care EveryWhere ID     This is your Care EveryWhere ID. This could be used by other organizations to access your [place] medical records  [id number]        Your Vitals Were     Pulse BMI (Body Mass Index)                89 20.37 kg/m2           Blood Pressure from Last 3 Encounters:   12/03/18 120/75   09/27/18 151/73   09/05/18 96/52    Weight from Last 3 Encounters:   12/03/18 65.3 kg (144 lb)   09/27/18 66.8 kg (147 lb 3.2 oz)   09/05/18 64.9 kg (143 lb)              Today, you had the following     No orders found for display          Today's Medication Changes          These changes are accurate as of 12/3/18  3:06 PM.  If you have any questions, ask your nurse or doctor.               These medicines have changed or have updated prescriptions.        Dose/Directions    insulin glargine 100 UNIT/ML pen   This may have changed:    - how much to take  - additional instructions   Used for:  Type 2 diabetes mellitus with both eyes affected by moderate nonproliferative retinopathy without macular edema, without long-term current use of insulin (H)        Dose:  7 Units   Inject 7 Units Subcutaneous At Bedtime We will make further adjustments as we see your response   Quantity:  15 mL   Refills:  1            Where to get your medicines      These medications were sent to [id number] - [facility] [address] [facility] [address]  [address] [facility] [address]     Phone:  [phone]     risperiDONE 3 MG tablet    venlafaxine 150 MG 24 hr capsule    venlafaxine 75 MG 24 hr capsule                Primary Care Provider Office Phone # Fax #    Annie Jeniffer Hart [phone] [address] [place] [place] [address]        Goals        General    Psychosocial (pt-stated)     Notes - Note created  11/7/2018  9:03 AM by Lidia Jose LGSW    I will move to a new apartment within six months, to be closer to family. (in the [facility] area). I understand that if I move I lose my section 8 voucher since its project based. My goal is to move to a subsidized/low income apartment.         Equal Access to Services     [facility] BRIDGETT AH: Hadii ethan briones hadasho Sopatience, waaxda luqadaha, qaybta kaalmada adeegyagrisel, marcia woods. So [facility] [phone].    ATENCIÓN: Si habla español, tiene a magaña disposición servicios gratuitos de asistencia lingüística. Llame al [phone].    We comply with applicable federal civil rights laws and [place] laws. We do  not discriminate on the basis of race, color, national origin, age, disability, sex, sexual orientation, or gender identity.            Thank you!     Thank you for choosing PSYCHIATRY CLINIC  for your care. Our goal is always to provide you with excellent care. Hearing back from our patients is one way we can continue to improve our services. Please take a few minutes to complete the written survey that you may receive in the mail after your visit with us. Thank you!             Your Updated Medication List - Protect others around you: Learn how to safely use, store and throw away your medicines at www.disposemymeds.org.          This list is accurate as of 12/3/18  3:06 PM.  Always use your most recent med list.                   Brand Name Dispense Instructions for use Diagnosis    ACE/ARB/ARNI NOT PRESCRIBED    INTENTIONAL     Please choose reason not prescribed, below- hypotension        acetaminophen 500 MG Caps      Take 2 capsules by mouth 3 times daily as needed        aspirin 81 MG EC tablet    ASA     Take 81 mg by mouth daily.        B-12 1000 MCG Caps      Take 1 capsule by mouth daily        blood glucose lancets standard    NO BRAND SPECIFIED    100 each    Use to test blood sugar 2 times daily or as directed.    Type 2 diabetes mellitus with both eyes affected by moderate nonproliferative retinopathy without macular edema, without long-term current use of insulin (H)       blood glucose monitoring meter device kit    NO BRAND SPECIFIED    1 kit    Use to test blood sugar 2 times daily or as directed.    Type 2 diabetes mellitus with both eyes affected by moderate nonproliferative retinopathy without macular edema, without long-term current use of insulin (H)       blood glucose monitoring test strip    NO BRAND SPECIFIED    100 strip    Use to test blood sugars 2 times daily or as directed    Type 2 diabetes mellitus with both eyes affected by moderate nonproliferative retinopathy without macular  edema, without long-term current use of insulin (H)       COMPLETE NEEDLE COLLECTION SYS Misc     1 each    1 each daily    Type 2 diabetes mellitus with both eyes affected by moderate nonproliferative retinopathy without macular edema, with long-term current use of insulin (H)       ferrous sulfate 325 (65 Fe) MG tablet    FEROSUL     Take 325 mg by mouth daily        FISH OIL      1 capsule daily        insulin glargine 100 UNIT/ML pen     15 mL    Inject 7 Units Subcutaneous At Bedtime We will make further adjustments as we see your response    Type 2 diabetes mellitus with both eyes affected by moderate nonproliferative retinopathy without macular edema, without long-term current use of insulin (H)       insulin pen needle 32G X 4 MM miscellaneous    BD BRENDAN U/F    100 each    Use 1 daily as directed.    Type 2 diabetes mellitus with both eyes affected by moderate nonproliferative retinopathy without macular edema, without long-term current use of insulin (H)       metFORMIN 500 MG 24 hr tablet    GLUCOPHAGE-XR    360 tablet    TAKE 2 TABLETS BY MOUTH TWICE DAILY    Type 2 diabetes mellitus with both eyes affected by moderate nonproliferative retinopathy without macular edema, without long-term current use of insulin (H)       pioglitazone 45 MG tablet    ACTOS    90 tablet    TAKE 1 TABLET BY MOUTH DAILY    Encounter for medication refill       risperiDONE 3 MG tablet    risperDAL    30 tablet    Take 1 tablet (3 mg) by mouth At Bedtime    Schizoaffective disorder, bipolar type (H)       simvastatin 40 MG tablet    ZOCOR    90 tablet    TAKE ONE TABLET BY MOUTH EVERY NIGHT AT BEDTIME    Encounter for medication refill       traZODone 50 MG tablet    DESYREL    30 tablet    Take 1 tablet (50 mg) by mouth nightly as needed for sleep    Schizoaffective disorder, bipolar type (H)       * venlafaxine 150 MG 24 hr capsule    EFFEXOR XR    30 capsule    Take 1 capsule (150 mg) by mouth daily Along with a 75 mg for a  total of 225 mg daily dose.    Schizoaffective disorder, bipolar type (H)       * venlafaxine 75 MG 24 hr capsule    EFFEXOR XR    30 capsule    Take 1 capsule (75 mg) by mouth daily Please take with Effexor 150 mg for a total dose of 225 mg daily    Schizoaffective disorder, bipolar type (H)       vitamin D3 1000 units (25 mcg) tablet    CHOLECALCIFEROL     Take 1 tablet by mouth daily.        * Notice:  This list has 2 medication(s) that are the same as other medications prescribed for you. Read the directions carefully, and ask your doctor or other care provider to review them with you.

## 2018-12-03 NOTE — PATIENT INSTRUCTIONS
[facility]  - Tuesdays 1-1:45 pm  [address] [facility] [place] [place] [address]  [phone]    [facility] : [phone]      MEDICATIONS:  - Continue risperidone 3 mg QHS  - Cont Effexor XR  225 mg daily  - Cont trazodone to 50 mg qhs prn (insomnia)    - Volunteer Match > Dial 411    Thank you for coming to the PSYCHIATRY CLINIC.    Lab Testing:  If you had lab testing today and your results are reassuring or normal they will be mailed to you or sent through [employer] within 7 days.   If the lab tests need quick action we will call you with the results.  The phone number we will call with results is # [phone] (home) . If this is not the best number please call our clinic and change the number.    Medication Refills:  If you need any refills please call your pharmacy and they will contact us. Our fax number for refills is [phone]. Please allow three business for refill processing.   If you need to  your refill at a new pharmacy, please contact the new pharmacy directly. The new pharmacy will help you get your medications transferred.     Scheduling:  If you have any concerns about today's visit or wish to schedule another appointment please call our office during normal business hours [phone] (8-5:00 M-F)    Contact Us:  Please call [phone] during business hours (8-5:00 M-F).  If after clinic hours, or on the weekend, please call  [phone].    Financial Assistance [phone]  [facility] Billing [phone]  [facility] Billing [phone]  Medical Records [phone]      MENTAL HEALTH CRISIS NUMBERS:  [facility]:   [facility] - [phone]   Crisis Residence [facility] - [facility] - [phone]   Walk-In Counseling Center [facility] - [phone]   COPE 24/7 [place] Mobile Team for Adults - [[phone]]; Child - [[phone]]     Crisis Connection - [phone]     [facility]:   [facility] - [phone]   Walk-in  counseling [facility] - [phone]   Walk-in counseling [facility] - [phone]   Crisis Residence [facility] - [phone]   Urgent Care Adult Mental Health:   --Drop-in, 24/7 crisis line, and [facility] Mobile Team [[phone]]    CRISIS TEXT LINE: Text [phone] from anywhere, anytime, any crisis 24/7;    OR SEE www.crisistextline.org     Poison Control Center - [phone]    CHILD: [facility] needs assessment team - [phone]     [facility] Lifeline - [phone]; or [employer] [facility] - [phone]    If you have a medical emergency please call 911or go to the nearest ER.                    _____________________________________________    Again thank you for choosing PSYCHIATRY CLINIC and please let us know how we can best partner with you to improve you and your family's health.  You may be receiving a survey in the mail regarding this appointment. We would love to have your feedback, both positive and negative, so please fill out the survey and return it using the provided envelope. The survey is done by an external company, so your answers are anonymous.

## 2018-12-05 ENCOUNTER — ALLIED HEALTH/NURSE VISIT (OUTPATIENT)
Dept: PHARMACY | Facility: PHYSICIAN GROUP | Age: 58
End: 2018-12-05
Payer: MEDICAID

## 2018-12-05 DIAGNOSIS — E11.3393 TYPE 2 DIABETES MELLITUS WITH BOTH EYES AFFECTED BY MODERATE NONPROLIFERATIVE RETINOPATHY WITHOUT MACULAR EDEMA, WITHOUT LONG-TERM CURRENT USE OF INSULIN (H): Primary | ICD-10-CM

## 2018-12-05 PROCEDURE — 99606 MTMS BY PHARM EST 15 MIN: CPT | Performed by: PHARMACIST

## 2018-12-05 NOTE — MR AVS SNAPSHOT
After Visit Summary   12/5/2018    Caden Bush    MRN: [id number]           Patient Information     Date Of Birth          1960        Visit Information        Provider Department      12/5/2018 1:30 PM Aliyah Angeles [facility]        Today's Diagnoses     Type 2 diabetes mellitus with both eyes affected by moderate nonproliferative retinopathy without macular edema, without long-term current use of insulin (H)    -  1       Follow-ups after your visit        Your next 10 appointments already scheduled     Dec 26, 2018  1:30 PM CST   TELEMEDICINE with Aliyah BARRON Angeles   [facility] ([facility])    [address] [place] [id number] [phone]           Note: this is not an onsite visit; there is no need to come to the facility.            Jan 02, 2019 10:00 AM CST   [facility]    [address] [place] [address] [phone]            Feb 07, 2019 10:30 AM CST   Adult Med Follow UP with Natali Dang MD   Psychiatry Clinic ([facility])    [place] [id number] [address] [phone]              Who to contact     If you have questions or need follow up information about today's clinic visit or your schedule please contact [facility] directly at [phone].  Normal or non-critical lab and imaging results will be communicated to you by MyChart, letter or phone within 4 business days after the clinic has received the results. If you do not hear from us within 7 days, please contact the clinic through MyChart or phone. If you have a critical or abnormal lab result, we will notify you by phone as soon as possible.  Submit refill requests through [employer] or call your pharmacy and they will forward the refill request to us. Please allow  3 business days for your refill to be completed.          Additional Information About Your Visit        MyChart Information     [employer] gives you secure access to your electronic health record. If you see a primary care provider, you can also send messages to your care team and make appointments. If you have questions, please call your primary care clinic.  If you do not have a primary care provider, please call [phone] and they will assist you.        Care EveryWhere ID     This is your Care EveryWhere ID. This could be used by other organizations to access your [place] medical records  [id number]         Blood Pressure from Last 3 Encounters:   12/03/18 120/75   09/27/18 151/73   09/05/18 96/52    Weight from Last 3 Encounters:   12/03/18 144 lb (65.3 kg)   09/27/18 147 lb 3.2 oz (66.8 kg)   09/05/18 143 lb (64.9 kg)              Today, you had the following     No orders found for display       Primary Care Provider Office Phone # Fax #    Annie Jeniffer Hart [phone] [address] [place] [address]        Goals        General    Psychosocial (pt-stated)     Notes - Note created  11/7/2018  9:03 AM by Lidia Jose LGSW    I will move to a new apartment within six months, to be closer to family. (in the [facility] area). I understand that if I move I lose my section 8 voucher since its project based. My goal is to move to a subsidized/low income apartment.         Equal Access to Services     ERICA URIAS AH: Hadii ethan ku hadasho Solukeali, waaxda luqadaha, qaybta kaalmada adeegyada, marcia woods. So [facility] [phone].    ATENCIÓN: Si habla español, tiene a magaña disposición servicios gratuitos de asistencia lingüística. Llame al [phone].    We comply with applicable federal civil rights laws and [place] laws. We do not discriminate on the basis of race, color, national origin, age, disability, sex, sexual  orientation, or gender identity.            Thank you!     Thank you for choosing [facility]  for your care. Our goal is always to provide you with excellent care. Hearing back from our patients is one way we can continue to improve our services. Please take a few minutes to complete the written survey that you may receive in the mail after your visit with us. Thank you!             Your Updated Medication List - Protect others around you: Learn how to safely use, store and throw away your medicines at www.disposemymeds.org.          This list is accurate as of 12/5/18  2:07 PM.  Always use your most recent med list.                   Brand Name Dispense Instructions for use Diagnosis    ACE/ARB/ARNI NOT PRESCRIBED    INTENTIONAL     Please choose reason not prescribed, below- hypotension        acetaminophen 500 MG Caps      Take 2 capsules by mouth 3 times daily as needed        aspirin 81 MG EC tablet    ASA     Take 81 mg by mouth daily.        B-12 1000 MCG Caps      Take 1 capsule by mouth daily        blood glucose lancets standard    NO BRAND SPECIFIED    100 each    Use to test blood sugar 2 times daily or as directed.    Type 2 diabetes mellitus with both eyes affected by moderate nonproliferative retinopathy without macular edema, without long-term current use of insulin (H)       blood glucose monitoring meter device kit    NO BRAND SPECIFIED    1 kit    Use to test blood sugar 2 times daily or as directed.    Type 2 diabetes mellitus with both eyes affected by moderate nonproliferative retinopathy without macular edema, without long-term current use of insulin (H)       blood glucose monitoring test strip    NO BRAND SPECIFIED    100 strip    Use to test blood sugars 2 times daily or as directed    Type 2 diabetes mellitus with both eyes affected by moderate nonproliferative retinopathy without macular edema, without long-term current use of insulin (H)       COMPLETE NEEDLE COLLECTION  SYS Misc     1 each    1 each daily    Type 2 diabetes mellitus with both eyes affected by moderate nonproliferative retinopathy without macular edema, with long-term current use of insulin (H)       ferrous sulfate 325 (65 Fe) MG tablet    FEROSUL     Take 325 mg by mouth daily        FISH OIL      1 capsule daily        insulin glargine 100 UNIT/ML pen     15 mL    Inject 7 Units Subcutaneous At Bedtime We will make further adjustments as we see your response    Type 2 diabetes mellitus with both eyes affected by moderate nonproliferative retinopathy without macular edema, without long-term current use of insulin (H)       insulin pen needle 32G X 4 MM miscellaneous    BD BRENDAN U/F    100 each    Use 1 daily as directed.    Type 2 diabetes mellitus with both eyes affected by moderate nonproliferative retinopathy without macular edema, without long-term current use of insulin (H)       metFORMIN 500 MG 24 hr tablet    GLUCOPHAGE-XR    360 tablet    TAKE 2 TABLETS BY MOUTH TWICE DAILY    Type 2 diabetes mellitus with both eyes affected by moderate nonproliferative retinopathy without macular edema, without long-term current use of insulin (H)       pioglitazone 45 MG tablet    ACTOS    90 tablet    TAKE 1 TABLET BY MOUTH DAILY    Encounter for medication refill       risperiDONE 3 MG tablet    risperDAL    30 tablet    Take 1 tablet (3 mg) by mouth At Bedtime    Schizoaffective disorder, bipolar type (H)       simvastatin 40 MG tablet    ZOCOR    90 tablet    TAKE ONE TABLET BY MOUTH EVERY NIGHT AT BEDTIME    Encounter for medication refill       traZODone 50 MG tablet    DESYREL    30 tablet    Take 1 tablet (50 mg) by mouth nightly as needed for sleep    Schizoaffective disorder, bipolar type (H)       * venlafaxine 150 MG 24 hr capsule    EFFEXOR XR    30 capsule    Take 1 capsule (150 mg) by mouth daily Along with a 75 mg for a total of 225 mg daily dose.    Schizoaffective disorder, bipolar type (H)       *  venlafaxine 75 MG 24 hr capsule    EFFEXOR XR    30 capsule    Take 1 capsule (75 mg) by mouth daily Please take with Effexor 150 mg for a total dose of 225 mg daily    Schizoaffective disorder, bipolar type (H)       vitamin D3 1000 units (25 mcg) tablet    CHOLECALCIFEROL     Take 1 tablet by mouth daily.        * Notice:  This list has 2 medication(s) that are the same as other medications prescribed for you. Read the directions carefully, and ask your doctor or other care provider to review them with you.

## 2018-12-05 NOTE — PROGRESS NOTES
SUBJECTIVE/OBJECTIVE:                Caden Bush is a 58 year old male called for a follow-up visit for Medication Therapy Management.  He was referred to me from Dr. Hart.     Chief Complaint: Follow up from our visit on 11/21.      Tobacco: No tobacco use  Alcohol: not currently using    Medication Adherence/Access:  no issues reported    Diabetes:  Pt currently taking metformin ER 500mg- 2 BID, Pioglitazone 45mg daily and basaglar 7 units in the AM. Pt is not experiencing side effects. Met with CDE to discuss diet and this has been very helpful to him, continues to have low weight and not always eating enough.    Stopped Glipizide 5mg with the start of insulin per PCP and has not needed this to be restarted.   SMBG: one time daily, mostly in the evenings. Ranges (patient reported): newest to oldest   Newest to oldest (fasting,evening)  Date FBG Evening   12/5 85    12/4 74 165   12/3 143 236   12/2 116 124   12/1 82 224   11/30 126 230   11/29 101 250   11/28 104 106   11/27 81? 117   11/26 107 211   11/25 147 107   11/24  116   11/23 172 125   11/22 118 90     Recent symptoms of high blood sugar? None  Eye exam: up to date  Foot exam: up to date  Pt is not taking an ACEi/ARB was taken off lisinopril due to dizziness and has not been restarted.  Also has hx of lithium toxicity w/ Ace previously, but has been off lithium now since Jan 2018.  Aspirin: Taking 81mg daily and denies side effects  Lab Results   Component Value Date    A1C 6.7 07/17/2018    A1C 8.2 03/19/2018    A1C 6.8 09/15/2017    A1C 8.2 06/07/2017    A1C 6.6 12/13/2016       Today's Vitals: There were no vitals taken for this visit. - phone visit    ASSESSMENT:              Current medications were reviewed today as discussed above.      Medication Adherence: good, no issues identified    Diabetes: Stable. Patient is meeting A1c goal of < 7%. Self monitoring of blood glucose is not at goal of post prandial < 150 mg/dL. Pt would benefit from  watching diet choices as some post-prandial readings are above goal, while others are at goal. No medication changes suggested at this time.        PLAN:                  1. No medication changes at this time.    2. Pt to continue to work on diet choices     I spent 15 minutes with this patient today. All changes were made via collaborative practice agreement with Dr. Hart. A copy of the visit note was provided to the patient's primary care provider.     Will follow up in 3 weeks per pt request, scheduled with PCP in Jan.    The patient declined a summary of these recommendations as an after visit summary.    Aliyah Angeles, Pharm.D, [facility]  Medication Therapy Management Pharmacist  [phone]

## 2018-12-07 ENCOUNTER — PATIENT OUTREACH (OUTPATIENT)
Dept: CARE COORDINATION | Facility: CLINIC | Age: 58
End: 2018-12-07

## 2018-12-07 NOTE — PROGRESS NOTES
"Clinic Care Coordination Contact    OUTREACH    Referral Information: Patient would like to increase socialization, and potentially move to a different apartment setting. He is on a waiting list for a different apartment he is interested in.     Chief Complaint   Patient presents with     Clinic Care Coordination - Follow-up     psychosocial        Universal Utilization: appropriate     Utilization    Last refreshed: 12/6/2018  3:15 PM:  No Show Count (past year) 0       Last refreshed: 12/6/2018  3:15 PM:  ED visits 0       Last refreshed: 12/6/2018  3:15 PM:  Hospital admissions 0          Current as of: 12/6/2018  3:15 PM           Clinical Concerns:  - Arthritis is limiting but he keeps an active life style.  - No depression, or psychosis. Very mild social anxiety.   - Sleeps well (~10-12 hours) utilizes PRN trazodone weekly with optimal results.   - Keeps busy with errands  - Desires to socialize. Asks for groups      Psychosocial: Patient has information for [employer], a nonprofit that has two locations ([place] and [place]) that has a \"clubhouse\" for people with mental illness to socialize and support one another. Patient has interest and has contact info to reach out and tour/attend.     Patient/Caregiver understanding: Patient reports understanding and denies any other concerns. Utilized [employer] communication during encounter       Future Appointments              In 2 weeks Aliyah Angeles RPH [facility], [facility]    In 3 weeks Annie Hart MD [facility], [facility]    In 2 months Natali Powell MD [facility], [facility]          Plan: Patient is on medicare, out of scope for continued care coordination. Patient has resources to increase socialization ([facility], [employer], and [employer]).  Care Coordinator will plan to do no further outreach unless new referral is placed.    Lidia Pacheco, MSW, [facility]  Clinic " Care Coordinator  [email or web address]  [phone]

## 2018-12-26 ENCOUNTER — ALLIED HEALTH/NURSE VISIT (OUTPATIENT)
Dept: PHARMACY | Facility: PHYSICIAN GROUP | Age: 58
End: 2018-12-26
Payer: MEDICAID

## 2018-12-26 DIAGNOSIS — E11.3393 TYPE 2 DIABETES MELLITUS WITH BOTH EYES AFFECTED BY MODERATE NONPROLIFERATIVE RETINOPATHY WITHOUT MACULAR EDEMA, WITHOUT LONG-TERM CURRENT USE OF INSULIN (H): Primary | ICD-10-CM

## 2018-12-26 PROCEDURE — 99606 MTMS BY PHARM EST 15 MIN: CPT | Performed by: PHARMACIST

## 2018-12-26 NOTE — PROGRESS NOTES
SUBJECTIVE/OBJECTIVE:                Caden Bush is a 58 year old male called for a follow-up visit for Medication Therapy Management.  He was referred to me from Dr. Hart.     Chief Complaint: Follow up from our visit on 12/5.      Tobacco: No tobacco use  Alcohol: not currently using    Medication Adherence/Access:  no issues reported    Diabetes:  Pt currently taking metformin ER 500mg- 2 BID, Pioglitazone 45mg daily and basaglar 7 units in the AM. Pt is not experiencing side effects. Met with CDE to discuss diet and this has been very helpful to him, continues to have low weight and not always eating enough.    Stopped Glipizide 5mg with the start of insulin per PCP and has not needed this to be restarted.   SMBG: one time daily, mostly in the evenings. Ranges (patient reported): newest to oldest   Newest to oldest (fasting,evening):    Date FBG Evening   12/26 135    12/25 104 87   12/24 75 279   12/23 82 174   12/22 118 135   12/21 80 194   12/20 115 121   12/19 168 223   12/18 80 229   12/17 130 244   12/16 167 120   12/15 121 273   12/14 77    12/13 112 165     Lab Results   Component Value Date    A1C 6.7 07/17/2018    A1C 8.2 03/19/2018    A1C 6.8 09/15/2017    A1C 8.2 06/07/2017    A1C 6.6 12/13/2016     143lbs now    Today's Vitals: There were no vitals taken for this visit.- phone    ASSESSMENT:              Current medications were reviewed today as discussed above.      Medication Adherence: good, no issues identified    Diabetes: Stable. Patient is meeting A1c goal of < 7%. Due for Shingles vaccine.      PLAN:                  1. Shingrix recommended    2. No Changes in other medications at this time.    I spent 15 minutes with this patient today. All changes were made via collaborative practice agreement with Dr. Hart. A copy of the visit note was provided to the patient's primary care provider.     Will follow up in 4 weeks.    The patient declined a summary of these recommendations as an  after visit summary.    Aliyah Angeles, Pharm.D, Psychiatric  Medication Therapy Management Pharmacist  [phone]

## 2019-01-02 ENCOUNTER — OFFICE VISIT (OUTPATIENT)
Dept: FAMILY MEDICINE | Facility: CLINIC | Age: 59
End: 2019-01-02

## 2019-01-02 VITALS
HEIGHT: 72 IN | OXYGEN SATURATION: 98 % | BODY MASS INDEX: 18.53 KG/M2 | HEART RATE: 106 BPM | DIASTOLIC BLOOD PRESSURE: 68 MMHG | RESPIRATION RATE: 16 BRPM | WEIGHT: 136.8 LBS | SYSTOLIC BLOOD PRESSURE: 114 MMHG

## 2019-01-02 DIAGNOSIS — E11.3393 TYPE 2 DIABETES MELLITUS WITH BOTH EYES AFFECTED BY MODERATE NONPROLIFERATIVE RETINOPATHY WITHOUT MACULAR EDEMA, WITHOUT LONG-TERM CURRENT USE OF INSULIN (H): ICD-10-CM

## 2019-01-02 DIAGNOSIS — Z12.5 SCREENING FOR PROSTATE CANCER: ICD-10-CM

## 2019-01-02 DIAGNOSIS — D64.9 ANEMIA, UNSPECIFIED TYPE: ICD-10-CM

## 2019-01-02 DIAGNOSIS — D72.819 LEUKOPENIA, UNSPECIFIED TYPE: ICD-10-CM

## 2019-01-02 DIAGNOSIS — R00.0 TACHYCARDIA: ICD-10-CM

## 2019-01-02 DIAGNOSIS — M79.10 MUSCLE ACHE: ICD-10-CM

## 2019-01-02 DIAGNOSIS — E78.00 HYPERCHOLESTEREMIA: ICD-10-CM

## 2019-01-02 DIAGNOSIS — Z00.00 ROUTINE GENERAL MEDICAL EXAMINATION AT A HEALTH CARE FACILITY: Primary | ICD-10-CM

## 2019-01-02 DIAGNOSIS — R94.120 FAILED HEARING SCREENING: ICD-10-CM

## 2019-01-02 DIAGNOSIS — N39.45 CONTINUOUS LEAKAGE OF URINE: ICD-10-CM

## 2019-01-02 DIAGNOSIS — R63.4 WEIGHT LOSS, UNINTENTIONAL: ICD-10-CM

## 2019-01-02 LAB
% GRANULOCYTES: 80.3 % (ref 42.2–75.2)
A/C RATIO MG/G: ABNORMAL MG/G
ALBUMIN (URINE) MG/L: 10 MG/L
BACTERIA URINE: 0
BILIRUB UR QL STRIP: 0
BLOOD URINE DIP: 0
CASTS/LPF: 0
COLOR UR: YELLOW
CRYSTAL URINE: 0
EPITHELIAL CELLS - QUEST: NORMAL
GLUCOSE UR STRIP-MCNC: 0 MG/DL
HCT VFR BLD AUTO: 39 % (ref 39–51)
HEMOGLOBIN: 12.9 G/DL (ref 13.4–17.5)
INTERPRETATION: ABNORMAL
KETONES UR QL STRIP: NORMAL
LEUKOCYTE ESTERASE URINE DIP: 0
LYMPHOCYTES NFR BLD AUTO: 13.6 % (ref 20.5–51.1)
MCH RBC QN AUTO: 29.9 PG (ref 27–31)
MCHC RBC AUTO-ENTMCNC: 33.2 G/DL (ref 33–37)
MCV RBC AUTO: 89.9 FL (ref 80–100)
MONOCYTES NFR BLD AUTO: 6.1 % (ref 1.7–9.3)
MUCOUS URINE: 0
NITRITE UR QL STRIP: NORMAL
PH UR STRIP: 5.5 PH (ref 5–9)
PLATELET # BLD AUTO: 149 K/UL (ref 140–450)
PROT UR QL: 0 MG/DL (ref ?–0.01)
RBC # BLD AUTO: 4.33 X10/CMM (ref 4.2–5.9)
RBC URINE: NORMAL (ref 0–3)
SP GR UR STRIP: 1 (ref 1–1.02)
URINE CREATININE MG/DL - QUEST: 50 MG/DL
UROBILINOGEN UR QL STRIP: 0.2 EU/DL (ref 0.2–1)
WBC # BLD AUTO: 4.2 X10/CMM (ref 3.8–11)
WBC URINE: NORMAL (ref 0–3)

## 2019-01-02 PROCEDURE — 99215 OFFICE O/P EST HI 40 MIN: CPT | Mod: 25 | Performed by: FAMILY MEDICINE

## 2019-01-02 PROCEDURE — 93000 ELECTROCARDIOGRAM COMPLETE: CPT | Performed by: FAMILY MEDICINE

## 2019-01-02 PROCEDURE — 36415 COLL VENOUS BLD VENIPUNCTURE: CPT | Performed by: FAMILY MEDICINE

## 2019-01-02 PROCEDURE — 82570 ASSAY OF URINE CREATININE: CPT | Mod: 90 | Performed by: FAMILY MEDICINE

## 2019-01-02 PROCEDURE — 82044 UR ALBUMIN SEMIQUANTITATIVE: CPT | Performed by: FAMILY MEDICINE

## 2019-01-02 PROCEDURE — 85025 COMPLETE CBC W/AUTO DIFF WBC: CPT | Performed by: FAMILY MEDICINE

## 2019-01-02 PROCEDURE — 81003 URINALYSIS AUTO W/O SCOPE: CPT | Performed by: FAMILY MEDICINE

## 2019-01-02 PROCEDURE — G0439 PPPS, SUBSEQ VISIT: HCPCS | Performed by: FAMILY MEDICINE

## 2019-01-02 ASSESSMENT — ANXIETY QUESTIONNAIRES
GAD7 TOTAL SCORE: 5
1. FEELING NERVOUS, ANXIOUS, OR ON EDGE: SEVERAL DAYS
5. BEING SO RESTLESS THAT IT IS HARD TO SIT STILL: NOT AT ALL
6. BECOMING EASILY ANNOYED OR IRRITABLE: SEVERAL DAYS
3. WORRYING TOO MUCH ABOUT DIFFERENT THINGS: SEVERAL DAYS
2. NOT BEING ABLE TO STOP OR CONTROL WORRYING: SEVERAL DAYS
7. FEELING AFRAID AS IF SOMETHING AWFUL MIGHT HAPPEN: NOT AT ALL

## 2019-01-02 ASSESSMENT — MIFFLIN-ST. JEOR: SCORE: 1469.55

## 2019-01-02 ASSESSMENT — PATIENT HEALTH QUESTIONNAIRE - PHQ9
SUM OF ALL RESPONSES TO PHQ QUESTIONS 1-9: 9
5. POOR APPETITE OR OVEREATING: SEVERAL DAYS

## 2019-01-02 NOTE — PROGRESS NOTES
SUBJECTIVE:   CC: Caden Bush is an 58 year old male who presents for preventive health visit.     CONCERNS: Weight loss, unexplained. He has a a decent appetite commonly, mostly snacking all day long. His diabetes numbers have been stable. He was noted to have low WBC count and low Hgb/Hct in September with repeat low counts and low platelets in October as well. He had colonoscopy in 2015 which was normal. He previously had a baseline weight between 160-170, now weighing in at 136# today. He has been having lots of muscle aches in his legs lately. No hair loss. No enlarged glands. No fevers.    CHRONIC ISSUES TO ADDRESS:  1. DM: taking Actos, metformin, and Basaglar 7 units daily. He has had some readings in the 60s-70s, mildly symptomatic. His AM fastings are typically 100-130s range.  2. Hypercholesterolemia: tolerating simvastatin. Leg muscle aches are new, uncertain if this is due to the statin.  3. Urinary incontinence: No trouble starting or maintaining stream of urine, but he is having issue with constant dribbling of urine.    Healthy Habits:    Do you get at least three servings of calcium containing foods daily (dairy, green leafy vegetables, etc.)? no, taking calcium and/or vitamin D supplement: vitamins    Amount of exercise or daily activities, outside of work: 3 day(s) per week    Problems taking medications regularly No    Medication side effects: Yes Dry mouth    Have you had an eye exam in the past two years? yes    Do you see a dentist twice per year? no    Do you have sleep apnea, excessive snoring or daytime drowsiness?no      Right Ear:    500 Hz- pass    1000 Hz- pass    2000 Hz- pass    4000 Hz- pass  Left Ear:     500 Hz- fail    1000 Hz- fail    2000 Hz-pass    4000 Hz- pass    Audiology referral or check for cerumen impaction  Lidia Minaya CMA  January 2, 2019      Today's PHQ-2 Score:   PHQ-2 ( 1999 Pfizer) 1/2/2019 12/27/2017   Q1: Little interest or pleasure in doing things 1 0    Q2: Feeling down, depressed or hopeless 1 0   PHQ-2 Score 2 0       Abuse: Current or Past(Physical, Sexual or Emotional)- No  Do you feel safe in your environment? Yes    Social History     Tobacco Use     Smoking status: Never Smoker     Smokeless tobacco: Never Used   Substance Use Topics     Alcohol use: No     Alcohol/week: 0.0 oz     If you drink alcohol do you typically have >3 drinks per day or >7 drinks per week? Not Applicable                      Last PSA: No results found for: PSA    Reviewed orders with patient. Reviewed health maintenance and updated orders accordingly - Yes  Labs reviewed in EPIC  BP Readings from Last 3 Encounters:   01/02/19 114/68   12/03/18 120/75   09/27/18 151/73    Wt Readings from Last 3 Encounters:   01/02/19 62.1 kg (136 lb 12.8 oz)   12/03/18 65.3 kg (144 lb)   09/27/18 66.8 kg (147 lb 3.2 oz)                  Patient Active Problem List   Diagnosis     Eczema     Retinopathy     Moderate episode of recurrent major depressive disorder (H)     Polyp of colon, adenomatous     Health Care Home     Hx of psychiatric care     Neuropathy of left upper extremity     Type 2 diabetes mellitus with both eyes affected by moderate nonproliferative retinopathy without macular edema, without long-term current use of insulin (H)     Schizoaffective disorder, bipolar type (H)     Past Surgical History:   Procedure Laterality Date     APPENDECTOMY       COLONOSCOPY  2012    2 adenomatous polyps       Social History     Tobacco Use     Smoking status: Never Smoker     Smokeless tobacco: Never Used   Substance Use Topics     Alcohol use: No     Alcohol/week: 0.0 oz     Family History   Problem Relation Age of Onset     Osteoporosis Mother      Osteoporosis Father      Asthma Father      Cancer - colorectal Father 70     Breast Cancer Mother 78     Rheumatoid Arthritis Sister          Current Outpatient Medications   Medication Sig Dispense Refill     ACE/ARB/ARNI NOT PRESCRIBED,  INTENTIONAL, Please choose reason not prescribed, below-  hypotension       acetaminophen 500 MG CAPS Take 2 capsules by mouth 3 times daily as needed       aspirin 81 MG EC tablet Take 81 mg by mouth daily.       BASAGLAR 100 UNIT/ML injection Inject 7 Units Subcutaneous At Bedtime We will make further adjustments as we see your response 15 mL 1     blood glucose (NO BRAND SPECIFIED) lancets standard Use to test blood sugar 2 times daily or as directed. 100 each 11     blood glucose monitoring (NO BRAND SPECIFIED) meter device kit Use to test blood sugar 2 times daily or as directed. 1 kit 0     blood glucose monitoring (NO BRAND SPECIFIED) test strip Use to test blood sugars 2 times daily or as directed 100 strip 3     cholecalciferol (VITAMIN D) 1000 UNIT tablet Take 1 tablet by mouth daily.       Cyanocobalamin (B-12) 1000 MCG CAPS Take 1 capsule by mouth daily       ferrous sulfate (IRON) 325 (65 Fe) MG tablet Take 325 mg by mouth daily       FISH OIL 1 capsule daily        insulin pen needle (BD BRENDAN U/F) 32G X 4 MM Use 1 daily as directed. 100 each 11     metFORMIN (GLUCOPHAGE-XR) 500 MG 24 hr tablet TAKE 2 TABLETS BY MOUTH TWICE DAILY 360 tablet 1     pioglitazone (ACTOS) 45 MG tablet TAKE 1 TABLET BY MOUTH DAILY 90 tablet 0     risperiDONE (RISPERDAL) 3 MG tablet Take 1 tablet (3 mg) by mouth At Bedtime 30 tablet 0     Sharps Container (COMPLETE NEEDLE COLLECTION SYS) MISC 1 each daily 1 each 1     simvastatin (ZOCOR) 40 MG tablet TAKE ONE TABLET BY MOUTH EVERY NIGHT AT BEDTIME 90 tablet 3     traZODone (DESYREL) 50 MG tablet Take 1 tablet (50 mg) by mouth nightly as needed for sleep 30 tablet 2     venlafaxine (EFFEXOR XR) 150 MG 24 hr capsule Take 1 capsule (150 mg) by mouth daily Along with a 75 mg for a total of 225 mg daily dose. 30 capsule 0     venlafaxine (EFFEXOR XR) 75 MG 24 hr capsule Take 1 capsule (75 mg) by mouth daily Please take with Effexor 150 mg for a total dose of 225 mg daily 30 capsule  0     No Known Allergies  Recent Labs   Lab Test 09/05/18  1100 07/17/18  1126 04/17/18  1356 03/19/18  1116 01/30/18  0923 09/15/17  0956 07/11/17  1034 06/07/17  1004  06/09/16  1030   A1C  --  6.7*  --  8.2*  --  6.8*  --  8.2*   < > 7.3*   LDL  --   --  45  --   --   --  40 46  --  45   HDL  --   --  85  --   --   --  76 76  --  69   TRIG  --   --  45  --   --   --  49 105  --  55   ALT  --   --  12  --  16  --  12  --    < > 27   CR 1.11  --  1.10  --  1.20  --  1.17  --    < > 1.26*   GFRESTIMATED  --   --   --   --  62  --   --   --   --  59*   GFRESTBLACK  --   --   --   --  75  --   --   --   --  71   POTASSIUM 4.8  --  4.7  --  4.5  --  4.9  --    < > 4.9   TSH  --   --   --   --  0.72  --   --   --   --  1.70    < > = values in this interval not displayed.        Reviewed and updated as needed this visit by clinical staff  Tobacco  Allergies  Meds         Reviewed and updated as needed this visit by Provider  Allergies        Past Medical History:   Diagnosis Date     DM (diabetes mellitus) (H)      Eczema 2011    biopsy leg      Polyp of colon, adenomatous 6/25/2012     Retinopathy     HFA ophtho DR HARLEY     Schizo-affective type schizophrenia      Vitamin D deficiency     replaced      Past Surgical History:   Procedure Laterality Date     APPENDECTOMY       COLONOSCOPY  2012    2 adenomatous polyps       ROS:  CONSTITUTIONAL: NEGATIVE for fever, chills, POSITIVE for change in weight as noted above  INTEGUMENTARY/SKIN: NEGATIVE for worrisome rashes, moles or lesions  EYES: NEGATIVE for vision changes or irritation  ENT: NEGATIVE for ear, mouth and throat problems  RESP: NEGATIVE for significant cough or SOB  CV: NEGATIVE for chest pain, palpitations or peripheral edema  GI: NEGATIVE for nausea, abdominal pain, heartburn, or change in bowel habits   male: negative for dysuria, hematuria, decreased urinary stream, erectile dysfunction, urethral discharge POSITIVE for continuous incontinence as  "noted above  MUSCULOSKELETAL: NEGATIVE for significant arthralgias or myalgia except for thighs as noted above.  NEURO: NEGATIVE for weakness, dizziness or paresthesias  PSYCHIATRIC: NEGATIVE for changes in mood or affect. POSITIVE for Schizoaffective disorder, followed by psychiatrist.    OBJECTIVE:   /68   Pulse 106   Resp 16   Ht 1.822 m (5' 11.75\")   Wt 62.1 kg (136 lb 12.8 oz)   SpO2 98%   BMI 18.68 kg/m    EXAM:  GENERAL: healthy, alert and no distress  EYES: PERRL, EOMI and globes are mildly protuberant bilaterally  HENT: ear canals and TM's normal, nose and mouth without ulcers or lesions  NECK: no adenopathy, no asymmetry, masses, or scars and thyroid normal to palpation  RESP: lungs clear to auscultation - no rales, rhonchi or wheezes  CV: variable rate, initially tachycardic, then normal, then back to tachy, rhythm is normal with normal S1 S2, no S3 or S4, no murmur, click or rub, no peripheral edema and peripheral pulses strong  ABDOMEN: soft, nontender, no hepatosplenomegaly, no masses and bowel sounds normal   (male): no hernias and penis normal without urethral discharge, testes are small bilaterally without any masses  RECTAL: normal sphincter tone, no rectal masses and prostate broadly enlarged without any nodules, moderately firm, feels symmetric, nontender  MS: no gross musculoskeletal defects noted, no edema  SKIN: no suspicious lesions or rashes  NEURO: Normal strength and tone, mentation intact and speech normal  PSYCH: affect flat  LYMPH: no cervical, supraclavicular, axillary, or inguinal adenopathy    Diagnostic Test Results:  Urinalysis - negative  EKG - sinus tachycardia with rate of 103 bpm, no ischemic changes    ASSESSMENT/PLAN:   Caden was seen today for physical and hearing screening.    Diagnoses and all orders for this visit:    Routine general medical examination at a health care facility  Caden is a healthy appearing 58 year old male with multiple medical issues " needing investigation. Testicular exam is normal, prostate exam is abnormal (see below), UTD on colon cancer screening, encouraged healthy diet and regular exercise.    Type 2 diabetes mellitus with both eyes affected by moderate nonproliferative retinopathy without macular edema, with long-term current use of insulin (H)  -     Comp. Metabolic Panel (14) ([facility])  -     Hemoglobin A1C ([facility])  -     Microalbumin ([facility])  It would be helpful to review his finger stick log. We will await A1c result to decide of medication changes are needed. I'm concerned that with his weight loss his Lantus dose may be too high. Discussed decreasing dose if getting low readings. He is in close contact with Mahogany Ly.    Muscle ache  -     Creatine Kinase Total Serum ([facility])  Etiology uncertain, evaluate for muscle damage given statin therapy.    Weight loss, unintentional, etiology unknown  -     Comp. Metabolic Panel (14) ([facility])  -     TSH ([facility]), Free T4, T3,   Need to rule out thyroid disease first, but if normal, need to pursue full evaluation for possible malignancy which may include CT chest/abdomen, evaluation of prostate, further hematology work up.    Hypercholesteremia  -     Comp. Metabolic Panel (14) ([facility])  -     Lipid Panel ([facility])  Goal of LDL is <100, verify with fasting lipid panel today.    Leukopenia, unspecified type, etiology uncertain  -     Hematopath Consultation, Smear ([facility])  Possibly due to Psychiatric medications (risperidone), but given his unexplained weight loss, evaluation of peripheral smear is certainly warranted.    Anemia, unspecified type  -     CBC with Diff/Plt ([facility])  -     Hematopath Consultation, Smear ([facility])  Anemia has improved since he has been taking ferrous sulfate, etiology is unknown.     Screening for prostate cancer  -     PSA Serum ([facility])    Continuous leakage of urine  -     Urinalysis w/reflex protein, bili ([facility])  -     Urine Culture  Routine  "([facility])  Referral is given for Caden to be evaluated by Urology for both leakage and enlarged prostate. PSA is pending.    Tachycardia  -     EKG 12-lead complete w/read - Clinics  Confirms tachycardia but no other abnormalities noted.    Failed hearing screening  -     Referral to Audiology - [facility]        COUNSELING:  Reviewed preventive health counseling, as reflected in patient instructions       Regular exercise       Healthy diet/nutrition       Vision screening       Hearing screening       Colon cancer screening       Prostate cancer screening    BP Readings from Last 1 Encounters:   01/02/19 114/68     Estimated body mass index is 18.68 kg/m  as calculated from the following:    Height as of this encounter: 1.822 m (5' 11.75\").    Weight as of this encounter: 62.1 kg (136 lb 12.8 oz).      Weight management plan undergoing work up as to cause     reports that  has never smoked. he has never used smokeless tobacco.      Counseling Resources:  ATP IV Guidelines  Pooled Cohorts Equation Calculator  FRAX Risk Assessment  ICSI Preventive Guidelines  Dietary Guidelines for Americans, 2010  USDA's MyPlate  ASA Prophylaxis  Lung CA Screening    Annie Hart MD  [facility]  "

## 2019-01-02 NOTE — PATIENT INSTRUCTIONS
Preventive Health Recommendations  Male Ages 50 - 64    Yearly exam:             See your health care provider every year in order to  o   Review health changes.   o   Discuss preventive care.    o   Review your medicines if your doctor has prescribed any.     Have a cholesterol test every 5 years, or more frequently if you are at risk for high cholesterol/heart disease.     Have a diabetes test (fasting glucose) every three years. If you are at risk for diabetes, you should have this test more often.     Have a colonoscopy at age 50, or have a yearly FIT test (stool test). These exams will check for colon cancer.      Talk with your health care provider about whether or not a prostate cancer screening test (PSA) is right for you.    You should be tested each year for STDs (sexually transmitted diseases), if you re at risk.     Shots: Get a flu shot each year. Get a tetanus shot every 10 years.     Nutrition:    Eat at least 5 servings of fruits and vegetables daily.     Eat whole-grain bread, whole-wheat pasta and brown rice instead of white grains and rice.     Get adequate Calcium and Vitamin D.     Lifestyle    Exercise for at least 150 minutes a week (30 minutes a day, 5 days a week). This will help you control your weight and prevent disease.     Limit alcohol to one drink per day.     No smoking.     Wear sunscreen to prevent skin cancer.     See your dentist every six months for an exam and cleaning.     See your eye doctor every 1 to 2 years.    Preventive Health Recommendations:     See your health care provider every year to    Review health changes.     Discuss preventive care.      Review your medicines if your doctor has prescribed any.    Talk with your health care provider about whether you should have a test to screen for prostate cancer (PSA).    Every 3 years, have a diabetes test (fasting glucose). If you are at risk for diabetes, you should have this test more often.    Every 5 years, have a  cholesterol test. Have this test more often if you are at risk for high cholesterol or heart disease.     Every 10 years, have a colonoscopy. Or, have a yearly FIT test (stool test). These exams will check for colon cancer.    Talk to with your health care provider about screening for Abdominal Aortic Aneurysm if you have a family history of AAA or have a history of smoking.  Shots:     Get a flu shot each year.     Get a tetanus shot every 10 years.     Talk to your doctor about your pneumonia vaccines. There are now two you should receive - Pneumovax (PPSV 23) and Prevnar (PCV 13).    Talk to your pharmacist about a shingles vaccine.     Talk to your doctor about the hepatitis B vaccine.  Nutrition:     Eat at least 5 servings of fruits and vegetables each day.     Eat whole-grain bread, whole-wheat pasta and brown rice instead of white grains and rice.     Get adequate Calcium and Vitamin D.   Lifestyle    Exercise for at least 150 minutes a week (30 minutes a day, 5 days a week). This will help you control your weight and prevent disease.     Limit alcohol to one drink per day.     No smoking.     Wear sunscreen to prevent skin cancer.     See your dentist every six months for an exam and cleaning.     See your eye doctor every 1 to 2 years to screen for conditions such as glaucoma, macular degeneration and cataracts.    Personalized Prevention Plan  You are due for the preventive services outlined below.  Your care team is available to assist you in scheduling these services.  If you have already completed any of these items, please share that information with your care team to update in your medical record.    Health Maintenance Due   Topic Date Due     HIV SCREEN (SYSTEM ASSIGNED)  04/10/1977     Zoster (Chicken Pox) Vaccine (1 of 2) 04/10/2009     Medicare Annual Wellness Visit  12/27/2018     NEDRA QUESTIONNAIRE 1 YEAR  12/27/2018     A1C (Diabetes) Lab - every 6 months  01/17/2019

## 2019-01-03 LAB
ALBUMIN SERPL-MCNC: 4.6 G/DL (ref 3.5–5.5)
ALBUMIN/GLOB SERPL: 3.1 {RATIO} (ref 1.2–2.2)
ALP SERPL-CCNC: 74 IU/L (ref 39–117)
ALT SERPL-CCNC: 13 IU/L (ref 0–44)
AST SERPL-CCNC: 14 IU/L (ref 0–40)
BILIRUB SERPL-MCNC: 0.2 MG/DL (ref 0–1.2)
BUN SERPL-MCNC: 21 MG/DL (ref 6–24)
BUN/CREATININE RATIO: 20 (ref 9–20)
CALCIUM SERPL-MCNC: 9.8 MG/DL (ref 8.7–10.2)
CHLORIDE SERPLBLD-SCNC: 104 MMOL/L (ref 96–106)
CHOLEST SERPL-MCNC: 127 MG/DL (ref 100–199)
CK SERPL-CCNC: 64 U/L (ref 24–204)
CREAT SERPL-MCNC: 1.06 MG/DL (ref 0.76–1.27)
EGFR IF AFRICN AM: 88 ML/MIN/1.73
EGFR IF NONAFRICN AM: 76 ML/MIN/1.73
GLOBULIN, TOTAL: 1.5 G/DL (ref 1.5–4.5)
GLUCOSE SERPL-MCNC: 147 MG/DL (ref 65–99)
HBA1C MFR BLD: 6.7 % (ref 4.8–5.6)
HDLC SERPL-MCNC: 84 MG/DL
LDL/HDL RATIO: 0.4 RATIO (ref 0–3.6)
LDLC SERPL CALC-MCNC: 33 MG/DL (ref 0–99)
POTASSIUM SERPL-SCNC: 5.2 MMOL/L (ref 3.5–5.2)
PROT SERPL-MCNC: 6.1 G/DL (ref 6–8.5)
PSA NG/ML: 1.6 NG/ML (ref 0–4)
SODIUM SERPL-SCNC: 143 MMOL/L (ref 134–144)
TOTAL CO2: 25 MMOL/L (ref 20–29)
TRIGL SERPL-MCNC: 51 MG/DL (ref 0–149)
TSH BLD-ACNC: 1.76 UIU/ML (ref 0.45–4.5)
VLDLC SERPL CALC-MCNC: 10 MG/DL (ref 5–40)

## 2019-01-03 ASSESSMENT — ANXIETY QUESTIONNAIRES: GAD7 TOTAL SCORE: 5

## 2019-01-04 ENCOUNTER — TELEPHONE (OUTPATIENT)
Dept: FAMILY MEDICINE | Facility: CLINIC | Age: 59
End: 2019-01-04

## 2019-01-04 DIAGNOSIS — N40.0 ENLARGED PROSTATE ON RECTAL EXAMINATION: ICD-10-CM

## 2019-01-04 DIAGNOSIS — R63.4 WEIGHT LOSS, UNINTENTIONAL: Primary | ICD-10-CM

## 2019-01-04 LAB
BASOPHILS # BLD AUTO: 0 X10E3/UL (ref 0–0.2)
BASOPHILS NFR BLD AUTO: 0 %
EOSINOPHIL # BLD AUTO: 0 X10E3/UL (ref 0–0.4)
EOSINOPHIL NFR BLD AUTO: 1 %
ERYTHROCYTE [DISTWIDTH] IN BLOOD BY AUTOMATED COUNT: 13.8 % (ref 12.3–15.4)
HCT VFR BLD AUTO: 39 % (ref 37.5–51)
HEMOGLOBIN: 13.1 G/DL (ref 13–17.7)
IMMATURE GRANS (ABS): 0 X10E3/UL (ref 0–0.1)
IMMATURE GRANULOCYTES: 0 %
LYMPHOCYTES # BLD AUTO: 0.5 X10E3/UL (ref 0.7–3.1)
LYMPHOCYTES NFR BLD AUTO: 12 %
Lab: NO GROWTH
MCH RBC QN AUTO: 30.8 PG (ref 26.6–33)
MCHC RBC AUTO-ENTMCNC: 33.6 G/DL (ref 31.5–35.7)
MCV RBC AUTO: 92 FL (ref 79–97)
MONOCYTES # BLD AUTO: 0.2 X10E3/UL (ref 0.1–0.9)
MONOCYTES NFR BLD AUTO: 5 %
NEUTROPHILS # BLD AUTO: 3.3 X10E3/UL (ref 1.4–7)
NEUTROPHILS NFR BLD AUTO: 82 %
PATHOLOGIST: ABNORMAL
PLATELETS: 172 X10E3/UL (ref 150–379)
PLTS: ABNORMAL
RBC # BLD AUTO: 4.25 X10E6/UL (ref 4.14–5.8)
RBC CHRM BLD SMEAR: ABNORMAL
T3 SERPL-MCNC: 99 NG/DL (ref 71–180)
T4 FREE SERPL-MCNC: 1.47 NG/DL (ref 0.82–1.77)
URINE CULTURE: NORMAL
WBC # BLD AUTO: 4.1 X10E3/UL (ref 3.4–10.8)
WBC # BLD AUTO: ABNORMAL 10*3/UL

## 2019-01-04 NOTE — LETTER
[facility] [address] [place] [address] [phone]      January 4, 2019      Caden Bush  [address]  [facility] [address]    Caden -- as we discussed on the phone today, here is Dr. Hart's result note and recommendation:    Blood counts are improved with use of iron, continue taking this for another 2 months.   Elevated glucose, but otherwise normal electrictrolytes, kidney function, and liver function.   Thyroid tests are normal, therefore making thyroid not a factor in your weight loss.   Cholesterol is excellent.   PSA (prostate) is normal and stable over time.   Creatine kinase is normal indicating that muscle aches are not likely due to any significant effect of your cholesterol medication.     Your significant weight loss still warrants further evaluation. Please set up CXR 2 view and CT abdomen/pelvis at [facility]. We have sent the referral to [facility] and they should have called you by the time you get this letter.     Dr. Hart

## 2019-01-04 NOTE — TELEPHONE ENCOUNTER
----- Message from Annie Hart MD sent at 1/3/2019 10:34 PM CST -----  Blood counts are improved with use of iron, continue taking this for another 2 months.  Elevated glucose, but otherwise normal electrictrolytes, kidney function, and liver function.  Thyroid tests are normal, therefore making thyroid not a factor in your weight loss.  Cholesterol is excellent.  PSA (prostate) is normal and stable over time.  Creatine kinase is normal indicating that muscle aches are not likely due to any significant effect of your cholesterol medication.    Your significant weight loss still warrants further evaluation. Please set up CXR 2 view and CT abdomen/pelvis at [facility]. He had a large prostate on exam which along with unintentional weight loss is the diagnosis for the CT. Weight loss alone is reason for CXR.    AB

## 2019-01-04 NOTE — TELEPHONE ENCOUNTER
Called patient with results and Dr. Hart's recommendations. Patient agrees to plan. Referral for CXR and CT abd/pelvis faxed to [facility] to call patient and schedule procedures.   Mailed result letter to patient with Dr. Hart's note.   Josseline Mason RN

## 2019-01-10 ENCOUNTER — HOSPITAL ENCOUNTER (EMERGENCY)
Facility: CLINIC | Age: 59
Discharge: HOME | End: 2019-01-10
Attending: EMERGENCY MEDICINE | Admitting: EMERGENCY MEDICINE
Payer: MEDICARE

## 2019-01-10 VITALS
TEMPERATURE: 98.6 F | OXYGEN SATURATION: 99 % | SYSTOLIC BLOOD PRESSURE: 144 MMHG | BODY MASS INDEX: 19.04 KG/M2 | WEIGHT: 136 LBS | HEIGHT: 71 IN | HEART RATE: 86 BPM | DIASTOLIC BLOOD PRESSURE: 84 MMHG | RESPIRATION RATE: 15 BRPM

## 2019-01-10 DIAGNOSIS — F32.A DEPRESSION, UNSPECIFIED DEPRESSION TYPE: ICD-10-CM

## 2019-01-10 DIAGNOSIS — F25.1 SCHIZOAFFECTIVE DISORDER, DEPRESSIVE TYPE (H): ICD-10-CM

## 2019-01-10 LAB — GLUCOSE BLDC GLUCOMTR-MCNC: 127 MG/DL (ref 70–99)

## 2019-01-10 PROCEDURE — 00000146 ZZHCL STATISTIC GLUCOSE BY METER IP

## 2019-01-10 PROCEDURE — 99283 EMERGENCY DEPT VISIT LOW MDM: CPT

## 2019-01-10 ASSESSMENT — ENCOUNTER SYMPTOMS
ABDOMINAL PAIN: 0
HEADACHES: 0
DYSPHORIC MOOD: 1
DIARRHEA: 0

## 2019-01-10 ASSESSMENT — MIFFLIN-ST. JEOR: SCORE: 1454.02

## 2019-01-10 NOTE — ED AVS SNAPSHOT
Emergency Department  [address] [place] [address]  Phone:  [phone]  Fax:  [phone]                                    Caden Bush   MRN: [id number]    Department:   Emergency Department   Date of Visit:  1/10/2019           After Visit Summary Signature Page    I have received my discharge instructions, and my questions have been answered. I have discussed any challenges I see with this plan with the nurse or doctor.    ..........................................................................................................................................  Patient/Patient Representative Signature      ..........................................................................................................................................  Patient Representative Print Name and Relationship to Patient    ..................................................               ................................................  Date                                   Time    ..........................................................................................................................................  Reviewed by Signature/Title    ...................................................              ..............................................  Date                                               Time          22EPIC Rev 08/18

## 2019-01-11 NOTE — ED PROVIDER NOTES
"  History     Chief Complaint:  Depression     HPI   Caden Bush is a 58 year old male with a medical history of schizoaffective disorder and depression who presents with depression. The patient reports he contacted his niece today but was disappointed because she told him that she is leaving town and does not have time to see him before she leaves. He states he began feeling overwhelmed after this. Patient states a part of him does not want to initiate contact with people, and is upset that no one invites him over for coffee or check up on him. The patient currently lives at [facility], and states he's always by himself, \"I can't have anyone with me,\" and \"I'm scared to go out alone.\" Patient does not do any activities and states he only stays in bed. The patient does have a psychiatrist and a therapist. He also notes that he has lost a lot of weight over the last couple of months. He was 147 in September and is now 136. He has been worked up by his primary care doctor and has a CT scan scheduled. He denies headaches, chest pain, abdominal pain, diarrhea, suicidal ideation, homicidal ideation, or hallucinations. Denies alcohol, tobacco, or illicit drug use.    Allergies:  No known drug allergies     Medications:    ACE/ARB/ARNI NOT PRESCRIBED, INTENTIONAL,  aspirin 81 MG EC tablet  BASAGLAR 100 UNIT/ML injection  cholecalciferol (VITAMIN D) 1000 UNIT tablet  Cyanocobalamin (B-12) 1000 MCG CAPS  ferrous sulfate (IRON) 325 (65 Fe) MG tablet  FISH OIL  insulin pen needle (BD BRENDAN U/F) 32G X 4 MM  metFORMIN (GLUCOPHAGE-XR) 500 MG 24 hr tablet  pioglitazone (ACTOS) 45 MG tablet  risperiDONE (RISPERDAL) 3 MG tablet  simvastatin (ZOCOR) 40 MG tablet  traZODone (DESYREL) 50 MG tablet  venlafaxine     Past Medical History:    Diabetes  Polyp of colon  Retinopathy  Schizo-affective disorder  Depression  Neuropathy    Past Surgical History:    Appendectomy    Family History:    Osteoporosis  Breast " "cancer  Asthma  Colorectal cancer  Rheumatoid arthritis    Social History:  Smoking status: Never smoker  Alcohol use: No   Marital Status:  Single [1]  PCP: Annie Hart      Review of Systems   Cardiovascular: Negative for chest pain.   Gastrointestinal: Negative for abdominal pain and diarrhea.   Neurological: Negative for headaches.   Psychiatric/Behavioral: Positive for dysphoric mood. Negative for self-injury and suicidal ideas.   All other systems reviewed and are negative.    Physical Exam   Patient Vitals for the past 24 hrs:   BP Temp Temp src Pulse Resp SpO2 Height Weight   01/10/19 2115 144/84 98.6  F (37  C) Oral 86 15 99 % 1.803 m (5' 11\") 61.7 kg (136 lb)      Physical Exam  Constitutional: Middle aged white male, disheveled, sitting on the bed.   HENT: No signs of trauma.   Eyes: EOM are normal. Pupils are equal, round, and reactive to light.   Neck: Normal range of motion. No JVD present. No cervical adenopathy.  Cardiovascular: Regular rhythm.  Exam reveals no gallop and no friction rub.    No murmur heard.  Pulmonary/Chest: Bilateral breath sounds normal. No wheezes, rhonchi or rales.  Abdominal: Soft. No tenderness. No rebound or guarding.   Musculoskeletal: No edema. No tenderness.   Lymphadenopathy: No lymphadenopathy.   Neurological: Alert and oriented to person, place, and time. Normal strength. Coordination normal.   Skin: Skin is warm and dry. No rash noted. No erythema.    Psychiatric: Flat affect. Appears depressed. Denies suicidal thought. No acute psychosis.     Emergency Department Course   Laboratory:  Glucose by meter: 127    Emergency Department Course:  Past medical records, nursing notes, and vitals reviewed.  2115: I performed an exam of the patient and obtained history, as documented above.   I rechecked the patient. Findings and plan explained to the Patient. Patient discharged home with instructions regarding supportive care, medications, and reasons to return. The " importance of close follow-up was reviewed.      Impression & Plan    Medical Decision Making:  Caden Bush is a 58 year old male with schizo-affective disorder and depression who comes to the ED because he feels nobody wants to talk to him or spend time with him. It was probably triggered tonight by talking to his niece who is leaving town, who did not want to come over and see him. Patient does see a therapist, has a psychiatrist, and a primary doctor. He is on medications for depression including Risperdal, Effexor and trazodone for sleep. He denies any new or acute problems. He's being worked up by his primary doctor for weight loss over the last 6 months. His exam reveals a very flat affect and depressive symptoms. He has no suicidal thoughts, no psychosis, and he's not using any substances. DEC had a brief discussion with patient about some additional outpatient resources and the patient is able to go home at this time.     Diagnosis:    ICD-10-CM   1. Depression, unspecified depression type F32.9   2. Schizoaffective disorder, depressive type (H) F25.1     Disposition:  discharged to home    Cheyenne Aparicio  1/10/2019    EMERGENCY DEPARTMENT  I, Cheyenne Aparicio, am serving as a scribe at 9:15 PM on 1/10/2019 to document services personally performed by Matthew Wynn MD based on my observations and the provider's statements to me.       Matthew Wynn MD  01/11/19 0137

## 2019-01-11 NOTE — PROGRESS NOTES
Met briefly with pt to provide resources for groups/ additional support as pt presents to ED essentially for loneliness. He has a psychiatrist, therapist, , CADI worker, and PCP. He lives alone and family is busy, so he only sees them on his birthday and holidays. He has been referred for groups by his therapist. Writer provided [employer] number, [place] support groups, and provider-run group information. Pt is not at risk for harm to self or others.

## 2019-01-11 NOTE — ED NOTES
Bed: ED16  Expected date:   Expected time:   Means of arrival:   Comments:  533 58M depression/cooperative

## 2019-01-16 ENCOUNTER — ALLIED HEALTH/NURSE VISIT (OUTPATIENT)
Dept: PHARMACY | Facility: PHYSICIAN GROUP | Age: 59
End: 2019-01-16
Payer: MEDICAID

## 2019-01-16 DIAGNOSIS — E11.3393 TYPE 2 DIABETES MELLITUS WITH BOTH EYES AFFECTED BY MODERATE NONPROLIFERATIVE RETINOPATHY WITHOUT MACULAR EDEMA, WITHOUT LONG-TERM CURRENT USE OF INSULIN (H): Primary | ICD-10-CM

## 2019-01-16 DIAGNOSIS — R33.9 URINARY RETENTION: Primary | ICD-10-CM

## 2019-01-16 DIAGNOSIS — F25.0 SCHIZOAFFECTIVE DISORDER, BIPOLAR TYPE (H): ICD-10-CM

## 2019-01-16 PROCEDURE — 99607 MTMS BY PHARM ADDL 15 MIN: CPT | Performed by: PHARMACIST

## 2019-01-16 PROCEDURE — 99605 MTMS BY PHARM NP 15 MIN: CPT | Performed by: PHARMACIST

## 2019-01-16 NOTE — PROGRESS NOTES
SUBJECTIVE/OBJECTIVE:                Caden Bush is a 58 year old male called for a follow-up visit for Medication Therapy Management.  He was referred to me from .     Chief Complaint: Follow up from our visit on 12/26. Seen in the ER on 1/10 for depression.No med changes.  Tobacco: No tobacco use  Alcohol: not currently using    Medication Adherence/Access:  no issues reported    Depression/Schizoaffective disorder: Was in the ER on 1/10 for depression. He saw his therapist today and put in orders to have him enroll in a day program. Denies thoughts of harming himself. Aware to seek immediate attention if these develop. Also has contact information for his therapist.  Currently on venlafaxine 225mg daily, trazodone 50mg daily and risperidone 3mg daily.  No longer on the lithium since January 2018 . 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     Diabetes:  Pt currently taking metformin ER 500mg- 2 BID, Pioglitazone 45mg daily and basaglar 7 units in the HS. Pt is not experiencing side effects. Met with CDE to discuss diet and this has been very helpful to him, continues to have low weight and not always eating enough.    Stopped Glipizide 5mg with the start of insulin per PCP and has not needed this to be restarted.   SMBG: one time daily, mostly in the evenings. Ranges (patient reported): newest to oldest   Newest to oldest (fasting,evening):    Date FBG Evening   1/16 84    1/15 116 249   1/14 114 189   1/13 111 177   1/12 112 258   1/11 148 204   1/10 143 -   1/9 81 207   1/8 81 83   1/7 - 87   1/6 148 80   1/5 119 145   1/4 131 69   1/3 122 63     Lab Results   Component Value Date    A1C 6.7 01/02/2019    A1C 6.7 07/17/2018    A1C 8.2 03/19/2018    A1C 6.8 09/15/2017    A1C 8.2 06/07/2017     Today's Vitals: There were no vitals taken for this visit.- phone visit.       ASSESSMENT:              Current medications were reviewed today as discussed above.      Medication Adherence: good, no  issues identified    Depression/Schizoaffective disorder: Needs Improvement. Patient would benefit from ongoing work with therapist and day program consideration.    Diabetes: Stable. Patient is meeting A1c goal of < 7%. Discussed lows, continue to monitor and avoid with regular meals.      PLAN:                  1. No changes at this time.    I spent 20 minutes with this patient today. All changes were made via collaborative practice agreement with Dr. Hart. A copy of the visit note was provided to the patient's primary care provider.     Will follow up in 2 weeks.    The patient was given a summary of these recommendations as an after visit summary.    Aliyah Angeles, Pharm.D, BCACP  Medication Therapy Management Pharmacist  [phone]

## 2019-01-17 ENCOUNTER — TRANSFERRED RECORDS (OUTPATIENT)
Dept: HEALTH INFORMATION MANAGEMENT | Facility: CLINIC | Age: 59
End: 2019-01-17

## 2019-01-21 ENCOUNTER — OFFICE VISIT (OUTPATIENT)
Dept: UROLOGY | Facility: CLINIC | Age: 59
End: 2019-01-21
Payer: MEDICARE

## 2019-01-21 ENCOUNTER — TELEPHONE (OUTPATIENT)
Dept: FAMILY MEDICINE | Facility: CLINIC | Age: 59
End: 2019-01-21

## 2019-01-21 VITALS
WEIGHT: 136 LBS | SYSTOLIC BLOOD PRESSURE: 120 MMHG | BODY MASS INDEX: 19.04 KG/M2 | HEIGHT: 71 IN | DIASTOLIC BLOOD PRESSURE: 68 MMHG | HEART RATE: 98 BPM | OXYGEN SATURATION: 98 %

## 2019-01-21 DIAGNOSIS — E27.9 ADRENAL NODULE (H): Primary | ICD-10-CM

## 2019-01-21 DIAGNOSIS — R33.9 URINARY RETENTION: ICD-10-CM

## 2019-01-21 LAB
ALBUMIN UR-MCNC: NEGATIVE MG/DL
APPEARANCE UR: CLEAR
BILIRUB UR QL STRIP: NEGATIVE
COLOR UR AUTO: YELLOW
GLUCOSE UR STRIP-MCNC: 500 MG/DL
HGB UR QL STRIP: NEGATIVE
KETONES UR STRIP-MCNC: NEGATIVE MG/DL
LEUKOCYTE ESTERASE UR QL STRIP: NEGATIVE
NITRATE UR QL: NEGATIVE
PH UR STRIP: 5 PH (ref 5–7)
RESIDUAL VOLUME (RV) (EXTERNAL): NORMAL
SOURCE: ABNORMAL
SP GR UR STRIP: 1.01 (ref 1–1.03)
UROBILINOGEN UR STRIP-ACNC: 0.2 EU/DL (ref 0.2–1)

## 2019-01-21 PROCEDURE — 51798 US URINE CAPACITY MEASURE: CPT | Performed by: PHYSICIAN ASSISTANT

## 2019-01-21 PROCEDURE — 81003 URINALYSIS AUTO W/O SCOPE: CPT | Performed by: PHYSICIAN ASSISTANT

## 2019-01-21 PROCEDURE — 99203 OFFICE O/P NEW LOW 30 MIN: CPT | Mod: 25 | Performed by: PHYSICIAN ASSISTANT

## 2019-01-21 ASSESSMENT — MIFFLIN-ST. JEOR: SCORE: 1454.02

## 2019-01-21 ASSESSMENT — PAIN SCALES - GENERAL: PAINLEVEL: NO PAIN (0)

## 2019-01-21 NOTE — PROGRESS NOTES
January 21, 2019      CC: dribbling    HPI:  Caden Bush is a 58 year old male who presents in consultation from Dr. Hart for evaluation of the above. Has post void dribbling, needs to double void to feel empty. Nocturia x 3. Having work up for weight loss. CT showed enlarged prostate and possible bladder wall thickening (small adrenal and lung nodules). No gross hematuria.     PVR 80mL.     UA neg other than 500 glucose.     Past Medical History:   Diagnosis Date     DM (diabetes mellitus) (H)      Eczema 2011    biopsy leg      Polyp of colon, adenomatous 6/25/2012     Retinopathy     HFA ophtho DR HARLEY     Schizo-affective type schizophrenia      Vitamin D deficiency     replaced       Past Surgical History:   Procedure Laterality Date     APPENDECTOMY       COLONOSCOPY  2012    2 adenomatous polyps       Social History     Socioeconomic History     Marital status: Single     Spouse name: Not on file     Number of children: Not on file     Years of education: Not on file     Highest education level: Not on file   Social Needs     Financial resource strain: Not on file     Food insecurity - worry: Not on file     Food insecurity - inability: Not on file     Transportation needs - medical: Not on file     Transportation needs - non-medical: Not on file   Occupational History     Not on file   Tobacco Use     Smoking status: Never Smoker     Smokeless tobacco: Never Used   Substance and Sexual Activity     Alcohol use: No     Alcohol/week: 0.0 oz     Drug use: No     Sexual activity: Not on file   Other Topics Concern     Parent/sibling w/ CABG, MI or angioplasty before 65F 55M? Not Asked   Social History Narrative     Not on file       Family History   Problem Relation Age of Onset     Osteoporosis Mother      Breast Cancer Mother 78     Osteoporosis Father      Asthma Father      Cancer - colorectal Father 70     Rheumatoid Arthritis Sister        ROS:14 point ROS neg other than the symptoms noted above in the  "HPI.    No Known Allergies    Current Outpatient Medications   Medication     blood glucose (NO BRAND SPECIFIED) lancets standard     blood glucose monitoring (NO BRAND SPECIFIED) meter device kit     blood glucose monitoring (NO BRAND SPECIFIED) test strip     cholecalciferol (VITAMIN D) 1000 UNIT tablet     Cyanocobalamin (B-12) 1000 MCG CAPS     ferrous sulfate (IRON) 325 (65 Fe) MG tablet     FISH OIL     metFORMIN (GLUCOPHAGE-XR) 500 MG 24 hr tablet     pioglitazone (ACTOS) 45 MG tablet     risperiDONE (RISPERDAL) 3 MG tablet     simvastatin (ZOCOR) 40 MG tablet     traZODone (DESYREL) 50 MG tablet     venlafaxine (EFFEXOR XR) 150 MG 24 hr capsule     venlafaxine (EFFEXOR XR) 75 MG 24 hr capsule     ACE/ARB/ARNI NOT PRESCRIBED, INTENTIONAL,     acetaminophen 500 MG CAPS     aspirin 81 MG EC tablet     BASAGLAR 100 UNIT/ML injection     insulin pen needle (BD BRENDAN U/F) 32G X 4 MM     [employer] [place]COMPLETE NEEDLE COLLECTION SYS[place] [facility]     No current facility-administered medications for this visit.          PEx:   Blood pressure 120/68, pulse 98, height 1.803 m (5' 11\"), weight 61.7 kg (136 lb), SpO2 98 %.  5' 11\", Body mass index is 18.97 kg/m ., 136 lbs 0 oz  Gen appearance:  Well groomed,: age-appropriate appearing male in NAD.   HEENT:  EOMI, AT NC, CN grossly normal  Psych:  alert , comfortable in no acute distress  Neuro:  A/O X 3  Skin:  Warm to touch, clear of rashes, ecchymoses  Resp:  No increased respiratory effort  lymph:  No LE edema  Abd:  Soft/NT, ND, no palpable masses, no CVAT  Back: bony spine is non-tender, flanks are nontender    Urine: glucose 500  PSA 1.6    A/P: Caden Bush is a 58 year old male with BPH with LUTS  -Recommended trial of Flomax, SE discussed.   -Post void scan is reassuring that he is not in retention.   -He would like to talk to Dr. Hart about these recommendations.   -Would have him return to office in 2-3 months for med check and discuss if cysto would be " necessary.   -Follow-up with Dr. Hart about lung and adrenal nodules.     Annie Ye PA-C  [facility]    30 minutes were spent with the patient today, > 50% in counseling and coordination of care

## 2019-01-21 NOTE — LETTER
RE: Caden Bush  [address]  [facility] [address]     Dear Colleague,    Thank you for referring your patient, Caden Bush, to the [facility]. Please see a copy of my visit note below.    January 21, 2019      CC: dribbling    HPI:  Caden Bush is a 58 year old male who presents in consultation from Dr. Hart for evaluation of the above. Has post void dribbling, needs to double void to feel empty. Nocturia x 3. Having work up for weight loss. CT showed enlarged prostate and possible bladder wall thickening (small adrenal and lung nodules). No gross hematuria.     PVR 80mL.     UA neg other than 500 glucose.     Past Medical History:   Diagnosis Date     DM (diabetes mellitus) (H)      Eczema 2011    biopsy leg      Polyp of colon, adenomatous 6/25/2012     Retinopathy     HFA ophtho DR HARLEY     Schizo-affective type schizophrenia      Vitamin D deficiency     replaced       Past Surgical History:   Procedure Laterality Date     APPENDECTOMY       COLONOSCOPY  2012    2 adenomatous polyps       Social History     Socioeconomic History     Marital status: Single     Spouse name: Not on file     Number of children: Not on file     Years of education: Not on file     Highest education level: Not on file   Social Needs     Financial resource strain: Not on file     Food insecurity - worry: Not on file     Food insecurity - inability: Not on file     Transportation needs - medical: Not on file     Transportation needs - non-medical: Not on file   Occupational History     Not on file   Tobacco Use     Smoking status: Never Smoker     Smokeless tobacco: Never Used   Substance and Sexual Activity     Alcohol use: No     Alcohol/week: 0.0 oz     Drug use: No     Sexual activity: Not on file   Other Topics Concern     Parent/sibling w/ CABG, MI or angioplasty before 65F 55M? Not Asked   Social History Narrative  "    Not on file       Family History   Problem Relation Age of Onset     Osteoporosis Mother      Breast Cancer Mother 78     Osteoporosis Father      Asthma Father      Cancer - colorectal Father 70     Rheumatoid Arthritis Sister      No Known Allergies    Current Outpatient Medications   Medication     blood glucose (NO BRAND SPECIFIED) lancets standard     blood glucose monitoring (NO BRAND SPECIFIED) meter device kit     blood glucose monitoring (NO BRAND SPECIFIED) test strip     cholecalciferol (VITAMIN D) 1000 UNIT tablet     Cyanocobalamin (B-12) 1000 MCG CAPS     ferrous sulfate (IRON) 325 (65 Fe) MG tablet     FISH OIL     metFORMIN (GLUCOPHAGE-XR) 500 MG 24 hr tablet     pioglitazone (ACTOS) 45 MG tablet     risperiDONE (RISPERDAL) 3 MG tablet     simvastatin (ZOCOR) 40 MG tablet     traZODone (DESYREL) 50 MG tablet     venlafaxine (EFFEXOR XR) 150 MG 24 hr capsule     venlafaxine (EFFEXOR XR) 75 MG 24 hr capsule     ACE/ARB/ARNI NOT PRESCRIBED, INTENTIONAL,     acetaminophen 500 MG CAPS     aspirin 81 MG EC tablet     BASAGLAR 100 UNIT/ML injection     insulin pen needle (BD BRENDAN U/F) 32G X 4 MM     [employer] (COMPLETE NEEDLE COLLECTION SYS[place] [facility]     No current facility-administered medications for this visit.      PEx:   Blood pressure 120/68, pulse 98, height 1.803 m (5' 11\"), weight 61.7 kg (136 lb), SpO2 98 %.  5' 11\", Body mass index is 18.97 kg/m ., 136 lbs 0 oz  Gen appearance:  Well groomed,: age-appropriate appearing male in NAD.   HEENT:  EOMI, AT NC, CN grossly normal  Psych:  alert , comfortable in no acute distress  Neuro:  A/O X 3  Skin:  Warm to touch, clear of rashes, ecchymoses  Resp:  No increased respiratory effort  lymph:  No LE edema  Abd:  Soft/NT, ND, no palpable masses, no CVAT  Back: bony spine is non-tender, flanks are nontender    Urine: glucose 500  PSA 1.6    A/P: Caden Bush is a 58 year old male with BPH with LUTS  -Recommended trial of Flomax, SE discussed. "   -Post void scan is reassuring that he is not in retention.   -He would like to talk to Dr. Hart about these recommendations.   -Would have him return to office in 2-3 months for med check and discuss if cysto would be necessary.   -Follow-up with Dr. Hart about lung and adrenal nodules.     [facility]    30 minutes were spent with the patient today, > 50% in counseling and coordination of care

## 2019-01-21 NOTE — NURSING NOTE
Chief Complaint   Patient presents with     Consult     New pt is here due to urinary retention.      Josiane Ludwig, FRANCISCO

## 2019-01-21 NOTE — PATIENT INSTRUCTIONS
Discuss with Dr. Hart and let me know what you decide.     Below is a list of things that can irritate the bladder and should be avoided:      Caffeinated soft drinks.    Coffee.    Tea.    Chocolate.    Tomato-based foods.    Acidic juices and fruits. (includes cranberry juice)    Alcohol.    Carbonated drinks.    Aspartame/Nutrasweet.    Limit fluids after dinner.     You have been recommended a trial of Flomax (tamsulosin).    Flomax is prescribed for men with difficulty urinating due to a benign prostatic enlargement.  Hopefully, you will see improvement in urinary stream in the next 10-14 days if you try the medication.    Common side effects include:  Weakness or fatigue  Blurred vision  Sinus congestion or runny nose  Dizziness or lightheadedness  Decrease or lack of ejaculation

## 2019-01-21 NOTE — TELEPHONE ENCOUNTER
Called patient with result of CT of abd/pelvis.  Informed him of enlarged prostate-patient did see urologist about this today.  Also seen was L  adrenal lesion and recommended is adrenal protocol CT.  Patient agrees to get done and order sent to [facility]. Small pulmonary nodule also seen-no follow up needed at this time.

## 2019-01-24 DIAGNOSIS — F25.0 SCHIZOAFFECTIVE DISORDER, BIPOLAR TYPE (H): ICD-10-CM

## 2019-01-24 RX ORDER — VENLAFAXINE HYDROCHLORIDE 150 MG/1
CAPSULE, EXTENDED RELEASE ORAL
Qty: 30 CAPSULE | Refills: 0 | Status: SHIPPED | OUTPATIENT
Start: 2019-01-24 | End: 2019-02-07

## 2019-01-24 RX ORDER — VENLAFAXINE HYDROCHLORIDE 75 MG/1
75 CAPSULE, EXTENDED RELEASE ORAL DAILY
Qty: 30 CAPSULE | Refills: 0 | Status: SHIPPED | OUTPATIENT
Start: 2019-01-24 | End: 2019-02-07

## 2019-01-24 RX ORDER — RISPERIDONE 3 MG/1
3 TABLET ORAL DAILY
Qty: 30 TABLET | Refills: 0 | Status: SHIPPED | OUTPATIENT
Start: 2019-01-24 | End: 2019-02-07

## 2019-01-24 NOTE — TELEPHONE ENCOUNTER
Medication requested:  venlafaxine (EFFEXOR XR) 75 MG   Last refilled: 12/3/18  Qty: 30  Medication requested:  venlafaxine (EFFEXOR XR) 150 MG   Last refilled:  12/3/18  Qty: 30  Medication requested:  risperiDONE (RISPERDAL) 3 MG   Last refilled: 12/3/18  Qty: 30  * Continue risperidone 3 mg QHS  - Cont Effexor XR  225 mg daily    Last seen:  12/3/18  RTC: 3 MOS  Cancel: 0  No-show: 0  Next appt:  2/7/19  Refill decision: Refilled for 30 days per protocol

## 2019-01-25 DIAGNOSIS — F25.0 SCHIZOAFFECTIVE DISORDER, BIPOLAR TYPE (H): ICD-10-CM

## 2019-01-25 RX ORDER — VENLAFAXINE HYDROCHLORIDE 75 MG/1
CAPSULE, EXTENDED RELEASE ORAL
Qty: 90 CAPSULE | Refills: 0 | OUTPATIENT
Start: 2019-01-25

## 2019-01-28 ENCOUNTER — OFFICE VISIT (OUTPATIENT)
Dept: FAMILY MEDICINE | Facility: CLINIC | Age: 59
End: 2019-01-28

## 2019-01-28 VITALS
DIASTOLIC BLOOD PRESSURE: 82 MMHG | OXYGEN SATURATION: 94 % | HEART RATE: 115 BPM | BODY MASS INDEX: 19.47 KG/M2 | RESPIRATION RATE: 16 BRPM | SYSTOLIC BLOOD PRESSURE: 132 MMHG | WEIGHT: 139.6 LBS

## 2019-01-28 DIAGNOSIS — R33.8 BENIGN PROSTATIC HYPERPLASIA WITH URINARY RETENTION: Primary | ICD-10-CM

## 2019-01-28 DIAGNOSIS — R63.4 LOSS OF WEIGHT: ICD-10-CM

## 2019-01-28 DIAGNOSIS — F33.1 MODERATE EPISODE OF RECURRENT MAJOR DEPRESSIVE DISORDER (H): ICD-10-CM

## 2019-01-28 DIAGNOSIS — N40.1 BENIGN PROSTATIC HYPERPLASIA WITH URINARY RETENTION: Primary | ICD-10-CM

## 2019-01-28 PROCEDURE — 99214 OFFICE O/P EST MOD 30 MIN: CPT | Performed by: FAMILY MEDICINE

## 2019-01-28 RX ORDER — TAMSULOSIN HYDROCHLORIDE 0.4 MG/1
0.4 CAPSULE ORAL DAILY
Qty: 90 CAPSULE | Refills: 3 | Status: SHIPPED | OUTPATIENT
Start: 2019-01-28 | End: 2019-02-18

## 2019-01-28 NOTE — PROGRESS NOTES
Problem(s) Oriented visit        SUBJECTIVE:                                                    Caden Bush is a 58 year old male who presents to clinic today for discussion of recent testing. He has had significant weight loss recently and we have been able to determine that aside from an adrenal nodule and enlarged prostate, nothing else in his work-upo points to concerning etiology. He has apparently altered his eating dramatically according to his sister who accompanies him today.    He has issues with urinary retention and has had enlarge prostate gland. Urologist suggested use of Flomax, but he was reluctant.      Problem list, Medication list, Allergies, and Medical/Social/Surgical histories reviewed in EPIC and updated as appropriate.   Additional history: as documented    ROS:  5 point ROS completed and negative except noted above, including Gen, CV, Resp, GI, MS      Histories:   Patient Active Problem List   Diagnosis     Eczema     Retinopathy     Moderate episode of recurrent major depressive disorder (H)     Polyp of colon, adenomatous     Health Care Home     Hx of psychiatric care     Neuropathy of left upper extremity     Type 2 diabetes mellitus with both eyes affected by moderate nonproliferative retinopathy without macular edema, without long-term current use of insulin (H)     Schizoaffective disorder, bipolar type (H)     Past Surgical History:   Procedure Laterality Date     APPENDECTOMY       COLONOSCOPY  2012    2 adenomatous polyps       Social History     Tobacco Use     Smoking status: Never Smoker     Smokeless tobacco: Never Used   Substance Use Topics     Alcohol use: No     Alcohol/week: 0.0 oz     Family History   Problem Relation Age of Onset     Osteoporosis Mother      Breast Cancer Mother 78     Osteoporosis Father      Asthma Father      Cancer - colorectal Father 70     Rheumatoid Arthritis Sister            OBJECTIVE:                                                    BP  132/82   Pulse 115   Resp 16   Wt 63.3 kg (139 lb 9.6 oz)   SpO2 94%   BMI 19.47 kg/m    Body mass index is 19.47 kg/m .   GENERAL APPEARANCE: Alert, no acute distress  NEURO: Alert, oriented, speech and mentation normal  PSYCH: mentation appears normal., affect is flat, SLUMs score is 26.   Labs Resulted Today:   Results for orders placed or performed in visit on 01/21/19   MEASURE POST-VOID RESIDUAL URINE/BLADDER CAPACITY, US NON-IMAGING   Result Value Ref Range    Residual Volume (RV) (External) 80 mL    UA without Microscopic   Result Value Ref Range    Color Urine Yellow     Appearance Urine Clear     Glucose Urine 500 (A) NEG^Negative mg/dL    Bilirubin Urine Negative NEG^Negative    Ketones Urine Negative NEG^Negative mg/dL    Specific Gravity Urine 1.015 1.003 - 1.035    Blood Urine Negative NEG^Negative    pH Urine 5.0 5.0 - 7.0 pH    Protein Albumin Urine Negative NEG^Negative mg/dL    Urobilinogen Urine 0.2 0.2 - 1.0 EU/dL    Nitrite Urine Negative NEG^Negative    Leukocyte Esterase Urine Negative NEG^Negative    Source Midstream Urine      ASSESSMENT/PLAN:                                                        Caden was seen today for follow up.    Diagnoses and all orders for this visit:    Benign prostatic hyperplasia with urinary retention  -     tamsulosin (FLOMAX) 0.4 MG capsule; Take 1 capsule (0.4 mg) by mouth daily    Loss of weight  -     NUTRITION REFERRAL    Moderate episode of recurrent major depressive disorder (H)  He is encouraged to follow up with his psychiatrist.    Discussed all the recent test results with Caden and his sister. At this point he has follow up imaging of adrenal lesion scheduled for tomorrow, and if not alarming I feel comfortable pursuing nutrition counseling for helping him to understand how to eat enough without worsening his diabetes control.    >25 min spent with patient, greater than 50% spent on discussion/education/planning, etc. About The primary encounter  diagnosis was Benign prostatic hyperplasia with urinary retention. Diagnoses of Loss of weight and Moderate episode of recurrent major depressive disorder (H) were also pertinent to this visit.        Patient Instructions   Refer to Nutrition Counseling for significant weight loss. ([facility])      The following health maintenance items are reviewed in Epic and correct as of today:  Health Maintenance   Topic Date Due     HIV SCREEN (SYSTEM ASSIGNED)  [birth year]1977     ZOSTER IMMUNIZATION (1 of 2) 04/10/2009     PHQ-9 Q1 MONTH  02/02/2019     A1C Q6 MO  07/02/2019     FOOT EXAM Q1 YEAR  07/17/2019     EYE EXAM Q1 YEAR  12/11/2019     ADVANCE DIRECTIVE PLANNING Q5 YRS  12/17/2019     CREATININE Q1 YEAR  01/02/2020     MEDICARE ANNUAL WELLNESS VISIT  01/02/2020     LIPID MONITORING Q1 YEAR  01/02/2020     MICROALBUMIN Q1 YEAR  01/02/2020     NEDRA QUESTIONNAIRE 1 YEAR  01/02/2020     COLONOSCOPY Q5 YR  06/19/2020     TSH W/ FREE T4 REFLEX Q2 YEAR  01/02/2021     DTAP/TDAP/TD IMMUNIZATION (2 - Td) 12/18/2025     DEPRESSION ACTION PLAN  Completed     INFLUENZA VACCINE  Completed     HEPATITIS C SCREENING  Completed     IPV IMMUNIZATION  Aged Out     MENINGITIS IMMUNIZATION  Aged Out       Annie Hart MD  [facility]  Family Practice  [facility]  [phone]    For any issues my office # is [phone]

## 2019-01-31 ENCOUNTER — TELEPHONE (OUTPATIENT)
Dept: FAMILY MEDICINE | Facility: CLINIC | Age: 59
End: 2019-01-31

## 2019-01-31 NOTE — TELEPHONE ENCOUNTER
Called patient with result of CT of abd/pelvis. Informed patient that lesion on adrenal gland was benign.   No follow up needed.

## 2019-02-07 ENCOUNTER — OFFICE VISIT (OUTPATIENT)
Dept: PSYCHIATRY | Facility: CLINIC | Age: 59
End: 2019-02-07
Attending: PSYCHIATRY & NEUROLOGY
Payer: MEDICARE

## 2019-02-07 VITALS
WEIGHT: 137 LBS | SYSTOLIC BLOOD PRESSURE: 102 MMHG | BODY MASS INDEX: 19.11 KG/M2 | HEART RATE: 123 BPM | DIASTOLIC BLOOD PRESSURE: 76 MMHG

## 2019-02-07 DIAGNOSIS — F25.0 SCHIZOAFFECTIVE DISORDER, BIPOLAR TYPE (H): Primary | ICD-10-CM

## 2019-02-07 PROCEDURE — G0463 HOSPITAL OUTPT CLINIC VISIT: HCPCS | Mod: ZF

## 2019-02-07 RX ORDER — RISPERIDONE 3 MG/1
3 TABLET ORAL DAILY
Qty: 30 TABLET | Refills: 0 | Status: SHIPPED | OUTPATIENT
Start: 2019-02-07 | End: 2019-03-15

## 2019-02-07 RX ORDER — VENLAFAXINE HYDROCHLORIDE 75 MG/1
75 CAPSULE, EXTENDED RELEASE ORAL DAILY
Qty: 30 CAPSULE | Refills: 0 | Status: SHIPPED | OUTPATIENT
Start: 2019-02-07 | End: 2019-03-11

## 2019-02-07 RX ORDER — VENLAFAXINE HYDROCHLORIDE 150 MG/1
CAPSULE, EXTENDED RELEASE ORAL
Qty: 30 CAPSULE | Refills: 0 | Status: SHIPPED | OUTPATIENT
Start: 2019-02-07 | End: 2019-03-11

## 2019-02-07 ASSESSMENT — PAIN SCALES - GENERAL: PAINLEVEL: NO PAIN (0)

## 2019-02-07 ASSESSMENT — PATIENT HEALTH QUESTIONNAIRE - PHQ9: SUM OF ALL RESPONSES TO PHQ QUESTIONS 1-9: 12

## 2019-02-07 NOTE — PATIENT INSTRUCTIONS
Medication:  - Continue risperidone 3 mg QHS  - Cont Effexor XR  225 mg daily  - Please take Trazodone 50 mg every night for a week and increase the dose to 100 mg every night    - Please call the clinic and update me about your sleep and mood.     - Next visit , in 4 weeks    - Please follow with your  on Day treatment . I will also refer you to the  Day Treatment.     Thank you for coming to the PSYCHIATRY CLINIC.    Lab Testing:  If you had lab testing today and your results are reassuring or normal they will be mailed to you or sent through [employer] within 7 days.   If the lab tests need quick action we will call you with the results.  The phone number we will call with results is # [phone] (home) . If this is not the best number please call our clinic and change the number.    Medication Refills:  If you need any refills please call your pharmacy and they will contact us. Our fax number for refills is [phone]. Please allow three business for refill processing.   If you need to  your refill at a new pharmacy, please contact the new pharmacy directly. The new pharmacy will help you get your medications transferred.     Scheduling:  If you have any concerns about today's visit or wish to schedule another appointment please call our office during normal business hours [phone] (8-5:00 M-F)    Contact Us:  Please call [phone] during business hours (8-5:00 M-F).  If after clinic hours, or on the weekend, please call  [phone].    Financial Assistance [phone]  [facility] [phone]  [facility] [phone]  Medical Records [phone]      MENTAL HEALTH CRISIS NUMBERS:  [facility]:   [facility] - [phone]   Crisis Residence \[facility]\"" - [facility] - [phone]   Walk-In Counseling Center \[facility]\"" - [phone]   COPE 24/7 [place] Mobile Team for Adults - [[phone]]; Child - [[phone]]     Crisis Connection -  [phone]     [facility] - [phone]   Walk-in counseling [facility] - [phone]   Walk-in counseling [facility] - [phone]   Crisis Residence [facility] - [phone]   Urgent Care Adult Mental Health:   --Drop-in, 24/7 crisis line, and [place] Mobile Team [[phone]]    CRISIS TEXT LINE: Text [phone] from anywhere, anytime, any crisis 24/7;    OR SEE [email or web address]     Poison Control Center - [phone]    CHILD: [facility] needs assessment team - [phone]     [facility] Lifeline - [phone]; or [facility] Lifeline - [phone]    If you have a medical emergency please call 911or go to the nearest ER.                    _____________________________________________    Again thank you for choosing PSYCHIATRY CLINIC and please let us know how we can best partner with you to improve you and your family's health.  You may be receiving a survey in the mail regarding this appointment. We would love to have your feedback, both positive and negative, so please fill out the survey and return it using the provided envelope. The survey is done by an external company, so your answers are anonymous.

## 2019-02-07 NOTE — PROGRESS NOTES
[facility] PSYCHIATRY CLINIC PROGRESS NOTE     CARE TEAM:  PCP- Annie Hart   [facility]      Ophthalmologist: Jacob Lieberman with [facility]          Community support: [facility], [phone]     Caden Bush is a 58 year old male who prefers the name Caden & pronouns he.  The initial diagnostic evaluation was on 11/26/08 .   Date of the most recent transfer of care eval is 07/13/2018.      Pertinent Background:  This patient first experienced mental health issues in his late 20s , initially obtained care at that time and has received treatment for Schizoaffective Disorder, bipolar type. His diagnosis has changed through the time and afterreviewing all the evidence in 9/2018 the current diagnosis was confirmed (several episodes of moderate to severe depression and one episode of two weeks long period of grandiosity,  pressured speech, overspending and sleeplessness with increased social anxiety and paranoia).   Previously had a CCM until 2011. Has SW consult on 10/20/15, not interested in CM or [facility] worker services at this time and seems to be managing well on his own.    #Mood/psychosis:  - Notably, this pt was stable on regimen of Lithium, Effexor and Risperdal since 2011. However due to complications of chronic diabetes, Lithium was discontinued in 2017.   -  reviewed the chart . Per chart review, patient was not fit for the current group. She recommended [facility] for groups and activities for him.   - We even tried going down on Risperidone for other reasons but he immediately got destabilized.    #Insomnia: The etiology of his insomnia is not well understood and the sleep study was discussed and encouraged. Sleep has improved on 3mg of risperidone while psychotic sxs are also better managed.      Psych critical item history includes suicidal ideation, psychosis [sxs include AH and paranoia], mutiple psychotropic trials and psych  "hosp (>5).     INTERIM HISTORY                                                                                                                 4, 4   The patient reports good treatment adherence.  History was provided by the patient who was a good historian. The last visit ended with no change to the med regimen.   Since the last visit, Caden reports the following:   Patient is not a good historian. Answers many questions with \"i don't know\".   - doing fine.   - Reported hypersomnia. Goes to bed earlier (10pm ) and wake up earlier at (4:00 am). Usually falls back to sleep, almost right a way and sleeps by noon.   - Has lost 15 pounds in the past few months. This has been followed by PCP who had ordered seried of lab tests and scans. Adrenal incidentaloma was found and reported to be be benign without any need for follow up. PCP has mentioned that the weight loss appears to be related to his depression.   - Patient reports that his depression is getting worse and he identifies the weather to be the main factor. Patient is usually out and about in other seasons but the snow has been limiting. This isolation is also contributing to poor sleep.   - Hears [employer]ing machine sound at nights and is pretty convinced that its real. On/off, sometimes annoyed by it. The sounds does not wake him up though. He is not interested in following this up with building management. He reported hearing this sound for many years without any new change to it.   - No repoerts of psychosis or kinga. Social anxiety at baseline. His CM has discussed the benefits of day treatment and he has agreed. He also agreed to use [facility] Day treatment program. Referral placed.   - Flumax has recently started for BPH.     RECENT SYMPTOMS:   DEPRESSION:  reports- depression. anhedonia, low energy , memory of concentration problem. Sleep issues and hypersomnia, appetite or weight change;  DENIES-  suicidal ideation and self-destructive thoughts  KINGA/HYPOMANIA: "  reports-none;  DENIES- increased energy, decreased sleep need, increased activity and grandiosity  PSYCHOSIS:  reports-none;  DENIES- delusions, auditory hallucinations and visual hallucinations  DYSREGULATION:  reports-none;  DENIES- suicidal ideation, violent ideation and SIB  PANIC ATTACK:  none   ANXIETY:   social anxiety at baseline. Desires to socialize  TRAUMA RELATED:  none  COMPULSIVE:  none  SLEEP:  As mentioned above  EATING DISORDER: none      RECENT SUBSTANCE USE:     ALCOHOL-  never   TOBACCO- none  CAFFEINE- 1 cups/day of coffee, 1 sodas/ in a while  OPIOIDS- none      NARCAN KIT- N/A          CANNABIS- none  OTHER ILLICIT DRUGS- none      CURRENT SOCIAL HISTORY:  FINANCIAL SUPPORT- SSDI for schizoaffective disorder and diabetes  CHILDREN- none      LIVING SITUATION- Currently living by self in subsidized apartment in [place] since Feb 2011.      SOCIAL/ SPIRITUAL SUPPORT- few friends and family     FEELS SAFE AT HOME- yes       MEDICAL ROS (2,10):  Reports Arthritis pain Denies Pain, headache, dizziness, GI [nausea, diarrhea, constipation,  ], , sedation, tremor and confusion.    PSYCH and CD Critical Summary Points since July 2018 July: Increased Risperidone dose    PAST PSYCH MED TRIALS   see EMR Problem List: Hx of psychiatric care    MEDICAL / SURGICAL HISTORY                                 Neurologic Hx- no  Patient Active Problem List   Diagnosis     Eczema     Retinopathy     Moderate episode of recurrent major depressive disorder (H)     Polyp of colon, adenomatous     Health Care Home     Hx of psychiatric care     Neuropathy of left upper extremity     Type 2 diabetes mellitus with both eyes affected by moderate nonproliferative retinopathy without macular edema, without long-term current use of insulin (H)     Schizoaffective disorder, bipolar type (H)       Major Surgery- Appendectomy    ALLERGY                                Patient has no known allergies.  MEDICATIONS                                Current Outpatient Medications   Medication Sig Dispense Refill     ACE/ARB/ARNI NOT PRESCRIBED, INTENTIONAL, Please choose reason not prescribed, below-  hypotension       acetaminophen 500 MG CAPS Take 2 capsules by mouth 3 times daily as needed       aspirin 81 MG EC tablet Take 81 mg by mouth daily.       BASAGLAR 100 UNIT/ML injection Inject 7 Units Subcutaneous At Bedtime We will make further adjustments as we see your response 15 mL 1     blood glucose (NO BRAND SPECIFIED) lancets standard Use to test blood sugar 2 times daily or as directed. 100 each 11     blood glucose monitoring (NO BRAND SPECIFIED) meter device kit Use to test blood sugar 2 times daily or as directed. 1 kit 0     blood glucose monitoring (NO BRAND SPECIFIED) test strip Use to test blood sugars 2 times daily or as directed 100 strip 3     cholecalciferol (VITAMIN D) 1000 UNIT tablet Take 1 tablet by mouth daily.       Cyanocobalamin (B-12) 1000 MCG CAPS Take 1 capsule by mouth daily       ferrous sulfate (IRON) 325 (65 Fe) MG tablet Take 325 mg by mouth daily       FISH OIL 1 capsule daily        insulin pen needle (BD BRENDAN U/F) 32G X 4 MM Use 1 daily as directed. 100 each 11     metFORMIN (GLUCOPHAGE-XR) 500 MG 24 hr tablet TAKE 2 TABLETS BY MOUTH TWICE DAILY 360 tablet 1     pioglitazone (ACTOS) 45 MG tablet TAKE 1 TABLET BY MOUTH DAILY 90 tablet 0     risperiDONE (RISPERDAL) 3 MG tablet Take 1 tablet (3 mg) by mouth daily 30 tablet 0     Sharps Container (COMPLETE NEEDLE COLLECTION SYS) MISC 1 each daily 1 each 1     simvastatin (ZOCOR) 40 MG tablet Take 0.5 tablets (20 mg) by mouth At Bedtime 45 tablet 3     tamsulosin (FLOMAX) 0.4 MG capsule Take 1 capsule (0.4 mg) by mouth daily 90 capsule 3     traZODone (DESYREL) 50 MG tablet Take 1 tablet (50 mg) by mouth nightly as needed for sleep 30 tablet 2     venlafaxine (EFFEXOR XR) 75 MG 24 hr capsule Take 1 capsule (75 mg) by mouth daily TAKE WITH  Effexor 150  mg for a total dose of 225 mg daily 30 capsule 0     venlafaxine (EFFEXOR-XR) 150 MG 24 hr capsule TAKE 1 CAPSULE BY MOUTH DAILY ALONG WITH A 75 MG FOR A TOTAL  MG DAILY 30 capsule 0     VITALS                                                                                                                          3, 3   /76   Pulse 123   Wt 62.1 kg (137 lb)   BMI 19.11 kg/m     MENTAL STATUS EXAM                                                                                           9, 14 cog gs     Alertness: alert  and oriented  Appearance: unkempt  Behavior/Demeanor: cooperative, calm, with fair to intense starting eye contact   Speech: slowed with delay  Language: intact and no problems  Psychomotor: slowed  Mood: depressed  Affect:limited range; was congruent to mood; was congruent to content  Thought Process/Associations: unremarkable  Thought Content:  Reports possible AH;   Denies suicidal ideation  and psychotic thought  Perception:   Reports none;  Denies auditory hallucinations or visual hallucinations or delusions  Insight: fair  Judgment: fair  Cognition: does  appear grossly intact; formal cognitive testing was not done  Gait and Station: unremarkable     LABS and DATA     AIMS: 0 at 3/1/2018    PHQ9 TODAY = 12  PHQ-9 SCORE 9/27/2018 12/3/2018 1/2/2019   PHQ-9 Total Score - - -   PHQ-9 Total Score 1 3 9   PHQ-9 Total Score - - -     ANTIPSYCHOTIC LABS  [glu, A1C, lipids (ezequiel LDL), liver enzymes, WBC, ANEU, Hgb, plts]  q12 mo  Recent Labs   Lab Test 01/02/19  1045 09/05/18  1100 07/17/18  1126 04/17/18  1356 03/19/18  1116 01/30/18  0923 09/15/17  0956   * 214*  --  150*  --  195*  --    A1C 6.7*  --  6.7*  --  8.2*  --  6.8*     Recent Labs   Lab Test 01/02/19  1045 04/17/18  1356 07/11/17  1034 06/07/17  1004   CHOL 127 139 126 143   TRIG 51 45 49 105   LDL 33 45 40 46   HDL 84 85 76 76     Recent Labs   Lab Test 01/02/19  1045 04/17/18  1356 01/30/18  0923 07/11/17  1034   AST  14 15 14 14   ALT 13 12 16 12   ALKPHOS 74 73 79 72     Recent Labs   Lab Test 01/02/19  1126 01/02/19  1045 10/11/18  1010 09/05/18  1123 01/30/18  0923  06/09/16  1030 12/21/15  1413  08/01/12  1640   WBC 4.2  --  2.5* 2.8* 2.8*   < > 4.7 4.6   < > 5.5   ANEU  --   --   --   --   --   --  3.5 3.4  --  4.8   HGB 12.9* 13.1 12.5* 11.5* 12.1*   < > 14.7 13.4   < > 11.4*     --  136* 168 179   < > 175 155   < > 180    < > = values in this interval not displayed.       DIAGNOSIS     Schizoaffective disorder, bipolar type, depressive episode, mild to moderate, without current psychotic features      ASSESSMENT                                                                                                                   m2, h3     TODAY:    #Psychosis  #Mood  - Patient reports low mood, compliance to medication and denies safety issues including self harm, SI, wolf or psychosis. Patient was stable in current medication regimen since summer 2018 (risperidone and venlafaxine) still desired to continue this medication regimen. However there is a concern regarding his depression. Denies abnormal movements but his movements are significantly slowed in comparison to last session. He is working with his CM on getting into a Day treatment program. The referral to our +55 program was discussed, and agreed by patient . Referral was placed.     #Insomnia :  - Has become a recent issue.   - Unclear etiology  - Sleep study is discussed and encouraged in the past  - patient has utilized trazodone in the past few weeks, inconsistently. Helpful..Recommended to take it scheduled with plan to increase the dose to Anti Depressant dose (since patient is resistant to change the medication in general). Patient disagreed to change Risperidone dose which could have been helpful in terms of sleep and the sounds that he reported to hear at nights (AH?).     #Leukopenia, unspecified type #Low PLT count #Anemia  - PCP is aware  -  Possibly due to Risperdal.   - Has a physical with PCP on Jan 2nd when hw is also going to evaluate hematologic abnormalities.  - Receives iron replacement    #DM II  # Vitamin D def> replacement  #Benign adrenal incidentaloma    Patient agreed to come back earlier for next visit (~1month) and agreed to call the clinic in two weeks and update up of his mental well being.       [place] PRESCRIPTION MONITORING PROGRAM [] was not checked today:  not using controlled substances.    PSYCHOTROPIC DRUG INTERACTIONS:   VENLAFAXINE and ANTIPLATELET AGENTS may result in an increased risk of bleeding.  RISPERIDONE and SIMVASTATIN may result in increased simvastatin serum concentrations -with an increased risk of myopathy or rhabdomyolysis.  TRAZODONE and VENLAFAXINE may result in an increased risk of serotonin syndrome.    MANAGEMENT:  Monitoring for adverse effects     PLAN                                                                                                                                m2, h3     1) PSYCHOTROPIC MEDICATIONS:  - Continue risperidone 3 mg QHS  - Cont Effexor XR  225 mg daily  - Scheduled trazodone to 50 mg qhs for a week and plan to increase the dose to 100mg.      2) THERAPY:    - Seeing psychologist Yeny Goff at [facility] since 11/2015. Sees once a month.       3) LABS NEXT DUE: as per atypical neuroleptic protocol.      RATING SCALES:  AIMS: 0 at 3/1/2018      4) REFERRALS:    none     7) RTC: 3 months      8) CRISIS NUMBERS:     Provided routinely in AVS  none    TREATMENT RISK STATEMENT:  The risks, benefits, alternatives and potential adverse effects have been discussed and are understood by the pt. The pt understands the risks of using street drugs or alcohol. There are no medical contraindications, the pt agrees to treatment with the ability to do so. The pt knows to call the clinic for any problems or to access emergency care if needed.  Medical and substance use concerns are  documented above.  Psychotropic drug interaction check was done, including changes made today.     PROVIDER:  Natali Dang MD    Patient staffed in clinic with Dr. Phillips who will sign the note.  Supervisor is Dr. Bradley.    Supervisor Attestation:  I met with Caden Bush along with the resident, and participated in key portions of the service, including the mental status examination and developing the plan of care. I reviewed key portions of the history with the resident. I agree with the findings and plan as documented in this note.  Antolin Phillips MD

## 2019-02-13 ENCOUNTER — OFFICE VISIT (OUTPATIENT)
Dept: FAMILY MEDICINE | Facility: CLINIC | Age: 59
End: 2019-02-13

## 2019-02-13 VITALS
OXYGEN SATURATION: 98 % | DIASTOLIC BLOOD PRESSURE: 58 MMHG | SYSTOLIC BLOOD PRESSURE: 70 MMHG | BODY MASS INDEX: 19.11 KG/M2 | RESPIRATION RATE: 16 BRPM | HEART RATE: 122 BPM | WEIGHT: 137 LBS

## 2019-02-13 DIAGNOSIS — F25.0 SCHIZOAFFECTIVE DISORDER, BIPOLAR TYPE (H): ICD-10-CM

## 2019-02-13 DIAGNOSIS — I95.1 ORTHOSTATIC HYPOTENSION: ICD-10-CM

## 2019-02-13 DIAGNOSIS — E11.3393 TYPE 2 DIABETES MELLITUS WITH BOTH EYES AFFECTED BY MODERATE NONPROLIFERATIVE RETINOPATHY WITHOUT MACULAR EDEMA, WITHOUT LONG-TERM CURRENT USE OF INSULIN (H): ICD-10-CM

## 2019-02-13 DIAGNOSIS — R00.0 RACING HEART BEAT: Primary | ICD-10-CM

## 2019-02-13 LAB
% GRANULOCYTES: 87.6 % (ref 42.2–75.2)
HCT VFR BLD AUTO: 38.1 % (ref 39–51)
HEMOGLOBIN: 12.7 G/DL (ref 13.4–17.5)
LYMPHOCYTES NFR BLD AUTO: 8.6 % (ref 20.5–51.1)
MCH RBC QN AUTO: 30.2 PG (ref 27–31)
MCHC RBC AUTO-ENTMCNC: 33.4 G/DL (ref 33–37)
MCV RBC AUTO: 90.3 FL (ref 80–100)
MONOCYTES NFR BLD AUTO: 3.8 % (ref 1.7–9.3)
PLATELET # BLD AUTO: 162 K/UL (ref 140–450)
RBC # BLD AUTO: 4.21 X10/CMM (ref 4.2–5.9)
WBC # BLD AUTO: 5.3 X10/CMM (ref 3.8–11)

## 2019-02-13 PROCEDURE — 93000 ELECTROCARDIOGRAM COMPLETE: CPT | Performed by: FAMILY MEDICINE

## 2019-02-13 PROCEDURE — 99214 OFFICE O/P EST MOD 30 MIN: CPT | Performed by: FAMILY MEDICINE

## 2019-02-13 PROCEDURE — 85025 COMPLETE CBC W/AUTO DIFF WBC: CPT | Performed by: FAMILY MEDICINE

## 2019-02-13 PROCEDURE — 36415 COLL VENOUS BLD VENIPUNCTURE: CPT | Performed by: FAMILY MEDICINE

## 2019-02-13 NOTE — PROGRESS NOTES
Heart racing      SUBJECTIVE:   Caden Bush is a 58 year old male who presents to clinic today for the following health issues:      Sleep sleep too much at times, 12 hours  Appetite trying to eat more  Today beef stew water and 2 cartons milk.   Exercise not much    Smoking no  ETOH no  Street drugs/MJ no  Caffeine coffee sometimes    Depression some bad days   Anxiety no  Panic no  SI/SP no  Hallucinations no  Paranoid no  Manic no  OCD no  PTSD no  Gambling no  Counseling q2 weeks after Presidents day weekend  Psychiatry recently seen    Lab Results   Component Value Date    A1C 6.7 01/02/2019    A1C 6.7 07/17/2018    A1C 8.2 03/19/2018    A1C 6.8 09/15/2017    A1C 8.2 06/07/2017     LDL Cholesterol Calculated   Date Value Ref Range Status   01/02/2019 33 0 - 99 mg/dL Final     Creatinine   Date Value Ref Range Status   01/02/2019 1.06 0.76 - 1.27 mg/dL Final     FHx stroke ? Grandparent    Flomax was just added 2 weeks  This may be side effect of that. Dr Hart  Helped urine issue some.      Heart racing      Duration: Since Monday then went away with dizziness was laying down one hour about noon. Does not remember clammy/sweats no CP. Anxious no.      Tuesday fine    Wednesday heart racing but not dizzy 10 am, laid down 20 min then better. BS not checked while happening. BS today 141.    Up 2 pm and was gone.         Description (location/character/radiation): heart beating fast, when stand up too fast     Get light heade    Intensity:  moderate, severe    Accompanying signs and symptoms: none    History (similar episodes/previous evaluation): Yes in the past, has heart murmur.    Precipitating or alleviating factors: None    Therapies tried and outcome: None       Diabetes Follow-up    Patient is checking blood sugars: twice daily.    Blood sugar testing frequency justification: On insulin, frequency appropriate   Results are as follows:         am - before eating         suppertime - After eating before  bed    Diabetic concerns: low blood sugar several less than 70 in the past few weeks     Symptoms of hypoglycemia (low blood sugar): dizzy, confusion     Paresthesias (numbness or burning in feet) or sores: No     Date of last diabetic eye exam: not asked    Diabetes Management Resources    Hyperlipidemia Follow-Up  LDL Cholesterol Calculated   Date Value Ref Range Status   01/02/2019 33 0 - 99 mg/dL Final             Hypertension Follow-up  BP Readings from Last 3 Encounters:   02/13/19 (!) 70/58   01/28/19 132/82   01/21/19 120/68   ortho static drop          BP Readings from Last 2 Encounters:   01/28/19 132/82   01/21/19 120/68     Hemoglobin A1C (%)   Date Value   01/02/2019 6.7 (H)   07/17/2018 6.7 (H)     LDL Cholesterol Calculated (mg/dL)   Date Value   01/02/2019 33   04/17/2018 45       Problem list and histories reviewed & adjusted, as indicated.  Additional history: as documented    Patient Active Problem List   Diagnosis     Eczema     Retinopathy     Moderate episode of recurrent major depressive disorder (H)     Polyp of colon, adenomatous     Health Care Home     Hx of psychiatric care     Neuropathy of left upper extremity     Type 2 diabetes mellitus with both eyes affected by moderate nonproliferative retinopathy without macular edema, without long-term current use of insulin (H)     Schizoaffective disorder, bipolar type (H)     Past Surgical History:   Procedure Laterality Date     APPENDECTOMY       COLONOSCOPY  2012    2 adenomatous polyps       Social History     Tobacco Use     Smoking status: Never Smoker     Smokeless tobacco: Never Used   Substance Use Topics     Alcohol use: No     Alcohol/week: 0.0 oz     Family History   Problem Relation Age of Onset     Osteoporosis Mother      Breast Cancer Mother 78     Osteoporosis Father      Asthma Father      Cancer - colorectal Father 70     Rheumatoid Arthritis Sister          Current Outpatient Medications   Medication Sig Dispense Refill      acetaminophen 500 MG CAPS Take 2 capsules by mouth 3 times daily as needed       aspirin 81 MG EC tablet Take 81 mg by mouth daily.       BASAGLAR 100 UNIT/ML injection Inject 7 Units Subcutaneous At Bedtime We will make further adjustments as we see your response 15 mL 1     blood glucose (NO BRAND SPECIFIED) lancets standard Use to test blood sugar 2 times daily or as directed. 100 each 11     blood glucose monitoring (NO BRAND SPECIFIED) meter device kit Use to test blood sugar 2 times daily or as directed. 1 kit 0     blood glucose monitoring (NO BRAND SPECIFIED) test strip Use to test blood sugars 2 times daily or as directed 100 strip 3     cholecalciferol (VITAMIN D) 1000 UNIT tablet Take 1 tablet by mouth daily.       insulin pen needle (BD BRENDAN U/F) 32G X 4 MM Use 1 daily as directed. 100 each 11     metFORMIN (GLUCOPHAGE-XR) 500 MG 24 hr tablet TAKE 2 TABLETS BY MOUTH TWICE DAILY 360 tablet 1     pioglitazone (ACTOS) 45 MG tablet TAKE 1 TABLET BY MOUTH DAILY 90 tablet 0     risperiDONE (RISPERDAL) 3 MG tablet Take 1 tablet (3 mg) by mouth daily 30 tablet 0     Sharps Container (COMPLETE NEEDLE COLLECTION SYS) MISC 1 each daily 1 each 1     simvastatin (ZOCOR) 40 MG tablet Take 0.5 tablets (20 mg) by mouth At Bedtime 45 tablet 3     venlafaxine (EFFEXOR XR) 75 MG 24 hr capsule Take 1 capsule (75 mg) by mouth daily TAKE WITH  Effexor 150 mg for a total dose of 225 mg daily 30 capsule 0     venlafaxine (EFFEXOR-XR) 150 MG 24 hr capsule TAKE 1 CAPSULE BY MOUTH DAILY ALONG WITH A 75 MG FOR A TOTAL  MG DAILY 30 capsule 0     ACE/ARB/ARNI NOT PRESCRIBED, INTENTIONAL, Please choose reason not prescribed, below-  hypotension       Cyanocobalamin (B-12) 1000 MCG CAPS Take 1 capsule by mouth daily       ferrous sulfate (IRON) 325 (65 Fe) MG tablet Take 325 mg by mouth daily       FISH OIL 1 capsule daily        traZODone (DESYREL) 50 MG tablet Take 2 tablets (100 mg) by mouth nightly as needed for sleep 30  tablet 2     No Known Allergies  Recent Labs   Lab Test 02/13/19  1625 01/02/19  1045  07/17/18  1126 04/17/18  1356 03/19/18  1116 01/30/18  0923  07/11/17  1034  06/09/16  1030   A1C  --  6.7*  --  6.7*  --  8.2*  --    < >  --    < > 7.3*   LDL  --  33  --   --  45  --   --   --  40   < > 45   HDL  --  84  --   --  85  --   --   --  76   < > 69   TRIG  --  51  --   --  45  --   --   --  49   < > 55   ALT  --  13  --   --  12  --  16  --  12   < > 27   CR 1.21 1.06   < >  --  1.10  --  1.20  --  1.17   < > 1.26*   GFRESTIMATED  --   --   --   --   --   --  62  --   --   --  59*   GFRESTBLACK  --   --   --   --   --   --  75  --   --   --  71   POTASSIUM 5.5* 5.2   < >  --  4.7  --  4.5  --  4.9   < > 4.9   TSH  --   --   --   --   --   --  0.72  --   --   --  1.70    < > = values in this interval not displayed.      BP Readings from Last 3 Encounters:   02/13/19 (!) 70/58   01/28/19 132/82   01/21/19 120/68    Wt Readings from Last 3 Encounters:   02/13/19 62.1 kg (137 lb)   01/28/19 63.3 kg (139 lb 9.6 oz)   01/21/19 61.7 kg (136 lb)                  Labs reviewed in EPIC    Reviewed and updated as needed this visit by clinical staff       Reviewed and updated as needed this visit by Provider         ROS:  Constitutional, HEENT, cardiovascular, pulmonary, GI, , musculoskeletal, neuro, skin, endocrine and psych systems are negative, except as otherwise noted.    OBJECTIVE:     BP (!) 70/58 (Patient Position: Standing)   Pulse 122   Resp 16   Wt 62.1 kg (137 lb)   SpO2 98%   BMI 19.11 kg/m    Body mass index is 19.11 kg/m .  GENERAL: healthy, alert and no distress  EYES: Eyes grossly normal to inspection, PERRL and conjunctivae and sclerae normal  HENT: ear canals and TM's normal, nose and mouth without ulcers or lesions  NECK: no adenopathy, no asymmetry, masses, or scars and thyroid normal to palpation  RESP: lungs clear to auscultation - no rales, rhonchi or wheezes  CV: regular rate and rhythm, normal S1  S2, no S3 or S4, no murmur, click or rub, no peripheral edema and peripheral pulses strong  ABDOMEN: soft, nontender, no hepatosplenomegaly, no masses and bowel sounds normal  MS: no gross musculoskeletal defects noted, no edema  SKIN: no suspicious lesions or rashes  NEURO: Normal strength and tone, mentation intact and speech normal  PSYCH: mentation appears normal, affect normal/bright  LYMPH: no cervical, supraclavicular, axillary, or inguinal adenopathy    Diagnostic Test Results:  Results for orders placed or performed in visit on 02/13/19   Basic Metabolic Panel (8) ([facility])   Result Value Ref Range    Glucose 254 (H) 65 - 99 mg/dL    Urea Nitrogen 16 6 - 24 mg/dL    Creatinine 1.21 0.76 - 1.27 mg/dL    eGFR If NonAfricn Am 65 >59 mL/min/1.73    eGFR If Africn Am 75 >59 mL/min/1.73    BUN/Creatinine Ratio 13 9 - 20    Sodium 136 134 - 144 mmol/L    Potassium 5.5 (H) 3.5 - 5.2 mmol/L    Chloride 99 96 - 106 mmol/L    Total CO2 24 20 - 29 mmol/L    Calcium 10.0 8.7 - 10.2 mg/dL    Narrative    Performed at:  01 - [facility] [place]  [address] [place] [address]  [id number]  : Rigoberto Sanchez MD, Phone:  [phone]   CBC with Diff/Plt ([facility])   Result Value Ref Range    WBC x10/cmm 5.3 3.8 - 11.0 x10/cmm    % Lymphocytes 8.6 (A) 20.5 - 51.1 %    % Monocytes 3.8 1.7 - 9.3 %    % Granulocytes 87.6 (A) 42.2 - 75.2 %    RBC x10/cmm 4.21 4.2 - 5.9 x10/cmm    Hemoglobin 12.7 (A) 13.4 - 17.5 g/dl    Hematocrit 38.1 (A) 39 - 51 %    MCV 90.3 80 - 100 fL    MCH 30.2 27.0 - 31.0 pg    MCHC 33.4 33.0 - 37.0 g/dL    Platelet Count 162 140 - 450 K/uL   TSH ([facility])   Result Value Ref Range    TSH 0.855 0.450 - 4.500 uIU/mL    Narrative    Performed at:  01 - [facility] [address], [place], [place]  [id number]  : Rigoberto Sanchez MD, Phone:  [phone]   Thyroxine (T4) Free  Direct  S ([facility])   Result Value Ref Range    T4 Free 1.30 0.82 - 1.77 ng/dL    Narrative    Performed at:  01  [facility]  [place] [address], [place], [place]  [id number]  : Rigoberto Sanchez MD, Phone:  [phone]     EKG NSR today scanned  ASSESSMENT/PLAN:     ASSESSMENT / PLAN:  (R00.0) Racing heart beat  (primary encounter diagnosis)  Comment:   Plan: EKG 12-lead complete w/read - Clinics, Basic         Metabolic Panel (8) ([facility]), CBC with         Diff/Plt ([facility]), TSH ([facility]), Thyroxine (T4)         Free  Direct  S ([facility]), CANCELED: CBC with         Diff/Plt ([facility])       (E11.3393) Type 2 diabetes mellitus with both eyes affected by moderate nonproliferative retinopathy without macular edema, without long-term current use of insulin (H)  Comment:   Lab Results   Component Value Date    A1C 6.7 01/02/2019    A1C 6.7 07/17/2018    A1C 8.2 03/19/2018    A1C 6.8 09/15/2017    A1C 8.2 06/07/2017       Plan: recheck a1c in April    (F25.0) Schizoaffective disorder, bipolar type (H)  Comment:   Plan: per psychiatry    (I95.1) Orthostatic hypotension  Comment:   Plan: hydration, hold flomax              Patient Instructions     Hold flomax    Hydrate    F/u 2 weeks with Dr Hart or Dr Owusu    Get up slowly from lying down, sit on edge of bed to make sure not dizzy before getting up    Lab Results   Component Value Date    A1C 6.7 01/02/2019    A1C 6.7 07/17/2018    A1C 8.2 03/19/2018    A1C 6.8 09/15/2017    A1C 8.2 06/07/2017     Check BS before every meal and at bedtime and bring numbers to next visit.          Emmy Owusu MD  [facility]

## 2019-02-13 NOTE — PATIENT INSTRUCTIONS
Hold flomax    Hydrate    F/u 2 weeks with Dr Hart or Dr Owusu    Get up slowly from lying down, sit on edge of bed to make sure not dizzy before getting up    Lab Results   Component Value Date    A1C 6.7 01/02/2019    A1C 6.7 07/17/2018    A1C 8.2 03/19/2018    A1C 6.8 09/15/2017    A1C 8.2 06/07/2017     Check BS before every meal and at bedtime and bring numbers to next visit.

## 2019-02-14 ENCOUNTER — TELEPHONE (OUTPATIENT)
Dept: PSYCHIATRY | Facility: CLINIC | Age: 59
End: 2019-02-14

## 2019-02-14 LAB
BUN SERPL-MCNC: 16 MG/DL (ref 6–24)
BUN/CREATININE RATIO: 13 (ref 9–20)
CALCIUM SERPL-MCNC: 10 MG/DL (ref 8.7–10.2)
CHLORIDE SERPLBLD-SCNC: 99 MMOL/L (ref 96–106)
CREAT SERPL-MCNC: 1.21 MG/DL (ref 0.76–1.27)
EGFR IF AFRICN AM: 75 ML/MIN/1.73
EGFR IF NONAFRICN AM: 65 ML/MIN/1.73
GLUCOSE SERPL-MCNC: 254 MG/DL (ref 65–99)
POTASSIUM SERPL-SCNC: 5.5 MMOL/L (ref 3.5–5.2)
SODIUM SERPL-SCNC: 136 MMOL/L (ref 134–144)
T4 FREE SERPL-MCNC: 1.3 NG/DL (ref 0.82–1.77)
TOTAL CO2: 24 MMOL/L (ref 20–29)
TSH BLD-ACNC: 0.85 UIU/ML (ref 0.45–4.5)

## 2019-02-14 NOTE — TELEPHONE ENCOUNTER
"  [facility]       SITUATION                                                                                                               Writer received telephone call from pt, who reported that Dr. Dang wanted him to check in a week after his latest clinic appointment (2/7).        BACKGROUND                                                                                                                   Pt reports that he has been taking one trazodone 50 mg tablet consistently for the past week. He identified that his sleep is interrupted with occasional difficulty falling asleep, and that it usually takes a while to return to sleep upon awakening.  Pt identified planning to increase his trazodone dose to two tablets nightly, that he has a supply of the medication, and will contact the pharmacy if he needs more.      ASSESSMENT                                                                                                                   Pt reports that his usual AH of a sewing machine sound continues, that he didn't hear it on Sunday and slept for a while on Monday.  When asked how his mood was, pt stated \"oh, I don't know. I can't really tell.\"  Pt denies racing thoughts. He stated that his anxiety has been \"pretty calm.\"  Pt denies SI, SIB, HI    Pt reports taking his medications as prescribed.    Pt presented with monotone voice and slow rate of speech. No difficulty finding words was observed.    RECOMMENDATION                                                                                                          Writer requested that pt contact clinic in one week to review his symptoms.    Routed to provider.                "

## 2019-02-15 ENCOUNTER — TELEPHONE (OUTPATIENT)
Dept: FAMILY MEDICINE | Facility: CLINIC | Age: 59
End: 2019-02-15

## 2019-02-15 DIAGNOSIS — E87.5 SERUM POTASSIUM ELEVATED: Primary | ICD-10-CM

## 2019-02-15 NOTE — TELEPHONE ENCOUNTER
----- Message from Emmy Owusu MD sent at 2/15/2019  8:38 AM CST -----  RN please call patient with results.  POTASSIUM IS HIGH. Avoid supplements and Foods high in Potassium: Avocados, spinach, sweet potatos, coconut water, yogurt, white beans, bananas, acorn squash, dried apricost, mushrooms.  Recheck one week  Sugars are high  BMP otherwise ok  Thyroid tests were fine

## 2019-02-15 NOTE — TELEPHONE ENCOUNTER
Called and spoke with patient regarding lab results per Dr Owusu. Patient is advised to avoid potassium rich foods as well as supplements that contain potassium. Patient does not wish to RTC next week, he is scheduled with Dr Hart on 2/27/19 and will recheck K at that time. Becca Abarca

## 2019-02-18 ENCOUNTER — ALLIED HEALTH/NURSE VISIT (OUTPATIENT)
Dept: PHARMACY | Facility: PHYSICIAN GROUP | Age: 59
End: 2019-02-18
Payer: MEDICAID

## 2019-02-18 DIAGNOSIS — N40.1 BENIGN PROSTATIC HYPERPLASIA WITH URINARY FREQUENCY: ICD-10-CM

## 2019-02-18 DIAGNOSIS — E11.3393 TYPE 2 DIABETES MELLITUS WITH BOTH EYES AFFECTED BY MODERATE NONPROLIFERATIVE RETINOPATHY WITHOUT MACULAR EDEMA, WITHOUT LONG-TERM CURRENT USE OF INSULIN (H): Primary | ICD-10-CM

## 2019-02-18 DIAGNOSIS — R35.0 BENIGN PROSTATIC HYPERPLASIA WITH URINARY FREQUENCY: ICD-10-CM

## 2019-02-18 PROCEDURE — 99606 MTMS BY PHARM EST 15 MIN: CPT | Mod: GY | Performed by: PHARMACIST

## 2019-02-18 RX ORDER — TRAZODONE HYDROCHLORIDE 50 MG/1
100 TABLET, FILM COATED ORAL
Qty: 30 TABLET | Refills: 2 | COMMUNITY
Start: 2019-02-18 | End: 2019-03-18

## 2019-02-18 NOTE — Clinical Note
FYI- seeing you on 2/27, then i'm checking in on sugars again on 3/5. Aliyah Angeles, Pharm.D, BCACPMedication Therapy Management [phone]

## 2019-02-18 NOTE — PROGRESS NOTES
SUBJECTIVE/OBJECTIVE:                Caden Bush is a 58 year old male called for a follow-up visit for Medication Therapy Management.  He was referred to me from .     Chief Complaint: Follow up from our visit on 1/30.      Tobacco: No tobacco use  Alcohol: not currently using    Medication Adherence/Access:  no issues reported    Diabetes:  Pt currently taking metformin ER 500mg- 2 BID, Pioglitazone 45mg daily and basaglar 7 units in the HS. Pt is not experiencing side effects. Met with CDE to discuss diet and this has been very helpful to him, continues to have low weight and not always eating enough.    Stopped Glipizide 5mg with the start of insulin per PCP and has not needed this to be restarted.   SMBG: one time daily, mostly in the evenings. Ranges (patient reported): newest to oldest   Newest to oldest (fasting,evening):    Date FBG Evening   2/18 220    2/17 172 366   2/16 199    2/15 - 157   2/14 115 277   2/13 147 142   2/12 - 90   2/11     2/10 108 144   2/9 - 159   2/8 170 156   2/7 96 242   2/6 241 177   2/5 62 245   2/4 95 204   2/3 - 160   2/2 125 325   2/1 124 205       BPH: Started flomax once daily for urinary symptoms, however had racing heart and more dizziness so was told to hold this on 2/13. Off the flomax now, still having dizziness as he was before. Trying to stay hydrated, but hard to balance with frequency concerns. Seeing PCP on 2/27.     Today's Vitals: There were no vitals taken for this visit.- phone visit      ASSESSMENT:              Current medications were reviewed today as discussed above.      Medication Adherence: good, no issues identified    Diabetes: Needs Improvement. Patient is meeting A1c goal of < 7%. Self monitoring of blood glucose is not at goal of fasting  mg/dL and post prandial < 150 mg/dL. Pt would benefit from ongoing monitoring, due to recent issues of hypoglycemia and variable sugars, holding on insulin adjustment today. His increased appetite  has been beneficial for him, however is likely contributing to higher sugars, will continue to monitor and increase insulin as allows.     BPH: Needs improvement, recommend he continue off the tamsulosin given symptoms, encouraged hydration as previously discussed with Dr. Owusu.     PLAN:                  1. No changes at this time, will see PCP on 2/27 for follow up in clinic      I spent 15 minutes with this patient today. All changes were made via collaborative practice agreement with Dr. Hart. A copy of the visit note was provided to the patient's primary care provider.     Will follow up in 2 weeks on sugars.    The patient was given a summary of these recommendations as an after visit summary.    Aliyah Angeles, Pharm.D, [facility]  Medication Therapy Management Pharmacist  [phone]

## 2019-02-20 ENCOUNTER — PATIENT OUTREACH (OUTPATIENT)
Dept: CARE COORDINATION | Facility: CLINIC | Age: 59
End: 2019-02-20

## 2019-02-20 ENCOUNTER — TELEPHONE (OUTPATIENT)
Dept: PHARMACY | Facility: PHYSICIAN GROUP | Age: 59
End: 2019-02-20

## 2019-02-20 DIAGNOSIS — E11.3393 TYPE 2 DIABETES MELLITUS WITH BOTH EYES AFFECTED BY MODERATE NONPROLIFERATIVE RETINOPATHY WITHOUT MACULAR EDEMA, WITHOUT LONG-TERM CURRENT USE OF INSULIN (H): ICD-10-CM

## 2019-02-20 RX ORDER — INSULIN GLARGINE 100 [IU]/ML
8 INJECTION, SOLUTION SUBCUTANEOUS AT BEDTIME
Qty: 15 ML | Refills: 1
Start: 2019-02-20 | End: 2019-04-15

## 2019-02-20 NOTE — PROGRESS NOTES
Clinic Care Coordination Contact  Outreach    Data: Patient emailed a request asking about how to go about getting a PCA.    Per chart review, patient has Medicare and Medicaid for health insurance. Writer emailed patient back today and suggested that patient start by calling [facility] ([phone]) to initiate the process of obtaining a PCA (Personal Care Attendant).    Plan: -[facility] will follow at this time for possible community resource needs.      NATALIE Garcia  [facility] Care Coordination  Clinic: [facility]   Email: [email or web address]  Tele: [phone]

## 2019-02-20 NOTE — TELEPHONE ENCOUNTER
Patient called due to anxiety about his high blood sugar numbers, they jumped up last night (they have done this a few times). Went up to 427, then 323 and then 284 before bed. At 10:45 am this morning was at 269.     He has hx of lows at times, taking 7 units basaglar currently. Recommend he increase to 8 units for now and continue his monitoring. Change made per CPA with .    We are scheduled for follow up on 3/5/19, but he is to call back sooner if sugars remain over 300mg/dl.    Aliyah Angeles, Pharm.D, [facility]  Medication Therapy Management Pharmacist  [phone]

## 2019-02-21 ENCOUNTER — TELEPHONE (OUTPATIENT)
Dept: PSYCHIATRY | Facility: CLINIC | Age: 59
End: 2019-02-21

## 2019-02-21 NOTE — TELEPHONE ENCOUNTER
"    [facility]         SITUATION                                                                                                                Writer received telephone call from pt for a weekly checkin.           BACKGROUND                                                                                                                     Pt identified that he felt tired yesterday and that his sleep is still interrupted with occasional difficulty falling asleep, and that it usually takes a while to return to sleep upon awakening. He reported that he has been taking two trazodone tablets, but has not noticed much change in his sleep. He identified having approximately 30 trazodone tablets remaining and would contact the pharmacy if he needed more.        ASSESSMENT                                                                                                                    Pt states that his usual AH of a sewing machine sound continues, but that it's occurring less frequently - \"it's not as much.\"  When asked how his mood was, pt stated \"I don't know. It kind of depends on the day.\"  Pt stated that regarding anxiety, he has been \"kind of anxious a little bit,\" but unable to identify a cause, stating \"maybe this cabin fever.\"  Pt stated that he has experienced \"a little bit\" of racing thoughts during the day, and that they subside without his taking any action.  Pt denies SI, SIB, HI     Pt reports taking his medications as prescribed.     Pt presented with a voice that seemed less monotone than last week and with a more normal rate of speech. No difficulty finding words was observed.     RECOMMENDATION                                                                                                            Writer requested that pt contact clinic in one week to review his symptoms.  Writer observed that provider is not available on 3/4 for pt's scheduled appointment. He agreed " to meet on 3/11 @ 10:40AM. Writer sent message to scheduling team to convert held time to appointment and cancel 3/4 appointment.       Routed to provider.

## 2019-02-22 ENCOUNTER — TELEPHONE (OUTPATIENT)
Dept: PSYCHIATRY | Facility: CLINIC | Age: 59
End: 2019-02-22

## 2019-02-22 NOTE — TELEPHONE ENCOUNTER
Called Caden today and LVM. Thanked him for weekly F/U with clinic nurse, updating us of his insomnia.     I encouraged him to prepare a sleep diary (left the details) and track his sleep pattern for a week prior to his next appointment.     Natali Dang MD  PGY3 Psychiatry Resident

## 2019-02-25 ENCOUNTER — PATIENT OUTREACH (OUTPATIENT)
Dept: CARE COORDINATION | Facility: CLINIC | Age: 59
End: 2019-02-25

## 2019-02-25 ENCOUNTER — TELEPHONE (OUTPATIENT)
Dept: PSYCHIATRY | Facility: CLINIC | Age: 59
End: 2019-02-25

## 2019-02-25 NOTE — PROGRESS NOTES
"Clinic Care Coordination Contact  Outreach    Data: Patient sent writer several emails this weekend. Initially, patient asked if writer knew about group homes or nursing homes in [facility]. In patient's most recent email of yesterday, patient wrote:    \"Lolly the reason I'm asking for a group home or assistant living is I sleep a lot because of my depression and I need some nursing as far as my medicine and I rely on a pill reminder . There are times I have not taken my medication. I know I cannot stay in my apartment I cannot wait for a pca. I can only do basic things like heating up food, some light cleaning and need a diet for my diabetes and gain wait. I tired all the time. My appearance has started taking a turn. My blood sugar is high and it went 2 times the normal rate for me a few times. I'm not certain how to go about getting into assistant living.\"    Writer noted that patient is scheduled to meet with Dr. Annie Hart in the [facility] clinic on 2/27/19. Writer asked patient (in the email) if patient would like to meet with writer when patient is in the clinic.    Plan: Writer will talk further with patient--in the clinic if that is OK with patient--about placement options for him.    Lolly Armando, [facility] Care Coordination  Clinic: [facility]   Email: [email or web address]  Tele: [phone]            "

## 2019-02-25 NOTE — TELEPHONE ENCOUNTER
Natali Powell MD Snyder, David J, RN   Caller: Unspecified (Today, 11:02 AM)             I just need him to track his sleep through a sleep diary for a week prior to coming to next appointment.     May be something as simple as this one would help:     [email or web address]:     Also knowing when he took his medications at night.     Thanks Brian    Previous Messages      ----- Message -----   From: Leonard Jama RN   Sent: 2/25/2019  11:41 AM   To: Leonard Jama RN, Natali Dang MD     ----- Message from Leonard Jama RN sent at 2/25/2019 11:41 AM CST -----   Dr. Dang:   Part of your Friday message to Caden got cut off.   He called and I went over general things that get recorded in a sleep diary.   Was there anything specific that you were looking for him to track?   Brian         Writer routed to pt via [employer], which he identified using.

## 2019-02-25 NOTE — TELEPHONE ENCOUNTER
Writer received telephone call from pt, who identified that part of the message that the provider left on 2/22 got cut off.  Writer reviewed with pt some sleep diary info and identified a plan to request information from the provider on any specific she is looking for.    Routed to provider.

## 2019-02-27 NOTE — PROGRESS NOTES
3 week f/u  SUBJECTIVE:   Caden Bush is a 58 year old male who presents to clinic today for the following health issues:  Schizoaffective patient  He met with Lolly IVEY for housing ideas    Sleep nocturia x2  Appetite ate today. Muffin water  Exercise working out weights, stationary bike 1/2 hour. One week ago    Smoking no  ETOH no  Street drugs/MJ no  Caffeine yes Sunday    Fall no  Seizure no  LOC no    Depression some  Anxiety some  Panic no  SI/SP no  Hallucinations no  Paranoid no  Manic no  OCD no  PTSD no  Gambling no    Psychiatry coming up  Counseling yes weekly    HA sometimes tylenol helps  GERD no  CP no  SOB no  BM today first time in one week. Normal  Urine ok  Skin ok, dryness     345 pm yesterday  Lab Results   Component Value Date    A1C 6.7 01/02/2019    A1C 6.7 07/17/2018    A1C 8.2 03/19/2018    A1C 6.8 09/15/2017    A1C 8.2 06/07/2017     Foot no issues  Trouble with  soreness when cold        Creatinine   Date Value Ref Range Status   02/13/2019 1.21 0.76 - 1.27 mg/dL Final     LDL Cholesterol Calculated   Date Value Ref Range Status   01/02/2019 33 0 - 99 mg/dL Final     Last Comprehensive Metabolic Panel:  Sodium   Date Value Ref Range Status   02/13/2019 136 134 - 144 mmol/L Final     Potassium   Date Value Ref Range Status   02/13/2019 5.5 (H) 3.5 - 5.2 mmol/L Final     Chloride   Date Value Ref Range Status   02/13/2019 99 96 - 106 mmol/L Final     Carbon Dioxide   Date Value Ref Range Status   01/30/2018 26 20 - 32 mmol/L Final     Anion Gap   Date Value Ref Range Status   01/30/2018 7 3 - 14 mmol/L Final     Glucose   Date Value Ref Range Status   02/13/2019 254 (H) 65 - 99 mg/dL Final     Urea Nitrogen   Date Value Ref Range Status   02/13/2019 16 6 - 24 mg/dL Final     BUN/Creatinine Ratio   Date Value Ref Range Status   02/13/2019 13 9 - 20 Final     Creatinine   Date Value Ref Range Status   02/13/2019 1.21 0.76 - 1.27 mg/dL Final     GFR Estimate   Date Value Ref Range  Status   01/30/2018 62 >60 mL/min/1.7m2 Final     Comment:     Non  GFR Calc     Calcium   Date Value Ref Range Status   02/13/2019 10.0 8.7 - 10.2 mg/dL Final         Problem list and histories reviewed & adjusted, as indicated.  Additional history: as documented    Patient Active Problem List   Diagnosis     Eczema     Retinopathy     Moderate episode of recurrent major depressive disorder (H)     Polyp of colon, adenomatous     Health Care Home     Hx of psychiatric care     Neuropathy of left upper extremity     Type 2 diabetes mellitus with both eyes affected by moderate nonproliferative retinopathy without macular edema, without long-term current use of insulin (H)     Schizoaffective disorder, bipolar type (H)     Past Surgical History:   Procedure Laterality Date     APPENDECTOMY       COLONOSCOPY  2012    2 adenomatous polyps       Social History     Tobacco Use     Smoking status: Never Smoker     Smokeless tobacco: Never Used   Substance Use Topics     Alcohol use: No     Alcohol/week: 0.0 oz     Family History   Problem Relation Age of Onset     Osteoporosis Mother      Breast Cancer Mother 78     Osteoporosis Father      Asthma Father      Cancer - colorectal Father 70     Rheumatoid Arthritis Sister          Current Outpatient Medications   Medication Sig Dispense Refill     ACE/ARB/ARNI NOT PRESCRIBED, INTENTIONAL, Please choose reason not prescribed, below-  hypotension       acetaminophen 500 MG CAPS Take 2 capsules by mouth 3 times daily as needed       aspirin 81 MG EC tablet Take 81 mg by mouth daily.       blood glucose (NO BRAND SPECIFIED) lancets standard Use to test blood sugar 2 times daily or as directed. 100 each 11     blood glucose monitoring (NO BRAND SPECIFIED) meter device kit Use to test blood sugar 2 times daily or as directed. 1 kit 0     blood glucose monitoring (NO BRAND SPECIFIED) test strip Use to test blood sugars 2 times daily or as directed 100 strip 3      cholecalciferol (VITAMIN D) 1000 UNIT tablet Take 1 tablet by mouth daily.       Cyanocobalamin (B-12) 1000 MCG CAPS Take 1 capsule by mouth daily       ferrous sulfate (IRON) 325 (65 Fe) MG tablet Take 325 mg by mouth daily       FISH OIL 1 capsule daily        insulin glargine (BASAGLAR KWIKPEN) 100 UNIT/ML pen Inject 8 Units Subcutaneous At Bedtime We will make further adjustments as we see your response 15 mL 1     insulin pen needle (BD BRENDAN U/F) 32G X 4 MM Use 1 daily as directed. 100 each 11     metFORMIN (GLUCOPHAGE-XR) 500 MG 24 hr tablet TAKE 2 TABLETS BY MOUTH TWICE DAILY 360 tablet 1     pioglitazone (ACTOS) 45 MG tablet TAKE 1 TABLET BY MOUTH DAILY 90 tablet 0     risperiDONE (RISPERDAL) 3 MG tablet Take 1 tablet (3 mg) by mouth daily 30 tablet 0     Sharps Container (COMPLETE NEEDLE COLLECTION SYS) MISC 1 each daily 1 each 1     simvastatin (ZOCOR) 40 MG tablet Take 0.5 tablets (20 mg) by mouth At Bedtime 45 tablet 3     traZODone (DESYREL) 50 MG tablet Take 2 tablets (100 mg) by mouth nightly as needed for sleep 30 tablet 2     venlafaxine (EFFEXOR XR) 75 MG 24 hr capsule Take 1 capsule (75 mg) by mouth daily TAKE WITH  Effexor 150 mg for a total dose of 225 mg daily 30 capsule 0     venlafaxine (EFFEXOR-XR) 150 MG 24 hr capsule TAKE 1 CAPSULE BY MOUTH DAILY ALONG WITH A 75 MG FOR A TOTAL  MG DAILY 30 capsule 0     No Known Allergies  Recent Labs   Lab Test 02/13/19  1625 01/02/19  1045  07/17/18  1126 04/17/18  1356 03/19/18  1116 01/30/18  0923  07/11/17  1034  06/09/16  1030   A1C  --  6.7*  --  6.7*  --  8.2*  --    < >  --    < > 7.3*   LDL  --  33  --   --  45  --   --   --  40   < > 45   HDL  --  84  --   --  85  --   --   --  76   < > 69   TRIG  --  51  --   --  45  --   --   --  49   < > 55   ALT  --  13  --   --  12  --  16  --  12   < > 27   CR 1.21 1.06   < >  --  1.10  --  1.20  --  1.17   < > 1.26*   GFRESTIMATED  --   --   --   --   --   --  62  --   --   --  59*   GFRESTBLACK  --    --   --   --   --   --  75  --   --   --  71   POTASSIUM 5.5* 5.2   < >  --  4.7  --  4.5  --  4.9   < > 4.9   TSH  --   --   --   --   --   --  0.72  --   --   --  1.70    < > = values in this interval not displayed.      BP Readings from Last 3 Encounters:   03/04/19 140/68   02/13/19 (!) 70/58   01/28/19 132/82    Wt Readings from Last 3 Encounters:   03/04/19 62.1 kg (137 lb)   02/13/19 62.1 kg (137 lb)   01/28/19 63.3 kg (139 lb 9.6 oz)                  Labs reviewed in EPIC    Reviewed and updated as needed this visit by clinical staff       Reviewed and updated as needed this visit by Provider         ROS:  Constitutional, HEENT, cardiovascular, pulmonary, GI, , musculoskeletal, neuro, skin, endocrine and psych systems are negative, except as otherwise noted.    OBJECTIVE:     /68   Pulse 108   Resp 16   Wt 62.1 kg (137 lb)   SpO2 97%   BMI 19.11 kg/m    There is no height or weight on file to calculate BMI.  GENERAL: healthy, alert and no distress  EYES: Eyes grossly normal to inspection, PERRL and conjunctivae and sclerae normal  HENT: ear canals and TM's normal, nose and mouth without ulcers or lesions  NECK: no adenopathy, no asymmetry, masses, or scars and thyroid normal to palpation  RESP: lungs clear to auscultation - no rales, rhonchi or wheezes  CV: regular rate and rhythm, normal S1 S2, no S3 or S4, no murmur, click or rub, no peripheral edema and peripheral pulses strong  ABDOMEN: soft, nontender, no hepatosplenomegaly, no masses and bowel sounds normal  MS: no gross musculoskeletal defects noted, no edema  SKIN: no suspicious lesions or rashes  NEURO: Normal strength and tone, mentation intact and speech normal  PSYCH: mentation appears normal, affect schizoaffective fair eye contact alert and cooperative  LYMPH: no cervical, supraclavicular, axillary, or inguinal adenopathy  Foot exam deferred    Diagnostic Test Results:  Results for orders placed or performed in visit on 02/13/19    Basic Metabolic Panel (8) ([facility])   Result Value Ref Range    Glucose 254 (H) 65 - 99 mg/dL    Urea Nitrogen 16 6 - 24 mg/dL    Creatinine 1.21 0.76 - 1.27 mg/dL    eGFR If NonAfricn Am 65 >59 mL/min/1.73    eGFR If Africn Am 75 >59 mL/min/1.73    BUN/Creatinine Ratio 13 9 - 20    Sodium 136 134 - 144 mmol/L    Potassium 5.5 (H) 3.5 - 5.2 mmol/L    Chloride 99 96 - 106 mmol/L    Total CO2 24 20 - 29 mmol/L    Calcium 10.0 8.7 - 10.2 mg/dL    Narrative    Performed at:  01 - [facility] [address], [place], [place]  [id number]  : Rigoberto Sanchez MD, Phone:  [phone]   CBC with Diff/Plt ([facility])   Result Value Ref Range    WBC x10/cmm 5.3 3.8 - 11.0 x10/cmm    % Lymphocytes 8.6 (A) 20.5 - 51.1 %    % Monocytes 3.8 1.7 - 9.3 %    % Granulocytes 87.6 (A) 42.2 - 75.2 %    RBC x10/cmm 4.21 4.2 - 5.9 x10/cmm    Hemoglobin 12.7 (A) 13.4 - 17.5 g/dl    Hematocrit 38.1 (A) 39 - 51 %    MCV 90.3 80 - 100 fL    MCH 30.2 27.0 - 31.0 pg    MCHC 33.4 33.0 - 37.0 g/dL    Platelet Count 162 140 - 450 K/uL   TSH ([facility])   Result Value Ref Range    TSH 0.855 0.450 - 4.500 uIU/mL    Narrative    Performed at:  01 - [facility] [address], [place], [place]  [id number]  : Rigoberto Sanchez MD, Phone:  [phone]   Thyroxine (T4) Free  Direct  S ([facility])   Result Value Ref Range    T4 Free 1.30 0.82 - 1.77 ng/dL    Narrative    Performed at:  01 - [facility] [address], [place], [place]  [id number]  : Rigoberto Sanchez MD, Phone:  [phone]       ASSESSMENT/PLAN:     ASSESSMENT / PLAN:  (E87.5) Hyperkalemia  (primary encounter diagnosis)  Comment:   Plan: Basic Metabolic Panel (8) ([facility])            (Z23) Need for prophylactic vaccination and inoculation against influenza  Comment:   Plan: Pneumococcal vaccine 13 valent PCV13 IM         (Prevnar) [13119], ADMIN MEDICARE: Pneumococcal        Vaccine ()            (F25.0) Schizoaffective disorder, bipolar type (H)  Comment:   Plan:  per psychiatry    (E11.1914) Type 2 diabetes mellitus with both eyes affected by moderate nonproliferative retinopathy without macular edema, without long-term current use of insulin (H)  Comment:   Plan: f/u a1c at next visit        Patient Instructions   Pneumonia vaccine 13 today    BMP recheck for high potassium    F/u labs in 3 months          Emmy Owusu MD  [facility]

## 2019-03-04 ENCOUNTER — OFFICE VISIT (OUTPATIENT)
Dept: FAMILY MEDICINE | Facility: CLINIC | Age: 59
End: 2019-03-04

## 2019-03-04 ENCOUNTER — PATIENT OUTREACH (OUTPATIENT)
Dept: CARE COORDINATION | Facility: CLINIC | Age: 59
End: 2019-03-04

## 2019-03-04 VITALS
SYSTOLIC BLOOD PRESSURE: 140 MMHG | HEART RATE: 108 BPM | RESPIRATION RATE: 16 BRPM | BODY MASS INDEX: 19.11 KG/M2 | WEIGHT: 137 LBS | OXYGEN SATURATION: 97 % | DIASTOLIC BLOOD PRESSURE: 68 MMHG

## 2019-03-04 DIAGNOSIS — Z23 NEED FOR PROPHYLACTIC VACCINATION AND INOCULATION AGAINST INFLUENZA: ICD-10-CM

## 2019-03-04 DIAGNOSIS — F25.0 SCHIZOAFFECTIVE DISORDER, BIPOLAR TYPE (H): ICD-10-CM

## 2019-03-04 DIAGNOSIS — E11.3393 TYPE 2 DIABETES MELLITUS WITH BOTH EYES AFFECTED BY MODERATE NONPROLIFERATIVE RETINOPATHY WITHOUT MACULAR EDEMA, WITHOUT LONG-TERM CURRENT USE OF INSULIN (H): ICD-10-CM

## 2019-03-04 DIAGNOSIS — E87.5 HYPERKALEMIA: Primary | ICD-10-CM

## 2019-03-04 PROCEDURE — G0009 ADMIN PNEUMOCOCCAL VACCINE: HCPCS | Performed by: FAMILY MEDICINE

## 2019-03-04 PROCEDURE — 99214 OFFICE O/P EST MOD 30 MIN: CPT | Performed by: FAMILY MEDICINE

## 2019-03-04 PROCEDURE — 90670 PCV13 VACCINE IM: CPT | Performed by: FAMILY MEDICINE

## 2019-03-04 ASSESSMENT — ACTIVITIES OF DAILY LIVING (ADL): DEPENDENT_IADLS:: INDEPENDENT

## 2019-03-04 NOTE — PROGRESS NOTES
Clinic Care Coordination Contact  Face-to-face meeting    Data: Writer met with patient in the [facility] today. Patient said that he (patient) would like to move from his independent apartment into housing with more support. Patient is concerned that he (patient) is not managing his health well--especially the diabetes. Patient said that he (patient) is somewhat forgetful and probably misses medications once or twice per week. Patient also feels that he (patient) is not eating nutritious meals often enough.    Patient said that he (pt) has a CADI waiver, and his CADI  is Qing Durán at [phone]. Patient would like to move into a group home or assisted living facility in [facility] as he has family in that area. Patient said that Ms. Durán can only help patient find housing in [place].    Patient gave writer permission to contact Qing Durán and signed a Medical Records Release Authroization form.     Writer provided patient with a senior housing book--which includes residential homes and assisted living options.    Writer mailed a Complex Care plan to patient today.    Plan: Writer will plan to call patient's CADI worker and coordinate with her regarding housing options for patient.      Lolly Armando [facility] Care Coordination  Clinic: [facility]  Email: [email or web address]  Tele: [phone]

## 2019-03-04 NOTE — LETTER
[facility] Home  Complex Care Plan  About Me  Patient Name:  Caden Bush    YOB: 1960  Age:     58 year old   [place] MRN:   [id number] Telephone Information:  Home Phone [phone]   Mobile [phone]       Address:    [address]  [facility] [address] Email address:  [email or web address]      Emergency Contact(s)  Name Relationship Lgl Grd Work Phone Home Phone Mobile Phone   PEPPER JIMENEZ Mother   [phone]            Primary language:  English     needed? No   [place] [employer] [facility]:  [phone] op. 1  Other communication barriers:    Preferred Method of Communication:  Mail  Current living arrangement: I live alone  Mobility Status/ Medical Equipment: Independent    Health Maintenance  Health Maintenance Reviewed: Not assessed    My Access Plan  Medical Emergency 911   Primary Clinic Line   - [phone]   24 Hour Appointment Line [phone] or  [phone]) (toll-free)   24 Hour Nurse Line [phone] (toll-free)   Preferred Urgent Care [facility], [phone]   Preferred Hospital [facility]  [phone]   [facility]erred [facility] [address] [place] [address] [address] AT [facility] [address]     Behavioral Health Crisis Line The National Suicide Prevention Lifeline at 1-956.576.5171 or 911     My Care Team Members    Patient Care Team       Relationship Specialty Notifications Start End    Annie Hart MD PCP - General Family Practice  3/19/18     Phone: [phone] Fax: [phone] [address] [place] [address]    Maty Porras MD MD Psychiatry  8/17/15     Attending provider    Phone: [phone] Fax: [phone]         [facility] [facility] [address]    Yeny Treviño MD Resident Psychiatry Admissions 7/25/16     Phone: [phone] Fax: [phone]         [facility] [address]  [facility] [place] [address]    Nam Kessler MD Resident Student in organized health care education/training program  1/22/18     Phone: [phone] Fax: [phone]         [facility] [address] [facility] [place] [address]    Linda Bradley MD MD Psychiatry  1/22/18     Phone: [phone] Fax: [phone]         [facility] [address]    Annie Hart MD Assigned PCP   6/3/18     Phone: [phone] Fax: [phone] [address] [place] [address]    Lolly Armando LSW Lead Care Coordinator Primary Care - [facility] Admissions 2/20/19     Phone: [phone] Fax: [phone]                My Care Plans  Self Management and Treatment Plan  Goals and (Comments)  Goals        General    Psychosocial (pt-stated)     Notes - Note created  3/5/2019  3:29 PM by Lolly Armando LSW    Goal Statement: I want to move into a group home or assisted living.  Measure of Success: Move accomplished.  Supportive Steps to Achieve: I will work with my county worker and the clinic Care Coordinator to explore options.  Barriers: Finding an affordable housing option.  Strengths: Able to advocate for self.  Date to Achieve By: 5/5/19  Patient expressed understanding of goal: yes               Action Plans on File:      Depression       Advance Care Plans/Directives Type:        My Medical and Care Information  Problem List   Patient Active Problem List   Diagnosis     Eczema     Retinopathy     Moderate episode of recurrent major depressive disorder (H)     Polyp of colon, adenomatous     Health Care Home     Hx of psychiatric care     Neuropathy of left upper extremity     Type 2 diabetes mellitus with both eyes affected by moderate nonproliferative retinopathy without macular edema, without long-term current use of insulin (H)     Schizoaffective disorder, bipolar type (H)      Current Medications and Allergies:  See printed Medication Report.    Care Coordination Start  Date: 3/4/2019   Frequency of Care Coordination:     Form Last Updated: 03/05/2019

## 2019-03-05 ENCOUNTER — ALLIED HEALTH/NURSE VISIT (OUTPATIENT)
Dept: PHARMACY | Facility: PHYSICIAN GROUP | Age: 59
End: 2019-03-05
Payer: MEDICAID

## 2019-03-05 DIAGNOSIS — E11.3393 TYPE 2 DIABETES MELLITUS WITH BOTH EYES AFFECTED BY MODERATE NONPROLIFERATIVE RETINOPATHY WITHOUT MACULAR EDEMA, WITHOUT LONG-TERM CURRENT USE OF INSULIN (H): Primary | ICD-10-CM

## 2019-03-05 LAB
BUN SERPL-MCNC: 19 MG/DL (ref 6–24)
BUN/CREATININE RATIO: 19 (ref 9–20)
CALCIUM SERPL-MCNC: 9.5 MG/DL (ref 8.7–10.2)
CHLORIDE SERPLBLD-SCNC: 93 MMOL/L (ref 96–106)
CREAT SERPL-MCNC: 1.02 MG/DL (ref 0.76–1.27)
EGFR IF AFRICN AM: 93 ML/MIN/1.73
EGFR IF NONAFRICN AM: 80 ML/MIN/1.73
GLUCOSE SERPL-MCNC: 444 MG/DL (ref 65–99)
POTASSIUM SERPL-SCNC: 4.5 MMOL/L (ref 3.5–5.2)
SODIUM SERPL-SCNC: 133 MMOL/L (ref 134–144)
TOTAL CO2: 26 MMOL/L (ref 20–29)

## 2019-03-05 PROCEDURE — 99607 MTMS BY PHARM ADDL 15 MIN: CPT | Performed by: PHARMACIST

## 2019-03-05 PROCEDURE — 99606 MTMS BY PHARM EST 15 MIN: CPT | Performed by: PHARMACIST

## 2019-03-05 RX ORDER — PIOGLITAZONEHYDROCHLORIDE 45 MG/1
45 TABLET ORAL DAILY
Qty: 90 TABLET | Refills: 0 | Status: SHIPPED | OUTPATIENT
Start: 2019-03-05 | End: 2019-03-25

## 2019-03-05 NOTE — PROGRESS NOTES
SUBJECTIVE/OBJECTIVE:                Caden Bush is a 58 year old male called for a follow-up visit for Medication Therapy Management.  He was referred to me from Dr. Hart.     Chief Complaint: Follow up from our visit on 2/18.      Tobacco: No tobacco use  Alcohol: not currently using    Medication Adherence/Access:  no issues reported    Diabetes:  Pt currently taking metformin ER 500mg- 2 BID, Pioglitazone 45mg daily and basaglar 8 units in the HS. Pt is not experiencing side effects. Met with CDE to discuss diet and this has been very helpful to him, continues to have low weight and not always eating enough.    Stopped Glipizide 5mg with the start of insulin per PCP.   SMBG: two times daily, Ranges (patient reported): newest to oldest   Newest to oldest (fasting,evening):    Date FBG Evening   3/5 151    3/4 118 310   3/3 215 445   3/2 108 146   3/1 249 221   2/28 117    2/27 138 182   2/26 139 205   2/25 143    2/24 71 (12am)  138 (1:30am)  168 278   2/23 104 199   2/22 156 271   2/21 196 158   2/20 269 371   2/19 109 177     Lab Results   Component Value Date    A1C 6.7 01/02/2019    A1C 6.7 07/17/2018    A1C 8.2 03/19/2018    A1C 6.8 09/15/2017    A1C 8.2 06/07/2017       Today's Vitals: There were no vitals taken for this visit.- phone visit    ASSESSMENT:              Current medications were reviewed today as discussed above.      Medication Adherence: fair, no issues identified    Diabetes: Needs Improvement. Patient is meeting A1c goal of < 7%, however blood sugars are not at goal. GLP1 agonist not ideal given issues with maintaining weight.   SGLT2 inhibitors not idea given his urinary issues.  Unknown status of his beta cells, given insulin sensitivity and high post-prandial meal sugars, may need to add in meal correction, but may be best to test c-peptide before to see if possibly adding back Sulfonylurea would make sense.   Also could consider DPP4 inhibitor too if needed.      PLAN:                   1.Continue current doses for now.     2. MTM will discuss with PCP if c-peptide or diagnostic CGM could be done.     I spent 20 minutes with this patient today. All changes were made via collaborative practice agreement with Dr. Hart. A copy of the visit note was provided to the patient's primary care provider.     Will follow up in 2 weeks.    The patient declined a summary of these recommendations as an after visit summary.    Aliyah Angeles, Pharm.D, [facility]  Medication Therapy Management Pharmacist  [phone]

## 2019-03-05 NOTE — Clinical Note
See my note for details. I am not sure what to do with his sugars, he is on very low insulin which lowers him a ton over night, but spikes over 400 in the evening at times. Continues to have trouble maintaining weight so want to avoid GLP1 agonists and frequent urinary problems so want to avoid SGLT2 inhibitors. I am wondering if you think it makes sense to check a c-peptide at all to see if we need to add low dose meal time insulin if he isn't making any insulin or if he does have residual insulin, then maybe we could add back the sulfonylurea to help meal spikes. What are your thoughts? Feel free to call me too if easier to chat on the phone- [phone]. Thanks,Aliyah Angeles, Pharm.D, BCACPMedication Therapy Management [phone]

## 2019-03-06 DIAGNOSIS — F25.0 SCHIZOAFFECTIVE DISORDER, BIPOLAR TYPE (H): ICD-10-CM

## 2019-03-07 NOTE — TELEPHONE ENCOUNTER
Last seen: 2/7  RTC: 3 months  Cancel: 3/4 Rescheduled - Provider not in clinic.  No-show: None  Next appt: 3/11    Incoming refill from pharmacy via electronic request      Disp Refills Start End MAIKEL   traZODone (DESYREL) 50 MG tablet 30 tablet 2 2/18/2019  No   Sig - Route: Take 2 tablets (100 mg) by mouth nightly as needed for sleep     From 2/7/19 visit note:  - Scheduled trazodone to 50 mg qhs for a week and plan to increase the dose to 100mg.

## 2019-03-08 RX ORDER — TRAZODONE HYDROCHLORIDE 50 MG/1
100 TABLET, FILM COATED ORAL AT BEDTIME
Qty: 30 TABLET | Refills: 0 | Status: SHIPPED | OUTPATIENT
Start: 2019-03-08 | End: 2019-03-23

## 2019-03-08 NOTE — TELEPHONE ENCOUNTER
Health Call Center    Phone Message    May a detailed message be left on voicemail: yes    Reason for Call: Medication Refill Request    Has the patient contacted the pharmacy for the refill? Yes   Name of medication being requested: trazadone  Provider who prescribed the medication: Natali Dang MD  Pharmacy: [facility]  Date medication is needed: 3/9/19    The patient stated that he is still having trouble sleeping even with the trazadone.        Action Taken: Message routed to:  Other: Leonard Jama RNCC

## 2019-03-08 NOTE — TELEPHONE ENCOUNTER
Writer e-prescribed 15-day supply to [facility] ([phone]). Qty 30, refills 0.

## 2019-03-08 NOTE — TELEPHONE ENCOUNTER
"Writer telephoned pt to identify trazodone refill being sent and explore his symptoms.    Pt reports that his sleep difficulties are due in part to having to get up during night to use toilet and having difficulty returning to sleep.  Pt identified that on Monday (3/4) at 5:00AM he awoke hearing the AH noise that is typical for him and he couldn't get back to sleep.  Pt stated that his anxiety has been \"a little high,\" in part thinking about moving after 8 years in same location.  Pt identified wondering if Effexor is working well enough in light of his sleep problems.  When his mood was explored, pt stated that most days he is tired, feeling between awake and half asleep.  Pt denies SI, SIB, HI.    Pt presented with a voice that seemed less monotone than at the previous encounter and with a more normal rate of speech. No difficulty finding words was observed.    Routed to provider.        "

## 2019-03-11 ENCOUNTER — PATIENT OUTREACH (OUTPATIENT)
Dept: CARE COORDINATION | Facility: CLINIC | Age: 59
End: 2019-03-11

## 2019-03-11 DIAGNOSIS — F25.0 SCHIZOAFFECTIVE DISORDER, BIPOLAR TYPE (H): ICD-10-CM

## 2019-03-11 RX ORDER — VENLAFAXINE HYDROCHLORIDE 150 MG/1
CAPSULE, EXTENDED RELEASE ORAL
Qty: 30 CAPSULE | Refills: 1 | Status: SHIPPED | OUTPATIENT
Start: 2019-03-11 | End: 2019-05-10

## 2019-03-11 RX ORDER — VENLAFAXINE HYDROCHLORIDE 75 MG/1
75 CAPSULE, EXTENDED RELEASE ORAL DAILY
Qty: 30 CAPSULE | Refills: 1 | Status: SHIPPED | OUTPATIENT
Start: 2019-03-11 | End: 2019-05-10

## 2019-03-11 NOTE — PROGRESS NOTES
Called and spoke with patient regarding needed lab work per Dr Hart and Aliyah Henson. Patient will call clinic to schedule this lab only appointment. Becca Trejo LPN

## 2019-03-12 NOTE — PROGRESS NOTES
Clinic Care Coordination Contact  Outreach    Data: Writer called patient's CADI , Qing Durán ([phone]), this afternoon. Writer left a voice message with call back information.    Plan: [facility] will continue to follow for housing and community resources.      Lolly Armando, [facility] Care Coordination  Clinic: [facility]  Email: [email or web address]  Tele: [phone]      Addendum  3/15/19  Data; Writer has not heard back from patient's CADI  Qing Durán as yet.    Writer met with patient in the clinic today. Patient said that he (patient) has not talked with Ms. Durán recently. Patient has no new information regarding housing and said that he had not had the chance to review the housing book that writer gave to patient at the last meeting in the clinic (3/4/19).    While patient was present, writer called Qing Durán and left a voice message requesting a return call. Patient also stated in this message that he (patient) is giving permission for Ms. Durán to talk with writer.    Patient gave writer's Ms. Durán' fax number. Writer faxed the Medical Records Release Authorization form that patient had signed earlier (allowing information to be shared with Qing Durán).    Plan: [facility] will continue to follow for housing concerns.      Lolly Armando [facility] Care Coordination  Clinic: [facility]  Email: [email or web address]  Tele: [phone]

## 2019-03-15 DIAGNOSIS — N40.0 BPH (BENIGN PROSTATIC HYPERPLASIA): Primary | ICD-10-CM

## 2019-03-15 DIAGNOSIS — F25.0 SCHIZOAFFECTIVE DISORDER, BIPOLAR TYPE (H): ICD-10-CM

## 2019-03-15 DIAGNOSIS — E87.5 SERUM POTASSIUM ELEVATED: ICD-10-CM

## 2019-03-15 DIAGNOSIS — E11.3393 TYPE 2 DIABETES MELLITUS WITH BOTH EYES AFFECTED BY MODERATE NONPROLIFERATIVE RETINOPATHY WITHOUT MACULAR EDEMA, WITHOUT LONG-TERM CURRENT USE OF INSULIN (H): ICD-10-CM

## 2019-03-15 PROCEDURE — 36415 COLL VENOUS BLD VENIPUNCTURE: CPT | Performed by: FAMILY MEDICINE

## 2019-03-15 NOTE — PROGRESS NOTES
opoen order for gluc, K+, anti glutamic acid decorb, c peptide- pt just finished 1/2 muffin due to elevated BS at home    Lucy Guzmán MA March 15, 2019 10:19 AM

## 2019-03-15 NOTE — TELEPHONE ENCOUNTER
Medication requested: risperiDONE (RISPERDAL) 3 MG tablet  Last refilled: 2/17/19  Qty: 30      Last seen: 2/7/19  RTC: 3 months  Cancel: 2  No-show: 0  Next appt: 3/26/19    Refill decision:   Refill pended and routed to the provider for review/determination due to   CANCEL X 2  .

## 2019-03-16 LAB
C-PEPTIDE: 2.1 NG/ML (ref 1.1–4.4)
GLUCOSE SERPL-MCNC: 346 MG/DL (ref 65–99)
POTASSIUM SERPL-SCNC: 4.8 MMOL/L (ref 3.5–5.2)

## 2019-03-16 RX ORDER — RISPERIDONE 3 MG/1
3 TABLET ORAL DAILY
Qty: 30 TABLET | Refills: 0 | Status: SHIPPED | OUTPATIENT
Start: 2019-03-16 | End: 2019-05-10

## 2019-03-18 ENCOUNTER — ALLIED HEALTH/NURSE VISIT (OUTPATIENT)
Dept: PHARMACY | Facility: PHYSICIAN GROUP | Age: 59
End: 2019-03-18
Payer: MEDICAID

## 2019-03-18 DIAGNOSIS — E11.3393 TYPE 2 DIABETES MELLITUS WITH BOTH EYES AFFECTED BY MODERATE NONPROLIFERATIVE RETINOPATHY WITHOUT MACULAR EDEMA, WITHOUT LONG-TERM CURRENT USE OF INSULIN (H): Primary | ICD-10-CM

## 2019-03-18 DIAGNOSIS — F25.0 SCHIZOAFFECTIVE DISORDER, BIPOLAR TYPE (H): ICD-10-CM

## 2019-03-18 PROCEDURE — 99607 MTMS BY PHARM ADDL 15 MIN: CPT | Performed by: PHARMACIST

## 2019-03-18 PROCEDURE — 99606 MTMS BY PHARM EST 15 MIN: CPT | Performed by: PHARMACIST

## 2019-03-18 NOTE — PROGRESS NOTES
SUBJECTIVE/OBJECTIVE:                Caden Bush is a 58 year old male called for a follow-up visit for Medication Therapy Management.  He was referred to me from Dr. Hart.     Chief Complaint: Follow up from our visit on 3/5.      Tobacco: No tobacco use  Alcohol: not currently using    Medication Adherence/Access:  no issues reported    Diabetes:  Pt currently taking metformin ER 500mg- 2 BID, Pioglitazone 45mg daily and basaglar 8 units in the HS. Took 12 units last night because the number was so high.   Pt is not experiencing side effects. Met with CDE to discuss diet and this has been very helpful to him, continues to have low weight and not always eating enough.    Stopped Glipizide 5mg with the start of insulin per PCP.   More recently he has had a jump in his sugars and ongoing weight loss, so additional lab testing was ordered to ensure proper dx and treatment for him.  C-peptide= 2.1ng/ml with glucose of 346mg/dl  NEDRA antibodies still pending at time of visit- likely negative since has positive c-peptide currently.   SMBG: two times daily, Ranges (patient reported): newest to oldest   Newest to oldest (fasting,evening):    Date FBG Evening   3/18 118    3/17 191 501   3/16 166 254   3/15 111/288 170   3/14 215 270   3/13 - 221   3/12 161 378   3/11 225    3/10 190 193   3/9     3/8 164 173   3/7 217 76 (9:44pm)   3/6 177 309   3/5 - 377   3/4 151 256   Recent symptoms of high blood sugar? None  Eye exam: up to date  Foot exam: up to date  Pt is nottaking an ACEi/ARB was taken off lisinopril due to dizziness and has not been restarted.  Also has hx of lithium toxicity w/ Ace previously, but has been off lithium now since Jan 2018.  Aspirin: Taking 81mg daily and denies side effects    Lab Results   Component Value Date    A1C 6.7 01/02/2019    A1C 6.7 07/17/2018    A1C 8.2 03/19/2018    A1C 6.8 09/15/2017    A1C 8.2 06/07/2017       Depression/Schizoaffective disorder: Was in the ER on 1/10 for  depression.  Denies thoughts of harming himself. Aware to seek immediate attention if these develop. Also has contact information for his therapist. Today he is more down- bad day. He had a high sugar last night that was stressful to him. Searching for housing- getting help with Clinic [facility].   Currently on venlafaxine 225mg daily, trazodone 50mg daily and risperidone 3mg daily.  No longer on the lithium since January 2018 . 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     Today's Vitals: There were no vitals taken for this visit.- phone visit      ASSESSMENT:              Current medications were reviewed today as discussed above.      Medication Adherence: good, no issues identified    Diabetes: Needs Improvement. Patient is not meeting A1c goal of < 7%. Self monitoring of blood glucose is not at goal of fasting  mg/dL and post prandial < 150 mg/dL. Based on blood work showing C-peptide, does appear he has residual beta cell function. NEDRA antibodies are still pending at this time, but unlikely that they would be positive, if they were positive, he would be needing to transition to basal/bolus insulin regimen as he would be in the honeymoon phase of the beta cell dysfunction. Currently though, it appears type 2 and has residual beta cell function, so pt would benefit from Starting Januvia 100mg daily. Recommending DPP-4 over SFU at this time due to risk of lows, he has wide variation in his sugars day to day and concerned for hypoglycemia with him living alone.     Depression/Schizoaffective disorder:  Recommend he reach out to his care team for support given challenges today.      PLAN:                  1. Start Januvia 100mg daily.     2. Patient to reach out to therapist/psych care team.     I spent 20 minutes with this patient today. All changes were made via collaborative practice agreement with Dr. Hart. A copy of the visit note was provided to the patient's primary care provider.     Will follow up  in 2 week.    The patient declined a summary of these recommendations as an after visit summary.    Aliyah Angeles, Pharm.D, [facility]  Medication Therapy Management Pharmacist  [phone]

## 2019-03-19 ENCOUNTER — PATIENT OUTREACH (OUTPATIENT)
Dept: CARE COORDINATION | Facility: CLINIC | Age: 59
End: 2019-03-19

## 2019-03-19 NOTE — PROGRESS NOTES
Clinic Care Coordination Contact  Care Team Conversations    Data: Patient's [facility] CADI , Qing Durán, contacted writer and left a voice message for writer. Ms. Durán requested that writer contact Ms. Durán via email ([email or web address]). Writer emailed Ms. Durán to schedule a time to talk about patient.    Plan: Writer will plan to talk with patient's CADI  regarding housing.      Lolly Armando [facility] Care Coordination  Clinic: [facility]  Email: [email or web address]  Tele: [phone]

## 2019-03-20 LAB — Lab: 240 U/ML (ref 0–5)

## 2019-03-23 DIAGNOSIS — F25.0 SCHIZOAFFECTIVE DISORDER, BIPOLAR TYPE (H): ICD-10-CM

## 2019-03-25 ENCOUNTER — ALLIED HEALTH/NURSE VISIT (OUTPATIENT)
Dept: PHARMACY | Facility: PHYSICIAN GROUP | Age: 59
End: 2019-03-25
Payer: MEDICAID

## 2019-03-25 DIAGNOSIS — E10.8 TYPE 1 DIABETES MELLITUS WITH COMPLICATION (H): Primary | ICD-10-CM

## 2019-03-25 DIAGNOSIS — E13.9 LADA (LATENT AUTOIMMUNE DIABETES IN ADULTS), MANAGED AS TYPE 1 (H): ICD-10-CM

## 2019-03-25 PROCEDURE — 99606 MTMS BY PHARM EST 15 MIN: CPT | Performed by: PHARMACIST

## 2019-03-25 PROCEDURE — 99607 MTMS BY PHARM ADDL 15 MIN: CPT | Performed by: PHARMACIST

## 2019-03-25 RX ORDER — PIOGLITAZONEHYDROCHLORIDE 15 MG/1
15 TABLET ORAL DAILY
Qty: 90 TABLET | Refills: 3 | Status: SHIPPED | OUTPATIENT
Start: 2019-03-25 | End: 2019-05-03

## 2019-03-25 RX ORDER — PROCHLORPERAZINE 25 MG/1
1 SUPPOSITORY RECTAL
Qty: 9 EACH | Refills: 3 | Status: SHIPPED | OUTPATIENT
Start: 2019-03-25 | End: 2019-04-23

## 2019-03-25 RX ORDER — PROCHLORPERAZINE 25 MG/1
1 SUPPOSITORY RECTAL DAILY
Qty: 1 DEVICE | Refills: 0 | Status: SHIPPED | OUTPATIENT
Start: 2019-03-25 | End: 2019-04-23

## 2019-03-25 RX ORDER — PROCHLORPERAZINE 25 MG/1
1 SUPPOSITORY RECTAL
Qty: 1 EACH | Refills: 3 | Status: SHIPPED | OUTPATIENT
Start: 2019-03-25 | End: 2019-04-23

## 2019-03-25 NOTE — PROGRESS NOTES
SUBJECTIVE/OBJECTIVE:                Caden Bush is a 58 year old male called for a follow-up visit for Medication Therapy Management.  He was referred to me from .     Chief Complaint: Follow up from our visit on 3/18/2019.  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    Tobacco: No tobacco use  Alcohol: not currently using    Medication Adherence/Access:  no issues reported    Diabetes:  Pt currently taking metformin ER 500mg- 2 BID, Januvia 100 mg, Pioglitazone 45mg daily and basaglar 8 units in the HS. Took 9 units last night because the number was high. Reports no issues with Januvia, and blood sugars have improved since starting.  *Note that the NEDRA antibodies were resulted last week after our visit and were positive.   C-peptide= 2.1ng/ml with glucose of 346mg/dl.  NEDRA antibodies = positive.  Pt is not experiencing side effects. Met with CDE to discuss diet and this has been very helpful to him, continues to have low weight and not always eating enough.    Stopped Glipizide 5mg with the start of insulin per PCP.   Diet:  Breakfast usually cheerios (1 bowl, 26 g carbs).  Lunch may be an apple or small amount of fruit, or bread.  For dinner usually eats vegetables, fruit, sometimes bread, with meat (chicken, meatballs), or tacos.  He does read labels and counts carbs, typically eating around 30g at at time.   SMBG: two times daily, Ranges (patient reported): newest to oldest   Newest to oldest (fasting,evening): meter wasn't working well during our call.   Date FBG Evening   3/25 201    3/24 237 318   3/23 139 168   3/22 147      Recent symptoms of high blood sugar? None  Eye exam: up to date  Foot exam: up to date  Pt is not taking an ACEi/ARB was taken off lisinopril due to dizziness and has not been restarted.  Also has hx of lithium toxicity w/ Ace previously, but has been off lithium now since Jan 2018.  Aspirin: Taking 81mg daily and denies side effects    Today's  Vitals: There were no vitals taken for this visit. -- Telephone visit.      ASSESSMENT:              Current medications were reviewed today as discussed above.      Medication Adherence: good, no issues identified.    Diabetes: Needs Improvement. Self monitoring of blood glucose is not at goal of fasting  mg/dL and post prandial < 180 mg/dL..  With new diagnosis of latent autoimmune diabetes in adults, there is a need to adjust therapies.  Patient does not have weight concerns with lower benefit to staying on metformin given he will continue to be higher risk of lactic acidosis as his need for insulin rises.   In patients with GABE, there is some evidence to support continuing DPP4 inhibitors or thiazolidinediones in helping beta cell preservation, however given starting meal time insulin and risks of hypoglycemia, recommend he decrease his pioglitazone dose for now. Also would like to have him start with Continuous glucose monitoring and recommending Dexcom G6 for alarm ability for lows. Also would benefit from endocrinologist consult to evaluate given new dx of GABE.      PLAN:                  1.  Stop metformin.    2.  Decrease dose of pioglitazone to 15 mg.      3.  Start mealtime insulin (Humalog or Novolog), 1 unit for every 30 grams of carbohydrates with breakfast and dinner.      4.  Order a continuous glucose monitor, Dexcom.  Check blood sugars before breakfast, before dinner, and before bedtime.      5.  Refer to endocrinologist for initial assessment and to set up care.    I spent 25 minutes with this patient today. All changes were made via collaborative practice agreement with Dr. Hart. A copy of the visit note was provided to the patient's primary care provider.     Will follow up in 3 days to follow up on blood sugars and medication changes (Humalog or Novolog).  Telephone appointment set for 1:00 PM, on 4/4/2019.    The patient was sent via [employer] a summary of these recommendations as an  after visit summary.    Aliyah Angeles, Pharm.D, [facility]  Medication Therapy Management Pharmacist  [phone]

## 2019-03-26 ENCOUNTER — PATIENT OUTREACH (OUTPATIENT)
Dept: CARE COORDINATION | Facility: CLINIC | Age: 59
End: 2019-03-26

## 2019-03-26 ENCOUNTER — OFFICE VISIT (OUTPATIENT)
Dept: PSYCHIATRY | Facility: CLINIC | Age: 59
End: 2019-03-26
Attending: PSYCHIATRY & NEUROLOGY
Payer: MEDICARE

## 2019-03-26 ENCOUNTER — TELEPHONE (OUTPATIENT)
Dept: FAMILY MEDICINE | Facility: CLINIC | Age: 59
End: 2019-03-26

## 2019-03-26 VITALS
HEART RATE: 111 BPM | SYSTOLIC BLOOD PRESSURE: 119 MMHG | DIASTOLIC BLOOD PRESSURE: 69 MMHG | WEIGHT: 141 LBS | BODY MASS INDEX: 19.67 KG/M2

## 2019-03-26 DIAGNOSIS — F25.0 SCHIZOAFFECTIVE DISORDER, BIPOLAR TYPE (H): Primary | ICD-10-CM

## 2019-03-26 PROCEDURE — G0463 HOSPITAL OUTPT CLINIC VISIT: HCPCS | Mod: ZF

## 2019-03-26 RX ORDER — TRAZODONE HYDROCHLORIDE 50 MG/1
75 TABLET, FILM COATED ORAL AT BEDTIME
Qty: 30 TABLET | Refills: 0 | Status: SHIPPED | OUTPATIENT
Start: 2019-03-26 | End: 2019-04-21

## 2019-03-26 ASSESSMENT — PATIENT HEALTH QUESTIONNAIRE - PHQ9: SUM OF ALL RESPONSES TO PHQ QUESTIONS 1-9: 11

## 2019-03-26 ASSESSMENT — PAIN SCALES - GENERAL: PAINLEVEL: NO PAIN (0)

## 2019-03-26 NOTE — PATIENT INSTRUCTIONS
Thank you for coming to the PSYCHIATRY CLINIC.    Lab Testing:  If you had lab testing today and your results are reassuring or normal they will be mailed to you or sent through [facility] within 7 days.   If the lab tests need quick action we will call you with the results.  The phone number we will call with results is # [phone] (home) . If this is not the best number please call our clinic and change the number.    Medication Refills:  If you need any refills please call your pharmacy and they will contact us. Our fax number for refills is [phone]. Please allow three business for refill processing.   If you need to  your refill at a new pharmacy, please contact the new pharmacy directly. The new pharmacy will help you get your medications transferred.     Scheduling:  If you have any concerns about today's visit or wish to schedule another appointment please call our office during normal business hours [phone] (8-5:00 M-F)    Contact Us:  Please call [phone] during business hours (8-5:00 M-F).  If after clinic hours, or on the weekend, please call  [phone].    Financial Assistance [phone]  [employer] Billing [phone]  [employer] Billing [phone]  Medical Records [phone]      MENTAL HEALTH CRISIS NUMBERS:  [facility] [place]:   [facility] - [phone]   Crisis Residence [facility] - [phone]   Walk-In Counseling [facility] [phone]   COPE 24/7 [place] Mobile Team for Adults - [[phone]]; Child - [[phone]]        [facility]:   [facility] - [phone]   Walk-in counseling [facility] - [phone]   Walk-in counseling [facility] - [phone]   Crisis Residence [facility] - [phone]   Urgent Care Adult Mental Health:   --Drop-in, 24/7 crisis line, and [place] Mobile Team [[phone]]    CRISIS TEXT LINE: Text [phone] from anywhere,  anytime, any crisis 24/7;    OR SEE [email or web address]     Poison Control Center - [phone]    CHILD: [facility] needs assessment team - [phone]     [facility] - [phone]; or [employer] [facility] - [phone]    If you have a medical emergency please call 911or go to the nearest ER.                    _____________________________________________    Again thank you for choosing PSYCHIATRY CLINIC and please let us know how we can best partner with you to improve you and your family's health.  You may be receiving a survey in the mail regarding this appointment. We would love to have your feedback, both positive and negative, so please fill out the survey and return it using the provided envelope. The survey is done by an external company, so your answers are anonymous.

## 2019-03-26 NOTE — TELEPHONE ENCOUNTER
Medication requested:  traZODone (DESYREL) 50 MG   Last refilled:3/8/19  Qty: 30  *  Start taking Trazodone 75mg     Last seen:  3/26/19  RTC: 2 WEEKS  Cancel: 0  No-show: 0  Next appt: 4/8/19   Refill decision:   UNSIGNED NOTE   ** CHG IN DOSE

## 2019-03-26 NOTE — PROGRESS NOTES
[facility] PSYCHIATRY CLINIC PROGRESS NOTE     CARE TEAM:  PCP- Annie Hart   Therapist-  [facility]      Ophthalmologist: Jacob Lieberman with [facility]          Community support: [facility], [phone]     Caden Bush is a 58 year old male who prefers the name Caden & pronouns he.  The initial diagnostic evaluation was on 11/26/08 .   Date of the most recent transfer of care eval is 07/13/2018.      Pertinent Background:  This patient first experienced mental health issues in his late 20s , initially obtained care at that time and has received treatment for Schizoaffective Disorder, bipolar type. His diagnosis has changed through the time and afterreviewing all the evidence in 9/2018 the current diagnosis was confirmed (several episodes of moderate to severe depression and one episode of two weeks long period of grandiosity,  pressured speech, overspending and sleeplessness with increased social anxiety and paranoia).   Previously had a CCM until 2011. Has SW consult on 10/20/15, not interested in CM or [facility] worker services at this time and seems to be managing well on his own.    #Mood/psychosis:  - Notably, this pt was stable on regimen of Lithium, Effexor and Risperdal since 2011. However due to complications of chronic diabetes, Lithium was discontinued in 2017.   -  reviewed the chart . Per chart review, patient was not fit for the  current group. She recommended [facility] for groups and activities for him.   - We even tried going down on Risperidone for other reasons but he immediately got destabilized.    #Insomnia: The etiology of his insomnia is not well understood and the sleep study was discussed and encouraged. Sleep has improved on 3mg of risperidone while psychotic sxs are also better managed.      Psych critical item history includes suicidal ideation, psychosis [sxs include AH and paranoia], mutiple psychotropic trials and psych  "hosp (>5).     INTERIM HISTORY                                                                                                                 4, 4   The patient reports good treatment adherence.  History was provided by the patient who was a fair to good  historian. The last visit ended with no change to the med regimen.   Since the last visit, Caden reports the following:   - doing \"okay I guess\".   - Reported hypersomnia when takes trazodone 100 mg. 50 mg is not enough too. He agreed to break the tablets for the dose of 75 mg. He also brought sleep diary, remakable for frequent water consumption, lack of restful night sleep due to noises and no indication of drinking caffeinated drinks in evening or after.  - Has gained 4 pounds back, via overeating, which also has made diabetic control more difficult. Tracing the unintentional weight loss pattern goes back to 2017.   - Patient reports that his depression is getting worse and he identifies the weather to be the main factor.   - Hears sewing machine sound at nights and is pretty convinced that its real. disturbers his night sleep. Hears it at day time too and ONLY in his apartment. He has voiced this concern to [facility] worker and the plan is [facility] [place] to also decrease social isolation which has played a key role in his MH decompensation.   - No reports of psychosis or wolf. Social anxiety at baseline. Upcoming intake appointment with [facility] Day treatment program on April 3rd. Called them during the visit and confirmed ta appointment. Patient and I are hopeful that he might start the program right after the intake session.    - Insulin management has been an ongoing struggle for patient and recently there was a change to the type of his insulin.    - Patient reports bodily aches and pains. Has followed with MH providers who have not been able to diagnose the etiology. Patient remembers not being in pain in 2017. It co insides " with the same time that lithium was discontinued and he started to lose weight.   - Patient was unable to remember that last time he was ever happy.     RECENT SYMPTOMS:   DEPRESSION:  reports- depression. anhedonia, low energy , memory of concentration problem. Sleep issues and hypersomnia, appetite or weight change;  DENIES-  suicidal ideation and self-destructive thoughts  KINGA/HYPOMANIA:  reports-none;  DENIES- increased energy, decreased sleep need, increased activity and grandiosity  PSYCHOSIS:  reports-none;  DENIES- delusions, auditory hallucinations and visual hallucinations  DYSREGULATION:  reports-none;  DENIES- suicidal ideation, violent ideation and SIB  PANIC ATTACK:  none   ANXIETY:   social anxiety at baseline. Desires to socialize  TRAUMA RELATED:  none  COMPULSIVE:  none  SLEEP:  As mentioned above  EATING DISORDER: none      RECENT SUBSTANCE USE:     ALCOHOL-  never   TOBACCO- none  CAFFEINE- 1 cups/day of coffee, 1 sodas/ in a while  OPIOIDS- none      NARCAN KIT- N/A          CANNABIS- none  OTHER ILLICIT DRUGS- none      CURRENT SOCIAL HISTORY:  FINANCIAL SUPPORT- SSDI for schizoaffective disorder and diabetes  CHILDREN- none      LIVING SITUATION- Currently living by self in subsidized apartment in [place] since Feb 2011.      SOCIAL/ SPIRITUAL SUPPORT- few friends and family     FEELS SAFE AT HOME- yes       MEDICAL ROS (2,10):  Reports Arthritis pain Denies Pain, headache, dizziness, GI [nausea, diarrhea, constipation,  ], , sedation, tremor and confusion.    PSYCH and CD Critical Summary Points since July 2018 July: Increased Risperidone dose  During months of Fall and winter, patient was hesitant to make changes to medications drastically. Modified risperidone and trazodone dose several times. Not successful in complete resolution os sxs.     PAST PSYCH MED TRIALS   see EMR Problem List: Hx of psychiatric care    MEDICAL / SURGICAL HISTORY                                  Neurologic Hx- no  Patient Active Problem List   Diagnosis     Eczema     Retinopathy     Moderate episode of recurrent major depressive disorder (H)     Polyp of colon, adenomatous     Health Care Home     Hx of psychiatric care     Neuropathy of left upper extremity     Type 2 diabetes mellitus with both eyes affected by moderate nonproliferative retinopathy without macular edema, without long-term current use of insulin (H)     Schizoaffective disorder, bipolar type (H)       Major Surgery- Appendectomy    ALLERGY                                Patient has no known allergies.  MEDICATIONS                               Current Outpatient Medications   Medication Sig Dispense Refill     ACE/ARB/ARNI NOT PRESCRIBED, INTENTIONAL, Please choose reason not prescribed, below-  hypotension       acetaminophen 500 MG CAPS Take 2 capsules by mouth 3 times daily as needed       aspirin 81 MG EC tablet Take 81 mg by mouth daily.       blood glucose (NO BRAND SPECIFIED) lancets standard Use to test blood sugar 2 times daily or as directed. 100 each 11     blood glucose (NO BRAND SPECIFIED) test strip Use to test blood sugars 2 times daily or as directed 200 strip 3     blood glucose monitoring (NO BRAND SPECIFIED) meter device kit Use to test blood sugar 2 times daily or as directed. 1 kit 0     cholecalciferol (VITAMIN D) 1000 UNIT tablet Take 1 tablet by mouth daily.       Cyanocobalamin (B-12) 1000 MCG CAPS Take 1 capsule by mouth daily       ferrous sulfate (IRON) 325 (65 Fe) MG tablet Take 325 mg by mouth daily       FISH OIL 1 capsule daily        insulin aspart (NOVOLOG PEN) 100 UNIT/ML pen Inject 1-3 units before breakfast,   0-3 units before lunch and 1-3 units before dinner, using 1 unit per 30 gr of carbohydrate. 15 mL 0     insulin glargine (BASAGLAR KWIKPEN) 100 UNIT/ML pen Inject 8 Units Subcutaneous At Bedtime We will make further adjustments as we see your response 15 mL 1     insulin pen needle (BD BRENDAN  U/F) 32G X 4 MM Use 1 daily as directed. 100 each 11     pioglitazone (ACTOS) 15 MG tablet Take 1 tablet (15 mg) by mouth daily 90 tablet 3     risperiDONE (RISPERDAL) 3 MG tablet Take 1 tablet (3 mg) by mouth daily 30 tablet 0     Sharps Container (COMPLETE NEEDLE COLLECTION SYS) MISC 1 each daily 1 each 1     simvastatin (ZOCOR) 40 MG tablet Take 0.5 tablets (20 mg) by mouth At Bedtime 45 tablet 3     sitagliptin (JANUVIA) 100 MG tablet Take 1 tablet (100 mg) by mouth daily 30 tablet 1     traZODone (DESYREL) 50 MG tablet Take 2 tablets (100 mg) by mouth At Bedtime as needed for sleep 30 tablet 0     venlafaxine (EFFEXOR XR) 75 MG 24 hr capsule Take 1 capsule (75 mg) by mouth daily TAKE WITH  Effexor 150 mg for a total dose of 225 mg daily 30 capsule 1     venlafaxine (EFFEXOR-XR) 150 MG 24 hr capsule TAKE 1 CAPSULE BY MOUTH DAILY ALONG WITH A 75 MG FOR A TOTAL  MG DAILY 30 capsule 1     Continuous Blood Gluc  (DEXCOM G6 ) MICHELLE 1 each daily (Patient not taking: Reported on 3/26/2019) 1 Device 0     Continuous Blood Gluc Sensor (DEXCOM G6 SENSOR) MISC 1 each every 10 days (Patient not taking: Reported on 3/26/2019) 9 each 3     Continuous Blood Gluc Transmit (DEXCOM G6 TRANSMITTER) MISC 1 each every 3 months (Patient not taking: Reported on 3/26/2019) 1 each 3     VITALS                                                                                                                          3, 3   /69   Pulse 111   Wt 64 kg (141 lb)   BMI 19.67 kg/m     MENTAL STATUS EXAM                                                                                           9, 14 cog gs     Alertness: alert  and oriented  Appearance: unkempt  Behavior/Demeanor: cooperative, calm, with fair to intense starting eye contact   Speech: slowed with delay  Language: intact and no problems  Psychomotor: slowed, patient holds uncommon postures while appearing comfortable. When asked from patient, he is able  to release his muscles and stop the posture. No stiffness noted.   Mood: Okay I guess/ depressed  Affect: limited range; blunted, sad and tense, was congruent to mood; was congruent to content  Thought Process/Associations: unremarkable  Thought Content:  Reports possible AH;   Denies suicidal ideation  and psychotic thought  Perception:   Reports none;  Denies auditory hallucinations or visual hallucinations or delusions  Insight: fair  Judgment: fair  Cognition: does  appear grossly intact; formal cognitive testing was not done  Gait and Station: unremarkable     LABS and DATA     AIMS: 0 at 3/1/2018    PHQ9 TODAY = 11  PHQ-9 SCORE 12/3/2018 1/2/2019 2/7/2019   PHQ-9 Total Score - - -   PHQ-9 Total Score 3 9 12   PHQ-9 Total Score - - -     ANTIPSYCHOTIC LABS  [glu, A1C, lipids (ezequiel LDL), liver enzymes, WBC, ANEU, Hgb, plts]  q12 mo  Recent Labs   Lab Test 03/15/19  1015 03/04/19  1519 02/13/19  1625 01/02/19  1045  07/17/18  1126  03/19/18  1116  09/15/17  0956   * 444* 254* 147*   < >  --    < >  --    < >  --    A1C  --   --   --  6.7*  --  6.7*  --  8.2*  --  6.8*    < > = values in this interval not displayed.     Recent Labs   Lab Test 01/02/19  1045 04/17/18  1356 07/11/17  1034 06/07/17  1004   CHOL 127 139 126 143   TRIG 51 45 49 105   LDL 33 45 40 46   HDL 84 85 76 76     Recent Labs   Lab Test 01/02/19  1045 04/17/18  1356 01/30/18  0923 07/11/17  1034   AST 14 15 14 14   ALT 13 12 16 12   ALKPHOS 74 73 79 72     Recent Labs   Lab Test 02/13/19  1522 01/02/19  1126 01/02/19  1045 10/11/18  1010 09/05/18  1123  06/09/16  1030 12/21/15  1413  08/01/12  1640   WBC 5.3 4.2  --  2.5* 2.8*   < > 4.7 4.6   < > 5.5   ANEU  --   --   --   --   --   --  3.5 3.4  --  4.8   HGB 12.7* 12.9* 13.1 12.5* 11.5*   < > 14.7 13.4   < > 11.4*    149  --  136* 168   < > 175 155   < > 180    < > = values in this interval not displayed.       DIAGNOSIS     Schizoaffective disorder, bipolar type, depressive  episode, mild to moderate, without current psychotic features    ASSESSMENT                                                                                                                   m2, h3     TODAY:    #Psychosis #Mood  - Patient reports low mood, compliance to medication and denies safety issues including self harm, SI, wolf or psychosis. Patient was stable in current medication regimen since summer 2018 (risperidone and venlafaxine) still desired to continue this medication regimen but he is more open to med changes at this session.  +55 [facility] might start in a matter of 7-10 days. And he has planned to move to another apartment to be close to family. These are positive environmental changes that might significantly improve his mood. Also spring is a better season for him in terms of his MH. Also considering the fact that slight medication changes in the past has significantly destabilized patient's care, we will hold off on changing medicaition in this session and postpone this plan to after starting day treatment.      #Insomnia :  - Unclear etiology. Sleep study is discussed and encouraged in the past  - Trazodone helps but too sedating at 100mg. Patient will try 75 mg from tonight.     #Leukopenia, unspecified type #Low PLT count #Anemia  - PCP is aware. Possibly due to Risperdal. Has a physical with PCP on Jan 2nd when hw is also going to evaluate hematologic abnormalities. Receives iron replacement  #DM II: On insulin  # Vitamin D def> replacement  #Benign adrenal incidentaloma    Patient agreed to come back earlier for next visit (~1month) and agreed to call the clinic in two weeks and update up of his mental well being.       [place] PRESCRIPTION MONITORING PROGRAM [] was not checked today:  not using controlled substances.    PSYCHOTROPIC DRUG INTERACTIONS:   VENLAFAXINE and ANTIPLATELET AGENTS may result in an increased risk of bleeding.  RISPERIDONE and SIMVASTATIN may result in  increased simvastatin serum concentrations -with an increased risk of myopathy or rhabdomyolysis.  TRAZODONE and VENLAFAXINE may result in an increased risk of serotonin syndrome.    MANAGEMENT:  Monitoring for adverse effects     PLAN                                                                                                                                m2, h3     1) PSYCHOTROPIC MEDICATIONS:  - Continue risperidone 3 mg QHS  - Cont Effexor XR  225 mg daily  - Start taking Trazodone 75mg      2) THERAPY:    - Seeing psychologist Yeny Goff at [facility] since 11/2015. Sees once/twice a month.       3) LABS NEXT DUE: as per atypical neuroleptic protocol.      RATING SCALES:  AIMS: 0 at 3/1/2018      4) REFERRALS:    none     7) RTC: 2wks      8) CRISIS NUMBERS:     Provided routinely in AVS  none    TREATMENT RISK STATEMENT:  The risks, benefits, alternatives and potential adverse effects have been discussed and are understood by the pt. The pt understands the risks of using street drugs or alcohol. There are no medical contraindications, the pt agrees to treatment with the ability to do so. The pt knows to call the clinic for any problems or to access emergency care if needed.  Medical and substance use concerns are documented above.  Psychotropic drug interaction check was done, including changes made today.     PROVIDER:  Natali Dang MD    Patient was not staffed in clinic.  Supervisor is Dr. Bradley.  I did not see this patient directly. I have reviewed the documentation and I agree with the resident's plan of care.     Linda Bradley MD

## 2019-03-26 NOTE — PROGRESS NOTES
Clinic Care Coordination Contact  Outreach/Follow up    Data: Since previous SW-CCC documentation, Qing Luis Armando, patient's [facility] , emailed writer. Ms. Durán suggested contacting patient's Housing Access Coordination worker Renuka Hawthorne.     Writer called Renuka today at [phone]. Renuka said that she (Renuka) is working on housing options for patient. Renuka said that patient has not been not clear with her (Renuka) that patient wants to move to [facility].     Writer talked with Renuka about the advisability of patient moving into a group home or assisted living so that patient will have the support to manage his diabetes, nutrition and medications.     Renuka said that she (Renuka) will call patient and clarify if patient does indeed want to move to [facility]. Renuka will then explore openings in group homes or assisted living facilities there.    Writer emailed patient this afternoon telling patient that writer has talked with patient's , Renuka, and will be working with her (Renuka) to try to find housing for patient.      Plan: Writer will coordinate with patient's [facility]  to try to find supportive housing for patient.      Lolly Armando, [facility] Care Coordination  Clinic: [facility]  Email: [email or web address]  Tele: [phone]        Addendum  3/29/19  Data: Patient called writer this morning. Patient said that he (patient) put his name on the waiting list on [facility] and has been told that they ([facility]) may have an apartment for patient in 2 weeks--one month. Patient is unclear about the financial arrangements for his living at [facility] and requested writer's assistance with this.    Writer called [facility] (Jodi, [phone]) this afternoon. Jodi confirmed that patient is on their waiting list. However, Jodi said that she (Jodi) would  "anticipate that it could take \"years\" before patient's name got to the top of the waiting list. In addition, [facility] does accept Elderly Waiver but requires that individuals be able to pay privately for two years when they are admitted to the facility.    Writer called patient late this afternoon and explained the above information from [facility] to patient. Patient appeared to understand. Patient said that he (patient) would be interested in going to a group home.    Plan: Writer will plan to call patient's Housing Access Coordination worker next week to get an update regarding her exploration of housing options for patient and to assist with that process as able.    Lolly Armando, [facility] Care Coordination  Clinic: [facility]  Email: [email or web address]  Tele: [phone]                       "

## 2019-03-28 ENCOUNTER — ALLIED HEALTH/NURSE VISIT (OUTPATIENT)
Dept: PHARMACY | Facility: PHYSICIAN GROUP | Age: 59
End: 2019-03-28
Payer: MEDICAID

## 2019-03-28 DIAGNOSIS — E10.8 TYPE 1 DIABETES MELLITUS WITH COMPLICATION (H): Primary | ICD-10-CM

## 2019-03-28 PROCEDURE — 99606 MTMS BY PHARM EST 15 MIN: CPT | Mod: GY | Performed by: PHARMACIST

## 2019-03-28 NOTE — PROGRESS NOTES
SUBJECTIVE/OBJECTIVE:                Caden Bush is a 58 year old male called for a follow-up visit for Medication Therapy Management.  He was referred to me from Dr. Hart.     Chief Complaint: Follow up from our visit on 3/25.  Started meal time insulin yesterday (not Monday when we talked).     Tobacco: No tobacco use  Alcohol: not currently using    Medication Adherence/Access:  no issues reported, however having to wait 1 month of checking sugars 4x daily before he can get coverage for Dexcom G6 CGM    Diabetes:  Pt currently taking metformin ER 500mg- 2 BID (did not stop like we discussed), Januvia 100 mg, Pioglitazone 15mg daily and basaglar 8 units in the HS plus Novolog 2-4 units with breakfast/dinner. He was prescribed to use 1:30 carb ratio for insulin, however even though he feels comfortable with carb counting he has trouble with using the  Ratio per his report.   *Note that the NEDRA antibodies were positive.   C-peptide= 2.1ng/ml with glucose of 346mg/dl.  Pt is not experiencing side effects. Met with CDE to discuss diet and this has been very helpful to him, continues to have low weight and not always eating enough.    Stopped Glipizide 5mg with the start of insulin per PCP.   Diet:  Breakfast usually cheerios (1 bowl, 26 g carbs).  Lunch may be an apple or small amount of fruit, or bread.  For dinner usually eats vegetables, fruit, sometimes bread, with meat (chicken, meatballs), or tacos.  He does read labels and counts carbs, typically eating around 30g at at time.   SMBG: two times daily, Ranges (patient reported): newest to oldest   Newest to oldest (fasting,evening): meter wasn't working well during our call.   Date FBG Lunch dinner bedtime   3/27 220 (2u) 370 258 (3u) 203   3/28 - 130 (4u)- 211 post lunch       Recent symptoms of high blood sugar? None  Eye exam: up to date  Foot exam: up to date  Pt is not taking an ACEi/ARB was taken off lisinopril due to dizziness and has not been  restarted.  Also has hx of lithium toxicity w/ Ace previously, but has been off lithium now since Jan 2018.  Aspirin: Taking 81mg daily and denies side effects    Today's Vitals: There were no vitals taken for this visit.- phone visit.     ASSESSMENT:              Current medications were reviewed today as discussed above.      Medication Adherence: good, no issues identified    Diabetes: Needs Improvement. Patient is not meeting A1c goal of < 7%. Self monitoring of blood glucose is not at goal of fasting  mg/dL and post prandial < 150 mg/dL. Pt would benefit from stopping metformin as previously reviewed. Consider using 2-4 units per breakfast/dinner based on range of carbs, but will not be using the carb ratio for now as this has started to frustrate him some. Continue checking sugars 4 x daily.      PLAN:                  1. Novolog 2-4 units with breakfast and dinner    2. Stop metformin (recommended this Monday)    I spent 15 minutes with this patient today. All changes were made via collaborative practice agreement with Dr. Hart. A copy of the visit note was provided to the patient's primary care provider.     Will follow up in 1 week.    The patient declined a summary of these recommendations as an after visit summary.    Aliyah Angeles, Pharm.D, BCACP  Medication Therapy Management Pharmacist  [phone]

## 2019-04-03 ENCOUNTER — PATIENT OUTREACH (OUTPATIENT)
Dept: CARE COORDINATION | Facility: CLINIC | Age: 59
End: 2019-04-03

## 2019-04-03 NOTE — PROGRESS NOTES
Clinic Care Coordination Contact  Outreach/Follow up    Data: Writer called patient's Housing Access Worker Renuka ([phone]) this afternoon. Renuka said that she (Renuka) has confirmed with patient that patient would prefer to go to a facility in [facility] and would also be interested in a group home setting. Renuka said that she (Renuka) has not found a placement for patient at this time. Writer offered to help Renuka explore housing options, but Renuka does not feel that it necessary.    Plan: -CCC will continue to follow to assist, as able, with housing options.    NATALIE Garcia  [facility] Care Coordination  Clinic: [facility]  Email: [email or web address]  Tele: [phone]

## 2019-04-08 ENCOUNTER — OFFICE VISIT (OUTPATIENT)
Dept: PSYCHIATRY | Facility: CLINIC | Age: 59
End: 2019-04-08
Attending: PSYCHIATRY & NEUROLOGY
Payer: MEDICARE

## 2019-04-08 VITALS
WEIGHT: 147 LBS | SYSTOLIC BLOOD PRESSURE: 121 MMHG | BODY MASS INDEX: 20.5 KG/M2 | HEART RATE: 94 BPM | DIASTOLIC BLOOD PRESSURE: 77 MMHG

## 2019-04-08 DIAGNOSIS — F25.0 SCHIZOAFFECTIVE DISORDER, BIPOLAR TYPE (H): ICD-10-CM

## 2019-04-08 PROCEDURE — G0463 HOSPITAL OUTPT CLINIC VISIT: HCPCS | Mod: ZF

## 2019-04-08 ASSESSMENT — PAIN SCALES - GENERAL: PAINLEVEL: SEVERE PAIN (6)

## 2019-04-08 NOTE — PROGRESS NOTES
[facility] PSYCHIATRY CLINIC PROGRESS NOTE     CARE TEAM:  PCP- Annie Hart   [facility]      Ophthalmologist: Jacob Lieberman with [facility]          Community support: [facility], [phone]     Caden Bush is a 58 year old male who prefers the name Caden & pronouns he.  The initial diagnostic evaluation was on 11/26/08 .   Date of the most recent transfer of care eval is 07/13/2018.      Pertinent Background:  This patient first experienced mental health issues in his late 20s , initially obtained care at that time and has received treatment for Schizoaffective Disorder, bipolar type. His diagnosis has changed through the time and afterreviewing all the evidence in 9/2018 the current diagnosis was confirmed (several episodes of moderate to severe depression and one episode of two weeks long period of grandiosity,  pressured speech, overspending and sleeplessness with increased social anxiety and paranoia).   Previously had a CCM until 2011. Has SW consult on 10/20/15, not interested in CM or [facility] worker services at this time and seems to be managing well on his own.    #Mood/psychosis:  - Notably, this pt was stable on regimen of Lithium, Effexor and Risperdal since 2011. However due to complications of chronic diabetes, Lithium was discontinued in 2017.   -  reviewed the chart . Per chart review, patient was not fit for the  current group. She recommended [facility] for groups and activities for him.   - We even tried going down on Risperidone for other reasons but he immediately got destabilized.    #Insomnia: The etiology of his insomnia is not well understood and the sleep study was discussed and encouraged. Sleep has improved on 3mg of risperidone while psychotic sxs are also better managed.      Psych critical item history includes suicidal ideation, psychosis [sxs include AH and paranoia], mutiple psychotropic trials and psych  hosp (>5).     INTERIM HISTORY                                                                                                                 4, 4   The patient reports good treatment adherence.  History was provided by the patient who was a fair to good  historian. The last visit ended with the following med change: Increased trazodone dose..   Since the last visit, Caden reports the following:   - Patient is trying to move in to a new home to find a house closer to family.   - Also the sewing machine noise is still annoying.   - Social anxiety at baseline  - Low mood is reported.  - Discussed the I/R/B OF Remeron and Lamictal. Patient will think about these medication options. He is reporting symptoms but is also appears to be apprehensive of any change to the medication regimen. Hyper focused on medication side effects.   - Hesitates to get in day treatment anticipating his move to new apartment, even though this move might be over 90 days out.   - Cleaning apartment makes him feel happy. He emphasizes that he is still managing his life almost independently and takes pride on that.  - Meets with therapist more frequently.   - Feels lonely and down at gloomy and rainy days. Looks forward to summer days to walk.   - He states that he never feels bored because he always finds things to do. This is not in line with common reported boredom that he experiences.   - He used to be active in the Taoist in the past. Not involved with any Scientologist activities. Joining churches were discussed. He is open to do so after he moves.   - Patient does not want the medication refills to be send out to the pharmacy because they keep calling him when he already has supplies at home. He will notify us when he needs the refills.     RECENT SYMPTOMS:   DEPRESSION:  reports- depression. anhedonia, low energy , memory of concentration problem. Sleep issues and hypersomnia, appetite or weight change;  DENIES-  suicidal ideation and  self-destructive thoughts, violent thoughts  KINGA/HYPOMANIA:  reports-none;  DENIES- increased energy, decreased sleep need, increased activity and grandiosity  PSYCHOSIS:  reports-none;  DENIES- delusions, auditory hallucinations and visual hallucinations  DYSREGULATION:  reports-none;  DENIES- suicidal ideation, violent ideation and SIB  PANIC ATTACK:  none   ANXIETY:   social anxiety at baseline. Desires to socialize  TRAUMA RELATED:  none  COMPULSIVE:  none  SLEEP:  As mentioned above  EATING DISORDER: none      RECENT SUBSTANCE USE:     ALCOHOL-  never   TOBACCO- none  CAFFEINE- 1 cups/day of coffee, 1 sodas/ in a while  OPIOIDS- none      NARCAN KIT- N/A          CANNABIS- none  OTHER ILLICIT DRUGS- none      CURRENT SOCIAL HISTORY:  FINANCIAL SUPPORT- SSDI for schizoaffective disorder and diabetes  CHILDREN- none      LIVING SITUATION- Currently living by self in subsidized apartment in [place] since Feb 2011.      SOCIAL/ SPIRITUAL SUPPORT- few friends and family     FEELS SAFE AT HOME- yes       MEDICAL ROS (2,10):  Reports Arthritis pain (ongoing, unchanged) Denies Pain, headache, dizziness, GI [nausea, diarrhea, constipation,  ], , sedation, tremor and confusion.    PSYCH and CD Critical Summary Points since July 2018 July: Increased Risperidone dose  During months of Fall and winter, patient was hesitant to make changes to medications . Modified risperidone and trazodone dose several times (minor changes). Not successful in complete resolution of sxs.     PAST PSYCH MED TRIALS   see EMR Problem List: Hx of psychiatric care       Drug / Start Date   Dose (mg)   Helpful   Adverse Effects  DC Reason / Date     Lithium   900          Risperdal         current     Abilify             Seroquel             Effexor         current     Zoloft             Wellbutrin             Buspar             trazodone         current     hydroxyzine             Ambien               Maybe Zyprexa, and Prozac,  can't remember           MEDICAL / SURGICAL HISTORY                                 Neurologic Hx- no  Patient Active Problem List   Diagnosis     Eczema     Retinopathy     Moderate episode of recurrent major depressive disorder (H)     Polyp of colon, adenomatous     Health Care Home     Hx of psychiatric care     Neuropathy of left upper extremity     Type 2 diabetes mellitus with both eyes affected by moderate nonproliferative retinopathy without macular edema, without long-term current use of insulin (H)     Schizoaffective disorder, bipolar type (H)       Major Surgery- Appendectomy    ALLERGY                                Patient has no known allergies.  MEDICATIONS                               Current Outpatient Medications   Medication Sig Dispense Refill     ACE/ARB/ARNI NOT PRESCRIBED, INTENTIONAL, Please choose reason not prescribed, below-  hypotension       acetaminophen 500 MG CAPS Take 2 capsules by mouth 3 times daily as needed       aspirin 81 MG EC tablet Take 81 mg by mouth daily.       blood glucose (NO BRAND SPECIFIED) lancets standard Use to test blood sugar 2 times daily or as directed. 100 each 11     blood glucose (NO BRAND SPECIFIED) test strip Use to test blood sugars 2 times daily or as directed 200 strip 3     blood glucose monitoring (NO BRAND SPECIFIED) meter device kit Use to test blood sugar 2 times daily or as directed. 1 kit 0     cholecalciferol (VITAMIN D) 1000 UNIT tablet Take 1 tablet by mouth daily.       Continuous Blood Gluc  (DEXCOM G6 ) MICHELLE 1 each daily 1 Device 0     Continuous Blood Gluc Sensor (DEXCOM G6 SENSOR) MISC 1 each every 10 days 9 each 3     Continuous Blood Gluc Transmit (DEXCOM G6 TRANSMITTER) MISC 1 each every 3 months 1 each 3     Cyanocobalamin (B-12) 1000 MCG CAPS Take 1 capsule by mouth daily       FISH OIL 1 capsule daily        insulin aspart (NOVOLOG PEN) 100 UNIT/ML pen Inject 1-3 units before breakfast,   0-3 units before lunch  and 1-3 units before dinner, using 1 unit per 30 gr of carbohydrate. 15 mL 0     insulin glargine (BASAGLAR KWIKPEN) 100 UNIT/ML pen Inject 8 Units Subcutaneous At Bedtime We will make further adjustments as we see your response 15 mL 1     insulin pen needle (BD BRENDAN U/F) 32G X 4 MM Use 1 daily as directed. 100 each 11     pioglitazone (ACTOS) 15 MG tablet Take 1 tablet (15 mg) by mouth daily 90 tablet 3     risperiDONE (RISPERDAL) 3 MG tablet Take 1 tablet (3 mg) by mouth daily 30 tablet 0     Sharps Container (COMPLETE NEEDLE COLLECTION SYS) MISC 1 each daily 1 each 1     simvastatin (ZOCOR) 40 MG tablet Take 0.5 tablets (20 mg) by mouth At Bedtime 45 tablet 3     sitagliptin (JANUVIA) 100 MG tablet Take 1 tablet (100 mg) by mouth daily 30 tablet 1     traZODone (DESYREL) 50 MG tablet Take 1.5 tablets (75 mg) by mouth At Bedtime 30 tablet 0     venlafaxine (EFFEXOR XR) 75 MG 24 hr capsule Take 1 capsule (75 mg) by mouth daily TAKE WITH  Effexor 150 mg for a total dose of 225 mg daily 30 capsule 1     venlafaxine (EFFEXOR-XR) 150 MG 24 hr capsule TAKE 1 CAPSULE BY MOUTH DAILY ALONG WITH A 75 MG FOR A TOTAL  MG DAILY 30 capsule 1     ferrous sulfate (IRON) 325 (65 Fe) MG tablet Take 325 mg by mouth daily       VITALS                                                                                                                          3, 3   /77   Pulse 94   Wt 66.7 kg (147 lb)   BMI 20.50 kg/m     MENTAL STATUS EXAM                                                                                           9, 14 cog gs     Alertness: alert  and oriented  Appearance: unkempt  Behavior/Demeanor: cooperative, calm, with fair to intense starting eye contact   Speech: slowed with delay  Language: intact and no problems  Psychomotor: slowed,otherwise unremarkable.   Mood: fine  Affect: limited range; blunted, sad and tense, was congruent to mood; was congruent to content  Thought Process/Associations:  unremarkable  Thought Content:  Reports possible AH;   Denies suicidal ideation  and psychotic thought  Perception:   Reports none;  Denies auditory hallucinations or visual hallucinations or delusions  Insight: fair  Judgment: fair  Cognition: does  appear grossly intact; formal cognitive testing was not done  Gait and Station: unremarkable     LABS and DATA     AIMS: 0 at 3/1/2018    PHQ9 TODAY = 11  PHQ-9 SCORE 1/2/2019 2/7/2019 3/26/2019   PHQ-9 Total Score - - -   PHQ-9 Total Score 9 12 11   PHQ-9 Total Score - - -     ANTIPSYCHOTIC LABS  [glu, A1C, lipids (ezequiel LDL), liver enzymes, WBC, ANEU, Hgb, plts]  q12 mo  Recent Labs   Lab Test 03/15/19  1015 03/04/19  1519 02/13/19  1625 01/02/19  1045  07/17/18  1126  03/19/18  1116  09/15/17  0956   * 444* 254* 147*   < >  --    < >  --    < >  --    A1C  --   --   --  6.7*  --  6.7*  --  8.2*  --  6.8*    < > = values in this interval not displayed.     Recent Labs   Lab Test 01/02/19  1045 04/17/18  1356 07/11/17  1034 06/07/17  1004   CHOL 127 139 126 143   TRIG 51 45 49 105   LDL 33 45 40 46   HDL 84 85 76 76     Recent Labs   Lab Test 01/02/19  1045 04/17/18  1356 01/30/18  0923 07/11/17  1034   AST 14 15 14 14   ALT 13 12 16 12   ALKPHOS 74 73 79 72     Recent Labs   Lab Test 02/13/19  1522 01/02/19  1126 01/02/19  1045 10/11/18  1010 09/05/18  1123  06/09/16  1030 12/21/15  1413  08/01/12  1640   WBC 5.3 4.2  --  2.5* 2.8*   < > 4.7 4.6   < > 5.5   ANEU  --   --   --   --   --   --  3.5 3.4  --  4.8   HGB 12.7* 12.9* 13.1 12.5* 11.5*   < > 14.7 13.4   < > 11.4*    149  --  136* 168   < > 175 155   < > 180    < > = values in this interval not displayed.       DIAGNOSIS     Schizoaffective disorder, bipolar type, depressive episode, mild to moderate, without current psychotic features    ASSESSMENT                                                                                                                   m2, h3     TODAY:    #Psychosis  #Mood  - Patient reports low mood, compliance to medication and denies safety issues including self harm, SI, wolf or psychosis. MSE + for depressed patient, concrete thinking pattern, with possible AH?. Patient was stable on current medication regimen since summer 2018 (risperidone and venlafaxine) and despite recent worsening of depressive symptoms, he desires to continue this medication regimen but he is more open to med changes at this session. +55 program [facility] was recommended but patient declined anticipating move to be closer to family members. Also spring is a better season for him in terms of his MH. Also considering the fact that slight medication changes in the past have significantly destabilized patient's mental health, we will hold off on changing medication in this session and postpone this plan to after starting day treatment.  Patient also unwilling to try med changes. Active lifestyle and odoor activities were encouraged.    #Insomnia :  - Unclear etiology. Sleep study is discussed and was encouraged in the past  - Trazodone helps but too sedating at 100mg. Patient will try 75 mg from tonight.     #History of Leukopenia, unspecified type #Low PLT count #Anemia  - PCP is aware. Possibly due to Risperdal. Had a physical with PCP on Jan 2nd when he was also going to evaluate hematologic abnormalities. Receives iron replacement  #DM II: On insulin  # Vitamin D def> replacement  #Benign adrenal incidentaloma    Patient agreed to come back earlier for next visit (~1month) and agreed to call the clinic in two weeks and update up of his mental well being.       [place] PRESCRIPTION MONITORING PROGRAM [] was not checked today:  not using controlled substances.    PSYCHOTROPIC DRUG INTERACTIONS:   VENLAFAXINE and ANTIPLATELET AGENTS may result in an increased risk of bleeding.  RISPERIDONE and SIMVASTATIN may result in increased simvastatin serum concentrations -with an increased risk of myopathy or  rhabdomyolysis.  TRAZODONE and VENLAFAXINE may result in an increased risk of serotonin syndrome.    MANAGEMENT:  Monitoring for adverse effects     PLAN                                                                                                                                m2, h3     1) PSYCHOTROPIC MEDICATIONS:  - Continue risperidone 3 mg QHS  - Cont Effexor XR  225 mg daily  - Continue taking Trazodone 75mg      2) THERAPY:    - Seeing psychologist Yeny Goff at [facility] since 11/2015. Sees once/twice a month.       3) LABS NEXT DUE: as per atypical neuroleptic protocol.      RATING SCALES:  AIMS: 0 at 3/1/2018      4) REFERRALS:    none     7) RTC: 4 wks      8) CRISIS NUMBERS:     Provided routinely in AVS  none    TREATMENT RISK STATEMENT:  The risks, benefits, alternatives and potential adverse effects have been discussed and are understood by the pt. The pt understands the risks of using street drugs or alcohol. There are no medical contraindications, the pt agrees to treatment with the ability to do so. The pt knows to call the clinic for any problems or to access emergency care if needed.  Medical and substance use concerns are documented above.  Psychotropic drug interaction check was done, including changes made today.     PROVIDER:  Natali Dang MD    Patient was staffed in clinic by  who will review and sign the note.  Supervisor is Dr. Bradley.  I did not see this patient directly. I have reviewed the documentation and I agree with the resident's plan of care.  I am closing the chart but also forwarding for Dr. Phillips's review      Linda Bradley MD      Supervisor Attestation:  I met with Caden Bush along with the resident physician, Natali Dang MD. I participated in key portions of the service, including the mental status examination and developing the plan of care. I reviewed key portions of the history with the resident. I agree with the findings  and plan as documented in this note.  Antolin Phillips MD

## 2019-04-08 NOTE — PATIENT INSTRUCTIONS
- Please continue same medications  - Please continue therapy  - Please consider Day Treatment to decrease isolation  - Please come back in 1-2 months   - Please keep an active life style, outdoors activities   - Please consider volunteering, doing crafts  - Please consider talking to your  about community involvement actvities

## 2019-04-09 ENCOUNTER — PATIENT OUTREACH (OUTPATIENT)
Dept: CARE COORDINATION | Facility: CLINIC | Age: 59
End: 2019-04-09

## 2019-04-09 ENCOUNTER — OFFICE VISIT (OUTPATIENT)
Dept: UROLOGY | Facility: CLINIC | Age: 59
End: 2019-04-09
Payer: MEDICARE

## 2019-04-09 VITALS
BODY MASS INDEX: 20.58 KG/M2 | WEIGHT: 147 LBS | HEIGHT: 71 IN | HEART RATE: 103 BPM | OXYGEN SATURATION: 98 % | DIASTOLIC BLOOD PRESSURE: 70 MMHG | SYSTOLIC BLOOD PRESSURE: 140 MMHG

## 2019-04-09 DIAGNOSIS — N39.43 BENIGN PROSTATIC HYPERPLASIA WITH POST-VOID DRIBBLING: Primary | ICD-10-CM

## 2019-04-09 DIAGNOSIS — N40.1 BENIGN PROSTATIC HYPERPLASIA WITH POST-VOID DRIBBLING: Primary | ICD-10-CM

## 2019-04-09 PROCEDURE — 99213 OFFICE O/P EST LOW 20 MIN: CPT | Performed by: PHYSICIAN ASSISTANT

## 2019-04-09 ASSESSMENT — MIFFLIN-ST. JEOR: SCORE: 1503.92

## 2019-04-09 ASSESSMENT — PAIN SCALES - GENERAL: PAINLEVEL: NO PAIN (0)

## 2019-04-09 NOTE — LETTER
"4/9/2019     RE: Caden Bush  [address]  [place] [id number]     Dear Colleague,    Thank you for referring your patient, Caden Bush, to the [facility] at [facility]. Please see a copy of my visit note below.    CC: dribbling    HPI:  Caden Bush is a 58 year old male who presents in follow up of the above. Has post void dribbling.  Tried Flomax for a month but had dizziness and did not seem to help urinary pattern.  Nocturia x 1-2.  CT in Jan noted small renal stone (2mm) and benign adrenal nodule (adenoma).     States he is \"fine with his urination\" at this point.     PVR has been normal. PSA 1.6    Past Medical History:   Diagnosis Date     DM (diabetes mellitus) (H)      Eczema 2011    biopsy leg      Polyp of colon, adenomatous 6/25/2012     Retinopathy     HFA ophtho DR HARLEY     Schizo-affective type schizophrenia      Vitamin D deficiency     replaced       Past Surgical History:   Procedure Laterality Date     APPENDECTOMY       COLONOSCOPY  2012    2 adenomatous polyps       Social History     Socioeconomic History     Marital status: Single     Spouse name: Not on file     Number of children: Not on file     Years of education: Not on file     Highest education level: Not on file   Occupational History     Not on file   Social Needs     Financial resource strain: Not on file     Food insecurity:     Worry: Not on file     Inability: Not on file     Transportation needs:     Medical: Not on file     Non-medical: Not on file   Tobacco Use     Smoking status: Never Smoker     Smokeless tobacco: Never Used   Substance and Sexual Activity     Alcohol use: No     Alcohol/week: 0.0 oz     Drug use: No     Sexual activity: Not on file   Lifestyle     Physical activity:     Days per week: Not on file     Minutes per session: Not on file     Stress: Not on file   Relationships     Social connections:     Talks on " phone: Not on file     Gets together: Not on file     Attends Gnosticism service: Not on file     Active member of club or organization: Not on file     Attends meetings of clubs or organizations: Not on file     Relationship status: Not on file     Intimate partner violence:     Fear of current or ex partner: Not on file     Emotionally abused: Not on file     Physically abused: Not on file     Forced sexual activity: Not on file   Other Topics Concern     Parent/sibling w/ CABG, MI or angioplasty before 65F 55M? Not Asked   Social History Narrative     Not on file       Family History   Problem Relation Age of Onset     Osteoporosis Mother      Breast Cancer Mother 78     Osteoporosis Father      Asthma Father      Cancer - colorectal Father 70     Rheumatoid Arthritis Sister        ROS:14 point ROS neg other than the symptoms noted above in the HPI.    No Known Allergies    Current Outpatient Medications   Medication     ACE/ARB/ARNI NOT PRESCRIBED, INTENTIONAL,     acetaminophen 500 MG CAPS     aspirin 81 MG EC tablet     blood glucose (NO BRAND SPECIFIED) lancets standard     blood glucose (NO BRAND SPECIFIED) test strip     blood glucose monitoring (NO BRAND SPECIFIED) meter device kit     cholecalciferol (VITAMIN D) 1000 UNIT tablet     Continuous Blood Gluc  (DEXCOM G6 ) MICHELLE     Continuous Blood Gluc Sensor (DEXCOM G6 SENSOR) MISC     Continuous Blood Gluc Transmit (DEXCOM G6 TRANSMITTER) MISC     Cyanocobalamin (B-12) 1000 MCG CAPS     ferrous sulfate (IRON) 325 (65 Fe) MG tablet     FISH OIL     insulin aspart (NOVOLOG PEN) 100 UNIT/ML pen     insulin glargine (BASAGLAR KWIKPEN) 100 UNIT/ML pen     insulin pen needle (BD BRENDAN U/F) 32G X 4 MM     pioglitazone (ACTOS) 15 MG tablet     risperiDONE (RISPERDAL) 3 MG tablet     Sharps Container (COMPLETE NEEDLE COLLECTION SYS) MISC     simvastatin (ZOCOR) 40 MG tablet     sitagliptin (JANUVIA) 100 MG tablet     traZODone (DESYREL) 50 MG tablet  "    venlafaxine (EFFEXOR XR) 75 MG 24 hr capsule     venlafaxine (EFFEXOR-XR) 150 MG 24 hr capsule     No current facility-administered medications for this visit.          PEx:   /70 (BP Location: Left arm, Patient Position: Sitting, Cuff Size: Adult Regular)   Pulse 103   Ht 1.803 m (5' 11\")   Wt 66.7 kg (147 lb)   SpO2 98%   BMI 20.50 kg/m       Gen appearance:  Well groomed,: age-appropriate appearing male in NAD.   HEENT:  EOMI, AT NC, CN grossly normal  Psych:  alert , comfortable in no acute distress  Neuro:  A/O X 3  Skin:  Warm to touch, clear of rashes, ecchymoses  Resp:  No increased respiratory effort  lymph:  No LE edema          A/P: Caden ZEKE Bush is a 58 year old male with mild BPH with LUTS  -Not interested in a different BPH med at this time.   -Post void scan is reassuring that he is not in retention.   -Next step would be cysto if symptoms worsen.  -RTO PRN     Annie Ye PA-C  [facility]    10 minutes were spent with the patient today, > 50% in counseling and coordination of care      "

## 2019-04-09 NOTE — PROGRESS NOTES
"CC: dribbling    HPI:  Caden Bush is a 58 year old male who presents in follow up of the above. Has post void dribbling.  Tried Flomax for a month but had dizziness and did not seem to help urinary pattern.  Nocturia x 1-2.  CT in Jan noted small renal stone (2mm) and benign adrenal nodule (adenoma).     States he is \"fine with his urination\" at this point.     PVR has been normal. PSA 1.6    Past Medical History:   Diagnosis Date     DM (diabetes mellitus) (H)      Eczema 2011    biopsy leg      Polyp of colon, adenomatous 6/25/2012     Retinopathy     HFA ophtho DR HARLEY     Schizo-affective type schizophrenia      Vitamin D deficiency     replaced       Past Surgical History:   Procedure Laterality Date     APPENDECTOMY       COLONOSCOPY  2012    2 adenomatous polyps       Social History     Socioeconomic History     Marital status: Single     Spouse name: Not on file     Number of children: Not on file     Years of education: Not on file     Highest education level: Not on file   Occupational History     Not on file   Social Needs     Financial resource strain: Not on file     Food insecurity:     Worry: Not on file     Inability: Not on file     Transportation needs:     Medical: Not on file     Non-medical: Not on file   Tobacco Use     Smoking status: Never Smoker     Smokeless tobacco: Never Used   Substance and Sexual Activity     Alcohol use: No     Alcohol/week: 0.0 oz     Drug use: No     Sexual activity: Not on file   Lifestyle     Physical activity:     Days per week: Not on file     Minutes per session: Not on file     Stress: Not on file   Relationships     Social connections:     Talks on phone: Not on file     Gets together: Not on file     Attends Jew service: Not on file     Active member of club or organization: Not on file     Attends meetings of clubs or organizations: Not on file     Relationship status: Not on file     Intimate partner violence:     Fear of current or ex partner: Not " "on file     Emotionally abused: Not on file     Physically abused: Not on file     Forced sexual activity: Not on file   Other Topics Concern     Parent/sibling w/ CABG, MI or angioplasty before 65F 55M? Not Asked   Social History Narrative     Not on file       Family History   Problem Relation Age of Onset     Osteoporosis Mother      Breast Cancer Mother 78     Osteoporosis Father      Asthma Father      Cancer - colorectal Father 70     Rheumatoid Arthritis Sister        ROS:14 point ROS neg other than the symptoms noted above in the HPI.    No Known Allergies    Current Outpatient Medications   Medication     ACE/ARB/ARNI NOT PRESCRIBED, INTENTIONAL,     acetaminophen 500 MG CAPS     aspirin 81 MG EC tablet     blood glucose (NO BRAND SPECIFIED) lancets standard     blood glucose (NO BRAND SPECIFIED) test strip     blood glucose monitoring (NO BRAND SPECIFIED) meter device kit     cholecalciferol (VITAMIN D) 1000 UNIT tablet     Continuous Blood Gluc  (DEXCOM G6 ) MICHELLE     Continuous Blood Gluc Sensor (DEXCOM G6 SENSOR) MISC     Continuous Blood Gluc Transmit (DEXCOM G6 TRANSMITTER) MISC     Cyanocobalamin (B-12) 1000 MCG CAPS     ferrous sulfate (IRON) 325 (65 Fe) MG tablet     FISH OIL     insulin aspart (NOVOLOG PEN) 100 UNIT/ML pen     insulin glargine (BASAGLAR KWIKPEN) 100 UNIT/ML pen     insulin pen needle (BD BRENDAN U/F) 32G X 4 MM     pioglitazone (ACTOS) 15 MG tablet     risperiDONE (RISPERDAL) 3 MG tablet     Sharps Container (COMPLETE NEEDLE COLLECTION SYS) MISC     simvastatin (ZOCOR) 40 MG tablet     sitagliptin (JANUVIA) 100 MG tablet     traZODone (DESYREL) 50 MG tablet     venlafaxine (EFFEXOR XR) 75 MG 24 hr capsule     venlafaxine (EFFEXOR-XR) 150 MG 24 hr capsule     No current facility-administered medications for this visit.          PEx:   /70 (BP Location: Left arm, Patient Position: Sitting, Cuff Size: Adult Regular)   Pulse 103   Ht 1.803 m (5' 11\")   Wt 66.7 kg " (147 lb)   SpO2 98%   BMI 20.50 kg/m      Gen appearance:  Well groomed,: age-appropriate appearing male in NAD.   HEENT:  EOMI, AT NC, CN grossly normal  Psych:  alert , comfortable in no acute distress  Neuro:  A/O X 3  Skin:  Warm to touch, clear of rashes, ecchymoses  Resp:  No increased respiratory effort  lymph:  No LE edema          A/P: Caden ALANIS Eleazar is a 58 year old male with mild BPH with LUTS  -Not interested in a different BPH med at this time.   -Post void scan is reassuring that he is not in retention.   -Next step would be cysto if symptoms worsen.  -RTO PRN     [facility]    10 minutes were spent with the patient today, > 50% in counseling and coordination of care

## 2019-04-09 NOTE — PROGRESS NOTES
Clinic Care Coordination Contact  Outreach/Follow up    Data: Writer called patient's Housing Access Worker, Renuka ([phone]) today. Left Renuka a voice message with call back information. In the voice message, writer mentioned [facility] in [place] as a possible housing option for patient.    Plan: Await a return call from patient's Housing Access Worker.  Writer will continue to follow to assist, as able, with housing options for patient.      NATALIE Garcia  [facility] Care Coordination  Clinic: [facility]  Email: [email or web address]  Tele: [phone]

## 2019-04-10 NOTE — NURSING NOTE
"Chief Complaint   Patient presents with     Dribbling     Patient here today to go over his CT       Blood pressure 140/70, pulse 103, height 1.803 m (5' 11\"), weight 66.7 kg (147 lb), SpO2 98 %. Body mass index is 20.5 kg/m .    Patient Active Problem List   Diagnosis     Eczema     Retinopathy     Moderate episode of recurrent major depressive disorder (H)     Polyp of colon, adenomatous     Health Care Home     Hx of psychiatric care     Neuropathy of left upper extremity     Type 2 diabetes mellitus with both eyes affected by moderate nonproliferative retinopathy without macular edema, without long-term current use of insulin (H)     Schizoaffective disorder, bipolar type (H)       No Known Allergies    Current Outpatient Medications   Medication Sig Dispense Refill     ACE/ARB/ARNI NOT PRESCRIBED, INTENTIONAL, Please choose reason not prescribed, below-  hypotension       acetaminophen 500 MG CAPS Take 2 capsules by mouth 3 times daily as needed       aspirin 81 MG EC tablet Take 81 mg by mouth daily.       blood glucose (NO BRAND SPECIFIED) lancets standard Use to test blood sugar 2 times daily or as directed. 100 each 11     blood glucose (NO BRAND SPECIFIED) test strip Use to test blood sugars 2 times daily or as directed 200 strip 3     blood glucose monitoring (NO BRAND SPECIFIED) meter device kit Use to test blood sugar 2 times daily or as directed. 1 kit 0     cholecalciferol (VITAMIN D) 1000 UNIT tablet Take 1 tablet by mouth daily.       Continuous Blood Gluc  (DEXCOM G6 ) MICHELLE 1 each daily 1 Device 0     Continuous Blood Gluc Sensor (DEXCOM G6 SENSOR) MISC 1 each every 10 days 9 each 3     Continuous Blood Gluc Transmit (DEXCOM G6 TRANSMITTER) MISC 1 each every 3 months 1 each 3     Cyanocobalamin (B-12) 1000 MCG CAPS Take 1 capsule by mouth daily       ferrous sulfate (IRON) 325 (65 Fe) MG tablet Take 325 mg by mouth daily       FISH OIL 1 capsule daily        insulin aspart (NOVOLOG " PEN) 100 UNIT/ML pen Inject 1-3 units before breakfast,   0-3 units before lunch and 1-3 units before dinner, using 1 unit per 30 gr of carbohydrate. 15 mL 0     insulin glargine (BASAGLAR KWIKPEN) 100 UNIT/ML pen Inject 8 Units Subcutaneous At Bedtime We will make further adjustments as we see your response 15 mL 1     insulin pen needle (BD BRENDAN U/F) 32G X 4 MM Use 1 daily as directed. 100 each 11     pioglitazone (ACTOS) 15 MG tablet Take 1 tablet (15 mg) by mouth daily 90 tablet 3     risperiDONE (RISPERDAL) 3 MG tablet Take 1 tablet (3 mg) by mouth daily 30 tablet 0     Sharps Container (COMPLETE NEEDLE COLLECTION SYS) MISC 1 each daily 1 each 1     simvastatin (ZOCOR) 40 MG tablet Take 0.5 tablets (20 mg) by mouth At Bedtime 45 tablet 3     sitagliptin (JANUVIA) 100 MG tablet Take 1 tablet (100 mg) by mouth daily 30 tablet 1     traZODone (DESYREL) 50 MG tablet Take 1.5 tablets (75 mg) by mouth At Bedtime 30 tablet 0     venlafaxine (EFFEXOR XR) 75 MG 24 hr capsule Take 1 capsule (75 mg) by mouth daily TAKE WITH  Effexor 150 mg for a total dose of 225 mg daily 30 capsule 1     venlafaxine (EFFEXOR-XR) 150 MG 24 hr capsule TAKE 1 CAPSULE BY MOUTH DAILY ALONG WITH A 75 MG FOR A TOTAL  MG DAILY 30 capsule 1       Social History     Tobacco Use     Smoking status: Never Smoker     Smokeless tobacco: Never Used   Substance Use Topics     Alcohol use: No     Alcohol/week: 0.0 oz     Drug use: No       UA RESULTS:  Recent Labs   Lab Test 01/21/19  0949 01/02/19   COLOR Yellow Yellow   APPEARANCE Clear  --    URINEGLC 500*  --    URINEBILI Negative  --    URINEKETONE Negative  --    SG 1.015 1.005   UBLD Negative  --    URINEPH 5.0 5.5   PROTEIN Negative  --    UROBILINOGEN 0.2 0.2   NITRITE Negative Neg   LEUKEST Negative  --    RBCU  --  0-1   WBCU  --  0-1           Tracy [place], MA  4/10/2019

## 2019-04-12 ASSESSMENT — PATIENT HEALTH QUESTIONNAIRE - PHQ9: SUM OF ALL RESPONSES TO PHQ QUESTIONS 1-9: 11

## 2019-04-15 ENCOUNTER — ALLIED HEALTH/NURSE VISIT (OUTPATIENT)
Dept: PHARMACY | Facility: PHYSICIAN GROUP | Age: 59
End: 2019-04-15
Payer: MEDICAID

## 2019-04-15 DIAGNOSIS — E11.3393 TYPE 2 DIABETES MELLITUS WITH BOTH EYES AFFECTED BY MODERATE NONPROLIFERATIVE RETINOPATHY WITHOUT MACULAR EDEMA, WITHOUT LONG-TERM CURRENT USE OF INSULIN (H): ICD-10-CM

## 2019-04-15 DIAGNOSIS — E10.8 TYPE 1 DIABETES MELLITUS WITH COMPLICATION (H): Primary | ICD-10-CM

## 2019-04-15 PROCEDURE — 99606 MTMS BY PHARM EST 15 MIN: CPT | Performed by: PHARMACIST

## 2019-04-15 PROCEDURE — 99607 MTMS BY PHARM ADDL 15 MIN: CPT | Performed by: PHARMACIST

## 2019-04-15 RX ORDER — INSULIN GLARGINE 100 [IU]/ML
10 INJECTION, SOLUTION SUBCUTANEOUS AT BEDTIME
Qty: 15 ML | Refills: 1 | COMMUNITY
Start: 2019-04-15 | End: 2019-05-03

## 2019-04-15 NOTE — PROGRESS NOTES
SUBJECTIVE/OBJECTIVE:                Caden Bush is a 59 year old male called for a follow-up visit for Medication Therapy Management.  He was referred to me from Dr. Hart.     Chief Complaint: Follow up from our visit on 3/28.      Tobacco: No tobacco use  Alcohol: not currently using    Medication Adherence/Access:  no issues reported    Diabetes:  Pt currently taking Januvia 100 mg, Pioglitazone 15mg daily and Basaglar 8 units in the HS plus Novolog 2-4 units with breakfast/dinner (mostly using 4 units). (stopped metformin given lab results and ongoing weight loss concerns). Left him on the TZD and DPP4 given some data for patients with GABE to support continuing DPP4 inhibitors or thiazolidinediones in helping beta cell preservation. We decreased TZD dose previously due to concern of possible hypoglycemia with the initiation of meal time insulin.     Initially he was prescribed to use 1:30 carb ratio for insulin, however even though he feels comfortable with carb counting he has trouble with using the ratio per his report, so was transitioned to more fixed dosing with breakfast and dinner and adjusted dose upwards due to readings.  *Note that the NEDRA antibodies were positive.   C-peptide= 2.1ng/ml with glucose of 346mg/dl.  Pt is not experiencing side effects. Met with CDE to discuss diet and this has been very helpful to him.   Diet: states he has been slowly eating more and has gained some weight back now- weighing 147lbs.   He does read labels and counts carbs, typically eating around 30-45g at at time.   Set up to see endocrinology on May 1st.  4 x daily now- unable to get Dexcom G6 until 1 month of checking 4x per day.     Date FBG Lunch Dinner  Bedtime   4/15 374 212     4/14 236 224 - 379   4/13 293 177  224   4/12 233 256 157 -- 218 299   4/11 - 252 327 -   4/10 298 345 350 156   4/9 364 324 258 280     Recent symptoms of high blood sugar? None  Eye exam: up to date  Foot exam: up to date  Pt is not  taking an ACEi/ARB was taken off lisinopril due to dizziness and has not been restarted.  Also has hx of lithium toxicity w/ Ace previously, but has been off lithium now since Jan 2018.  Aspirin: Taking 81mg daily and denies side effects.  Lab Results   Component Value Date    A1C 6.7 01/02/2019    A1C 6.7 07/17/2018    A1C 8.2 03/19/2018    A1C 6.8 09/15/2017    A1C 8.2 06/07/2017     Today's Vitals: There were no vitals taken for this visit.- phone visit    ASSESSMENT:              Current medications were reviewed today as discussed above.      Medication Adherence: good, no issues identified    Diabetes: Needs Improvement. Patient is meeting A1c goal of < 7%. Stopping metformin and lowering pioglitazone have resulted in significant rises in sugars (along with patient eating more), but due to autoimmune nature of his DM, recommend dose increase in insulin over adding these oral agents back at this time. Will be able to get further guidance from Endocrinology in May. Also getting closer to being able to get CGM for him to better interpret his sugars. Self monitoring of blood glucose is not at goal of fasting  mg/dL and post prandial < 150 mg/dL. Pt would benefit from Basal Insulin (basaglar) :  increase dose to 10 units once daily and add in lunch time Novolog 1-2 units based on intake.     PLAN:                  1. Increase basaglar to 10 units once daily.     2. Add in Novolog 1-2 with lunch, continue 2-4 units with breakfast and dinner     I spent 20 minutes with this patient today. All changes were made via collaborative practice agreement with . A copy of the visit note was provided to the patient's primary care provider.     Will follow up in 2 weeks.    The patient declined a summary of these recommendations as an after visit summary.    Aliyah Angeles, Pharm.D, [facility]  Medication Therapy Management Pharmacist  [phone]

## 2019-04-21 DIAGNOSIS — F25.0 SCHIZOAFFECTIVE DISORDER, BIPOLAR TYPE (H): ICD-10-CM

## 2019-04-22 ENCOUNTER — PATIENT OUTREACH (OUTPATIENT)
Dept: CARE COORDINATION | Facility: CLINIC | Age: 59
End: 2019-04-22

## 2019-04-22 RX ORDER — TRAZODONE HYDROCHLORIDE 50 MG/1
75 TABLET, FILM COATED ORAL AT BEDTIME
Qty: 30 TABLET | Refills: 0 | Status: SHIPPED | OUTPATIENT
Start: 2019-04-22 | End: 2019-05-10

## 2019-04-22 NOTE — PROGRESS NOTES
Clinic Care Coordination Contact  Outreach?Follow up    Data: Patient sent an email to writer last Friday, 4/19/19, stating that he is still looking for housing. Writer has not heard back from patient's Housing Access Worker, Renuka (since voice message was left for Renuka on 4/09/19).    Writer called patient this afternoon. Writer left a voice message with call back information.    Plan: Await patient's return call. Will continue to follow.    Lolyl Armando [facility] Care Coordination  Clinic: [facility]  Email: [email or web address]  Tele: [phone]        Addendum  4/22/19  4:30 pm  Data:  Since above SW-CCC documentation, patient called writer back. Patient said that he (patient) had talked with Renuka who had no new housing resources for him. Writer gave patient information re: [facility] (assisted living). Gave patient the address and phone number ([phone]) for [facility]. Patient said that he will call [facility].    Lolly Armando [facility] Care Coordination  Clinic: [facility]  Email: [email or web address]  Tele: [phone]        Addendum  4/23/19   2:15 pm  Data: Patient called writer this morning and stated that he (patient) had contacted [facility] who would like to talk with writer. Patient gave verbal permission for writer to share clinic information with [facility].    Writer called [facility] on several occasions today. Talked with Meghann. Meghann explained that patient's CADI waiver would cover services at [facility], but patient would be responsible for the monthly rent of $904. Writer called patient who said that he (pt) can pay rent of $904. (Patient said that his monthly income is $1262.) [facility] requested clinic information re: patient be faxed to them at [phone]. The [facility] nurse will review the information tomorrow and then call patient to set up a tour (if  the nurse feels that patient is appropriate for their facility). Writer faxed clinic information as requested.    Writer called patient and reviewed the above with him.     Plan: SW-CCC will continue to follow to assist patient with housing options, as able.       Lolly Armando, [facility] Care Coordination  Clinic: [facility]  Email: [email or web address]  Tele: [phone]        Addendum  4/24/19  Data: Bear, Director of Nursing at [facility], called writer for information regarding patient. Bear plans to contact patient to schedule a tour of [facility] with patient. Bear said that there is a current opening at [facility].    Lolly Armando [facility] Care Coordination  Clinic: [facility]  Email: [email or web address]  Tele: [phone]

## 2019-04-23 ENCOUNTER — HOSPITAL ENCOUNTER (EMERGENCY)
Facility: CLINIC | Age: 59
Discharge: HOME | End: 2019-04-24
Attending: EMERGENCY MEDICINE | Admitting: EMERGENCY MEDICINE
Payer: MEDICARE

## 2019-04-23 ENCOUNTER — TELEPHONE (OUTPATIENT)
Dept: UROLOGY | Facility: CLINIC | Age: 59
End: 2019-04-23

## 2019-04-23 VITALS
TEMPERATURE: 98.8 F | BODY MASS INDEX: 20.58 KG/M2 | SYSTOLIC BLOOD PRESSURE: 145 MMHG | OXYGEN SATURATION: 99 % | RESPIRATION RATE: 16 BRPM | HEIGHT: 71 IN | WEIGHT: 147 LBS | DIASTOLIC BLOOD PRESSURE: 79 MMHG

## 2019-04-23 DIAGNOSIS — R45.851 SUICIDAL IDEATION: ICD-10-CM

## 2019-04-23 DIAGNOSIS — F25.1 SCHIZOAFFECTIVE DISORDER, DEPRESSIVE TYPE (H): ICD-10-CM

## 2019-04-23 DIAGNOSIS — E10.9 TYPE 1 DIABETES MELLITUS WITHOUT COMPLICATION (H): ICD-10-CM

## 2019-04-23 DIAGNOSIS — F32.A DEPRESSION, UNSPECIFIED DEPRESSION TYPE: ICD-10-CM

## 2019-04-23 LAB
AMPHETAMINES UR QL SCN: NEGATIVE
BARBITURATES UR QL: NEGATIVE
BENZODIAZ UR QL: NEGATIVE
CANNABINOIDS UR QL SCN: NEGATIVE
COCAINE UR QL: NEGATIVE
GLUCOSE BLDC GLUCOMTR-MCNC: 92 MG/DL (ref 70–99)
OPIATES UR QL SCN: NEGATIVE
PCP UR QL SCN: NEGATIVE

## 2019-04-23 PROCEDURE — 25000131 ZZH RX MED GY IP 250 OP 636 PS 637: Performed by: EMERGENCY MEDICINE

## 2019-04-23 PROCEDURE — 80307 DRUG TEST PRSMV CHEM ANLYZR: CPT | Performed by: EMERGENCY MEDICINE

## 2019-04-23 PROCEDURE — 96372 THER/PROPH/DIAG INJ SC/IM: CPT

## 2019-04-23 PROCEDURE — 00000146 ZZHCL STATISTIC GLUCOSE BY METER IP

## 2019-04-23 PROCEDURE — A9270 NON-COVERED ITEM OR SERVICE: HCPCS | Mod: GY | Performed by: EMERGENCY MEDICINE

## 2019-04-23 PROCEDURE — 99285 EMERGENCY DEPT VISIT HI MDM: CPT | Mod: 25

## 2019-04-23 PROCEDURE — 25000132 ZZH RX MED GY IP 250 OP 250 PS 637: Mod: GY | Performed by: EMERGENCY MEDICINE

## 2019-04-23 PROCEDURE — 90791 PSYCH DIAGNOSTIC EVALUATION: CPT

## 2019-04-23 RX ORDER — CHLORAL HYDRATE 500 MG
1 CAPSULE ORAL DAILY
COMMUNITY
End: 2019-10-08

## 2019-04-23 RX ORDER — SIMVASTATIN 20 MG
20 TABLET ORAL AT BEDTIME
Status: DISCONTINUED | OUTPATIENT
Start: 2019-04-23 | End: 2019-04-24 | Stop reason: HOSPADM

## 2019-04-23 RX ORDER — PIOGLITAZONEHYDROCHLORIDE 15 MG/1
15 TABLET ORAL DAILY
Status: DISCONTINUED | OUTPATIENT
Start: 2019-04-24 | End: 2019-04-24 | Stop reason: HOSPADM

## 2019-04-23 RX ORDER — RISPERIDONE 3 MG/1
3 TABLET ORAL DAILY
Status: DISCONTINUED | OUTPATIENT
Start: 2019-04-24 | End: 2019-04-24 | Stop reason: HOSPADM

## 2019-04-23 RX ORDER — ACETAMINOPHEN 325 MG/1
650 TABLET ORAL EVERY 4 HOURS PRN
Status: DISCONTINUED | OUTPATIENT
Start: 2019-04-23 | End: 2019-04-24 | Stop reason: HOSPADM

## 2019-04-23 RX ADMIN — INSULIN GLARGINE 10 UNITS: 100 INJECTION, SOLUTION SUBCUTANEOUS at 22:09

## 2019-04-23 RX ADMIN — TRAZODONE HYDROCHLORIDE 75 MG: 150 TABLET ORAL at 22:52

## 2019-04-23 RX ADMIN — SIMVASTATIN 20 MG: 20 TABLET, FILM COATED ORAL at 22:52

## 2019-04-23 ASSESSMENT — MIFFLIN-ST. JEOR: SCORE: 1498.92

## 2019-04-23 ASSESSMENT — ENCOUNTER SYMPTOMS
HALLUCINATIONS: 0
SLEEP DISTURBANCE: 1
APPETITE CHANGE: 0

## 2019-04-23 NOTE — TELEPHONE ENCOUNTER
He is interested in a medication to help him void that won't cause dizziness . Not Flomax .Magy Weems LPN

## 2019-04-23 NOTE — TELEPHONE ENCOUNTER
Medication requested: trazodone 50 mg tab  Last refilled: 3/26/19  Qty: 30      Last seen: 4/8/19  RTC: 2 weeks  Cancel: 0  No-show: 0  Next appt: 5/10/19    Refill decision:   Refilled for 30 days per protocol.

## 2019-04-23 NOTE — ED AVS SNAPSHOT
Emergency Department  [address] [place] [id number]  Phone:  [phone]  Fax:  [phone]                                    Caden Bush   MRN: [id number]    Department:   Emergency Department   Date of Visit:  4/23/2019           After Visit Summary Signature Page    I have received my discharge instructions, and my questions have been answered. I have discussed any challenges I see with this plan with the nurse or doctor.    ..........................................................................................................................................  Patient/Patient Representative Signature      ..........................................................................................................................................  Patient Representative Print Name and Relationship to Patient    ..................................................               ................................................  Date                                   Time    ..........................................................................................................................................  Reviewed by Signature/Title    ...................................................              ..............................................  Date                                               Time          22EPIC Rev 08/18

## 2019-04-23 NOTE — ED PROVIDER NOTES
History     Chief Complaint:  Depression    The history is provided by the patient.      Caden Bush is a 59 year old male who presents via EMS with depression. The patient has a history of depression, with past mental health hospitalization in 2008. He is currently following with Dr. Dang, Psychiatry, with his last appointment 4/8/2019, as well as a therapist. In the recent past, the patient's depression has been continually getting worse to the point where today he started thinking of hurting himself. This was concerning to the patient, prompting him to call EMS on his own will so he could be evaluated here in the ED. To note, the patient is diabetic and EMS report his blood sugar tonight is 186 and state it was 599 yesterday.    Here, the patient notes he lives alone in a senior apartment complex and feels isolated. He has family close-by and his mom infrequently checks up on him. He also gets along well with his two sisters, but does not see them often. He denies thoughts of self harm in the past and has not previously harmed himself. He notes he tried to call his therapist to speak with something about his increasing depression both yesterday and today and states he was never called back, though he has an appointment with her tomorrow. He has been taking his Effexor religiously but has started to think it is not helping much. No recent medication changes. He also notes that he takes around 3000 mg of Tylenol daily for body aches.The patient denies any changes in appetite. Regarding his history of diabetes, he describes that he normally overeats but does try to get some exercise by walking. He also has difficulty getting out of the bed in the morning. No use of alcohol or drugs. No visual or auditory hallucinations, but he notes he hears sounds like a sewing machine sometimes.     Allergies:  No known drug allergies    Medications:    Aspirin  Ferrous sulfate  Insulin apart  Insulin  "glargine  Actos  Risperdal  Zocor  Karen Curry  Effexor-XR    Past Medical History:    Diabetes Mellitus  Eczema  Retinopathy  Schizo-affective type schizophrenia  Major depressive disorder    Past Surgical History:    Appendectomy     Family History:    Osteoporosis  Breast cancer  Asthma  Cancer  Rheumatoid arthritis     Social History:  Smoking status: no  Alcohol use: no  Drug use: no  PCP: Annie Hart  Marital Status:  Single      Review of Systems   Constitutional: Negative for appetite change.   Psychiatric/Behavioral: Positive for self-injury and sleep disturbance. Negative for hallucinations and suicidal ideas.   All other systems reviewed and are negative.    Physical Exam     Patient Vitals for the past 24 hrs:   BP Temp Temp src Heart Rate Resp SpO2 Height Weight   04/23/19 1918 145/79 98.8  F (37.1  C) Temporal 83 16 99 % 1.803 m (5' 11\") 66.7 kg (147 lb)       Physical Exam  Nursing note and vitals reviewed.    Constitutional:  Appears well-developed and well-nourished, comfortable.  Cooperative.  HENT:    Nose normal.  No discharge.      Oropharynx is clear and moist.  Eyes:    Conjunctivae are normal without injection. No lid droop.     Pupils are equal, round, and reactive to light.   Cardiovascular:  Normal rate, regular rhythm with normal S1 and S2.      Normal heart sounds and peripheral pulses 2+ and equal.       No murmur or natalya.  Pulmonary:  Effort normal and breath sounds clear to auscultation bilaterally. No respiratory distress.  No stridor.     No wheezes. No rales.     GI:    Soft. No distension and no mass. No tenderness.   Musculoskeletal:  Normal range of motion. No extremity deformity.  Neurological:   Alert and oriented. No cranial nerve deficit, no facial droop.     Exhibits good muscle tone. Coordination normal.  Gait is normal.     GCS eye subscore is 4. GCS verbal subscore is 5.      GCS motor subscore is 6.   Skin:    Skin is warm and dry. No rash noted. No " diaphoresis.      No erythema. No pallor.  No lesions.  Psychiatric:   Behavior is normal.  Somewhat depressed mood and flat affect.  Good eye contact.     Judgment and thought content normal. No delusions.  Emergency Department Course   Laboratory:  Drug abuse screen urine: Negative  Glucose be meter: Ordered, but not checked before patient signed out to Dr. Ordonez    Interventions:  No medications were given. The patient's bordering medications were ordered, including his night time medications of Simvastatin 20 mg tablet, insulin gargine 10 units injection, and trazodone 75 mg tablet.     Emergency Department Course:  1845: Nursing notes and vitals reviewed. I performed an exam of the patient as documented above.     The patient was placed on a JEB hold while here in the ED.     The patient provided a urine sample here in the emergency department. This was sent for laboratory testing, findings above.     2020: I spoke with DEC and they agreed to evaluate the patient.     2200 I rechecked the patient and discussed the results of his workup thus far.     The patient was signed out to my colleague Dr. Covarrubias pending further evaluation and treatment.   Impression & Plan    Medical Decision Making:  Patient called the ambulance and came in voluntarily to be evaluated for some increasing depression and some suicidal ideation although is never made an attempt on his life and has no plan.  He feels pretty isolated does not have much in the way of support.  He does have an appointment with his therapist tomorrow.  I did get a drug screen and it was negative.  He has been taking his medications.  I have ordered tele-DEC to see the patient but they are backed up.  I am not convinced that he necessarily needs to be admitted but I did fill out paperwork and ordered his home medications.  He is diabetic and his blood sugar was 180.  I will have them check it again here tonight at bedtime.  Critical Care time:   none    Diagnosis:    ICD-10-CM    1. Suicidal ideation R45.851 Drug abuse screen 77 urine ([place], , )   2. Depression, unspecified depression type F32.9    3. Type 1 diabetes mellitus without complication (H) E10.9    4. Schizoaffective disorder, depressive type (H) F25.1      Disposition:  Signed out to Dr. Covarrubias.  Awaiting tele- DEC to see the patient.    Tomasa Joyce  4/23/2019    EMERGENCY DEPARTMENT    I, Tomasa Joyce, am serving as a scribe at 6:49 PM on 4/23/2019 to document services personally performed by Alicia Brito MD based on my observations and the provider's statements to me.        Alicia Brito MD  04/23/19 3410

## 2019-04-24 ENCOUNTER — TELEPHONE (OUTPATIENT)
Dept: UROLOGY | Facility: CLINIC | Age: 59
End: 2019-04-24

## 2019-04-24 DIAGNOSIS — N40.0 BPH (BENIGN PROSTATIC HYPERPLASIA): Primary | ICD-10-CM

## 2019-04-24 DIAGNOSIS — E10.8 TYPE 1 DIABETES MELLITUS WITH COMPLICATION (H): ICD-10-CM

## 2019-04-24 RX ORDER — SILODOSIN 8 MG/1
8 CAPSULE ORAL DAILY
Qty: 30 CAPSULE | Refills: 3 | Status: SHIPPED | OUTPATIENT
Start: 2019-04-24 | End: 2019-07-05

## 2019-04-24 NOTE — DISCHARGE INSTRUCTIONS
Discharge Instructions  Mental Health Concerns    You were seen today for mental health concerns, such as depression, anxiety, or suicidal thinking. Your provider feels that you do not require hospitalization at this time. However, your symptoms may become worse, and you may need to return to the Emergency Department. Most treatments of depression and suicidal thoughts are a process rather than a single intervention.  Medications and counseling can take several weeks or more to help.    Generally, every Emergency Department visit should have a follow-up clinic visit with either a primary or a specialty clinic/provider. Please follow-up as instructed by your emergency provider today.    By accepting these discharge instructions:  You promise to not harm yourself or others.  You agree that if you feel you are becoming unable to keep that promise, you will do something to help yourself before you do anything to harm yourself or others.   You agree to keep any safety plan arranged on your visit here today.  You agree to take any medication prescribed or recommended by your provider.  If you are getting worse, you can contact a friend or a family member, contact your counselor or family provider, contact a crisis line, or other options discussed with the provider or therapist today.  At any time, you can call 911 and return to the Emergency Department for more help.  You understand that follow-up is essential to your treatment, and you will make and keep appointments recommended on your visit today.    How to improve your mental health and prevent suicide:  Involve others by letting family, friends, counselors know.  Do not isolate yourself.  Avoid alcohol or drugs. Remove weapons, poisons from your home.  Try to stick to routines for eating, sleeping and getting regular exercise.    Try to get into sunlight. Bright natural light not only treats seasonal affective disorder but also depression.  Increase safe activities  that you enjoy.    If you feel worse, contact 1-800-suicide (1-471.879.5237), or call 911, or your primary provider/counselor for additional assistance.    If you were given a prescription for medicine here today, be sure to read all of the information (including the package insert) that comes with your prescription.  This will include important information about the medicine, its side effects, and any warnings that you need to know about.  The pharmacist who fills the prescription can provide more information and answer questions you may have about the medicine.  If you have questions or concerns that the pharmacist cannot address, please call or return to the Emergency Department.   Remember that you can always come back to the Emergency Department if you are not able to see your regular provider in the amount of time listed above, if you get any new symptoms, or if there is anything that worries you.

## 2019-04-24 NOTE — PHARMACY-ADMISSION MEDICATION HISTORY
Admission medication history interview status for the 4/23/2019  admission is complete. See EPIC admission navigator for prior to admission medications     Medication history source reliability:Good    Actions taken by pharmacist (provider contacted, etc): interviewed patient who had medication list with him.     Additional medication history information not noted on PTA med list :None    Medication reconciliation/reorder completed by provider prior to medication history? No    Time spent in this activity: 15 minutes    Prior to Admission medications    Medication Sig Last Dose Taking? Auth Provider   aspirin 81 MG EC tablet Take 81 mg by mouth daily. 4/23/2019 at am Yes Reported, Patient   cholecalciferol (VITAMIN D) 1000 UNIT tablet Take 1 tablet by mouth daily. 4/23/2019 at am Yes Reported, Patient   Cyanocobalamin (B-12) 1000 MCG CAPS Take 1 capsule by mouth daily 4/23/2019 at am Yes Reported, Patient   ferrous sulfate (IRON) 325 (65 Fe) MG tablet Take 325 mg by mouth daily 4/23/2019 at am Yes Reported, Patient   fish oil-omega-3 fatty acids 1000 MG capsule Take 1 g by mouth daily 4/23/2019 at am Yes Unknown, Entered By History   insulin aspart (NOVOLOG PEN) 100 UNIT/ML pen Inject 2-4 units before breakfast,  1-2 units before lunch and 2-4 units before dinner. 4/23/2019 at am Yes    insulin glargine (BASAGLAR KWIKPEN) 100 UNIT/ML pen Inject 10 Units Subcutaneous At Bedtime We will make further adjustments as we see your response 4/22/2019 at pm Yes    pioglitazone (ACTOS) 15 MG tablet Take 1 tablet (15 mg) by mouth daily 4/23/2019 at am Yes Annie Hart MD   risperiDONE (RISPERDAL) 3 MG tablet Take 1 tablet (3 mg) by mouth daily 4/22/2019 at pm Yes Natali Powell MD   simvastatin (ZOCOR) 40 MG tablet Take 0.5 tablets (20 mg) by mouth At Bedtime 4/22/2019 at pm Yes Annie Hart MD   sitagliptin (JANUVIA) 100 MG tablet Take 1 tablet (100 mg) by mouth daily 4/23/2019 at am Yes Tanner  Annie Swift MD   traZODone (DESYREL) 50 MG tablet Take 1.5 tablets (75 mg) by mouth At Bedtime 4/22/2019 at pm Yes Linda Bradley MD   venlafaxine (EFFEXOR XR) 75 MG 24 hr capsule Take 1 capsule (75 mg) by mouth daily TAKE WITH  Effexor 150 mg for a total dose of 225 mg daily 4/23/2019 at am Yes Natali Powell MD   venlafaxine (EFFEXOR-XR) 150 MG 24 hr capsule TAKE 1 CAPSULE BY MOUTH DAILY ALONG WITH A 75 MG FOR A TOTAL  MG DAILY 4/23/2019 at am Yes Natali Powell MD   ACE/ARB/ARNI NOT PRESCRIBED, INTENTIONAL, Please choose reason not prescribed, below-  hypotension   Reported, Patient   acetaminophen 500 MG CAPS Take 2 capsules by mouth 3 times daily as needed  at prn  Reported, Patient     Melodie Dugan, PharmD   [phone]

## 2019-04-24 NOTE — TELEPHONE ENCOUNTER
Rx e-scribed to pt's pharmacy. Pt informed.    ----- Message from Annie Ye PA-C sent at 4/24/2019  2:23 PM CDT -----  Regarding: RE: med request  Contact: [phone]  Rapaflo 8mg daily. Will likely be spendy...    ----- Message -----  From: Elisabeth Buckner RN  Sent: 4/24/2019   1:49 PM  To: Annie Ye PA-C, #  Subject: med request                                      Caden Tate calls again today stating he would like to go ahead with the alternative to Flomax medication for his retention.  Thank you,  Elisabeth

## 2019-04-24 NOTE — ED PROVIDER NOTES
"Critical access hospital ED Behavioral Health Handoff Note:       Brief HPI:  This is a 59 year old male signed out to me by Dr. Brito .  See initial ED Provider note for details of the presentation.     Patient is medically cleared for admission to a Behavioral Health unit.      Pending studies include nond.      The patient is not on a hold.        The patient has not required medication for agitation.      Exam:   Patient Vitals for the past 24 hrs:   BP Temp Temp src Heart Rate Resp SpO2 Height Weight   04/23/19 1918 145/79 98.8  F (37.1  C) Temporal 83 16 99 % 1.803 m (5' 11\") 66.7 kg (147 lb)     General: Resting comfortably on the gurney  Head:  The scalp, face, and head appear normal  Eyes:  The pupils are normal    Conjunctivae and sclera appear normal  ENT:    The nose is normal    Ears/pinnae are normal  Neck:  Normal range of motion  MS:  No deformities.  Moving all extremities.   Skin:  No rash or lesions noted.  Neuro: Speech is normal and fluent  Psych:  Awake. Alert.  Normal affect.      Appropriate interactions        ED Course:    There were significant events while under my care.      Therapy at 2pm tomorrow.  DEC assessed and felt patient can appropriate and safely be managed as outpatient.  Transient suicidal thoughts although no active plan and no prior attempts at suicide.  Signed extensive safety plan.  Safe for discharge home and return to ED if worsening thoughts or depression.  Patient agreed to f/u tomorrow with previously scheduled therapy.  Discharged home after all questions answered and return precautions understood.       Impression:    ICD-10-CM    1. Suicidal ideation R45.851 Drug abuse screen 77 urine ([place], [place], [place])     Glucose by meter     Glucose by meter   2. Depression, unspecified depression type F32.9    3. Type 1 diabetes mellitus without complication (H) E10.9    4. Schizoaffective disorder, depressive type (H) F25.1        Plan:    1. Discharged home    RESULTS:   Results for orders placed or " performed during the hospital encounter of 04/23/19 (from the past 24 hour(s))   Drug abuse screen 77 urine ([place], [place], [place])     Status: None    Collection Time: 04/23/19  7:35 PM   Result Value Ref Range    Amphetamine Qual Urine Negative NEG^Negative    Barbiturates Qual Urine Negative NEG^Negative    Benzodiazepine Qual Urine Negative NEG^Negative    Cannabinoids Qual Urine Negative NEG^Negative    Cocaine Qual Urine Negative NEG^Negative    Opiates Qualitative Urine Negative NEG^Negative    PCP Qual Urine Negative NEG^Negative   Glucose by meter     Status: None    Collection Time: 04/23/19  9:24 PM   Result Value Ref Range    Glucose 92 70 - 99 mg/dL     Caden Gonzales MD  04/23/19 1732

## 2019-04-25 ENCOUNTER — PATIENT OUTREACH (OUTPATIENT)
Dept: CARE COORDINATION | Facility: CLINIC | Age: 59
End: 2019-04-25

## 2019-04-25 NOTE — PROGRESS NOTES
Clinic Care Coordination Contact  Emergency Room Follow up    Data: Per chart review, patient was in the [facility] Emergency Room yesterday (-) with:  Suicidal ideation R45.851 Drug abuse screen 77 urine ([place], [place], [place])    2. Depression, unspecified depression type F32.9     3. Type 1 diabetes mellitus without complication (H) E10.9     4. Schizoaffective disorder, depressive type (H) F25.1        Writer called patient today. Patient said that he is doing OK. Patient stated that he (pt) has an appointment scheduled with his counselor at [facility], for 19 and with his psychiatrist, Dr. Dang, at [place] on 5/10/19.    Patient said that [facility] staff has called him and would like him to tour their facility. Patient said that he (patient) would like one of his sisters to go along but they are both busy busy (, vacation, etc.) so he is not sure if [facility] will be able [facility] until perhaps next week.    Plan: -CCC will continue to follow to assist, as able, with housing options.    NATALIE Garcia  [facility] Care Coordination  Clinic: [facility]  Email: [email or web address]  Tele: [phone]

## 2019-04-29 ENCOUNTER — ALLIED HEALTH/NURSE VISIT (OUTPATIENT)
Dept: PHARMACY | Facility: PHYSICIAN GROUP | Age: 59
End: 2019-04-29
Payer: MEDICAID

## 2019-04-29 DIAGNOSIS — E10.8 TYPE 1 DIABETES MELLITUS WITH COMPLICATION (H): Primary | ICD-10-CM

## 2019-04-29 DIAGNOSIS — F25.0 SCHIZOAFFECTIVE DISORDER, BIPOLAR TYPE (H): ICD-10-CM

## 2019-04-29 DIAGNOSIS — F33.1 MODERATE EPISODE OF RECURRENT MAJOR DEPRESSIVE DISORDER (H): ICD-10-CM

## 2019-04-29 DIAGNOSIS — G47.00 INSOMNIA, UNSPECIFIED TYPE: ICD-10-CM

## 2019-04-29 PROCEDURE — 99607 MTMS BY PHARM ADDL 15 MIN: CPT | Mod: GY | Performed by: PHARMACIST

## 2019-04-29 PROCEDURE — 99606 MTMS BY PHARM EST 15 MIN: CPT | Mod: GY | Performed by: PHARMACIST

## 2019-04-29 NOTE — PROGRESS NOTES
SUBJECTIVE/OBJECTIVE:                Caden Bush is a 59 year old male called for a follow-up visit for Medication Therapy Management.  He was referred to me from Dr. Hart.     Chief Complaint: Follow up from our visit on 4/15.  Was in ED on 4/23 for suicidal ideation.     Tobacco: No tobacco use  Alcohol: not currently using    Medication Adherence/Access:  no issues reported    Depression/Schizoaffective disorder/Insomnia: Was in the ER on 4/23 for SI and 1/10 for depression.   Working on finding new housing to allow for more interactions with others. Has been encourage to participate in a day program, but has not done this.   Therapist on 5/8.   Has psychiatrist as well, next visit set for 5/10/19- Currently on venlafaxine 225mg daily, trazodone 75mg daily now (increased from 50mg daily) and risperidone 3mg daily.  No longer on the lithium since January 2018 . 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 He feels his sleeping is a little better with the higher trazodone. Still hearing the sewing machine that keeps him up at night. Looking forward to touring a place in [place] tomorrow (4/30) that may offer him more socialization than his current living situation. Denies SI/HI at this time.     Diabetes:  Pt currently taking Januvia 100 mg, Pioglitazone 15mg daily and Basaglar 10 units in the HS plus Novolog 2-4 units with breakfast/dinner (mostly using 4 units). Did NOT add in lunchtime insulin as we discussed last time. (stopped metformin given lab results and ongoing weight loss concerns). Left him on the TZD and DPP4 given some data for patients with GABE to support continuing DPP4 inhibitors or thiazolidinediones in helping beta cell preservation. We decreased TZD dose previously due to concern of possible hypoglycemia with the initiation of meal time insulin.     Initially he was prescribed to use 1:30 carb ratio for insulin, however even though he feels comfortable with carb counting he has  trouble with using the ratio per his report, so was transitioned to more fixed dosing with breakfast and dinner and adjusted dose upwards due to readings.  *Note that the NEDRA antibodies were positive.   C-peptide= 2.1ng/ml with glucose of 346mg/dl.  Pt is not experiencing side effects. Met with CDE to discuss diet and this has been very helpful to him.   Diet: states he has been slowly eating more and has gained some weight back now- weighing 147lbs.   He does read labels and counts carbs, typically eating around 30-45g at at time.   Set up to see endocrinology on May 1st-Dr. Nava.  4 x daily now- unable to get Dexcom G6 until 1 month of checking 4x per day, this should be able to be processed now.     Date FBG Lunch Dinner  Bedtime   4/29  210     4/28 293 265 222 217   4/27 - 258 525 201   4/26 203 402 266 111   4/25 291 - 264 325   4/24 - 204 324 156   4/23 238  253 92*at hospital   4/22 286 214 599 346   4/21 246 194 335 394     Recent symptoms of high blood sugar? None  Eye exam: up to date  Foot exam: up to date  Pt is not taking an ACEi/ARB was taken off lisinopril due to dizziness and has not been restarted.  Also has hx of lithium toxicity w/ Ace previously, but has been off lithium now since Jan 2018.  Aspirin: Taking 81mg daily and denies side effects.  Lab Results   Component Value Date    A1C 6.7 01/02/2019    A1C 6.7 07/17/2018    A1C 8.2 03/19/2018    A1C 6.8 09/15/2017    A1C 8.2 06/07/2017     Today's Vitals: There were no vitals taken for this visit.- phone visit.    ASSESSMENT:              Current medications were reviewed today as discussed above.    Post Discharge Medication Reconciliation Status: discharge medications reconciled, continue medications without change.    Medication Adherence: good, no issues identified    Depression/Schizoaffective disorder/Insomnia: Keep planned follow up with psychiatry.     Diabetes: Needs Improvement. Patient is not meeting A1c goal of < 7%. Self  monitoring of blood glucose is not at goal of fasting  mg/dL and post prandial < 150 mg/dL. Pt would benefit from Basal Insulin (Basaglar) :  increase dose to 12 units daily  Bolus / Rapid Acting Insulin (Novolog) : increase dose to 4 units with 3 meals (breakfast, lunch and dinner).     PLAN:                  1. Increase basaglar to 12 units daily    2. Take 4 units with breakfast, 4 units with lunch and 4 units with dinner.     3. Will attempt to get Dexcom to cover now that he has been checking sugars 4x daily for 1 month.       I spent 20 minutes with this patient today. All changes were made via collaborative practice agreement with . A copy of the visit note was provided to the patient's primary care provider.     Will follow up in 2 weeks.    The patient declined a summary of these recommendations as an after visit summary.    Aliyah Angeles, Pharm.D, BCACP  Medication Therapy Management Pharmacist  [phone]

## 2019-04-29 NOTE — PROGRESS NOTES
Per  Aliyah Vides's request, her visit notes from 4/29/19, 4/15/19 and 3/28/19 faxed to [facility] today. These visit notes have the documentation of patient's QID glucose testing that is required for coverage consideration of CGM.   Josseline Mason RN

## 2019-04-29 NOTE — Clinical Note
CALEB- seeing emili on wed this week. Hoping to get his CGM covered now that it has been 1 month of checking sugars 4x daily. Aliyah Angeles, Pharm.D, BCACPMedication Therapy Management [phone]

## 2019-05-01 ENCOUNTER — TRANSFERRED RECORDS (OUTPATIENT)
Dept: FAMILY MEDICINE | Facility: CLINIC | Age: 59
End: 2019-05-01

## 2019-05-01 ENCOUNTER — PATIENT OUTREACH (OUTPATIENT)
Dept: CARE COORDINATION | Facility: CLINIC | Age: 59
End: 2019-05-01

## 2019-05-01 NOTE — PROGRESS NOTES
Clinic Care Coordination Contact    Data: Patient called writer yesterday and left a message that he (patient) had toured a studio apartment at  [place], [place] and liked it.    Writer called patient this morning. Patient said that he(pt) understands that he can move into [facility] as soon as the background check clears him. Patient said that he (pt) has to pay $65 for an application fee. Patient has talked to his financial counselor who will get him reimbursed for this.    Plan: Patient has some questions on what is and is not covered under his [facility] rent. With patient's permission, writer will call [facility].      Lolly Armando [facility] Care Coordination  Clinic: [facility]  Email: [email or web address]  Tele: [phone]        Addendum  5/2/19  Data: Writer called [facility] ([phone]) earlier this afternoon and left a voice message for Bear, Director of Health Services, with call back information.     Lolly Armando [facility] Care Coordination  Clinic: [facility]  Email: [email or web address]  Tele: [phone]      Addendum  5/3/19  Data: Patient had called and left a message for writer yesterday. Writer called patient this morning and left a voice message with call back information.      Lolly Armando [facility] Care Coordination  Clinic: [facility]  Email: [email or web address]  Tele: [phone]

## 2019-05-03 ENCOUNTER — TELEPHONE (OUTPATIENT)
Dept: PHARMACY | Facility: PHYSICIAN GROUP | Age: 59
End: 2019-05-03

## 2019-05-03 DIAGNOSIS — E11.3393 TYPE 2 DIABETES MELLITUS WITH BOTH EYES AFFECTED BY MODERATE NONPROLIFERATIVE RETINOPATHY WITHOUT MACULAR EDEMA, WITHOUT LONG-TERM CURRENT USE OF INSULIN (H): ICD-10-CM

## 2019-05-03 DIAGNOSIS — E10.8 TYPE 1 DIABETES MELLITUS WITH COMPLICATION (H): ICD-10-CM

## 2019-05-03 DIAGNOSIS — E10.8 TYPE 1 DIABETES MELLITUS WITH COMPLICATION (H): Primary | ICD-10-CM

## 2019-05-03 RX ORDER — INSULIN GLARGINE 100 [IU]/ML
14 INJECTION, SOLUTION SUBCUTANEOUS AT BEDTIME
Qty: 15 ML | Refills: 1 | COMMUNITY
Start: 2019-05-03 | End: 2019-05-09

## 2019-05-03 NOTE — TELEPHONE ENCOUNTER
Spoke with Meredith at Endo office or Dr. Vasquez and she states that Dr. Vasquez is recommending to stop Januvia, Stop Actos, increase basaglar to 14 units and use 1:10 carb ratio with 1:50 correction.     Patient was uncertain about these changes and wanted to get the plan from [facility] as well. I have called him and let him know he should:     1. Stop januvia  2. Stop Actos  3. Increase Basaglar to 14units   4 . Increase Novolog to 5 units with breakfast lunch and dinner for now.       He is not able to convert his carbs into an insulin dose from a ratio, so we have tried to simplify to fix dose for right now, but working towards getting more flexibility to account for intake and pre-meal sugar.     He has planned follow up with MT on 5/13.     Also waiting on Dexcom G6 coverage- we have sent in updated information to [facility]- they have now sent on to Medicare for him, so now waiting for coverage determination (may take 2-4 weeks).     All changes made per CPA with Dr. Hart.     Aliyah Angeles, Pharm.D, BCACP  Medication Therapy Management Pharmacist  [phone]

## 2019-05-03 NOTE — TELEPHONE ENCOUNTER
Updated rx for test strips for 4x per day testing per CPA with Dr. Hart.     Aliyah Angeles, Pharm.D, [facility]  Medication Therapy Management Pharmacist  [phone]

## 2019-05-06 ENCOUNTER — PATIENT OUTREACH (OUTPATIENT)
Dept: CARE COORDINATION | Facility: CLINIC | Age: 59
End: 2019-05-06

## 2019-05-06 ENCOUNTER — TELEPHONE (OUTPATIENT)
Dept: FAMILY MEDICINE | Facility: CLINIC | Age: 59
End: 2019-05-06

## 2019-05-06 NOTE — PROGRESS NOTES
"Clinic Care Coordination Contact  Care Team Conversations    Data: Bear [employer], called writer this morning. Bear said that [facility] is interested in accepting patient. Bear was not sure, however, the status of patient's application form, application check and whether or not the background check has been completed. Bear said that she (Bear) will check on these things and call patient.    Later, patient called writer. Patient said that he (pt) has received a letter today that patient's CADI  Qing Durán will be on maternity leave. During Ms. Durán' leave, Lori Albarado ([phone]), will be assigned to patient. Ms. Durán' leave is expected to be 5/03/19--8/08/19.     Patient had received a voice message from Bear at [facility] and planned to call Bear back.    Plan: [facility] will follow to assist with housing options, as indicated.      Lolly Armando [facility] Care Coordination  Clinic: [facility]  Email: [email or web address]  Tele: [phone]        Addendum  5/8/19  Data: Patient left a voice message for writer yesterday afternoon. Patient stated (in the voice message) that he (pt) has had second thoughts about going to assisted living. Patient is \"leaning toward\" going to a group home--perhaps just staying in his current apartment. Patient stated \"I can't picture myself being in Assisted Living\".    Writer called patient's Housing Access Worker, Renuka, this afternoon. Renuka said that she (Renuka) has most recently talked to patient yesterday. Renuka told patient that she (Renuka) is still pursuing group home options for him (pt), but has not heard of any appropriate group home openings at this time. Patient told Renuka that he (pt) is interested in moving into a group home in [facility], so Renuka is expanding her search to [facility]. Renuka said that patient did not tell her (Renuka) that he is no longer interested in going into assisted " living.    Writer called patient this afternoon and left a voice message. In the message, writer told patient that writer will be off work until 5/14/19.    Plan: -[facility] will follow to assist with housing options, as indicated.    NATALIE Garcia  [facility] Care Coordination  Clinic: [facility]   Email: [email or web address]  Tele: [phone]

## 2019-05-06 NOTE — TELEPHONE ENCOUNTER
Called and spoke with [facility] pharmacy regarding Dexcom G6. Order is still being processed-received updated testing information on 5/3/19 and will process prescription. Becca Abarca

## 2019-05-09 DIAGNOSIS — E11.3393 TYPE 2 DIABETES MELLITUS WITH BOTH EYES AFFECTED BY MODERATE NONPROLIFERATIVE RETINOPATHY WITHOUT MACULAR EDEMA, WITHOUT LONG-TERM CURRENT USE OF INSULIN (H): ICD-10-CM

## 2019-05-09 RX ORDER — INSULIN GLARGINE 100 [IU]/ML
14 INJECTION, SOLUTION SUBCUTANEOUS AT BEDTIME
Qty: 15 ML | Refills: 1 | Status: SHIPPED | OUTPATIENT
Start: 2019-05-09 | End: 2019-08-09

## 2019-05-09 NOTE — TELEPHONE ENCOUNTER
Basaglar  LOV 3/4/19  A1c lab scheduled 5/15    Lab Results   Component Value Date    A1C 6.7 01/02/2019    A1C 6.7 07/17/2018    A1C 8.2 03/19/2018    A1C 6.8 09/15/2017    A1C 8.2 06/07/2017

## 2019-05-10 ENCOUNTER — OFFICE VISIT (OUTPATIENT)
Dept: PSYCHIATRY | Facility: CLINIC | Age: 59
End: 2019-05-10
Attending: PSYCHIATRY & NEUROLOGY
Payer: MEDICARE

## 2019-05-10 VITALS
BODY MASS INDEX: 20.78 KG/M2 | HEART RATE: 103 BPM | DIASTOLIC BLOOD PRESSURE: 79 MMHG | WEIGHT: 149 LBS | SYSTOLIC BLOOD PRESSURE: 148 MMHG

## 2019-05-10 DIAGNOSIS — F25.0 SCHIZOAFFECTIVE DISORDER, BIPOLAR TYPE (H): ICD-10-CM

## 2019-05-10 PROCEDURE — G0463 HOSPITAL OUTPT CLINIC VISIT: HCPCS | Mod: ZF

## 2019-05-10 RX ORDER — VENLAFAXINE HYDROCHLORIDE 150 MG/1
CAPSULE, EXTENDED RELEASE ORAL
Qty: 30 CAPSULE | Refills: 1 | Status: SHIPPED | OUTPATIENT
Start: 2019-05-10 | End: 2019-08-02

## 2019-05-10 RX ORDER — RISPERIDONE 3 MG/1
3 TABLET ORAL DAILY
Qty: 30 TABLET | Refills: 0 | Status: SHIPPED | OUTPATIENT
Start: 2019-05-10 | End: 2019-06-03

## 2019-05-10 RX ORDER — VENLAFAXINE HYDROCHLORIDE 75 MG/1
75 CAPSULE, EXTENDED RELEASE ORAL DAILY
Qty: 30 CAPSULE | Refills: 1 | Status: SHIPPED | OUTPATIENT
Start: 2019-05-10 | End: 2019-06-14

## 2019-05-10 RX ORDER — TRAZODONE HYDROCHLORIDE 50 MG/1
75 TABLET, FILM COATED ORAL AT BEDTIME
Qty: 30 TABLET | Refills: 0 | Status: SHIPPED | OUTPATIENT
Start: 2019-05-10 | End: 2019-05-30

## 2019-05-10 ASSESSMENT — PAIN SCALES - GENERAL: PAINLEVEL: NO PAIN (0)

## 2019-05-10 NOTE — PROGRESS NOTES
[facility] PSYCHIATRY CLINIC PROGRESS NOTE     CARE TEAM:  PCP- Annie Hart   [facility]      Ophthalmologist: Jacob Lieberman with [facility] support: [facility], [phone]     Caden Bush is a 58 year old male who prefers the name Caden & pronouns he.  The initial diagnostic evaluation was on 11/26/08 .   Date of the most recent transfer of care eval is 07/13/2018.      Pertinent Background:  This patient first experienced mental health issues in his late 20s , initially obtained care at that time and has received treatment for Schizoaffective Disorder, bipolar type. His diagnosis has changed through the time and afterreviewing all the evidence in 9/2018 the current diagnosis was confirmed (several episodes of moderate to severe depression and one episode of two weeks long period of grandiosity,  pressured speech, overspending and sleeplessness with increased social anxiety and paranoia).   Previously had a CCM until 2011. Has SW consult on 10/20/15, not interested in CM or [facility] worker services at this time and seems to be managing well on his own.    Of note, patient is found to be very hesitant to change any medication regimen or engaged in the treatment programs.  Luckily, he has been engaged in care with his therapist.    #Mood/psychosis:  - Notably, this pt was stable on regimen of Lithium, Effexor and Risperdal since 2011. However due to complications of chronic diabetes, Lithium was discontinued in 2017.   -  reviewed the chart . Per chart review, patient was not fit for the  current group. She recommended [facility] for groups and activities for him.   - We even tried going down on Risperidone for other reasons but he immediately got destabilized.    #Insomnia: The etiology of his insomnia is not well understood and the sleep study was discussed and encouraged. Sleep has improved on 3mg of risperidone while psychotic  "sxs are also better managed.      Psych critical item history includes suicidal ideation, psychosis [sxs include AH and paranoia], mutiple psychotropic trials and psych hosp (>5).     INTERIM HISTORY                                                                                                                 4, 4   The patient reports good treatment adherence.  History was provided by the patient who was a fair to good  historian. The last visit ended with the following med change: Increased trazodone dose..   Since the last visit, Caden reports the following:   - Patient decided not to move to assisted living because he thinks Assisted living places are not ell equipped managing his mental needs.   - Patient has not reached out to the Day treatment program and still is hesitent to make any changes to his medications.   - He is still feeling sad, isolated and desires to feel \"just a little happy\".   - Sewing machine voice is ongoing. Still unclear if it is AH or in reality there is a sewing machine running in the building at nights.   - He has few upcoming appointments next week and since he has limitation in number of rides with [employer] per week, he does not think he may start Day Treatment any time soon.   - He is still engaged with his therapist.   -Patient has talked to his  and they have decided to consider support animal.  Patient asked for a letter.   -Lately patient has been thinking he would be better off dead.  He denies any active suicidal ideation, plan to harm himself, or any intent to do so.  He just repeats \"I want this loneliness to end.\".     RECENT SYMPTOMS:   DEPRESSION:  reports-depression, anhedonia, little interest in doing things, feeling tired, poor appetite, feeling bad about self, memory problems, moving slower, and suicidal ideation without any plan or intent;  DENIES-  self-destructive thoughts, violent thoughts  KINGA/HYPOMANIA:  reports-none;  DENIES- increased " energy, decreased sleep need, increased activity and grandiosity  PSYCHOSIS:  reports-none;  DENIES- delusions, auditory hallucinations and visual hallucinations  DYSREGULATION:  reports-none;  DENIES- suicidal ideation, violent ideation and SIB  PANIC ATTACK:  none   ANXIETY:   social anxiety at baseline. Desires to socialize  TRAUMA RELATED:  none  COMPULSIVE:  none  SLEEP:  As mentioned above  EATING DISORDER: none      RECENT SUBSTANCE USE:     ALCOHOL-  never   TOBACCO- none  CAFFEINE- 1 cups/day of coffee, 1 sodas/ in a while  OPIOIDS- none      NARCAN KIT- N/A          CANNABIS- none  OTHER ILLICIT DRUGS- none      CURRENT SOCIAL HISTORY:  FINANCIAL SUPPORT- SSDI for schizoaffective disorder and diabetes  CHILDREN- none      LIVING SITUATION- Currently living by self in subsidized apartment in [place] since Feb 2011.      SOCIAL/ SPIRITUAL SUPPORT- few friends and family     FEELS SAFE AT HOME- yes       MEDICAL ROS (2,10):  Reports Arthritis pain (ongoing, unchanged) Denies Pain, headache, dizziness, GI [nausea, diarrhea, constipation,  ], , sedation, tremor and confusion.    PSYCH and CD Critical Summary Points since July 2018 July: Increased Risperidone dose  During months of Fall and winter, patient was hesitant to make changes to medications . Modified risperidone and trazodone dose several times (minor changes). Not successful in complete resolution of sxs.     PAST PSYCH MED TRIALS   see EMR Problem List: Hx of psychiatric care       Drug / Start Date   Dose (mg)   Helpful   Adverse Effects  DC Reason / Date     Lithium   900          Risperdal         current     Abilify             Seroquel             Effexor         current     Zoloft             Wellbutrin             Buspar             trazodone         current     hydroxyzine             Ambien               Maybe Zyprexa, and Prozac, can't remember           MEDICAL / SURGICAL HISTORY                                 Neurologic Hx-  no  Patient Active Problem List   Diagnosis     Eczema     Retinopathy     Moderate episode of recurrent major depressive disorder (H)     Polyp of colon, adenomatous     Health Care Home     Hx of psychiatric care     Neuropathy of left upper extremity     Type 2 diabetes mellitus with both eyes affected by moderate nonproliferative retinopathy without macular edema, without long-term current use of insulin (H)     Schizoaffective disorder, bipolar type (H)       Major Surgery- Appendectomy    ALLERGY                                Flomax [tamsulosin] and Lisinopril  MEDICATIONS                               Current Outpatient Medications   Medication Sig Dispense Refill     ACE/ARB/ARNI NOT PRESCRIBED, INTENTIONAL, Please choose reason not prescribed, below-  hypotension       acetaminophen 500 MG CAPS Take 2 capsules by mouth 3 times daily as needed       aspirin 81 MG EC tablet Take 81 mg by mouth daily.       blood glucose (NO BRAND SPECIFIED) test strip Use to test blood sugar 4 times daily or as directed for insulin injections 4 times daily. 400 each 3     cholecalciferol (VITAMIN D) 1000 UNIT tablet Take 1 tablet by mouth daily.       Cyanocobalamin (B-12) 1000 MCG CAPS Take 1 capsule by mouth daily       ferrous sulfate (IRON) 325 (65 Fe) MG tablet Take 325 mg by mouth daily       fish oil-omega-3 fatty acids 1000 MG capsule Take 1 g by mouth daily       insulin aspart (NOVOLOG PEN) 100 UNIT/ML pen Inject 5 units before breakfast,  5 units before lunch and 5 units before dinner. 15 mL 0     insulin glargine (BASAGLAR KWIKPEN) 100 UNIT/ML pen Inject 14 Units Subcutaneous At Bedtime We will make further adjustments as we see your response 15 mL 1     risperiDONE (RISPERDAL) 3 MG tablet Take 1 tablet (3 mg) by mouth daily 30 tablet 0     Silodosin (RAPAFLO) 8 MG CAPS capsule Take 1 capsule (8 mg) by mouth daily 30 capsule 3     simvastatin (ZOCOR) 40 MG tablet Take 0.5 tablets (20 mg) by mouth At Bedtime 45  tablet 3     traZODone (DESYREL) 50 MG tablet Take 1.5 tablets (75 mg) by mouth At Bedtime 30 tablet 0     venlafaxine (EFFEXOR XR) 75 MG 24 hr capsule Take 1 capsule (75 mg) by mouth daily TAKE WITH  Effexor 150 mg for a total dose of 225 mg daily 30 capsule 1     venlafaxine (EFFEXOR-XR) 150 MG 24 hr capsule TAKE 1 CAPSULE BY MOUTH DAILY ALONG WITH A 75 MG FOR A TOTAL  MG DAILY 30 capsule 1     VITALS                                                                                                                          3, 3   /79   Pulse 103   Wt 67.6 kg (149 lb)   BMI 20.78 kg/m     MENTAL STATUS EXAM                                                                                           9, 14 cog gs     Alertness: alert  and oriented  Appearance: unkempt  Behavior/Demeanor: cooperative, calm, with fair to intense starting eye contact   Speech: slowed with delay  Language: intact and no problems  Psychomotor: slowed,otherwise unremarkable.   Mood: fine  Affect: limited range; blunted, sad and tense, was congruent to mood; was congruent to content  Thought Process/Associations: unremarkable  Thought Content:  Reports possible AH;   Denies suicidal ideation  and psychotic thought  Perception:   Reports none;  Denies auditory hallucinations or visual hallucinations or delusions  Insight: fair  Judgment: fair  Cognition: does  appear grossly intact; formal cognitive testing was not done  Gait and Station: unremarkable     LABS and DATA     AIMS: 0 at 3/1/2018    PHQ9 TODAY = 11  PHQ-9 SCORE 2/7/2019 3/26/2019 4/8/2019   PHQ-9 Total Score - - -   PHQ-9 Total Score 12 11 11   PHQ-9 Total Score - - -     ANTIPSYCHOTIC LABS  [glu, A1C, lipids (ezequiel LDL), liver enzymes, WBC, ANEU, Hgb, plts]  q12 mo  Recent Labs   Lab Test 03/15/19  1015 03/04/19  1519 02/13/19  1625 01/02/19  1045  07/17/18  1126  03/19/18  1116  09/15/17  0956   * 444* 254* 147*   < >  --    < >  --    < >  --    A1C  --   --    --  6.7*  --  6.7*  --  8.2*  --  6.8*    < > = values in this interval not displayed.     Recent Labs   Lab Test 01/02/19  1045 04/17/18  1356 07/11/17  1034 06/07/17  1004   CHOL 127 139 126 143   TRIG 51 45 49 105   LDL 33 45 40 46   HDL 84 85 76 76     Recent Labs   Lab Test 01/02/19  1045 04/17/18  1356 01/30/18  0923 07/11/17  1034   AST 14 15 14 14   ALT 13 12 16 12   ALKPHOS 74 73 79 72     Recent Labs   Lab Test 02/13/19  1522 01/02/19  1126 01/02/19  1045 10/11/18  1010 09/05/18  1123  06/09/16  1030 12/21/15  1413  08/01/12  1640   WBC 5.3 4.2  --  2.5* 2.8*   < > 4.7 4.6   < > 5.5   ANEU  --   --   --   --   --   --  3.5 3.4  --  4.8   HGB 12.7* 12.9* 13.1 12.5* 11.5*   < > 14.7 13.4   < > 11.4*    149  --  136* 168   < > 175 155   < > 180    < > = values in this interval not displayed.       DIAGNOSIS     Schizoaffective disorder, bipolar type, depressive episode, mild to moderate, without current psychotic features    ASSESSMENT                                                                                                                   m2, h3     TODAY:    #Psychosis #Mood  - Patient reports low mood, compliance to medication and denies safety issues including self harm, SI, wolf or psychosis. MSE + for depressed patient, concrete thinking pattern, with possible AH?. Patient was stable on current medication regimen since summer 2018 (risperidone and venlafaxine) and despite recent worsening of depressive symptoms, he desires to continue this medication regimen but he is more open to med changes at this session. +[facility] program at [facility] was highly recommended again.     Also spring has historically been a better season for him in terms of his MH. Active lifestyle and also activities were encouraged. He was encouraged to take walks.  considering the fact that slight medication changes in the past have significantly destabilized patient's mental health, we will hold off on changing medication in  this session and postpone this plan to after starting day treatment.  Patient also unwilling to try med changes.   Patient has agreed to close psychiatric follow-up.    Support animal letter was prepared and provided to patient after her appointment.    #Insomnia :  - Unclear etiology. Sleep study has been discussed and was encouraged.  Patient is ambivalent to see a sleep specialist.  - Trazodone helps but too sedating at times.  He has been historically adjusting the dose as he needs.    #History of Leukopenia, unspecified type #Low PLT count #Anemia  - PCP is aware. Possibly due to Risperdal. Had a physical with PCP on Jan 2nd when he was also going to evaluate hematologic abnormalities. Receives iron replacement  #DM II: On insulin  # Vitamin D def> replacement  #Benign adrenal incidentaloma    SUICIDE RISK ASSESSMENT:    Caden Bush reports Si without plan or intent.  In addition, he has notable risk factors for self-harm including feels trapped, hopelessness, lives alone/ isolated, hallucinations and male.  However, risk is mitigated by no h/o suicide attempt, describes a safety plan, none to minimal alcohol use , commitment to family and stable housing.  Based on all available evidence he does not appear to be at imminent risk for self-harm therefore does not meet criteria for a 72-hr hold/  involuntary hospitalization.  However, based on degree of symptoms voluntary hospitalization, day treatment and close psych FU was recommended which the pt did agree to Day Treatment and close follow up.  Additional steps to minimize risk include: SAFETY PLAN completed 5/10/2019.  Safety Plan placed in AVS: Yes.    [place] PRESCRIPTION MONITORING PROGRAM [] was not checked today:  not using controlled substances.    PSYCHOTROPIC DRUG INTERACTIONS:   VENLAFAXINE and ANTIPLATELET AGENTS may result in an increased risk of bleeding.  RISPERIDONE and SIMVASTATIN may result in increased simvastatin serum concentrations -with an  increased risk of myopathy or rhabdomyolysis.  TRAZODONE and VENLAFAXINE may result in an increased risk of serotonin syndrome.    MANAGEMENT:  Monitoring for adverse effects     PLAN                                                                                                                                m2, h3     1) PSYCHOTROPIC MEDICATIONS:  - Continue risperidone 3 mg QHS  - Cont Effexor XR  225 mg daily  - Continue taking Trazodone 75mg      2) THERAPY:    - Seeing psychologist Yeny Goff at [facility] since 11/2015. Sees once/twice a month.       3) LABS NEXT DUE: as per atypical neuroleptic protocol.      RATING SCALES:  AIMS: 0 at 3/1/2018      4) REFERRALS:    none     7) RTC: 4 wks      8) CRISIS NUMBERS:     none    TREATMENT RISK STATEMENT:  The risks, benefits, alternatives and potential adverse effects have been discussed and are understood by the pt. The pt understands the risks of using street drugs or alcohol. There are no medical contraindications, the pt agrees to treatment with the ability to do so. The pt knows to call the clinic for any problems or to access emergency care if needed.  Medical and substance use concerns are documented above.  Psychotropic drug interaction check was done, including changes made today.     PROVIDER:  Natali Dang MD    Patient was staffed in clinic by Dr. Porras who will review and sign the note.  Supervisor is Dr. Bradley.  I did not see this patient directly. I have reviewed the documentation and I agree with the resident's plan of care. I am closing the chart due to its date. Will ask Dr. Porras to review    Linda Bradley MD    I saw the patient with the resident, and participated in key portions of the service, including the mental status examination and developing the plan of care. I reviewed key portions of the history with the resident. I agree with the findings and plan as documented in this note.    Maty Porras

## 2019-05-10 NOTE — PATIENT INSTRUCTIONS
"- Continue medications   - Please call Day treatment to start it ASAP.   - Continue Therapy      SAFETY PLAN    Completed on 5/10/2019  at which time the following was reported: attemtping suicide two weeks ago via overdosing on Insulin. .  It was determined that ongoing outpatient care was appropriate in addition to establishing the following care plan:     1. Triggers which can possibly cause thoughts of self-harm:   Thoughts- \"People do not care\".   Mood or behavior- Depressed  Situation- alone, at apartment.        2. Coping strategies:  ice pack on face- reviewed  paced breathing- reviewed  progressive muscle relaxation- reviewed  intense exercise- reviewed  other- Listen to music, read, go downstairs and play [email or web address] on Monday nights, Go to the store and mNectar, Call mother and text sisters.     3. Ways to increase safety:  -      4. People I can call for help: (friends, family and/or professionals)    Name: Lucero    Phone:     Name: Brianna    Phone:     Name: Natali Dang MD                Phone: [facility] [phone] (clinic)  , [phone] (after hours)      Thank you for coming to the PSYCHIATRY CLINIC.    Lab Testing:  If you had lab testing today and your results are reassuring or normal they will be mailed to you or sent through Cloudtop within 7 days.   If the lab tests need quick action we will call you with the results.  The phone number we will call with results is # [phone] (home) . If this is not the best number please call our clinic and change the number.    Medication Refills:  If you need any refills please call your pharmacy and they will contact us. Our fax number for refills is [phone]. Please allow three business for refill processing.   If you need to  your refill at a new pharmacy, please contact the new pharmacy directly. The new pharmacy will help you get your medications transferred.     Scheduling:  If you have any concerns about today's visit or " wish to schedule another appointment please call our office during normal business hours [phone] (8-5:00 M-F)    Contact Us:  Please call [phone] during business hours (8-5:00 M-F).  If after clinic hours, or on the weekend, please call  [phone].    Financial Assistance [phone]  [facility] Billing [phone]  [facility] Billing [phone]  Medical Records [phone]      MENTAL HEALTH CRISIS NUMBERS:  [facility]:   [facility] - [phone]   Crisis Residence [facility] - [phone]   Walk-In Counseling Center [facility] - [phone]   COPE 24/7 [place] Mobile Team for Adults - [[phone]]; Child - [[phone]]        [facility]:   [facility] - [phone]   Walk-in counseling [facility] - [phone]   Walk-in counseling [facility] - [phone]   Crisis Residence [facility] - [phone]   Urgent Care Adult Mental Health:   --Drop-in, 24/7 crisis line, and [facility] Tyler Mobile Team [[phone]]    CRISIS TEXT LINE: Text [phone] from anywhere, anytime, any crisis 24/7;    OR SEE [email or web address]     Poison Control Center - [phone]    CHILD: [facility] needs assessment team - [phone]     [facility] - [phone]; or [facility] Lifeline - [phone]    If you have a medical emergency please call 911or go to the nearest ER.                    _____________________________________________    Again thank you for choosing PSYCHIATRY CLINIC and please let us know how we can best partner with you to improve you and your family's health.  You may be receiving a survey in the mail regarding this appointment. We would love to have your feedback, both positive and negative, so please fill out the survey and return it using the provided envelope. The survey is done by an external company, so your answers are anonymous.

## 2019-05-13 ENCOUNTER — TELEPHONE (OUTPATIENT)
Dept: PSYCHIATRY | Facility: CLINIC | Age: 59
End: 2019-05-13

## 2019-05-13 ENCOUNTER — CARE COORDINATION (OUTPATIENT)
Dept: PSYCHIATRY | Facility: CLINIC | Age: 59
End: 2019-05-13

## 2019-05-13 ENCOUNTER — ALLIED HEALTH/NURSE VISIT (OUTPATIENT)
Dept: PHARMACY | Facility: PHYSICIAN GROUP | Age: 59
End: 2019-05-13
Payer: MEDICAID

## 2019-05-13 DIAGNOSIS — F25.0 SCHIZOAFFECTIVE DISORDER, BIPOLAR TYPE (H): Primary | ICD-10-CM

## 2019-05-13 DIAGNOSIS — E10.8 TYPE 1 DIABETES MELLITUS WITH COMPLICATION (H): Primary | ICD-10-CM

## 2019-05-13 PROCEDURE — 99606 MTMS BY PHARM EST 15 MIN: CPT | Performed by: PHARMACIST

## 2019-05-13 PROCEDURE — 99607 MTMS BY PHARM ADDL 15 MIN: CPT | Performed by: PHARMACIST

## 2019-05-13 NOTE — PATIENT INSTRUCTIONS
Recommendations from today's MTM visit:                                                      1. Hold basaglar tonight, start taking 16 units every morning tomorrow.     2. Keep taking 5 units with each meal, check your sugar before breakfast, lunch, dinner-     200-250 = add 1 unit   251- 300= add 2 units  301- 350= add 3 units  >350 = add 4 units      Next MTM visit: May 21st at 1;30 I will call you.     To schedule another MTM appointment, please call the clinic directly or you may call the MTM scheduling line at [phone] or toll-free at [phone].     My Clinical Pharmacist's contact information:                                                      It was a pleasure talking with you today!  Please feel free to contact me with any questions or concerns you have.      Aliyah Angeles, Pharm.D, [facility]  Medication Therapy Management Pharmacist  [phone]    You may receive a survey about the MTM services you received by email and/or US Mail.  I would appreciate your feedback to help me serve you better in the future. Your comments will be anonymous.

## 2019-05-13 NOTE — PROGRESS NOTES
Writer received call from Kelsy Cunningham ([phone]) in [place] outpatient day treatment, who reported having received a message from Dr. Dang regarding a referral for pt. Kelsy reported that the referral from 2/7 needed to be re-done, as they are good only for 90 days.

## 2019-05-13 NOTE — PROGRESS NOTES
SUBJECTIVE/OBJECTIVE:                Caden Bush is a 59 year old male called for a follow-up visit for Medication Therapy Management.  He was referred to me from Dr. Hart.     Chief Complaint: Follow up from our visit on 4/29.      Tobacco: No tobacco use  Alcohol: not currently using    Medication Adherence/Access:  no issues reported    Type 1 Diabetes:  Pt currently taking Basaglar 14 units in the HS plus Novolog 5 units with breakfast/lunch/dinner.   Was seen by endo and they stopped Januvia and pioglitazone on 5/3. Plan was to give him insulin/carb ratio with sliding scale however he reports that he is not confident with his carb counting and not able to convert a carb amount to insulin dose.  Fixed dose with meals started but since stopping orals the sugars have shot up further while he still has minor residual beta cell function, but will eventually not have beta cell function given positive antibodies.   *Note that the NEDRA antibodies were positive.   C-peptide= 2.1ng/ml with glucose of 346mg/dl.  Pt is not experiencing side effects. Met with CDE to discuss diet and this has been very helpful to him.   He does read labels and counts carbs, typically eating around 30-45g at at time, but we continue to discuss this and appears less able to confidently estimate the carbohydrates he is teaing.   Set up to see endocrinology on May 1st-Dr. Nava.  4 x daily now- unable to get Dexcom G6 until 1 month of checking 4x per day, updated records sent to medicare- waiting to get this shipped now.   He has also started to worry about going too low at night so has varied his doses and diet before bed if he is under 150mg/dl at bedtime. He would feel better about dosing his basaglar in the morning .     Date FBG Lunch Dinner  Bedtime   5/13 377 391     5/12 394 247 98 133   5/11 317 226 170 296   5/10 242 200 107 325   5/9 254 219 164 227   5/8 242 397 253 82   5/7 207 290 342 327   5/6 178 314 221 99   5/5 59 92  452  87 94     Recent symptoms of high blood sugar? None  Eye exam: up to date  Foot exam: up to date  Pt is not taking an ACEi/ARB was taken off lisinopril due to dizziness and has not been restarted.  Also has hx of lithium toxicity w/ Ace previously, but has been off lithium now since Jan 2018.  Aspirin: Taking 81mg daily and denies side effects.  Lab Results   Component Value Date    A1C 6.7 01/02/2019    A1C 6.7 07/17/2018    A1C 8.2 03/19/2018    A1C 6.8 09/15/2017    A1C 8.2 06/07/2017       Today's Vitals: There were no vitals taken for this visit.- phone visit      ASSESSMENT:              Current medications were reviewed today as discussed above.      Medication Adherence: good, no issues identified    Diabetes: Needs Improvement. Patient is not meeting A1c goal of < 7%. Self monitoring of blood glucose is not at goal of fasting  mg/dL. Pt would benefit from switching long acting to morning and increase the dose to 16 units daily. Will continue with 5 units of insulin per meal, but discussed adding in sliding scale for now to try to adjust for the increases noted with stopping pioglitazone and Januvia. .     PLAN:                    1. Hold basaglar tonight, start taking 16 units every morning tomorrow.     2. Keep taking 5 units with each meal, check your sugar before breakfast, lunch, dinner-     200-250 = add 1 unit   251- 300= add 2 units  301- 350= add 3 units  >350 = add 4 units      I spent 30 minutes with this patient today. All changes were made via collaborative practice agreement with Dr. Hart. A copy of the visit note was provided to the patient's primary care provider.     Will follow up in 1 week.    The patient was sent via FreeAgent a summary of these recommendations as an after visit summary.    Aliyah Angeles, Pharm.D, [facility]  Medication Therapy Management Pharmacist  [phone]

## 2019-05-13 NOTE — TELEPHONE ENCOUNTER
[facility] Call Center    Phone Message    May a detailed message be left on voicemail: yes    Reason for Call: Other:   Patient called with a request for a letter for a comfort dog. Patient can be reached at [phone].    Action Taken: Message routed to:  Other: Leonard Jama RNCC

## 2019-05-14 ENCOUNTER — DOCUMENTATION ONLY (OUTPATIENT)
Dept: PSYCHIATRY | Facility: CLINIC | Age: 59
End: 2019-05-14

## 2019-05-14 NOTE — TELEPHONE ENCOUNTER
Natali Powell MD Snyder, David J RN   Caller: Unspecified (Yesterday, 10:48 AM)             Shmuel Ricee,     I just wrote the animal support letter and it must have printed in the clinic printer. I am at physician [facility] now, so could not print it here.     I appreciate if you can send the letter to him.     Thanks   Natali        See 5/14/19 documentation only encounter for letter.  Routed letter to provider for signing.    Writer received signed letter from provider.  Writer left voice mail message for pt, identifying that the letter was complete and would be mailed to him.    Writer sent letter to pt via USPS in 4:00PM pickup dropbox.

## 2019-05-15 ENCOUNTER — PATIENT OUTREACH (OUTPATIENT)
Dept: CARE COORDINATION | Facility: CLINIC | Age: 59
End: 2019-05-15

## 2019-05-15 ENCOUNTER — MEDICAL CORRESPONDENCE (OUTPATIENT)
Dept: FAMILY MEDICINE | Facility: CLINIC | Age: 59
End: 2019-05-15

## 2019-05-15 DIAGNOSIS — E10.8 TYPE 1 DIABETES MELLITUS WITH COMPLICATION (H): Primary | ICD-10-CM

## 2019-05-15 PROCEDURE — 36415 COLL VENOUS BLD VENIPUNCTURE: CPT | Performed by: FAMILY MEDICINE

## 2019-05-15 NOTE — LETTER
[facility] [address] [place]  [address]  Phone: [phone]    May 17, 2019      Caden Bush  [address]  [facility] [address]              Dear Caden,    The results from your recent visit showed A1c is up a bit. Please continue working with Aliyah to tweak your insulin dosing.         Sincerely,     Annie Hart M.D.    Results for orders placed or performed in visit on 05/15/19   Hemoglobin A1C ([facility])   Result Value Ref Range    Hemoglobin A1C 7.9 (H) 4.8 - 5.6 %    Narrative    Performed at:  01 - [facility] [address], [place], [place]  [id number]  : Rigoberto Sanchez MD, Phone:  [phone]

## 2019-05-15 NOTE — PROGRESS NOTES
Clinic Care Coordination Contact  Outreach/Follow up    Data: Writer received a voice message on 5/13/19 (when writer was out of the office) from Meghann at [facility] stating that patient had called [facility] and told them that he (pt) was not going to be moving there. Meghann wanted to confirm this before taking patient off their waiting list.    Writer called patient this morning. Patient said that he (patient) has decided that he does not feel ready to move into assisted living and does not want to move into [facility]. Patient said that he (pt) is aware that it might take some time to find a group home. Patient said that both She ([facility] ) and Renuka (Housing Access Worker) are looking for group homes for patient. Patient said that his current lease on his apartment goes until 1/31/20.    Writer called [facility] (Meghann, [phone]). Told Meghann that patient is not planning to move to their facility. Meghann will take patient off their wait list. Meghann said that patient's background check had come back fine. (The background check had been a concern of patient's.)    Plan: SW-CCC will follow to assist with housing options, as indicated.      Lolly Armando, [facility] Care Coordination  Clinic: [facility]   Email: [email or web address]  Tele: [phone]      Addendum  5/15/19  2:20 pm  Data: Writer called patient this afternoon. Told patient that [facility] had told writer that patient's background check had come back fine.    Lolly Armando, [facility] Care Coordination  Clinic: [facility]   Email: [email or web address]  Tele: [phone]

## 2019-05-16 LAB — HBA1C MFR BLD: 7.9 % (ref 4.8–5.6)

## 2019-05-21 ENCOUNTER — ALLIED HEALTH/NURSE VISIT (OUTPATIENT)
Dept: PHARMACY | Facility: PHYSICIAN GROUP | Age: 59
End: 2019-05-21
Payer: MEDICAID

## 2019-05-21 DIAGNOSIS — E11.3393 TYPE 2 DIABETES MELLITUS WITH BOTH EYES AFFECTED BY MODERATE NONPROLIFERATIVE RETINOPATHY WITHOUT MACULAR EDEMA, WITH LONG-TERM CURRENT USE OF INSULIN (H): ICD-10-CM

## 2019-05-21 DIAGNOSIS — E10.9 TYPE 1 DIABETES MELLITUS WITHOUT COMPLICATION (H): Primary | ICD-10-CM

## 2019-05-21 DIAGNOSIS — E10.8 TYPE 1 DIABETES MELLITUS WITH COMPLICATION (H): Primary | ICD-10-CM

## 2019-05-21 DIAGNOSIS — Z79.4 TYPE 2 DIABETES MELLITUS WITH BOTH EYES AFFECTED BY MODERATE NONPROLIFERATIVE RETINOPATHY WITHOUT MACULAR EDEMA, WITH LONG-TERM CURRENT USE OF INSULIN (H): ICD-10-CM

## 2019-05-21 PROCEDURE — 99606 MTMS BY PHARM EST 15 MIN: CPT | Performed by: PHARMACIST

## 2019-05-21 PROCEDURE — 99607 MTMS BY PHARM ADDL 15 MIN: CPT | Performed by: PHARMACIST

## 2019-05-21 NOTE — PROGRESS NOTES
SUBJECTIVE/OBJECTIVE:                Caden uBsh is a 59 year old male called for a follow-up visit for Medication Therapy Management.  He was referred to me from Dr. Hart.     Chief Complaint: Follow up from our visit on 5/13/19.      Tobacco: No tobacco use  Alcohol: not currently using    Medication Adherence/Access:  Skipping insulin at times due to fear of being low. Eating more at night also due to fears of being too low.   Not moving like he had planned.     Type 1 Diabetes:  Pt currently taking Basaglar 16 units in the AM plus Novolog 5 units with breakfast/lunch/dinner.   Using sliding scale pre-meal as well=  200-250 = add 1 unit   251- 300= add 2 units  301- 350= add 3 units  >350 = add 4 units  Was seen by endo and they stopped Januvia and pioglitazone on 5/3. Plan was to give him insulin/carb ratio with sliding scale however he reports that he is not confident with his carb counting and not able to convert a carb amount to insulin dose.  Fixed dose with meals started but since stopping orals the sugars have shot up further while he still has minor residual beta cell function, but will eventually not have beta cell function given positive antibodies.   *Note that the NEDRA antibodies were positive.   C-peptide= 2.1ng/ml with glucose of 346mg/dl.  Pt is not experiencing side effects. Met with CDE to discuss diet and this has been very helpful to him.   He does read labels and counts carbs, typically eating around 30-45g at at time, but we continue to discuss this and appears less able to confidently estimate the carbohydrates he is teaing.   Set up to see endocrinology on May 1st-Dr. Nava.  4 x daily now- unable to get Dexcom G6 until 1 month of checking 4x per day, updated records sent to medicare- waiting to get this shipped now.     Has been holding doses of basaglar in the morning if sugars are lower and also eating more if sugars are under 200 due to fear of lows.     Date FBG Lunch Dinner   Bedtime   5/21 264 230 after brunch     5/20 165 338 152 223 - then 76 (11:50 woke up)   5/19 196 when woke up, had  muffin and coffee 407 401 153 113   5/18 300 278 260 110   5/17 298 116 320 134   5/16 502 *138 after eating 159 236   5/15 251 318 261 95   5/14 306 312 275 -   5/13 377 *391 272 -     Recent symptoms of high blood sugar? None  Eye exam: up to date  Foot exam: up to date  Pt is not taking an ACEi/ARB was taken off lisinopril due to dizziness and has not been restarted.  Also has hx of lithium toxicity w/ Ace previously, but has been off lithium now since Jan 2018.  Aspirin: Taking 81mg daily and denies side effects.   Lab Results   Component Value Date    A1C 7.9 05/15/2019    A1C 6.7 01/02/2019    A1C 6.7 07/17/2018    A1C 8.2 03/19/2018    A1C 6.8 09/15/2017       Today's Vitals: There were no vitals taken for this visit.- phone visit      ASSESSMENT:              Current medications were reviewed today as discussed above.      Medication Adherence: fair, reviewed role of insulin and need for consistent dosing.     Diabetes: Needs Improvement. Patient is not meeting A1c goal of < 7%. Self monitoring of blood glucose is not at goal of fasting  mg/dL and post prandial < 150 mg/dL. Pt would benefit from fixing his basal insulin daily and then if lower (under 90 for now) will hold meal time insulin. Continue sliding scale for highs. Not raising insulin doses due to concern of over eating to prevent sugars getting too low. Need to get CGM for better confidence in the numbers.     PLAN:                  1. Skip Novolog if under 90mg/dl.      2. Take basaglar every day - 16 units in the morning.     3. Called [facility] to check on Dexcom status, they suggested calling [facility] since they are doing the billing for this order - [phone].       I spent 20 minutes with this patient today. All changes were made via collaborative practice agreement with Dr. Hart. A copy of the visit note was provided  to the patient's primary care provider.     Will follow up in 2 weeks.    The patient declined a summary of these recommendations as an after visit summary.    Aliyah Angeles, Pharm.D, [facility]  Medication Therapy Management Pharmacist  [phone]

## 2019-05-23 ENCOUNTER — PATIENT OUTREACH (OUTPATIENT)
Dept: CARE COORDINATION | Facility: CLINIC | Age: 59
End: 2019-05-23

## 2019-05-23 NOTE — PROGRESS NOTES
"Clinic Care Coordination Contact  Outreach/Follow up    Data: Patient send writer an email this afternoon stating the following:    \"I reconsidered going to [facility]. I had time to think about it. I would like to go to [facility] when they have an opening.\"    Writer emailed patient back instructing patient to call [facility] and talk with admissions there about his (pt's) wish to be admitted.    Plan: -CCC will continue to follow to assist with housing options, as indicated.      NATALIE Garcia  [facility] Care Coordination  Clinic: [facility]   Email: [email or web address]  Tele: [phone]            "

## 2019-05-28 ENCOUNTER — BEH TREATMENT PLAN (OUTPATIENT)
Dept: BEHAVIORAL HEALTH | Facility: CLINIC | Age: 59
End: 2019-05-28

## 2019-05-28 ENCOUNTER — PATIENT OUTREACH (OUTPATIENT)
Dept: CARE COORDINATION | Facility: CLINIC | Age: 59
End: 2019-05-28

## 2019-05-28 ENCOUNTER — HOSPITAL ENCOUNTER (OUTPATIENT)
Dept: BEHAVIORAL HEALTH | Facility: CLINIC | Age: 59
Discharge: HOME | End: 2019-05-28
Attending: PSYCHIATRY & NEUROLOGY | Admitting: PSYCHIATRY & NEUROLOGY
Payer: MEDICARE

## 2019-05-28 DIAGNOSIS — F25.0 SCHIZOAFFECTIVE DISORDER, BIPOLAR TYPE (H): ICD-10-CM

## 2019-05-28 DIAGNOSIS — E10.9 TYPE 1 DIABETES MELLITUS WITHOUT COMPLICATION (H): ICD-10-CM

## 2019-05-28 PROCEDURE — 90791 PSYCH DIAGNOSTIC EVALUATION: CPT

## 2019-05-28 ASSESSMENT — PAIN SCALES - GENERAL: PAINLEVEL: NO PAIN (0)

## 2019-05-28 NOTE — TELEPHONE ENCOUNTER
Needs updated order for pen needles- using 4 per day with basal/bolus regimen.     Rx updated per CPA with Dr. Hart.     Aliyah Angeles, Pharm.D, [facility]  Medication Therapy Management Pharmacist  [phone]

## 2019-05-28 NOTE — PROGRESS NOTES
Acknowledgement of Current Treatment Plan       I have reviewed my treatment plan with my therapist / counselor on 5/28/2019. I agree with the plan as it is written in the electronic health record.    Name Signature   Caden Bush    Name of Therapist / Counselor    Eric Ramírez MA [facility]

## 2019-05-28 NOTE — PROGRESS NOTES
"Adult Outpatient Mental Health Programs    MY COPING PLAN FOR SAFETY    NAME: Caden Bush  MRN: [id number]  SAFETY PLAN:  Step 1: Warning signs / cues (Thoughts, images, mood, situation, behavior) that a crisis may be developing:    Thoughts: \"I don't matter\", \"People would be better off without me\", \"I'm a burden\", \"I can't do this anymore\", \"I just want this to end\", \"Nothing makes it better\" and \"People don't care\"    Images: NA    Thinking Processes: intrusive thoughts (bothersome, unwanted thoughts that come out of nowhere):  , disorganized thinking:   and paranoia:       Mood: worsening depression    Behaviors: isolating/withdrawing     Situations: medical condition / diagnosis: Diabetes   Step 2: Coping strategies - Things I can do to take my mind off of my problems without contacting another person (relaxation technique, physical activity):    Distress Tolerance Strategies:  listen to positive and upbeat music:  , watch a funny movie:   and read a book:      Physical Activities: go for a walk    Focus on helpful thoughts:  \"This is temporary\", \"I will get through this\", \"It always passes\", think about happy memories: , remind myself of what is important to me:  and self-compassion statements:   Step 3: People and social settings that provide distraction:   Name: Mom Phone:    Name: Alissa Phone:    Name: Lucero Phone:     coffee shop, library, volunteering and support group (i.e. twelve-step)   Step 4: Remind myself of people and things that are important to me and worth living for:  My family   Step 5: When I am in crisis, I can ask these people to help me use my safety plan:   Name: COPE Phone:    Name: Mom Phone:    Name: Sister Phone:   Step 6: Making the environment safe:     be around others and get out into the community   Step 7: Professionals or agencies I can contact during a crisis:    Suicide Prevention Lifeline: 3-563-703-CDVP (6672)    Crisis Text Line Service (available 24 hours a day, 7 days a " week): Text [place] to 658934    Call  **CRISIS (504579) from a cell phone to talk to a team of professionals who can help you.  Crisis Services By County: Phone Number:   [place]     [phone]   [place]    [phone]   [place]    [phone]   [place]    [phone]   [place]    [phone]   [place] [phone]   [place]     [phone]       Call 911 or go to my nearest emergency department.     I helped develop this safety plan and agree to use it when needed.  I have been given a copy of this plan.      Client signature _________________________________________________________________  Today s date:  5/28/2019  Adapted from Safety Plan Template 2008 Taylor Barajas and Edson Osorio is reprinted with the express permission of the authors.  No portion of the Safety Plan Template may be reproduced without the express, written permission.  You can contact the authors at [email or web address] or [email or web address].

## 2019-05-28 NOTE — PROGRESS NOTES
"Clinic Care Coordination Contact  Outreach/Follow up    Data: Patient had sent an email to writer the evening of 5/23/19 sating the following: \"Meghann is going to talk to Marjan and see if the room is still available otherwise I will be put on a waiting list in the meantime can you checkout Michelle? [phone]. In case [facility] does not have a room for me now?\"    Writer had not called Michelle as yet.    Today, patient left a voice message for writer today stating that he (patient) will be moving into [facility] in June. Writer called patient this afternoon and left a voice message with call back information.    Plan: Await patient's return call. Per patient's voice message, patient will be able to move into [facility] in June.    Lolly Armando, [facility] Care Coordination  Clinic: [facility]   Email: [email or web address]  Tele: [phone]      Addendum  5/30/19  10.40 am  Data: Patient called writer this morning and left a voice message. Writer called patient and left a voice message with call back information.    Lolly Armando [facility] Care Coordination  Clinic: [facility]   Email: [email or web address]  Tele: [phone]      Addendum  5/20/19  1:20pm  Data: Since above documentation, patient called writer. Patient said that he (patient) is scheduled to move into a studio apartment at [facility] on 6/19/19. Patient said that he has family members who will be able to help him move.    Patient had not talk with his (pt's) CADI worker, as yet, as patient thought that writer would do that. Writer suggested that patient call his CADI worker to start the process of moving patient's CADI services to [facility]. Writer told patient to call writer if patient had difficulty reaching his CADI worker, etc.    Plan: [facility]CCC will assist with patient's move into assisted living as " indicated.      Lolly Armando [facility] Care Coordination  Clinic: [facility]   Email: [email or web address]  Tele: [phone]

## 2019-05-28 NOTE — PROGRESS NOTES
" Standard Diagnostic Assessment     CLIENT'S NAME: Caden Bush  MRN:   [id number]  :   1960 AGE:59 year old SEX: male  ACCT. NUMBER: [id number]  DATE OF SERVICE: 19 Start Time:  9:00 am End Time:  10:45 am    Home Phone [phone]   Work Phone Not on file.   Mobile [phone]     Preferred Phone: Mobile  May we leave a program related message? yes    Yes, the patient has been informed that any other mental health professional providing mental health services to me will need access to this Diagnostic Assessment in order to develop a treatment plan and receive payment.     Identifying Information:  Caden Bush is a 59 year old, White, single male. Caden attended the   alone.     Reason for Referral: Caden was referred to [facility] ([facility] by Psychiatrist. Caden reports the reason for referral at this time is I took more insulin that I should have intentionally.  Suicide attempt.  .    Caden verbalizes the following treatment/discharge goals: \"Meeting new people.  Support.\".    Current Stressors/Losses/Disappointments:   Life Changes- getting older and finding out some people like you and some people don't     Per Client, Review of Symptoms:  Client endorsed very few items on checklist, throughout assessment he reported symptoms he did not endorsed.  Noted these below.  Additional symptoms have been reported in the past (see records for more details)  Mood (Depression/Anxiety/Sary/Anger): Heart pounding/racing.  Later endorsed sadness, loneliness, irritability, and fatigue.    Thoughts: Denies issues.  Later reports some parasuicidal ideation, sluggish thoughts, and racing thoughts.  Concentration/Memory: Denies issues while reporting \"I can't recall/I don't remember/I'm not sure\" to many of assessment answers.   Appetite/Weight: (see also, Physical Health Screening below) denies issues in this area.  Later reports increased appetite and overeating.   Sleep: Denies issues.  Later reports " difficulties getting enough sleep.  Motivation/Energy: Low motivation and energy  Behavior: Having to check and recheck things.  Reports this is due to poor memory but did not endorse poor memory on checklist.  Later endorses isolation.   Psychosis: Denies issues.  Later reports paranoia.  Per records, has history of hearing a sewing machine.       Trauma: Denies issues   Other:     Mental Health History:  Caden reports first onset of mental health symptoms Young adult and unsure of what I noticed.  Caden was first diagnosed 1988 and unsure of with what.   Caden received the following mental health services in the past: [facility], case management, counseling, inpatient mental health services, physician / PCP and psychiatry.   Psychiatric Hospitalizations: Around 5 times.  Unsure of when first hospitalization was or most recent.  Thinks most recent may have been in 2012 due to drug interaction.   Caden denies a history of civil commitment. Unsure but does not think so     Onset/Duration/Pattern of Symptoms noted above: Sometimes I wake up early and sometimes I don't.  I get mostly depressed in the winter.  I cant remember if I get breaks.     Caden reports the following understanding of his diagnosis: Depression, The hospital said one time that it was schizoaffective disorder.  Psychiatrist dx schizoaffective disorder- bipolar type.  Did not feel true at the time.  .      Personal Safety:    Bureau-Suicide Severity Rating Scale   Suicide Ideation   1.) Have you ever wished you were dead or that you could go to sleep and not wake up?     Lifetime: No.  Client paused a long time prior to denying. Past Month:  Yes, (if yes, please discribe) : Client reported some of these thoughts have occurred; later contradicts self and denies thoughts.     2.) Have you actually had any thoughts of killing yourself?   Lifetime:  No denies despite reporting a fairly recent attempt. Past Month:  No.  Denies despite fairly recent attempt.  "  3.) Have you been thinking about how you might do this?     Lifetime:  Yes, (if yes, please discribe) : Overdose on Insulin.  Unsure of when that was. (per records, occurred in late April) Past Month:  No.  Client denies but earlier had reported intentionally taking too much insulin and when asked if this was a suicide attempt he reported \"I think so.\"     4.) Have you had these thoughts and had some intention of acting on them?     Lifetime:  Yes, (if yes, please discribe) : Client reports insulin attempt and is unsure of when this occurred.  Per records, was late April. Past Month:  Yes, (if yes, please discribe) : See lifetime.   5.) Have you started to work out the details of how to kill yourself?   Lifetime:  No Past Month:  No   6.) Do you intend to carry out this plan?      Lifetime:  Yes, (if yes, please discribe) : See above Past Month:  Yes, (if yes, please discribe) : See above   Intensity of Ideation   Intensity of ideation (1 being least severe, 5 being most severe):     Lifetime:  1, description of Ideation: Pretty low, pretty infrequent                                                                                                Past Month:  1, description of Ideation: Pretty low    How often do you have these thoughts? Client contridicts self frequently.  When asked this question he reports never having thoughts.  Earlier reported they occur \"sometimes\"    When you have the thoughts how long do they last? Despite contradictions, Client denies thoughts are present   Can you stop thinking about killing yourself or wanting to die if you want to?  Easily able to control thoughts   Are there things - anyone or anything (i.e. family, Rastafari, pain of death) that stopped you from wanting to die or acting on thoughts of suicide?  Protective factors definately stopped you from attempting suicide      What sort of reasons did you have for thinking about wanting to die or killing yourself (ie end pain, stop " "how you were feeling, get attention or reaction, revenge)?  Completely to end or stop the pain (youcouldn't go on living with the pain or how you were feeling)   Suicidal Behavior   (Suicide Attempt) - Have you made a suicide attempt?     Lifetime:  Yes, (if yes, please discribe) : Once overdose on insulin unsure of when  Past Month: Yes, (if yes, please discribe) : Attempt mentioned during :lifetime\" occurred within the last month.  Reports being unsure of why he attempted or what was going on at the time.  Reported intentionally taking too much insulin, later going to a psychiatry appointment and telling psychiatrist who encouraged day treatment.  It is unclear if he went straight to appointment or this was some time later.   Have you engaged in self-harm (non-suicidal self-injury)?  No   (Interrupted Attempt) - Has there been a time when you started to do something to end your life but someone or something stopped you before you actually did anything?  No   (Aborted or Self-Interrupted Attempt) - Has there been a time when you started to do something to try to end your life but you stopped yourself before you actually did anything?  No   (Preparatory Acts of Behavior) - Have you taken any steps towards making suicide attempt or preparing to kill yourself (such as collecting pills, getting a gun, giving valuables away or writing a suicide note)? No   Actual Lethality/Medical Damage:   0. No physical damage or very minor physical damage (e.g., surface scratches).   1. Minor physical damage (e.g., lethargic speech; first-degree burns; mild bleeding; sprains).  2. Moderate physical damage; medical attention needed (e.g., conscious but sleepy, somewhat responsive; second-degree burns; bleeding of major vessel).  3. Moderately severe physical damage; medical hospitalization and likely intensive care required (e.g., comatose with reflexes intact; third-degree burns less than 20% of body; extensive blood loss but can " recover; major fractures).  4. Sever physical damage; medical hospitalization with intensive care required (e.g., comatose without reflexes; third-degree burs over 20% of body; extensive blood loss with unstable vital sign; major damage to a vital area).  5. Death    Attempt Date / Enter Code: April 2019 / 1 2008  [facility]  Used with permission by Inés Denise, PhD.               Guide to C-SSRS Risk Ratings   NO IDEATION:  with no active thoughts IDEATION: with a wish to die. IDEATION: with active thoughts. Risk Ratings   If Yes No No 0 - Very Low Risk   If NA Yes No 1 - Low Risk   If NA Yes Yes 2 - Low/moderate risk   IDEATION: associated thoughts of methods without intent or plan INTENT: Intent to follow through on suicide PLAN: Plan to follow through on suicide Risk Ratings cont...   If Yes No No 3 - Moderate Risk   If Yes Yes No 4 - High Risk   If Yes Yes Yes 5 - High Risk   The patient's ADDITIONAL RISK FACTORS and lack of PROTECTIVE FACTORS may increase their overall suicide risk ratings.      Additional Risk Factors:    Significant history of having untreated or poorly treated mental health symptoms     Significant history of physical illness or chronic medical problems     Tendency to be socially isolated and/or cut off from the support of others   Protective Factors: Sense of responsibility to family, Religiosity, Life Satisfaction, Reality testing ability, Positive coping skills, Positive social support and Positive therapeutic releationships       Risk Status   Risk Rating: 3-Moderate risk  DA Staff:  SBAR to Tx team.    Additional information to support suicide risk rating: Client contradicted self consistently.  He could be anywhere from no risk to moderate high risk.  He denies any safety concerns when asked outright but through discussion reports some infrequent parasuicidal and suicidal ideation.  His plan is to overdose on insulin which he has easy  access to as he is diabetic.  He committed to safety and created a safety plan OR There was no additional information to provide at this time.  Please see the above suicide risk rating information.       Additional Safety Questions:    Do you have a gun, weapons or other means (including medications) to harm yourself available to you? No   Do you take chances with your safety?   no   Have you ever thought about killing someone else? No   Have you ever heard voices telling you to harm yourself or others? No       Supports:   From whom do you receive support and how often? (family/friends/agency) Family- Siblings and parents  Therapist     Do your support people want/need education/resources? yes        Is there anything in your life (current or history) that is satisfying to you (include leisure interests/hobbies)?   yes Music, reading articles about politics, royal family       Hope/Belief System:  Do you believe things can get better? yes       Personal Safety Summary:          Caden denies current fears or concerns for personal safety.    Completed safety coping plan? yes        Substance Use History:   Substance: Hx of Use/Abuse: Last Use: Pattern of Use:   Alcohol yes Several years Did not struggle with alcohol.  If I had wine or beer it would interact poorly with meds.     Cannabis no     Street Drugs no     Prescription Drugs no     Other no       Substance Use Disorder Treatment: Caden is currently receiving the following services: No indications of CD issues.       CAGE-AID:  Have you ever felt you ought to cut down on your drinking or drug use?   No    Have people annoyed you by criticizing your drinking or drug use?   No    Have you ever felt bad or guilty about your drinking or drug use?   No    Have you ever had a drink or used drugs first thing in the morning to steady your nerves or to get rid of a hangover?  No    Do you feel these issues have been adequately addressed? Yes If no, are you ready to  address them now? NA    Chemical Dependency Assessment Recommended?  No        Caden has a negative Cage-Aid score.     Legal History:    Caden reports that he has not been involved with the legal system.   ________________________________________________________________________    Life Situation (Employment/School/Finances/Basic Needs):  Caden  is currently living alone in a apartment in subsidized apartment in [place] for 8 years.. However, per records, Client was encouraged to pursue assisted living which he did not do.  He stated desire to pursue a group home but there seems to be a long waitlist.  Client did not dislcose any of this to writer.  The safety/stability of this environment is described as: some weird people that.  No risk of homelessness or eviction    Caden is currently unemployed:Last worked 2008 as .     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  Right now I am comforable with what I am doing.   Caden reports finances are obtained through Disability  Caden does not identify his finances as a current stressor.  Caden denies a history of gambling and denies a history of gambling treatment.     Caden reports his highest level of education is college graduate in Business Caden did not identify any learning problems   Caden describes academic performance as: I graduated from college and graduated from .  HW/study- good.  Tests- anxiety around it   Caden describes school social experience as: was pretty isolated, worked instead of having friendships.       Caden denies concerns regarding his current ability to meet basic needs.     Social/Family History:  Caden  reports he grew up in [place], [place].   Caden was the second born of 4 children. 2 sisters (2 years older, 4 years younger) and brother (2 years younger).    Caden reports his biological parents are     Caden describes his childhood as cannot recall my childhood  Caden describes his current  relationships with his family of origin as Parents- we are kind of close, they live in [place].  I had brunch with them a week ago.  Sisters- They are busy, we are distant but no conflict.  When we see eachother it is okay.  Brother- He is hard to get ahold of, lives in [place].       Caden identifies his relationship status as: single.    Caden identifies his sexual orientation as: not sure yet   Caden denies sexual health concerns.     Caden reports having 0 children.     Caden describes the quantity/quality of his social relationships as I want more relationships        Significant Losses / Trauma / Abuse / Neglect Issues / Developmental Incidents:  Caden denies significant loss/trauma/abuse/neglect issues/developmental incidents   Caden reports death of reports the death of an aunt and then states it was not distressing to him and major medical problems reports diabetes issues have been  major stressor   Caden has addressed the above concerns in previous therapy/treatment     Caden denies personal  experience.     Amish Preference/Spiritual Beliefs/Cultural Considerations: Raised Presybeterian and not practicing right now.  Find comfort from Bahai music.    A. Ethnic Self-Identification:  Caden self-identifies his race/ethnicities as:  and his preferred language to be English.   Caden reports he does not need the assistance of an . Caden  reports he does not need other support or modifications involved in therapy.      B. Do you experience cultural bias (the practice of interpreting judging behavior by standards inherent to one's own culture) by other people as a stressor? If yes, describe how this relates to overall mental health symptoms.  No    C. Are there any cultural influences that may need to be considered for your treatment?  (This includes historical, geographical and familial factors that affect assessment and intervention processes). No, Denies any cultural  influences or concerns that need to be considered for treatment    Strengths/Vulnerabilities:   Caden identifies his personal strengths as: caring, committed to sobriety, educated, empathetic, goal-focused, good listener, has a previous history of therapy, insightful, intelligent, motivated, open to learning, open to suggestions / feedback, support of family, friends and providers, supportive, wants to learn, willing to ask questions, willing to relate to others and work history .   Things that may interfere with the clients success in treatment include: financial hardship.   Other identified areas of vulnerability include: Suicidal Ideation  Poor impulse control  Anxiety with/without panic attacks  Depressive symptoms  Cognitive impairment  Physical/medical.     Medical History / Physical Health Screen:     Primary Care Physician: Caden has a [facility] Primary Care Provider, who is named Annie Hart..   Last Physical Exam: within the past year. Client was encouraged to follow up with PCP if symptoms were to develop.    Mental Health Medication Management Provider / Psychiatrist: Caden has a psychiatrist whose name and location are: Dr. Jessica Laughlin.     Last visit: Recent but unsure when         Next visit: June 14th    Current medications including prescription, non-prescription, herbals, dietary aids and vitamins:  Per client report:   Outpatient Medications Marked as Taking for the 5/28/19 encounter (Hospital Encounter) with Tri Ramírez   Medication Sig     ACE/ARB/ARNI NOT PRESCRIBED, INTENTIONAL, Please choose reason not prescribed, below-  hypotension     acetaminophen 500 MG CAPS Take 2 capsules by mouth 3 times daily as needed     aspirin 81 MG EC tablet Take 81 mg by mouth daily.     blood glucose (NO BRAND SPECIFIED) test strip Use to test blood sugar 4 times daily or as directed for insulin injections 4 times daily.     cholecalciferol (VITAMIN D) 1000 UNIT tablet Take 1 tablet by mouth  daily.     insulin aspart (NOVOLOG PEN) 100 UNIT/ML pen Inject 5 units before breakfast,  5 units before lunch and 5 units before dinner.     insulin glargine (BASAGLAR KWIKPEN) 100 UNIT/ML pen Inject 14 Units Subcutaneous At Bedtime We will make further adjustments as we see your response (Patient taking differently: Inject 16 Units Subcutaneous every morning We will make further adjustments as we see your response)     insulin pen needle (32G X 4 MM) 32G X 4 MM miscellaneous Use 1 pen needles daily or as directed.     risperiDONE (RISPERDAL) 3 MG tablet Take 1 tablet (3 mg) by mouth daily     Sharps Container (COMPLETE NEEDLE COLLECTION SYS) MISC USE AS DIRECTED     Silodosin (RAPAFLO) 8 MG CAPS capsule Take 1 capsule (8 mg) by mouth daily     simvastatin (ZOCOR) 40 MG tablet Take 0.5 tablets (20 mg) by mouth At Bedtime     traZODone (DESYREL) 50 MG tablet Take 1.5 tablets (75 mg) by mouth At Bedtime     venlafaxine (EFFEXOR XR) 75 MG 24 hr capsule Take 1 capsule (75 mg) by mouth daily TAKE WITH  Effexor 150 mg for a total dose of 225 mg daily     venlafaxine (EFFEXOR-XR) 150 MG 24 hr capsule TAKE 1 CAPSULE BY MOUTH DAILY ALONG WITH A 75 MG FOR A TOTAL  MG DAILY       Caden reports current medications are: Not effective:  said they are probably not working.  I dont think they are working either as I do not feel the energy I used to have.  .   Cdaen describes taking his medications as: Independent.  Caden reports taking prescribed medications as prescribed.     Caden provides the following current assessment of pain:  ;  ;  .     Caden provides the following information regarding past significant medical conditions/diagnoses:      Medical:  Past Medical History:   Diagnosis Date     Arthritis      Depressive disorder      DM (diabetes mellitus) (H)      Eczema 2011    biopsy leg      Polyp of colon, adenomatous 6/25/2012     Retinopathy     HFA ophtho DR HARLEY     Schizo-affective type schizophrenia       Vitamin D deficiency     replaced       Surgical:  Past Surgical History:   Procedure Laterality Date     APPENDECTOMY       COLONOSCOPY  2012    2 adenomatous polyps     Allergy:   Caden reports   Allergies   Allergen Reactions     Flomax [Tamsulosin] Unknown     Lisinopril Unknown        Family History of Medical, Mental Health and/or Substance Use problems:  Per client report:   Family History   Problem Relation Age of Onset     Osteoporosis Mother      Breast Cancer Mother 78     Osteoporosis Father      Asthma Father      Cancer - colorectal Father 70     Depression Father      Anxiety Disorder Sister      Rheumatoid Arthritis Sister        Caden reports the following current medical concerns: Managing diabetes well and talks with Diabetic Nurse once a week or two.      General Health:   Have you had any exposure to any communicable disease in the past 2-3 weeks? no     Are you aware of safe sex practices? yes     Is there a possibility of pregnancy?  no       Nutrition:    Are you on a special diet? If yes, please explain:  no but feels I should eat more chicken and rice.  Everything raises my blood sugar   Do you have any concerns regarding your nutritional status? If yes, please explain:  yes I either under eat or overeat   Have you had any appetite changes in the last 3 months?  Yes, I used to get up in the middle of the night and eat but do not anymore.  Unsure of when it stopped.      Have you had any weight loss or weight gain in the last 3 months?  Yes, how much? Was 147lbs now a150.  Gained those in the last 3 months.  Feels like good weight gain     Do you have a history of an eating disorder? no   Do you have a history of being in an eating disorder program? no   Do you have any dental concerns? no   NOTE: BMI to be calculated following program admission.    Fall Risk:   Have you had any falls in the past 3 months? no     Do you currently use any assistive devices for mobility?   no     NOTE: If  client reports 3 or more falls in the past 3 months, the client will not be accepted into the program until further assessment is completed by the program nurse. Check if a nurse is available to assess at time of DA.    NOTE: If client reports 2 falls in the past 3 months and/or the client currently uses assistive devices for mobility, the  will send an in-basket to the program nurse to meet with the client within the first week of programming.    Head Injury/Trauma:   Do you have a history of head injury / trauma? no     Do you have any cognitive impairment? no       Per completion of the Medical History / Physical Health Screen, is there a recommendation to see / follow up with a primary care physician/clinic or dentist?    No.      Clinical Findings     Mental Status Assessment/Clinical Observation:  Appearance:   adequately groomed and appeared as age stated  Eye Contact:   intense- slightly and only at times  Psychomotor Behavior: Retarded (Slowed)  no evidence of tardive dyskinesia, dystonia, or tics  Attitude:   Cooperative    Oriented to:   All    Speech   Rate / Production: Monotone    Volume:  Normal   Mood:    Normal    Affect:    Flat      Thought Content:  Clear  passive suicidal ideation present  Thought Form:  evidence of thought blocking present no loose associations  Insight:    limited    Judgment:     limited  Attention Span/Concentration: limited  Recent and Remote Memory:  poor      Psychiatric Diagnosis:    295.70  (F25) Schizoaffective Disorder Bipolar Type    Provisional Diagnostic Hypothesis (Explain R/O, other Provisional Diagnosis, and why alternative Diagnosis that were considered were ruled out):   Diagnosis is per records, Client does not provide adequate criteria for any diagnosis but it seems this has been checked several times.      Medical Concerns that may Impact Treatment:   Diabetes    Psychosocial and Contextual Factors (V-Codes):    WHODAS 2.0 SCORE:  "20/92.7%      Client and family participation in assessment:   Caden was alone during this assessment.   This assessment does include collateral information. Records     Summary & Recommendations  Provide a brief summary of how diagnostic criteria is met (symptoms, duration & functional impairment), cause, prognosis, and likely consequences of symptoms. Include overview of pertinent client strengths, cultural influences, life situations, relationships, health concerns and how diagnosis interacts/impacts with client's life. Recommendations include: client preferences, prioritization of needed mental health, ancillary or other services and any referrals to services required by statute or rule.      Caden is a 59 year old, white, single male.  He currently lives alone in an apartment he has had for 8 years.  Per records, he was recently encouraged to consider assisted living which he initially agreed to and then decided he was not ready for it.  Also per records, he is considering living at a group home and is on a waitlist.  He did not provide that information to writer directly.  He is not currently working and reports he has been unemployed for a number of years.  He denies desire to work.  His support network includes his parents, sisters, and brother but later states they are not easily available as they are all very busy.  His professional supports include his pscyhiatrist (Dr. Jessica Laughlin), therapist (Yeny Smith), PCP (Dr. Annie Hart), and  (Qing Durán).  He reports his  is currently out on leave and he is not sure if she will be returning.    Caden presents as a poor historian and often answered \"I don't recall/I can't remember/I'm not sure\" when questioned throughout assessment.  He also seems to have low insight into symptoms as he did not endorse almost any symptoms of mental health.  Per records, it seems he has schizoaffective disorder bipolar type.  At times he will " "endorse history of some depression and paranoia but is otherwise stating mostly he is lonely and has low motivation.  He denies issues with substance use or trauma which seems to be consistent with records.      Caden is referred to 55+ after telling his psychiatrist he intentionally gave himself too much insulin.  He is diabetic so has easy access.  When asked by writer if this was a suicide attempt, he stated \"I think so.\"  He contradicted himself frequently throughout assessment as to if he had safety concerns so he ranges from having no safety concerns to some, infrequent parasuicidal and suicidal ideation.  He created safety plan and committed to safety.  He denies any current stressors but later reports being isolated and his physical health as stressors.  He reports his goals for treatment are to meet new people and get support.  He will be placed in the B group which meets on Mondays and Wednesdays 10-3 and was offered a start date of 6/3.  He reported having a therapy appointment at 2 on that day.  Offered a start date of 6/5.  He reported being unsure if he wants to start this soon.  Stated plans to follow up with Client at the end of this week to see if he wants to commit to a start date.  Client met with 55+ , Grace, at the end of his assessment who then helped to determine group placement.        Prognosis is Guarded. Without the recommended intervention, the client is likely to experience the following consequences of their symptoms: increased negative symptoms, decreased functioning, worsening safety concerns, need for higher level of care.    Referrals to services required by statute or rule:   Report to child/adult protection services was NA.   Referral to another professional/service is not indicated at this time..    Program Recommendation: 55+ Outpatient Program (55+).  Offered B group (Mondays, Wednesdays 10-3) with start date of 6/5.  Will call back to confirm.    Assessment " Completed by: Tri Ramírez

## 2019-05-28 NOTE — PROGRESS NOTES
"Initial Individual Treatment Plan     Patient: Caden Bush   MRN: [id number]  : 1960  Age: 59 year old  Sex: male    Diagnostic Assessment Date / Date of Initial Individual Treatment Plan: 19      Immediate Health Concerns:  Yes. Identify health concern and plan to address: Managing diabetes well and talks with Diabetic Nurse once a week or two     Immediate Safety Concerns:  No. Per history, see safety plan.    Identify the issues to be addressed in treatment:  Symptom Management, Personal Safety, Community Resources/Discharge Planning, Develop / Improve Independent Living Skills, Develop Socialization / Interpersonal Relationship Skills and Physical Health     Client Initial Individualized Goals for Treatment: \"Meeting new people.  Support.\"    Initial Treatment suggestions for the client during the time between Diagnostic Assessment and completion of the Individualized Treatment Plan:  Follow Safety Plan  Abstain from Substance Use   Ask for more information, support and/or assistance as needed.  Follow up with providers/community supports as needed: Talk to diabetes every week or every other week,  Sees therapist once every two week, sees psychiatrist once a month, Has a  who is out on leave (last saw her in January)   Report increases or changes in symptoms to staff.  Report any personal safety concerns to staff.   Take medications as prescribed.  Report medication changes and/or side effects to staff.  Attend and participate in groups as scheduled or notify staff if unable to do so.  Report any use of substances to staff as this may impact your symptoms and/or personal safety.  Notify staff if you have any other issues that need to be addressed. This may include any current abuse / neglect / exploitation or other vulnerability.  Follow recommendations of your treatment team and discuss concerns if not in agreement.     Treatment Team Responsible: 55+ Outpatient Program (55+) "     Therapeutic Interventions/Treatment Strategies may include:  Support, Redirection, Feedback, Limit/Boundaries, Safety Assessments, Structured Activity, Problem Solving, Clarification, Education, Motivational Enhancement and Relapse Prevention as needed.    Tri Ramírez

## 2019-05-30 DIAGNOSIS — F25.0 SCHIZOAFFECTIVE DISORDER, BIPOLAR TYPE (H): ICD-10-CM

## 2019-05-31 ENCOUNTER — TELEPHONE (OUTPATIENT)
Dept: BEHAVIORAL HEALTH | Facility: CLINIC | Age: 59
End: 2019-05-31

## 2019-05-31 NOTE — TELEPHONE ENCOUNTER
Left voicemail. Inquired as to if Client had decided on start date for 55+ programming.  Reminded him of group dates/times and start date offered of 6/5.  Requested a return phone call.

## 2019-05-31 NOTE — TELEPHONE ENCOUNTER
"Client left a voicemail that stated the following:    He is \"decling to start next week.\"  States I am in  the process of packing and moving on June 19th.  I need at least a month for transportation to roll over.  Would like to know when there is an opening after  June 19th of July.   And would like to start group therapy if there is a space after that time.      Left Client a return voicemail and stated if Client postponed start date more than a month from intake there is a chance he will have to do an updated diagnostic assessment.  Encouraged him to call back when he is able to commit to programming.    "

## 2019-06-03 DIAGNOSIS — F25.0 SCHIZOAFFECTIVE DISORDER, BIPOLAR TYPE (H): ICD-10-CM

## 2019-06-03 RX ORDER — TRAZODONE HYDROCHLORIDE 50 MG/1
75 TABLET, FILM COATED ORAL AT BEDTIME
Qty: 45 TABLET | Refills: 0 | Status: SHIPPED | OUTPATIENT
Start: 2019-06-03 | End: 2019-06-14

## 2019-06-03 NOTE — TELEPHONE ENCOUNTER
Medication requested: TRAZODONE 50 MG TAB  Last refilled: 5/10/19  Qty: 30      Last seen: 5/10/19  RTC: 4 WEEKS  Cancel: 0  No-show: 0  Next appt: 6/14/19    Refill decision:   Refilled for 30 days per protocol.

## 2019-06-04 ENCOUNTER — ALLIED HEALTH/NURSE VISIT (OUTPATIENT)
Dept: PHARMACY | Facility: PHYSICIAN GROUP | Age: 59
End: 2019-06-04
Payer: MEDICAID

## 2019-06-04 DIAGNOSIS — E10.9 TYPE 1 DIABETES MELLITUS WITHOUT COMPLICATION (H): Primary | ICD-10-CM

## 2019-06-04 PROCEDURE — 99606 MTMS BY PHARM EST 15 MIN: CPT | Performed by: PHARMACIST

## 2019-06-04 PROCEDURE — 99607 MTMS BY PHARM ADDL 15 MIN: CPT | Performed by: PHARMACIST

## 2019-06-04 NOTE — PATIENT INSTRUCTIONS
Recommendations from today's MTM visit:                                                      1. Increase Humalog 6 units with breakfast, 5 units with lunch and 5 units with dinner.     2. Continue taking Basaglar 16 units every morning.     Next MTM visit: June 24th at 2:00pm I will call you.    To schedule another MTM appointment, please call the clinic directly or you may call the MTM scheduling line at [phone] or toll-free at [phone].     My Clinical Pharmacist's contact information:                                                      It was a pleasure talking with you today!  Please feel free to contact me with any questions or concerns you have.      Aliyah Angeles, Pharm.D, [facility]  Medication Therapy Management Pharmacist  [phone]    You may receive a survey about the MTM services you received by email and/or US Mail.  I would appreciate your feedback to help me serve you better in the future. Your comments will be anonymous.

## 2019-06-04 NOTE — PROGRESS NOTES
SUBJECTIVE/OBJECTIVE:                Caden Bush is a 59 year old male called for a follow-up visit for Medication Therapy Management.  He was referred to me from .     Chief Complaint: Follow up from our visit on 5/21.  Moving on the 19th-[place]. Hoping to have them take over medication management.     Tobacco: No tobacco use  Alcohol: not currently using    Medication Adherence/Access:  Skipping insulin at times due to fear of being low. Eating more at night also due to fears of being too low.   Not moving like he had planned.     Type 1 Diabetes:  Pt currently taking Basaglar 16 units in the AM plus Novolog 5 units with breakfast/lunch/dinner.   Using sliding scale pre-meal as well=  200-250 = add 1 unit   251- 300= add 2 units  301- 350= add 3 units  >350 = add 4 units  Was seen by emili and they stopped Januvia and pioglitazone on 5/3. Plan was to give him insulin/carb ratio with sliding scale however he reports that he is not confident with his carb counting and not able to convert a carb amount to insulin dose.  Fixed dose with meals started but since stopping orals the sugars have shot up further while he still has minor residual beta cell function, but will eventually not have beta cell function given positive antibodies.   *Note that the NEDRA antibodies were positive.   C-peptide= 2.1ng/ml with glucose of 346mg/dl.  Pt is not experiencing side effects. Met with CDE to discuss diet and this has been very helpful to him.   He does read labels and counts carbs, typically eating around 30-45g at at time, but we continue to discuss this and appears less able to confidently estimate the carbohydrates he is teaing.   Set up to see endocrinology on May 1st-Dr. Nava.  4 x daily now- unable to get Dexcom G6 until 1 month of checking 4x per day, updated records sent to medicare- waiting to get this shipped now.     Has been holding doses of basaglar in the morning if sugars are lower and also eating  "more if sugars are under 200 due to fear of lows.     Date FBG Lunch Dinner  Bedtime   6/4 151 222     6/3  waking was 165; then before breakfast 380 10am 285 92 244   6/2 233 132 283 221   6/1 168 waking, then before breakfast- 174 224 100 120   5/31 273 297 134 na   5/30  281 193 116   5/29 5/28 111 207 146 145     Likely will have a snack if under 130mg/dl - peanut butter  or bread.   Recent symptoms of high blood sugar? None  Eye exam: up to date  Foot exam: up to date  Pt is not taking an ACEi/ARB was taken off lisinopril due to dizziness and has not been restarted.  Also has hx of lithium toxicity w/ Ace previously, but has been off lithium now since Jan 2018.  Aspirin: Taking 81mg daily and denies side effects.   Lab Results   Component Value Date    A1C 7.9 05/15/2019    A1C 6.7 01/02/2019    A1C 6.7 07/17/2018    A1C 8.2 03/19/2018    A1C 6.8 09/15/2017     Today's Vitals: There were no vitals taken for this visit.- phone visit      ASSESSMENT:              Current medications were reviewed today as discussed above.      Medication Adherence: good, no issues identified, however he may not always be giving his insulin doses or eating to correct for \"normal sugars\" due to fear of lows.     Diabetes: Needs Improvement. Patient is not meeting A1c goal of < 7%. Self monitoring of blood glucose is not at goal of fasting  mg/dL. Pt would benefit from dose increase in insulin at breakfast- will need to watch sugars closely when moving into new housing with medication help as he may start getting more insulin on a scheduled basis than he may be giving himself at home when he is on his own.  Discussed next a1c won't be due until after Aug 15th.      PLAN:                  1. Increase Humalog 6 units with breakfast, 5 units with lunch and 5 units with dinner.     2. Continue taking Basaglar 16 units every morning.     3. A1c in middle of August.       I spent 30 minutes with this patient today. All changes " were made via collaborative practice agreement with Dr. Hart. A copy of the visit note was provided to the patient's primary care provider.     Will follow up in 3 weeks.    The patient declined a summary of these recommendations as an after visit summary.    Aliyah Angeles, Pharm.D, [facility]  Medication Therapy Management Pharmacist  [phone]

## 2019-06-05 RX ORDER — RISPERIDONE 3 MG/1
3 TABLET ORAL AT BEDTIME
Qty: 30 TABLET | Refills: 0 | Status: SHIPPED | OUTPATIENT
Start: 2019-06-05 | End: 2019-06-14

## 2019-06-05 NOTE — TELEPHONE ENCOUNTER
Medication requested: RISPERIDONE 3MG TABLETS   Last refilled: 5/10/2019  Qty: 30      Last seen: 5/10/2019  1) PSYCHOTROPIC MEDICATIONS:  - Continue risperidone 3 mg QHS  - Cont Effexor XR  225 mg daily  - Continue taking Trazodone 75mg        RTC: 4 weeks  Cancel: 0  No-show: 0  Next appt: 6/14/2019    Refill decision:   Refilled for 30 days per protocol.

## 2019-06-07 ENCOUNTER — PATIENT OUTREACH (OUTPATIENT)
Dept: FAMILY MEDICINE | Facility: CLINIC | Age: 59
End: 2019-06-07

## 2019-06-07 NOTE — PROGRESS NOTES
Clinic Care Coordination Contact  Outreach/Follow up    Data: Writer called [facility] (Marjan, [phone]) this morning. Marjan confirmed that patient is scheduled to move into [facility] on 6/19/19. Marjan was unsure of the status of patient's CADI waiver (re: moving coverage to [facility]) but will check on this.    Writer called patient late this afternoon. Writer left a voice message for patient that writer will be on vacation for two weeks and that patient should call the clinic and ask for a  if patient would like to talk with someone in writer's absence.    Plan: Patient plans to move into assisted living on 6/19/19. -[facility] will continue to follow at this time.    Lolly Armando, SAYRA  [facility] Care Coordination  Clinic: [facility]   Email: [email or web address]  Tele: [phone]

## 2019-06-11 ENCOUNTER — MEDICAL CORRESPONDENCE (OUTPATIENT)
Dept: FAMILY MEDICINE | Facility: CLINIC | Age: 59
End: 2019-06-11

## 2019-06-11 ASSESSMENT — PATIENT HEALTH QUESTIONNAIRE - PHQ9: SUM OF ALL RESPONSES TO PHQ QUESTIONS 1-9: 11

## 2019-06-13 NOTE — PROGRESS NOTES
6/11/19 Received form for Dexcom G6 diabetic supplies.  Form was filled out and faxed with records to [employer] @ [phone]    Rafal Duke,   [facility]  [phone]

## 2019-06-14 ENCOUNTER — OFFICE VISIT (OUTPATIENT)
Dept: PSYCHIATRY | Facility: CLINIC | Age: 59
End: 2019-06-14
Attending: PSYCHIATRY & NEUROLOGY
Payer: MEDICARE

## 2019-06-14 VITALS
BODY MASS INDEX: 20.36 KG/M2 | HEART RATE: 74 BPM | SYSTOLIC BLOOD PRESSURE: 145 MMHG | DIASTOLIC BLOOD PRESSURE: 80 MMHG | WEIGHT: 146 LBS

## 2019-06-14 DIAGNOSIS — F25.0 SCHIZOAFFECTIVE DISORDER, BIPOLAR TYPE (H): ICD-10-CM

## 2019-06-14 PROCEDURE — G0463 HOSPITAL OUTPT CLINIC VISIT: HCPCS | Mod: ZF

## 2019-06-14 RX ORDER — TRAZODONE HYDROCHLORIDE 50 MG/1
75 TABLET, FILM COATED ORAL AT BEDTIME
Qty: 45 TABLET | Refills: 0 | Status: SHIPPED | OUTPATIENT
Start: 2019-06-30 | End: 2019-08-02

## 2019-06-14 RX ORDER — VENLAFAXINE HYDROCHLORIDE 150 MG/1
CAPSULE, EXTENDED RELEASE ORAL
Qty: 30 CAPSULE | Refills: 1 | Status: CANCELLED | OUTPATIENT
Start: 2019-06-14

## 2019-06-14 RX ORDER — VENLAFAXINE HYDROCHLORIDE 75 MG/1
75 CAPSULE, EXTENDED RELEASE ORAL DAILY
Qty: 30 CAPSULE | Refills: 1 | Status: SHIPPED | OUTPATIENT
Start: 2019-06-30 | End: 2019-08-02

## 2019-06-14 RX ORDER — RISPERIDONE 3 MG/1
3 TABLET ORAL AT BEDTIME
Qty: 30 TABLET | Refills: 0 | Status: SHIPPED | OUTPATIENT
Start: 2019-06-30 | End: 2019-08-02

## 2019-06-14 ASSESSMENT — PAIN SCALES - GENERAL: PAINLEVEL: NO PAIN (0)

## 2019-06-14 NOTE — PATIENT INSTRUCTIONS
Thank you for coming to the PSYCHIATRY CLINIC.    Lab Testing:  If you had lab testing today and your results are reassuring or normal they will be mailed to you or sent through [facility] within 7 days.   If the lab tests need quick action we will call you with the results.  The phone number we will call with results is # [phone] (home) . If this is not the best number please call our clinic and change the number.    Medication Refills:  If you need any refills please call your pharmacy and they will contact us. Our fax number for refills is [phone]. Please allow three business for refill processing.   If you need to  your refill at a new pharmacy, please contact the new pharmacy directly. The new pharmacy will help you get your medications transferred.     Scheduling:  If you have any concerns about today's visit or wish to schedule another appointment please call our office during normal business hours [phone] (8-5:00 M-F)    Contact Us:  Please call [phone] during business hours (8-5:00 M-F).  If after clinic hours, or on the weekend, please call  [phone].    Financial Assistance [phone]  [employer] Billing [phone]  [facility] Billing [phone]  Medical Records [phone]      MENTAL HEALTH CRISIS NUMBERS:  [facility]:   [facility] - [phone]   Crisis Residence [facility] - [phone]   Walk-In Counseling [facility] [phone]   COPE 24/7 [place] Mobile Team for Adults - [[phone]]; Child - [[phone]]        [facility]:   [facility] - [phone]   Walk-in counseling [facility] - [phone]   Walk-in counseling [facility] - [phone]   Crisis Residence [facility] - [phone]   Urgent Care Adult Mental Health:   --Drop-in, 24/7 crisis line, and [place] Mobile Team [[phone]]    CRISIS TEXT LINE: Text [phone] from anywhere,  anytime, any crisis 24/7;    OR SEE [email or web address]     Poison Control Center - [phone]    CHILD: [facility] needs assessment team - [phone]     [facility] - [phone]; or [facility] - [phone]    If you have a medical emergency please call 911or go to the nearest ER.                    _____________________________________________    Again thank you for choosing PSYCHIATRY CLINIC and please let us know how we can best partner with you to improve you and your family's health.  You may be receiving a survey in the mail regarding this appointment. We would love to have your feedback, both positive and negative, so please fill out the survey and return it using the provided envelope. The survey is done by an external company, so your answers are anonymous.

## 2019-06-14 NOTE — PROGRESS NOTES
[facility] PSYCHIATRY CLINIC PROGRESS NOTE     CARE TEAM:  PCP- Annie Hart   The[facility]  [facility]      Ophthalmologist: Jacob Lieberman with [facility] Community support: [facility], [phone]     Caden Bush is a 58 year old male who prefers the name Caden & pronouns he.  The initial diagnostic evaluation was on 11/26/08 .   Date of the most recent transfer of care eval is 07/13/2018.      Pertinent Background:  This patient first experienced mental health issues in his late 20s , initially obtained care at that time and has received treatment for Schizoaffective Disorder, bipolar type. His diagnosis has changed through the time and afterreviewing all the evidence in 9/2018 the current diagnosis was confirmed (several episodes of moderate to severe depression and one episode of two weeks long period of grandiosity,  pressured speech, overspending and sleeplessness with increased social anxiety and paranoia).   Previously had a CCM until 2011. Has SW consult on 10/20/15, not interested in CM or [facility] worker services at this time and seems to be managing well on his own.    Of note, patient is found to be very hesitant to change any medication regimen or engaged in the treatment programs.  Luckily, he has been engaged in care with his therapist.    #Mood/psychosis:  - Notably, this pt was stable on regimen of Lithium, Effexor and Risperdal since 2011. However due to complications of chronic diabetes, Lithium was discontinued in 2017.   -  reviewed the chart . Per chart review, patient was not fit for the  current group. She recommended [facility] for groups and activities for him.   - We even tried going down on Risperidone for other reasons but he immediately got destabilized.    #Insomnia: The etiology of his insomnia is not well understood and the sleep study was discussed and encouraged. Sleep has improved on 3mg of risperidone while psychotic  sxs are also better managed.      Psych critical item history includes suicidal ideation, psychosis [sxs include AH and paranoia], mutiple psychotropic trials and psych hosp (>5).     INTERIM HISTORY                                                                                                                 4, 4   The patient reports good treatment adherence.  History was provided by the patient who was a fair to good  historian. The last visit ended with the following med change: Increased trazodone dose..   Since the last visit, Caden reports the following:   - Moving on Wednesday to new residence in [place]. He will live in a studio apartment. He is excited to be closer to his parents.   - Feeling good and worries about move and things that might go wrong. We discussed strategies on how to decrease anxiety through planning, knowing about weaknesses and reaching out to friends and family for help.   - He is also worried about living in a new area and not knowing how to get to places. Bus route info provided  - Has intake appointment for day treatment next week , and may start it in July.   - Discussed resident transition and patient is willing to stay with resident clinic    RECENT SYMPTOMS:   DEPRESSION:  reports-feeling tired and moving slow  DENIES-  self-destructive thoughts, violent thoughts  KINGA/HYPOMANIA:  reports-none;  DENIES- increased energy, decreased sleep need, increased activity and grandiosity  PSYCHOSIS:  reports-none;  DENIES- delusions, auditory hallucinations and visual hallucinations  DYSREGULATION:  reports-none;  DENIES- suicidal ideation, violent ideation and SIB  PANIC ATTACK:  none   ANXIETY:   social anxiety at baseline. Desires to socialize  TRAUMA RELATED:  none  COMPULSIVE:  none  SLEEP:  As mentioned above  EATING DISORDER: none      RECENT SUBSTANCE USE:     ALCOHOL-  never   TOBACCO- none  CAFFEINE- 1 cups/day of coffee, 1 sodas/ in a while  OPIOIDS- none      NARCAN  KIT- N/A          CANNABIS- none  OTHER ILLICIT DRUGS- none      CURRENT SOCIAL HISTORY:  FINANCIAL SUPPORT- SSDI for schizoaffective disorder and diabetes  CHILDREN- none      LIVING SITUATION- Currently living by self in subsidized apartment in [place] since Feb 2011.      SOCIAL/ SPIRITUAL SUPPORT- few friends and family     FEELS SAFE AT HOME- yes       MEDICAL ROS (2,10):  Reports Arthritis pain (ongoing, unchanged) Denies Pain, headache, dizziness, GI [nausea, diarrhea, constipation,  ], , sedation, tremor and confusion.    PSYCH and CD Critical Summary Points since July 2018 July: Increased Risperidone dose  During months of Fall and winter, patient was hesitant to make changes to medications . Modified risperidone and trazodone dose several times (minor changes). Not successful in complete resolution of sxs.     PAST PSYCH MED TRIALS   see EMR Problem List: Hx of psychiatric care       Drug / Start Date   Dose (mg)   Helpful   Adverse Effects  DC Reason / Date     Lithium   900          Risperdal         current     Abilify             Seroquel             Effexor         current     Zoloft             Wellbutrin             Buspar             trazodone         current     hydroxyzine             Ambien               Maybe Zyprexa, and Prozac, can't remember           MEDICAL / SURGICAL HISTORY                                 Neurologic Hx- no  Patient Active Problem List   Diagnosis     Eczema     Retinopathy     Moderate episode of recurrent major depressive disorder (H)     Polyp of colon, adenomatous     Health Care Home     Hx of psychiatric care     Neuropathy of left upper extremity     Type 2 diabetes mellitus with both eyes affected by moderate nonproliferative retinopathy without macular edema, without long-term current use of insulin (H)     Schizoaffective disorder, bipolar type (H)       Major Surgery- Appendectomy    ALLERGY                                Flomax [tamsulosin] and  Lisinopril  MEDICATIONS                               Current Outpatient Medications   Medication Sig Dispense Refill     ACE/ARB/ARNI NOT PRESCRIBED, INTENTIONAL, Please choose reason not prescribed, below-  hypotension       acetaminophen 500 MG CAPS Take 2 capsules by mouth 3 times daily as needed       aspirin 81 MG EC tablet Take 81 mg by mouth daily.       blood glucose (NO BRAND SPECIFIED) test strip Use to test blood sugar 4 times daily or as directed for insulin injections 4 times daily. 400 each 3     cholecalciferol (VITAMIN D) 1000 UNIT tablet Take 1 tablet by mouth daily.       Cyanocobalamin (B-12) 1000 MCG CAPS Take 1 capsule by mouth daily       ferrous sulfate (IRON) 325 (65 Fe) MG tablet Take 325 mg by mouth daily       fish oil-omega-3 fatty acids 1000 MG capsule Take 1 g by mouth daily       insulin aspart (NOVOLOG PEN) 100 UNIT/ML pen Inject 5 units before breakfast,  5 units before lunch and 5 units before dinner. 15 mL 0     insulin glargine (BASAGLAR KWIKPEN) 100 UNIT/ML pen Inject 14 Units Subcutaneous At Bedtime We will make further adjustments as we see your response (Patient taking differently: Inject 16 Units Subcutaneous every morning We will make further adjustments as we see your response) 15 mL 1     insulin pen needle (32G X 4 MM) 32G X 4 MM miscellaneous Use 4 pen needles daily. 400 each 3     risperiDONE (RISPERDAL) 3 MG tablet Take 1 tablet (3 mg) by mouth At Bedtime 30 tablet 0     Sharps Container (COMPLETE NEEDLE COLLECTION SYS) MISC USE AS DIRECTED 1 each 0     Silodosin (RAPAFLO) 8 MG CAPS capsule Take 1 capsule (8 mg) by mouth daily 30 capsule 3     simvastatin (ZOCOR) 40 MG tablet Take 0.5 tablets (20 mg) by mouth At Bedtime 45 tablet 3     traZODone (DESYREL) 50 MG tablet Take 1.5 tablets (75 mg) by mouth At Bedtime 45 tablet 0     venlafaxine (EFFEXOR XR) 75 MG 24 hr capsule Take 1 capsule (75 mg) by mouth daily TAKE WITH  Effexor 150 mg for a total dose of 225 mg daily  30 capsule 1     venlafaxine (EFFEXOR-XR) 150 MG 24 hr capsule TAKE 1 CAPSULE BY MOUTH DAILY ALONG WITH A 75 MG FOR A TOTAL  MG DAILY 30 capsule 1     VITALS                                                                                                                          3, 3   /80   Pulse 74   Wt 66.2 kg (146 lb)   BMI 20.36 kg/m     MENTAL STATUS EXAM                                                                                           9, 14 cog gs     Alertness: alert  and oriented  Appearance: unkempt  Behavior/Demeanor: cooperative, calm, with fair to intense starting eye contact   Speech: slowed with delay  Language: intact and no problems  Psychomotor: slowed,otherwise unremarkable.   Mood: better  Affect: limited range; blunted but brightens appropriately in the context of conversation, was congruent to mood; was congruent to content  Thought Process/Associations: unremarkable  Thought Content:  Reports possible AH (ongoing);   Denies suicidal ideation  and psychotic thought  Perception:   Reports none;  Denies auditory hallucinations or visual hallucinations or delusions  Insight: fair  Judgment: fair  Cognition: does  appear grossly intact; formal cognitive testing was not done  Gait and Station: unremarkable     LABS and DATA     AIMS: 0 at 3/1/2018    PHQ9 TODAY = 2  PHQ-9 SCORE 3/26/2019 4/8/2019 6/11/2019   PHQ-9 Total Score - - -   PHQ-9 Total Score 11 11 11   PHQ-9 Total Score - - -     ANTIPSYCHOTIC LABS  [glu, A1C, lipids (ezequiel LDL), liver enzymes, WBC, ANEU, Hgb, plts]  q12 mo  Recent Labs   Lab Test 05/15/19  1045 03/15/19  1015 03/04/19  1519 02/13/19  1625 01/02/19  1045  07/17/18  1126  03/19/18  1116   GLC  --  346* 444* 254* 147*   < >  --    < >  --    A1C 7.9*  --   --   --  6.7*  --  6.7*  --  8.2*    < > = values in this interval not displayed.     Recent Labs   Lab Test 01/02/19  1045 04/17/18  1356 07/11/17  1034 06/07/17  1004   CHOL 127 139 126 143   TRIG 51  45 49 105   LDL 33 45 40 46   HDL 84 85 76 76     Recent Labs   Lab Test 01/02/19  1045 04/17/18  1356 01/30/18  0923 07/11/17  1034   AST 14 15 14 14   ALT 13 12 16 12   ALKPHOS 74 73 79 72     Recent Labs   Lab Test 02/13/19  1522 01/02/19  1126 01/02/19  1045 10/11/18  1010 09/05/18  1123  06/09/16  1030 12/21/15  1413  08/01/12  1640   WBC 5.3 4.2  --  2.5* 2.8*   < > 4.7 4.6   < > 5.5   ANEU  --   --   --   --   --   --  3.5 3.4  --  4.8   HGB 12.7* 12.9* 13.1 12.5* 11.5*   < > 14.7 13.4   < > 11.4*    149  --  136* 168   < > 175 155   < > 180    < > = values in this interval not displayed.       DIAGNOSIS     Schizoaffective disorder, bipolar type, depressive episode, mild to moderate, without current psychotic features  R/O seasonal affective disorder or mood disorder with seasonal pattern    ASSESSMENT                                                                                                                   m2, h3     TODAY:    #Psychosis #Mood  - Patient reports low mood, compliance to medication and denies safety issues including self harm, SI, wolf or psychosis. MSE + for depressed patient, concrete thinking pattern, with possible AH?. Patient desires to continue this medication regimen. +55 program at [facility] was highly recommended again. Upcoming intake appointment next week with possibility of starting program in July.     . Patient's mood has significantly improved since last appointment which is thought to be situational, anticipating move. Also it might be related to improved day time activity due to season change. Spring has historically been a better season for him in terms of his MH.    Will continue same meds.  Patient has agreed to close psychiatric follow-up.    #Insomnia :  - Unclear etiology. Sleep study has been discussed and was encouraged.  Patient is ambivalent to see a sleep specialist.  - Trazodone helps but too sedating at times.  He has been historically adjusting the  dose as he needs.    #History of Leukopenia, unspecified type #Low PLT count #Anemia  - PCP is aware. Possibly due to Risperdal. Had a physical with PCP on Jan 2nd when he was also going to evaluate hematologic abnormalities. Receives iron replacement  #DM II: On insulin  # Vitamin D def> replacement  #Benign adrenal incidentaloma    SUICIDE RISK ASSESSMENT:    Caden Bush denies any SI /SIB.  In addition, he has notable risk factors for self-harm including feels trapped, hopelessness, lives alone/ isolated, hallucinations and male.  However, risk is mitigated by no h/o suicide attempt, describes a safety plan, none to minimal alcohol use , commitment to family and stable housing.  Based on all available evidence he does not appear to be at imminent risk for self-harm therefore does not meet criteria for a 72-hr hold/  involuntary hospitalization.  However, based on degree of symptoms voluntary hospitalization, day treatment and close psych FU was recommended which the pt did agree to Day Treatment and close follow up.  Additional steps to minimize risk include: SAFETY PLAN completed 5/10/2019.  Safety Plan placed in AVS: Yes.    [place] PRESCRIPTION MONITORING PROGRAM [] was not checked today:  not using controlled substances.    PSYCHOTROPIC DRUG INTERACTIONS:   VENLAFAXINE and ANTIPLATELET AGENTS may result in an increased risk of bleeding.  RISPERIDONE and SIMVASTATIN may result in increased simvastatin serum concentrations -with an increased risk of myopathy or rhabdomyolysis.  TRAZODONE and VENLAFAXINE may result in an increased risk of serotonin syndrome.    MANAGEMENT:  Monitoring for adverse effects     PLAN                                                                                                                                m2, h3     1) PSYCHOTROPIC MEDICATIONS:  - Continue risperidone 3 mg QHS  - Cont Effexor XR  225 mg daily  - Continue taking Trazodone 75mg      2) THERAPY:    - Seeing  psychologist Yeny Goff at [facility] since 11/2015. Sees once/twice a month.       3) LABS NEXT DUE: as per atypical neuroleptic protocol.      RATING SCALES:  AIMS: 0 at 3/1/2018      4) REFERRALS:    none     7) RTC: 4 wks with       8) CRISIS NUMBERS:     none    TREATMENT RISK STATEMENT:  The risks, benefits, alternatives and potential adverse effects have been discussed and are understood by the pt. The pt understands the risks of using street drugs or alcohol. There are no medical contraindications, the pt agrees to treatment with the ability to do so. The pt knows to call the clinic for any problems or to access emergency care if needed.  Medical and substance use concerns are documented above.  Psychotropic drug interaction check was done, including changes made today.     PROVIDER:  Natali Dang MD    Patient was staffed in clinic by Dr. Porras who will review and sign the note.  Supervisor is Dr. Bradley.    I saw the patient with the resident, and participated in key portions of the service, including the mental status examination and developing the plan of care. I reviewed key portions of the history with the resident. I agree with the findings and plan as documented in this note.    Maty Porras

## 2019-06-20 ASSESSMENT — PATIENT HEALTH QUESTIONNAIRE - PHQ9: SUM OF ALL RESPONSES TO PHQ QUESTIONS 1-9: 2

## 2019-06-24 ENCOUNTER — ALLIED HEALTH/NURSE VISIT (OUTPATIENT)
Dept: PHARMACY | Facility: PHYSICIAN GROUP | Age: 59
End: 2019-06-24
Payer: MEDICAID

## 2019-06-24 ENCOUNTER — MEDICAL CORRESPONDENCE (OUTPATIENT)
Dept: FAMILY MEDICINE | Facility: CLINIC | Age: 59
End: 2019-06-24

## 2019-06-24 DIAGNOSIS — E10.9 TYPE 1 DIABETES MELLITUS WITHOUT COMPLICATION (H): Primary | ICD-10-CM

## 2019-06-24 PROCEDURE — 99606 MTMS BY PHARM EST 15 MIN: CPT | Performed by: PHARMACIST

## 2019-06-24 PROCEDURE — 99607 MTMS BY PHARM ADDL 15 MIN: CPT | Performed by: PHARMACIST

## 2019-06-24 NOTE — PROGRESS NOTES
SUBJECTIVE/OBJECTIVE:                Caden Bush is a 59 year old male called for a follow-up visit for Medication Therapy Management.  He was referred to me from Dr. Hart.     Chief Complaint: Follow up from our visit on 6/4/19.  Moved into assisted living now, they are managing medications. Called nurse Livia at facility to confirm most recent dosing and bg readings.     Tobacco: No tobacco use  Alcohol: not currently using    Medication Adherence/Access:  Has assisted living support with medication.     Type 1 Diabetes:  Pt currently taking Basaglar 16 units in the AM plus Novolog 6 units with breakfast and 5 units with lunch/dinner.   Using sliding scale pre-meal as well=>  200-250 = add 1 unit   251- 300= add 2 units  301- 350= add 3 units  >350 = add 4 units  Was seen by emili and they stopped Januvia and pioglitazone on 5/3. Plan was to give him insulin/carb ratio with sliding scale however he reports that he is not confident with his carb counting and not able to convert a carb amount to insulin dose.  Fixed dose with meals started but since stopping orals the sugars have shot up further while he still has minor residual beta cell function, but will eventually not have beta cell function given positive antibodies.   *Note that the NEDRA antibodies were positive.   C-peptide= 2.1ng/ml with glucose of 346mg/dl.  Pt is not experiencing side effects. Met with CDE to discuss diet and this has been very helpful to him.   He does read labels and counts carbs, typically eating around 30-45g at at time, but we continue to discuss this and appears less able to confidently estimate the carbohydrates he is teaing.   Saw emili on May 1st-Dr. Nava.  4 x daily now- unable to get Dexcom G6 until 1 month of checking 4x per day, updated records sent to medicare- waiting to get this shipped now.      Date FBG Lunch Dinner  Bedtime   6/24 144 201     6/23 153 136 182 162   6/22 151 236 203 155   6/21 171 207 129 202   6/20  NA NA NA NA   6/19 124 NA NA NA   6/18 139 213 108 109   6/17 190 114 140 196   6/16 209 207 317 71   6/15 182 - 216 144   6/14 216 169 229 75   6/13 160 111 182 216   6/12 168 244 155 134   6/11 231 199 100 143   6/10 244 146 157 123   6/9 109 271 129 340     Likely will have a snack if under 130mg/dl - peanut butter or bread.   Recent symptoms of high blood sugar? None  Eye exam: up to date  Foot exam: up to date  Pt is not taking an ACEi/ARB was taken off lisinopril due to dizziness and has not been restarted.  Also has hx of lithium toxicity w/ Ace previously, but has been off lithium now since Jan 2018.  Aspirin: Taking 81mg daily and denies side effects.   Lab Results   Component Value Date    A1C 7.9 05/15/2019    A1C 6.7 01/02/2019    A1C 6.7 07/17/2018    A1C 8.2 03/19/2018    A1C 6.8 09/15/2017       Today's Vitals: There were no vitals taken for this visit.- phone visit    ASSESSMENT:              Current medications were reviewed today as discussed above.      Medication Adherence: good, no issues identified    Diabetes: Needs Improvement. Patient is not meeting A1c goal of < 7%. Self monitoring of blood glucose is not at goal of fasting  mg/dL and post prandial < 150 mg/dL. Pt would benefit from ongoing support to avoid over correction of sugars due to fear of lows. Continuing to work on getting CGM with Dexcom in efforts to increase confidence for patient.     PLAN:                  1. Continue current insulin plan    2. Reviewed with patient that if night sugars are over 100mg/dl, no need to eat extra before bed to prevent the low sugars.     3. Has follow up with Dr. Hart on July 25th for visit to help with coverage for his Dexcom G6.       I spent 20 minutes with this patient today. All changes were made via collaborative practice agreement with Dr. Hart. A copy of the visit note was provided to the patient's primary care provider.     Will follow up in 3 weeks.    The patient declined a  summary of these recommendations as an after visit summary.    Aliyah Angeles, Pharm.D, [facility]  Medication Therapy Management Pharmacist  [phone]

## 2019-07-05 ENCOUNTER — DOCUMENTATION ONLY (OUTPATIENT)
Dept: OTHER | Facility: CLINIC | Age: 59
End: 2019-07-05

## 2019-07-05 DIAGNOSIS — N40.0 BPH (BENIGN PROSTATIC HYPERPLASIA): ICD-10-CM

## 2019-07-05 RX ORDER — SILODOSIN 8 MG/1
8 CAPSULE ORAL DAILY
Qty: 90 CAPSULE | Refills: 0 | Status: SHIPPED | OUTPATIENT
Start: 2019-07-05 | End: 2020-04-09

## 2019-07-11 ENCOUNTER — PATIENT OUTREACH (OUTPATIENT)
Dept: FAMILY MEDICINE | Facility: CLINIC | Age: 59
End: 2019-07-11

## 2019-07-11 NOTE — PROGRESS NOTES
"Clinic Care Coordination Contact  Outreach/Follow up    Data: Patient has left two voice messages for writer stating that he (patient) is now living at [facility] but would like to know if there might be a group home available if patient's current living situation does not \"work out\".    Writer called patient this afternoon. Writer left a voice message with call back information.      Plan: Await patient's return call. Will review/discuss housing with patient as indicated.    NATALIE Garcia  [facility] Care Coordination  Clinic: [facility]   Email: [email or web address]  Tele: [phone]                "

## 2019-07-15 ENCOUNTER — ALLIED HEALTH/NURSE VISIT (OUTPATIENT)
Dept: PHARMACY | Facility: PHYSICIAN GROUP | Age: 59
End: 2019-07-15
Payer: MEDICAID

## 2019-07-15 DIAGNOSIS — E10.9 TYPE 1 DIABETES MELLITUS WITHOUT COMPLICATION (H): Primary | ICD-10-CM

## 2019-07-15 PROCEDURE — 99606 MTMS BY PHARM EST 15 MIN: CPT | Performed by: PHARMACIST

## 2019-07-15 PROCEDURE — 99607 MTMS BY PHARM ADDL 15 MIN: CPT | Performed by: PHARMACIST

## 2019-07-15 NOTE — PROGRESS NOTES
SUBJECTIVE/OBJECTIVE:                Caden Bush is a 59 year old male called for a follow-up visit for Medication Therapy Management.  He was referred to me from Dr. Hart.     Chief Complaint: Follow up from our visit on 6/24.  Talked with patient and facility nurse Livia for information today. See attached document photo for facility blood sugar report.     Tobacco: No tobacco use  Alcohol: not currently using    Medication Adherence/Access:  no issues reported    Type 1 Diabetes:  Pt currently taking Basaglar 16 units in the AM plus Novolog 6 units with breakfast and 5 units with lunch/dinner.   Using sliding scale pre-meal as well=>  200-250 = add 1 unit   251- 300= add 2 units  301- 350= add 3 units  >350 = add 4 units  Was seen by endo and they stopped Januvia and pioglitazone on 5/3. Plan was to give him insulin/carb ratio with sliding scale however he reports that he is not confident with his carb counting and not able to convert a carb amount to insulin dose.  Fixed dose with meals plus sliding scale started but since stopping orals.  *Note that the NEDRA antibodies were positive.   C-peptide= 2.1ng/ml with glucose of 346mg/dl.    Facility forms scanned, only given 7/5/19-6/25/19,nothing more recent.     They are checking sugars and giving insulin at 08:00, 12:00 and 20:00, however patient is typically eating at 08:00, 12:00, 17:00.   Per chart shows Novolog given mostly 0-2 units (one documented time of 7 units on 7/3/19)  Fasting sugars: 107, 221,89, 143, 84, 90, 106, 97, 123, 120, 144, 153, 151  Pre- lunch :93, 180, 240, 187, 246, 211, 240,   Post- dinner: 75, 236, 258, 100, 212, 107, 200, 240, 139, 187, 156  *it is unclear if they are giving him his fixed dose of 6 units with breakfast and 5 units for lunch in dinner, in addition to the sliding scale as it appears only the sliding scale is noted on the Novolog MAR.     Recent symptoms of high blood sugar? None  Eye exam: up to date  Foot exam: up to  date  Pt is not taking an ACEi/ARB was taken off lisinopril due to dizziness and has not been restarted.  Also has hx of lithium toxicity w/ Ace previously, but has been off lithium now since Jan 2018.  Aspirin: Taking 81mg daily and denies side effects.   Lab Results   Component Value Date    A1C 7.9 05/15/2019    A1C 6.7 01/02/2019    A1C 6.7 07/17/2018    A1C 8.2 03/19/2018    A1C 6.8 09/15/2017       Today's Vitals: There were no vitals taken for this visit.- phone visit    ASSESSMENT:              Current medications were reviewed today as discussed above.      Medication Adherence: fair, clarifying additional timing of medication with facility staff.     Diabetes: Needs Improvement. Recommend updating orders to allow for blood sugar checks before breakfast, before lunch and before dinner (not 8pm). Patient should be getting a fixed insulin dose for the meal and if he HAS been getting the 6/5/5 regimen, then recommend changing to 7/5/5 along with sliding scale for Novolog dosing. If he has only been getting his sliding scale at meals and not the fixed doses, then would need to resume fixed dose at much lower amount to avoid hypoglycemia.      PLAN:                  1. Waiting call back from [facility] regarding ongoing questions for his Novolog regimen. - spoke with Livia again regarding sugars and medication timing- he is getting fixed dose at dinner and then sliding scale correction at 2000 not at 1700 with his meal. Changing regimen to be Novolog 6/5/5 plus sliding scale at -0800, 1200, 1700.     2. Will need to move up evening blood sugar checks/insulin dose to dinner time (5pm)    3. Requesting more recent records for blood sugars as well to determine if doses need to be adjusted.       I spent 15 minutes with this patient today. All changes were made via collaborative practice agreement with Dr. Hart. A copy of the visit note was provided to the patient's primary care provider.     Will follow up in 2  weeks.    The patient declined a summary of these recommendations as an after visit summary.    Aliyah Angeles, Pharm.D, [facility]  Medication Therapy Management Pharmacist  [phone]

## 2019-07-25 ENCOUNTER — OFFICE VISIT (OUTPATIENT)
Dept: FAMILY MEDICINE | Facility: CLINIC | Age: 59
End: 2019-07-25

## 2019-07-25 ENCOUNTER — MEDICAL CORRESPONDENCE (OUTPATIENT)
Dept: FAMILY MEDICINE | Facility: CLINIC | Age: 59
End: 2019-07-25

## 2019-07-25 VITALS
DIASTOLIC BLOOD PRESSURE: 74 MMHG | WEIGHT: 154 LBS | RESPIRATION RATE: 16 BRPM | HEART RATE: 71 BPM | OXYGEN SATURATION: 99 % | SYSTOLIC BLOOD PRESSURE: 124 MMHG | BODY MASS INDEX: 21.48 KG/M2

## 2019-07-25 DIAGNOSIS — E10.9 TYPE 1 DIABETES MELLITUS WITHOUT COMPLICATION (H): Primary | ICD-10-CM

## 2019-07-25 PROCEDURE — 99213 OFFICE O/P EST LOW 20 MIN: CPT | Performed by: FAMILY MEDICINE

## 2019-07-25 NOTE — PROGRESS NOTES
Problem(s) Oriented visit        SUBJECTIVE:                                                    Caden Bush is a 59 year old male who presents to clinic today for recheck of diabetes. He takes basaglar 16 units daily. He uses novolog 6 units wit breakfast, 5 units + sliding scale coverage for both lunch and dinner. He uncommonly feels his blood sugar being too low. He keeps something with him at all times to deal with these moments. He is drinking adequate amount of water. Typical am fasting blood sugar is 120. He does note leg soreness generally when it rains. This does not seem to correlate with his simvastatin. He gets exercise with walking.       Problem list, Medication list, Allergies, and Medical/Social/Surgical histories reviewed in [facility] and updated as appropriate.   Additional history: as documented    ROS:  5 point ROS completed and negative except noted above, including Gen, CV, Resp, GI, MS      Histories:   Patient Active Problem List   Diagnosis     Eczema     Retinopathy     Moderate episode of recurrent major depressive disorder (H)     Polyp of colon, adenomatous     Health Care Home     Hx of psychiatric care     Neuropathy of left upper extremity     Type 2 diabetes mellitus with both eyes affected by moderate nonproliferative retinopathy without macular edema, without long-term current use of insulin (H)     Schizoaffective disorder, bipolar type (H)     Past Surgical History:   Procedure Laterality Date     APPENDECTOMY       COLONOSCOPY  2012    2 adenomatous polyps       Social History     Tobacco Use     Smoking status: Never Smoker     Smokeless tobacco: Never Used   Substance Use Topics     Alcohol use: No     Alcohol/week: 0.0 oz     Family History   Problem Relation Age of Onset     Osteoporosis Mother      Breast Cancer Mother 78     Osteoporosis Father      Asthma Father      Cancer - colorectal Father 70     Depression Father      Anxiety Disorder Sister      Rheumatoid Arthritis  Sister            OBJECTIVE:                                                    /74   Pulse 71   Resp 16   Wt 69.9 kg (154 lb)   SpO2 99%   BMI 21.48 kg/m    Body mass index is 21.48 kg/m .   GENERAL APPEARANCE: Alert, no acute distress  NECK: No adenopathy,masses or thyromegaly  RESP: lungs clear to auscultation   CV: normal rate, regular rhythm, no murmur or gallop  ABDOMEN: soft, no organomegaly, masses or tenderness  MS: extremities normal, no peripheral edema, normal peripheral pulses  NEURO: Alert, oriented, speech and mentation normal  PSYCH: mentation appears normal, affect and mood normal   Labs Resulted Today:   Results for orders placed or performed in visit on 07/25/19   Hemoglobin A1C ([facility])   Result Value Ref Range    Hemoglobin A1C 6.6 (H) 4.8 - 5.6 %    Narrative    Performed at:  01 - [facility] [address], [place], [place]  [id number]  : Rigoberto Sanchez MD, Phone:  [phone]     ASSESSMENT/PLAN:                                                        Diagnoses and all orders for this visit:    Type 1 diabetes mellitus without complication (H)  -     Hemoglobin A1C ([facility])  Continue QID fingersticks, short acting insulin with meals including a per carb coverage plus extra for fingerstick coverage. Continue once daily long acting insulin as well. Markedly improved A1c.       There are no Patient Instructions on file for this visit.    The following health maintenance items are reviewed in Epic and correct as of today:  Health Maintenance   Topic Date Due     HIV SCREENING  04/10/1974     ZOSTER IMMUNIZATION (1 of 2) 04/10/2009     PHQ-9  07/14/2019     DIABETIC FOOT EXAM  07/17/2019     INFLUENZA VACCINE (1) 09/01/2019     EYE EXAM  12/11/2019     MEDICARE ANNUAL WELLNESS VISIT  01/02/2020     LIPID  01/02/2020     MICROALBUMIN  01/02/2020     NEDRA ASSESSMENT  01/02/2020     A1C  01/25/2020     BMP  03/04/2020     COLONOSCOPY  06/19/2020     TSH W/FREE T4 REFLEX   02/13/2021     ADVANCE CARE PLANNING  07/05/2024     DTAP/TDAP/TD IMMUNIZATION (2 - Td) 12/18/2025     HEPATITIS C SCREENING  Completed     DEPRESSION ACTION PLAN  Completed     IPV IMMUNIZATION  Aged Out     MENINGITIS IMMUNIZATION  Aged Out       Annie Hart MD  [facility]  Family Practice  [facility]  [phone]    For any issues my office # is [phone]

## 2019-07-26 LAB — HBA1C MFR BLD: 6.6 % (ref 4.8–5.6)

## 2019-07-29 ENCOUNTER — HOSPITAL ENCOUNTER (OUTPATIENT)
Dept: BEHAVIORAL HEALTH | Facility: CLINIC | Age: 59
Discharge: HOME | End: 2019-07-29
Attending: PSYCHIATRY & NEUROLOGY | Admitting: PSYCHIATRY & NEUROLOGY
Payer: MEDICARE

## 2019-07-29 PROCEDURE — 90791 PSYCH DIAGNOSTIC EVALUATION: CPT | Performed by: SOCIAL WORKER

## 2019-07-29 NOTE — PROGRESS NOTES
"Standard Diagnostic Assessment Update     CLIENT'S NAME: Caden Bush  MRN:   [id number]  :   1960 AGE:59 year old SEX: male  ACCT. NUMBER: [id number]  DATE OF SERVICE: 19 Start Time: 1:40PM End Time: 2:20PM     PHONE: [phone] (home)   MOBILE/CELL PHONE: see above  Preferred Phone: see above  May we leave a program related message? yes    Initial Diagnostic Assessment Date: 19 Completed by: Eric Ramírez    Instructions: Flowsheets including: pain assessment, allergies, medical history, surgery history, medications, family history, communication record, and advanced directives have been reviewed.    Identifying Information:  Caden Bush is a 59 year old, White, single male. Caden attended the   alone.     Reason for Referral: Caden was referred to 55+ Outpatient Program (55+) by psychiatrist in the [facility].  Caden reports the reason for referral at this time is \" not socializing with people and not getting out, past overdose on insulin - reason for initial referral. Caden reports he does not remember this.    Caden verbalizes the following treatment/discharge goals: \" to assess memory problems and increase social support to reduce isolation\".    Current Stressors/Losses/Disappointments: Housing- trying to remember new people. Relationships- feels lonely would like more friends.     Per Client, Review of Symptoms:  Mood (Depression/Anxiety/Sary/Anger): fatigue, heart racing and pounding.  Thoughts: constant worry.   Concentration/Memory: trouble remembering things, not remembering chunks of time.   Appetite/Weight: (see also, Physical Health Screening below) increased appetite.   Sleep: sleep does not feel restful.    Motivation/Energy: sudden changes in energy.  Behavior: making decisions you regret.     Psychosis: NA     Trauma: NA   Other: Caden noted limited symptoms but he is a rather poor historian per record review. He did wonder if his poor memory was due to depression. He is very " "isolated. He has limited insight into mh diagnosis.    Onset/Duration/Pattern of Symptoms noted above:  Per record review pippa has long history of a major mental illness. His recent resident reviewed record and gave him diagnosis of schizoaffective disorder bipolar type. He was rather poor historian about recent symptoms. It was noted in his record that he told his psychiatrist last may that he intentionally overdosed on insulin. During initial DA he denied this and today he stated he did not remember. Of concern he stated he has very poor memory of his past and recently did not remember that he has visited with his sister. He is concerned about paying his bills as he does not remember if he did it or not. He has moved to a AL and his meds and insulin are now supervised and given to him daily. He does endorse severe isolation and loneliness.    Pippa reports the following understanding of his diagnosis: schizoaffective bipolar type    Instructions: Please update any of the following areas with any changes that have occurred since the initial DA. Indicate \"No changes identified since the initial DA\" when applicable. (.nci)    Personal Safety: Since the initial DA, Pippa:   denies personal safety concerns.    denies suicidal ideation or attempts. Insulin is now being managed by nursing staff.    No   reports changes to Mental Health or other providers. New resident at [facility]-Miguelangel Joshi MD.  deniesfurther hospitalizations or changes in commitment status.     Personal Safety Summary:  After gathering the above information, Pippa presents the following high risk factors for suicide: male, poor sleep and past serious attempts - especially recent. Pippa denies current fears or concerns for personal safety.     Upon review of the patient interview and identification of high risk factors determine individualized safety strategies alternatives and treatment plan interventions. denies any suicidal " ideation. Reports he is living in AL and has no access to meds or insulin.     Substance Use History:   Caden does not report use of substances since the initial DA.     Substances Used: NA Last use: NA  Pattern of Use: NA     Substance Use Disorder Treatment: Caden is currently receiving the following services: No indications of CD issues.       CAGE-AID:  Have you ever felt you ought to cut down on your drinking or drug use?   No    Have people annoyed you by criticizing your drinking or drug use?   No    Have you ever felt bad or guilty about your drinking or drug use?   No    Have you ever had a drink or used drugs first thing in the morning to steady your nerves or to get rid of a hangover?  No    Do you feel these issues have been adequately addressed?   Yes. How? NA    Chemical Dependency Assessment Recommended?  No        Caden has a negative Cage-Aid score.       Legal History:    Caden denies legal system involvement since the initial DA.     Life Situation (Employment/School/Finances/Basic Needs):   Since the initial DA, Caden:  reports changes in his living situation. Moved from apartment in [place] to an assisted living in [place]. Has  waiver and has TMA to give meds and Home Aid to provide insulin. Gets 3 meals a day and snacks. There are some activities in the building he is taking advantage of.  He has a  CM.  denies changes in his employment.    denies changes regarding financial concerns or gambling behavior.   denies  changes in education status.   denies ability to meet his basic needs.   Social/Family History:   Since the initial DA, the Caden reports changes with his relationships/support system. Living physically closer to family.     Significant Losses / Trauma / Abuse / Neglect Issues:   Since the initial DA, Caden denies new losses/trauma/abuse/neglect issues.     Restoration Preferences/Spiritual Beliefs/Cultural Considerations:  Since the initial DA, Caden denies new  experiences of cultural bias. Caden does not identify changes or new cultural influences that may need to be considered for treatment.     Strengths/Vulnerabilities:   Since the initial DA, Caden denies changes or new strengths/vulnerabilities.     Medical History / Physical Health Screen Update:     Primary Care Physician: Caden has a non-[facility] Primary Care Provider. Their PCP is Annie Hart PCP [facility]...   Last Physical Exam: greater than a year ago and client was encouraged to schedule an exam with PCP.    Mental Health Medication Management Provider / Psychiatrist: Caden has a psychiatrist whose name and location are: [facility].     Last visit: NA        Next visit: Friday august 2.    Current medications including prescription, non-prescription, herbals, dietary aids and vitamins:  Per client report:   Outpatient Medications Marked as Taking for the 7/29/19 encounter (Hospital Encounter) with Zaina Olson LICSW   Medication Sig     ACE/ARB/ARNI NOT PRESCRIBED, INTENTIONAL, Please choose reason not prescribed, below-  hypotension     acetaminophen 500 MG CAPS Take 2 capsules by mouth 3 times daily as needed     aspirin 81 MG EC tablet Take 81 mg by mouth daily.     blood glucose (NO BRAND SPECIFIED) test strip Use to test blood sugar 4 times daily or as directed for insulin injections 4 times daily.     cholecalciferol (VITAMIN D) 1000 UNIT tablet Take 1 tablet by mouth daily.     Cyanocobalamin (B-12) 1000 MCG CAPS Take 1 capsule by mouth daily     ferrous sulfate (IRON) 325 (65 Fe) MG tablet Take 325 mg by mouth daily     fish oil-omega-3 fatty acids 1000 MG capsule Take 1 g by mouth daily     insulin aspart (NOVOLOG PEN) 100 UNIT/ML pen Inject 6 units before breakfast,  5 units before lunch and 5 units before dinner.     insulin glargine (BASAGLAR KWIKPEN) 100 UNIT/ML pen Inject 14 Units Subcutaneous At Bedtime We will make further adjustments as we see your response (Patient  taking differently: Inject 16 Units Subcutaneous every morning We will make further adjustments as we see your response)     insulin pen needle (32G X 4 MM) 32G X 4 MM miscellaneous Use 4 pen needles daily.     risperiDONE (RISPERDAL) 3 MG tablet Take 1 tablet (3 mg) by mouth At Bedtime     Sharps Container (COMPLETE NEEDLE COLLECTION SYS) MISC USE AS DIRECTED     Silodosin (RAPAFLO) 8 MG CAPS capsule Take 1 capsule (8 mg) by mouth daily     simvastatin (ZOCOR) 40 MG tablet Take 0.5 tablets (20 mg) by mouth At Bedtime     traZODone (DESYREL) 50 MG tablet Take 1.5 tablets (75 mg) by mouth At Bedtime     venlafaxine (EFFEXOR XR) 75 MG 24 hr capsule Take 1 capsule (75 mg) by mouth daily TAKE WITH  Effexor 150 mg for a total dose of 225 mg daily     venlafaxine (EFFEXOR-XR) 150 MG 24 hr capsule TAKE 1 CAPSULE BY MOUTH DAILY ALONG WITH A 75 MG FOR A TOTAL  MG DAILY       Caden reports current medications are: Effective.   Caden describes taking his medications as: Requires assistance.  Caden reports taking prescribed medications as prescribed.     Caden provides the following current assessment of pain:  ;  ;  .     Caden provides the following information regarding past significant medical conditions/diagnoses:      Medical:  Past Medical History:   Diagnosis Date     Arthritis      Depressive disorder      DM (diabetes mellitus) (H)      Eczema 2011    biopsy leg      Polyp of colon, adenomatous 6/25/2012     Retinopathy     HFA ophtho DR HARLEY     Schizo-affective type schizophrenia      Vitamin D deficiency     replaced       Surgical:  Past Surgical History:   Procedure Laterality Date     APPENDECTOMY       COLONOSCOPY  2012    2 adenomatous polyps     Allergy:   Caden reports   Allergies   Allergen Reactions     Flomax [Tamsulosin] Unknown     Lisinopril Unknown        Family History of Medical, Mental Health and/or Substance Use problems:  Per client report:   Family History   Problem Relation Age of Onset      Osteoporosis Mother      Breast Cancer Mother 78     Osteoporosis Father      Asthma Father      Cancer - colorectal Father 70     Depression Father      Anxiety Disorder Sister      Rheumatoid Arthritis Sister        Caden reports the following current medical concerns: reports concerns about his memory..      General Health:   Have you had any exposure to any communicable disease in the past 2-3 weeks? no     Since the initial Diagnostic Assessment, do you have any sexual health concerns and/or any possibility of pregnancy? no       Nutrition:    Since the initial Diagnostic Assessment, are there any changes in the following areas? *special diet? If yes, please explain  *concerns regarding nutritional status?  *appetite changes in the past 3 months?  *eating disorder symptoms and/or eating disorder program? No gets 3 meals a day plus snacks.   Have you had any weight loss or weight gain in the last 3 months?  No     NOTE: BMI to be calculated following program admission.    Fall Risk:   Have you had any falls in the past 3 months? no     Do you currently useany assistive devices for mobility?   no     NOTE: If client reports 3 or more falls in the past 3 months, the client will not be accepted into the program until further assessment is completed by the program nurse. Check if a nurse is available to assess at time of DA.    NOTE: If client reports 2 falls in the past 3 months and/or the client currently uses assistive devices for mobility, the  will send an in-basket to the program nurse to meet with the client within the first week of programming.    Head Injury/Trauma:   Since the initial Diagnostic Assessment, have you experienced head injury / trauma and/or cognitive impairment? no       Per completion of the Medical History / Physical Health Screen, is there a recommendation to see / follow up with a primary care physician/clinic?    No.    Clinical Findings     Mental Status Assessment/Clinical  Observation:  Appearance:   awake, alert and adequately groomed  Eye Contact:   good  Psychomotor Behavior: Normal  intact station, gait and muscle tone  Attitude:   Cooperative    Oriented to:   All    Speech   Rate / Production: Normal    Volume:  Normal   Mood:    Anxious     Affect:    Constricted      Thought Content:  Clear  no evidence of suicidal ideation or homicidal ideation, no evidence of psychotic thought, no auditory hallucinations present and no visual hallucinations present  Thought Form:  logical no loose associations  Insight:    limited    Judgment:     fair  Attention Span/Concentration: limited  Recent and Remote Memory:  poor      Psychiatric Diagnosis:    295.70  (F25) Schizoaffective Disorder Bipolar Type    Provisional Diagnostic Hypothesis (Explain R/O, other Provisional Diagnosis, and why alternative Diagnosis that were considered were ruled out):   R/O cognitive impairment.    Medical Concerns that may Impact Treatment:   Diabetes insulin dependent    Psychosocial and Contextual Factors (V-Codes):  V62.9 Unspecified problem related to social environment recently  moved into AL very isolated and lonely    WHODAS 2.0 SCORE: 27/95 %    Client and family participation in assessment:   Caden was alone during this assessment.   This assessment does include collateral information. Epic NOTES.     Summary & Recommendations  Provide a brief summary of how diagnostic criteria is met (symptoms, duration & functional impairment), cause, prognosis, and likely consequences of symptoms. Include overview of pertinent client strengths, cultural influences, life situations, relationships, health concerns and how diagnosis interacts/impacts with client's life. Recommendations include: client preferences, prioritization of needed mental health, ancillary or other services and any referrals to services required by statute or rule.     Caden Bush is a 59 year old male with a diagnosis of schizoaffective  "disorder who was first referred to 55+ day treatment in may 2019 after telling his psychiatrist that he had intentionally taken too much insulin. During the initial DA he denied this was a suicide attempt. He had initial eval on 5.28.19 but since then he moved into an assisted living in [place]. He now has his medications and insulin given to him by the home health aids at the facility. He reports he is eating better as he receives 3 meals plus snacks a day. He also reports he tries to take advantage of the activities and has gone on outings, has played [employer]pol[employer], and has attended exercise class. He reported that most of the residents are elderly and he has difficulty engaging in conversations with them.    Caden was a rather poor historian. He stated several times that he is concerned about his memory. He stated he does not remember anything about his childhood. He has a sister that he sees on occasion and she will remind him of past vacations they took as he has no memory. He also stated he saw her last week Tuesday but had forgotten. He reported he has no memory of overdosing on insulin. Other symptoms he endorsed include: fatigue, heart racing, constant worry, poor sleep, sudden changes in energy and making decisions you regret.     He is single with no children and expressed disappointment that he moved closer to his family but still does not see them as much as he would like. Both his parents are still living. He reports he has no friends and wishes for a friend or . He has been attempting to socialized in the AL but as stated above finds that others are not able to socialize. HIs goal for day treatment is: to assess memory problems and increase social support to reduce isolation\".     Prognosis is Guarded. Without the recommended intervention, the client is likely to experience the following consequences of their symptoms: He is vulnerable to further mood deterioration that may result in need for a " higher level of care.    Referrals to services required by statute or rule:   Report to child/adult protection services was NA.   Referral to another professional/service is not indicated at this time..    Program Recommendation: Day Treatment (DT) .        Assessment Completed by: Zaina Olson

## 2019-07-30 ENCOUNTER — ALLIED HEALTH/NURSE VISIT (OUTPATIENT)
Dept: PHARMACY | Facility: PHYSICIAN GROUP | Age: 59
End: 2019-07-30
Payer: MEDICAID

## 2019-07-30 DIAGNOSIS — E10.9 TYPE 1 DIABETES MELLITUS WITHOUT COMPLICATION (H): Primary | ICD-10-CM

## 2019-07-30 PROCEDURE — 99606 MTMS BY PHARM EST 15 MIN: CPT | Performed by: PHARMACIST

## 2019-07-30 NOTE — PROGRESS NOTES
SUBJECTIVE/OBJECTIVE:                Caden Bush is a 59 year old male called for a follow-up visit for Medication Therapy Management.  He was referred to me from Dr. Hart.     Chief Complaint: Follow up from our visit on 7/15.      Tobacco: No tobacco use  Alcohol: not currently using    Medication Adherence/Access:  Has assistance from living facility- this has now been straightened out - he was getting his sliding scale at different times than his set meal doses, so now it getting sliding scale only before meals with his fixed meal dose of insulin.     Type 1 Diabetes:  Pt currently taking Basaglar 16 units in the AM plus Novolog 6 units with breakfast and 5 units with lunch/dinner.   Using sliding scale pre-meal as well=>  200-250 = add 1 unit   251- 300= add 2 units  301- 350= add 3 units  >350 = add 4 units  Was seen by emili and they stopped Januvia and pioglitazone on 5/3. Plan was to give him insulin/carb ratio with sliding scale however he reports that he is not confident with his carb counting and not able to convert a carb amount to insulin dose.  Fixed dose with meals plus sliding scale started since stopping orals.  *Note that the NEDRA antibodies were positive.   C-peptide= 2.1ng/ml with glucose of 346mg/dl.    Facility forms scanned for recent blood sugar log- under DEXCOM files as they were included for processing.        They are checking sugars and giving insulin at 08:00, 12:00 and 17:00 when patient is eating, then checking sugars at 20:00 without Insulin. He also has access to extra food in his room if he is feeling low or needs to correct for a low sugar.     Recent symptoms of high blood sugar? None, states he is not having to over eat to avoid lows and has been feeling that he is eating enough.   Eye exam: up to date  Foot exam: up to date  Pt is not taking an ACEi/ARB was taken off lisinopril due to dizziness and has  not been restarted.  Also has hx of lithium toxicity w/ Ace previously,  but has been off lithium now since Jan 2018.  Aspirin: Taking 81mg daily and denies side effects.   Lab Results   Component Value Date    A1C 6.6 07/25/2019    A1C 7.9 05/15/2019    A1C 6.7 01/02/2019    A1C 6.7 07/17/2018    A1C 8.2 03/19/2018     Today's Vitals: There were no vitals taken for this visit.- phone visit    ASSESSMENT:              Current medications were reviewed today as discussed above.      Medication Adherence: good, no issues identified    Diabetes: Stable. Patient is meeting A1c goal of < 7%.     PLAN:                  1. PCP filling out Dexcom forms from 7/25 OV, then need to attach copy of the QID BG checks they are doing.     I spent 10 mins with this patient today. All changes were made via collaborative practice agreement with Dr. Hart. A copy of the visit note was provided to the patient's primary care provider.     Will follow up in 1 month or sooner if needed to go through Dexcom application.    The patient declined a summary of these recommendations as an after visit summary.    Aliyah Angeles, Pharm.D, BCACP  Medication Therapy Management Pharmacist  [phone]

## 2019-08-02 ENCOUNTER — OFFICE VISIT (OUTPATIENT)
Dept: PSYCHIATRY | Facility: CLINIC | Age: 59
End: 2019-08-02
Attending: PSYCHIATRY & NEUROLOGY
Payer: MEDICARE

## 2019-08-02 VITALS
BODY MASS INDEX: 21.76 KG/M2 | DIASTOLIC BLOOD PRESSURE: 71 MMHG | SYSTOLIC BLOOD PRESSURE: 106 MMHG | WEIGHT: 156 LBS | HEART RATE: 71 BPM

## 2019-08-02 DIAGNOSIS — F25.0 SCHIZOAFFECTIVE DISORDER, BIPOLAR TYPE (H): ICD-10-CM

## 2019-08-02 PROCEDURE — G0463 HOSPITAL OUTPT CLINIC VISIT: HCPCS | Mod: ZF

## 2019-08-02 RX ORDER — RISPERIDONE 3 MG/1
3 TABLET ORAL AT BEDTIME
Qty: 30 TABLET | Refills: 2 | Status: SHIPPED | OUTPATIENT
Start: 2019-08-02 | End: 2019-10-30

## 2019-08-02 RX ORDER — VENLAFAXINE HYDROCHLORIDE 150 MG/1
CAPSULE, EXTENDED RELEASE ORAL
Qty: 30 CAPSULE | Refills: 2 | Status: SHIPPED | OUTPATIENT
Start: 2019-08-02 | End: 2019-10-30

## 2019-08-02 RX ORDER — VENLAFAXINE HYDROCHLORIDE 75 MG/1
75 CAPSULE, EXTENDED RELEASE ORAL DAILY
Qty: 30 CAPSULE | Refills: 2 | Status: SHIPPED | OUTPATIENT
Start: 2019-08-02 | End: 2019-10-30

## 2019-08-02 RX ORDER — TRAZODONE HYDROCHLORIDE 50 MG/1
75 TABLET, FILM COATED ORAL AT BEDTIME
Qty: 45 TABLET | Refills: 2 | Status: SHIPPED | OUTPATIENT
Start: 2019-08-02 | End: 2019-10-30

## 2019-08-02 ASSESSMENT — PAIN SCALES - GENERAL: PAINLEVEL: MILD PAIN (3)

## 2019-08-02 NOTE — PATIENT INSTRUCTIONS
Please continue taking medications as prescribed. I am happy to hear you will be starting day treatment. The 55+ program would be excellent to get into if able. One of your goals for the year is finding a close relationship connection, which I think is wonderful.     Please note any change in your memory this next month. We will do more formal testing next visit.    Please do not hesitate to call or message me with any questions or concerns.    Be well,  Dr. Parks     Thank you for coming to the PSYCHIATRY CLINIC.    Lab Testing:  If you had lab testing today and your results are reassuring or normal they will be mailed to you or sent through [facility] within 7 days.   If the lab tests need quick action we will call you with the results.  The phone number we will call with results is # [phone] (home) . If this is not the best number please call our clinic and change the number.    Medication Refills:  If you need any refills please call your pharmacy and they will contact us. Our fax number for refills is [phone]. Please allow three business for refill processing.   If you need to  your refill at a new pharmacy, please contact the new pharmacy directly. The new pharmacy will help you get your medications transferred.     Scheduling:  If you have any concerns about today's visit or wish to schedule another appointment please call our office during normal business hours [phone] (8-5:00 M-F)    Contact Us:  Please call [phone] during business hours (8-5:00 M-F).  If after clinic hours, or on the weekend, please call  [phone].    Financial Assistance [phone]  [facility] Billing [phone]  Central Billing Office, [facility]: [phone]  [facility] Billing [phone]  Medical Records [phone]      MENTAL HEALTH CRISIS NUMBERS:  [facility]:   [facility] - [phone]   Crisis Residence [facility] - [phone]   Walk-In Counseling Center  [facility] - [phone]   COPE 24/7 [facility] Mobile Team for Adults - [[phone]]; Child - [[phone]]        [facility] - [phone]   Walk-in counseling [facility] - [phone]   Walk-in counseling [facility] - [phone]   Crisis Residence [facility] - [phone]   Urgent Care Adult Mental Health:   --Drop-in, 24/7 crisis line, and [facility] Mobile Team [[phone]]    CRISIS TEXT LINE: Text [phone] from anywhere, anytime, any crisis 24/7;    OR SEE www.crisistextline.org     Poison Control Center - [phone]    CHILD: [facility] needs assessment team - [phone]     [facility] Lif[facility] - [phone]; or [facility] Lifeline - [phone]    If you have a medical emergency please call 911or go to the nearest ER.                    _____________________________________________    Again thank you for choosing PSYCHIATRY CLINIC and please let us know how we can best partner with you to improve you and your family's health.  You may be receiving a survey in the mail regarding this appointment. We would love to have your feedback, both positive and negative, so please fill out the survey and return it using the provided envelope. The survey is done by an external company, so your answers are anonymous.

## 2019-08-02 NOTE — PROGRESS NOTES
[facility]  Psychiatry Clinic  TRANSFER of CARE DIAGNOSTIC ASSESSMENT       CARE TEAM:  PCP- Annie Hart   Therapist-  Yeny Goff at [facility]      Ophthalmologist: Jacob Lieberman with [facility] support: [facility] [phone]     Caden Bush is a 58 year old male who prefers the name Caden & pronouns he.  The initial diagnostic evaluation was on 11/26/08 .   Date of the most recent transfer of care eval is 08/01/2019.      Pertinent Background:  This patient first experienced mental health issues in his late 20s , initially obtained care at that time and has received treatment for Schizoaffective Disorder, bipolar type. His diagnosis has changed through the time and afterreviewing all the evidence in 9/2018 the current diagnosis was confirmed (several episodes of moderate to severe depression and one episode of two weeks long period of grandiosity,  pressured speech, overspending and sleeplessness with increased social anxiety and paranoia).   Previously had a CCM until 2011. Has SW consult on 10/20/15, not interested in CM or [facility] worker services at this time and seems to be managing well on his own.     Of note, patient is found to be very hesitant to change any medication regimen or engaged in the treatment programs.  Luckily, he has been engaged in care with his therapist.     #Mood/psychosis:  - Notably, this pt was stable on regimen of Lithium, Effexor and Risperdal since 2011. However due to complications of chronic diabetes, Lithium was discontinued in 2017.   -  reviewed the chart. She recommended [facility] for groups and activities for him.   - We even tried going down on Risperidone for other reasons but he immediately got destabilized.     #Insomnia: The etiology of his insomnia is not well understood and the sleep study was discussed and encouraged. Sleep has improved on 3 mg of risperidone while  psychotic sxs are also better managed.      Psych critical item history includes suicidal ideation, psychosis [sxs include AH and paranoia], mutiple psychotropic trials and psych hosp (>5).     INTERIM HISTORY      [4, 4]   The patient reports good treatment adherence.  History was provided by the patient who was a good historian.  The last visit ended with no change to the med regimen.     Since the last visit:     -Patient is starting day treatment through  on Monday. It is M,Tu, Th. He may transfer to the 55+ program per previously recommended. The time is from 1-4 PM. It is for 12 weeks.  -Patient notes that he has memory deficits. They have not been worsening. He can remember the previous day, but cannot remember his move in June. He has little biographical remembrance from his life. He recently left the stove on accidentally. He has trouble remembering people's names. Meals are provided.  -He denies any disturbance in mood.  -He has been sleeping well. He goes to bed around 9 and wakes up around 7-9 AM. No trouble falling asleep.  -He denies any side effects from his medications.  -He enjoys watching TV and doing work puzzles.  -Mr. Bush is afraid of being alone. He does not feel alone as he used to be. He finds the AL facility more conducive for socializing, although it is harder to find a connection due to lack of shared interests and life experiences.  -The patient wants to be more independent. Unable to clarify.  -Patient's father (88) was recently in the hospital for a bowel obstruction. His mom is alive and 85. He has been seeing them every other day recently.     RECENT PSYCH ROS:   Depression:  None  Elevated:  none  Psychosis:  none  Anxiety:  none  Panic Attack:  none  Trauma Related:  none  Dysregulation:  none  Eating Disorder: no   Cognitive: remote memory deficits, difficulty with names  Pertinent Negative Symptoms:  NO suicidal ideation, self-injurious behavior/urges, violent ideation,  suicidal and violent ideation, aggression, psychosis, hallucinations, delusions, wolf and hypomania    RECENT SUBSTANCE USE:     ALCOHOL-  never   TOBACCO- none  CAFFEINE- 1 cups/day of coffee, 1 sodas/ in a while  OPIOIDS- none      NARCAN KIT- N/A          CANNABIS- none  OTHER ILLICIT DRUGS- none      CURRENT SOCIAL HISTORY:  FINANCIAL SUPPORT- SSDI for schizoaffective disorder and diabetes  CHILDREN- none      LIVING SITUATION- Currently living in an assisted living facility (care free living) in [facility] 6/19. Receives assistance with managing medications and administering insulin.  SOCIAL/ SPIRITUAL SUPPORT- few friends and family. 2 sisters (55 and 62). Mom, alive (85). Father, alive (88).  FEELS SAFE AT HOME- yes       MEDICAL ROS (2,10):  Reports Arthritis pain (ongoing, unchanged) Denies Pain, headache, dizziness, GI [nausea, diarrhea, constipation, sedation, tremor and confusion.     PSYCHIATRIC HISTORY                                                                                  *      SIB [method, most recent]- none  Suicidal Ideation Hx [passive, active]- Remote hx of passive SI  Suicide Attempt [#, recent, method]:   #- N/A   Most Recent- N/A   Violence/Aggression Hx- none  Psychosis Hx- hx of auditory hallucinations and paranoia  Psych Hosp [ #, most recent, committed]- about 4-5 times in the past. Last hospitalization was aug 1, 2012 due to lIthium toxicity. Last mental health admission was June 2009.   ECT [#, most recent]- none  Pertinent Outpatient Treatment: Has had DBT in the past, as well as group therapy and individual therapy. In Day Treatment with [facility].  Eating Disorder: none    Past Psychotropic Med Trials- see next section    PAST PSYCHOTROPIC MED TRIALS                                                           *     Drug / Start Date   Dose (mg)   Helpful   Adverse Effects  DC Reason / Date     Lithium   900           Risperdal         current     Abilify             Seroquel              Effexor         current     Zoloft             Wellbutrin             Buspar             trazodone         current     hydroxyzine             Ambien                Maybe Zyprexa, and Prozac, can't remember    July: Increased Risperidone dose  During months of Fall and winter, patient was hesitant to make changes to medications . Modified risperidone and trazodone dose several times (minor changes). Not successful in complete resolution of sxs.     SUBSTANCE USE HISTORY                                                                         *   Past Use- none  Treatment [#, most recent] - none  Medical Consequences [withdrawal, sz etc] - none  HIV/Hepatitis- none  Legal Consequences- none    SOCIAL and FAMILY HISTORY      [1ea, 1ea]       patient reported               *      Financial Support- as mentioned above.  Living Situation/Family/Relationships- as mentioned above.    Children- none  Trauma History (self-report)- none  Legal- none, but some financial problems for which he is working with   Social/Spiritual Support- family (parents and sister) and people in his apartment building.   Early History/Education-  Born in [place], lived in [place]. Lived in [place] from 1st grade, then came back and finished at [employer]. Went to [employer], then vocational school, then went to [employer], then [employer], then graduated [employer] with BA in Business Administration.   Family Mental Health History-  Parents had issues with depression, unsure of diagnoses; Great grandfather with alcoholism, possible OCD.     MEDICAL / SURGICAL HISTORY                                     Patient Active Problem List   Diagnosis     Eczema     Retinopathy     Moderate episode of recurrent major depressive disorder (H)     Polyp of colon, adenomatous     Health Care Home     Hx of psychiatric care     Neuropathy of left upper extremity     Type 2 diabetes mellitus with both eyes affected by moderate nonproliferative  retinopathy without macular edema, without long-term current use of insulin (H)     Schizoaffective disorder, bipolar type (H)       Major Surgery-  Past Surgical History:   Procedure Laterality Date     APPENDECTOMY       COLONOSCOPY  2012    2 adenomatous polyps       ALLERGY       Flomax [tamsulosin] and Lisinopril  MEDICATIONS        Current Outpatient Medications   Medication Sig Dispense Refill     ACE/ARB/ARNI NOT PRESCRIBED, INTENTIONAL, Please choose reason not prescribed, below-  hypotension       acetaminophen 500 MG CAPS Take 2 capsules by mouth 3 times daily as needed       aspirin 81 MG EC tablet Take 81 mg by mouth daily.       blood glucose (NO BRAND SPECIFIED) test strip Use to test blood sugar 4 times daily or as directed for insulin injections 4 times daily. 400 each 3     cholecalciferol (VITAMIN D) 1000 UNIT tablet Take 1 tablet by mouth daily.       Cyanocobalamin (B-12) 1000 MCG CAPS Take 1 capsule by mouth daily       ferrous sulfate (IRON) 325 (65 Fe) MG tablet Take 325 mg by mouth daily       fish oil-omega-3 fatty acids 1000 MG capsule Take 1 g by mouth daily       insulin aspart (NOVOLOG PEN) 100 UNIT/ML pen Inject 6 units before breakfast,  5 units before lunch and 5 units before dinner. 15 mL 0     insulin glargine (BASAGLAR KWIKPEN) 100 UNIT/ML pen Inject 14 Units Subcutaneous At Bedtime We will make further adjustments as we see your response (Patient taking differently: Inject 16 Units Subcutaneous every morning We will make further adjustments as we see your response) 15 mL 1     insulin pen needle (32G X 4 MM) 32G X 4 MM miscellaneous Use 4 pen needles daily. 400 each 3     risperiDONE (RISPERDAL) 3 MG tablet Take 1 tablet (3 mg) by mouth At Bedtime 30 tablet 0     Sharps Container (COMPLETE NEEDLE COLLECTION SYS) MISC USE AS DIRECTED 1 each 0     Silodosin (RAPAFLO) 8 MG CAPS capsule Take 1 capsule (8 mg) by mouth daily 90 capsule 0     simvastatin (ZOCOR) 40 MG tablet Take 0.5  "tablets (20 mg) by mouth At Bedtime 45 tablet 3     traZODone (DESYREL) 50 MG tablet Take 1.5 tablets (75 mg) by mouth At Bedtime 45 tablet 0     venlafaxine (EFFEXOR XR) 75 MG 24 hr capsule Take 1 capsule (75 mg) by mouth daily TAKE WITH  Effexor 150 mg for a total dose of 225 mg daily 30 capsule 1     venlafaxine (EFFEXOR-XR) 150 MG 24 hr capsule TAKE 1 CAPSULE BY MOUTH DAILY ALONG WITH A 75 MG FOR A TOTAL  MG DAILY 30 capsule 1     VITALS      [3, 3]   There were no vitals taken for this visit.   MENTAL STATUS EXAM      [9, 14 cog gs]     Alertness: alert and oriented  Appearance: appropriately groomed, asymmetrical facial features   Behavior/Demeanor: cooperative, pleasant and calm, with good eye contact   Speech: normal and regular rate and rhythm  Language: intact and no problems  Psychomotor: normal or unremarkable  Mood: \"Fine.\"  Affect: Blunt  Thought Process/Associations: Linear  Thought Content: Denies SI or HI.   Perception: Denies AVH  Insight: Fair  Judgment: Good  Cognition: (6) Difficulty with remote memory, possible impairment, no formal testing done  Gait and Station: unremarkable    LABS and DATA     AIMS:  0: 3/1/2018, 0: 08/02/2019    PHQ9 TODAY = 0  PHQ-9 SCORE 4/8/2019 6/11/2019 6/20/2019   PHQ-9 Total Score - - -   PHQ-9 Total Score 11 11 2   PHQ-9 Total Score - - -       ANTIPSYCHOTIC LABS  [glu, A1C, lipids (ezequiel LDL), liver enzymes, WBC, ANEU, Hgb, plts]  q12 mo  Recent Labs   Lab Test 07/25/19  1415 05/15/19  1045 03/15/19  1015 03/04/19  1519 02/13/19  1625 01/02/19  1045  07/17/18  1126   GLC  --   --  346* 444* 254* 147*   < >  --    A1C 6.6* 7.9*  --   --   --  6.7*  --  6.7*    < > = values in this interval not displayed.     Recent Labs   Lab Test 01/02/19  1045 04/17/18  1356 07/11/17  1034 06/07/17  1004   CHOL 127 139 126 143   TRIG 51 45 49 105   LDL 33 45 40 46   HDL 84 85 76 76     Recent Labs   Lab Test 01/02/19  1045 04/17/18  1356 01/30/18  0923 07/11/17  1034   AST 14 " 15 14 14   ALT 13 12 16 12   ALKPHOS 74 73 79 72     Recent Labs   Lab Test 02/13/19  1522 01/02/19  1126 01/02/19  1045 10/11/18  1010 09/05/18  1123  06/09/16  1030 12/21/15  1413  08/01/12  1640   WBC 5.3 4.2  --  2.5* 2.8*   < > 4.7 4.6   < > 5.5   ANEU  --   --   --   --   --   --  3.5 3.4  --  4.8   HGB 12.7* 12.9* 13.1 12.5* 11.5*   < > 14.7 13.4   < > 11.4*    149  --  136* 168   < > 175 155   < > 180    < > = values in this interval not displayed.       PSYCHOTROPIC DRUG INTERACTIONS     VENLAFAXINE and ANTIPLATELET AGENTS may result in an increased risk of bleeding.  RISPERIDONE and SIMVASTATIN may result in increased simvastatin serum concentrations -with an increased risk of myopathy or rhabdomyolysis.  TRAZODONE and VENLAFAXINE may result in an increased risk of serotonin syndrome.    MANAGEMENT:  Monitoring for adverse effects    RISK STATEMENT for SAFETY     Caden Bush denies any SI /SIB.  In addition, he has notable risk factors for self-harm including feels trapped, hopelessness, lives alone/ isolated, hallucinations and male.  However, risk is mitigated by no h/o suicide attempt, describes a safety plan, none to minimal alcohol use , commitment to family and stable housing.  Based on all available evidence he does not appear to be at imminent risk for self-harm therefore does not meet criteria for a 72-hr hold/  involuntary hospitalization.  However, based on degree of symptoms voluntary hospitalization, day treatment and close psych FU was recommended which the pt did agree to Day Treatment and close follow up.  Additional steps to minimize risk include: SAFETY PLAN completed 5/10/2019.  Safety Plan placed in AVS: Yes.    DIAGNOSIS     Schizoaffective disorder, bipolar type, depressive episode, mild to moderate, without current psychotic features  R/O seasonal affective disorder    ASSESSMENT      [m2, h3]     TODAY    Patient stable on his current medication regimen with no psychotic or  mood symptoms. His main concern today concerns his memory that may be worsening. He appears to likely have deficits in remote memory, although further testing is needed. No evidence of any cognitive screenings being performed recently. Will perform a MoCA at the next visit. Unclear if the patient is having worsening of memory related to neurocognitive impairments from his diagnosis of a psychotic disorder, early onset of dementia, or medication side effect (Risperidone causing cognitive blunting). Will monitor closely with possible referral to neuropsych.     #History of Leukopenia, unspecified type #Low PLT count #Anemia  - PCP is aware. Possibly due to Risperdal. Had a physical with PCP on Jan 2nd when he was also going to evaluate hematologic abnormalities. Receives iron replacement  #DM II: On insulin  # Vitamin D def> replacement  #Benign adrenal incidentaloma     PLAN      [m2, h3]     1) PSYCHOTROPIC MEDICATIONS:  - Continue Risperidone 3 mg QHS  - Continue Effexor  mg daily  - Continue Trazodone 75 mg QHS      2) THERAPY:  Seeing psychologist Yeny Goff at [facility] since 11/[facility]. Sees once/twice a month. Day treatment starts 8/5.      3) LABS NEXT DUE: Antipsychotic labs next due 01/2019      RATING SCALES: Next 8/2020.      MoCA: Next visit      4) REFERRALS:  None     7) RTC: 4 weeks      8) CRISIS NUMBERS:   None    9) MN  was not checked today.    10) Goals:  - Find one meaningful friendship this year  - Improve cognition      TREATMENT RISK STATEMENT:  The risks, benefits, alternatives and potential adverse effects have been discussed and are understood by the pt. The pt understands the risks of using street drugs or alcohol. There are no medical contraindications, the pt agrees to treatment with the ability to do so. The pt knows to call the clinic for any problems or to access emergency care if needed.  Medical and substance use concerns are documented above.  Psychotropic drug  interaction check was done, including changes made today.     PROVIDER: Miguelangel Parks, DO    Patient staffed in clinic with Dr. Marie who will sign the note.  Supervisor is Dr. Phillips.    Attestation:  I, Leonard Marie, personally performed an examination of this patient on August 2, 2019 and I have reviewed the resident's documentation.  I have edited the note to reflect all relevant changes. I agree with resident findings and plan in this resident H&P.  Riccis meets diagnostic criteria for schizoaffective disorder bipolar type.  He will continue his current medications which are helpful for mood stability and preventing psychosis.  He does complain of memory loss which is long-standing but he perceives to be progressive.  He reports having left the stove on recently.  He recently moved into a group home where meals are provided.  At Caden's next visit in 1 month we will administer a MoCA and if necessary make a referral for neuropsychological testing or geriatric psychiatry assessment.  He will continue seeing his outpatient therapist.  Leonard Marie

## 2019-08-02 NOTE — NURSING NOTE
Chief Complaint   Patient presents with     Recheck Medication     Schizoaffective disorder, bipolar type        Dry [facility] is responsible for some decrease in vision.

## 2019-08-05 ENCOUNTER — HOSPITAL ENCOUNTER (OUTPATIENT)
Dept: BEHAVIORAL HEALTH | Facility: CLINIC | Age: 59
End: 2019-08-05
Attending: PSYCHIATRY & NEUROLOGY
Payer: MEDICARE

## 2019-08-05 PROCEDURE — G0177 OPPS/PHP; TRAIN & EDUC SERV: HCPCS

## 2019-08-05 PROCEDURE — 90853 GROUP PSYCHOTHERAPY: CPT

## 2019-08-05 ASSESSMENT — ANXIETY QUESTIONNAIRES
1. FEELING NERVOUS, ANXIOUS, OR ON EDGE: NOT AT ALL
GAD7 TOTAL SCORE: 1
6. BECOMING EASILY ANNOYED OR IRRITABLE: NOT AT ALL
3. WORRYING TOO MUCH ABOUT DIFFERENT THINGS: SEVERAL DAYS
7. FEELING AFRAID AS IF SOMETHING AWFUL MIGHT HAPPEN: NOT AT ALL
5. BEING SO RESTLESS THAT IT IS HARD TO SIT STILL: NOT AT ALL
IF YOU CHECKED OFF ANY PROBLEMS ON THIS QUESTIONNAIRE, HOW DIFFICULT HAVE THESE PROBLEMS MADE IT FOR YOU TO DO YOUR WORK, TAKE CARE OF THINGS AT HOME, OR GET ALONG WITH OTHER PEOPLE: NOT DIFFICULT AT ALL
2. NOT BEING ABLE TO STOP OR CONTROL WORRYING: NOT AT ALL

## 2019-08-05 ASSESSMENT — PATIENT HEALTH QUESTIONNAIRE - PHQ9
5. POOR APPETITE OR OVEREATING: NOT AT ALL
SUM OF ALL RESPONSES TO PHQ QUESTIONS 1-9: 0
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
## 2019-08-06 ENCOUNTER — OFFICE VISIT (OUTPATIENT)
Dept: PHARMACY | Facility: PHYSICIAN GROUP | Age: 59
End: 2019-08-06
Payer: MEDICAID

## 2019-08-06 DIAGNOSIS — E10.9 TYPE 1 DIABETES MELLITUS WITHOUT COMPLICATION (H): Primary | ICD-10-CM

## 2019-08-06 PROCEDURE — 99207 ZZC NO CHARGE LOS: CPT | Performed by: PHARMACIST

## 2019-08-06 RX ORDER — IBUPROFEN 200 MG
200-400 TABLET ORAL EVERY 8 HOURS PRN
COMMUNITY
End: 2019-09-11

## 2019-08-06 RX ORDER — BLOOD-GLUCOSE,RECEIVER,CONT
EACH MISCELLANEOUS
COMMUNITY
End: 2019-12-03

## 2019-08-06 ASSESSMENT — PATIENT HEALTH QUESTIONNAIRE - PHQ9: SUM OF ALL RESPONSES TO PHQ QUESTIONS 1-9: 0

## 2019-08-06 ASSESSMENT — ANXIETY QUESTIONNAIRES: GAD7 TOTAL SCORE: 1

## 2019-08-06 NOTE — PROGRESS NOTES
Clinical Pharmacy Consult:                                                    Caden Bush is a 59 year old male coming in for a clinical pharmacist consult.  He was referred to me from Dr. Hart.     Reason for Consult: Dexcom teaching.     Discussion: Dexcom G5 sensor started today- patient placed in office, reviewed administration, monitoring, calibration, expiration with him. Need to stop APAP for now given interferance with the monitor, but could use IBU as needed short term.    Plan:    1. Stop tylenol- as needed Ibuprofen ordered, okay to switch back to APAP when on G6 monitor.     2. Calibration recommended at 7-8am and 7-8pm daily, until G6 is available.     3. Use scanner for blood sugars before lunch and before dinner- no need for finger sticks here unless monitor is reading under 70mg/dl.    4. Change sensors every 7 days, transmitter will need to be replaced in 90 days. Per Dexcom - should be able to apply for G6 in October when re-ordering the transmitter.       Relayed plan to facility RN as well.     Aliyah Angeles, Pharm.D, BCACP  Medication Therapy Management Pharmacist  [phone]

## 2019-08-06 NOTE — PATIENT INSTRUCTIONS
Recommendations from today's MTM visit:                                                      1. Stop tylenol- as needed Ibuprofen ordered, okay to switch back to APAP when on G6 monitor.     2. Calibration recommended at 7-8am and 7-8pm daily, until G6 is available.     3. Use scanner for blood sugars before lunch and before dinner- no need for finger sticks here unless monitor is reading under 70mg/dl.     It was great to speak with you today.  I value your experience and would be very thankful for your time with providing good feedback on clinic surveys.  Kindly let me know personally if your experience today was not exceptional so that we can continue to improve your care delivery experience.    Next MTM visit:     To schedule another MTM appointment, please call the clinic directly or you may call the MTM scheduling line at [phone] or toll-free at [phone].     My Clinical Pharmacist's contact information:                                                      It was a pleasure talking with you today!  Please feel free to contact me with any questions or concerns you have.      Aliyah Angeles, Pharm.D, [facility]  Medication Therapy Management Pharmacist  [phone]

## 2019-08-08 ENCOUNTER — HOSPITAL ENCOUNTER (OUTPATIENT)
Dept: BEHAVIORAL HEALTH | Facility: CLINIC | Age: 59
End: 2019-08-08
Attending: PSYCHIATRY & NEUROLOGY
Payer: MEDICARE

## 2019-08-08 PROCEDURE — 90853 GROUP PSYCHOTHERAPY: CPT

## 2019-08-08 PROCEDURE — G0177 OPPS/PHP; TRAIN & EDUC SERV: HCPCS
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
## 2019-08-09 DIAGNOSIS — E11.3393 TYPE 2 DIABETES MELLITUS WITH BOTH EYES AFFECTED BY MODERATE NONPROLIFERATIVE RETINOPATHY WITHOUT MACULAR EDEMA, WITHOUT LONG-TERM CURRENT USE OF INSULIN (H): ICD-10-CM

## 2019-08-09 RX ORDER — INSULIN GLARGINE 100 [IU]/ML
INJECTION, SOLUTION SUBCUTANEOUS
Qty: 3 ML | Refills: 10 | Status: SHIPPED | OUTPATIENT
Start: 2019-08-09 | End: 2019-12-19

## 2019-08-09 NOTE — PROGRESS NOTES
Adult Mental Health Day Treatment  TRACK: 4C    PATIENT'S NAME: Caden Bush  MRN:   [id number]  [facility]   1960  [id number]  DATE OF SERVICE: 19  START TIME: 1:00  END TIME: 1:50  NUMBER OF PARTICIPANTS: 6      Summary of Group / Topics Discussed:  Sensory Approaches in Mental Health: Coping Through the Senses Introduction: Patients were introduced and taught about neurosensory based skills and strategies related to supporting effective self regulation skills.  Patients were taught about the eight sensory systems and how they can be used for coping with mental health symptoms and stressors.  Patients were provided with an experiential opportunity to increase self-awareness of helpful sensory input and self care activities. Patients were introduced on how to create supportive environments that encourage use of these skills.         Patient Session Goals / Objectives:    Identified specific and individualized neurosensory skills to help when distressed      Identified skills learned and how this applies to current daily life    Established a plan for practice of these skills in their own environments    Patient Participation / Response:  Moderately participated, sharing some personal reflections / insights and adequately adequately received / provided feedback with other participants.    Patient presentation: engages, appeared reality oriented, shares appropriately. Identified 2 sensory preferences for calming. , Verbalized understanding of content and Patient would benefit from additional opportunities to practice the content to be able to generalize it to their everyday life with increased intentionality, consistency, and efficacy in support of their psychiatric recovery    Treatment Plan:  Patient has an initial individualized treatment plan that was created as part of their diagnostic assessment / admission process.  A master individualized treatment plan is in the process of being developed  with the patient and multi-disciplinary care team.      Allan Lee, OT

## 2019-08-12 ENCOUNTER — HOSPITAL ENCOUNTER (OUTPATIENT)
Dept: BEHAVIORAL HEALTH | Facility: CLINIC | Age: 59
End: 2019-08-12
Attending: PSYCHIATRY & NEUROLOGY
Payer: MEDICARE

## 2019-08-12 PROCEDURE — G0177 OPPS/PHP; TRAIN & EDUC SERV: HCPCS

## 2019-08-12 PROCEDURE — 90853 GROUP PSYCHOTHERAPY: CPT
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
## 2019-08-13 ENCOUNTER — TELEPHONE (OUTPATIENT)
Dept: BEHAVIORAL HEALTH | Facility: CLINIC | Age: 59
End: 2019-08-13

## 2019-08-13 NOTE — TELEPHONE ENCOUNTER
Absence call- Phone call and message left to check on Caden Bush  Since Caden Bush did not attend today.    Emmy Schultz, Psy., D,  L.P.

## 2019-08-14 ENCOUNTER — ALLIED HEALTH/NURSE VISIT (OUTPATIENT)
Dept: PHARMACY | Facility: PHYSICIAN GROUP | Age: 59
End: 2019-08-14
Payer: MEDICAID

## 2019-08-14 DIAGNOSIS — Z53.9 ERRONEOUS ENCOUNTER--DISREGARD: Primary | ICD-10-CM

## 2019-08-15 ENCOUNTER — HOSPITAL ENCOUNTER (OUTPATIENT)
Dept: BEHAVIORAL HEALTH | Facility: CLINIC | Age: 59
End: 2019-08-15
Attending: PSYCHIATRY & NEUROLOGY
Payer: MEDICARE

## 2019-08-15 ENCOUNTER — HOSPITAL ENCOUNTER (OUTPATIENT)
Dept: BEHAVIORAL HEALTH | Facility: CLINIC | Age: 59
End: 2019-08-15
Attending: NURSE PRACTITIONER
Payer: MEDICARE

## 2019-08-15 PROCEDURE — 99207 ZZC CDG-CODE CATEGORY CHANGED: CPT | Performed by: NURSE PRACTITIONER

## 2019-08-15 PROCEDURE — G0177 OPPS/PHP; TRAIN & EDUC SERV: HCPCS

## 2019-08-15 PROCEDURE — 99214 OFFICE O/P EST MOD 30 MIN: CPT | Performed by: NURSE PRACTITIONER

## 2019-08-15 PROCEDURE — 90853 GROUP PSYCHOTHERAPY: CPT

## 2019-08-15 NOTE — PROGRESS NOTES
"Adult Mental Health Outpatient Group Therapy Progress Note             Psychiatric Diagnosis:    295.70  (F25) Schizoaffective Disorder Bipolar Type     Provisional Diagnostic Hypothesis (Explain R/O, other Provisional Diagnosis, and why alternative Diagnosis that were considered were ruled out):   R/O cognitive impairment.     Treatment Goal  \" to assess memory problems and increase social support to reduce isolation\".     See Initial Treatment suggestions for the client during the time between Diagnostic Assessment and completion of the Master Individualized Treatment Plan.        Area of Treatment Focus:  Symptom Management, Personal Safety, Community Resources/Discharge Planning, Develop / Improve Independent Living Skills and Develop Socialization / Interpersonal Relationship Skills     Therapeutic Interventions/Treatment Strategies:  Support, Redirection, Safety Assessments and Problem Solving     Response to Treatment Strategies:  Accepted Feedback, Listened, Focused on Goals, Attentive and Accepted Support     Name of Group: Psychotherapy Date/Time: 8/15/19   2:00 - 2:50 pm   3:00 - 3:50 pm    Number of Group Participants: 7     Description and Outcome:     .  Client reports being safe today.  He reported that his sleep was good, that he talked with others at his residence, and that he went for a walk.  He reported problems with low motivation and low appetite, but that he has been eating three meals each day, lately. He reported taking his medications regularly, as prescribed.  He reported that he is working with his nurse to monitor his diabetes and sees her every other week.   He reported that he is exercising by walking, doing errands, going to appointments, and eating healthy foods.              He reported that he went to the community room and worked on puzzles with another resident and talked to that person.    He stated that he is happy to live closer to his parents and that his family are a " "support. He stated that he talked to his mom over the weekend and that his dad was sent home from the hospital and is recovering.  He stated that he felt relieved that his dad was healing from a surgery on his obstructed bowel.  He talked to the group about it and how he would like to visit his dad, and decided that he could call his sister to pick him up and drive him to and from his parent's home.  He reported feeling worried about his parents and what to do as they get older. The group reminded him that this would be good to talk with his sister about this.                He reported some problems with intrusive thoughts and reminded himself of all the \"zillions of thoughts\" that people can get everyday, so he distracted himself by looking at the scenery.  He reported no visual or auditory hallucinations, no paranoia, and no Special Messages.                He reported that he gets a lot of sleep, and that he doesn't have suicidal thoughts or psychosis symptoms.  He talked about current news events and how it affected him, and indicated that his thoughts are clear and it is just general concern for society. He denied having any voices or paranoia.               He stated that he wants to get out more in the community and that he wants to work or volunteer at an animal rescue place.      3:00 - 3:50 pm  Mental Health Management   The group participated in a structured activity that involved reading a worksheet about anger, opposite actions, and coping.  The group discussed their thoughts and feelings about the process and shared their experiences with others.  The group wrote out examples from their lives about how they felt it had affected them.  The group discussed their different stories and those that were similar.  The group validated others who had experienced distress.     Client participated in a mindfulness and grounding exercise with the group.     Client would benefit from additional opportunities to " practice and implement content from this session.     Is this a Weekly Review of the Progress on the Treatment Plan?  Yes.        Are Treatment Plan Goals being addressed?  Yes, continue treatment goals          Are Treatment Plan Strategies to Address Goals Effective?  Yes, continue treatment strategies          Are there any current contracts in place?  No

## 2019-08-15 NOTE — H&P
DATE OF SERVICE: 8/15/2019                                             ATTENDING PROVIDER: Hortensia FERRARI CNP  LEGAL STATUS:  Voluntary  SOURCES OF INFORMATION: Information was obtained from the patient and available records.  REASON FOR ASSESSMENT: Continuation of Day Treatment Program  HISTORY F PRESENT ILLNESS: Caden Bush is a 59 year old male referred to the Day Treatment Program by psychiatric team at the [facility] psychiatric clinic.  The reason for referral is management of depression, isolation, and memory problems.  The patient has been diagnosed with schizoaffective disorder, bipolar type in his early 20s.  According to his chart, he attempted suicide last year by overdosing on insulin.  The patient was last seen by his psychiatric resident on August 2, 2019.  At that time, there were no medication changes.  The team considered cognitive test and eventually neuropsych testing.  The patient is a poor historian.  He was not able to respond to most of my questions stating that he does not remember.  Currently does not feel depressed.  He is sleeping 7 to 8 hours a night.  He is not taking naps.  He denies problems with energy, feeling guilty, appetite, feeling hopeless, helpless, worthless, and irritable.  Denies suicidal ideation.  Denies self injury behaviors.  The patient reports significant problems with his memory and concentration, unable to recall when it started.  The patient denies feeling anxious.  He was not able to recall past or current symptoms of panic attacks, wolf, psychosis, PTSD, OCD, eating disorders, and borderline personality disorder.  The patient stated that he might have had auditory and visual hallucinations in the past but he was not able to remember details.  The patient reports that he recently moved to assisted living and is now getting help with his medications, meals, and activities.  His main concern is memory problems.  Was not able to  recall if he has apparently history of dementia.  Denies seizures, head injuries, and loss of consciousness.  SUBSTANCE USE HISTORY:   Denies history of abusing alcohol, street drugs, prescription medications.    PSYCHIATRIC HISTORY:   The patient has a long history of schizoaffective disorder, bipolar type, first diagnosed in his early 20s.  The patient reported that he has been hospitalized for mental illness however, was not able to say when was the last time or how many times he has been hospitalized.  He was not sure if he had a suicide attempt.  According to his chart, he overdosed on insulin last May.  The chart also indicates that he has been stable on lithium, Effexor, and risperidone up until 2011 when they had to take him off of the lithium due to complications with type 1 diabetes.  He has been resistive to changes.  It is not clear if he ever had ECT treatment.  Currently has a therapist and psychiatrist.  PAST MEDICAL HISTORY:   Past Medical History:   Diagnosis Date     Arthritis      Depressive disorder      DM (diabetes mellitus) (H)      Eczema 2011    biopsy leg      Polyp of colon, adenomatous 6/25/2012     Retinopathy     HFA ophtho DR HARLEY     Schizo-affective type schizophrenia      Vitamin D deficiency     replaced     Past Surgical History:   Procedure Laterality Date     APPENDECTOMY       COLONOSCOPY  2012    2 adenomatous polyps       ALLERGIES:    Allergies   Allergen Reactions     Flomax [Tamsulosin] Unknown     Lisinopril Unknown     FAMILY HISTORY:  The patient does not remember family history of mental illness or chemical dependency.  According to his chart, both of his parents had depression and hisgrand grandfather had OCD and alcohol dependency.  Family History   Problem Relation Age of Onset     Osteoporosis Mother      Breast Cancer Mother 78     Osteoporosis Father      Asthma Father      Cancer - colorectal Father 70     Depression Father      Anxiety Disorder Sister       Rheumatoid Arthritis Sister        SOCIAL HISTORY:    The patient was born in the [place] area.  He lived in [place] as a child but came back and finished high school in the [place].  He graduated from [employer] with bachelor's degree in business.  He had no worked as a  however, has been on Social Security disability for many years.  The patient has 1 brother and 2 sisters.  His parents are  and are both alive.  He has never been  and has no children.  Currently lives in an assisted living since June 2019.  Denies  and legal history.    ROS: Negative, except as noted in HPI.   There were no vitals taken for this visit.    MENTAL STATUS EXAM:      Appearance: adequately groomed, awake, alert, cooperative, mild distress and thin  Attitude:  cooperative  Eye Contact:  good  Mood:  good  Affect:  intensity is blunted and intensity is flat  Speech:  clear, coherent  Psychomotor Behavior:  no evidence of tardive dyskinesia, dystonia, or tics  Throught Process:  linear and circumstantial  Associations:  no loose associations  Thought Content:  no evidence of suicidal ideation or homicidal ideation, no auditory hallucinations present and no visual hallucinations present  Insight:  fair  Judgement:  fair  Oriented to:  time, person, and place  Attention Span and Concentration:  fair  Recent and Remote Memory:  limited  Language: no problems expressing self  Fund of Knowledge: adequate for the level of education and training.    DIAGNOSIS:  Patient Active Problem List   Diagnosis     Eczema     Retinopathy     Moderate episode of recurrent major depressive disorder (H)     Polyp of colon, adenomatous     Health Care Home     Hx of psychiatric care     Neuropathy of left upper extremity     Type 2 diabetes mellitus with both eyes affected by moderate nonproliferative retinopathy without macular edema, without long-term current use of insulin (H)     Schizoaffective disorder, bipolar  type (H)     CURRENT MEDICATIONS:   - Risperidone 3 mg QHS  -  Effexor  mg daily  - Trazodone 75 mg QHS  PLAN AND RECOMMENDATIONS:  1. Continue Day Treatment Program. Patient finds the education and support of the the program helpful in keeping current symptoms under control.  2. Continue current medications.  I recommend:  --Neuropsych testing  --CT scan and/or MRI to rule out organic causes for the cognitive decline.   --Blood work to include CBC with diff, CMP, Vit D, Vit B12 and Folate and TSH with T4.   3. The patient was encourage to follow up with PCP and Psychiatrist.   4. The patient was advised to let us know if inpatient admission or further help is needed.  5. Care was coordinated with the treatment team.  Attestation: Patient has been seen and evaluated by marilyn FERRARI CNP.  8/15/2019  1:47 PM    This note was created with the help of Dragon dictation system. All grammatical/typing errors or context distortion are unintentional and inherent to software.

## 2019-08-19 ENCOUNTER — MEDICAL CORRESPONDENCE (OUTPATIENT)
Dept: FAMILY MEDICINE | Facility: CLINIC | Age: 59
End: 2019-08-19

## 2019-08-19 PROBLEM — E10.9 TYPE 1 DIABETES MELLITUS WITHOUT COMPLICATIONS (H): Status: ACTIVE | Noted: 2018-04-17

## 2019-08-22 ENCOUNTER — HOSPITAL ENCOUNTER (OUTPATIENT)
Dept: BEHAVIORAL HEALTH | Facility: CLINIC | Age: 59
End: 2019-08-22
Attending: PSYCHIATRY & NEUROLOGY
Payer: MEDICARE

## 2019-08-22 PROCEDURE — 90853 GROUP PSYCHOTHERAPY: CPT

## 2019-08-22 PROCEDURE — G0177 OPPS/PHP; TRAIN & EDUC SERV: HCPCS

## 2019-08-22 NOTE — PROGRESS NOTES
"Adult Mental Health Outpatient Group Therapy Progress Note             Psychiatric Diagnosis:    295.70  (F25) Schizoaffective Disorder Bipolar Type     Provisional Diagnostic Hypothesis (Explain R/O, other Provisional Diagnosis, and why alternative Diagnosis that were considered were ruled out):   R/O cognitive impairment.     Treatment Goal  \" to assess memory problems and increase social support to reduce isolation\".     See Initial Treatment suggestions for the client during the time between Diagnostic Assessment and completion of the Master Individualized Treatment Plan.        Area of Treatment Focus:  Symptom Management, Personal Safety, Community Resources/Discharge Planning, Develop / Improve Independent Living Skills and Develop Socialization / Interpersonal Relationship Skills     Therapeutic Interventions/Treatment Strategies:  Support, Redirection, Safety Assessments and Problem Solving     Response to Treatment Strategies:  Accepted Feedback, Listened, Focused on Goals, Attentive and Accepted Support     Name of Group: Psychotherapy Date/Time: 8/22/19   2:00 - 2:50 pm   3:00 - 3:50 pm     Number of Group Participants: 4     Description and Outcome:     .   Client reports being safe today.  He reported that his sleep was good, and he went to breakfast, and then went back to bed.  He stated that he was doing word puzzles for part of the day, and then met a friend and showed her around the building.  He stated that he talked with others at his residence, and that he went for a short walk.  He reported problems with low motivation and low appetite, but that he has been eating three meals each day, lately. He reported taking his medications regularly, as prescribed.  He reported that he is working with his nurse to monitor his diabetes and sees her every other week.   He reported that he is trying to exercise by walking, doing errands, going to appointments, and eating healthy foods.              He " "reported that he didn't visit his dad over the weekend, since his mom said his dad was too tired to have visitors.  He stated that he is happy to live closer to his parents and that his family are a support. He stated that he talked to his mom over the weekend and that his dad was sent home from the hospital and is recovering.  He stated that he felt relieved that his dad was healing from a surgery on his obstructed bowel.    He reported feeling worried about his parents and what to do as they get older. The group reminded him that this would be good to talk with his sister about this.                 He reported some problems with intrusive thoughts and reminded himself of all the \"zillions of thoughts\" that people can get everyday, so he distracted himself by looking at the scenery.  He reported no visual or auditory hallucinations, no paranoia, and no Special Messages.                He reported that he gets a lot of sleep, and that he doesn't have suicidal thoughts or psychosis symptoms.  He talked about current news events and how it affected him, and indicated that his thoughts are clear and it is just general concern for society. He denied having any voices or paranoia.               He stated that he wants to get out more in the community and that he wants to work or volunteer at an animal rescue place.      3:00 - 3:50 pm  Mental Health Management        The group participated in a structured activity that involved reading a worksheet about cognitive distortions, core beliefs, and coping.  The group discussed their thoughts and feelings about the process and shared their experiences with others.  The group wrote out examples from their lives about how they felt it had affected them.  The group discussed their different stories and those that were similar.  The group validated others who had experienced distress.      Client participated in a mindfulness and grounding exercise with the group.     Client " would benefit from additional opportunities to practice and implement content from this session.     Is this a Weekly Review of the Progress on the Treatment Plan?  Yes.        Are Treatment Plan Goals being addressed?  Yes, continue treatment goals          Are Treatment Plan Strategies to Address Goals Effective?  Yes, continue treatment strategies          Are there any current contracts in place?  No

## 2019-08-23 NOTE — PROGRESS NOTES
Adult Mental Health Day Treatment  TRACK: 4C    PATIENT'S NAME: Caden Bush  MRN:   [id number]  :   1960  ACCT. NUMBER: [id number]  DATE OF SERVICE: 8/15/19  START TIME: 1:00  END TIME: 1:50  NUMBER OF PARTICIPANTS: 5      Summary of Group / Topics Discussed:  Sensory Approaches in Mental Health: Sensory Enhanced Mindfulness: Patients were taught and provided with an opportunity to explore and practice how using sensory enhanced mindfulness practices can help them stay grounded in the present moment as a way to manage mental health symptoms and stressors. Emphasis on vestibular and proprioceptive input.         Patient Session Goals / Objectives:    Identified how using sensory enhanced mindfulness practices can be used for grounding, stress management, and self regulation  With an emphasis on proprioception and vestibular input.    Improved awareness of different types of sensory enhanced mindfulness activities that assist with healthy coping of stress and symptoms      Established a plan for practice of these skills in their own environments    Practiced and reflected on how to generalize taught skills to their everyday life    Patient Participation / Response:  Moderately participated, sharing some personal reflections / insights and adequately adequately received / provided feedback with other participants.    Patient presentation: engaged in discussion, experiential practice and shares. identified one way to use internal senses at home to help him self regulate at home. , Verbalized understanding of content and Patient would benefit from additional opportunities to practice the content to be able to generalize it to their everyday life with increased intentionality, consistency, and efficacy in support of their psychiatric recovery    Treatment Plan:  Patient has a current master individualized treatment plan.  See Epic treatment plan for more information.      Allan Lee, OT

## 2019-08-26 ENCOUNTER — HOSPITAL ENCOUNTER (OUTPATIENT)
Dept: BEHAVIORAL HEALTH | Facility: CLINIC | Age: 59
End: 2019-08-26
Attending: PSYCHIATRY & NEUROLOGY
Payer: MEDICARE

## 2019-08-26 PROCEDURE — G0177 OPPS/PHP; TRAIN & EDUC SERV: HCPCS

## 2019-08-26 PROCEDURE — 90853 GROUP PSYCHOTHERAPY: CPT

## 2019-08-26 NOTE — PROGRESS NOTES
"Adult Mental Health Outpatient Group Therapy Progress Note             Psychiatric Diagnosis:    295.70  (F25) Schizoaffective Disorder Bipolar Type     Provisional Diagnostic Hypothesis (Explain R/O, other Provisional Diagnosis, and why alternative Diagnosis that were considered were ruled out):   R/O cognitive impairment.     Treatment Goal  \" to assess memory problems and increase social support to reduce isolation\".     See Initial Treatment suggestions for the client during the time between Diagnostic Assessment and completion of the Master Individualized Treatment Plan.        Area of Treatment Focus:  Symptom Management, Personal Safety, Community Resources/Discharge Planning, Develop / Improve Independent Living Skills and Develop Socialization / Interpersonal Relationship Skills     Therapeutic Interventions/Treatment Strategies:  Support, Redirection, Safety Assessments and Problem Solving     Response to Treatment Strategies:  Accepted Feedback, Listened, Focused on Goals, Attentive and Accepted Support     Name of Group: Psychotherapy Date/Time: 8/26/19   1:00 - 1:50 pm       Number of Group Participants: 6     Description and Outcome:     .              Client reports being safe today.  He reported that he talked with his mother and that his dad is recovering well, and went to an eye doctor appointment. He reported that he is getting out to more activities in the building and went to [employer] and won a prize of $1.  He stated that he will talk to his sister on Wednesday and was worried about her judging him, but used his skills to stay in the moment and stopped the thoughts. He discussed this with the group.    He stated that he was doing word puzzles for part of the day, and then met a friend and showed her around the building.  He stated that he talked with a nurse about his friend Ai, who may have to move out, and who had been arguing with him, and since has been better as a friend for him.  He " "reported that he talked with other friends, as well.    He reported trying to manage low motivation and low appetite, but that he has been eating three meals each day, lately. He reported taking his medications regularly, as prescribed.  He reported that he is working with his nurse to monitor his diabetes and sees her every other week.   He reported that he is trying to exercise by walking, doing errands, going to appointments, and eating healthy foods.             He stated that he is happy to live closer to his parents and that his family are a support.   He stated that he felt relieved that his dad was healing from a surgery on his obstructed bowel.    He reported feeling worried about his parents and what to do as they get older. The group reminded him that this would be good to talk with his sister about this.                 He reported some problems with intrusive thoughts and reminded himself of all the \"zillions of thoughts\" that people can get everyday, so he distracted himself by looking at the scenery.  He reported no visual or auditory hallucinations, no paranoia, and no Special Messages.                He reported that he gets a lot of sleep, and that he doesn't have suicidal thoughts or psychosis symptoms.  He talked about current news events and how it affected him, and indicated that his thoughts are clear and it is just general concern for society. He denied having any voices or paranoia.               He stated that he wants to get out more in the community and that he wants to work or volunteer at an animal rescue place.       Client participated in a mindfulness and grounding exercise with the group.     Client would benefit from additional opportunities to practice and implement content from this session.     Is this a Weekly Review of the Progress on the Treatment Plan?  Yes.        Are Treatment Plan Goals being addressed?  Yes, continue treatment goals          Are Treatment Plan Strategies " to Address Goals Effective?  Yes, continue treatment strategies          Are there any current contracts in place?  No

## 2019-08-27 NOTE — PROGRESS NOTES
Adult Mental Health Day Treatment  TRACK: 4C    PATIENT'S NAME: Caden Bush  MRN:   [id number]  :   1960  ACCT. NUMBER: [id number]  DATE OF SERVICE: 19  START TIME: 1:00  END TIME: 1:50  NUMBER OF PARTICIPANTS: 4      Summary of Group / Topics Discussed:  Sensory Approaches in Mental Health: Coping Through the Senses Application: Patients reviewed neurosensory based skills and strategies related to supporting effective self regulation skills focusing on a sensory preference and creating a sensory toolkit to help them to stay grounded and regulated when at home and in the community.  Patients discussed  how their chosen sensory tool can be used for coping with mental health symptoms and stressors.  Patients were introduced on how to create supportive environments that encourage use of sensory based skills and toolkit.         Patient Session Goals / Objectives:    Identified specific and individualized neurosensory skills to help when distressed      Identified skills learned and how this applies to current daily life    Established a plan for practice of these skills in their own environments    Patient Participation / Response:  Moderately participated, sharing some personal reflections / insights and adequately adequately received / provided feedback with other participants.    Verbalized understanding of content and Patient would benefit from additional opportunities to practice the content to be able to generalize it to their everyday life with increased intentionality, consistency, and efficacy in support of their psychiatric recovery    Treatment Plan:  Patient has a current master individualized treatment plan.  See Epic treatment plan for more information.          Allan Lee OT

## 2019-08-29 ENCOUNTER — HOSPITAL ENCOUNTER (OUTPATIENT)
Dept: BEHAVIORAL HEALTH | Facility: CLINIC | Age: 59
End: 2019-08-29
Attending: PSYCHIATRY & NEUROLOGY
Payer: MEDICARE

## 2019-08-29 PROCEDURE — G0177 OPPS/PHP; TRAIN & EDUC SERV: HCPCS

## 2019-08-29 PROCEDURE — 90853 GROUP PSYCHOTHERAPY: CPT

## 2019-08-29 NOTE — PROGRESS NOTES
"Adult Mental Health Outpatient Group Therapy Progress Note             Psychiatric Diagnosis:    295.70  (F25) Schizoaffective Disorder Bipolar Type     Provisional Diagnostic Hypothesis (Explain R/O, other Provisional Diagnosis, and why alternative Diagnosis that were considered were ruled out):   R/O cognitive impairment.     Treatment Goal  \" to assess memory problems and increase social support to reduce isolation\".     See Initial Treatment suggestions for the client during the time between Diagnostic Assessment and completion of the Master Individualized Treatment Plan.        Area of Treatment Focus:  Symptom Management, Personal Safety, Community Resources/Discharge Planning, Develop / Improve Independent Living Skills and Develop Socialization / Interpersonal Relationship Skills     Therapeutic Interventions/Treatment Strategies:  Support, Redirection, Safety Assessments and Problem Solving     Response to Treatment Strategies:  Accepted Feedback, Listened, Focused on Goals, Attentive and Accepted Support     Name of Group: Psychotherapy Date/Time: 8/29/19   2:00 - 2:50 pm  3:00 - 3:50 pm          Number of Group Participants: 4     Description and Outcome:     .              Client reports being safe today.  He reported that he talked with his sister and they visited with his mother and dad and dad is recovering well.  He reported worries about them getting older and himself having no parents in the future. . He reported that he is getting out to more activities in the building and has talked to other residents.  He stated that he went out to dinner with his sister and had a nice time.               He stated that he was doing word puzzles for part of the day, and then met a friend and showed her around the building.                 He reported that he has been eating three meals each day, lately. He reported taking his medications regularly, as prescribed.  He reported that he is working with his nurse to " "monitor his diabetes and sees her every other week.   He reported that he is trying to exercise by walking, doing errands, and going to appointments.               He stated that he is happy to live closer to his parents and that his family are a support.                He reported some problems with intrusive thoughts and reminded himself of all the \"zillions of thoughts\" that people can get everyday, so he distracted himself by looking at the scenery.  He reported no visual or auditory hallucinations, no paranoia, and no Special Messages.                He reported that he gets a lot of sleep, and that he doesn't have suicidal thoughts or psychosis symptoms.  He talked about current news events and how it affected him, and indicated that his thoughts are clear and it is just general concern for society. He denied having any voices or paranoia.               He stated that he wants to get out more in the community and that he wants to work or volunteer at an animal rescue place.     3:00 - 3:50 pm          The group participated in a structured activity that involved reading a worksheet about mindfulness, letting go, and images that are calming. The group discussed their thoughts and feelings about the process and shared their experiences with others.  The group wrote out examples from their lives about how they felt it had affected them.  The group discussed their different stories and those that were similar.  The group validated others who had experienced distress.      Client participated in a mindfulness and grounding exercise with the group.     Client would benefit from additional opportunities to practice and implement content from this session.     Is this a Weekly Review of the Progress on the Treatment Plan?  Yes.        Are Treatment Plan Goals being addressed?  Yes, continue treatment goals          Are Treatment Plan Strategies to Address Goals Effective?  Yes, continue treatment " strategies          Are there any current contracts in place?  No

## 2019-08-29 NOTE — PROGRESS NOTES
Adult Mental Health Day Treatment  TRACK: 4C    PATIENT'S NAME: Caden Bush  MRN:   [id number]  :   1960  ACCT. NUMBER: [id number]  DATE OF SERVICE: 19  START TIME: 1:00  END TIME: 1:50  NUMBER OF PARTICIPANTS: 4      Summary of Group / Topics Discussed:  Sensory Approaches in Mental Health: Focused Activity: Patients were provided with verbal and experiential education to identify physical and sensorimotor based activities that can be utilized for stress management, self care, health maintenance, and self regulation.  Patients increased knowledge and awareness of activities that support good self care, build resiliency, and prevent relapse through healthy engagement in mindful focused activities for effective coping with illness and reduction of maladaptive coping skills.     Patient Session Goals / Objectives:    Identified specific physical and sensorimotor based activities for stress management, self care, health maintenance, and self regulation      Improved awareness of activities that are meaningful focused activities that support good self care, build resiliency, and prevent relapse and how this applies to current daily life    Established a plan for practice of these skills in their own environments    Practiced and reflected on how to generalize taught skills to their everyday life    Patient Participation / Response:  Fully participated with the group by sharing personal reflections / insights and openly received / provided feedback with other participants.    Verbalized understanding of content and Patient would benefit from additional opportunities to practice the content to be able to generalize it to their everyday life with increased intentionality, consistency, and efficacy in support of their psychiatric recovery    Treatment Plan:  Patient has a current master individualized treatment plan.  See Epic treatment plan for more information.          Allan Lee, OT

## 2019-08-30 ENCOUNTER — OFFICE VISIT (OUTPATIENT)
Dept: PSYCHIATRY | Facility: CLINIC | Age: 59
End: 2019-08-30
Attending: PSYCHIATRY & NEUROLOGY
Payer: MEDICARE

## 2019-08-30 VITALS
WEIGHT: 160 LBS | BODY MASS INDEX: 22.32 KG/M2 | HEART RATE: 76 BPM | SYSTOLIC BLOOD PRESSURE: 119 MMHG | DIASTOLIC BLOOD PRESSURE: 69 MMHG

## 2019-08-30 DIAGNOSIS — F25.0 SCHIZOAFFECTIVE DISORDER, BIPOLAR TYPE (H): Primary | ICD-10-CM

## 2019-08-30 PROCEDURE — G0463 HOSPITAL OUTPT CLINIC VISIT: HCPCS | Mod: ZF

## 2019-08-30 ASSESSMENT — PATIENT HEALTH QUESTIONNAIRE - PHQ9: SUM OF ALL RESPONSES TO PHQ QUESTIONS 1-9: 3

## 2019-08-30 ASSESSMENT — PAIN SCALES - GENERAL: PAINLEVEL: NO PAIN (0)

## 2019-08-30 NOTE — PATIENT INSTRUCTIONS
Please do not hesitate to call or message me with any questions or concerns.    Be well,  Dr. Parks     Thank you for coming to the [facility].    Lab Testing:  If you had lab testing today and your results are reassuring or normal they will be mailed to you or sent through [facility] within 7 days.   If the lab tests need quick action we will call you with the results.  The phone number we will call with results is # [phone] (home) . If this is not the best number please call our clinic and change the number.    Medication Refills:  If you need any refills please call your pharmacy and they will contact us. Our fax number for refills is [phone]. Please allow three business for refill processing.   If you need to  your refill at a new pharmacy, please contact the new pharmacy directly. The new pharmacy will help you get your medications transferred.     Scheduling:  If you have any concerns about today's visit or wish to schedule another appointment please call our office during normal business hours [phone] (8-5:00 M-F)    Contact Us:  Please call [phone] during business hours (8-5:00 M-F).  If after clinic hours, or on the weekend, please call  [phone].    Financial Assistance [phone]  [facility] Billing [phone]  [place] Billing Office, [facility]: [phone]  [place] Billing [phone]  Medical Records [phone]      MENTAL HEALTH CRISIS NUMBERS:  [facility]:   [facility] - [phone]   Crisis Residence [facility] - [phone]   Walk-In Counseling [facility] [phone]   COPE 24/7 [place] Mobile Team for Adults - [[phone]]; Child - [[phone]]        [facility]:   [facility] - [phone]   Walk-in counseling [facility] - [phone]   Walk-in counseling [facility] - [phone]   Crisis Residence [facility] - [phone]   [facility] Adult Mental Health:   --Drop-in, 24/7 crisis line, and [facility] Mobile Team [[phone]]    CRISIS TEXT LINE: Text [phone] from anywhere, anytime, any crisis 24/7;    OR SEE [email or web address].org     Poison Control Center - [phone]    CHILD: [facility] needs assessment team - [phone]     [facility] Lifeline - [phone]; or [facility] Lif[facility] - [phone]    If you have a medical emergency please call 911or go to the nearest ER.                    _____________________________________________    Again thank you for choosing [facility] and please let us know how we can best partner with you to improve you and your family's health.  You may be receiving a survey in the mail regarding this appointment. We would love to have your feedback, both positive and negative, so please fill out the survey and return it using the provided envelope. The survey is done by an external company, so your answers are anonymous.

## 2019-08-30 NOTE — PROGRESS NOTES
[facility]  Psychiatry Clinic  PSYCHIATRIC PROGRESS NOTE     CARE TEAM:  PCP- Annie Hart   Therapist-  [facility] Goff [facility]      Ophthalmologist: Jacob Lieberman with [facility] Community support: [facility], [phone]     Caden Bush is a 58 year old male who prefers the name Caden & pronouns he.  The initial diagnostic evaluation was on 11/26/08 .   Date of the most recent transfer of care eval is 08/01/2019.      Pertinent Background:  This patient first experienced mental health issues in his late 20s , initially obtained care at that time and has received treatment for Schizoaffective Disorder, bipolar type. His diagnosis has changed through the time and afterreviewing all the evidence in 9/2018 the current diagnosis was confirmed (several episodes of moderate to severe depression and one episode of two weeks long period of grandiosity,  pressured speech, overspending and sleeplessness with increased social anxiety and paranoia).   Previously had a CCM until 2011. Has SW consult on 10/20/15, not interested in CM or [facility] worker services at this time and seems to be managing well on his own.     Of note, patient is found to be very hesitant to change any medication regimen or engaged in the treatment programs.  Luckily, he has been engaged in care with his therapist.     #Mood/psychosis:  - Notably, this pt was stable on regimen of Lithium, Effexor and Risperdal since 2011. However due to complications of chronic diabetes, Lithium was discontinued in 2017.   -  reviewed the chart. She recommended [facility] [place] for groups and activities for him.   - We even tried going down on Risperidone for other reasons but he immediately got destabilized.     #Insomnia: The etiology of his insomnia is not well understood and the sleep study was discussed and encouraged. Sleep has improved on 3 mg of risperidone while psychotic sxs are  also better managed.      Psych critical item history includes suicidal ideation, psychosis [sxs include AH and paranoia], mutiple psychotropic trials and psych hosp (>5).     INTERIM HISTORY      [4, 4]   The patient reports good treatment adherence.  History was provided by the patient who was a good historian.  The last visit ended with no change to the med regimen.     Since the last visit:     -Patient feels like he is doing well.  -He denies any side effects from his medications.  -He denies any psychotic symptoms.  -He sometimes is worried what other people may say about him. He also wonders what the family would be like if he dies. He is not suicidal and has no plan.  -He would like to continue his current regimen.  -He has trouble remembering his past and became concerned about it this year. Some difficulty remembering things. No acute worsening since last appointment.     RECENT PSYCH ROS:   Depression:  None  Elevated:  none  Psychosis:  none  Anxiety:  none  Panic Attack:  none  Trauma Related:  none  Dysregulation:  none  Eating Disorder: no   Cognitive: remote memory deficits, difficulty with names  Pertinent Negative Symptoms:  NO suicidal ideation, self-injurious behavior/urges, violent ideation, suicidal and violent ideation, aggression, psychosis, hallucinations, delusions, wolf and hypomania    RECENT SUBSTANCE USE:     ALCOHOL-  never   TOBACCO- none  CAFFEINE- 1 cups/day of coffee, 1 sodas/ in a while  OPIOIDS- none      NARCAN KIT- N/A          CANNABIS- none  OTHER ILLICIT DRUGS- none      CURRENT SOCIAL HISTORY:  FINANCIAL SUPPORT- SSDI for schizoaffective disorder and diabetes  CHILDREN- none      LIVING SITUATION- Currently living in an assisted living facility (care free living) in [facility] 6/19. Receives assistance with managing medications and administering insulin.  SOCIAL/ SPIRITUAL SUPPORT- few friends and family. 2 sisters (55 and 62). Mom, alive (85). Father, alive  (88).  FEELS SAFE AT HOME- yes       MEDICAL ROS (2,10):  Reports Arthritis pain (ongoing, unchanged) Denies Pain, headache, dizziness, GI [nausea, diarrhea, constipation, sedation, tremor and confusion.    PAST PSYCHOTROPIC MED TRIALS                                                           *     Drug / Start Date   Dose (mg)   Helpful   Adverse Effects  DC Reason / Date     Lithium   900           Risperdal         current     Abilify             Seroquel             Effexor         current     Zoloft             Wellbutrin             Buspar             trazodone         current     hydroxyzine             Ambien                Maybe Zyprexa, and Prozac, can't remember    July: Increased Risperidone dose  During months of Fall and winter, patient was hesitant to make changes to medications . Modified risperidone and trazodone dose several times (minor changes). Not successful in complete resolution of sxs.     MEDICAL / SURGICAL HISTORY                                     Patient Active Problem List   Diagnosis     Eczema     Retinopathy     Moderate episode of recurrent major depressive disorder (H)     Polyp of colon, adenomatous     Health Care Home     Hx of psychiatric care     Neuropathy of left upper extremity     Type 1 diabetes mellitus without complications (H)     Schizoaffective disorder, bipolar type (H)       Major Surgery-  Past Surgical History:   Procedure Laterality Date     APPENDECTOMY       COLONOSCOPY  2012    2 adenomatous polyps       ALLERGY       Flomax [tamsulosin] and Lisinopril  MEDICATIONS        Current Outpatient Medications   Medication Sig Dispense Refill     ACE/ARB/ARNI NOT PRESCRIBED, INTENTIONAL, Please choose reason not prescribed, below-  hypotension       aspirin 81 MG EC tablet Take 81 mg by mouth daily.       blood glucose (NO BRAND SPECIFIED) test strip Use to test blood sugar 4 times daily or as directed for insulin injections 4 times daily. 400 each 3      cholecalciferol (VITAMIN D) 1000 UNIT tablet Take 1 tablet by mouth daily.       Continuous Blood Gluc  (DEXCOM G5 MOBILE ) MICHELLE        Cyanocobalamin (B-12) 1000 MCG CAPS Take 1 capsule by mouth daily       ferrous sulfate (IRON) 325 (65 Fe) MG tablet Take 325 mg by mouth daily       fish oil-omega-3 fatty acids 1000 MG capsule Take 1 g by mouth daily       ibuprofen (ADVIL/MOTRIN) 200 MG tablet Take 200-400 mg by mouth every 8 hours as needed for mild pain       insulin aspart (NOVOLOG PEN) 100 UNIT/ML pen Inject 6 units before breakfast,  5 units before lunch and 5 units before dinner. 15 mL 0     insulin glargine (BASAGLAR KWIKPEN) 100 UNIT/ML pen INJECT 16 UNITS SUB Q EVERY MORNING 3 mL 10     insulin pen needle (32G X 4 MM) 32G X 4 MM miscellaneous Use 4 pen needles daily. 400 each 3     risperiDONE (RISPERDAL) 3 MG tablet Take 1 tablet (3 mg) by mouth At Bedtime 30 tablet 2     Sharps Container (COMPLETE NEEDLE COLLECTION SYS) MISC USE AS DIRECTED 1 each 0     Silodosin (RAPAFLO) 8 MG CAPS capsule Take 1 capsule (8 mg) by mouth daily 90 capsule 0     simvastatin (ZOCOR) 40 MG tablet Take 0.5 tablets (20 mg) by mouth At Bedtime 45 tablet 3     traZODone (DESYREL) 50 MG tablet Take 1.5 tablets (75 mg) by mouth At Bedtime 45 tablet 2     venlafaxine (EFFEXOR XR) 75 MG 24 hr capsule Take 1 capsule (75 mg) by mouth daily TAKE WITH  Effexor 150 mg for a total dose of 225 mg daily 30 capsule 2     venlafaxine (EFFEXOR-XR) 150 MG 24 hr capsule TAKE 1 CAPSULE BY MOUTH DAILY ALONG WITH A 75 MG FOR A TOTAL  MG DAILY 30 capsule 2     VITALS      [3, 3]   /69   Pulse 76   Wt 72.6 kg (160 lb)   BMI 22.32 kg/m     MENTAL STATUS EXAM      [9, 14 cog gs]     Alertness: alert and oriented  Appearance: appropriately groomed, asymmetrical facial features   Behavior/Demeanor: cooperative, pleasant and calm, with good eye contact   Speech: normal and regular rate and rhythm  Language: intact and no  "problems  Psychomotor: normal or unremarkable  Mood: \"Fine.\"  Affect: Blunted  Thought Process/Associations: Linear  Thought Content: Denies SI or HI.   Perception: Denies AVH  Insight: Fair  Judgment: Good  Cognition: (6) Difficulty with remote memory, normal MoCA  Gait and Station: unremarkable    LABS and DATA     AIMS:  0: 3/1/2018, 0: 08/02/2019    PHQ9 TODAY = 3  PHQ-9 SCORE 8/2/2019 8/5/2019 8/30/2019   PHQ-9 Total Score - - -   PHQ-9 Total Score 0 0 3   PHQ-9 Total Score - - -     MOCA: 26/30  (including 0 points added for education)  - Visuospatial/executive:  5/5  - Naming: 3/3  - Attention: 5/6  - Language: 2/3  - Abstraction: 1/2   - Delayed recall: 4/5  - Orientation: 6/6    ANTIPSYCHOTIC LABS  [glu, A1C, lipids (ezequiel LDL), liver enzymes, WBC, ANEU, Hgb, plts]  q12 mo  Recent Labs   Lab Test 07/25/19  1415 05/15/19  1045 03/15/19  1015 03/04/19  1519 02/13/19  1625 01/02/19  1045  07/17/18  1126   GLC  --   --  346* 444* 254* 147*   < >  --    A1C 6.6* 7.9*  --   --   --  6.7*  --  6.7*    < > = values in this interval not displayed.     Recent Labs   Lab Test 01/02/19  1045 04/17/18  1356 07/11/17  1034 06/07/17  1004   CHOL 127 139 126 143   TRIG 51 45 49 105   LDL 33 45 40 46   HDL 84 85 76 76     Recent Labs   Lab Test 01/02/19  1045 04/17/18  1356 01/30/18  0923 07/11/17  1034   AST 14 15 14 14   ALT 13 12 16 12   ALKPHOS 74 73 79 72     Recent Labs   Lab Test 02/13/19  1522 01/02/19  1126 01/02/19  1045 10/11/18  1010 09/05/18  1123  06/09/16  1030 12/21/15  1413  08/01/12  1640   WBC 5.3 4.2  --  2.5* 2.8*   < > 4.7 4.6   < > 5.5   ANEU  --   --   --   --   --   --  3.5 3.4  --  4.8   HGB 12.7* 12.9* 13.1 12.5* 11.5*   < > 14.7 13.4   < > 11.4*    149  --  136* 168   < > 175 155   < > 180    < > = values in this interval not displayed.       PSYCHOTROPIC DRUG INTERACTIONS     VENLAFAXINE and ANTIPLATELET AGENTS may result in an increased risk of bleeding.  RISPERIDONE and SIMVASTATIN may " result in increased simvastatin serum concentrations -with an increased risk of myopathy or rhabdomyolysis.  TRAZODONE and VENLAFAXINE may result in an increased risk of serotonin syndrome.    MANAGEMENT:  Monitoring for adverse effects    RISK STATEMENT for SAFETY     Caden Bush denies any SI /SIB.  In addition, he has notable risk factors for self-harm including feels trapped, hopelessness, lives alone/ isolated, hallucinations and male.  However, risk is mitigated by no h/o suicide attempt, describes a safety plan, none to minimal alcohol use , commitment to family and stable housing.  Based on all available evidence he does not appear to be at imminent risk for self-harm therefore does not meet criteria for a 72-hr hold/  involuntary hospitalization.  However, based on degree of symptoms voluntary hospitalization, day treatment and close psych FU was recommended which the pt did agree to Day Treatment and close follow up.  Additional steps to minimize risk include: SAFETY PLAN completed 5/10/2019.  Safety Plan placed in AVS: Yes.    DIAGNOSIS     Schizoaffective disorder, bipolar type, in remission on medications and with therapy   R/O seasonal affective disorder    ASSESSMENT      [m2, h3]     TODAY    Patient stable on his current medication regimen with no psychotic or mood symptoms. Checked a MoCA to screen for cognitive impairment with normal results. Suspect his sense of forgetfulness and change in cognitive awareness is potentially related to long-term history of a neurocognitive disorder like schizoaffective disorder, in addition to Risperidone, which can cause cognitive blunting. Will continue to explore and work up accordingly. No changes for now.    Future Consideration:  Bright light box for SAD     #History of Leukopenia, unspecified type #Low PLT count #Anemia  - PCP is aware. Possibly due to Risperdal. Had a physical with PCP on Jan 2nd when he was also going to evaluate hematologic  abnormalities. Receives iron replacement  #DM II: On insulin, last A1c 6.6  # Vitamin D def> replacement  #Benign adrenal incidentaloma     PLAN      [m2, h3]     1) PSYCHOTROPIC MEDICATIONS:  - Continue Risperidone 3 mg QHS  - Continue Effexor  mg daily  - Continue Trazodone 75 mg at bedtime    - Continue Fish Oil 1g daily  - Continue B12 1000 mcg daily  - Continue Vitamin D 1,000 international unit(s) daily       2) THERAPY:  Seeing psychologist Yeny Goff at [facility] since 11/2015. Sees once/twice a month. Day treatment starts 8/5.      3) LABS NEXT DUE: Antipsychotic labs next due 01/2020      RATING SCALES: AIMS 8/20      MoCA: Today      4) REFERRALS:  None     7) RTC: 4 weeks      8) CRISIS NUMBERS:  See AVS    9) [place]  was not checked today.    10) Goals:  - Find one meaningful friendship this year  - Improve cognition      TREATMENT RISK STATEMENT:  The risks, benefits, alternatives and potential adverse effects have been discussed and are understood by the pt. The pt understands the risks of using street drugs or alcohol. There are no medical contraindications, the pt agrees to treatment with the ability to do so. The pt knows to call the clinic for any problems or to access emergency care if needed.  Medical and substance use concerns are documented above.  Psychotropic drug interaction check was done, including changes made today.     PROVIDER: Miguelangel Parks, DO    Patient staffed in clinic with Dr. Porras who will sign the note.  Supervisor is Dr. Phillips.    I saw the patient with the resident, and participated in key portions of the service, including the mental status examination and developing the plan of care. I reviewed key portions of the history with the resident. I agree with the findings and plan as documented in this note.    Maty Porras MD

## 2019-09-04 ENCOUNTER — ALLIED HEALTH/NURSE VISIT (OUTPATIENT)
Dept: PHARMACY | Facility: PHYSICIAN GROUP | Age: 59
End: 2019-09-04
Payer: MEDICAID

## 2019-09-04 DIAGNOSIS — E10.9 TYPE 1 DIABETES MELLITUS WITHOUT COMPLICATION (H): Primary | ICD-10-CM

## 2019-09-04 PROCEDURE — 99606 MTMS BY PHARM EST 15 MIN: CPT | Performed by: PHARMACIST

## 2019-09-04 NOTE — PROGRESS NOTES
SUBJECTIVE/OBJECTIVE:                Caden Bush is a 59 year old male called for a follow-up visit for Medication Therapy Management.  He was referred to me from .     Chief Complaint: Follow up from our visit on 8/6- has been using G5 monitor now.      Tobacco: No tobacco use  Alcohol: not currently using    Medication Adherence/Access:  no issues reported- facility is administering his medications.       Type 1 Diabetes:  Pt currently taking Basaglar 16 units in the AM plus Novolog 6 units with breakfast and 5 units with lunch/dinner.   Using sliding scale pre-meal as well=>  200-250 = add 1 unit   251- 300= add 2 units  301- 350= add 3 units  >350 = add 4 units  Was seen by endo and they stopped Januvia and pioglitazone on 5/3. Plan was to give him insulin/carb ratio with sliding scale however he reports that he is not confident with his carb counting and not able to convert a carb amount to insulin dose.  Fixed dose with meals plus sliding scale started since stopping orals.  *Note that the NEDRA antibodies were positive.   C-peptide= 2.1ng/ml with glucose of 346mg/dl.    No low alarms lately, only alarms over 200mg/dl- happens occasionally if eats something like pancakes or other higher sugar meals. <1 per day usually.     Lab Results   Component Value Date    A1C 6.6 07/25/2019    A1C 7.9 05/15/2019    A1C 6.7 01/02/2019    A1C 6.7 07/17/2018    A1C 8.2 03/19/2018         Today's Vitals: There were no vitals taken for this visit.- phone     ASSESSMENT:              Current medications were reviewed today as discussed above.      Medication Adherence: good, no issues identified    Diabetes: Stable. Patient is meeting A1c goal of < 7%.     PLAN:                  1. Continue same doses for now- will place order for Dexcom G6 in October when able    2. Facility needs update rx for ibuprofen as needed use.        I spent 10 mins with this patient today. All changes were made via collaborative practice  agreement with Dr. Hart. A copy of the visit note was provided to the patient's primary care provider.     Will follow up in 1 month.    The patient was given a summary of these recommendations as an after visit summary.    Aliyah Angeles, Pharm.D, [facility]  Medication Therapy Management Pharmacist  [phone]

## 2019-09-05 ENCOUNTER — HOSPITAL ENCOUNTER (OUTPATIENT)
Dept: BEHAVIORAL HEALTH | Facility: CLINIC | Age: 59
End: 2019-09-05
Attending: PSYCHIATRY & NEUROLOGY
Payer: MEDICARE

## 2019-09-05 PROCEDURE — G0177 OPPS/PHP; TRAIN & EDUC SERV: HCPCS

## 2019-09-05 PROCEDURE — 90853 GROUP PSYCHOTHERAPY: CPT
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
## 2019-09-06 ENCOUNTER — PATIENT OUTREACH (OUTPATIENT)
Dept: FAMILY MEDICINE | Facility: CLINIC | Age: 59
End: 2019-09-06

## 2019-09-06 NOTE — PROGRESS NOTES
Adult Mental Health Day Treatment  TRACK: 4C    PATIENT'S NAME: Caden Bush  MRN:   [id number]  :   1960  ACCT. NUMBER: [id number]  DATE OF SERVICE: 19  START TIME: 1500  END TIME: 1600  NUMBER OF PARTICIPANTS: 6      Summary of Group / Topics Discussed:  Communication and Social Skills Development: Social Supports: Social Support Scale: Patients completed a social support self assessment to identify people who are supportive and to evaluate the effectiveness of their social support system.  Patients were taught and gained awareness of characteristics of an effective support and how to develop this in their lives.  Patients identified both personal strengths and opportunities for growth in this areas to improve overall communication and connection with other people.    Patient Session Goals / Objectives:    Identified strengths and opportunities for growth in developing their social support system and how this impacts their ability to connect and communicate with other people       Improved awareness of important aspects of social support systems and how this relates to mental health recovery        Established a plan for practice of these skills in their own environments    Practiced and reflected on how to generalize taught skills to their everyday life    Patient Participation / Response:  Fully participated with the group by sharing personal reflections / insights and openly received / provided feedback with other participants.    Patient presentation: Calm,alert and focused with stable mood and thought process. Initial tretment goals identified include using personal time more effectively,learn some new leisure activities and improve decision making, Verbalized understanding of content and Patient would benefit from additional opportunities to practice the content to be able to generalize it to their everyday life with increased intentionality, consistency, and efficacy in support of their  psychiatric recovery      Treatment Plan:  Patient has a current master individualized treatment plan.  See Epic treatment plan for more information.          Herbie Spear, OTR/L

## 2019-09-06 NOTE — LETTER
CARE COORDINATION  [facility] [address] [place] [address]      September 9, 2019    Caden Bush  [address]  [place] [address]      Dear Caden,    It was good to talk to you today! I am glad that you are enjoying your new home.    As I mentioned in my phone call, I no longer work at [facility]. If you would like to talk with the Social Work Care Coordinator there in the future, her name is Lidia Jose and her phone number is [phone].     Below is a description of Care Coordination as a reminder...  The clinic care coordinator is a registered nurse and/or  who understand the health care system. The goal of clinic care coordination is to help you manage your health and improve access to the [facility] in the most efficient manner. The registered nurse can assist you in meeting your health care goals by providing education, coordinating services, and strengthening the communication among your providers. The  can assist you with financial, behavioral, psychosocial, chemical dependency, counseling, and/or psychiatric resources.    I wish you all the best, Caden!      Sincerely,           NATALIE Garcia  [facility] Care Coordination  Clinics: [facility], [facility]   Email: [email or web address]  Tele: [phone]

## 2019-09-06 NOTE — PROGRESS NOTES
Psychiatry staffing: case discussed. Doing relatively well.   Diagnosis: Schizoaffective disorder.     Current Outpatient Medications   Medication     ACE/ARB/ARNI NOT PRESCRIBED, INTENTIONAL,     aspirin 81 MG EC tablet     blood glucose (NO BRAND SPECIFIED) test strip     cholecalciferol (VITAMIN D) 1000 UNIT tablet     Continuous Blood Gluc  (DEXCOM G5 MOBILE ) MICHELLE     Cyanocobalamin (B-12) 1000 MCG CAPS     ferrous sulfate (IRON) 325 (65 Fe) MG tablet     fish oil-omega-3 fatty acids 1000 MG capsule     ibuprofen (ADVIL/MOTRIN) 200 MG tablet     insulin aspart (NOVOLOG PEN) 100 UNIT/ML pen     insulin glargine (BASAGLAR KWIKPEN) 100 UNIT/ML pen     insulin pen needle (32G X 4 MM) 32G X 4 MM miscellaneous     risperiDONE (RISPERDAL) 3 MG tablet     Sharps Container (COMPLETE NEEDLE COLLECTION SYS) MISC     Silodosin (RAPAFLO) 8 MG CAPS capsule     simvastatin (ZOCOR) 40 MG tablet     traZODone (DESYREL) 50 MG tablet     venlafaxine (EFFEXOR XR) 75 MG 24 hr capsule     venlafaxine (EFFEXOR-XR) 150 MG 24 hr capsule     No current facility-administered medications for this encounter.      Past Medical History:   Diagnosis Date     Arthritis      Depressive disorder      DM (diabetes mellitus) (H)      Eczema 2011    biopsy leg      Polyp of colon, adenomatous 6/25/2012     Retinopathy     HFA ophtho DR HARLEY     Schizo-affective type schizophrenia      Vitamin D deficiency     replaced

## 2019-09-09 ENCOUNTER — HOSPITAL ENCOUNTER (OUTPATIENT)
Dept: BEHAVIORAL HEALTH | Facility: CLINIC | Age: 59
End: 2019-09-09
Attending: PSYCHIATRY & NEUROLOGY
Payer: MEDICARE

## 2019-09-09 PROCEDURE — G0177 OPPS/PHP; TRAIN & EDUC SERV: HCPCS

## 2019-09-09 PROCEDURE — 90853 GROUP PSYCHOTHERAPY: CPT

## 2019-09-09 NOTE — PROGRESS NOTES
Clinic Care Coordination Contact    Follow Up Progress Note      Assessment: [facility] has followed patient re: housing concerns.    Goals addressed this encounter:   Goals Addressed                 This Visit's Progress      COMPLETED: Psychosocial (pt-stated)        Goal Statement: I want to move into a group home or assisted living.  Measure of Success: Move accomplished.  Supportive Steps to Achieve: I will work with my county worker and the clinic Care Coordinator to explore options.  Barriers: Finding an affordable housing option.  Strengths: Able to advocate for self.  Date to Achieve By: 5/5/19  Patient expressed understanding of goal: yes      As of today's date 9/9/2019 goal is met at 76 - 100%.   Goal Status:  Discontinued                 Intervention/Education provided during outreach: Writer called patient today. Patient is currently living in a studio apartment at [facility]. Patient said that he (pt) is happy there and does not want to move from [facility] at this time.     Patient said that he (pt) is seeing a psychiatrist and is also participating in group sessions (mental health) since August.    Writer told patient that writer is no longer working at [facility] and that writer would mail patient information re: the current Social Work Care Coordinator at [facility]. Writer mailed patient a letter with information re: the new/current Social Work Care Coordinator at [facility]--Lidia Jose ([phone]).     Plan: Patient has secured stable housing and is getting support for his mental health.     [facility] plans no further outreach at this time but Care Coordination remains available if patient is interested in community resources/support in the future.     NATALIE Garcia  [facility] Care Coordination  Clinics: [facility]   Email:  [email or web address]  Tele: [phone]

## 2019-09-09 NOTE — PROGRESS NOTES
Adult Mental Health Day Treatment  TRACK: 4c    PATIENT'S NAME: Caden Bush  MRN:   [id number]  :   1960  ACCT. NUMBER: [id number]  DATE OF SERVICE: 19  START TIME: 3:00  END TIME: 3:50  NUMBER OF PARTICIPANTS: 5      Summary of Group / Topics Discussed:  Foundations of Health:  Sleep: Case study/sleep hygiene: Patients explored the connection between sleep and mental illness. Patients learned about how adequate sleep can improve health, productivity, wellness, quality of life, and safety.     Patient Session Goals / Objectives:  ? Demonstrated understanding of sleep hygiene practices and benefits of sleep  ? Identified sleep hygiene strategies to utilize     Described the connection between sleep disturbances and mental illness    Patient Participation / Response:  Minimally participated, only when prompted / asked.    Verbalized understanding of foundations of health topic    Treatment Plan:  Patient has an initial individualized treatment plan that was created as part of their diagnostic assessment / admission process.  A master individualized treatment plan is in the process of being developed with the patient and multi-disciplinary care team.          Alanis Grant

## 2019-09-09 NOTE — PROGRESS NOTES
"Adult Mental Health Outpatient Group Therapy Progress Note             Psychiatric Diagnosis:    295.70  (F25) Schizoaffective Disorder Bipolar Type     Provisional Diagnostic Hypothesis (Explain R/O, other Provisional Diagnosis, and why alternative Diagnosis that were considered were ruled out):   R/O cognitive impairment.     Treatment Goal  \" to assess memory problems and increase social support to reduce isolation\".     See Initial Treatment suggestions for the client during the time between Diagnostic Assessment and completion of the Master Individualized Treatment Plan.        Area of Treatment Focus:  Symptom Management, Personal Safety, Community Resources/Discharge Planning, Develop / Improve Independent Living Skills and Develop Socialization / Interpersonal Relationship Skills     Therapeutic Interventions/Treatment Strategies:  Support, Redirection, Safety Assessments and Problem Solving     Response to Treatment Strategies:  Accepted Feedback, Listened, Focused on Goals, Attentive and Accepted Support     Name of Group: Psychotherapy Date/Time: 9/9/2019  1:00 - 1:50 pm       Number of Group Participants: 5     Description and Outcome:     .              Client reports being safe today.  He reported that he talks with other residents that he likes and enjoys their company. He reported staying in his room on Saturday and resting, but did go to play [employer] on Saturday night and won a dollar as a prize. He stated there were snacks there too.               He stated that he was trying to get out to do more activities in the building.  He stated that his therapist will go on maternity leave and that she will give him resources for the time she is gone.                He reported that he has been eating three meals each day, lately. He reported taking his medications regularly, as prescribed.  He reported that he is working with his nurse to monitor his diabetes and sees her every other week.   He reported that " "he is trying to exercise by walking, doing errands, and going to appointments.               He stated that he is happy to live closer to his parents and that his family are a support.  He stated that he called to check on his dad, and his dad was getting better from the surgery.              He reported some problems with intrusive thoughts and reminded himself of all the \"zillions of thoughts\" that people can get everyday, so he distracted himself by looking at the scenery.  He reported no visual or auditory hallucinations, no paranoia, and no Special Messages.                He reported that he gets a lot of sleep, and that he doesn't have suicidal thoughts or psychosis symptoms.  He talked about current news events and how it affected him, and indicated that his thoughts are clear and it is just general concern for society. He denied having any voices or paranoia.               He stated that he wants to get out more in the community and that he wants to work or volunteer at an animal rescue place.     Client participated in a mindfulness and grounding exercise with the group.     Client would benefit from additional opportunities to practice and implement content from this session.     Is this a Weekly Review of the Progress on the Treatment Plan?  Yes.        Are Treatment Plan Goals being addressed?  Yes, continue treatment goals          Are Treatment Plan Strategies to Address Goals Effective?  Yes, continue treatment strategies          Are there any current contracts in place?  No    "

## 2019-09-10 NOTE — PROGRESS NOTES
Adult Mental Health Day Treatment  TRACK: 4C    PATIENT'S NAME: Caden Bush  MRN:   [id number]  :   1960  ACCT. NUMBER: [id number]  DATE OF SERVICE: 19  START TIME: 1400  END TIME: 1500  NUMBER OF PARTICIPANTS: 5      Summary of Group / Topics Discussed:  Communication and Social Skills Development: Social Supports: Social Risk Taking: Patients explored and evaluated how effective they are in different aspects of social risk taking.  Patients gained awareness of different areas in which they need/want to take risks, possible benefits of taking this risk, and action steps to take.  Patients identified both personal strengths and opportunities for growth in social risk taking to improve overall communication and connection with other people.      Patient Session Goals / Objectives:    Identified strengths and opportunities for growth in social risk taking skills and how these impact their ability to communicate clearly with other people       Improved awareness of important aspects of social risk taking skills and how this relates to mental health recovery        Established a plan for practice of these skills in their own environments    Practiced and reflected on how to generalize taught skills to their everyday life    Patient Participation / Response:  Moderately participated, sharing some personal reflections / insights and adequately adequately received / provided feedback with other participants.    Patient presentation: Calm,alert and focused with mood and thought process, Minimal interaction with other group members, Verbalized understanding of content and Patient would benefit from additional opportunities to practice the content to be able to generalize it to their everyday life with increased intentionality, consistency, and efficacy in support of their psychiatric recovery    Treatment Plan:  Patient has a current master individualized treatment plan.  See Epic treatment plan for more  information.          Herbie Spear, OTR/L

## 2019-09-11 DIAGNOSIS — R52 PAIN: Primary | ICD-10-CM

## 2019-09-11 RX ORDER — IBUPROFEN 200 MG
200-400 TABLET ORAL 4 TIMES DAILY PRN
Qty: 60 TABLET | Refills: 1 | Status: SHIPPED | OUTPATIENT
Start: 2019-09-11 | End: 2019-12-30

## 2019-09-11 NOTE — TELEPHONE ENCOUNTER
Received request today from [facility] stating [facility] is requesting ibuprofen 200mg #240 with 11 refills sig: take 2 tablets QID prn.   Dr. Hart has given verbal approval last week on this medication when Livia called from [facility] requesting this to replace tylenol that he would typically use. Uses rarely but patient wants to have available it needed.   Can't use tylenol with his Dexcom G5 CGM (interferes with sensor somehow?) but will be getting G6 next month and can go back to tylenol.     Rx sent to pharmacy.  Josseline Mason RN

## 2019-09-12 ENCOUNTER — HOSPITAL ENCOUNTER (OUTPATIENT)
Dept: BEHAVIORAL HEALTH | Facility: CLINIC | Age: 59
End: 2019-09-12
Attending: PSYCHIATRY & NEUROLOGY
Payer: MEDICARE

## 2019-09-12 PROCEDURE — G0177 OPPS/PHP; TRAIN & EDUC SERV: HCPCS

## 2019-09-12 PROCEDURE — 90853 GROUP PSYCHOTHERAPY: CPT

## 2019-09-12 NOTE — PROGRESS NOTES
"Adult Mental Health Outpatient Group Therapy Progress Note             Psychiatric Diagnosis:    295.70  (F25) Schizoaffective Disorder Bipolar Type     Provisional Diagnostic Hypothesis (Explain R/O, other Provisional Diagnosis, and why alternative Diagnosis that were considered were ruled out):   R/O cognitive impairment.     Treatment Goal  \" to assess memory problems and increase social support to reduce isolation\".     See Initial Treatment suggestions for the client during the time between Diagnostic Assessment and completion of the Master Individualized Treatment Plan.        Area of Treatment Focus:  Symptom Management, Personal Safety, Community Resources/Discharge Planning, Develop / Improve Independent Living Skills and Develop Socialization / Interpersonal Relationship Skills     Therapeutic Interventions/Treatment Strategies:  Support, Redirection, Safety Assessments and Problem Solving     Response to Treatment Strategies:  Accepted Feedback, Listened, Focused on Goals, Attentive and Accepted Support     Name of Group: Psychotherapy Date/Time: 9/12/2019  2:00 - 2:50 pm   3:00 - 3:50 pm          Number of Group Participants: 6     Description and Outcome:     .              Client reports being safe today.  He reported that he gets a lot of sleep, and that he doesn't have suicidal thoughts or psychosis symptoms.  He reported that he has trouble doing things socially and taking risks. He denied having any voices or paranoia.    He reported that he talks with other residents that he likes and enjoys their company. He reported that he has plans to go to play [employer] and [employer]ingo on Saturday night, and is trying to be more sociable.  He stated that he did go out to lunch with his sister.                       He reported that he has been eating three meals each day, lately. He reported taking his medications regularly, as prescribed.  He reported that he is working with his nurse to monitor his diabetes and " "sees her every other week.   He reported that he is trying to exercise by walking, doing errands, and going to appointments.               He stated that he is happy to live closer to his parents and that his family are a support.  He stated that he called to check on his dad, and his dad was getting better from the surgery.              He reported some problems with intrusive thoughts and reminded himself of all the \"zillions of thoughts\" that people can get everyday, so he distracted himself by looking at the scenery.  He reported no visual or auditory hallucinations, no paranoia, and no Special Messages.                              He stated that he wants to get out more in the community and that he wants to work or volunteer at an animal rescue place.    3:00 - 3:50 pm     The group participated in a structured activity that involved reading a worksheet about affirmations.  The group discussed their thoughts and feelings about the process and shared their experiences with others.  The group wrote out examples from their lives about how they felt it had affected them.  The group discussed their different stories and those that were similar.  The group validated others who had experienced distress.       Client participated in a mindfulness and grounding exercise with the group.     Client would benefit from additional opportunities to practice and implement content from this session.     Is this a Weekly Review of the Progress on the Treatment Plan?  Yes.        Are Treatment Plan Goals being addressed?  Yes, continue treatment goals          Are Treatment Plan Strategies to Address Goals Effective?  Yes, continue treatment strategies          Are there any current contracts in place?  No    "

## 2019-09-13 ENCOUNTER — MEDICAL CORRESPONDENCE (OUTPATIENT)
Dept: FAMILY MEDICINE | Facility: CLINIC | Age: 59
End: 2019-09-13

## 2019-09-16 ENCOUNTER — MEDICAL CORRESPONDENCE (OUTPATIENT)
Dept: FAMILY MEDICINE | Facility: CLINIC | Age: 59
End: 2019-09-16

## 2019-09-16 ENCOUNTER — HOSPITAL ENCOUNTER (OUTPATIENT)
Dept: BEHAVIORAL HEALTH | Facility: CLINIC | Age: 59
End: 2019-09-16
Attending: PSYCHIATRY & NEUROLOGY
Payer: MEDICARE

## 2019-09-16 PROCEDURE — G0177 OPPS/PHP; TRAIN & EDUC SERV: HCPCS

## 2019-09-16 PROCEDURE — 90853 GROUP PSYCHOTHERAPY: CPT

## 2019-09-16 NOTE — PROGRESS NOTES
"Adult Mental Health Outpatient Group Therapy Progress Note             Psychiatric Diagnosis:    295.70  (F25) Schizoaffective Disorder Bipolar Type     Provisional Diagnostic Hypothesis (Explain R/O, other Provisional Diagnosis, and why alternative Diagnosis that were considered were ruled out):   R/O cognitive impairment.     Treatment Goal  \" to assess memory problems and increase social support to reduce isolation\".     See Initial Treatment suggestions for the client during the time between Diagnostic Assessment and completion of the Master Individualized Treatment Plan.        Area of Treatment Focus:  Symptom Management, Personal Safety, Community Resources/Discharge Planning, Develop / Improve Independent Living Skills and Develop Socialization / Interpersonal Relationship Skills     Therapeutic Interventions/Treatment Strategies:  Support, Redirection, Safety Assessments and Problem Solving     Response to Treatment Strategies:  Accepted Feedback, Listened, Focused on Goals, Attentive and Accepted Support     Name of Group: Psychotherapy Date/Time: 9/16/2019  1:00 - 1:50 pm      Number of Group Participants: 3     Description and Outcome:     .              Client reports being safe today.  He reported that he gets a lot of sleep, and that he doesn't have suicidal thoughts or psychosis symptoms.  He reported that he has trouble doing things socially and taking risks, and is challenging himself to do more at his residence. He denied having any voices or paranoia.              He reported that he talks with other residents that he played [employer] on Saturday night, and is trying to be more sociable.  He reported that he does eat lunch with a friend and enjoys his company.               He reported that he has been eating three meals each day, lately. He reported taking his medications regularly, as prescribed.  He reported that he is working with his nurse to monitor his diabetes and sees her every other week. " "  He reported that he is trying to exercise by walking, doing errands, and going to appointments.               He stated that he is happy to live closer to his parents and that his family are a support.  He stated that he calls to check on his dad, and his mom each week.              He reported some problems with intrusive thoughts and reminded himself of all the \"zillions of thoughts\" that people can get everyday, so he distracted himself by looking at the scenery.  He reported no visual or auditory hallucinations, no paranoia, and no Special Messages.                              He stated that he wants to get out more in the community and that he wants to work or volunteer at an animal rescue place.           Client participated in a mindfulness and grounding exercise with the group.     Client would benefit from additional opportunities to practice and implement content from this session.     Is this a Weekly Review of the Progress on the Treatment Plan?  Yes.        Are Treatment Plan Goals being addressed?  Yes, continue treatment goals          Are Treatment Plan Strategies to Address Goals Effective?  Yes, continue treatment strategies          Are there any current contracts in place?  No    "

## 2019-09-17 NOTE — PROGRESS NOTES
Adult Mental Health Day Treatment  TRACK: 4C     PATIENT'S NAME:    Caden Bush  MRN:                           [id number]  :                           1960  DATE OF SERVICE:  19  START TIME: 1400  END TIME: 1500  NUMBER OF PARTICIPANTS: 5    Summary of Group / Topics Discussed:  Health Maintenance: Eight Dimensions of Wellness: The concept of holistic health through the model of eight dimensions was introduced. Group members participated in identifying behaviors and activities in each of the dimensions of wellness.  The importance of each dimension was reinforced and the concept of balance in life as it relates to wellness was explored.      Patient Session Goals / Objectives:    Verbalized understanding of balance in wellness and how it relates to their life    Identified and explained the eight dimensions of wellness    Categorized activities and wellness needs into corresponding dimensions appropriately during exercise    Patient Participation / Response:  Moderately participated, sharing some personal reflections / insights and adequately adequately received / provided feedback with other participants.    Verbalized understanding of health maintenance topic    Treatment Plan:  Patient has a current master individualized treatment plan.  See Epic treatment plan for more information.     Allan Lee OT on 2019 at 5:20 PM

## 2019-09-17 NOTE — PROGRESS NOTES
Adult Mental Health Day Treatment  TRACK: 4C     PATIENT'S NAME:    Caden Bush  MRN:                           [id number]  :                           1960  ACCT. NUMBER:       [id number]  DATE OF SERVICE:  19  START TIME: 1400  END TIME: 1500  NUMBER OF PARTICIPANTS: 5      Summary of Group / Topics Discussed:  Lifestyle Balance and Structure: Occupations: A Balanced Lifestyle: Patients were introduced to the importance of daily occupations in support of mental health management by exploring a balanced lifestyle.  Patients identified how they spend their time and skills to bring this into better balance.  Patients were assisted to establish, restore, and/or modify performance skills and patterns for improved engagement and promotion of positive mental health through meaningful occupations.  Patients gained awareness of the connection between who they are (self identity) with what they do.    Patient Session Goals / Objectives:    Increased awareness of how patient s functioning in identified meaningful occupations are impacted by their mental health status     Developed performance skills and performance patterns to enhance occupational engagement through creating a balanced lifestyle    Explored ways to generalize new awareness and skills to their everyday life    Patient Participation / Response:  Fully participated with the group by sharing personal reflections / insights and openly received / provided feedback with other participants.    Patient presentation: Calm,alert,focused with stable mood and thought process, Verbalized understanding of content and Patient would benefit from additional opportunities to practice the content to be able to generalize it to their everyday life with increased intentionality, consistency, and efficacy in support of their psychiatric recovery    Treatment Plan:  Patient has a current master individualized treatment plan.  See Epic treatment plan for more  information.

## 2019-09-19 ENCOUNTER — HOSPITAL ENCOUNTER (OUTPATIENT)
Dept: BEHAVIORAL HEALTH | Facility: CLINIC | Age: 59
End: 2019-09-19
Attending: PSYCHIATRY & NEUROLOGY
Payer: MEDICARE

## 2019-09-19 PROCEDURE — G0177 OPPS/PHP; TRAIN & EDUC SERV: HCPCS

## 2019-09-19 PROCEDURE — 90853 GROUP PSYCHOTHERAPY: CPT

## 2019-09-19 NOTE — PROGRESS NOTES
"Adult Mental Health Outpatient Group Therapy Progress Note             Psychiatric Diagnosis:    295.70  (F25) Schizoaffective Disorder Bipolar Type     Provisional Diagnostic Hypothesis (Explain R/O, other Provisional Diagnosis, and why alternative Diagnosis that were considered were ruled out):   R/O cognitive impairment.     Treatment Goal  \" to assess memory problems and increase social support to reduce isolation\".     See Initial Treatment suggestions for the client during the time between Diagnostic Assessment and completion of the Master Individualized Treatment Plan.        Area of Treatment Focus:  Symptom Management, Personal Safety, Community Resources/Discharge Planning, Develop / Improve Independent Living Skills and Develop Socialization / Interpersonal Relationship Skills     Therapeutic Interventions/Treatment Strategies:  Support, Redirection, Safety Assessments and Problem Solving     Response to Treatment Strategies:  Accepted Feedback, Listened, Focused on Goals, Attentive and Accepted Support     Name of Group: Psychotherapy Date/Time: 9/19/2019  2:00 - 2:50 pm   3:00 - 3:50 pm    Number of Group Participants: 7     Description and Outcome:     .              Client reports being safe today.  He reported that he gets a lot of sleep, and that he doesn't have suicidal thoughts or psychosis symptoms.  He reported that he has trouble doing things socially and taking risks. He denied having any voices or paranoia. He reported ongoing problems with depression and said that an aid was receptive to listening.             He reported that he talks with other residents that he likes and enjoys their company. He reported that he has plans to go to play Dominoes and Bingo on Saturday, and is trying to be more sociable.  He stated that he did go to visit a relative, but the relative was napping, so he walked home.                     He reported that he has been eating three meals each day, and eating " "fruit. He reported taking his medications regularly, as prescribed.  He reported that he is working with his nurse to monitor his diabetes and sees her every other week.   He reported that he is trying to exercise by walking, doing errands, and going to appointments.               He stated that he is happy to live closer to his parents and that his family are a support.  He stated that he called to check on his dad, and his dad was getting better from the surgery.              He reported some problems with intrusive thoughts and reminded himself of all the \"zillions of thoughts\" that people can get everyday, so he distracted himself by looking at the scenery.  He reported no visual or auditory hallucinations, no paranoia, and no Special Messages.                              He stated that he wants to get out more in the community and that he wants to work or volunteer at an animal rescue place.     3:00 - 3:50 pm          The group participated in a structured activity that involved reading a worksheet about shame and forgiveness.  The group discussed their thoughts and feelings about the process and shared their experiences with others.  The group wrote out examples from their lives about how they felt it had affected them.  The group discussed their different stories and those that were similar.  The group validated others who had experienced distress.       Client participated in a mindfulness and grounding exercise with the group.     Client would benefit from additional opportunities to practice and implement content from this session.     Is this a Weekly Review of the Progress on the Treatment Plan?  Yes.        Are Treatment Plan Goals being addressed?  Yes, continue treatment goals          Are Treatment Plan Strategies to Address Goals Effective?  Yes, continue treatment strategies          Are there any current contracts in place?  No    "

## 2019-09-20 NOTE — PROGRESS NOTES
Adult Mental Health Day Treatment  TRACK: 4C     PATIENT'S NAME:    Caden Bush  MRN:                           [id number]  :                           1960  DATE OF SERVICE:  19  START TIME: 1:00  END TIME: 1:50  NUMBER OF PARTICIPANTS: 6      Summary of Group / Topics Discussed:  Resiliency Development: Coping Skills: Aftercare Coping Cards: Patients were taught how to begin to develop a relapse management kit for both mental and substance use/abuse by creating individualized coping cards.    Patient Session Goals / Objectives:    Identified individualized coping skills they can use for effectively managing both mental health and substance abuse/abuse symptoms     Improved awareness of different types of coping skills and how to personalize them to their unique situation and circumstances      Established a plan for practice of these skills in their own environments    Practiced and reflected on how to generalize taught skills to their everyday life    Patient Participation / Response:  Moderately participated, sharing some personal reflections / insights and adequately adequately received / provided feedback with other participants.    Verbalized understanding of content and Patient would benefit from additional opportunities to practice the content to be able to generalize it to their everyday life with increased intentionality, consistency, and efficacy in support of their psychiatric recovery    Treatment Plan:  Patient has a current master individualized treatment plan.  See Epic treatment plan for more information.

## 2019-09-23 ENCOUNTER — HOSPITAL ENCOUNTER (OUTPATIENT)
Dept: BEHAVIORAL HEALTH | Facility: CLINIC | Age: 59
End: 2019-09-23
Attending: PSYCHIATRY & NEUROLOGY
Payer: MEDICARE

## 2019-09-23 PROCEDURE — 90853 GROUP PSYCHOTHERAPY: CPT

## 2019-09-23 PROCEDURE — G0177 OPPS/PHP; TRAIN & EDUC SERV: HCPCS

## 2019-09-23 NOTE — PROGRESS NOTES
Adult Mental Health Day Treatment    PATIENT'S NAME: Caden Bush  MRN:   [id number]  :   1960  ACCT. NUMBER: [id number]  DATE OF SERVICE: 19  START TIME: 3:00  END TIME: 2:50  NUMBER OF PARTICIPANTS: 5      Summary of Group / Topics Discussed:  Foundations of Health:  Nutrition: Design a Meal: Patients were educated on the USDA guidelines for creating meals that meet daily nutritional requirements. With the assistance of the instructor, patients were guided to use these to design well-balanced meals. Barriers and obstacles to meeting daily nutritional needs were discussed in the group and solutions and strategies were explored. Patients were also provided education and resources on healthy snack options, budget saving tips, and safe food handling & preparation.      Patient Session Goals / Objectives:  ? Applied USDA MyPlate guidelines to design a balanced breakfast, lunch, and dinner  ? Verbalized healthy snack options   ? Identified personal barriers/obstacles to meeting nutritional needs (if present) and listed strategies that could be used to overcome them    Patient Participation / Response:  Moderately participated, sharing some personal reflections / insights and adequately adequately received / provided feedback with other participants.    Identified / Expressed personal readiness to practice skills and Verbalized understanding of foundations of health topic    Treatment Plan:  Patient has a current master individualized treatment plan.  See Epic treatment plan for more information.

## 2019-09-23 NOTE — PROGRESS NOTES
"Adult Mental Health Outpatient Group Therapy Progress Note             Psychiatric Diagnosis:    295.70  (F25) Schizoaffective Disorder Bipolar Type     Provisional Diagnostic Hypothesis (Explain R/O, other Provisional Diagnosis, and why alternative Diagnosis that were considered were ruled out):   R/O cognitive impairment.     Treatment Goal  \" to assess memory problems and increase social support to reduce isolation\".     See Initial Treatment suggestions for the client during the time between Diagnostic Assessment and completion of the Master Individualized Treatment Plan.        Area of Treatment Focus:  Symptom Management, Personal Safety, Community Resources/Discharge Planning, Develop / Improve Independent Living Skills and Develop Socialization / Interpersonal Relationship Skills     Therapeutic Interventions/Treatment Strategies:  Support, Redirection, Safety Assessments and Problem Solving     Response to Treatment Strategies:  Accepted Feedback, Listened, Focused on Goals, Attentive and Accepted Support     Name of Group: Psychotherapy Date/Time: 9/19/2019  1:00 - 1:50 pm         Number of Group Participants: 4     Description and Outcome:     .              Client reports being safe today.  He reported that he gets a lot of sleep, and that he doesn't have suicidal thoughts or psychosis symptoms.  He reported that he has trouble doing things socially and taking risks. He denied having any voices or paranoia. He reported ongoing problems with depression and said that it feels better, lately.  He talked with the group about a conversation with two residents and problem-solved with the group about what to do.             He reported that he talks with other residents that he likes and enjoys their company. He reported that he has plans  to play Dominoes and Bingo on Saturday, and is trying to be more sociable.  He stated that he talks with other residents and some are more trustful than others. " "                     He reported that he has been eating three meals each day, and eating fruit. He reported taking his medications regularly, as prescribed.  He reported that he is working with his nurse to monitor his diabetes and sees her every other week.   He reported that he is trying to exercise by walking, doing errands, and going to appointments.               He stated that he is happy to live closer to his parents and that his family are a support.  He stated that he calls to check on his dad, and his dad was getting better from the surgery, but lately wasn't eating much, and the parents are selling their townhouse.              He reported some problems with intrusive thoughts and reminded himself of all the \"zillions of thoughts\" that people can get everyday, so he distracted himself by looking at the scenery.  He reported no visual or auditory hallucinations, no paranoia, and no Special Messages.                              He stated that he wants to get out more in the community and that he wants to work or volunteer at an animal rescue place.     \   Client participated in a mindfulness and grounding exercise with the group.     Client would benefit from additional opportunities to practice and implement content from this session.     Is this a Weekly Review of the Progress on the Treatment Plan?  Yes.        Are Treatment Plan Goals being addressed?  Yes, continue treatment goals          Are Treatment Plan Strategies to Address Goals Effective?  Yes, continue treatment strategies          Are there any current contracts in place?  No    "

## 2019-09-23 NOTE — PROGRESS NOTES
Adult Mental Health Day Treatment  TRACK: 4C     PATIENT'S NAME:    Caden Bush  MRN:                           [id number]  :                           1960  ACCT. NUMBER:       [id number]  DATE OF SERVICE:  19  START TIME: 1400  END TIME: 1500  NUMBER OF PARTICIPANTS: 4      Summary of Group / Topics Discussed:  Lifestyle Balance and Structure: Physical Fitness Scale: Patients were assisted to identify meaningful roles that they want to promote and the impact their mental health symptoms have on this.  Patients learned, applied, and generalized skills needed to live and participate in meaningful roles as effectively and independently as possible.  Patients developed awareness of strengths and challenges in fulfilling these roles and worked on integrating specific and individualized rituals and habits into their daily life.     Patient Session Goals / Objectives:    Improved awareness and engagement in life s meaningful roles, routines, habits, and rituals    Explored and identified current roles and challenges due to mental health symptoms     Identified ways to establish and integrate daily self care and wellness routines and habits to support mental health recovery    Practiced and reflected on how to generalize taught skills to their everyday life    Patient Participation / Response:  Fully participated with the group by sharing personal reflections / insights and openly received / provided feedback with other participants.    Patient presentation: Calm,alert and focused with stable mood, Verbalized understanding of content and Patient would benefit from additional opportunities to practice the content to be able to generalize it to their everyday life with increased intentionality, consistency, and efficacy in support of their psychiatric recovery    Treatment Plan:  Patient has a current master individualized treatment plan.  See Epic treatment plan for more information.

## 2019-09-26 ENCOUNTER — HOSPITAL ENCOUNTER (OUTPATIENT)
Dept: BEHAVIORAL HEALTH | Facility: CLINIC | Age: 59
End: 2019-09-26
Attending: PSYCHIATRY & NEUROLOGY
Payer: MEDICARE

## 2019-09-26 PROCEDURE — 90853 GROUP PSYCHOTHERAPY: CPT

## 2019-09-26 PROCEDURE — G0177 OPPS/PHP; TRAIN & EDUC SERV: HCPCS

## 2019-09-26 NOTE — PROGRESS NOTES
"Adult Mental Health Outpatient Group Therapy Progress Note             Psychiatric Diagnosis:    295.70  (F25) Schizoaffective Disorder Bipolar Type     Provisional Diagnostic Hypothesis (Explain R/O, other Provisional Diagnosis, and why alternative Diagnosis that were considered were ruled out):   R/O cognitive impairment.     Treatment Goal  \" to assess memory problems and increase social support to reduce isolation\".     See Initial Treatment suggestions for the client during the time between Diagnostic Assessment and completion of the Master Individualized Treatment Plan.        Area of Treatment Focus:  Symptom Management, Personal Safety, Community Resources/Discharge Planning, Develop / Improve Independent Living Skills and Develop Socialization / Interpersonal Relationship Skills     Therapeutic Interventions/Treatment Strategies:  Support, Redirection, Safety Assessments and Problem Solving     Response to Treatment Strategies:  Accepted Feedback, Listened, Focused on Goals, Attentive and Accepted Support     Name of Group: Psychotherapy Date/Time: 9/26/2019  2:00 - 2:50 pm   3:00 - 3:50 pm    Number of Group Participants: 4     Description and Outcome:     .              Client reports being safe today.  He reported that he gets a lot of sleep, and that he doesn't have suicidal thoughts or psychosis symptoms.  He reported that he has trouble doing things socially and taking risks. He denied having any voices or paranoia. He reported ongoing problems with depression and said that it feels like it is decreasing.    He reported that he had a friend visit, and they colored and ate lunch together and he introduced her to other friends. He stated that he had a nice time. He reported that he plans to meet with his sisters and check in on his parents, who want to move into assisted living.    He reported that he got a call from his psychiatry office and his 10/2 appointment was moved to 10/30, and he said he was " "disappointed. The group discussed if he needed immediate changes, and he said no, and that he could leave a message if he did need something earlier.   He talked to the group about his MNET  who brought him to the [facility] [address] [facility] [place] and another member said the same thing happened to her.             He reported that he talks with other residents that he likes and enjoys their company. He reported that he has plans  to play Dominoes and Bingo on Saturday, and is trying to be more sociable.  He stated that he talks with other residents and some are more trustful than others.                      He reported that he has been eating three meals each day, and eating fruit. He reported taking his medications regularly, as prescribed.  He reported that he is working with his nurse to monitor his diabetes and sees her every other week.   He reported that he is trying to exercise by walking, doing errands, and going to appointments.               He stated that he is happy to live closer to his parents and that his family are a support.  He stated that he calls to check on his dad, and his dad was getting better from the surgery, but lately wasn't eating much, and the parents are selling their townhouse.              He reported some problems with intrusive thoughts and reminded himself of all the \"zillions of thoughts\" that people can get everyday, so he distracted himself by looking at the scenery.  He reported no visual or auditory hallucinations, no paranoia, and no Special Messages.                              He stated that he wants to get out more in the community and that he wants to work or volunteer at an animal rescue place.     3:00 - 3:50 pm     The group participated in a structured activity that involved reading a worksheet about  Positive coping skills.  The group discussed their thoughts and feelings about the process and shared their experiences with others.  The " group wrote out examples from their lives about how they felt it had affected them.  The group discussed their different stories and those that were similar.  The group validated others who had experienced distress.      Client participated in a mindfulness and grounding exercise with the group.     Client would benefit from additional opportunities to practice and implement content from this session.     Is this a Weekly Review of the Progress on the Treatment Plan?  Yes.        Are Treatment Plan Goals being addressed?  Yes, continue treatment goals          Are Treatment Plan Strategies to Address Goals Effective?  Yes, continue treatment strategies          Are there any current contracts in place?  No

## 2019-09-27 NOTE — PROGRESS NOTES
"[facility] Programs:[id number]}    PATIENT'S NAME: Caden Bush  MRN:   [id number]  :   1960  ACCT. NUMBER: [id number]  DATE OF SERVICE: 19  START TIME: 1:00  END TIME: 1:50    NUMBER OF PARTICIPANTS: 4      Summary of Group / Topics Discussed:  Sensory Approaches in Mental Health: Self Regulation Through Sensory Modulation:  Interoception. Patients were taught how to recognize specific signs and symptoms of their individualized state of arousal and how to make changes when needed by asking \"how do I feel\".  Patients explored body based skills (bottom up processing skills) including developing interoceptive awareness and techniques to help manage symptoms and change level of arousal when needed to be in control and comfortable so they are able to function in different environments.        Patient Session Goals / Objectives:    [place] how the ANS works and importance of its  impact on functioning and mental wellbeing    [place] how developing interoceptive awareness can help one self-regulate sooner rather than later    Identified signs and symptoms of current level of arousal and ways to make changes in level of arousal when needed    Identified specific and individualized neurosensory skills to help when distressed and for crisis management    Established a plan for practice of these skills in their own environments    Patient Participation / Response:  Moderately participated, sharing some personal reflections / insights and adequately adequately received / provided feedback with other participants.    Verbalized understanding of content and Patient would benefit from additional opportunities to practice the content to be able to generalize it to their everyday life with increased intentionality, consistency, and efficacy in support of their psychiatric recovery    Treatment Plan:  Patient has a current master individualized treatment plan.  See Epic treatment plan for more information.                "

## 2019-09-27 NOTE — PROGRESS NOTES
[facility] Programs:[id number]}    PATIENT'S NAME: Caden Bush  MRN:   [id number]  :   1960  ACCT. NUMBER: [id number]  DATE OF SERVICE: 19  START TIME: 1:00  END TIME: 1:50    NUMBER OF PARTICIPANTS: 7      Summary of Group / Topics Discussed:  Sensory Approaches in Mental Health: Focused Activity: Patients were provided with verbal and experiential education to identify physical and sensorimotor based activities that can be utilized for stress management, self care, health maintenance, and self regulation.  Patients increased knowledge and awareness of activities that support good self care, build resiliency, and prevent relapse through healthy engagement in mindful focused activities for effective coping with illness and reduction of maladaptive coping skills.     Patient Session Goals / Objectives:    Identified specific physical and sensorimotor based activities for stress management, self care, health maintenance, and self regulation      Improved awareness of activities that are meaningful focused activities that support good self care, build resiliency, and prevent relapse and how this applies to current daily life    Established a plan for practice of these skills in their own environments    Practiced and reflected on how to generalize taught skills to their everyday life    Patient Participation / Response:  Fully participated with the group by sharing personal reflections / insights and openly received / provided feedback with other participants.    Verbalized understanding of content and Patient would benefit from additional opportunities to practice the content to be able to generalize it to their everyday life with increased intentionality, consistency, and efficacy in support of their psychiatric recovery    Treatment Plan:  Patient has a current master individualized treatment plan.  See Epic treatment plan for more information.

## 2019-09-30 ENCOUNTER — HOSPITAL ENCOUNTER (OUTPATIENT)
Dept: BEHAVIORAL HEALTH | Facility: CLINIC | Age: 59
End: 2019-09-30
Attending: PSYCHIATRY & NEUROLOGY
Payer: MEDICARE

## 2019-09-30 PROCEDURE — G0177 OPPS/PHP; TRAIN & EDUC SERV: HCPCS

## 2019-09-30 PROCEDURE — 90853 GROUP PSYCHOTHERAPY: CPT

## 2019-09-30 NOTE — PROGRESS NOTES
Adult Mental Health Day Treatment    PATIENT'S NAME: Caden Bush  MRN:   [id number]  :   1960  ACCT. NUMBER: [id number]  DATE OF SERVICE: 19  START TIME: 2:00  END TIME: 2:50  Track 6B (merged with 1B today)  NUMBER OF PARTICIPANTS: 7      Summary of Group / Topics Discussed:  Sensory Approaches in Mental Health: Sensory Enhanced Mindfulness: Interoception the eighth sense. Patients were taught and provided with an opportunity to explore and practice how using sensory enhanced mindfulness practices can help them stay grounded in the present moment as a way to manage mental health symptoms and stressors.         Patient Session Goals / Objectives:    Identified how using sensory enhanced mindfulness practices can be used for grounding, stress management, and self regulation      Improved awareness of different types of sensory enhanced mindfulness activities that assist with healthy coping of stress and symptoms      Established a plan for practice of these skills in their own environments    Practiced and reflected on how to generalize taught skills to their everyday life    Patient Participation / Response:  Minimally participated, only when prompted / asked.    Verbalized understanding of content and Patient would benefit from additional opportunities to practice the content to be able to generalize it to their everyday life with increased intentionality, consistency, and efficacy in support of their psychiatric recovery    Treatment Plan:  Patient has a current master individualized treatment plan.  See Epic treatment plan for more information.

## 2019-09-30 NOTE — PROGRESS NOTES
Adult Mental Health Day Treatment    PATIENT'S NAME: Caden Bush  MRN:   [id number]  :   1960  ACCT. NUMBER: [id number]  DATE OF SERVICE: 19  START TIME: 1500  END TIME: 1550    NUMBER OF PARTICIPANTS: 7      Summary of Group / Topics Discussed:  Specialty Topics: Trauma and PTSD: Patients were provided with information to understand the types and origins of trauma, the relationship between trauma and PTSD, the scope of Trauma-Informed Care, and treatment options. Patients were able to explore how trauma may have impacted their functioning directly or indirectly, with reference to the the complexity of trauma and associated treatment options. Patients reviewed their current awareness of this topic and relevance to their functioning. Individual experiences with symptoms and treatment options were discussed.     Patient Session Goals / Objectives:    Discussed definitions of trauma, Trauma-Informed Care, PTSD    Discussed how traumatic experiences affect the individual and their relationships (directly, indirectly, brain development and function)    Identified how a history of trauma or exposure to trauma may impact group work    Assisted patients to find ways to adapt functioning in light of past traumatic experience(s), and to identify suitable treatment options and community resources    Patient Participation / Response:  Minimally participated, only when prompted / asked.    Demonstrated understanding of topics discussed through group discussion and participation    Treatment Plan:  Patient has a current master individualized treatment plan.  See Epic treatment plan for more information.

## 2019-09-30 NOTE — PROGRESS NOTES
"Adult Mental Health Outpatient Group Therapy Progress Note             Psychiatric Diagnosis:    295.70  (F25) Schizoaffective Disorder Bipolar Type     Provisional Diagnostic Hypothesis (Explain R/O, other Provisional Diagnosis, and why alternative Diagnosis that were considered were ruled out):   R/O cognitive impairment.     Treatment Goal  \" to assess memory problems and increase social support to reduce isolation\".     See Initial Treatment suggestions for the client during the time between Diagnostic Assessment and completion of the Master Individualized Treatment Plan.        Area of Treatment Focus:  Symptom Management, Personal Safety, Community Resources/Discharge Planning, Develop / Improve Independent Living Skills and Develop Socialization / Interpersonal Relationship Skills     Therapeutic Interventions/Treatment Strategies:  Support, Redirection, Safety Assessments and Problem Solving     Response to Treatment Strategies:  Accepted Feedback, Listened, Focused on Goals, Attentive and Accepted Support     Name of Group: Psychotherapy Date/Time: 9/30/2019  1:00 - 1:50 pm    Number of Group Participants: 2, merged with 5 in 1B     Description and Outcome:     .              Client reports being safe today.  He reported that he gets a lot of sleep, and that he doesn't have suicidal thoughts or psychosis symptoms.  He reported that he has trouble doing things socially and taking risks. He denied having any voices or paranoia. He reported ongoing problems with depression and said that it feels like it is decreasing.          He stated that he is happy to live closer to his parents and that his family are a support.  He stated that his dad went into the hospital overnight, due to problems with swallowing.                           He reported that he initiated conversations with other residents, since his friend was not at lunch and enjoys the company and conversations. He reported that he went play Domino[employer] " "and Bingo on Saturday, and is trying to be more sociable.  He stated that he talks with other residents and some are more trustful than others.                      He reported that he has been eating three meals each day, and eating fruit. He reported taking his medications regularly, as prescribed.  He reported that he is working with his nurse to monitor his diabetes and sees her every other week.   He reported that he is trying to exercise by walking, doing errands, and going to appointments.                      He reported some problems with intrusive thoughts and reminded himself of all the \"zillions of thoughts\" that people can get everyday, so he distracted himself by looking at the scenery.  He reported no visual or auditory hallucinations, no paranoia, and no Special Messages.       He reported that he got a call from his psychiatry office and his 10/2 appointment was moved to 10/30, and he said he was disappointed.                              He stated that he wants to get out more in the community and that he wants to work or volunteer at an animal rescue place.        Client participated in a mindfulness and grounding exercise with the group.     Client would benefit from additional opportunities to practice and implement content from this session.     Is this a Weekly Review of the Progress on the Treatment Plan?  Yes.        Are Treatment Plan Goals being addressed?  Yes, continue treatment goals          Are Treatment Plan Strategies to Address Goals Effective?  Yes, continue treatment strategies          Are there any current contracts in place?  No    "

## 2019-10-02 ENCOUNTER — TELEPHONE (OUTPATIENT)
Dept: FAMILY MEDICINE | Facility: CLINIC | Age: 59
End: 2019-10-02

## 2019-10-02 DIAGNOSIS — D64.9 ANEMIA, UNSPECIFIED TYPE: Primary | ICD-10-CM

## 2019-10-02 DIAGNOSIS — R79.89 LOW VITAMIN D LEVEL: ICD-10-CM

## 2019-10-02 DIAGNOSIS — E53.8 LOW SERUM VITAMIN B12: ICD-10-CM

## 2019-10-02 DIAGNOSIS — D64.9 NORMOCYTIC ANEMIA: ICD-10-CM

## 2019-10-02 NOTE — TELEPHONE ENCOUNTER
Spoke with patient regarding supplements. Dr. Hart wants patient to continue to take vitamin and RTC to recheck vitamin D, b-12 and iron/TIBC. Future labs ordered. Becca Abarca

## 2019-10-02 NOTE — TELEPHONE ENCOUNTER
----- Message from Annie Hart MD sent at 10/1/2019 10:52 PM CDT -----  Regarding: FW: supplements?  Please tell pippa to continue the vitamin D, return her to recheck vitamin D, iron/TIBC, B12.  Thanks.     ----- Message -----  From: Aliyah Angeles, [facility]  Sent: 10/1/2019   3:34 PM CDT  To: Annie Hart MD  Subject: supplements?                                     Patient is wondering about cutting out supplements as they are not covered for him and he is looking to reduce costs.     Hx of anemia in 2018 so was started on b12 and iron, however not rechecked since Feb this year.     He is also on vit D and fish oil.     Thoughts on stopping these? We could stop and check labs in 6-8 weeks or do you want to put in labs to have him check before stopping them?     I think we could go either way.     Aliyah Angeles, Pharm.D, BCACP  Medication Therapy Management Pharmacist  [phone]

## 2019-10-03 ENCOUNTER — HOSPITAL ENCOUNTER (OUTPATIENT)
Dept: BEHAVIORAL HEALTH | Facility: CLINIC | Age: 59
End: 2019-10-03
Attending: PSYCHIATRY & NEUROLOGY
Payer: MEDICARE

## 2019-10-03 DIAGNOSIS — M25.50 ARTHRALGIA, UNSPECIFIED JOINT: Primary | ICD-10-CM

## 2019-10-03 PROCEDURE — G0177 OPPS/PHP; TRAIN & EDUC SERV: HCPCS

## 2019-10-03 PROCEDURE — 90853 GROUP PSYCHOTHERAPY: CPT

## 2019-10-03 PROCEDURE — G0177 OPPS/PHP; TRAIN & EDUC SERV: HCPCS | Performed by: PSYCHOLOGIST

## 2019-10-03 ASSESSMENT — ANXIETY QUESTIONNAIRES
7. FEELING AFRAID AS IF SOMETHING AWFUL MIGHT HAPPEN: NOT AT ALL
5. BEING SO RESTLESS THAT IT IS HARD TO SIT STILL: NOT AT ALL
3. WORRYING TOO MUCH ABOUT DIFFERENT THINGS: SEVERAL DAYS
1. FEELING NERVOUS, ANXIOUS, OR ON EDGE: NOT AT ALL
2. NOT BEING ABLE TO STOP OR CONTROL WORRYING: NOT AT ALL
GAD7 TOTAL SCORE: 1
IF YOU CHECKED OFF ANY PROBLEMS ON THIS QUESTIONNAIRE, HOW DIFFICULT HAVE THESE PROBLEMS MADE IT FOR YOU TO DO YOUR WORK, TAKE CARE OF THINGS AT HOME, OR GET ALONG WITH OTHER PEOPLE: NOT DIFFICULT AT ALL
6. BECOMING EASILY ANNOYED OR IRRITABLE: NOT AT ALL

## 2019-10-03 ASSESSMENT — PATIENT HEALTH QUESTIONNAIRE - PHQ9: 5. POOR APPETITE OR OVEREATING: NOT AT ALL

## 2019-10-03 NOTE — ADDENDUM NOTE
Encounter addended by: Emmy Schultz PsyD on: 10/3/2019 4:21 PM   Actions taken: Charge Capture section accepted

## 2019-10-03 NOTE — ADDENDUM NOTE
Encounter addended by: Allan Lee, OT on: 10/3/2019 6:05 PM   Actions taken: Flowsheet accepted, Charge Capture section accepted

## 2019-10-03 NOTE — PROGRESS NOTES
"Adult Mental Health Outpatient Group Therapy Progress Note             Psychiatric Diagnosis:    295.70  (F25) Schizoaffective Disorder Bipolar Type     Provisional Diagnostic Hypothesis (Explain R/O, other Provisional Diagnosis, and why alternative Diagnosis that were considered were ruled out):   R/O cognitive impairment.     Treatment Goal  \" to assess memory problems and increase social support to reduce isolation\".     See Initial Treatment suggestions for the client during the time between Diagnostic Assessment and completion of the Master Individualized Treatment Plan.        Area of Treatment Focus:  Symptom Management, Personal Safety, Community Resources/Discharge Planning, Develop / Improve Independent Living Skills and Develop Socialization / Interpersonal Relationship Skills     Therapeutic Interventions/Treatment Strategies:  Support, Redirection, Safety Assessments and Problem Solving     Response to Treatment Strategies:  Accepted Feedback, Listened, Focused on Goals, Attentive and Accepted Support     Name of Group: Psychotherapy Date/Time:10/3/2019  2:00 - 2:50 pm  3:00 - 3:50 pm    Number of Group Participants: 6     Description and Outcome:     .              Client reports being safe today.  He reported that he gets a lot of sleep, and that he doesn't have suicidal thoughts or psychosis symptoms.  He reported that he has been practicing going to different events at his residence and enjoys talking to others.  He denied having any voices or paranoia. He reported ongoing problems with depression and said that it feels like it is decreasing, and that he is doing better. He discharged today.         He stated that he is happy to live closer to his parents and that his family are a support.  He stated that his parents will move into an assisted living place and the siblings will assist. He stated that he talks with his sisters about what they can do.             He reported that he initiated " conversations with other residents, since his friend was not at lunch and enjoys the company and conversations. He reported that he went play [employer] and [employer] on Saturday, and is trying to be more sociable.  He stated that he talks with other residents and some are more trustful than others.                      He reported that he has been eating three meals each day, and eating fruit. He reported taking his medications regularly, as prescribed.  He reported that he is working with his nurse to monitor his diabetes and sees her every other week.   He reported that he is trying to exercise by walking, doing errands, and going to appointments.             He reported that he got a call from his psychiatry office and his 10/2 appointment was moved to 10/30, and he said he was disappointed.    3:00 - 3:50 pm     The group participated in a structured activity that involved playing a game and answering questions about positive psychology skills.  The group discussed their thoughts and feelings about the process and shared their experiences with others.  The group wrote out examples from their lives about how they felt it had affected them.  The group discussed their different stories and those that were similar.  The group validated others who had experienced distress.           Client participated in a mindfulness and grounding exercise with the group.     Client would benefit from additional opportunities to practice and implement content from this session.     Is this a Weekly Review of the Progress on the Treatment Plan?  Yes.        Are Treatment Plan Goals being addressed?  Yes, continue treatment goals          Are Treatment Plan Strategies to Address Goals Effective?  Yes, continue treatment strategies          Are there any current contracts in place?  No

## 2019-10-04 RX ORDER — PSEUDOEPHED/ACETAMINOPH/DIPHEN 30MG-500MG
TABLET ORAL
Qty: 150 TABLET | Refills: 3 | Status: SHIPPED | OUTPATIENT
Start: 2019-10-04 | End: 2019-12-31

## 2019-10-04 ASSESSMENT — PATIENT HEALTH QUESTIONNAIRE - PHQ9: SUM OF ALL RESPONSES TO PHQ QUESTIONS 1-9: 1

## 2019-10-04 NOTE — ADDENDUM NOTE
Encounter addended by: Kelsy Cunningham on: 10/4/2019 8:17 AM   Actions taken: Visit Navigator Flowsheet section accepted

## 2019-10-04 NOTE — PROGRESS NOTES
Adult Mental Health Day Treatment    PATIENT'S NAME: Caden Bush  MRN:   [id number]  :   1960  ACCT. NUMBER: [id number]  DATE OF SERVICE: 10/03/19  START TIME: 1:00  END TIME: 1:50    NUMBER OF PARTICIPANTS: 6      Summary of Group / Topics Discussed:  Resiliency Development: Coping Skills: Aftercare Coping Cards: Patients continued and completed his aftercare coping cards to support him in helping him manage his mental health post discharge.     Patient Session Goals / Objectives:    Identified individualized coping skills they can use for effectively managing mental health.    Improved awareness of different types of coping skills and how to personalize them to their unique situation and circumstances      Established a plan for practice of these skills in their own environments    Practiced and reflected on how to generalize taught skills to their everyday life    Patient Participation / Response:  Fully participated with the group by sharing personal reflections / insights and openly received / provided feedback with other participants.    Verbalized understanding of content and Patient would benefit from additional opportunities to practice the content to be able to generalize it to their everyday life with increased intentionality, consistency, and efficacy in support of their psychiatric recovery    Treatment Plan:  Patient has a current master individualized treatment plan.  See Epic treatment plan for more information.

## 2019-10-05 ASSESSMENT — ANXIETY QUESTIONNAIRES: GAD7 TOTAL SCORE: 1

## 2019-10-08 ENCOUNTER — ALLIED HEALTH/NURSE VISIT (OUTPATIENT)
Dept: PHARMACY | Facility: PHYSICIAN GROUP | Age: 59
End: 2019-10-08
Payer: MEDICAID

## 2019-10-08 DIAGNOSIS — E10.9 TYPE 1 DIABETES MELLITUS WITHOUT COMPLICATION (H): Primary | ICD-10-CM

## 2019-10-08 DIAGNOSIS — D64.9 ANEMIA, UNSPECIFIED TYPE: ICD-10-CM

## 2019-10-08 DIAGNOSIS — Z78.9 TAKES DIETARY SUPPLEMENTS: ICD-10-CM

## 2019-10-08 PROCEDURE — 99606 MTMS BY PHARM EST 15 MIN: CPT | Performed by: PHARMACIST

## 2019-10-08 RX ORDER — PROCHLORPERAZINE 25 MG/1
1 SUPPOSITORY RECTAL
Qty: 9 EACH | Refills: 3 | Status: SHIPPED | OUTPATIENT
Start: 2019-10-08 | End: 2020-04-06

## 2019-10-08 RX ORDER — PROCHLORPERAZINE 25 MG/1
1 SUPPOSITORY RECTAL DAILY
Qty: 1 DEVICE | Refills: 0 | Status: SHIPPED | OUTPATIENT
Start: 2019-10-08 | End: 2019-12-10

## 2019-10-08 RX ORDER — PROCHLORPERAZINE 25 MG/1
1 SUPPOSITORY RECTAL
Qty: 1 EACH | Refills: 3 | Status: SHIPPED | OUTPATIENT
Start: 2019-10-08 | End: 2020-04-06

## 2019-10-08 NOTE — PROGRESS NOTES
SUBJECTIVE/OBJECTIVE:                Caden Bush is a 59 year old male called for a follow-up visit for Medication Therapy Management.  He was referred to me from Dr. Hart.     Chief Complaint: Follow up from our visit on 9/4.  Wants to stop supplements as they are not covered and he wants to reduce if not needed. PCP wanted patient to continue supplements for now and recheck labs before stopping.     Tobacco: No tobacco use  Alcohol: not currently using    Medication Adherence/Access:  no issues reported      Type 1 Diabetes:  Pt currently taking Basaglar 16 units in the AM plus Novolog 6 units with breakfast and 5 units with lunch/dinner.   Using sliding scale pre-meal as well=>  200-250 = add 1 unit   251- 300= add 2 units  301- 350= add 3 units  >350 = add 4 units  Was seen by endo and they stopped Januvia and pioglitazone on 5/3. Plan was to give him insulin/carb ratio with sliding scale however he reports that he is not confident with his carb counting and not able to convert a carb amount to insulin dose.  Fixed dose with meals plus sliding scale started since stopping orals.  *Note that the NEDRA antibodies were positive.   C-peptide= 2.1ng/ml with glucose of 346mg/dl.    *sensor failures happening so having to change it more frequently.     Lowest was 70mg/dl.   Did recall a 218, feels the highest are after breakfast then day goes well.     Lab Results   Component Value Date    A1C 6.6 07/25/2019    A1C 7.9 05/15/2019    A1C 6.7 01/02/2019    A1C 6.7 07/17/2018    A1C 8.2 03/19/2018     Anemia: Currently taking iron 325mg daily and b12 daily. Has labs set up for next week.   Last Hgb was 12.7g/dl in Feb 2019.   b12 and iron has not been rechecked.     Supplements: Vitamin D and fish oil in addition to the iron and B12 noted above. Would like to stop extra supplements. No recent Vit D level.     Today's Vitals: There were no vitals taken for this visit.- phone visit    ASSESSMENT:              Current  medications were reviewed today as discussed above.      Medication Adherence: good, no issues identified    Type 1 Diabetes:  Recommend changing to updated CGM Dexcom G6 now that it should be covered by medicare for him. This will not require calibration, will allow him to go back to acetaminophen instead of ibuprofen and is changed every 10 days instead of every 7 days.     Anemia: Rechecking labs to determine plan.     Supplements: Stop fish oil, continue vit D until recheck of labs on Monday.      PLAN:                  1. Future labs previously placed for rechecks on these levels (on treatment)- scheduled on 10/14 for blood work.     2. Sending in updated Dexcom G6     3. Stop fish oil      I spent 15 minutes with this patient today. All changes were made via collaborative practice agreement with Dr. Hart. A copy of the visit note was provided to the patient's primary care provider.     Will follow up in 1 month.    The patient declined a summary of these recommendations as an after visit summary.    Aliyah Angeles, Pharm.D, BCACP  Medication Therapy Management Pharmacist  [phone]

## 2019-10-09 ENCOUNTER — MEDICAL CORRESPONDENCE (OUTPATIENT)
Dept: FAMILY MEDICINE | Facility: CLINIC | Age: 59
End: 2019-10-09

## 2019-10-14 DIAGNOSIS — R79.89 LOW VITAMIN D LEVEL: ICD-10-CM

## 2019-10-14 DIAGNOSIS — D64.9 NORMOCYTIC ANEMIA: ICD-10-CM

## 2019-10-14 DIAGNOSIS — E53.8 LOW SERUM VITAMIN B12: ICD-10-CM

## 2019-10-14 DIAGNOSIS — D64.9 ANEMIA, UNSPECIFIED TYPE: ICD-10-CM

## 2019-10-14 PROCEDURE — 36415 COLL VENOUS BLD VENIPUNCTURE: CPT | Performed by: FAMILY MEDICINE

## 2019-10-14 NOTE — PROGRESS NOTES
open orders - not fasting vit d , vitamin b12, iron & TIBC  Lucy Guzmán MA October 14, 2019 9:26 AM

## 2019-10-16 LAB
IRON BINDING CAP: 263 UG/DL (ref 250–450)
IRON SATURATION: 33 % (ref 15–55)
IRON: 86 UG/DL (ref 38–169)
UIBC: 177 UG/DL (ref 111–343)
VIT B12 SERPL-MCNC: 1869 PG/ML (ref 232–1245)
VITAMIN D, 25-HYDROXY: 38.1 NG/ML (ref 30–100)

## 2019-10-18 ENCOUNTER — MEDICAL CORRESPONDENCE (OUTPATIENT)
Dept: FAMILY MEDICINE | Facility: CLINIC | Age: 59
End: 2019-10-18

## 2019-10-30 ENCOUNTER — OFFICE VISIT (OUTPATIENT)
Dept: PSYCHIATRY | Facility: CLINIC | Age: 59
End: 2019-10-30
Attending: PSYCHIATRY & NEUROLOGY
Payer: MEDICARE

## 2019-10-30 VITALS
WEIGHT: 166.6 LBS | DIASTOLIC BLOOD PRESSURE: 71 MMHG | BODY MASS INDEX: 23.24 KG/M2 | SYSTOLIC BLOOD PRESSURE: 116 MMHG | HEART RATE: 89 BPM

## 2019-10-30 DIAGNOSIS — F25.0 SCHIZOAFFECTIVE DISORDER, BIPOLAR TYPE (H): ICD-10-CM

## 2019-10-30 DIAGNOSIS — F33.8 SEASONAL AFFECTIVE DISORDER (H): Primary | ICD-10-CM

## 2019-10-30 PROCEDURE — G0463 HOSPITAL OUTPT CLINIC VISIT: HCPCS | Mod: ZF

## 2019-10-30 RX ORDER — VENLAFAXINE HYDROCHLORIDE 75 MG/1
75 CAPSULE, EXTENDED RELEASE ORAL DAILY
Qty: 30 CAPSULE | Refills: 2 | Status: SHIPPED | OUTPATIENT
Start: 2019-10-30 | End: 2019-12-02

## 2019-10-30 RX ORDER — TRAZODONE HYDROCHLORIDE 50 MG/1
75 TABLET, FILM COATED ORAL AT BEDTIME
Qty: 45 TABLET | Refills: 2 | Status: SHIPPED | OUTPATIENT
Start: 2019-10-30 | End: 2019-12-02

## 2019-10-30 RX ORDER — VENLAFAXINE HYDROCHLORIDE 150 MG/1
CAPSULE, EXTENDED RELEASE ORAL
Qty: 30 CAPSULE | Refills: 2 | Status: SHIPPED | OUTPATIENT
Start: 2019-10-30 | End: 2019-12-02

## 2019-10-30 RX ORDER — RISPERIDONE 3 MG/1
3 TABLET ORAL AT BEDTIME
Qty: 30 TABLET | Refills: 2 | Status: SHIPPED | OUTPATIENT
Start: 2019-10-30 | End: 2019-12-02

## 2019-10-30 ASSESSMENT — PAIN SCALES - GENERAL: PAINLEVEL: NO PAIN (0)

## 2019-10-30 ASSESSMENT — PATIENT HEALTH QUESTIONNAIRE - PHQ9: SUM OF ALL RESPONSES TO PHQ QUESTIONS 1-9: 3

## 2019-10-30 NOTE — PROGRESS NOTES
[facility]  Psychiatry Clinic  PSYCHIATRIC PROGRESS NOTE     CARE TEAM:  PCP- Annie Hart   The[facility]  [facility]      Ophthalmologist: Jacob Lieberman with [facility] support: [facility], [phone]     Caden Bush is a 58 year old male who prefers the name Caden & pronouns he.  The initial diagnostic evaluation was on 11/26/08 .   Date of the most recent transfer of care eval is 08/01/2019.      Pertinent Background:  This patient first experienced mental health issues in his late 20s , initially obtained care at that time and has received treatment for Schizoaffective Disorder, bipolar type. His diagnosis has changed through the time and afterreviewing all the evidence in 9/2018 the current diagnosis was confirmed (several episodes of moderate to severe depression and one episode of two weeks long period of grandiosity,  pressured speech, overspending and sleeplessness with increased social anxiety and paranoia).   Previously had a CCM until 2011. Has SW consult on 10/20/15, not interested in CM or [facility] worker services at this time and seems to be managing well on his own.     Of note, patient is found to be very hesitant to change any medication regimen or engaged in the treatment programs.  Luckily, he has been engaged in care with his therapist.     #Mood/psychosis:  - Notably, this pt was stable on regimen of Lithium, Effexor and Risperdal since 2011. However due to complications of chronic diabetes, Lithium was discontinued in 2017.   -  reviewed the chart. She recommended [facility] for groups and activities for him.   - We even tried going down on Risperidone for other reasons but he immediately got destabilized.     #Insomnia: The etiology of his insomnia is not well understood and the sleep study was discussed and encouraged. Sleep has improved on 3 mg of risperidone while psychotic sxs are  also better managed.      Psych critical item history includes suicidal ideation, psychosis [sxs include AH and paranoia], mutiple psychotropic trials and psych hosp (>5).     INTERIM HISTORY      [4, 4]   The patient reports good treatment adherence.  History was provided by the patient who was a good historian.  The last visit ended with no change to the med regimen.     Since the last visit:     -He is attending group therapy through the [facility]. He has found it beneficial. He finds it helps him socialize and get out of the house. It is 3 days a week.   -His memory has been alright. He denies any worsening.  -He notes less energy and more depressed mood during winter. He would like to start a light box.  -No side effects from medications.  -He would like to continue medications as prescribed.  -No safety concerns.    RECENT PSYCH ROS:   Depression:  None  Elevated:  none  Psychosis:  none  Anxiety:  none  Panic Attack:  none  Trauma Related:  none  Dysregulation:  none  Eating Disorder: no   Cognitive: remote memory deficits, difficulty with names  Pertinent Negative Symptoms:  NO suicidal ideation, self-injurious behavior/urges, violent ideation, suicidal and violent ideation, aggression, psychosis, hallucinations, delusions, wolf and hypomania    RECENT SUBSTANCE USE:     ALCOHOL-  never   TOBACCO- none  CAFFEINE- 1 cups/day of coffee, 1 sodas/ in a while  OPIOIDS- none      NARCAN KIT- N/A          CANNABIS- none  OTHER ILLICIT DRUGS- none      CURRENT SOCIAL HISTORY:  FINANCIAL SUPPORT- SSDI for schizoaffective disorder and diabetes  CHILDREN- none      LIVING SITUATION- Currently living in an assisted living facility (care free living) in [facility] 6/19. Receives assistance with managing medications and administering insulin.  SOCIAL/ SPIRITUAL SUPPORT- few friends and family. 2 sisters (55 and 62). Mom, alive (85). Father, alive (88).  FEELS SAFE AT HOME- yes       MEDICAL ROS (2,10):  Reports  Arthritis pain (ongoing, unchanged) Denies Pain, headache, dizziness, GI [nausea, diarrhea, constipation, sedation, tremor and confusion.    PAST PSYCHOTROPIC MED TRIALS                                                           *     Drug / Start Date   Dose (mg)   Helpful   Adverse Effects  DC Reason / Date     Lithium   900           Risperdal         current     Abilify             Seroquel             Effexor         current     Zoloft             Wellbutrin             Buspar             trazodone         current     hydroxyzine             Ambien                Maybe Zyprexa, and Prozac, can't remember    July: Increased Risperidone dose  During months of Fall and winter, patient was hesitant to make changes to medications . Modified risperidone and trazodone dose several times (minor changes). Not successful in complete resolution of sxs.     MEDICAL / SURGICAL HISTORY                                     Patient Active Problem List   Diagnosis     Eczema     Retinopathy     Moderate episode of recurrent major depressive disorder (H)     Polyp of colon, adenomatous     Health Care Home     Hx of psychiatric care     Neuropathy of left upper extremity     Type 1 diabetes mellitus without complications (H)     Schizoaffective disorder, bipolar type (H)       Major Surgery-  Past Surgical History:   Procedure Laterality Date     APPENDECTOMY       COLONOSCOPY  2012    2 adenomatous polyps       ALLERGY       Flomax [tamsulosin] and Lisinopril  MEDICATIONS        Current Outpatient Medications   Medication Sig Dispense Refill     ACE/ARB/ARNI NOT PRESCRIBED, INTENTIONAL, Please choose reason not prescribed, below-  hypotension       ACETAMINOPHEN EXTRA STRENGTH 500 MG tablet TAKE TWO  TABLETS (1000MG) BY MOUTH THREE TIMES DAILY AS NEEDED FOR PAIN 150 tablet 3     aspirin 81 MG EC tablet Take 81 mg by mouth daily.       cholecalciferol (VITAMIN D) 1000 UNIT tablet Take 1 tablet by mouth daily.       Continuous  Blood Gluc  (DEXCOM G5 MOBILE ) MICHELLE        Continuous Blood Gluc  (DEXCOM G6 ) MICHELLE 1 each daily 1 Device 0     Continuous Blood Gluc Sensor (DEXCOM G6 SENSOR) MISC 1 each every 10 days 9 each 3     Continuous Blood Gluc Transmit (DEXCOM G6 TRANSMITTER) MISC 1 each every 3 months 1 each 3     Cyanocobalamin (B-12) 1000 MCG CAPS Take 1 capsule by mouth daily       ibuprofen (ADVIL/MOTRIN) 200 MG tablet Take 1-2 tablets (200-400 mg) by mouth 4 times daily as needed for pain 60 tablet 1     insulin aspart (NOVOLOG PEN) 100 UNIT/ML pen Inject 6 units before breakfast,  5 units before lunch and 5 units before dinner. 15 mL 0     insulin glargine (BASAGLAR KWIKPEN) 100 UNIT/ML pen INJECT 16 UNITS SUB Q EVERY MORNING 3 mL 10     insulin pen needle (32G X 4 MM) 32G X 4 MM miscellaneous Use 4 pen needles daily. 400 each 3     risperiDONE (RISPERDAL) 3 MG tablet Take 1 tablet (3 mg) by mouth At Bedtime 30 tablet 2     Sharps Container (COMPLETE NEEDLE COLLECTION SYS) MISC USE AS DIRECTED 1 each 0     Silodosin (RAPAFLO) 8 MG CAPS capsule Take 1 capsule (8 mg) by mouth daily 90 capsule 0     simvastatin (ZOCOR) 40 MG tablet Take 0.5 tablets (20 mg) by mouth At Bedtime 45 tablet 3     traZODone (DESYREL) 50 MG tablet Take 1.5 tablets (75 mg) by mouth At Bedtime 45 tablet 2     venlafaxine (EFFEXOR XR) 75 MG 24 hr capsule Take 1 capsule (75 mg) by mouth daily TAKE WITH  Effexor 150 mg for a total dose of 225 mg daily 30 capsule 2     venlafaxine (EFFEXOR-XR) 150 MG 24 hr capsule TAKE 1 CAPSULE BY MOUTH DAILY ALONG WITH A 75 MG FOR A TOTAL  MG DAILY 30 capsule 2     blood glucose (NO BRAND SPECIFIED) test strip Use to test blood sugar 4 times daily or as directed for insulin injections 4 times daily. (Patient not taking: Reported on 10/8/2019) 400 each 3     VITALS      [3, 3]   /71   Pulse 89   Wt 75.6 kg (166 lb 9.6 oz)   BMI 23.24 kg/m     MENTAL STATUS EXAM      [9, 14 cog gs]  "    Alertness: alert and oriented  Appearance: appropriately groomed, asymmetrical facial features   Behavior/Demeanor: cooperative, pleasant and calm, with good eye contact   Speech: normal and regular rate and rhythm  Language: intact and no problems  Psychomotor: normal or unremarkable  Mood: \"Fine.\"  Affect: Blunted  Thought Process/Associations: Linear  Thought Content: Denies SI or HI.   Perception: Denies AVH  Insight: Fair  Judgment: Good  Cognition: (6) Difficulty with remote memory, normal MoCA  Gait and Station: unremarkable    LABS and DATA     AIMS:  0: 3/1/2018, 0: 08/02/2019    PHQ9 TODAY = 3  PHQ-9 SCORE 8/5/2019 8/30/2019 10/3/2019   PHQ-9 Total Score - - -   PHQ-9 Total Score 0 3 1   PHQ-9 Total Score - - -     MOCA (8/19): 26/30  (including 0 points added for education)  - Visuospatial/executive:  5/5  - Naming: 3/3  - Attention: 5/6  - Language: 2/3  - Abstraction: 1/2   - Delayed recall: 4/5  - Orientation: 6/6    ANTIPSYCHOTIC LABS  [glu, A1C, lipids (ezequiel LDL), liver enzymes, WBC, ANEU, Hgb, plts]  q12 mo  Recent Labs   Lab Test 07/25/19  1415 05/15/19  1045 03/15/19  1015 03/04/19  1519 02/13/19  1625 01/02/19  1045  07/17/18  1126   GLC  --   --  346* 444* 254* 147*   < >  --    A1C 6.6* 7.9*  --   --   --  6.7*  --  6.7*    < > = values in this interval not displayed.     Recent Labs   Lab Test 01/02/19  1045 04/17/18  1356 07/11/17  1034 06/07/17  1004   CHOL 127 139 126 143   TRIG 51 45 49 105   LDL 33 45 40 46   HDL 84 85 76 76     Recent Labs   Lab Test 01/02/19  1045 04/17/18  1356 01/30/18  0923 07/11/17  1034   AST 14 15 14 14   ALT 13 12 16 12   ALKPHOS 74 73 79 72     Recent Labs   Lab Test 02/13/19  1522 01/02/19  1126 01/02/19  1045 10/11/18  1010 09/05/18  1123  06/09/16  1030 12/21/15  1413  08/01/12  1640   WBC 5.3 4.2  --  2.5* 2.8*   < > 4.7 4.6   < > 5.5   ANEU  --   --   --   --   --   --  3.5 3.4  --  4.8   HGB 12.7* 12.9* 13.1 12.5* 11.5*   < > 14.7 13.4   < > 11.4*   PLT " 162 149  --  136* 168   < > 175 155   < > 180    < > = values in this interval not displayed.       PSYCHOTROPIC DRUG INTERACTIONS     VENLAFAXINE and ANTIPLATELET AGENTS may result in an increased risk of bleeding.  RISPERIDONE and SIMVASTATIN may result in increased simvastatin serum concentrations -with an increased risk of myopathy or rhabdomyolysis.  TRAZODONE and VENLAFAXINE may result in an increased risk of serotonin syndrome.    MANAGEMENT:  Monitoring for adverse effects    RISK STATEMENT for SAFETY     Caden Bush denies any SI /SIB.  In addition, he has notable risk factors for self-harm including feels trapped, hopelessness, lives alone/ isolated, hallucinations and male.  However, risk is mitigated by no h/o suicide attempt, describes a safety plan, none to minimal alcohol use , commitment to family and stable housing.  Based on all available evidence he does not appear to be at imminent risk for self-harm therefore does not meet criteria for a 72-hr hold/  involuntary hospitalization.  However, based on degree of symptoms voluntary hospitalization, day treatment and close psych FU was recommended which the pt did agree to Day Treatment and close follow up.  Additional steps to minimize risk include: SAFETY PLAN completed 5/10/2019.  Safety Plan placed in AVS: Yes.    DIAGNOSIS     Schizoaffective disorder, bipolar type, in remission on medications and with therapy   Seasonal affective disorder    ASSESSMENT      [m2, h3]     TODAY    Patient stable on his current medication regimen with no psychotic or mood symptoms. Performed a MoCA recently to screen for cognitive impairment with normal results. Suspect his sense of forgetfulness and change in cognitive awareness is potentially related to long-term history of a neurocognitive disorder like schizoaffective disorder, in addition to Risperidone, which can cause cognitive blunting. Will continue to monitor. Reducing Risperidone is not an option at this  point given that this has led to destabilization in the past. Will continue medications as prescribed.    Ordered a light box therapy given his tend to have worsening of mood over the winter, decreased energy, and lack of drive, which correspond with seasonal affective disorder.    #History of Leukopenia, unspecified type #Low PLT count #Anemia  - PCP is aware. Possibly due to Risperdal. Had a physical with PCP on Jan 2nd when he was also going to evaluate hematologic abnormalities. Receives iron replacement  #DM II: On insulin, last A1c 6.6  # Vitamin D def> replacement  #Benign adrenal incidentaloma     PLAN      [m2, h3]     1) PSYCHOTROPIC MEDICATIONS:  - Continue Risperidone 3 mg QHS  - Continue Effexor  mg daily  - Continue Trazodone 75 mg at bedtime    - Stop Fish Oil 1 g daily per patient's discussion with pharmacy. Will explore Vascepa as alternative   - Continue B12 1000 mcg daily  - Continue Vitamin D 1,000 international unit(s) daily       2) THERAPY:  Seeing psychologist Yeny Goff at [facility] since 11/2015. Sees once/twice a month. Finishing day treatment      3) LABS NEXT DUE: Antipsychotic labs next due 01/2020      RATING SCALES: AIMS 8/20      MoCA: 8/20      4) REFERRALS:  None     7) RTC: 4 weeks      8) CRISIS NUMBERS:  See AVS    9) MN  was not checked today.    10) Goals:  - Find one meaningful friendship this year  - Work on cognition through [facility]     TREATMENT RISK STATEMENT:  The risks, benefits, alternatives and potential adverse effects have been discussed and are understood by the pt. The pt understands the risks of using street drugs or alcohol. There are no medical contraindications, the pt agrees to treatment with the ability to do so. The pt knows to call the clinic for any problems or to access emergency care if needed.  Medical and substance use concerns are documented above.  Psychotropic drug interaction check was done, including changes made today.      PROVIDER: Miguelangel Parks, DO    Patient staffed in clinic with Dr. De Leon who will sign the note.  Supervisor is Dr. Phillips.    I saw the patient with the resident, and participated in key portions of the service, including the mental status examination and developing the plan of care. I reviewed key portions of the history with the resident. I agree with the findings and plan as documented in this note.    Abigail De Leon MD

## 2019-10-30 NOTE — PATIENT INSTRUCTIONS
Please consider using the following website for brain training:    [email or web address]    In addition, please start using a light box if your insurance covers it. See the device for instructions. I recommend using it daily for 15 minutes when you eat lunch. Increase to 30 minutes after two weeks if you are not finding benefit.     Please do not hesitate to call or message me with any questions or concerns.    Be well,  Dr. Parks     Thank you for coming to the PSYCHIATRY CLINIC.    Lab Testing:  If you had lab testing today and your results are reassuring or normal they will be mailed to you or sent through [facility] within 7 days.   If the lab tests need quick action we will call you with the results.  The phone number we will call with results is # [phone] (home) . If this is not the best number please call our clinic and change the number.    Medication Refills:  If you need any refills please call your pharmacy and they will contact us. Our fax number for refills is [phone]. Please allow three business for refill processing.   If you need to  your refill at a new pharmacy, please contact the new pharmacy directly. The new pharmacy will help you get your medications transferred.     Scheduling:  If you have any concerns about today's visit or wish to schedule another appointment please call our office during normal business hours [phone] (8-5:00 M-F)    Contact Us:  Please call [phone] during business hours (8-5:00 M-F).  If after clinic hours, or on the weekend, please call  [phone].    Financial Assistance [phone]  [facility] Billing [phone]  Central Billing Office, [facility]: [phone]  [place] Billing [phone]  Medical Records [phone]      MENTAL HEALTH CRISIS NUMBERS:  [facility]:   [facility] - [phone]   Crisis Residence \[facility]\"" - [facility] - [phone]   Walk-In Counseling Center \[facility]\"" - [phone]   COPE 24/7 [place]  Mobile Team for Adults - [[phone]]; Child - [[phone]]        [facility] - [phone]   Walk-in counseling [facility] - [phone]   Walk-in counseling [facility] - [phone]   Crisis Residence [facility] - [phone]   Urgent Care Adult Mental Health:   --Drop-in, 24/7 crisis line, and [facility] Mobile Team [[phone]]    CRISIS TEXT LINE: Text [phone] from anywhere, anytime, any crisis 24/7;    OR SEE www.crisistextline.org     Poison Control Center - [phone]    CHILD: [facility] needs assessment team - [phone]     [facility] Lifeline - [phone]; or [facility] Lifeline - [phone]    If you have a medical emergency please call 911or go to the nearest ER.                    _____________________________________________    Again thank you for choosing PSYCHIATRY CLINIC and please let us know how we can best partner with you to improve you and your family's health.  You may be receiving a survey in the mail regarding this appointment. We would love to have your feedback, both positive and negative, so please fill out the survey and return it using the provided envelope. The survey is done by an external company, so your answers are anonymous.

## 2019-11-05 ENCOUNTER — ALLIED HEALTH/NURSE VISIT (OUTPATIENT)
Dept: PHARMACY | Facility: PHYSICIAN GROUP | Age: 59
End: 2019-11-05
Payer: MEDICAID

## 2019-11-05 DIAGNOSIS — E10.9 TYPE 1 DIABETES MELLITUS WITHOUT COMPLICATION (H): Primary | ICD-10-CM

## 2019-11-05 PROCEDURE — 99606 MTMS BY PHARM EST 15 MIN: CPT | Mod: GY | Performed by: PHARMACIST

## 2019-11-05 NOTE — PROGRESS NOTES
SUBJECTIVE/OBJECTIVE:                Caden Bush is a 59 year old male called for a follow-up visit for Medication Therapy Management.  He was referred to me from Dr. Hart.     Chief Complaint: Follow up from MT visit on 10/8.      Tobacco:  reports that he has never smoked. He has never used smokeless tobacco.  Alcohol: not currently using    Medication Adherence/Access:  no issues reported    Type 1 Diabetes:  Pt currently taking Basaglar 16 units in the AM plus Novolog 6 units with breakfast and 5 units with lunch/dinner.   Using sliding scale pre-meal as well=>  200-250 = add 1 unit   251- 300= add 2 units  301- 350= add 3 units  >350 = add 4 units  Was seen by endo and they stopped Januvia and pioglitazone on 5/3. Plan was to give him insulin/carb ratio with sliding scale however he reports that he is not confident with his carb counting and not able to convert a carb amount to insulin dose.  Fixed dose with meals plus sliding scale started since stopping orals.  *Note that the NEDRA antibodies were positive.   C-peptide= 2.1ng/ml with glucose of 346mg/dl.    Still using G5, but the G6 sensors are in the mail to him. Has not recalled any low alerts going off in the last month. States only goes up in the 200s after breakfast, but not up to 300s.        Lab Results   Component Value Date    A1C 6.6 07/25/2019    A1C 7.9 05/15/2019    A1C 6.7 01/02/2019    A1C 6.7 07/17/2018    A1C 8.2 03/19/2018     Weight 166lbs patient reported.     Today's Vitals: There were no vitals taken for this visit.- phone     ASSESSMENT:                Medication Adherence: good, no issues identified    Diabetes: Needs Improvement. Patient is meeting A1c goal of < 7%.     PLAN:                  1. Continue same doses, will switch to G6 when it arrives- in mail now.     2. LM with Selena ROBIN at [facility] to call back if any questions or issues arise with the insulin or sugars.       I spent 10 mins with this patient today. All  changes were made via collaborative practice agreement with Dr. Hart. A copy of the visit note was provided to the patient's primary care provider.     Will follow up in 1 month or sooner if needed.    The patient was declined a summary of these recommendations as an after visit summary.    Aliyah Angeles, Pharm.D, [facility]  Medication Therapy Management Pharmacist  [phone]

## 2019-11-25 ENCOUNTER — MEDICAL CORRESPONDENCE (OUTPATIENT)
Dept: FAMILY MEDICINE | Facility: CLINIC | Age: 59
End: 2019-11-25

## 2019-11-27 ENCOUNTER — MEDICAL CORRESPONDENCE (OUTPATIENT)
Dept: FAMILY MEDICINE | Facility: CLINIC | Age: 59
End: 2019-11-27

## 2019-11-29 ENCOUNTER — RECORDS - HEALTHEAST (OUTPATIENT)
Dept: LAB | Facility: CLINIC | Age: 59
End: 2019-11-29

## 2019-11-29 LAB — HBA1C MFR BLD: 5.8 % (ref 4.2–6.1)

## 2019-12-02 ENCOUNTER — OFFICE VISIT (OUTPATIENT)
Dept: PSYCHIATRY | Facility: CLINIC | Age: 59
End: 2019-12-02
Attending: PSYCHIATRY & NEUROLOGY
Payer: MEDICARE

## 2019-12-02 VITALS
WEIGHT: 163.5 LBS | BODY MASS INDEX: 22.8 KG/M2 | HEART RATE: 96 BPM | DIASTOLIC BLOOD PRESSURE: 74 MMHG | SYSTOLIC BLOOD PRESSURE: 125 MMHG

## 2019-12-02 DIAGNOSIS — R41.3 MEMORY LOSS: Primary | ICD-10-CM

## 2019-12-02 DIAGNOSIS — F25.0 SCHIZOAFFECTIVE DISORDER, BIPOLAR TYPE (H): ICD-10-CM

## 2019-12-02 PROCEDURE — G0463 HOSPITAL OUTPT CLINIC VISIT: HCPCS | Mod: ZF

## 2019-12-02 RX ORDER — TRAZODONE HYDROCHLORIDE 50 MG/1
75 TABLET, FILM COATED ORAL AT BEDTIME
Qty: 45 TABLET | Refills: 2 | Status: SHIPPED | OUTPATIENT
Start: 2019-12-02 | End: 2020-01-16

## 2019-12-02 RX ORDER — VENLAFAXINE HYDROCHLORIDE 75 MG/1
75 CAPSULE, EXTENDED RELEASE ORAL DAILY
Qty: 30 CAPSULE | Refills: 2 | Status: SHIPPED | OUTPATIENT
Start: 2019-12-02 | End: 2020-01-16

## 2019-12-02 RX ORDER — VENLAFAXINE HYDROCHLORIDE 150 MG/1
CAPSULE, EXTENDED RELEASE ORAL
Qty: 30 CAPSULE | Refills: 2 | Status: SHIPPED | OUTPATIENT
Start: 2019-12-02 | End: 2020-01-16

## 2019-12-02 RX ORDER — RISPERIDONE 3 MG/1
3 TABLET ORAL AT BEDTIME
Qty: 30 TABLET | Refills: 2 | Status: SHIPPED | OUTPATIENT
Start: 2019-12-02 | End: 2020-01-16

## 2019-12-02 ASSESSMENT — PAIN SCALES - GENERAL: PAINLEVEL: NO PAIN (0)

## 2019-12-02 NOTE — PATIENT INSTRUCTIONS
Please consider using [employer] ([email or web address] for cognitive training.     I will refer you to neuropsychiatric evaluation, MRI, and neurology, who will contact you. Please contact me in two weeks at [phone] if you do not hear back and I will assist in scheduling appointments.     Please consider using bright light therapy for seasonal depression. You can try any brand including Verilux or [employer]. Please get one with at least 10,000 Lux. Start out using it 15 minutes a day for 1-2 weeks and then if tolerating increase it to 30 minutes a day. Use it around lunch time.     Please do not hesitate to call or message me with any questions or concerns.    Be well,  Dr. Parks

## 2019-12-02 NOTE — PROGRESS NOTES
[facility]  Psychiatry Clinic  PSYCHIATRIC PROGRESS NOTE     CARE TEAM:  PCP- Annie Hart   The[facility]  [facility]  Ophthalmologist: Jacob Lieberman with [facility] support: [facility], [phone]     Caden Bush is a 58 year old male who prefers the name Caden & pronouns he.  The initial diagnostic evaluation was on 11/26/08 .   Date of the most recent transfer of care eval is 08/01/2019.      Pertinent Background:  This patient first experienced mental health issues in his late 20s , initially obtained care at that time and has received treatment for Schizoaffective Disorder, bipolar type. His diagnosis has changed through the time and afterreviewing all the evidence in 9/2018 the current diagnosis was confirmed (several episodes of moderate to severe depression and one episode of two weeks long period of grandiosity,  pressured speech, overspending and sleeplessness with increased social anxiety and paranoia).   Previously had a CCM until 2011. Has SW consult on 10/20/15, not interested in CM or [facility] worker services at this time and seems to be managing well on his own.     Of note, patient is found to be very hesitant to change any medication regimen or engaged in the treatment programs.  Luckily, he has been engaged in care with his therapist.     #Mood/psychosis:  - Notably, this pt was stable on regimen of Lithium, Effexor and Risperdal since 2011. However due to complications of chronic diabetes, Lithium was discontinued in 2017.   -  reviewed the chart. She recommended [facility] for groups and activities for him.   - We even tried going down on Risperidone for other reasons but he immediately got destabilized.     #Insomnia: The etiology of his insomnia is not well understood and the sleep study was discussed and encouraged. Sleep has improved on 3 mg of risperidone while psychotic sxs are also  better managed.      Psych critical item history includes suicidal ideation, psychosis [sxs include AH and paranoia], mutiple psychotropic trials and psych hosp (>5).     INTERIM HISTORY      [4, 4]   The patient reports good treatment adherence.  History was provided by the patient who was a good historian. Patient's sister was also present.    Since the last visit:     -His memory has been alright. He denies any worsening. He has problems with biographical memory pertaining to his childhood. This started around 1 year ago. It did not occur prior. It is from the 50s on.   -No focal neurological signs, including HA, change in vision, loss of balance, dizziness, numbness, tingling, weakness, gait disturbance, change in hearing, difficulty communicating or problems voiding.  -He would like to be seen by neuropsychology. In addition, he consents to MRI and neurology referral.   -His mood is stable with some mild worsening over winter. He has yet to start light therapy. His sister will help him purchase it as his insurance is not covering it.   -He would like to continue his current medications.  -No safety concerns.    RECENT PSYCH ROS:   Depression:  None  Elevated:  none  Psychosis:  none  Anxiety:  none  Panic Attack:  none  Trauma Related:  none  Dysregulation:  none  Eating Disorder: no   Cognitive: remote memory deficits (biographical), difficulty with names  Pertinent Negative Symptoms:  NO suicidal ideation, self-injurious behavior/urges, violent ideation, suicidal and violent ideation, aggression, psychosis, hallucinations, delusions, wolf and hypomania    RECENT SUBSTANCE USE:     ALCOHOL-  never   TOBACCO- none  CAFFEINE- 1 cup/day of coffee, 1 soda/ in a while  OPIOIDS- none      NARCAN KIT- N/A          CANNABIS- none  OTHER ILLICIT DRUGS- none      CURRENT SOCIAL HISTORY:  FINANCIAL SUPPORT- SSDI for schizoaffective disorder and diabetes  CHILDREN- none      LIVING SITUATION- Currently living in an  assisted living facility ([facility]) in [place], [facility] 6/19. Receives assistance with managing medications and administering insulin.  SOCIAL/ SPIRITUAL SUPPORT- few friends and family. 2 sisters (55 and 62). Mom, alive (85). Father, alive (88).  FEELS SAFE AT HOME- yes       MEDICAL ROS (2,10):  Reports Arthritis pain (ongoing, unchanged) Denies Pain, headache, dizziness, GI [nausea, diarrhea, constipation, sedation, tremor and confusion.    PAST PSYCHOTROPIC MED TRIALS                                                           *     Drug / Start Date   Dose (mg)   Helpful   Adverse Effects  DC Reason / Date     Lithium   900           Risperdal         current     Abilify             Seroquel             Effexor         current     Zoloft             Wellbutrin             Buspar             trazodone         current     hydroxyzine             Ambien                Maybe Zyprexa, and Prozac, can't remember    July: Increased Risperidone dose  During months of fall and winter, patient was hesitant to make changes to medications. Modified risperidone and trazodone dose several times (minor changes). Not successful in complete resolution of sxs.     MEDICAL / SURGICAL HISTORY                                     Patient Active Problem List   Diagnosis     Eczema     Retinopathy     Moderate episode of recurrent major depressive disorder (H)     Polyp of colon, adenomatous     Health Care Home     Hx of psychiatric care     Neuropathy of left upper extremity     Type 1 diabetes mellitus without complications (H)     Schizoaffective disorder, bipolar type (H)       Major Surgery-  Past Surgical History:   Procedure Laterality Date     APPENDECTOMY       COLONOSCOPY  2012    2 adenomatous polyps       ALLERGY       Flomax [tamsulosin] and Lisinopril  MEDICATIONS        Current Outpatient Medications   Medication Sig Dispense Refill     aspirin 81 MG EC tablet Take 81 mg by mouth daily.       blood glucose (NO  BRAND SPECIFIED) test strip Use to test blood sugar 4 times daily or as directed for insulin injections 4 times daily. 400 each 3     cholecalciferol (VITAMIN D) 1000 UNIT tablet Take 1 tablet by mouth daily.       Continuous Blood Gluc  (DEXCOM G5 MOBILE ) MICHELLE        Continuous Blood Gluc  (DEXCOM G6 ) MICHELLE 1 each daily 1 Device 0     Continuous Blood Gluc Sensor (DEXCOM G6 SENSOR) MISC 1 each every 10 days 9 each 3     Continuous Blood Gluc Transmit (DEXCOM G6 TRANSMITTER) MISC 1 each every 3 months 1 each 3     Cyanocobalamin (B-12) 1000 MCG CAPS Take 1 capsule by mouth daily       ibuprofen (ADVIL/MOTRIN) 200 MG tablet Take 1-2 tablets (200-400 mg) by mouth 4 times daily as needed for pain 60 tablet 1     insulin aspart (NOVOLOG PEN) 100 UNIT/ML pen Inject 6 units before breakfast,  5 units before lunch and 5 units before dinner. 15 mL 0     insulin glargine (BASAGLAR KWIKPEN) 100 UNIT/ML pen INJECT 16 UNITS SUB Q EVERY MORNING 3 mL 10     insulin pen needle (32G X 4 MM) 32G X 4 MM miscellaneous Use 4 pen needles daily. 400 each 3     risperiDONE (RISPERDAL) 3 MG tablet Take 1 tablet (3 mg) by mouth At Bedtime 30 tablet 2     Sharps Container (COMPLETE NEEDLE COLLECTION SYS) MISC USE AS DIRECTED 1 each 0     Silodosin (RAPAFLO) 8 MG CAPS capsule Take 1 capsule (8 mg) by mouth daily 90 capsule 0     simvastatin (ZOCOR) 40 MG tablet Take 0.5 tablets (20 mg) by mouth At Bedtime 45 tablet 3     traZODone (DESYREL) 50 MG tablet Take 1.5 tablets (75 mg) by mouth At Bedtime 45 tablet 2     venlafaxine (EFFEXOR XR) 75 MG 24 hr capsule Take 1 capsule (75 mg) by mouth daily TAKE WITH  Effexor 150 mg for a total dose of 225 mg daily 30 capsule 2     venlafaxine (EFFEXOR-XR) 150 MG 24 hr capsule TAKE 1 CAPSULE BY MOUTH DAILY ALONG WITH A 75 MG FOR A TOTAL  MG DAILY 30 capsule 2     ACE/ARB/ARNI NOT PRESCRIBED, INTENTIONAL, Please choose reason not prescribed, below-  hypotension        "ACETAMINOPHEN EXTRA STRENGTH 500 MG tablet TAKE TWO  TABLETS (1000MG) BY MOUTH THREE TIMES DAILY AS NEEDED FOR PAIN (Patient not taking: Reported on 12/2/2019) 150 tablet 3     order for DME Equipment being ordered: Light box > = 10,000 LUX. Use 15 minutes daily around noon with lunch. (Patient not taking: Reported on 12/2/2019) 1 Box 0     VITALS      [3, 3]   /74   Pulse 96   Wt 74.2 kg (163 lb 8 oz)   BMI 22.80 kg/m     MENTAL STATUS EXAM      [9, 14 cog gs]     Alertness: alert and oriented  Appearance: appropriately groomed, asymmetrical facial features   Behavior/Demeanor: cooperative, pleasant and calm, with good eye contact   Speech: normal and regular rate and rhythm  Language: intact and no problems  Psychomotor: normal or unremarkable  Mood: \"Fine.\"  Affect: Blunted  Thought Process/Associations: Linear  Thought Content: Denies SI or HI.   Perception: Denies AVH  Insight: Fair  Judgment: Good  Cognition: (6) Difficulty with remote memory, normal MoCA  Gait and Station: unremarkable    LABS and DATA     AIMS:  0: 3/1/2018, 0: 08/02/2019    PHQ9 TODAY = 3  PHQ-9 SCORE 8/30/2019 10/3/2019 10/30/2019   PHQ-9 Total Score - - -   PHQ-9 Total Score 3 1 3   PHQ-9 Total Score - - -     MOCA (8/19): 26/30  (including 0 points added for education)  - Visuospatial/executive:  5/5  - Naming: 3/3  - Attention: 5/6  - Language: 2/3  - Abstraction: 1/2   - Delayed recall: 4/5  - Orientation: 6/6    ANTIPSYCHOTIC LABS  [glu, A1C, lipids (ezequiel LDL), liver enzymes, WBC, ANEU, Hgb, plts]  q12 mo  Recent Labs   Lab Test 07/25/19  1415 05/15/19  1045 03/15/19  1015 03/04/19  1519 02/13/19  1625 01/02/19  1045  07/17/18  1126   GLC  --   --  346* 444* 254* 147*   < >  --    A1C 6.6* 7.9*  --   --   --  6.7*  --  6.7*    < > = values in this interval not displayed.     Recent Labs   Lab Test 01/02/19  1045 04/17/18  1356 07/11/17  1034 06/07/17  1004   CHOL 127 139 126 143   TRIG 51 45 49 105   LDL 33 45 40 46   HDL 84 " 85 76 76     Recent Labs   Lab Test 01/02/19  1045 04/17/18  1356 01/30/18  0923 07/11/17  1034   AST 14 15 14 14   ALT 13 12 16 12   ALKPHOS 74 73 79 72     Recent Labs   Lab Test 02/13/19  1522 01/02/19  1126 01/02/19  1045 10/11/18  1010 09/05/18  1123  06/09/16  1030 12/21/15  1413  08/01/12  1640   WBC 5.3 4.2  --  2.5* 2.8*   < > 4.7 4.6   < > 5.5   ANEU  --   --   --   --   --   --  3.5 3.4  --  4.8   HGB 12.7* 12.9* 13.1 12.5* 11.5*   < > 14.7 13.4   < > 11.4*    149  --  136* 168   < > 175 155   < > 180    < > = values in this interval not displayed.       PSYCHOTROPIC DRUG INTERACTIONS     VENLAFAXINE and ANTIPLATELET AGENTS may result in an increased risk of bleeding.  RISPERIDONE and SIMVASTATIN may result in increased simvastatin serum concentrations -with an increased risk of myopathy or rhabdomyolysis.  TRAZODONE and VENLAFAXINE may result in an increased risk of serotonin syndrome.    MANAGEMENT:  Monitoring for adverse effects    RISK STATEMENT for SAFETY     Caden Bush denies any SI /SIB.  In addition, he has notable risk factors for self-harm including feels trapped, hopelessness, lives alone/ isolated, hallucinations and male.  However, risk is mitigated by no h/o suicide attempt, describes a safety plan, none to minimal alcohol use , commitment to family and stable housing.  Based on all available evidence he does not appear to be at imminent risk for self-harm therefore does not meet criteria for a 72-hr hold/  involuntary hospitalization.  However, based on degree of symptoms voluntary hospitalization, day treatment and close psych FU was recommended which the pt did agree to Day Treatment and close follow up.  Additional steps to minimize risk include: SAFETY PLAN completed 5/10/2019.  Safety Plan placed in AVS: Yes.    DIAGNOSIS     Schizoaffective disorder, bipolar type, in remission on medications and with therapy   Seasonal affective disorder    ASSESSMENT      [m2, h3]      TODAY    Patient stable on his current medication regimen with no psychotic and minimal mood symptoms. Performed a MoCA recently to screen for cognitive impairment with normal results. Originally suspected his sense of forgetfulness and change in cognitive awareness was potentially related to long-term history of a neurocognitive disorder such as schizoaffective disorder, in addition to Risperidone, which can cause cognitive blunting. Further evaluation with collateral from sister shows that patient's memory loss is isolated to biographical content with patient having minimal memories before his 50s. It began around 1 year ago and has been relatively stable. Given the acute onset and isolated nature of memory loss being remote with a normal MoCA, will pursue additional workup to ensure no underlying medical cause like a prior stroke or microvascular disease. Patient has no acute neurological symptoms to warrant more urgent evaluation. He will be referred to neuropsychological testing to help determine the nature of his cognitive limitations and potentially identify a diagnosis. He will be referred to neurology to further evaluate for any neurological cause. An MRI will be ordered in the meantime to further evaluate. In addition, will order labs (Thiamine, Folic Acid, RPR) at next appointment with routine antipsychotic monitoring labs to further rule out any possible causes.    Doubt that medication is playing a role given that patient has been on Risperidone for at least 8 years if not longer. In addition reducing Risperidone is not a good option at this point given that this has led to destabilization in the past. Will continue medications as prescribed.    Ordered a light box therapy given his tend to have worsening of mood over the winter, decreased energy, and lack of drive, which correspond with seasonal affective disorder. Patient will attempt to obtain. He also will try to start brain training exercises.      #History of Leukopenia, unspecified type #Low PLT count #Anemia  - PCP is aware. Possibly due to Risperdal. Had a physical with PCP on Jan 2nd when he was also going to evaluate hematologic abnormalities. Receives iron replacement  #DM II: On insulin, last A1c 6.6  # Vitamin D def> replacement  #Benign adrenal incidentaloma     PLAN      [m2, h3]     1) PSYCHOTROPIC MEDICATIONS:  - Continue Risperidone 3 mg QHS  - Continue Effexor  mg daily  - Continue Trazodone 75 mg at bedtime    - Stop Fish Oil 1 g daily per patient's discussion with pharmacy. Will explore Vascepa as alternative   - Continue B12 1000 mcg daily  - Continue Vitamin D 1,000 international unit(s) daily       2) THERAPY:  Seeing psychologist Yeny Goff at [facility] since 11/[facility]. Sees once/twice a month. Finishing day treatment      3) LABS NEXT DUE: Antipsychotic labs next due 01/2020      RATING SCALES: AIMS 8/20      MoCA: 8/20      4) REFERRALS:  None     7) RTC: 6 weeks      8) CRISIS NUMBERS:  See AVS    9) MN  was not checked today.    10) Goals:  - Find one meaningful friendship this year, patient is accomplishing  - Work on cognition through brain [employer] or [employer]     TREATMENT RISK STATEMENT:  The risks, benefits, alternatives and potential adverse effects have been discussed and are understood by the pt. The pt understands the risks of using street drugs or alcohol. There are no medical contraindications, the pt agrees to treatment with the ability to do so. The pt knows to call the clinic for any problems or to access emergency care if needed.  Medical and substance use concerns are documented above.  Psychotropic drug interaction check was done, including changes made today.     PROVIDER: Miguelangel Parks DO    Patient staffed in clinic with Dr. Phillips who will sign the note.  Supervisor is Dr. Phillips.    Supervisor Attestation:  I met with Caden Bush along with the resident physician, Miguelangel Parks DO. I  participated in key portions of the service, including the mental status examination and developing the plan of care. I reviewed key portions of the history with the resident. I agree with the findings and plan as documented in this note.  Antolin Phillips MD

## 2019-12-02 NOTE — NURSING NOTE
Chief Complaint   Patient presents with     Recheck Medication     Seasonal affective disorder (H)

## 2019-12-03 ENCOUNTER — ALLIED HEALTH/NURSE VISIT (OUTPATIENT)
Dept: PHARMACY | Facility: PHYSICIAN GROUP | Age: 59
End: 2019-12-03
Payer: MEDICAID

## 2019-12-03 DIAGNOSIS — E10.9 TYPE 1 DIABETES MELLITUS WITHOUT COMPLICATION (H): Primary | ICD-10-CM

## 2019-12-03 PROCEDURE — 99606 MTMS BY PHARM EST 15 MIN: CPT | Performed by: PHARMACIST

## 2019-12-03 NOTE — PROGRESS NOTES
SUBJECTIVE/OBJECTIVE:                Caden Bush is a 59 year old male called for a follow-up visit for Medication Therapy Management.  He was referred to me from Dr. Hart.     Chief Complaint: Follow up from [facility] visit on 11/5.      Tobacco:  reports that he has never smoked. He has never used smokeless tobacco.  Alcohol: not currently using    Medication Adherence/Access:  no issues reported, facility managing his medications, but he has a good understanding of the regimen.       Type 1 Diabetes:  Pt currently taking Basaglar 16 units in the AM plus Novolog 6 units with breakfast and 5 units with lunch/dinner.   Using sliding scale pre-meal as well=>  200-250 = add 1 unit   251- 300= add 2 units  301- 350= add 3 units  >350 = add 4 units  Was seen by emili and they stopped Januvia and pioglitazone on 5/3. Plan was to give him insulin/carb ratio with sliding scale however he reports that he is not confident with his carb counting and not able to convert a carb amount to insulin dose.  Fixed dose with meals plus sliding scale started since stopping orals.  *Note that the NEDRA antibodies were positive.   C-peptide= 2.1ng/ml with glucose of 346mg/dl.    Dexcom G6- now  Did have 1 x a low alarm where it went under 70mg/dl.   220 for the high alarm, is going off after each meal, but no record of what the sugars have been running as the system is not linked to our clinic at this time.       Today's Vitals: There were no vitals taken for this visit.- phone visit.    ASSESSMENT:                Medication Adherence: good, no issues identified    Diabetes: Needs Improvement. Patient is meeting A1c goal of < 7%. Pt would benefit from reviewing records with facility as his alarm is going off after each meal meaning sugars are going over 200 several times per day. Will look at results and determine if appropriate to increase Novolog at 1 or more meals.       PLAN:                  1. Left message for RN at facility to call back  regarding lab results and home glucose readings. Based on conversation with patient, may need to increase Novolog pre-meal doses slightly. *Update- A1c results were 5.8% and not noting frequent lows, so will continue same plan of doses for now.    2. Updated emergency contact information per patient request.     3. Sharing code printed to be given to facility staff in efforts of connecting patient's Dexcom for remote reviewing.     I spent 15 minutes with this patient today. All changes were made via collaborative practice agreement with Dr. Hart. A copy of the visit note was provided to the patient's primary care provider.     Will follow up in 4 weeks.    The patient declined a summary of these recommendations as an after visit summary.    Aliyah Angeles, Pharm.D, [facility]  Medication Therapy Management Pharmacist  [phone]

## 2019-12-07 ENCOUNTER — HOSPITAL ENCOUNTER (OUTPATIENT)
Dept: MRI IMAGING | Facility: CLINIC | Age: 59
Discharge: HOME | End: 2019-12-07
Admitting: STUDENT IN AN ORGANIZED HEALTH CARE EDUCATION/TRAINING PROGRAM
Payer: MEDICARE

## 2019-12-07 DIAGNOSIS — R41.3 MEMORY LOSS: ICD-10-CM

## 2019-12-07 DIAGNOSIS — F25.0 SCHIZOAFFECTIVE DISORDER, BIPOLAR TYPE (H): ICD-10-CM

## 2019-12-07 PROCEDURE — 70551 MRI BRAIN STEM W/O DYE: CPT

## 2019-12-19 ENCOUNTER — OFFICE VISIT (OUTPATIENT)
Dept: FAMILY MEDICINE | Facility: CLINIC | Age: 59
End: 2019-12-19

## 2019-12-19 VITALS
BODY MASS INDEX: 23.24 KG/M2 | DIASTOLIC BLOOD PRESSURE: 70 MMHG | HEART RATE: 87 BPM | SYSTOLIC BLOOD PRESSURE: 142 MMHG | OXYGEN SATURATION: 99 % | RESPIRATION RATE: 16 BRPM | WEIGHT: 166.6 LBS

## 2019-12-19 DIAGNOSIS — M54.30 SCIATICA WITHOUT BACK PAIN, UNSPECIFIED LATERALITY: ICD-10-CM

## 2019-12-19 DIAGNOSIS — E10.9 TYPE 1 DIABETES MELLITUS WITHOUT COMPLICATIONS (H): Primary | ICD-10-CM

## 2019-12-19 PROBLEM — F32.0 MILD MAJOR DEPRESSION (H): Status: ACTIVE | Noted: 2018-09-27

## 2019-12-19 PROBLEM — M19.90 ARTHRITIS: Status: ACTIVE | Noted: 2017-05-17

## 2019-12-19 PROBLEM — R01.1 HEART MURMUR ON PHYSICAL EXAMINATION: Status: ACTIVE | Noted: 2018-05-01

## 2019-12-19 PROCEDURE — 99214 OFFICE O/P EST MOD 30 MIN: CPT | Performed by: FAMILY MEDICINE

## 2019-12-19 PROCEDURE — 99207 C FOOT EXAM  NO CHARGE: CPT | Performed by: FAMILY MEDICINE

## 2019-12-19 RX ORDER — INSULIN GLARGINE 100 [IU]/ML
15 INJECTION, SOLUTION SUBCUTANEOUS DAILY
Qty: 15 ML | Refills: 3 | Status: SHIPPED | OUTPATIENT
Start: 2019-12-19 | End: 2020-12-21

## 2019-12-19 ASSESSMENT — ANXIETY QUESTIONNAIRES
3. WORRYING TOO MUCH ABOUT DIFFERENT THINGS: SEVERAL DAYS
6. BECOMING EASILY ANNOYED OR IRRITABLE: NOT AT ALL
7. FEELING AFRAID AS IF SOMETHING AWFUL MIGHT HAPPEN: SEVERAL DAYS
1. FEELING NERVOUS, ANXIOUS, OR ON EDGE: NOT AT ALL
GAD7 TOTAL SCORE: 3
5. BEING SO RESTLESS THAT IT IS HARD TO SIT STILL: NOT AT ALL
IF YOU CHECKED OFF ANY PROBLEMS ON THIS QUESTIONNAIRE, HOW DIFFICULT HAVE THESE PROBLEMS MADE IT FOR YOU TO DO YOUR WORK, TAKE CARE OF THINGS AT HOME, OR GET ALONG WITH OTHER PEOPLE: SOMEWHAT DIFFICULT
2. NOT BEING ABLE TO STOP OR CONTROL WORRYING: NOT AT ALL

## 2019-12-19 ASSESSMENT — PATIENT HEALTH QUESTIONNAIRE - PHQ9
SUM OF ALL RESPONSES TO PHQ QUESTIONS 1-9: 6
5. POOR APPETITE OR OVEREATING: SEVERAL DAYS

## 2019-12-19 NOTE — PROGRESS NOTES
Problem(s) Oriented visit        SUBJECTIVE:                                                    Caden Bush is a 59 year old male who presents to clinic today for diabetes check. His recent A1c from three weeks ago was 5.8. He takes basaglar and mealtime novolog. He occasionally gets shaky if his blood sugar goes low. He has poor memory and cannot estimate how often it happens. He denies ever waking with symptoms of low readings. His assisted living staff helps him with insulin.     He complains of pain in his buttock that radiates into his legs, down to the calf. He has taken ibuprofen and Tylenol for pain without much relief. No weakness or tingling.     Problem list, Medication list, Allergies, and Medical/Social/Surgical histories reviewed in EPIC and updated as appropriate.   Additional history: as documented    ROS:  5 point ROS completed and negative except noted above, including Gen, CV, Resp, GI, MS      Histories:   Patient Active Problem List   Diagnosis     Eczema     Retinopathy     Moderate episode of recurrent major depressive disorder (H)     Polyp of colon, adenomatous     Health Care Home     Hx of psychiatric care     Neuropathy of left upper extremity     Type 1 diabetes mellitus without complications (H)     Schizoaffective disorder, bipolar type (H)     Past Surgical History:   Procedure Laterality Date     APPENDECTOMY       COLONOSCOPY  2012    2 adenomatous polyps       Social History     Tobacco Use     Smoking status: Never Smoker     Smokeless tobacco: Never Used   Substance Use Topics     Alcohol use: No     Alcohol/week: 0.0 standard drinks     Family History   Problem Relation Age of Onset     Osteoporosis Mother      Breast Cancer Mother 78     Osteoporosis Father      Asthma Father      Cancer - colorectal Father 70     Depression Father      Anxiety Disorder Sister      Rheumatoid Arthritis Sister            OBJECTIVE:                                                    BP (!)  142/76   Pulse 87   Resp 16   Wt 75.6 kg (166 lb 9.6 oz)   SpO2 99%   BMI 23.24 kg/m    Body mass index is 23.24 kg/m .   GENERAL APPEARANCE: Alert, no acute distress  NECK: No adenopathy,masses or thyromegaly  RESP: lungs clear to auscultation   CV: normal rate, regular rhythm, no murmur or gallop  MS: foot exam normal DP and PT pulses, no trophic changes or ulcerative lesions, normal monofilament exam. Back is straight without defect. No current pain with palpation of SI joints. Lumbar spine, iliac areas, lateral hips.   NEURO: Alert, oriented, speech and mentation normal  PSYCH: mentation appears normal, affect and mood normal   Labs Resulted Today: No results found for any visits on 12/19/19.  ASSESSMENT/PLAN:                                                        There are no diagnoses linked to this encounter.    Patient Instructions   Decrease Basaglar to 15 units daily.  Keep meal time insulin the same.  Document blood sugars for the next 10 days and fax them to [facility] ([phone]).    Refer to PT for bilateral sciatica.      The following health maintenance items are reviewed in Epic and correct as of today:  Health Maintenance   Topic Date Due     HIV SCREENING  04[birth year]10/1975     ZOSTER IMMUNIZATION (1 of 2) 04/10/2009     DIABETIC FOOT EXAM  07/17/2019     PHQ-9  11/30/2019     EYE EXAM  12/11/2019     MEDICARE ANNUAL WELLNESS VISIT  01/02/2020     LIPID  01/02/2020     MICROALBUMIN  01/02/2020     A1C  01/25/2020     BMP  03/04/2020     COLONOSCOPY  06/19/2020     TSH W/FREE T4 REFLEX  02/13/2021     ADVANCE CARE PLANNING  07/05/2024     DTAP/TDAP/TD IMMUNIZATION (2 - Td) 12/18/2025     HEPATITIS C SCREENING  Completed     DEPRESSION ACTION PLAN  Completed     INFLUENZA VACCINE  Completed     PNEUMOCOCCAL IMMUNIZATION 19-64 MEDIUM RISK  Completed     IPV IMMUNIZATION  Aged Out     MENINGITIS IMMUNIZATION  Aged Out       Annie Hart MD  [facility]  [facility]  [facility]  [phone]    For any issues my office # is [phone]

## 2019-12-19 NOTE — PATIENT INSTRUCTIONS
Decrease Basaglar to 15 units daily.  Keep meal time insulin the same.  Document blood sugars for the next 10 days and fax them to [facility] ([phone]).    Refer to PT for bilateral sciatica.

## 2019-12-20 ASSESSMENT — ANXIETY QUESTIONNAIRES: GAD7 TOTAL SCORE: 3

## 2019-12-26 ENCOUNTER — THERAPY VISIT (OUTPATIENT)
Dept: PHYSICAL THERAPY | Facility: CLINIC | Age: 59
End: 2019-12-26
Payer: MEDICARE

## 2019-12-26 DIAGNOSIS — M54.30 SCIATICA WITHOUT BACK PAIN, UNSPECIFIED LATERALITY: ICD-10-CM

## 2019-12-26 PROCEDURE — 97162 PT EVAL MOD COMPLEX 30 MIN: CPT | Mod: GP | Performed by: PHYSICAL THERAPIST

## 2019-12-26 PROCEDURE — 97110 THERAPEUTIC EXERCISES: CPT | Mod: GP | Performed by: PHYSICAL THERAPIST

## 2019-12-26 NOTE — LETTER
DEPARTMENT OF HEALTH AND HUMAN SERVICES  [facility]ENT[facility] FOR MEDICARE & MEDICAID SERVICES    PLAN/UPDATED PLAN OF PROGRESS FOR OUTPATIENT REHABILITATION    PATIENTS NAME:  Eleazar Caden   : 1960  PR[facility] NUMBER:    [id number]  [id number]   PROVIDER NAME: FLEX BARRON PT  MEDICAL RECORD NUMBER: [id number]   START OF CARE DATE:   19   TYPE:  PT  PRIMARY/TREATMENT DIAGNOSIS: Sciatica without back pain, unspecified laterality  VISITS FROM START OF CARE:  Rxs Used: 1     [facility] Initial Evaluation  Subjective:    Affected Side: bilateral posterior thigh and leg pain, R>L.   Condition occurred with:  Insidious onset. This is a chronic condition   Problem details: Date of Onset / MD order : 19.  Rates pain as a 6/10, tightness and discomfort.  Overall the pain is not improved or worsening as of late.  Previous treatment includes ibuprofen and tylenol which give some temporary relief.  No other previous treatment or imaging.  General Health is rated as good..   Site of Pain: bilateral posterior thigh and leg pain, R>L. Radiates to: bilateral posterior thigh and leg pain, R>L. Associated with: none. Exacerbated by: walking, standing, sitting. Relieved by: extending in his easy chair.    Objective:  Gait:    Assistive Devices:  None  Deviations:  Lumbar:  Trunk flexion  Flexibility/Screens:   Positive screens:  LumbarNegative screens: Hip; SI Joint or Knee   Lumbar/SI Evaluation  ROM:    AROM Lumbar:   Flexion:            75%  Ext:                    0%   Side Bend:        Left:  50%    Right:  50%  Rotation:           Left:  50%    Right:  50%  Side Glide:        Left:     Right:       Strength: NA  Lumbar Myotomes:  not assessed  Lumbar Dermtomes:  not assessed  Neural Tension/Mobility:    Left side:  SLR and SLR w/DF positive.  Left side:Slump  negative.   Right side:   SLR w/DF and SLR positive.  Right side:   Slump  negative.           PATIENTS NAME:  Caden Bush:  1960            Hip Evaluation  HIP AROM:  AROM:    Left Hip:     Normal    Right Hip:   Normal  Knee Evaluation:  ROM:  AROM: normal      Assessment/Plan:    Patient is a 59 year old male with bilateral low back and leg complaints.    Patient has the following significant findings with corresponding treatment plan.                Diagnosis 1:  Bilateral Sciatica  Pain -  hot/cold therapy, self management, education and home program  Decreased ROM/flexibility - manual therapy, therapeutic exercise, therapeutic activity and home program  Decreased joint mobility - manual therapy and therapeutic exercise  Impaired gait - gait training  Impaired muscle performance - neuro re-education  Decreased function - therapeutic activities    Therapy Evaluation Codes:   1) History comprised of:   Personal factors that impact the plan of care:      Cognition.    Comorbidity factors that impact the plan of care are:      Diabetes and Mental illness.     Medications impacting care: Anti-depressant and Pain.  2) Examination of Body Systems comprised of:   Body structures and functions that impact the plan of care:      Lumbar spine and thighs and legs.   Activity limitations that impact the plan of care are:      Squatting/kneeling, Stairs, Standing and Walking.  3) Clinical presentation characteristics are:   Evolving/Changing.  4) Decision-Making    Moderate complexity using standardized patient assessment instrument and/or measureable assessment of functional outcome.  Cumulative Therapy Evaluation is: Moderate complexity.    Previous and current functional limitations:  (See Goal Flow Sheet for this information)    Short term and Long term goals: (See Goal Flow Sheet for this information)     Communication ability:  Patient appears to be able to clearly communicate and understand verbal and written communication and follow directions correctly.  Treatment Explanation - The following has been discussed with the patient:   RX  "ordered/plan of care              PATIENTS NAME:  Caden Bush   : 1960      Anticipated outcomes  Possible risks and side effects  This patient would benefit from PT intervention to resume normal activities.   Rehab potential is good.    Frequency:  1 X week, once daily  Duration:  for 4 weeks  Discharge Plan:  Achieve all LTG.  Independent in home treatment program.  Reach maximal therapeutic benefit.        Caregiver Signature/Credentials _____________________________ Date ________       Treating Provider: Dakota Dumas, PT     I have reviewed and certified the need for these services and plan of treatment while under my care.        PHYSICIAN'S SIGNATURE:   _____________________________________  Date___________     Annie Hart MD    Certification period:  Beginning of Cert date period: 19 to  End of Cert period date: 20     Functional Level Progress Report: Please see attached \"Goal Flow sheet for Functional level.\"    ____X____ Continue Services or       ________ DC Services                Service dates: From  SOC Date: 19 date to present                         "

## 2019-12-26 NOTE — PROGRESS NOTES
[facility] Initial Evaluation  Subjective:    Affected Side: bilateral posterior thigh and leg pain, R>L.   Condition occurred with:  Insidious onset. This is a chronic condition   Problem details: Date of Onset / MD order : 12/19/19.  Rates pain as a 6/10, tightness and discomfort.  Overall the pain is not improved or worsening as of late.  Previous treatment includes ibuprofen and tylenol which give some temporary relief.  No other previous treatment or imaging.  General Health is rated as good..   Site of Pain: bilateral posterior thigh and leg pain, R>L. Radiates to: bilateral posterior thigh and leg pain, R>L. Associated with: none. Exacerbated by: walking, standing, sitting. Relieved by: extending in his easy chair.                      Objective:    Gait:    Assistive Devices:  None  Deviations:  Lumbar:  Trunk flexion    Flexibility/Screens:   Positive screens:  LumbarNegative screens: Hip; SI Joint or Knee                    Lumbar/SI Evaluation  ROM:    AROM Lumbar:   Flexion:            75%  Ext:                    0%   Side Bend:        Left:  50%    Right:  50%  Rotation:           Left:  50%    Right:  50%  Side Glide:        Left:     Right:         Strength: NA  Lumbar Myotomes:  not assessed                Lumbar Dermtomes:  not assessed                Neural Tension/Mobility:    Left side:  SLR and SLR w/DF positive.  Left side:Slump  negative.   Right side:   SLR w/DF and SLR positive.  Right side:   Slump  negative.                                             Hip Evaluation  HIP AROM:  AROM:    Left Hip:     Normal    Right Hip:   Normal                               Knee Evaluation:  ROM:  AROM: normal                              General     ROS    Assessment/Plan:    Patient is a 59 year old male with bilateral low back and leg complaints.    Patient has the following significant findings with corresponding treatment plan.                Diagnosis 1:  Bilateral  Sciatica  Pain -  hot/cold therapy, self management, education and home program  Decreased ROM/flexibility - manual therapy, therapeutic exercise, therapeutic activity and home program  Decreased joint mobility - manual therapy and therapeutic exercise  Impaired gait - gait training  Impaired muscle performance - neuro re-education  Decreased function - therapeutic activities    Therapy Evaluation Codes:   1) History comprised of:   Personal factors that impact the plan of care:      Cognition.    Comorbidity factors that impact the plan of care are:      Diabetes and Mental illness.     Medications impacting care: Anti-depressant and Pain.  2) Examination of Body Systems comprised of:   Body structures and functions that impact the plan of care:      Lumbar spine and thighs and legs.   Activity limitations that impact the plan of care are:      Squatting/kneeling, Stairs, Standing and Walking.  3) Clinical presentation characteristics are:   Evolving/Changing.  4) Decision-Making    Moderate complexity using standardized patient assessment instrument and/or measureable assessment of functional outcome.  Cumulative Therapy Evaluation is: Moderate complexity.    Previous and current functional limitations:  (See Goal Flow Sheet for this information)    Short term and Long term goals: (See Goal Flow Sheet for this information)     Communication ability:  Patient appears to be able to clearly communicate and understand verbal and written communication and follow directions correctly.  Treatment Explanation - The following has been discussed with the patient:   RX ordered/plan of care  Anticipated outcomes  Possible risks and side effects  This patient would benefit from PT intervention to resume normal activities.   Rehab potential is good.    Frequency:  1 X week, once daily  Duration:  for 4 weeks  Discharge Plan:  Achieve all LTG.  Independent in home treatment program.  Reach maximal therapeutic benefit.    Please  refer to the daily flowsheet for treatment today, total treatment time and time spent performing 1:1 timed codes.

## 2019-12-30 DIAGNOSIS — R52 PAIN: ICD-10-CM

## 2019-12-30 RX ORDER — IBUPROFEN 200 MG
TABLET ORAL
Qty: 100 TABLET | Refills: 3 | Status: SHIPPED | OUTPATIENT
Start: 2019-12-30 | End: 2019-12-31

## 2019-12-31 ENCOUNTER — ALLIED HEALTH/NURSE VISIT (OUTPATIENT)
Dept: PHARMACY | Facility: PHYSICIAN GROUP | Age: 59
End: 2019-12-31
Payer: MEDICAID

## 2019-12-31 DIAGNOSIS — E10.9 TYPE 1 DIABETES MELLITUS WITHOUT COMPLICATION (H): Primary | ICD-10-CM

## 2019-12-31 DIAGNOSIS — Z78.9 TAKES DIETARY SUPPLEMENTS: ICD-10-CM

## 2019-12-31 PROCEDURE — 99606 MTMS BY PHARM EST 15 MIN: CPT | Mod: GY | Performed by: PHARMACIST

## 2019-12-31 NOTE — PROGRESS NOTES
SUBJECTIVE/OBJECTIVE:                Caden Bush is a 59 year old male called for a follow-up visit for Medication Therapy Management.  He was referred to me from Dr. Hart, will be seeing Dr. Chao in January for physical.     Chief Complaint: Follow up from [facility] visit on 12/3.      Tobacco:  reports that he has never smoked. He has never used smokeless tobacco.  Alcohol: not currently using    Medication Adherence/Access:  no issues reported    Type 1 Diabetes:  Pt currently taking Basaglar 16 units in the AM plus Novolog 6 units with breakfast and 5 units with lunch/dinner.   Using sliding scale pre-meal as well=>  200-250 = add 1 unit   251- 300= add 2 units  301- 350= add 3 units  >350 = add 4 units  Was seen by endo and they stopped Januvia and pioglitazone on 5/3. Plan was to give him insulin/carb ratio with sliding scale however he reports that he is not confident with his carb counting and not able to convert a carb amount to insulin dose.  Fixed dose with meals plus sliding scale started since stopping orals.  *Note that the NEDRA antibodies were positive.   C-peptide= 2.1ng/ml with glucose of 346mg/dl.    Dexcom G6 sensors now, and was able to update his old  to be compatible.     Has several times the low alarm sounds but does not go below 60mg/dl and is <1 x per week.     Lowest has been 60, decreased to 15 units of Basaglar on 12/19/19 by PCP.     Supplements: Vitamin D, iron and B12. Last b12 level was high and was told he could decrease his supplement, but he wants to know if he can just stop it.   Vit D level maintained low normal with current supplementation.     Pain: Not currently having issues with pain. Doing Physical therapy exercises for his back and this has really helped his sciatica pain. He had been using ibuprofen as needed, but has not needed it now and he would like this taken off his list. He had been switched off acetaminophen due to interaction of the APAP and his continuous  glucose monitor sensors (G5) but now is on the  G6 sensors which do not have the same interaction concerns.     Today's Vitals: There were no vitals taken for this visit.- phone visit    ASSESSMENT:                  Medication Adherence: good, no issues identified    Diabetes: Stable. Patient is meeting A1c goal of < 7%.    Supplements: okay to stop B12.     Pain:  Will stop PRN order for ibuprofen and update to PRN acetaminophen given he is now using the G6 sensors and does not have the same interaction with acetaminophen.      PLAN:                  1. Stop b12    2. Okay to stop ibuprofen order and start acetaminophen 325mg tabs- 1-2 tabs every 8 hours as needed for pain.     I spent 15 minutes with this patient today. All changes were made via collaborative practice agreement with Dr. Hart. A copy of the visit note was provided to the patient's primary care provider.     Will follow up in 1 month.    The patient declined a summary of these recommendations as an after visit summary.    Aliyah Angeles, Pharm.D, BCACP  Medication Therapy Management Pharmacist  [phone]

## 2019-12-31 NOTE — PATIENT INSTRUCTIONS
12/31/19    1. Stop b12     2. Okay to stop ibuprofen order and start acetaminophen 325mg tabs- 1-2 tabs every 8 hours as needed for pain.       Authorization by Dr. Chao on call for Dr. Hart.         Aliyah Angeles, Pharm.D, [facility]  Medication Therapy Management Pharmacist  [phone]

## 2020-01-07 ENCOUNTER — THERAPY VISIT (OUTPATIENT)
Dept: PHYSICAL THERAPY | Facility: CLINIC | Age: 60
End: 2020-01-07
Payer: MEDICARE

## 2020-01-07 DIAGNOSIS — M54.30 SCIATICA WITHOUT BACK PAIN, UNSPECIFIED LATERALITY: ICD-10-CM

## 2020-01-07 PROCEDURE — 97110 THERAPEUTIC EXERCISES: CPT | Mod: GP | Performed by: PHYSICAL THERAPIST

## 2020-01-10 NOTE — PROGRESS NOTES
Addendum 01/10/20  Caden Bush currently uses daily Basaglar insulin in addition to TID mealtime insulin.   He currently has a Dexcom G6 CGM which is supplied to him through [employer].   Josseline Mason RN

## 2020-01-13 ENCOUNTER — TRANSFERRED RECORDS (OUTPATIENT)
Dept: FAMILY MEDICINE | Facility: CLINIC | Age: 60
End: 2020-01-13

## 2020-01-15 ENCOUNTER — TELEPHONE (OUTPATIENT)
Dept: FAMILY MEDICINE | Facility: CLINIC | Age: 60
End: 2020-01-15

## 2020-01-16 ENCOUNTER — OFFICE VISIT (OUTPATIENT)
Dept: PSYCHIATRY | Facility: CLINIC | Age: 60
End: 2020-01-16
Attending: PSYCHIATRY & NEUROLOGY
Payer: MEDICARE

## 2020-01-16 VITALS
DIASTOLIC BLOOD PRESSURE: 78 MMHG | SYSTOLIC BLOOD PRESSURE: 139 MMHG | HEART RATE: 90 BPM | WEIGHT: 166.4 LBS | BODY MASS INDEX: 23.21 KG/M2

## 2020-01-16 DIAGNOSIS — F25.0 SCHIZOAFFECTIVE DISORDER, BIPOLAR TYPE (H): ICD-10-CM

## 2020-01-16 DIAGNOSIS — Z79.899 OTHER LONG TERM (CURRENT) DRUG THERAPY: ICD-10-CM

## 2020-01-16 LAB
ALBUMIN SERPL-MCNC: 3.9 G/DL (ref 3.4–5)
ALP SERPL-CCNC: 129 U/L (ref 40–150)
ALT SERPL W P-5'-P-CCNC: 34 U/L (ref 0–70)
ANION GAP SERPL CALCULATED.3IONS-SCNC: 2 MMOL/L (ref 3–14)
AST SERPL W P-5'-P-CCNC: 12 U/L (ref 0–45)
BASOPHILS # BLD AUTO: 0 10E9/L (ref 0–0.2)
BASOPHILS NFR BLD AUTO: 0 %
BILIRUB SERPL-MCNC: 0.3 MG/DL (ref 0.2–1.3)
BUN SERPL-MCNC: 27 MG/DL (ref 7–30)
CALCIUM SERPL-MCNC: 8.8 MG/DL (ref 8.5–10.1)
CHLORIDE SERPL-SCNC: 109 MMOL/L (ref 94–109)
CHOLEST SERPL-MCNC: 113 MG/DL
CO2 SERPL-SCNC: 28 MMOL/L (ref 20–32)
CREAT SERPL-MCNC: 1.19 MG/DL (ref 0.66–1.25)
DIFFERENTIAL METHOD BLD: ABNORMAL
EOSINOPHIL # BLD AUTO: 0.1 10E9/L (ref 0–0.7)
EOSINOPHIL NFR BLD AUTO: 1.9 %
ERYTHROCYTE [DISTWIDTH] IN BLOOD BY AUTOMATED COUNT: 11.9 % (ref 10–15)
FOLATE SERPL-MCNC: 16.8 NG/ML
GFR SERPL CREATININE-BSD FRML MDRD: 66 ML/MIN/{1.73_M2}
GLUCOSE SERPL-MCNC: 245 MG/DL (ref 70–99)
HCT VFR BLD AUTO: 43.2 % (ref 40–53)
HDLC SERPL-MCNC: 67 MG/DL
HGB BLD-MCNC: 14.4 G/DL (ref 13.3–17.7)
IMM GRANULOCYTES # BLD: 0 10E9/L (ref 0–0.4)
IMM GRANULOCYTES NFR BLD: 0 %
LDLC SERPL CALC-MCNC: 34 MG/DL
LYMPHOCYTES # BLD AUTO: 0.7 10E9/L (ref 0.8–5.3)
LYMPHOCYTES NFR BLD AUTO: 16.5 %
MCH RBC QN AUTO: 31.3 PG (ref 26.5–33)
MCHC RBC AUTO-ENTMCNC: 33.3 G/DL (ref 31.5–36.5)
MCV RBC AUTO: 94 FL (ref 78–100)
MONOCYTES # BLD AUTO: 0.2 10E9/L (ref 0–1.3)
MONOCYTES NFR BLD AUTO: 5.6 %
NEUTROPHILS # BLD AUTO: 3.1 10E9/L (ref 1.6–8.3)
NEUTROPHILS NFR BLD AUTO: 76 %
NONHDLC SERPL-MCNC: 46 MG/DL
NRBC # BLD AUTO: 0 10*3/UL
NRBC BLD AUTO-RTO: 0 /100
PLATELET # BLD AUTO: 135 10E9/L (ref 150–450)
POTASSIUM SERPL-SCNC: 4.8 MMOL/L (ref 3.4–5.3)
PROT SERPL-MCNC: 6.4 G/DL (ref 6.8–8.8)
RBC # BLD AUTO: 4.6 10E12/L (ref 4.4–5.9)
SODIUM SERPL-SCNC: 139 MMOL/L (ref 133–144)
TRIGL SERPL-MCNC: 61 MG/DL
WBC # BLD AUTO: 4.1 10E9/L (ref 4–11)

## 2020-01-16 PROCEDURE — 86780 TREPONEMA PALLIDUM: CPT | Performed by: STUDENT IN AN ORGANIZED HEALTH CARE EDUCATION/TRAINING PROGRAM

## 2020-01-16 PROCEDURE — 80053 COMPREHEN METABOLIC PANEL: CPT | Performed by: STUDENT IN AN ORGANIZED HEALTH CARE EDUCATION/TRAINING PROGRAM

## 2020-01-16 PROCEDURE — 82746 ASSAY OF FOLIC ACID SERUM: CPT | Performed by: STUDENT IN AN ORGANIZED HEALTH CARE EDUCATION/TRAINING PROGRAM

## 2020-01-16 PROCEDURE — 85025 COMPLETE CBC W/AUTO DIFF WBC: CPT | Performed by: STUDENT IN AN ORGANIZED HEALTH CARE EDUCATION/TRAINING PROGRAM

## 2020-01-16 PROCEDURE — 84425 ASSAY OF VITAMIN B-1: CPT | Performed by: STUDENT IN AN ORGANIZED HEALTH CARE EDUCATION/TRAINING PROGRAM

## 2020-01-16 PROCEDURE — 80061 LIPID PANEL: CPT | Performed by: STUDENT IN AN ORGANIZED HEALTH CARE EDUCATION/TRAINING PROGRAM

## 2020-01-16 PROCEDURE — G0463 HOSPITAL OUTPT CLINIC VISIT: HCPCS | Mod: ZF

## 2020-01-16 PROCEDURE — 36415 COLL VENOUS BLD VENIPUNCTURE: CPT | Performed by: STUDENT IN AN ORGANIZED HEALTH CARE EDUCATION/TRAINING PROGRAM

## 2020-01-16 RX ORDER — VENLAFAXINE HYDROCHLORIDE 75 MG/1
75 CAPSULE, EXTENDED RELEASE ORAL DAILY
Qty: 30 CAPSULE | Refills: 2 | Status: SHIPPED | OUTPATIENT
Start: 2020-01-16 | End: 2020-03-06

## 2020-01-16 RX ORDER — RISPERIDONE 3 MG/1
3 TABLET ORAL AT BEDTIME
Qty: 30 TABLET | Refills: 2 | Status: SHIPPED | OUTPATIENT
Start: 2020-01-16 | End: 2020-03-06

## 2020-01-16 RX ORDER — VENLAFAXINE HYDROCHLORIDE 150 MG/1
CAPSULE, EXTENDED RELEASE ORAL
Qty: 30 CAPSULE | Refills: 2 | Status: SHIPPED | OUTPATIENT
Start: 2020-01-16 | End: 2020-03-06

## 2020-01-16 RX ORDER — TRAZODONE HYDROCHLORIDE 50 MG/1
75 TABLET, FILM COATED ORAL AT BEDTIME
Qty: 45 TABLET | Refills: 2 | Status: SHIPPED | OUTPATIENT
Start: 2020-01-16 | End: 2020-03-06

## 2020-01-16 ASSESSMENT — PAIN SCALES - GENERAL: PAINLEVEL: NO PAIN (0)

## 2020-01-16 NOTE — PATIENT INSTRUCTIONS
Health Maintenance Due   Topic Date Due   • Influenza Vaccine (1 of 2) 09/01/2019       Patient is due for the topics as listed above and wishes to proceed with them. Orders placed for Immunization(s) Influenza    Vaccine Information Statement(s) was given today. This has been reviewed, questions answered, and verbal consent given by Parent for injection(s) and administration of Influenza (Inactivated).    1. Does the patient have a moderate to severe fever?  No  2. Has the patient had a serious reaction to a flu shot before?   No  3. Has the patient ever had [place] Syndrome within 6 weeks of a previous flu shot?  No  4. Is the patient less that 6 months of age?  No    Patient is eligible to receive the vaccine based on all questions being answered as 'No'.    Patient tolerated without incident. See immunization grid for documentation.       Please start using the bright light 30 minutes, every day. Also, please exercise, do crossword puzzles, and socialize as able.    Please do not hesitate to call or message me with any questions or concerns.    Be well,  Dr. Parks

## 2020-01-16 NOTE — TELEPHONE ENCOUNTER
1/14/20 patient calls stating has cpx with Dr. Chao 1/27/20 and would like to have necessary labs done prior to visit. He would like us to fax lab orders to nursing office at his [facility] facility.   Dr. Chao reviewed chart and ordered following labs: lipid, CMP, TSH, PSA, CBC, urine microalbumin. Written order faxed to Nursing office at [facility] - fax:[phone]. Patient informed this was done.  Josseline Mason RN

## 2020-01-16 NOTE — PROGRESS NOTES
[facility]  Psychiatry Clinic  PSYCHIATRIC PROGRESS NOTE     CARE TEAM:  PCP- Annie Hart   The[facility]  [facility]  Ophthalmologist: Jacob Lieberman with [facility]: Dr. Zendejas-Clover, [phone] ext [phone] Community support: EVAN, Christa Monreal, [facility], [phone]     Caden Bush is a 58 year old male who prefers the name Caden & pronouns he.  The initial diagnostic evaluation was on 11/26/08 .   Date of the most recent transfer of care eval is 08/01/2019.      Pertinent Background:  This patient first experienced mental health issues in his late 20s , initially obtained care at that time and has received treatment for Schizoaffective Disorder, bipolar type. His diagnosis has changed through the time and afterreviewing all the evidence in 9/2018 the current diagnosis was confirmed (several episodes of moderate to severe depression and one episode of two weeks long period of grandiosity,  pressured speech, overspending and sleeplessness with increased social anxiety and paranoia).   Previously had a CCM until 2011. Has SW consult on 10/20/15, not interested in CM or [facility] worker services at this time and seems to be managing well on his own.     Of note, patient is found to be very hesitant to change any medication regimen or engaged in the treatment programs.  Luckily, he has been engaged in care with his therapist.     #Mood/psychosis:  - Notably, this pt was stable on regimen of Lithium, Effexor and Risperdal since 2011. However due to complications of chronic diabetes, Lithium was discontinued in 2017.   -  reviewed the chart. She recommended [facility] for groups and activities for him.   - We even tried going down on Risperidone for other reasons but he immediately got destabilized.     #Insomnia: The etiology of his insomnia is not well understood and the sleep study was discussed and encouraged. Sleep has improved  on 3 mg of risperidone while psychotic sxs are also better managed.      Psych critical item history includes suicidal ideation, psychosis [sxs include AH and paranoia], mutiple psychotropic trials and psych hosp (>5).     INTERIM HISTORY      [4, 4]   The patient reports good treatment adherence.  History was provided by the patient who was a good historian. Patient's sister was also present.    Since the last visit:     -His memory has been alright. He denies any worsening. He has problems with biographical memory pertaining to his childhood. This started around 1 year ago. It did not occur prior. It is from the 50s on.   -No focal neurological signs, including HA, change in vision, loss of balance, dizziness, numbness, tingling, weakness, gait disturbance, change in hearing, difficulty communicating or problems voiding.  -He would like to be seen by neuropsychology, which is scheduled for February.  -He saw a neurologist who did not notice any concerning findings.  -He has no acute concerns.  -No major side effects to report.  -He plans to see neuropsychologist in February.   -He is using bright light therapy. He uses it for 10 minutes. Encouraged him to use it everyday for 30 minutes.     RECENT PSYCH ROS:   Depression:  None  Elevated:  none  Psychosis:  none  Anxiety:  none  Panic Attack:  none  Trauma Related:  none  Dysregulation:  none  Eating Disorder: no   Cognitive: remote memory deficits (biographical), difficulty with names  Pertinent Negative Symptoms:  NO suicidal ideation, self-injurious behavior/urges, violent ideation, suicidal and violent ideation, aggression, psychosis, hallucinations, delusions, wolf and hypomania    RECENT SUBSTANCE USE:     ALCOHOL-  never   TOBACCO- none  CAFFEINE- 1 cup/day of coffee, 1 soda/ in a while  OPIOIDS- none      NARCAN KIT- N/A          CANNABIS- none  OTHER ILLICIT DRUGS- none      CURRENT SOCIAL HISTORY:  FINANCIAL SUPPORT- SSDI for schizoaffective disorder  and diabetes  CHILDREN- none      LIVING SITUATION- Currently living in an assisted living facility ([facility]) in [facility] 6/19. Receives assistance with managing medications and administering insulin.  SOCIAL/ SPIRITUAL SUPPORT- few friends and family. 2 sisters (55 and 62). Mom, alive (85). Father, alive (88).  FEELS SAFE AT HOME- yes       MEDICAL ROS (2,10):  Reports Arthritis pain (ongoing, unchanged) Denies Pain, headache, dizziness, GI [nausea, diarrhea, constipation, sedation, tremor and confusion.    PAST PSYCHOTROPIC MED TRIALS                                                           *     Drug / Start Date   Dose (mg)   Helpful   Adverse Effects  DC Reason / Date     Lithium   900           Risperdal         current     Abilify             Seroquel             Effexor         current     Zoloft             Wellbutrin             Buspar             trazodone         current     hydroxyzine             Ambien                Maybe Zyprexa, and Prozac, can't remember    July: Increased Risperidone dose  During months of fall and winter, patient was hesitant to make changes to medications. Modified risperidone and trazodone dose several times (minor changes). Not successful in complete resolution of sxs.     MEDICAL / SURGICAL HISTORY                                     Patient Active Problem List   Diagnosis     Arthritis     Retinopathy     Moderate episode of recurrent major depressive disorder (H)     Polyp of colon, adenomatous     Health Care Home     Hx of psychiatric care     Neuropathy of left upper extremity     Type 1 diabetes mellitus without complications (H)     Schizoaffective disorder, bipolar type (H)     Heart murmur on physical examination     Mild major depression (H)     Sciatica without back pain, unspecified laterality       Major Surgery-  Past Surgical History:   Procedure Laterality Date     APPENDECTOMY       COLONOSCOPY  2012    2 adenomatous polyps       ALLERGY        Flomax [tamsulosin] and Lisinopril  MEDICATIONS        Current Outpatient Medications   Medication Sig Dispense Refill     aspirin 81 MG EC tablet Take 81 mg by mouth daily.       cholecalciferol (VITAMIN D) 1000 UNIT tablet Take 1 tablet by mouth daily.       Continuous Blood Gluc  (DEXCOM G6 ) MICHELLE USE AS DIRECTED FOR CONTINUOUS GLUCOSE MONITORING 6 Device 3     Continuous Blood Gluc Sensor (DEXCOM G6 SENSOR) MISC 1 each every 10 days 9 each 3     Continuous Blood Gluc Transmit (DEXCOM G6 TRANSMITTER) MISC 1 each every 3 months 1 each 3     insulin aspart (NOVOLOG PEN) 100 UNIT/ML pen Inject 6 units before breakfast,  5 units before lunch and 5 units before dinner. 15 mL 0     insulin glargine (BASAGLAR KWIKPEN) 100 UNIT/ML pen Inject 15 Units Subcutaneous daily 15 mL 3     insulin pen needle (32G X 4 MM) 32G X 4 MM miscellaneous Use 4 pen needles daily. 400 each 3     order for DME Equipment being ordered: Light box > = 10,000 LUX. Use 15 minutes daily around noon with lunch. 1 Box 0     risperiDONE (RISPERDAL) 3 MG tablet Take 1 tablet (3 mg) by mouth At Bedtime 30 tablet 2     Sharps Container (COMPLETE NEEDLE COLLECTION SYS) MISC USE AS DIRECTED 1 each 0     Silodosin (RAPAFLO) 8 MG CAPS capsule Take 1 capsule (8 mg) by mouth daily 90 capsule 0     simvastatin (ZOCOR) 40 MG tablet Take 0.5 tablets (20 mg) by mouth At Bedtime 45 tablet 3     traZODone (DESYREL) 50 MG tablet Take 1.5 tablets (75 mg) by mouth At Bedtime 45 tablet 2     venlafaxine (EFFEXOR XR) 75 MG 24 hr capsule Take 1 capsule (75 mg) by mouth daily TAKE WITH  Effexor 150 mg for a total dose of 225 mg daily 30 capsule 2     venlafaxine (EFFEXOR-XR) 150 MG 24 hr capsule TAKE 1 CAPSULE BY MOUTH DAILY ALONG WITH A 75 MG FOR A TOTAL  MG DAILY 30 capsule 2     ACE/ARB/ARNI NOT PRESCRIBED, INTENTIONAL, Please choose reason not prescribed, below-  hypotension       blood glucose (NO BRAND SPECIFIED) test strip Use to test  "blood sugar 4 times daily or as directed for insulin injections 4 times daily. (Patient not taking: Reported on 12/3/2019) 400 each 3     Cyanocobalamin (B-12) 1000 MCG CAPS Take 1 capsule by mouth daily       VITALS      [3, 3]   /78   Pulse 90   Wt 75.5 kg (166 lb 6.4 oz)   BMI 23.21 kg/m     MENTAL STATUS EXAM      [9, 14 cog gs]     Alertness: alert and oriented  Appearance: appropriately groomed, asymmetrical facial features   Behavior/Demeanor: cooperative, pleasant and calm, with good eye contact   Speech: normal and regular rate and rhythm  Language: intact and no problems  Psychomotor: normal or unremarkable  Mood: \"Fine.\"  Affect: Blunted  Thought Process/Associations: Linear  Thought Content: Denies SI or HI.   Perception: Denies AVH  Insight: Fair  Judgment: Good  Cognition: (6) Difficulty with remote memory, normal MoCA  Gait and Station: unremarkable    LABS and DATA     AIMS:  0: 3/1/2018, 0: 08/02/2019    PHQ9 TODAY = 4  PHQ-9 SCORE 10/3/2019 10/30/2019 12/19/2019   PHQ-9 Total Score - - -   PHQ-9 Total Score 1 3 6   PHQ-9 Total Score - - -     MOCA (8/19): 26/30  (including 0 points added for education)  - Visuospatial/executive:  5/5  - Naming: 3/3  - Attention: 5/6  - Language: 2/3  - Abstraction: 1/2   - Delayed recall: 4/5  - Orientation: 6/6    ANTIPSYCHOTIC LABS  [glu, A1C, lipids (ezequiel LDL), liver enzymes, WBC, ANEU, Hgb, plts]  q12 mo  Recent Labs   Lab Test 11/29/19 07/25/19  1415 05/15/19  1045 03/15/19  1015 03/04/19  1519 02/13/19  1625 01/02/19  1045   GLC  --   --   --  346* 444* 254* 147*   A1C 5.8* 6.6* 7.9*  --   --   --  6.7*     Recent Labs   Lab Test 01/02/19  1045 04/17/18  1356 07/11/17  1034 06/07/17  1004   CHOL 127 139 126 143   TRIG 51 45 49 105   LDL 33 45 40 46   HDL 84 85 76 76     Recent Labs   Lab Test 01/02/19  1045 04/17/18  1356 01/30/18  0923 07/11/17  1034   AST 14 15 14 14   ALT 13 12 16 12   ALKPHOS 74 73 79 72     Recent Labs   Lab Test 02/13/19  1522 " 01/02/19  1126 01/02/19  1045 10/11/18  1010 09/05/18  1123  06/09/16  1030 12/21/15  1413  08/01/12  1640   WBC 5.3 4.2  --  2.5* 2.8*   < > 4.7 4.6   < > 5.5   ANEU  --   --   --   --   --   --  3.5 3.4  --  4.8   HGB 12.7* 12.9* 13.1 12.5* 11.5*   < > 14.7 13.4   < > 11.4*    149  --  136* 168   < > 175 155   < > 180    < > = values in this interval not displayed.       PSYCHOTROPIC DRUG INTERACTIONS     VENLAFAXINE and ANTIPLATELET AGENTS may result in an increased risk of bleeding.  RISPERIDONE and SIMVASTATIN may result in increased simvastatin serum concentrations -with an increased risk of myopathy or rhabdomyolysis.  TRAZODONE and VENLAFAXINE may result in an increased risk of serotonin syndrome.    MANAGEMENT:  Monitoring for adverse effects    RISK STATEMENT for SAFETY     Caden Bush denies any SI /SIB.  In addition, he has notable risk factors for self-harm including feels trapped, hopelessness, lives alone/ isolated, hallucinations and male.  However, risk is mitigated by no h/o suicide attempt, describes a safety plan, none to minimal alcohol use , commitment to family and stable housing.  Based on all available evidence he does not appear to be at imminent risk for self-harm therefore does not meet criteria for a 72-hr hold/  involuntary hospitalization.  However, based on degree of symptoms voluntary hospitalization, day treatment and close psych FU was recommended which the pt did agree to Day Treatment and close follow up.  Additional steps to minimize risk include: SAFETY PLAN completed 5/10/2019.  Safety Plan placed in AVS: Yes.    DIAGNOSIS     Schizoaffective disorder, bipolar type, in remission (on medications and with therapy)  Seasonal affective disorder    ASSESSMENT      [m2, h3]     TODAY    Patient stable on his current medication regimen with no psychotic and minimal mood symptoms. Performed a MoCA recently to screen for cognitive impairment with normal results. Originally  suspected his sense of forgetfulness and change in cognitive awareness was potentially related to long-term history of a neurocognitive disorder such as schizoaffective disorder, in addition to Risperidone, which can cause cognitive blunting. Further evaluation with collateral from sister shows that patient's memory loss is isolated to biographical content with patient having minimal memories before his 50s. It began around 1 year ago and has been relatively stable. Given the acute onset and isolated nature of memory loss being remote with a normal MoCA, will pursue additional workup to ensure no underlying medical cause like a prior stroke or microvascular disease. Patient has no acute neurological symptoms to warrant more urgent evaluation. He was referred to neuropsychological testing to help determine the nature of his cognitive limitations and potentially identify a diagnosis. He was referred to neurology with no troublesome findings. An MRI was ordered with no findings of any acute changes and appropriate age related changes. In addition, will order labs (Thiamine, Folic Acid, RPR) today with routine antipsychotic monitoring labs to further rule out any possible causes.    Doubt that medication is playing a role given that patient has been on Risperidone for at least 8 years if not longer. In addition reducing Risperidone is not a good option at this point given that this has led to destabilization in the past. Will continue medications as prescribed.    Ordered a light box therapy given his tend to have worsening of mood over the winter, decreased energy, and lack of drive, which correspond with seasonal affective disorder. Patient will continue with increase in frequency or duration. He also will try to start brain training exercises.     #History of Leukopenia, unspecified type #Low PLT count #Anemia  - PCP is aware. Possibly due to Risperdal. Had a physical with PCP on Jan 2nd when he was also going to  evaluate hematologic abnormalities. Receives iron replacement  #DM II: On insulin, last A1c 6.6  # Vitamin D def> replacement  #Benign adrenal incidentaloma     PLAN      [m2, h3]     1) PSYCHOTROPIC MEDICATIONS:  - Continue Risperidone 3 mg QHS  - Continue Effexor  mg daily  - Continue Trazodone 75 mg at bedtime  - Continue bright light therapy daily for 30 minutes    - Stop Fish Oil 1 g daily per patient's discussion with pharmacy. Will explore Vascepa as alternative   - Continue B12 1000 mcg daily  - Continue Vitamin D 1,000 international unit(s) daily         2) THERAPY:  Seeing psychologist Yeny Goff at [facility] since 11/2015. Sees once/twice a month. Finishing day treatment      3) LABS NEXT DUE: Antipsychotic labs next due 01/2020, B1, Folate, RPR      RATING SCALES: AIMS 8/20      MoCA: 8/20      4) REFERRALS:  None     7) RTC: 2 months       8) CRISIS NUMBERS:  See AVS    9) MN  was not checked today.    10) Goals:  - Find one meaningful friendship this year, patient is accomplishing  - Work on cognition through brain [employer] or [employer]     TREATMENT RISK STATEMENT:  The risks, benefits, alternatives and potential adverse effects have been discussed and are understood by the pt. The pt understands the risks of using street drugs or alcohol. There are no medical contraindications, the pt agrees to treatment with the ability to do so. The pt knows to call the clinic for any problems or to access emergency care if needed.  Medical and substance use concerns are documented above.  Psychotropic drug interaction check was done, including changes made today.     PROVIDER: Miguelangel Parks DO    Patient staffed in clinic with Dr. Phillips who will sign the note.  Supervisor is Dr. Phillips.    Supervisor Attestation:  I met with Caden Bush along with the resident physician, Miguelangel Parks DO. I participated in key portions of the service, including the mental status examination and developing the plan  of care. I reviewed key portions of the history with the resident. I agree with the findings and plan as documented in this note.  Antolin Phillips MD

## 2020-01-17 LAB — T PALLIDUM AB SER QL: NONREACTIVE

## 2020-01-20 ENCOUNTER — THERAPY VISIT (OUTPATIENT)
Dept: PHYSICAL THERAPY | Facility: CLINIC | Age: 60
End: 2020-01-20
Payer: MEDICARE

## 2020-01-20 DIAGNOSIS — M54.30 SCIATICA WITHOUT BACK PAIN, UNSPECIFIED LATERALITY: ICD-10-CM

## 2020-01-20 PROCEDURE — 97110 THERAPEUTIC EXERCISES: CPT | Mod: GP | Performed by: PHYSICAL THERAPY ASSISTANT

## 2020-01-21 LAB — VIT B1 BLD-MCNC: 151 NMOL/L (ref 70–180)

## 2020-01-22 ENCOUNTER — RECORDS - HEALTHEAST (OUTPATIENT)
Dept: LAB | Facility: CLINIC | Age: 60
End: 2020-01-22

## 2020-01-22 ENCOUNTER — TRANSFERRED RECORDS (OUTPATIENT)
Dept: FAMILY MEDICINE | Facility: CLINIC | Age: 60
End: 2020-01-22

## 2020-01-22 LAB
ALBUMIN SERPL-MCNC: 3.6 G/DL (ref 3.5–5)
ALBUMIN SERPL-MCNC: 3.6 G/DL (ref 3.5–5)
ALBUMIN/CREATININE RATIO: NORMAL
ALP SERPL-CCNC: 102 U/L (ref 45–120)
ALP SERPL-CCNC: 102 U/L (ref 45–120)
ALT SERPL W P-5'-P-CCNC: 19 U/L (ref 0–45)
ALT SERPL-CCNC: 19 U/L (ref 0–45)
ANION GAP SERPL CALCULATED.3IONS-SCNC: 4 MMOL/L (ref 5–18)
AST SERPL W P-5'-P-CCNC: 18 U/L (ref 0–40)
AST SERPL-CCNC: 18 U/L (ref 0–40)
BASOPHILS # BLD AUTO: 0 THOU/UL (ref 0–0.2)
BASOPHILS NFR BLD AUTO: 0 % (ref 0–2)
BILIRUB SERPL-MCNC: 0.8 MG/DL (ref 0–1)
BUN SERPL-MCNC: 20 MG/DL (ref 8–22)
CALCIUM SERPL-MCNC: 8.8 MG/DL (ref 8.5–10.5)
CALCIUM SERPL-MCNC: 8.8 MG/DL (ref 8.5–10.5)
CHLORIDE BLD-SCNC: 96 MMOL/L (ref 98–107)
CHLORIDE SERPLBLD-SCNC: 96 MMOL/L (ref 98–107)
CHOLEST SERPL-MCNC: 103 MG/DL
CHOLEST SERPL-MCNC: 103 MG/DL (ref 0–199)
CO2 SERPL-SCNC: 27 MMOL/L (ref 22–31)
CO2 SERPL-SCNC: 27 MMOL/L (ref 22–31)
CREAT SERPL-MCNC: 1.07 MG/DL (ref 0.7–1.3)
CREAT SERPL-MCNC: 1.07 MG/DL (ref 0.7–1.3)
CREAT UR-MCNC: 20.4 MG/DL
CREATININE (URINE): 20.4 MG/DL
EOSINOPHIL # BLD AUTO: 0.1 THOU/UL (ref 0–0.4)
EOSINOPHIL NFR BLD AUTO: 2 % (ref 0–6)
ERYTHROCYTE [DISTWIDTH] IN BLOOD BY AUTOMATED COUNT: 11.4 % (ref 11–14.5)
ERYTHROCYTE [DISTWIDTH] IN BLOOD BY AUTOMATED COUNT: 11.4 % (ref 11–14.5)
FASTING STATUS PATIENT QL REPORTED: NORMAL
GFR SERPL CREATININE-BSD FRML MDRD: >60 ML/MIN/1.73M2
GLUCOSE BLD-MCNC: 103 MG/DL (ref 70–125)
GLUCOSE SERPL-MCNC: 103 MG/DL (ref 70–125)
HCT VFR BLD AUTO: 38.7 % (ref 40–54)
HCT VFR BLD AUTO: 38.7 % (ref 40–54)
HDLC SERPL-MCNC: 56 MG/DL
HDLC SERPL-MCNC: 56 MG/DL (ref 40–?)
HEMOGLOBIN: 13.1 G/DL (ref 13.3–17.7)
HGB BLD-MCNC: 13.1 G/DL (ref 14–18)
LDLC SERPL CALC-MCNC: 41 MG/DL
LDLC SERPL CALC-MCNC: 41 MG/DL (ref 0–129)
LYMPHOCYTES # BLD AUTO: 0.7 THOU/UL (ref 0.8–4.4)
LYMPHOCYTES NFR BLD AUTO: 17 % (ref 20–40)
MCH RBC QN AUTO: 31.3 PG (ref 27–34)
MCH RBC QN AUTO: 31.3 PG (ref 27–34)
MCHC RBC AUTO-ENTMCNC: 33.9 G/DL (ref 32–36)
MCHC RBC AUTO-ENTMCNC: 33.9 G/DL (ref 32–36)
MCV RBC AUTO: 93 FL (ref 80–100)
MCV RBC AUTO: 93 FL (ref 80–100)
MICROALBUMIN UR-MCNC: <0.5 MG/DL (ref 0–1.99)
MICROALBUMIN, RAND UR - HISTORICAL: <0.5 (ref ?–1.99)
MICROALBUMIN/CREAT UR: NORMAL MG/G{CREAT}
MONOCYTES # BLD AUTO: 0.3 THOU/UL (ref 0–0.9)
MONOCYTES NFR BLD AUTO: 6 % (ref 2–10)
NEUTROPHILS # BLD AUTO: 3.3 THOU/UL (ref 2–7.7)
NEUTROPHILS NFR BLD AUTO: 75 % (ref 50–70)
PLATELET # BLD AUTO: 127 10^9/L (ref 140–440)
PLATELET # BLD AUTO: 127 THOU/UL (ref 140–440)
PMV BLD AUTO: 9.9 FL (ref 8.5–12.5)
PMV BLD: 9.9 FL (ref 8.5–12.5)
POTASSIUM BLD-SCNC: 4.6 MMOL/L (ref 3.5–5)
POTASSIUM SERPL-SCNC: 4.6 MMOL/L (ref 3.5–5)
PROT SERPL-MCNC: 5.7 G/DL (ref 6–8)
PROT SERPL-MCNC: 5.7 G/DL (ref 6–8)
PSA SERPL-MCNC: 1.4 NG/ML (ref 0–4.5)
PSA SERPL-MCNC: 1.4 NG/ML (ref ?–4.5)
RBC # BLD AUTO: 4.18 10^12/L (ref 4.4–6.2)
RBC # BLD AUTO: 4.18 MILL/UL (ref 4.4–6.2)
SODIUM SERPL-SCNC: 127 MMOL/L (ref 136–145)
SODIUM SERPL-SCNC: 127 MMOL/L (ref 136–145)
TRIGL SERPL-MCNC: 30 MG/DL
TRIGL SERPL-MCNC: 30 MG/DL (ref 0–149)
TSH SERPL DL<=0.005 MIU/L-ACNC: 0.89 UIU/ML (ref 0.3–5)
TSH SERPL-ACNC: 0.89 MCU/ML (ref 0.3–5)
WBC # BLD AUTO: 4.4 10^9/L (ref 4–11)
WBC: 4.4 THOU/UL (ref 4–11)

## 2020-01-27 ENCOUNTER — OFFICE VISIT (OUTPATIENT)
Dept: FAMILY MEDICINE | Facility: CLINIC | Age: 60
End: 2020-01-27

## 2020-01-27 VITALS
RESPIRATION RATE: 16 BRPM | SYSTOLIC BLOOD PRESSURE: 130 MMHG | HEART RATE: 78 BPM | OXYGEN SATURATION: 98 % | HEIGHT: 72 IN | BODY MASS INDEX: 21.94 KG/M2 | DIASTOLIC BLOOD PRESSURE: 74 MMHG | WEIGHT: 162 LBS

## 2020-01-27 DIAGNOSIS — M54.30 SCIATICA WITHOUT BACK PAIN, UNSPECIFIED LATERALITY: ICD-10-CM

## 2020-01-27 DIAGNOSIS — L30.9 ECZEMA, UNSPECIFIED TYPE: ICD-10-CM

## 2020-01-27 DIAGNOSIS — E78.00 HYPERCHOLESTEREMIA: ICD-10-CM

## 2020-01-27 DIAGNOSIS — Z00.00 ROUTINE GENERAL MEDICAL EXAMINATION AT A HEALTH CARE FACILITY: Primary | ICD-10-CM

## 2020-01-27 DIAGNOSIS — F33.1 MODERATE EPISODE OF RECURRENT MAJOR DEPRESSIVE DISORDER (H): ICD-10-CM

## 2020-01-27 DIAGNOSIS — F25.0 SCHIZOAFFECTIVE DISORDER, BIPOLAR TYPE (H): ICD-10-CM

## 2020-01-27 DIAGNOSIS — H35.00 RETINOPATHY: ICD-10-CM

## 2020-01-27 DIAGNOSIS — E10.319 TYPE 1 DIABETES MELLITUS WITH RETINOPATHY OF BOTH EYES, MACULAR EDEMA PRESENCE UNSPECIFIED, UNSPECIFIED RETINOPATHY SEVERITY (H): ICD-10-CM

## 2020-01-27 DIAGNOSIS — Z76.0 ENCOUNTER FOR MEDICATION REFILL: ICD-10-CM

## 2020-01-27 PROBLEM — M19.042 LOCALIZED OSTEOARTHRITIS OF HANDS, BILATERAL: Status: ACTIVE | Noted: 2017-05-17

## 2020-01-27 PROBLEM — M19.041 LOCALIZED OSTEOARTHRITIS OF HANDS, BILATERAL: Status: ACTIVE | Noted: 2017-05-17

## 2020-01-27 PROBLEM — F32.0 MILD MAJOR DEPRESSION (H): Status: RESOLVED | Noted: 2018-09-27 | Resolved: 2020-01-27

## 2020-01-27 PROCEDURE — 99213 OFFICE O/P EST LOW 20 MIN: CPT | Mod: 25 | Performed by: FAMILY MEDICINE

## 2020-01-27 PROCEDURE — G0438 PPPS, INITIAL VISIT: HCPCS | Performed by: FAMILY MEDICINE

## 2020-01-27 RX ORDER — SIMVASTATIN 40 MG
20 TABLET ORAL AT BEDTIME
Qty: 45 TABLET | Refills: 3 | Status: ON HOLD | OUTPATIENT
Start: 2020-01-27 | End: 2020-04-06

## 2020-01-27 RX ORDER — SIMVASTATIN 20 MG
TABLET ORAL
COMMUNITY
Start: 2020-01-16 | End: 2020-01-27

## 2020-01-27 ASSESSMENT — MIFFLIN-ST. JEOR: SCORE: 1582.83

## 2020-01-27 NOTE — PATIENT INSTRUCTIONS
I recommend that you go to the pharmacy to get the new Shingrix shot.        Preventive Health Recommendations  Male Ages 50 - 64    Yearly exam:             See your health care provider every year in order to  o   Review health changes.   o   Discuss preventive care.    o   Review your medicines if your doctor has prescribed any.     Have a cholesterol test every 5 years, or more frequently if you are at risk for high cholesterol/heart disease.     Have a diabetes test (fasting glucose) every three years. If you are at risk for diabetes, you should have this test more often.     Have a colonoscopy at age 50, or have a yearly FIT test (stool test). These exams will check for colon cancer.      Talk with your health care provider about whether or not a prostate cancer screening test (PSA) is right for you.    You should be tested each year for STDs (sexually transmitted diseases), if you re at risk.     Shots: Get a flu shot each year. Get a tetanus shot every 10 years.     Nutrition:    Eat at least 5 servings of fruits and vegetables daily.     Eat whole-grain bread, whole-wheat pasta and brown rice instead of white grains and rice.     Get adequate Calcium and Vitamin D.     Lifestyle    Exercise for at least 150 minutes a week (30 minutes a day, 5 days a week). This will help you control your weight and prevent disease.     Limit alcohol to one drink per day.     No smoking.     Wear sunscreen to prevent skin cancer.     See your dentist every six months for an exam and cleaning.     See your eye doctor every 1 to 2 years.

## 2020-01-27 NOTE — PROGRESS NOTES
3  SUBJECTIVE:   CC: Caden Bush is an 59 year old male who presents for preventive health visit.     Healthy Habits:    Do you get at least three servings of calcium containing foods daily (dairy, green leafy vegetables, etc.)? yes    Amount of exercise or daily activities, outside of work: 3 day(s) per week    Problems taking medications regularly No    Medication side effects: No    Have you had an eye exam in the past two years? yes    Do you see a dentist twice per year? No-once a year    Do you have sleep apnea, excessive snoring or daytime drowsiness?no    HEARING SCREEN PASS FOR BOTH EARS        PROBLEMS TO ADD ON...  -------------------------------------  [id number]  Diabetes type 1  he  reports no new symptoms.  No significnat or regular episodes of hypo or hyperglycemia  Medication compliance: compliant most of the time  Diabetic diet compliance: compliant most of the time  Diabetic ROS: no polyuria or polydipsia, no chest pain, dyspnea or TIA's, no numbness, tingling or pain in extremities    Home blood sugar monitoring: are performed regularly, Review of patients self blood glucose monitoring shows fasting most always < 130 and post prandial average under 170.  As a direct cause of their history of diabetes they have the following Diabetic complications: retinopathy    Most  recent A1C: Smoking: BP:   The last 5 available A1C values from [facility] [place], [facility]:  No components found for: [id number]     Lab Results   Component Value Date    A1C 5.8 11/29/2019    A1C 6.6 07/25/2019    A1C 7.9 05/15/2019    History   Smoking Status     Never Smoker   Smokeless Tobacco     Never Used    BP Readings from Last 1 Encounters:   01/27/20 130/74        Kidney studies:  Creatinine   Date Value Ref Range Status   01/16/2020 1.19 0.66 - 1.25 mg/dL Final   03/04/2019 1.02 0.76 - 1.27 mg/dL Final   02/13/2019 1.21 0.76 - 1.27 mg/dL Final   ]    GFR Estimate   Date Value Ref Range Status   01/16/2020 66 >60  mL/min/[1.73_m2] Final     Comment:     Non  GFR Calc  Starting 12/18/2018, serum creatinine based estimated GFR (eGFR) will be   calculated using the Chronic Kidney Disease Epidemiology Collaboration   (CKD-EPI) equation.                  No components found for: MICORALBUMINLC      GFR Estimate If Black   Date Value Ref Range Status   01/16/2020 76 >60 mL/min/[1.73_m2] Final     Comment:      GFR Calc  Starting 12/18/2018, serum creatinine based estimated GFR (eGFR) will be   calculated using the Chronic Kidney Disease Epidemiology Collaboration   (CKD-EPI) equation.       Medication (Note: This includes the hypertensive combination subclass to make sure to show all ACEI/ARB's.)     ACE Inhibitors       ACE/ARB/ARNI NOT PRESCRIBED, INTENTIONAL,    Please choose reason not prescribed, below-  hypotension    Angiotensin II Receptor Antagonists       ACE/ARB/ARNI NOT PRESCRIBED, INTENTIONAL,    Please choose reason not prescribed, below-  hypotension               Statin and ASA:  Current Outpatient Medications   Medication     aspirin 81 MG EC tablet     cholecalciferol (VITAMIN D) 1000 UNIT tablet     Continuous Blood Gluc  (DEXCOM G6 ) MICHELLE     Continuous Blood Gluc Sensor (DEXCOM G6 SENSOR) MISC     Continuous Blood Gluc Transmit (DEXCOM G6 TRANSMITTER) MISC     insulin aspart (NOVOLOG PEN) 100 UNIT/ML pen     insulin glargine (BASAGLAR KWIKPEN) 100 UNIT/ML pen     insulin pen needle (32G X 4 MM) 32G X 4 MM miscellaneous     order for DME     risperiDONE (RISPERDAL) 3 MG tablet     Sharps Container (COMPLETE NEEDLE COLLECTION SYS) MISC     Silodosin (RAPAFLO) 8 MG CAPS capsule     simvastatin (ZOCOR) 40 MG tablet     traZODone (DESYREL) 50 MG tablet     venlafaxine (EFFEXOR XR) 75 MG 24 hr capsule     venlafaxine (EFFEXOR-XR) 150 MG 24 hr capsule     ACE/ARB/ARNI NOT PRESCRIBED, INTENTIONAL,     blood glucose (NO BRAND SPECIFIED) test strip     Cyanocobalamin (B-12)  1000 MCG CAPS     No current facility-administered medications for this visit.      Having some pain into the legs like sciatica if walks too far.    Taking his vit D supplement and due for a check on the current level.      Today's PHQ-2 Score:   PHQ-2 ( 1999 Pfizer) 1/27/2020 12/19/2019   Q1: Little interest or pleasure in doing things 0 1   Q2: Feeling down, depressed or hopeless 0 1   PHQ-2 Score 0 2   Some encounter information is confidential and restricted. Go to Review Flowsheets activity to see all data.       Abuse: Current or Past(Physical, Sexual or Emotional)- No  Do you feel safe in your environment? Yes        Social History     Tobacco Use     Smoking status: Never Smoker     Smokeless tobacco: Never Used   Substance Use Topics     Alcohol use: No     Alcohol/week: 0.0 standard drinks     If you drink alcohol do you typically have >3 drinks per day or >7 drinks per week? No                      Last PSA: No results found for: PSA    Reviewed orders with patient. Reviewed health maintenance and updated orders accordingly - Yes  BP Readings from Last 3 Encounters:   01/27/20 130/74   12/19/19 (!) 142/70   07/25/19 124/74    Wt Readings from Last 3 Encounters:   01/27/20 73.5 kg (162 lb)   12/19/19 75.6 kg (166 lb 9.6 oz)   07/25/19 69.9 kg (154 lb)                    Reviewed and updated as needed this visit by clinical staff  Tobacco  Allergies  Meds         Reviewed and updated as needed this visit by Provider        Past Medical History:   Diagnosis Date     Arthritis      Eczema 2011    biopsy leg      Polyp of colon, adenomatous 6/25/2012     Retinopathy     HFA ophtho DR HARLEY     Vitamin D deficiency     replaced      Past Surgical History:   Procedure Laterality Date     APPENDECTOMY       COLONOSCOPY  2012    2 adenomatous polyps       ROS:  CONSTITUTIONAL: NEGATIVE for fever, chills, change in weight  INTEGUMENTARY/SKIN: NEGATIVE for worrisome rashes, moles or lesions  EYES: NEGATIVE for  vision changes or irritation  ENT: NEGATIVE for ear, mouth and throat problems  RESP: NEGATIVE for significant cough or SOB  CV: NEGATIVE for chest pain, palpitations or peripheral edema  GI: NEGATIVE for nausea, abdominal pain, heartburn, or change in bowel habits   male: negative for dysuria, hematuria, decreased urinary stream, erectile dysfunction, urethral discharge  MUSCULOSKELETAL: NEGATIVE for significant arthralgias or myalgia  NEURO: NEGATIVE for weakness, dizziness or paresthesias  PSYCHIATRIC: NEGATIVE for changes in mood or affect    OBJECTIVE:   /74   Pulse 78   Resp 16   Ht 1.829 m (6')   Wt 73.5 kg (162 lb)   SpO2 98%   BMI 21.97 kg/m    EXAM:  GENERAL: healthy, alert and no distress  EYES: Eyes grossly normal to inspection, PERRL and conjunctivae and sclerae normal  HENT: ear canals and TM's normal, nose and mouth without ulcers or lesions  NECK: no adenopathy, no asymmetry, masses, or scars and thyroid normal to palpation  RESP: lungs clear to auscultation - no rales, rhonchi or wheezes  CV: regular rate and rhythm, normal S1 S2, no S3 or S4, no murmur, click or rub, no peripheral edema and peripheral pulses strong  ABDOMEN: soft, nontender, no hepatosplenomegaly, no masses and bowel sounds normal   (male): normal male genitalia without lesions or urethral discharge, no hernia  RECTAL: normal sphincter tone, no rectal masses, prostate normal size, smooth, nontender without nodules or masses  MS: no gross musculoskeletal defects noted, no edema  SKIN: no suspicious lesions or rashes  NEURO: Normal strength and tone, mentation intact and speech normal  PSYCH: mentation appears normal, affect normal/bright  Diabetic foot exam: normal DP and PT pulses, no trophic changes or ulcerative lesions and normal sensory exam    Diagnostic Test Results:  Labs reviewed in Epic    ASSESSMENT/PLAN:   Caden was seen today for physical and hearing screening.    Diagnoses and all orders for this  visit:    Routine general medical examination at a health care facility    Moderate episode of recurrent major depressive disorder (H)  Working with team and doing well    Retinopathy  Seed eye doctor regularly    Eczema, unspecified type    Type 1 diabetes mellitus with retinopathy of both eyes, macular edema presence unspecified, unspecified retinopathy severity (H)  Doing well.  Discussed importance in compliance in all areas of diabetic control including diet, routine BS checks, absolute medication compliance, laboratory monitoring, and attending regular follow up appointments as ordered.  Failure to comply with instructions regarding diabetes will lead to a greater chance of poor diabetic control and therefore a greater chance of diabetes related complications such as CAD, CVA, PVD, and retinopathy/neuropathy/nephropathy.      Sciatica without back pain, unspecified laterality  stable  Schizoaffective disorder, bipolar type (H)    hyperchol  Discussed current lipid results, previous results (if available) current guidelines (NCEP) for treatment and goals for lipids.  Discussed lifestyle modification, dietary changes (low fat, low simple carb) and regular aerobic exercise.  Discussed the link between dysmetabolic syndrome and impaired glucose tolerance seen in certain patterns of lipids.  Briefly discussed medication used for lipid lowering, including the statins are their possible side effects of myalgias, rhabdomyolysis, and liver toxicity.    -     simvastatin (ZOCOR) 40 MG tablet; Take 0.5 tablets (20 mg) by mouth At Bedtime        COUNSELING:  Reviewed preventive health counseling, as reflected in patient instructions       Regular exercise       Healthy diet/nutrition    Estimated body mass index is 21.97 kg/m  as calculated from the following:    Height as of this encounter: 1.829 m (6').    Weight as of this encounter: 73.5 kg (162 lb).         reports that he has never smoked. He has never used  smokeless tobacco.      Counseling Resources:  ATP IV Guidelines  Pooled Cohorts Equation Calculator  FRAX Risk Assessment  ICSI Preventive Guidelines  Dietary Guidelines for Americans, 2010  USDA's MyPlate  ASA Prophylaxis  Lung CA Screening    Yassine Chao MD  [facility]

## 2020-01-28 ENCOUNTER — TRANSFERRED RECORDS (OUTPATIENT)
Dept: FAMILY MEDICINE | Facility: CLINIC | Age: 60
End: 2020-01-28

## 2020-01-28 LAB — RETINOPATHY: NORMAL

## 2020-01-30 ENCOUNTER — THERAPY VISIT (OUTPATIENT)
Dept: PHYSICAL THERAPY | Facility: CLINIC | Age: 60
End: 2020-01-30
Payer: MEDICARE

## 2020-01-30 DIAGNOSIS — M54.30 SCIATICA WITHOUT BACK PAIN, UNSPECIFIED LATERALITY: ICD-10-CM

## 2020-01-30 PROCEDURE — 97110 THERAPEUTIC EXERCISES: CPT | Mod: GP | Performed by: PHYSICAL THERAPY ASSISTANT

## 2020-01-30 NOTE — PROGRESS NOTES
Subjective:  HPI                    Objective:  System         Lumbar/SI Evaluation  ROM:    AROM Lumbar:   Flexion:          Mid tibia  Ext:                    50%   Side Bend:        Left:     Right:   Rotation:           Left:  100%    Right:  100%  Side Glide:        Left:     Right:                                                                          General     ROS    Assessment/Plan:    DISCHARGE REPORT    Progress reporting period is from 1/7/20 to 1/30/20.      SUBJECTIVE  Subjective changes noted by patient:   The leg pain has decreased and overall are better.  My legs just get tired  and thinks that they are more deconditioned vs anything else.    Have not walked any distance.  Patient feels that he can be independent with his home exercise program and has what he needs.  Current pain level is  1/10    Previous pain level was:   Initial Pain level: 6/10   Changes in function:  Yes (See Goal flowsheet attached for changes in current functional level) Changes in function: Yes, see goal flow sheet for change in function   Adverse reaction to treatment or activity: None     OBJECTIVE  Changes noted in objective findings:  Yes, patient continues to still have poor posture.  When perform posture correction patient will perform but then returns back to his forward flexed posture.  Patient does have a good home exercise program and was able to advance today with no increase of low back pain or radiating pain into the legs patient.  Patient does have a good home exercise program.        ASSESSMENT/PLAN  Updated problem list and treatment plan: Diagnosis 1:  Bilateral Sciatica   STG/LTGs have been met or progress has been made towards goals:  Yes (See Goal flow sheet completed today.)  Assessment of Progress: The patient's condition is improving.  Self Management Plans:  Patient is independent in a home treatment program.  Patient is independent in self management of symptoms.  I have re-evaluated this patient  and find that the nature, scope, duration and intensity of the therapy is appropriate for the medical condition of the patient.  Caden continues to require the following intervention to meet STG and LTG's:  PT intervention is no longer required to meet STG/LTG.    Recommendations:  This patient is ready to be discharged from therapy and continue their home treatment program.    Please refer to the daily flowsheet for treatment today, total treatment time and time spent performing 1:1 timed codes.

## 2020-02-04 ENCOUNTER — ALLIED HEALTH/NURSE VISIT (OUTPATIENT)
Dept: PHARMACY | Facility: PHYSICIAN GROUP | Age: 60
End: 2020-02-04
Payer: MEDICAID

## 2020-02-04 DIAGNOSIS — Z78.9 TAKES DIETARY SUPPLEMENTS: ICD-10-CM

## 2020-02-04 DIAGNOSIS — E10.9 TYPE 1 DIABETES MELLITUS WITHOUT COMPLICATION (H): Primary | ICD-10-CM

## 2020-02-04 DIAGNOSIS — M19.90 ARTHRITIS: ICD-10-CM

## 2020-02-04 DIAGNOSIS — F25.0 SCHIZOAFFECTIVE DISORDER, BIPOLAR TYPE (H): ICD-10-CM

## 2020-02-04 DIAGNOSIS — G47.00 INSOMNIA, UNSPECIFIED TYPE: ICD-10-CM

## 2020-02-04 DIAGNOSIS — E78.5 HYPERLIPIDEMIA LDL GOAL <100: ICD-10-CM

## 2020-02-04 DIAGNOSIS — R35.0 BENIGN PROSTATIC HYPERPLASIA WITH URINARY FREQUENCY: ICD-10-CM

## 2020-02-04 DIAGNOSIS — N40.1 BENIGN PROSTATIC HYPERPLASIA WITH URINARY FREQUENCY: ICD-10-CM

## 2020-02-04 PROBLEM — M54.30: Status: RESOLVED | Noted: 2019-12-26 | Resolved: 2020-02-04

## 2020-02-04 PROCEDURE — 99605 MTMS BY PHARM NP 15 MIN: CPT | Performed by: PHARMACIST

## 2020-02-04 PROCEDURE — 99607 MTMS BY PHARM ADDL 15 MIN: CPT | Performed by: PHARMACIST

## 2020-02-04 NOTE — PROGRESS NOTES
SUBJECTIVE/OBJECTIVE:                Caden Bush is a 59 year old male called for a follow-up visit for Medication Therapy Management.  He was referred to me from Dr. Hart, will be establishing with new PCP now.     Chief Complaint: Follow up from MTM visit on 12/31/19- first visit of the year.    Tobacco:  reports that he has never smoked. He has never used smokeless tobacco.  Alcohol: not currently using    Medication Adherence/Access:  no issues reported    Type 1 Diabetes:  Pt currently taking Basaglar 15 units in the AM plus Novolog 6 units with breakfast and 5 units with lunch/dinner.   Using sliding scale pre-meal as well=>  200-250 = add 1 unit   251- 300= add 2 units  301- 350= add 3 units  >350 = add 4 units  Was previously evaluated by endo and they stopped Januvia and pioglitazone on 5/3/19. Plan was to give him insulin/carb ratio with sliding scale however he reports that he is not confident with his carb counting and not able to convert a carb amount to insulin dose. Thus he is on fixed dose with meals plus sliding scale started since stopping orals.  *Note that the NEDRA antibodies were positive.   C-peptide= 2.1ng/ml with glucose of 346mg/dl.    Dexcom G6 sensors now, and was able to update his old  to be compatible.     Has several times the low alarm sounds since our last call but does not go below 60mg/dl and is <1 x per week.    Lowest has been 67 since we last talked and being on 15 units of Basaglar on 12/19/19 by PCP.     Lab Results   Component Value Date    A1C 5.8 11/29/2019    A1C 6.6 07/25/2019    A1C 7.9 05/15/2019    A1C 6.7 01/02/2019    A1C 6.7 07/17/2018       Supplements: Vitamin D. Last b12 level was high and he wanted to stop it. No more iron either.   Vit D level maintained low normal with current supplementation.     Pain: Not currently having issues with pain. Doing Physical therapy exercises for his back and this has really helped his sciatica pain. He had been using  ibuprofen as needed, but has not needed it now and he would like this taken off his list. He had been switched off acetaminophen due to interaction of the APAP and his continuous glucose monitor sensors (G5) but now is on the  G6 sensors which do not have the same interaction concerns.     BPH: Currently taking silodosin 8mg daily, denies issues. Did not discuss this further today.     Hyperlipidemia: Current therapy includes simvastatin 20mg once daily (taking 1/2 of 40mg tab).  Pt reports no significant myalgias or other side effects.  Recent Labs   Lab Test 01/16/20  1459 01/02/19  1045  08/07/13  0906   CHOL 113 127   < > 152   HDL 67 84   < > 58   LDL 34 33   < > 78   TRIG 61 51   < > 77   CHOLHDLRATIO  --   --   --  3.0    < > = values in this interval not displayed.       Sleep/Schizoaffective: Sees psychiatry. Currently on venlafaxine 225mg daily, trazodone 75mg at night and risperidone 3mg daily. Denies issues at this time, feels his mood and mental health has been doing well. No longer on the lithium for > 1 year and feels he is doing well. 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 Sees therapist monthly. Living in apartment with activities and opportunities for socialization which has been helpful for him. Has medications managed by facility.     Today's Vitals: There were no vitals taken for this visit.- phone    ASSESSMENT:                  Medication Adherence: good, no issues identified    Type 1 Diabetes: Stable. Patient is meeting A1c goal of < 7% and not having sugars drop below 60mg/dl.     Supplements: Stable.    Pain: Stable, will update his facility orders again to just show PRN acetaminophen (he wants the ibuprofen off).     BPH: Stable.    Hyperlipidemia: Stable.     Sleep/Schizoaffective: Stable, continue follow up with psychiatry.      PLAN:                  1. Will update facility med list again to show APAP instead of Ibuprofen for him.     2. Continue same doses for now.     I  spent 20 minutes with this patient today. All changes were made via collaborative practice agreement with Dr. Hart. A copy of the visit note was provided to the patient's primary care provider.     Will follow up in 1 month.    The patient declined a summary of these recommendations as an after visit summary.    Aliyah Angeles, Pharm.D, [facility]  Medication Therapy Management Pharmacist  [phone]

## 2020-02-05 RX ORDER — ACETAMINOPHEN 325 MG/1
325-650 TABLET ORAL EVERY 6 HOURS PRN
COMMUNITY
End: 2020-04-06

## 2020-02-27 ENCOUNTER — OFFICE VISIT (OUTPATIENT)
Dept: NEUROPSYCHOLOGY | Facility: CLINIC | Age: 60
End: 2020-02-27
Payer: MEDICARE

## 2020-02-27 DIAGNOSIS — F25.0 SCHIZOAFFECTIVE DISORDER, BIPOLAR TYPE (H): ICD-10-CM

## 2020-02-27 DIAGNOSIS — R41.3 COMPLAINTS OF MEMORY DISTURBANCE: Primary | ICD-10-CM

## 2020-03-03 ENCOUNTER — ALLIED HEALTH/NURSE VISIT (OUTPATIENT)
Dept: PHARMACY | Facility: PHYSICIAN GROUP | Age: 60
End: 2020-03-03
Payer: MEDICAID

## 2020-03-03 DIAGNOSIS — E10.9 TYPE 1 DIABETES MELLITUS WITHOUT COMPLICATION (H): Primary | ICD-10-CM

## 2020-03-03 DIAGNOSIS — G47.00 INSOMNIA, UNSPECIFIED TYPE: ICD-10-CM

## 2020-03-03 DIAGNOSIS — F25.0 SCHIZOAFFECTIVE DISORDER, BIPOLAR TYPE (H): ICD-10-CM

## 2020-03-03 PROCEDURE — 99606 MTMS BY PHARM EST 15 MIN: CPT | Mod: GY | Performed by: PHARMACIST

## 2020-03-03 NOTE — PROGRESS NOTES
MTM ENCOUNTER  SUBJECTIVE/OBJECTIVE:                Caden Bush is a 59 year old male called for a follow-up visit.  He was referred to me from Dr. Hart.     Chief Complaint: Follow up from MTM visit on 02/04/2020.      Tobacco:  reports that he has never smoked. He has never used smokeless tobacco.  Alcohol: not currently using    Medication Adherence/Access:  no issues reported    Type 1 Diabetes:  Pt currently taking Basaglar 15 units in the AM plus Novolog 6 units with breakfast and 5 units with lunch/dinner.   Using sliding scale pre-meal as well=>  200-250 = add 1 unit   251- 300= add 2 units  301- 350= add 3 units  >350 = add 4 units  Was previously evaluated by endo and they stopped Januvia and pioglitazone on 5/3/19. Plan was to give him insulin/carb ratio with sliding scale however he reports that he is not confident with his carb counting and not able to convert a carb amount to insulin dose. Thus he is on fixed dose with meals plus sliding scale started since stopping orals.  *Note that the NEDRA antibodies were positive.   C-peptide= 2.1ng/ml with glucose of 346mg/dl.     Dexcom G6 sensors now, and was able to update his old  to be compatible. Having issues with the transmitter over the weekend and so having nurse help him call to swap out the transmitter - they are calling Dexcom support now to get it fixed. They have bee doing finger sticks prior to meals while off of the sensors.     Had 1x with low alarm- 70mg/dl, 2x in last 2 months with 300mg/dl, but usually in range on his sensor.       Lab Results   Component Value Date    A1C 5.8 11/29/2019    A1C 6.6 07/25/2019    A1C 7.9 05/15/2019    A1C 6.7 01/02/2019    A1C 6.7 07/17/2018       Sleep/Schizoaffective: Sees psychiatry. Currently on venlafaxine 225mg daily, trazodone 75mg at night and risperidone 3mg daily. Denies issues at this time, feels his mood and mental health has been doing well. No longer on the lithium for > 1 year and  feels he is doing well. 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 Sees therapist monthly. Living in apartment with activities and opportunities for socialization which has been helpful for him. Has medications managed by facility.   Had neuropsych workup, but has not gotten results yet. Seeing psychiatry again on Friday. Patient was concerned about memory changes, which is why he had the neuropsych work up.      Today's Vitals: There were no vitals taken for this visit.- phone    ASSESSMENT:                Medication Adherence: good, no issues identified    Type 1 Diabetes: Stable, a1c at goal of <7% and will be due for A1c recheck in May.      Sleep/Schizoaffective: Stable, follow up on Friday for results with the neuropsych work up.      PLAN:                  1. No changes at this time.     I spent 15 minutes with this patient today. All changes were made via collaborative practice agreement with Annie Hart. A copy of the visit note was provided to the patient's primary care provider.     Will follow up in 2 months for next a1c.    The patient declined a summary of these recommendations.     Aliyah Angeles, Pharm.D, BCACP  Medication Therapy Management Pharmacist  [phone]

## 2020-03-06 ENCOUNTER — OFFICE VISIT (OUTPATIENT)
Dept: PSYCHIATRY | Facility: CLINIC | Age: 60
End: 2020-03-06
Attending: PSYCHIATRY & NEUROLOGY
Payer: MEDICARE

## 2020-03-06 VITALS
WEIGHT: 160.8 LBS | HEART RATE: 101 BPM | SYSTOLIC BLOOD PRESSURE: 106 MMHG | DIASTOLIC BLOOD PRESSURE: 69 MMHG | BODY MASS INDEX: 21.81 KG/M2

## 2020-03-06 DIAGNOSIS — F25.0 SCHIZOAFFECTIVE DISORDER, BIPOLAR TYPE (H): ICD-10-CM

## 2020-03-06 PROCEDURE — G0463 HOSPITAL OUTPT CLINIC VISIT: HCPCS | Mod: ZF

## 2020-03-06 RX ORDER — VENLAFAXINE HYDROCHLORIDE 150 MG/1
CAPSULE, EXTENDED RELEASE ORAL
Qty: 90 CAPSULE | Refills: 0 | Status: SHIPPED | OUTPATIENT
Start: 2020-03-06 | End: 2020-05-19

## 2020-03-06 RX ORDER — VENLAFAXINE HYDROCHLORIDE 75 MG/1
75 CAPSULE, EXTENDED RELEASE ORAL DAILY
Qty: 90 CAPSULE | Refills: 0 | Status: SHIPPED | OUTPATIENT
Start: 2020-03-06 | End: 2020-05-19

## 2020-03-06 RX ORDER — RISPERIDONE 3 MG/1
3 TABLET ORAL AT BEDTIME
Qty: 90 TABLET | Refills: 0 | Status: SHIPPED | OUTPATIENT
Start: 2020-03-06 | End: 2020-05-19

## 2020-03-06 RX ORDER — TRAZODONE HYDROCHLORIDE 50 MG/1
75 TABLET, FILM COATED ORAL AT BEDTIME
Qty: 135 TABLET | Refills: 0 | Status: SHIPPED | OUTPATIENT
Start: 2020-03-06 | End: 2020-05-19

## 2020-03-06 ASSESSMENT — PAIN SCALES - GENERAL: PAINLEVEL: NO PAIN (0)

## 2020-03-06 NOTE — PROGRESS NOTES
[facility]  Psychiatry Clinic  PSYCHIATRIC PROGRESS NOTE     CARE TEAM:  PCP- Annie Hart   Therapist-  [facility]  Ophthalmologist: Jacob Lieberman with [facility]: Dr. Zendejas-Clover, [phone] ext [phone] Community support: EVAN, Christa Monreal, [facility], [phone]     Caden Bush is a 58 year old male who prefers the name Caden & pronouns he.  The initial diagnostic evaluation was on 11/26/08 .   Date of the most recent transfer of care eval is 08/01/2019.      Pertinent Background:  This patient first experienced mental health issues in his late 20s , initially obtained care at that time and has received treatment for Schizoaffective Disorder, bipolar type. His diagnosis has changed through the time and afterreviewing all the evidence in 9/2018 the current diagnosis was confirmed (several episodes of moderate to severe depression and one episode of two weeks long period of grandiosity,  pressured speech, overspending and sleeplessness with increased social anxiety and paranoia).   Previously had a CCM until 2011. Has SW consult on 10/20/15, not interested in CM or ARMHS worker services at this time and seems to be managing well on his own.     Of note, patient is found to be very hesitant to change any medication regimen or engaged in the treatment programs.  Luckily, he has been engaged in care with his therapist.     #Mood/psychosis:  - Notably, this pt was stable on regimen of Lithium, Effexor and Risperdal since 2011. However due to complications of chronic diabetes, Lithium was discontinued in 2017.   -  reviewed the chart. She recommended [facility] for groups and activities for him.   - We even tried going down on Risperidone for other reasons but he immediately got destabilized.     #Insomnia: The etiology of his insomnia is not well understood and the sleep study was discussed and encouraged. Sleep has improved  on 3 mg of risperidone while psychotic sxs are also better managed.      Psych critical item history includes suicidal ideation, psychosis [sxs include AH and paranoia], mutiple psychotropic trials and psych hosp (>5).     INTERIM HISTORY      [4, 4]     Since the last visit:     Caden reports his mood as stable.  He has been busy with his medical appointments and physical therapy but has not been socializing.  He reports his sleep has been disrupted the last 2 weeks due to his neighbor's loud TV turned on late at night.  He asked to be moved to a different room in his assisted living facility. He has been trying to walk outside but the cold temperature prevents him from doing it on a consistent basis.  He reports his memory has not shown any change.  He completed neuropsych testing and waiting on the results.  He talked with a potential ILS worker and began to organize his paperwork.  He reports he has stopped taking the B12 vitamins due to the levels were too high.  The patient reports good treatment adherence. He denies any medication SE. History was provided by the patient who was a good historian.     RECENT PSYCH ROS:   Depression:  None  Elevated:  none  Psychosis:  none  Anxiety:  none  Panic Attack:  none  Trauma Related:  none  Dysregulation:  none  Eating Disorder: no   Cognitive: remote memory deficits (biographical), difficulty with names  Pertinent Negative Symptoms:  NO suicidal ideation, self-injurious behavior/urges, violent ideation, suicidal and violent ideation, aggression, psychosis, hallucinations, delusions, wolf and hypomania    RECENT SUBSTANCE USE:     ALCOHOL-  never   TOBACCO- none  CAFFEINE- 1 cup/day of coffee, 1 soda/ in a while  OPIOIDS- none      NARCAN KIT- N/A          CANNABIS- none  OTHER ILLICIT DRUGS- none      CURRENT SOCIAL HISTORY:  FINANCIAL SUPPORT- SSDI for schizoaffective disorder and diabetes  CHILDREN- none      LIVING SITUATION- Currently living in an assisted living  facility ([facility] in [facility] 6/19. Receives assistance with managing medications and administering insulin.  SOCIAL/ SPIRITUAL SUPPORT- few friends and family. 2 sisters (55 and 62). Mom, alive (85). Father, alive (88).  FEELS SAFE AT HOME- yes       MEDICAL ROS (2,10):  Reports Arthritis pain (ongoing, unchanged) Denies Pain, headache, dizziness, GI [nausea, diarrhea, constipation, sedation, tremor and confusion.    PAST PSYCHOTROPIC MED TRIALS                                                           *     Drug / Start Date   Dose (mg)   Helpful   Adverse Effects  DC Reason / Date     Lithium   900           Risperdal         current     Abilify             Seroquel             Effexor         current     Zoloft             Wellbutrin             Buspar             trazodone         current     hydroxyzine             Ambien                Maybe Zyprexa, and Prozac, can't remember    July: Increased Risperidone dose  During months of fall and winter, patient was hesitant to make changes to medications. Modified risperidone and trazodone dose several times (minor changes). Not successful in complete resolution of sxs.     MEDICAL / SURGICAL HISTORY                                     Patient Active Problem List   Diagnosis     Localized osteoarthritis of hands, bilateral     Retinopathy     Moderate episode of recurrent major depressive disorder (H)     Polyp of colon, adenomatous     Health Care Home     Hx of psychiatric care     Neuropathy of left upper extremity     Type 1 diabetes mellitus with retinopathy of both eyes (H)     Schizoaffective disorder, bipolar type (H)     Heart murmur on physical examination       Major Surgery-  Past Surgical History:   Procedure Laterality Date     APPENDECTOMY       COLONOSCOPY  2012    2 adenomatous polyps       ALLERGY       Flomax [tamsulosin] and Lisinopril  MEDICATIONS        Current Outpatient Medications   Medication Sig Dispense Refill      ACE/ARB/ARNI NOT PRESCRIBED, INTENTIONAL, Please choose reason not prescribed, below-  hypotension       acetaminophen (TYLENOL) 325 MG tablet Take 325-650 mg by mouth every 6 hours as needed for mild pain       aspirin 81 MG EC tablet Take 81 mg by mouth daily.       blood glucose (NO BRAND SPECIFIED) test strip Use to test blood sugar 4 times daily or as directed for insulin injections 4 times daily. 400 each 3     cholecalciferol (VITAMIN D) 1000 UNIT tablet Take 1 tablet by mouth daily.       Continuous Blood Gluc  (DEXCOM G6 ) MICHELLE USE AS DIRECTED FOR CONTINUOUS GLUCOSE MONITORING 6 Device 3     Continuous Blood Gluc Sensor (DEXCOM G6 SENSOR) MISC 1 each every 10 days 9 each 3     Continuous Blood Gluc Transmit (DEXCOM G6 TRANSMITTER) MISC 1 each every 3 months 1 each 3     insulin aspart (NOVOLOG PEN) 100 UNIT/ML pen Inject 6 units before breakfast,  5 units before lunch and 5 units before dinner. 15 mL 0     insulin glargine (BASAGLAR KWIKPEN) 100 UNIT/ML pen Inject 15 Units Subcutaneous daily 15 mL 3     insulin pen needle (32G X 4 MM) 32G X 4 MM miscellaneous Use 4 pen needles daily. 400 each 3     order for DME Equipment being ordered: Light box > = 10,000 LUX. Use 15 minutes daily around noon with lunch. 1 Box 0     risperiDONE (RISPERDAL) 3 MG tablet Take 1 tablet (3 mg) by mouth At Bedtime 30 tablet 2     Sharps Container (COMPLETE NEEDLE COLLECTION SYS) MISC USE AS DIRECTED 1 each 0     Silodosin (RAPAFLO) 8 MG CAPS capsule Take 1 capsule (8 mg) by mouth daily 90 capsule 0     simvastatin (ZOCOR) 40 MG tablet Take 0.5 tablets (20 mg) by mouth At Bedtime 45 tablet 3     traZODone (DESYREL) 50 MG tablet Take 1.5 tablets (75 mg) by mouth At Bedtime 45 tablet 2     venlafaxine (EFFEXOR XR) 75 MG 24 hr capsule Take 1 capsule (75 mg) by mouth daily TAKE WITH  Effexor 150 mg for a total dose of 225 mg daily 30 capsule 2     venlafaxine (EFFEXOR-XR) 150 MG 24 hr capsule TAKE 1 CAPSULE BY MOUTH  "DAILY ALONG WITH A 75 MG FOR A TOTAL  MG DAILY 30 capsule 2     VITALS      [3, 3]   /69   Pulse 101   Wt 72.9 kg (160 lb 12.8 oz)   BMI 21.81 kg/m     MENTAL STATUS EXAM      [9, 14 cog gs]     Alertness: alert and oriented  Appearance: appropriately groomed, asymmetrical facial features   Behavior/Demeanor: cooperative, pleasant and calm, with good eye contact   Speech: normal and regular rate and rhythm  Language: intact and no problems  Psychomotor: normal or unremarkable  Mood: \"Fine.\"  Affect: Blunted  Thought Process/Associations: Linear  Thought Content: Denies SI or HI.   Perception: Denies AVH  Insight: Fair  Judgment: Good  Cognition: (6) Difficulty with remote memory, normal MoCA  Gait and Station: unremarkable    LABS and DATA     AIMS:  0: 3/1/2018, 0: 08/02/2019    PHQ9 TODAY = 3  PHQ-9 SCORE 10/3/2019 10/30/2019 12/19/2019   PHQ-9 Total Score - - -   PHQ-9 Total Score 1 3 6   PHQ-9 Total Score - - -     MOCA (8/19): 26/30  (including 0 points added for education)  - Visuospatial/executive:  5/5  - Naming: 3/3  - Attention: 5/6  - Language: 2/3  - Abstraction: 1/2   - Delayed recall: 4/5  - Orientation: 6/6    ANTIPSYCHOTIC LABS  [glu, A1C, lipids (ezequiel LDL), liver enzymes, WBC, ANEU, Hgb, plts]  q12 mo  Recent Labs   Lab Test 01/22/20 01/16/20  1459 11/29/19 07/25/19  1415 05/15/19  1045 03/15/19  1015 03/04/19  1519  01/02/19  1045    245*  --   --   --  346* 444*   < > 147*   A1C  --   --  5.8* 6.6* 7.9*  --   --   --  6.7*    < > = values in this interval not displayed.     Recent Labs   Lab Test 01/22/20 01/16/20  1459 01/02/19  1045 04/17/18  1356   CHOL 103 113 127 139   TRIG 30 61 51 45   LDL 41 34 33 45   HDL 56 67 84 85     Recent Labs   Lab Test 01/22/20 01/16/20  1459 01/02/19  1045 04/17/18  1356   AST 18 12 14 15   ALT 19 34 13 12   ALKPHOS 102 129 74 73     Recent Labs   Lab Test 01/22/20 01/16/20  1459 02/13/19  1522 01/02/19  1126  06/09/16  1030 12/21/15  1413  " 08/01/12  1640   WBC 4.4 4.1 5.3 4.2   < > 4.7 4.6   < > 5.5   ANEU  --  3.1  --   --   --  3.5 3.4  --  4.8   HGB 13.1* 14.4 12.7* 12.9*   < > 14.7 13.4   < > 11.4*   * 135* 162 149   < > 175 155   < > 180    < > = values in this interval not displayed.       PSYCHOTROPIC DRUG INTERACTIONS     VENLAFAXINE and ANTIPLATELET AGENTS may result in an increased risk of bleeding.  RISPERIDONE and SIMVASTATIN may result in increased simvastatin serum concentrations -with an increased risk of myopathy or rhabdomyolysis.  TRAZODONE and VENLAFAXINE may result in an increased risk of serotonin syndrome.    MANAGEMENT:  Monitoring for adverse effects    RISK STATEMENT for SAFETY     Caden Bush denies any SI /SIB.  In addition, he has notable risk factors for self-harm including feels trapped, hopelessness, lives alone/ isolated, hallucinations and male.  However, risk is mitigated by no h/o suicide attempt, describes a safety plan, none to minimal alcohol use , commitment to family and stable housing.  Based on all available evidence he does not appear to be at imminent risk for self-harm therefore does not meet criteria for a 72-hr hold/  involuntary hospitalization.  However, based on degree of symptoms voluntary hospitalization, day treatment and close psych FU was recommended which the pt did agree to Day Treatment and close follow up.  Additional steps to minimize risk include: SAFETY PLAN completed 5/10/2019.  Safety Plan placed in AVS: Yes.    DIAGNOSIS     Schizoaffective disorder, bipolar type, in remission (on medications and with therapy)  Seasonal affective disorder    ASSESSMENT      [m2, h3]     Patient stable on his current medication regimen with no psychotic and minimal mood symptoms. Performed a MoCA in fall 2019 to screen for cognitive impairment with normal results. Originally suspected his sense of forgetfulness and change in cognitive awareness was potentially related to long-term history of a  neurocognitive disorder, namely schizoaffective disorder, in addition to Risperidone, which can cause cognitive blunting. Further evaluation with collateral from sister shows that patient's memory loss is isolated to biographical content with patient having minimal memories before his 50s. It began in 2018 and has been relatively stable. Given the acute onset and isolated nature of memory loss being remote with a normal MoCA, pursued additional workup to ensure no underlying medical cause like a prior stroke or microvascular disease. Patient has no acute neurological symptoms to warrant more urgent evaluation. He was referred to neuropsychological testing to help determine the nature of his cognitive limitations and potentially identify a diagnosis. He was referred to neurology with no troublesome findings. An MRI was ordered, in addition to lab tests, with no findings of any etiology.     Doubt that medication is playing a role given that patient has been on Risperidone for at least 8 years if not longer. In addition reducing Risperidone is not a good option at this point given that this has led to destabilization in the past. Will continue medications as prescribed.    TODAY    He reports his mood as euthymic.  He reports broken sleep the last 2 weeks due to external environmental noise.  Memory has not worsened and waiting for the neuropsych testing results.  No changes to his medication regime and reports no medication SE. Advised to stop using bright light therapy in April.      #History of Leukopenia, unspecified type #Low PLT count #Anemia  - PCP is aware. Possibly due to Risperdal. Had a physical with PCP on Jan 2nd when he was also going to evaluate hematologic abnormalities. Receives iron replacement  #DM II: On insulin, last A1c 6.6  # Vitamin D def> replacement  #Benign adrenal incidentaloma     PLAN      [m2, h3]     1) PSYCHOTROPIC MEDICATIONS:  - Continue Risperidone 3 mg QHS  - Continue Effexor XR  225 mg daily  - Continue Trazodone 75 mg at bedtime  - Continue bright light therapy daily for up to 30 minutes until April  - Continue Vitamin D 1,000 international unit(s) daily     2) THERAPY:  Seeing psychologist Yeny Goff at [facility] since 11/2015. Sees once/twice a month. Finishing day treatment      3) LABS NEXT DUE: Antipsychotic labs next due 01/2020      RATING SCALES: AIMS 8/20      MoCA: 8/20      4) REFERRALS:  None     7) RTC: 6 weeks       8) CRISIS NUMBERS:  See AVS    9) MN  was not checked today.    10) Goals:  - Find one meaningful friendship this year, patient is accomplishing  - Work on cognition through [facility] or [employer]     TREATMENT RISK STATEMENT:  The risks, benefits, alternatives and potential adverse effects have been discussed and are understood by the pt. The pt understands the risks of using street drugs or alcohol. There are no medical contraindications, the pt agrees to treatment with the ability to do so. The pt knows to call the clinic for any problems or to access emergency care if needed.  Medical and substance use concerns are documented above.  Psychotropic drug interaction check was done, including changes made today.     Note was dictated by NP student, Jim Cunningham, serving in scribe capacity.    PROVIDER: Miguelangel Parks, DO    Patient staffed in clinic with Dr. Porras who will sign the note.  Supervisor is Dr. Phillips.  I saw the patient with the resident, and participated in key portions of the service, including the mental status examination and developing the plan of care. I reviewed key portions of the history with the resident. I agree with the findings and plan as documented in this note.    Maty Porras MD

## 2020-03-06 NOTE — PATIENT INSTRUCTIONS
Please do not hesitate to call or message me with any questions or concerns.    Be well,  Dr. Parks     Thank you for coming to the PSYCHIATRY CLINIC.    Lab Testing:  If you had lab testing today and your results are reassuring or normal they will be mailed to you or sent through [email or web address] within 7 days.   If the lab tests need quick action we will call you with the results.  The phone number we will call with results is # [phone] (home) . If this is not the best number please call our clinic and change the number.    Medication Refills:  If you need any refills please call your pharmacy and they will contact us. Our fax number for refills is [phone]. Please allow three business for refill processing.   If you need to  your refill at a new pharmacy, please contact the new pharmacy directly. The new pharmacy will help you get your medications transferred.     Scheduling:  If you have any concerns about today's visit or wish to schedule another appointment please call our office during normal business hours [phone] (8-5:00 M-F)    Contact Us:  Please call [phone] during business hours (8-5:00 M-F).  If after clinic hours, or on the weekend, please call  [phone].    Financial Assistance [phone]  [facility] Billing [phone]  [facility] Billing Office, [facility]: [phone]  [facility] Billing [phone]  Medical Records [phone]      MENTAL HEALTH CRISIS NUMBERS:  [facility]:   [facility] - [phone]   Crisis Residence [facility] - [phone]   Walk-In Counseling [facility] [phone]   COPE 24/7 [facility] Mobile Team for Adults - [[phone]]; Child - [[phone]]        [facility]:   [facility] - [phone]   Walk-in counseling [facility] - [phone]   Walk-in counseling [facility] - [phone]   Crisis Residence [facility] - [phone]   Urgent  Care Adult Mental Health:   --Drop-in, 24/7 crisis line, and [facility] Mobile Team [[phone]]    CRISIS TEXT LINE: Text 340-433 from anywhere, anytime, any crisis 24/7;    OR SEE www.crisistextline.org     National Suicide Prevention Lifeline: 186-444-TTUE (270-355-0546)    Poison Control Center - 3-529-409-3096    CHILD: [facility] needs assessment team - [phone]     [facility] - [phone]; or [place] Lifeline - [phone]    If you have a medical emergency please call 911or go to the nearest ER.                    _____________________________________________    Again thank you for choosing PSYCHIATRY CLINIC and please let us know how we can best partner with you to improve you and your family's health.  You may be receiving a survey regarding this appointment. We would love to have your feedback, both positive and negative. The survey is done by an external company, so your answers are anonymous.

## 2020-03-09 DIAGNOSIS — E10.319 TYPE 1 DIABETES MELLITUS WITH RETINOPATHY OF BOTH EYES, MACULAR EDEMA PRESENCE UNSPECIFIED, UNSPECIFIED RETINOPATHY SEVERITY (H): Primary | ICD-10-CM

## 2020-03-09 NOTE — TELEPHONE ENCOUNTER
Refill for test strips sent to use as back up when G6 is not working. Sent per CPA with Leny Lee NP.    Aliyah Angeles, Pharm.D, [facility]  Medication Therapy Management Pharmacist  [phone]

## 2020-03-11 NOTE — TELEPHONE ENCOUNTER
Have to change pharmacy for billing - sent to [facility] instead.     Aliyah Angeles, Pharm.D, [facility]  Medication Therapy Management Pharmacist  [phone]

## 2020-03-20 ENCOUNTER — TELEPHONE (OUTPATIENT)
Dept: PSYCHIATRY | Facility: CLINIC | Age: 60
End: 2020-03-20

## 2020-03-20 NOTE — TELEPHONE ENCOUNTER
----- Message from Brianna Holden sent at 3/20/2020 10:22 AM CDT -----  Regarding: Test Results  Contact: [phone]  Caden, would like a call back with his Memory Test results. Also would like a call from Dr. Parks, to discuss having a Phone Visit.    Thanks!      Routed to provider.

## 2020-03-20 NOTE — TELEPHONE ENCOUNTER
-----------------------------------------------------------------------------------------------------------  Brief Psychiatry Phone note      Caden Bush MRN# [id number]   Age: 59 year old   Clinic Provider: Dr. Parks YOB: 1960   PCP: Leny Lee            Phone note:     Called patient per his request. Informed him that neuropsychology report results are not available let. Instructed him to call neuropsychology clinic to check. Also, confirmed that next appointment will be virtual.    =============================================================================  Miguelangel Parks DO, MA  PGY-3 Psychiatry Resident  Pager: [phone]

## 2020-03-25 NOTE — PROGRESS NOTES
Name: Caden Bush MRN: [id number]  : 1960  LOW: 2020  Staff: YUNI Tech: NN Age: 59  Sex: M Hand: RH Educ: 16  Vision: 20/50 ?with correction / ?without correction    ORIENTATION     Time  -0     Place       Personal info          Presidents     TOMM T1: 48  T2: 50     DCT Err: 4 U.5 .17 E-score: 18    WRAT4   SS %ile Grade Equiv.     Word Reading  92 30 11.6    WAIS-IV  Raw SSa     Similarities 17 6     Block Design 20 6     Digit Span 22 8 RDS= 9     Coding 43 7    COWAT (CFL)     Raw: 38  SS: 10 %ile: 41-59    BOSTON NAMING TEST     Raw: 55  SS: 10 %ile: 41-59    CLOCK DRAWING: Normal     GROOVED PEGBOARD   Raw  Drops SS T     RH  112  1 4 32       0 2 24    TRAILMAKING TEST   Raw  Err SS %ile     A 41  0 7 11-18     B 64  0 10 41-59    PORTEUS MAZE TEST     Test Age: 11    MILADIS-O    Raw    T %ile     Time to Copy  225      >16     Copy    18.5     ?1     Short Delay Recall 9.5 35 7     Long Delay Recall 11.5 39 14     Recognition Total 20 48 42    AVLT (>55, MOANS norms)     Trial 1 2 3 4 5 B 6 30  60              5 7 7 9 10 4 7 7 6      Raw SS %ile     Trial 1    5 9 24-45     Learning Over Trials  18 10 58-73     Short Delay Recall  7 8 20-31     30  Recall   7 8 36-44     60  Recall   6     30  Recognition Hits/FPs  10/4 4 2     60  Recognition Hits/FPs  11/0     AVLT-X Exaggeration Index 1    WMS-IV  Raw SS / %ile     LM I  21 8     LM II  19 9     LM Recog. 23 26th-50th     VR I  25 6     VR II  16 8     VR Recog. 4 17th-25th     BDI-II     Raw:  4 Interpretation: minimal    KALEN     Raw:  7 Interpretation: minimal    MMPI-2-RF     RCd: 67 VRIN-r:  73     RC1: 51 REVA-r:  80T     RC2: 38 F-r:  74     RC3: 65 Fp-r:  68     RC4: 49 Fs:  74     RC6: 56 FBS-r:  58     RC7: 52 RBS:  80     RC8: 66 L-r:  71     RC9: 51 K-r:  38

## 2020-03-25 NOTE — PROGRESS NOTES
NAME: Caden Bush.   MRN: [id number]   : 1960  LOW: 2020  Ne[facility]  [address]  [address]  [phone]    NEUROPSYCHOLOGICAL EVALUATION    RELEVANT HISTORY AND REASON FOR REFERRAL    This is a report of neuropsychological consultation regarding Caden Bush, a 59-year-old, right-handed man with perhaps 16 years of formal education. He established care with a psychiatric day treatment program last August, for management of depression, isolation, and memory problems. History includes diagnosis of bipolar type schizoaffective disorder in his 20s (most recently depressed), diabetes (variably listed as type I and type II in the records), suicide attempt earlier in the year by insulin overdose, bilateral diabetic retinopathy, and neuropathy of left upper extremity. His cognitive complaints include inability to remember remote biographical information. He says that he sees photographs of old trips or activities and has no recollection of them. He thinks the events of his 20s are especially affected. Today, he is able to provide reasonable recollections of what he did for the most recent Thanksgiving through [place]  [place] holidays. Screening with the [facility] was within normal limits () with his psychiatrist, Dr. Miguelangel Parks. The report from brain MRI obtained on 2019 reads,  1. No acute abnormality. 2. Brain atrophy and white matter changes consistent with sequelae of small vessel ischemic disease. 3. Scattered mucosal thickening in the paranasal sinuses and small amount of fluid in the left mastoid air cells.  Current medications include venlafaxine, risperidone, trazodone, silodosin, insulin aspart, insulin glargine, simvastatin, 81 mg aspirin, and cholecalciferol. He is referred for neuropsychological assessment of brain functioning in this context by Dr. Parks.     Mr. Bush is unaccompanied to today s evaluation. He reports cognitive  concerns as stated above. He is a poor historian and most statements are vague. He thinks he has had psychiatric hospitalizations but cannot guess when they might have been (records say 2008). He does not know when psychiatric symptoms arose in his lifetime, nor when schizoaffective disorder was diagnosed. He says he does not know if he ever suffered trauma or abuse. He is aware of the recent suicide attempt but says he does not know if there have been any particular stressors in his life recently (records indicate there was housing instability around that time). He says he has  probably  had hallucinations before, but he cannot recall when or describe them. Sleep is irregular. Some days, he might wake up at 3 AM and not get back to sleep. Other days, he might spend the whole day sleeping.     He reports no alcohol use currently and cannot recall if he ever had a drinking problem. He denies use of tobacco or illicit drugs.     When asked if he has ever been , he replies,  I don t think so.  He does not have children. He cannot recall if he has had any romantic relationships. He thinks he is interested, but he does not try to date.     Regarding family history, he thinks a paternal aunt and maybe a grandmother had dementia. He thinks his paternal great-grandfather had psychiatric issues, but he is not sure. He is not aware of any other psychiatric history in the family. Records indicate that both of his parents had depression and his great-grandfather had OCD and alcohol dependency.    He denies any early life developmental abnormalities. He does not recall any issues with in-school attentional or behavioral problems. He recalls getting special accommodations for taking tests in separate rooms, when he was in college. He thinks he was in mainstream classes in earlier schooling. 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 He recalls mostly doing clerical work. He has not worked since perhaps 2008. He says he is on disability for psychiatric concerns and diabetes.     He knows that his most recent A1c (November 2019) was 5.8. He states that diabetes was diagnosed in 2007. He describes problems with sciatic nerve pain that is controlled with PT exercises.     He thinks he has probably had syncope from blood sugar problems, but he knows no details. He is not aware of any history of stroke, seizure, TBI, migraine, motoric dyscontrol, or changes to his senses of smell, taste, hearing, or vision.     He states he has been in an assisted living facility since June. Previously, he was in a senior apartment complex. He had been living in a group home since 2008 or 2009 prior to that, and he stopped driving when he moved into the group home. He says he moved to the [facility] because of memory lapses and declining health. He was not eating well and got down to 137 pounds. [facility] staff track his insulin and meals. He just started getting help from an ILS worker. He is up to 158 pounds now.     BEHAVIORAL OBSERVATIONS    Mr. Bush was polite and cooperative with the evaluation. He was alert and not somnolent. Mood was neutral. Affective display was empty and unreactive. There were no aphasic qualities to his speech. Language comprehension was normal. He needed occasional repetitions of test instructions. He was occasionally distracted but seemed to work with appropriate precision and care. His effort and persistence appeared to be sufficient, and the results of direct and embedded measures for effort and performance validity were within expectations for individuals with similar psychiatric histories. The data are seen as reasonable reflections of his cognitive functioning.     MEASURES ADMINISTERED    The following measures were administered by a trained psychometrist, under my direct supervision:    Orientation:  Time, Place, Basic Personal Information, Recent [place] Presidents; Wide Range Achievement Test 4: Word Reading; Wechsler Adult Intelligence Scale-IV: Similarities, Block Design, Digit Span, Coding; Controlled Oral Word Association Test; [place] Naming Test; Shanika Visual Acuity Screen; Clock Drawing; Grooved Pegboard; Trail Making Test; Porteus Maze Test; Alec-Osterrieth Complex Figure Test; Alec Auditory Verbal Learning Test; Wechsler Memory Scale-IV: Logical Memory, Visual Reproduction; Dot Counting Test; Test of Memory Malingering; Garduno Depression Inventory-II; Garduno Anxiety Inventory; [place] Multiphasic Personality [id number].    RESULTS AND INTERPRETATION    Orientation: Orientation was normal for time, place, basic personal information, and basic cultural information.     Intellectual Abilities: Abstract verbal reasoning was at the lower limits of normal. Nonverbal reasoning through hands-on object assembly was also at the lower limits of normal.    Language & Related Skills: Basic reading and pronunciation skills were average. Confrontation naming was average. Associative verbal fluency was average.     Visual Perceptual & Constructional Skills: Binocular, uncorrected, near-point visual acuity was 20/50 on Shanika screening. Clock drawing was normal. His copy of a complex geometric figure was impaired, with notably poor ability to manage the left half of the figure.     Motor Skills: Speeded fine-motor dexterity for placing shaped pegs into holes was borderline impaired with the dominant right hand and impaired with the non-dominant left hand.     Mental Speed & Executive Functioning: As noted above, speeded verbal fluency was average. Graphomotor transcription speed was low average. Visual scanning and graphomotor sequencing under simple conditions was low average for speed and error-free. Scanning and sequencing under more complex demands to control divided attention was relatively stronger, being  middle average for speed and remaining error-free. Planning, foresight, and learning from errors were below normal expectations on an unspeeded maze-solving test.     Attention & Working Memory: Auditory attention and working memory were on the lower side of average for repeating and rearranging digit strings.     Learning & Anterograde Memory: A few minutes after the impaired initial copy, incidental free recall of the complex figure was borderline impaired. After 30 minutes, recall of the figure improved to the low average range and recognition of individual figure elements was middle average. On another test, immediate visual memory for simple designs was at the lower limits of normal, but delayed free recall was in the average range and recognition of the simple designs was lower average. Immediate verbal memory for stories was average, delayed story recall was average, and recognition of story details was average. Learning a word list over repeated readings was average. Free recall of the list was average after a brief delay with active interference and after delays of 30 and 60 minutes. Delayed list recognition performances were in the borderline range after 30 minutes but improved to within normal limits after 60 minutes.     Emotional, Behavioral, & Personality Functioning: On brief self-report inventories, he endorsed minimally elevated symptoms of depression (BDI-II = 4) and anxiety (KALEN = 7). His responses to a longer questionnaire for objective assessment of personality and emotional coping patterns (MMPI-2-RF) were invalid because of excessively inconsistent responding, answering true to items regardless of their content. There were also elevations on validity scales that indicate the presence of noncredible memory concerns and poor insight/psychological defensiveness with overly favorable self-reporting.     IMPRESSIONS AND RECOMMENDATIONS    The neuropsychological results are abnormal. Cognitively, he  demonstrates mild and somewhat variable weaknesses in mental flexibility, executive functioning, and complex visuospatial information processing. The data suggest relative weaknesses in frontal systems and more broadly across the nondominant (presumably right) hemisphere, which is a common finding among individuals with schizophrenia-spectrum and bipolar disorders. There might be some downstream effects from diabetes, making secondary contributions.     The phenomenon of retrograde amnesia for personal events is poorly understood from a neurologic perspective. While underpinnings of cerebral dysfunction are possible, many cases are seen as psychogenic. In today s data, his learning and memory performances suggest executive interference; there are no indications of anterograde amnesia. He actually shows paradoxically better performances on delayed recall than immediate recall. Despite the inefficiency from executive issues, he has generally average ability to retain new information. Reviewing records, the likelihood of acquired cerebral dysfunction from his overdose attempt seems unlikely, and records suggest that his memory complaints began before that event. Overall, the information at hand leans more toward psychiatric issues than neurologic disease.     The primary recommendation is for continued engagement in mental health pursuits. I am glad that he is receiving extra oversight and care in medication and nutritional management.     A neuropsychological baseline has been obtained. I or my colleagues would be happy to see him again, if ever clinically indicated.     Kj Simms, PhD, LP, ABPP-CN  Board Certified in Clinical Neuropsychology  Licensed Psychologist [id number]     Department of Rehabilitation Medicine  Division of Adult Neuropsychology  [facility]      Time spent: One hour neurobehavioral status exam including interview, clinical assessment by licensed and  board-certified neuropsychologist (CPT 91546). One hour neuropsychological testing evaluation by licensed and board-certified neuropsychologist, including integration of patient data, interpretation of standardized test results and clinical data, clinical decision-making, treatment planning, report, and interactive feedback to the patient, first hour (CPT 95644). Two hours of neuropsychological testing evaluation by licensed and board-certified neuropsychologist, including integration of patient data, interpretation of standardized test results and clinical data, clinical decision-making, treatment planning, report, and interactive feedback to the patient, subsequent hours (CPT 03678). 30 minutes of psychological and neuropsychological test administration and scoring by technician, first 30 minutes (CPT 10736). 147 minutes psychological or neuropsychological test administration and scoring by technician, subsequent 30-minute intervals (CPT 02981). Diagnoses: R41.3, F25.0

## 2020-03-26 ENCOUNTER — TELEPHONE (OUTPATIENT)
Dept: PSYCHIATRY | Facility: CLINIC | Age: 60
End: 2020-03-26

## 2020-03-26 NOTE — TELEPHONE ENCOUNTER
-----------------------------------------------------------------------------------------------------------  Brief Psychiatry Phone note      Caden Bush MRN# [id number]   Age: 59 year old   Clinic Provider: Dr. Parks YOB: 1960   PCP: Leny Lee            Phone note:     Updated patient about  Neuropsychology results. Answered general questions.    =============================================================================  Miguelangel Parks DO, MA  PGY-3 Psychiatry Resident  Pager: [phone]

## 2020-04-01 NOTE — TELEPHONE ENCOUNTER
Unable to get any coverage for test strips with his medicare B since he is getting coverage for the Dexcom G6. Patient is needing strips for when the monitor isn't working or asking for calibrations. Unable to bill his medicaid as this is secondary to his medicare and they are only going to cover 1 or the other.     Best option to recommend at this time is [employer] test strips and meter that can be purchased over-the-counter without a prescription and are going to be the cheapest available option for him.  ReliOn meter runs approximately $15 and ReliOn test strips approximately $10 for 50 strips and this should last him over a month based on his infrequent need for test strips.  Left a message with [facility] nurse Selena regarding this information.        Aliyah Angeles, Pharm.D, BCACP  Medication Therapy Management Pharmacist  [phone]

## 2020-04-06 ENCOUNTER — APPOINTMENT (OUTPATIENT)
Dept: GENERAL RADIOLOGY | Facility: CLINIC | Age: 60
DRG: 641 | End: 2020-04-06
Attending: EMERGENCY MEDICINE
Payer: MEDICARE

## 2020-04-06 ENCOUNTER — HOSPITAL ENCOUNTER (INPATIENT)
Facility: CLINIC | Age: 60
LOS: 1 days | Discharge: HOME | DRG: 641 | End: 2020-04-07
Attending: EMERGENCY MEDICINE | Admitting: HOSPITALIST
Payer: MEDICARE

## 2020-04-06 DIAGNOSIS — E87.1 HYPONATREMIA: ICD-10-CM

## 2020-04-06 DIAGNOSIS — R11.0 NAUSEA: Primary | ICD-10-CM

## 2020-04-06 DIAGNOSIS — F25.0 SCHIZOAFFECTIVE DISORDER, BIPOLAR TYPE (H): ICD-10-CM

## 2020-04-06 DIAGNOSIS — R11.2 NON-INTRACTABLE VOMITING WITH NAUSEA, UNSPECIFIED VOMITING TYPE: ICD-10-CM

## 2020-04-06 LAB
ALBUMIN SERPL-MCNC: 4.1 G/DL (ref 3.4–5)
ALBUMIN UR-MCNC: NEGATIVE MG/DL
ALP SERPL-CCNC: 113 U/L (ref 40–150)
ALT SERPL W P-5'-P-CCNC: 34 U/L (ref 0–70)
ANION GAP SERPL CALCULATED.3IONS-SCNC: 11 MMOL/L (ref 3–14)
APPEARANCE UR: CLEAR
AST SERPL W P-5'-P-CCNC: 29 U/L (ref 0–45)
BASE DEFICIT BLDV-SCNC: 1.8 MMOL/L
BASOPHILS # BLD AUTO: 0 10E9/L (ref 0–0.2)
BASOPHILS NFR BLD AUTO: 0.1 %
BILIRUB DIRECT SERPL-MCNC: 0.4 MG/DL (ref 0–0.2)
BILIRUB SERPL-MCNC: 1.8 MG/DL (ref 0.2–1.3)
BILIRUB UR QL STRIP: NEGATIVE
BUN SERPL-MCNC: 15 MG/DL (ref 7–30)
CALCIUM SERPL-MCNC: 8.6 MG/DL (ref 8.5–10.1)
CHLORIDE SERPL-SCNC: 86 MMOL/L (ref 94–109)
CO2 SERPL-SCNC: 22 MMOL/L (ref 20–32)
COLOR UR AUTO: ABNORMAL
CREAT SERPL-MCNC: 0.8 MG/DL (ref 0.66–1.25)
CREAT SERPL-MCNC: 0.85 MG/DL (ref 0.66–1.25)
CREAT UR-MCNC: 14 MG/DL
DIFFERENTIAL METHOD BLD: ABNORMAL
EOSINOPHIL # BLD AUTO: 0 10E9/L (ref 0–0.7)
EOSINOPHIL NFR BLD AUTO: 0.2 %
ERYTHROCYTE [DISTWIDTH] IN BLOOD BY AUTOMATED COUNT: 11.2 % (ref 10–15)
ETHANOL SERPL-MCNC: <0.01 G/DL
GFR SERPL CREATININE-BSD FRML MDRD: >90 ML/MIN/{1.73_M2}
GFR SERPL CREATININE-BSD FRML MDRD: >90 ML/MIN/{1.73_M2}
GLUCOSE BLDC GLUCOMTR-MCNC: 107 MG/DL (ref 70–99)
GLUCOSE BLDC GLUCOMTR-MCNC: 109 MG/DL (ref 70–99)
GLUCOSE BLDC GLUCOMTR-MCNC: 169 MG/DL (ref 70–99)
GLUCOSE BLDC GLUCOMTR-MCNC: 203 MG/DL (ref 70–99)
GLUCOSE BLDC GLUCOMTR-MCNC: 229 MG/DL (ref 70–99)
GLUCOSE BLDC GLUCOMTR-MCNC: 242 MG/DL (ref 70–99)
GLUCOSE SERPL-MCNC: 234 MG/DL (ref 70–99)
GLUCOSE UR STRIP-MCNC: 1000 MG/DL
HBA1C MFR BLD: 5.7 % (ref 0–5.6)
HCO3 BLDV-SCNC: 23 MMOL/L (ref 21–28)
HCT VFR BLD AUTO: 37.3 % (ref 40–53)
HGB BLD-MCNC: 13.3 G/DL (ref 13.3–17.7)
HGB UR QL STRIP: NEGATIVE
IMM GRANULOCYTES # BLD: 0 10E9/L (ref 0–0.4)
IMM GRANULOCYTES NFR BLD: 0.4 %
KETONES BLD-SCNC: 1.4 MMOL/L (ref 0–0.6)
KETONES UR STRIP-MCNC: 40 MG/DL
LEUKOCYTE ESTERASE UR QL STRIP: NEGATIVE
LIPASE SERPL-CCNC: 19 U/L (ref 73–393)
LYMPHOCYTES # BLD AUTO: 0.6 10E9/L (ref 0.8–5.3)
LYMPHOCYTES NFR BLD AUTO: 6.7 %
MCH RBC QN AUTO: 31.2 PG (ref 26.5–33)
MCHC RBC AUTO-ENTMCNC: 35.7 G/DL (ref 31.5–36.5)
MCV RBC AUTO: 88 FL (ref 78–100)
MONOCYTES # BLD AUTO: 0.2 10E9/L (ref 0–1.3)
MONOCYTES NFR BLD AUTO: 2.7 %
NEUTROPHILS # BLD AUTO: 7.6 10E9/L (ref 1.6–8.3)
NEUTROPHILS NFR BLD AUTO: 89.9 %
NITRATE UR QL: NEGATIVE
NRBC # BLD AUTO: 0 10*3/UL
NRBC BLD AUTO-RTO: 0 /100
O2/TOTAL GAS SETTING VFR VENT: ABNORMAL %
OSMOLALITY SERPL: 260 MMOL/KG (ref 280–301)
OSMOLALITY UR: 275 MMOL/KG (ref 100–1200)
OXYHGB MFR BLDV: 79 %
PCO2 BLDV: 38 MM HG (ref 40–50)
PH BLDV: 7.39 PH (ref 7.32–7.43)
PH UR STRIP: 6 PH (ref 5–7)
PLATELET # BLD AUTO: 116 10E9/L (ref 150–450)
PLATELET # BLD AUTO: 119 10E9/L (ref 150–450)
PO2 BLDV: 46 MM HG (ref 25–47)
POTASSIUM SERPL-SCNC: 3.6 MMOL/L (ref 3.4–5.3)
PROT SERPL-MCNC: 6.6 G/DL (ref 6.8–8.8)
RBC # BLD AUTO: 4.26 10E12/L (ref 4.4–5.9)
RBC #/AREA URNS AUTO: 1 /HPF (ref 0–2)
SODIUM SERPL-SCNC: 119 MMOL/L (ref 133–144)
SODIUM SERPL-SCNC: 120 MMOL/L (ref 133–144)
SODIUM SERPL-SCNC: 122 MMOL/L (ref 133–144)
SODIUM SERPL-SCNC: 131 MMOL/L (ref 133–144)
SODIUM UR-SCNC: 57 MMOL/L
SOURCE: ABNORMAL
SP GR UR STRIP: 1.01 (ref 1–1.03)
TROPONIN I SERPL-MCNC: <0.015 UG/L (ref 0–0.04)
TSH SERPL DL<=0.005 MIU/L-ACNC: 0.93 MU/L (ref 0.4–4)
UROBILINOGEN UR STRIP-MCNC: NORMAL MG/DL (ref 0–2)
UUN UR-MCNC: 168 MG/DL
UUN/CREAT 24H UR: 12 G/G CR
WBC # BLD AUTO: 8.4 10E9/L (ref 4–11)
WBC #/AREA URNS AUTO: 0 /HPF (ref 0–5)

## 2020-04-06 PROCEDURE — 25000128 H RX IP 250 OP 636: Performed by: HOSPITALIST

## 2020-04-06 PROCEDURE — 25000128 H RX IP 250 OP 636: Performed by: EMERGENCY MEDICINE

## 2020-04-06 PROCEDURE — 71046 X-RAY EXAM CHEST 2 VIEWS: CPT

## 2020-04-06 PROCEDURE — 83930 ASSAY OF BLOOD OSMOLALITY: CPT | Performed by: EMERGENCY MEDICINE

## 2020-04-06 PROCEDURE — 80320 DRUG SCREEN QUANTALCOHOLS: CPT | Performed by: EMERGENCY MEDICINE

## 2020-04-06 PROCEDURE — 99207 ZZC APP CREDIT; MD BILLING SHARED VISIT: CPT | Performed by: INTERNAL MEDICINE

## 2020-04-06 PROCEDURE — 25000132 ZZH RX MED GY IP 250 OP 250 PS 637: Mod: GY | Performed by: HOSPITALIST

## 2020-04-06 PROCEDURE — 25800029 ZZH RX IP 258 OP 250: Performed by: HOSPITALIST

## 2020-04-06 PROCEDURE — 25000131 ZZH RX MED GY IP 250 OP 636 PS 637: Mod: GY | Performed by: INTERNAL MEDICINE

## 2020-04-06 PROCEDURE — 82565 ASSAY OF CREATININE: CPT | Performed by: HOSPITALIST

## 2020-04-06 PROCEDURE — 36415 COLL VENOUS BLD VENIPUNCTURE: CPT | Performed by: EMERGENCY MEDICINE

## 2020-04-06 PROCEDURE — 93005 ELECTROCARDIOGRAM TRACING: CPT

## 2020-04-06 PROCEDURE — 96374 THER/PROPH/DIAG INJ IV PUSH: CPT

## 2020-04-06 PROCEDURE — 96361 HYDRATE IV INFUSION ADD-ON: CPT

## 2020-04-06 PROCEDURE — 85025 COMPLETE CBC W/AUTO DIFF WBC: CPT | Performed by: EMERGENCY MEDICINE

## 2020-04-06 PROCEDURE — 81001 URINALYSIS AUTO W/SCOPE: CPT | Performed by: EMERGENCY MEDICINE

## 2020-04-06 PROCEDURE — 83690 ASSAY OF LIPASE: CPT | Performed by: EMERGENCY MEDICINE

## 2020-04-06 PROCEDURE — 25800030 ZZH RX IP 258 OP 636: Performed by: INTERNAL MEDICINE

## 2020-04-06 PROCEDURE — 82805 BLOOD GASES W/O2 SATURATION: CPT | Performed by: EMERGENCY MEDICINE

## 2020-04-06 PROCEDURE — 25000132 ZZH RX MED GY IP 250 OP 250 PS 637: Mod: GY | Performed by: INTERNAL MEDICINE

## 2020-04-06 PROCEDURE — 84300 ASSAY OF URINE SODIUM: CPT | Performed by: HOSPITALIST

## 2020-04-06 PROCEDURE — 80048 BASIC METABOLIC PNL TOTAL CA: CPT | Performed by: EMERGENCY MEDICINE

## 2020-04-06 PROCEDURE — 36415 COLL VENOUS BLD VENIPUNCTURE: CPT | Performed by: HOSPITALIST

## 2020-04-06 PROCEDURE — 96375 TX/PRO/DX INJ NEW DRUG ADDON: CPT

## 2020-04-06 PROCEDURE — 12000000 ZZH R&B MED SURG/OB

## 2020-04-06 PROCEDURE — C9113 INJ PANTOPRAZOLE SODIUM, VIA: HCPCS | Performed by: INTERNAL MEDICINE

## 2020-04-06 PROCEDURE — 25000125 ZZHC RX 250: Performed by: INTERNAL MEDICINE

## 2020-04-06 PROCEDURE — 82010 KETONE BODYS QUAN: CPT | Performed by: EMERGENCY MEDICINE

## 2020-04-06 PROCEDURE — 25000128 H RX IP 250 OP 636: Performed by: INTERNAL MEDICINE

## 2020-04-06 PROCEDURE — 83935 ASSAY OF URINE OSMOLALITY: CPT | Performed by: HOSPITALIST

## 2020-04-06 PROCEDURE — 36415 COLL VENOUS BLD VENIPUNCTURE: CPT | Performed by: INTERNAL MEDICINE

## 2020-04-06 PROCEDURE — 84484 ASSAY OF TROPONIN QUANT: CPT | Performed by: HOSPITALIST

## 2020-04-06 PROCEDURE — 83036 HEMOGLOBIN GLYCOSYLATED A1C: CPT | Performed by: HOSPITALIST

## 2020-04-06 PROCEDURE — 00000146 ZZHCL STATISTIC GLUCOSE BY METER IP

## 2020-04-06 PROCEDURE — 80076 HEPATIC FUNCTION PANEL: CPT | Performed by: EMERGENCY MEDICINE

## 2020-04-06 PROCEDURE — 84295 ASSAY OF SERUM SODIUM: CPT | Performed by: INTERNAL MEDICINE

## 2020-04-06 PROCEDURE — 84295 ASSAY OF SERUM SODIUM: CPT | Performed by: HOSPITALIST

## 2020-04-06 PROCEDURE — 85049 AUTOMATED PLATELET COUNT: CPT | Performed by: HOSPITALIST

## 2020-04-06 PROCEDURE — 84484 ASSAY OF TROPONIN QUANT: CPT | Performed by: EMERGENCY MEDICINE

## 2020-04-06 PROCEDURE — 84540 ASSAY OF URINE/UREA-N: CPT | Performed by: HOSPITALIST

## 2020-04-06 PROCEDURE — 84443 ASSAY THYROID STIM HORMONE: CPT | Performed by: INTERNAL MEDICINE

## 2020-04-06 PROCEDURE — 25800030 ZZH RX IP 258 OP 636: Performed by: EMERGENCY MEDICINE

## 2020-04-06 PROCEDURE — 99223 1ST HOSP IP/OBS HIGH 75: CPT | Mod: AI | Performed by: HOSPITALIST

## 2020-04-06 PROCEDURE — 99285 EMERGENCY DEPT VISIT HI MDM: CPT | Mod: 25

## 2020-04-06 RX ORDER — SIMVASTATIN 20 MG
20 TABLET ORAL AT BEDTIME
COMMUNITY
End: 2020-12-31

## 2020-04-06 RX ORDER — RISPERIDONE 3 MG/1
3 TABLET ORAL AT BEDTIME
Status: DISCONTINUED | OUTPATIENT
Start: 2020-04-06 | End: 2020-04-07 | Stop reason: HOSPADM

## 2020-04-06 RX ORDER — SIMVASTATIN 20 MG
TABLET ORAL
Status: ON HOLD | COMMUNITY
Start: 2020-03-12 | End: 2020-04-06

## 2020-04-06 RX ORDER — PETROLATUM,WHITE/LANOLIN
OINTMENT (GRAM) TOPICAL PRN
COMMUNITY
End: 2020-05-04

## 2020-04-06 RX ORDER — ONDANSETRON 4 MG/1
4 TABLET, ORALLY DISINTEGRATING ORAL EVERY 6 HOURS PRN
Status: DISCONTINUED | OUTPATIENT
Start: 2020-04-06 | End: 2020-04-07 | Stop reason: HOSPADM

## 2020-04-06 RX ORDER — ASPIRIN 81 MG/1
81 TABLET, CHEWABLE ORAL DAILY
Status: DISCONTINUED | OUTPATIENT
Start: 2020-04-06 | End: 2020-04-06

## 2020-04-06 RX ORDER — SODIUM CHLORIDE 9 MG/ML
INJECTION, SOLUTION INTRAVENOUS CONTINUOUS
Status: DISCONTINUED | OUTPATIENT
Start: 2020-04-06 | End: 2020-04-06

## 2020-04-06 RX ORDER — SIMVASTATIN 20 MG
20 TABLET ORAL AT BEDTIME
Status: DISCONTINUED | OUTPATIENT
Start: 2020-04-06 | End: 2020-04-06

## 2020-04-06 RX ORDER — BISACODYL 10 MG
10 SUPPOSITORY, RECTAL RECTAL DAILY PRN
COMMUNITY
End: 2020-05-04

## 2020-04-06 RX ORDER — NICOTINE POLACRILEX 4 MG
15-30 LOZENGE BUCCAL
Status: DISCONTINUED | OUTPATIENT
Start: 2020-04-06 | End: 2020-04-07 | Stop reason: HOSPADM

## 2020-04-06 RX ORDER — LIDOCAINE 40 MG/G
CREAM TOPICAL
Status: DISCONTINUED | OUTPATIENT
Start: 2020-04-06 | End: 2020-04-07 | Stop reason: HOSPADM

## 2020-04-06 RX ORDER — MAGNESIUM HYDROXIDE/ALUMINUM HYDROXICE/SIMETHICONE 120; 1200; 1200 MG/30ML; MG/30ML; MG/30ML
20 SUSPENSION ORAL EVERY 6 HOURS PRN
COMMUNITY
End: 2020-05-04

## 2020-04-06 RX ORDER — SODIUM CHLORIDE 450 MG/100ML
INJECTION, SOLUTION INTRAVENOUS CONTINUOUS
Status: DISCONTINUED | OUTPATIENT
Start: 2020-04-06 | End: 2020-04-07

## 2020-04-06 RX ORDER — INSULIN ASPART 100 [IU]/ML
6 INJECTION, SOLUTION INTRAVENOUS; SUBCUTANEOUS
COMMUNITY
End: 2020-05-04

## 2020-04-06 RX ORDER — LORAZEPAM 2 MG/ML
.5-1 INJECTION INTRAMUSCULAR EVERY 4 HOURS PRN
Status: DISCONTINUED | OUTPATIENT
Start: 2020-04-06 | End: 2020-04-07 | Stop reason: HOSPADM

## 2020-04-06 RX ORDER — ATORVASTATIN CALCIUM 40 MG/1
40 TABLET, FILM COATED ORAL EVERY EVENING
Status: DISCONTINUED | OUTPATIENT
Start: 2020-04-06 | End: 2020-04-06

## 2020-04-06 RX ORDER — ASPIRIN 81 MG/1
81 TABLET, CHEWABLE ORAL DAILY
COMMUNITY
End: 2020-07-27

## 2020-04-06 RX ORDER — ONDANSETRON 2 MG/ML
4 INJECTION INTRAMUSCULAR; INTRAVENOUS EVERY 6 HOURS PRN
Status: DISCONTINUED | OUTPATIENT
Start: 2020-04-06 | End: 2020-04-07 | Stop reason: HOSPADM

## 2020-04-06 RX ORDER — PROCHLORPERAZINE MALEATE 10 MG
10 TABLET ORAL EVERY 6 HOURS PRN
Status: DISCONTINUED | OUTPATIENT
Start: 2020-04-06 | End: 2020-04-07 | Stop reason: HOSPADM

## 2020-04-06 RX ORDER — NALOXONE HYDROCHLORIDE 0.4 MG/ML
.1-.4 INJECTION, SOLUTION INTRAMUSCULAR; INTRAVENOUS; SUBCUTANEOUS
Status: DISCONTINUED | OUTPATIENT
Start: 2020-04-06 | End: 2020-04-07 | Stop reason: HOSPADM

## 2020-04-06 RX ORDER — TRANSPARENT DRESSING 2.37X2.75"
BANDAGE TOPICAL PRN
COMMUNITY
End: 2020-05-04

## 2020-04-06 RX ORDER — INSULIN ASPART 100 [IU]/ML
5 INJECTION, SOLUTION INTRAVENOUS; SUBCUTANEOUS 2 TIMES DAILY WITH MEALS
COMMUNITY
End: 2020-05-07

## 2020-04-06 RX ORDER — ACETAMINOPHEN 325 MG/1
650 TABLET ORAL EVERY 4 HOURS PRN
Status: DISCONTINUED | OUTPATIENT
Start: 2020-04-06 | End: 2020-04-07 | Stop reason: HOSPADM

## 2020-04-06 RX ORDER — SILODOSIN 4 MG/1
8 CAPSULE ORAL DAILY
Status: DISCONTINUED | OUTPATIENT
Start: 2020-04-06 | End: 2020-04-06 | Stop reason: CLARIF

## 2020-04-06 RX ORDER — KETOROLAC TROMETHAMINE 15 MG/ML
15 INJECTION, SOLUTION INTRAMUSCULAR; INTRAVENOUS ONCE
Status: COMPLETED | OUTPATIENT
Start: 2020-04-06 | End: 2020-04-06

## 2020-04-06 RX ORDER — AMOXICILLIN 250 MG
2 CAPSULE ORAL 2 TIMES DAILY PRN
Status: DISCONTINUED | OUTPATIENT
Start: 2020-04-06 | End: 2020-04-07 | Stop reason: HOSPADM

## 2020-04-06 RX ORDER — NEOMYCIN/BACITRACIN/POLYMYXINB 3.5-400-5K
OINTMENT (GRAM) TOPICAL PRN
COMMUNITY
End: 2020-05-04

## 2020-04-06 RX ORDER — SIMVASTATIN 20 MG
20 TABLET ORAL AT BEDTIME
Status: DISCONTINUED | OUTPATIENT
Start: 2020-04-06 | End: 2020-04-07 | Stop reason: HOSPADM

## 2020-04-06 RX ORDER — LOPERAMIDE HCL 2 MG
2 CAPSULE ORAL 4 TIMES DAILY PRN
COMMUNITY

## 2020-04-06 RX ORDER — DOXAZOSIN 1 MG/1
1 TABLET ORAL DAILY
Status: DISCONTINUED | OUTPATIENT
Start: 2020-04-06 | End: 2020-04-07 | Stop reason: HOSPADM

## 2020-04-06 RX ORDER — DEXTROSE MONOHYDRATE 25 G/50ML
25-50 INJECTION, SOLUTION INTRAVENOUS
Status: DISCONTINUED | OUTPATIENT
Start: 2020-04-06 | End: 2020-04-07 | Stop reason: HOSPADM

## 2020-04-06 RX ORDER — AMOXICILLIN 250 MG
1 CAPSULE ORAL 2 TIMES DAILY PRN
Status: DISCONTINUED | OUTPATIENT
Start: 2020-04-06 | End: 2020-04-07 | Stop reason: HOSPADM

## 2020-04-06 RX ORDER — ASPIRIN 81 MG/1
TABLET, CHEWABLE ORAL
Status: ON HOLD | COMMUNITY
Start: 2020-03-26 | End: 2020-04-06

## 2020-04-06 RX ORDER — ONDANSETRON 2 MG/ML
4 INJECTION INTRAMUSCULAR; INTRAVENOUS ONCE
Status: COMPLETED | OUTPATIENT
Start: 2020-04-06 | End: 2020-04-06

## 2020-04-06 RX ORDER — INSULIN ASPART 100 [IU]/ML
0-4 INJECTION, SOLUTION INTRAVENOUS; SUBCUTANEOUS
COMMUNITY
End: 2020-05-07

## 2020-04-06 RX ORDER — PROCHLORPERAZINE 25 MG
25 SUPPOSITORY, RECTAL RECTAL EVERY 12 HOURS PRN
Status: DISCONTINUED | OUTPATIENT
Start: 2020-04-06 | End: 2020-04-07 | Stop reason: HOSPADM

## 2020-04-06 RX ORDER — ACETAMINOPHEN 325 MG/1
325-650 TABLET ORAL EVERY 8 HOURS PRN
COMMUNITY
End: 2021-02-11

## 2020-04-06 RX ADMIN — ONDANSETRON 4 MG: 4 TABLET, ORALLY DISINTEGRATING ORAL at 08:04

## 2020-04-06 RX ADMIN — Medication 75 MG: at 21:09

## 2020-04-06 RX ADMIN — PANTOPRAZOLE SODIUM 40 MG: 40 INJECTION, POWDER, FOR SOLUTION INTRAVENOUS at 10:14

## 2020-04-06 RX ADMIN — VENLAFAXINE HYDROCHLORIDE 225 MG: 150 CAPSULE, EXTENDED RELEASE ORAL at 13:16

## 2020-04-06 RX ADMIN — SIMVASTATIN 20 MG: 20 TABLET, FILM COATED ORAL at 21:09

## 2020-04-06 RX ADMIN — RISPERIDONE 3 MG: 3 TABLET ORAL at 21:09

## 2020-04-06 RX ADMIN — INSULIN ASPART 2 UNITS: 100 INJECTION, SOLUTION INTRAVENOUS; SUBCUTANEOUS at 11:47

## 2020-04-06 RX ADMIN — INSULIN GLARGINE 7 UNITS: 100 INJECTION, SOLUTION SUBCUTANEOUS at 10:14

## 2020-04-06 RX ADMIN — INSULIN ASPART 1 UNITS: 100 INJECTION, SOLUTION INTRAVENOUS; SUBCUTANEOUS at 16:06

## 2020-04-06 RX ADMIN — SODIUM CHLORIDE: 9 INJECTION, SOLUTION INTRAVENOUS at 10:10

## 2020-04-06 RX ADMIN — LIDOCAINE HYDROCHLORIDE 30 ML: 20 SOLUTION ORAL; TOPICAL at 11:06

## 2020-04-06 RX ADMIN — KETOROLAC TROMETHAMINE 15 MG: 15 INJECTION, SOLUTION INTRAMUSCULAR; INTRAVENOUS at 01:54

## 2020-04-06 RX ADMIN — ONDANSETRON 4 MG: 2 INJECTION INTRAMUSCULAR; INTRAVENOUS at 01:55

## 2020-04-06 RX ADMIN — PROCHLORPERAZINE MALEATE 10 MG: 10 TABLET ORAL at 08:19

## 2020-04-06 RX ADMIN — LORAZEPAM 0.5 MG: 2 INJECTION INTRAMUSCULAR; INTRAVENOUS at 09:05

## 2020-04-06 RX ADMIN — SODIUM CHLORIDE: 4.5 INJECTION, SOLUTION INTRAVENOUS at 20:47

## 2020-04-06 RX ADMIN — SODIUM CHLORIDE 1000 ML: 9 INJECTION, SOLUTION INTRAVENOUS at 02:07

## 2020-04-06 RX ADMIN — DOXAZOSIN 1 MG: 1 TABLET ORAL at 13:16

## 2020-04-06 ASSESSMENT — ENCOUNTER SYMPTOMS
FEVER: 0
DIARRHEA: 0
ABDOMINAL PAIN: 0
VOMITING: 1

## 2020-04-06 ASSESSMENT — ACTIVITIES OF DAILY LIVING (ADL)
ADLS_ACUITY_SCORE: 21
ADLS_ACUITY_SCORE: 22

## 2020-04-06 ASSESSMENT — MIFFLIN-ST. JEOR: SCORE: 1615.03

## 2020-04-06 NOTE — PROVIDER NOTIFICATION
"MD paged: \"Pt is asking for diet, reports feeling hungry and denies nausea. Can you place orders for clear liquid diet, please? Thx!\"    Addendum: MD placed clear liq diet orders, pt informed.  "

## 2020-04-06 NOTE — ED NOTES
[facility]  ED Nurse Handoff Report    Caden Bush is a 59 year old male   ED Chief complaint: Chest Pain  . ED Diagnosis:   Final diagnoses:   Hyponatremia   Non-intractable vomiting with nausea, unspecified vomiting type   Schizoaffective disorder, bipolar type (H)     Allergies:   Allergies   Allergen Reactions     Flomax [Tamsulosin] Unknown     Lisinopril Unknown       Code Status: Full Code  Activity level - Baseline/Home:  Independent. Activity Level - Current:   Assist X 1. Lift room needed: No. Bariatric: No   Needed: No   Isolation: No. Infection: Not Applicable.     Vital Signs:   Vitals:    04/06/20 0143 04/06/20 0200 04/06/20 0230   BP: (!) 190/96 (!) 181/87 (!) 156/76   Pulse: 94 91 94   Resp: 16     Temp: 97.7  F (36.5  C)     TempSrc: Oral     SpO2: 100% 99% 100%       Cardiac Rhythm: ST  ,      Pain level:    Patient confused: Slow to respond. Patient Falls Risk: Yes.   Elimination Status: Has voided  Patient Report - Initial Complaint: Chest pain. Focused Assessment: Caden Bush is a 59 year old male with a history of insulin dependent diabetes and schizoaffective disorder who presents via EMS from his care facility for evaluation of chest pain. Tonight, he vomited once at his care facility at which time he also complained of left sided chest pain. This prompted staff to call 911 to bring the patient to the ED. On arival, the patient vomited once as well. After vomiting, he is no longer complaining of chest pain. He also denies any diarrhea, fevers, or abdominal pain.   Tests Performed: EKG, labs, xray Abnormal Results:   Labs Ordered and Resulted from Time of ED Arrival Up to the Time of Departure from the ED   CBC WITH PLATELETS DIFFERENTIAL - Abnormal; Notable for the following components:       Result Value    RBC Count 4.26 (*)     Hematocrit 37.3 (*)     Platelet Count 119 (*)     Absolute Lymphocytes 0.6 (*)     All other components within normal limits   BASIC  METABOLIC PANEL - Abnormal; Notable for the following components:    Sodium 119 (*)     Chloride 86 (*)     Glucose 234 (*)     All other components within normal limits   HEPATIC PANEL - Abnormal; Notable for the following components:    Bilirubin Direct 0.4 (*)     Bilirubin Total 1.8 (*)     Protein Total 6.6 (*)     All other components within normal limits   KETONE BETA-HYDROXYBUTYRATE QUANTITATIVE - Abnormal; Notable for the following components:    Ketone Quantitative 1.4 (*)     All other components within normal limits   LIPASE - Abnormal; Notable for the following components:    Lipase 19 (*)     All other components within normal limits   BLOOD GAS VENOUS AND OXYHGB - Abnormal; Notable for the following components:    PCO2 Venous 38 (*)     All other components within normal limits   TROPONIN I   ROUTINE UA WITH MICROSCOPIC   SODIUM RANDOM URINE   CREATININE RANDOM URINE   OSMOLALITY   ALCOHOL ETHYL   CARDIAC CONTINUOUS MONITORING   PULSE OXIMETRY NURSING   PERIPHERAL IV CATHETER     Chest XR,  PA & LAT   Final Result   IMPRESSION: Minimal linear atelectasis left lung base laterally. Lungs otherwise clear. No focal infiltrate or consolidation. Normal heart size. Normal pulmonary vascularity. No overt osseous abnormality.         Treatments provided: see MAR  Family Comments: SisterAlissa, updated, would like updates if possible on pt status and when pt leaves. [phone]  OBS brochure/video discussed/provided to patient:  No  ED Medications:   Medications   0.9% sodium chloride BOLUS (1,000 mLs Intravenous New Bag 4/6/20 0207)   ketorolac (TORADOL) injection 15 mg (15 mg Intravenous Given 4/6/20 0154)   ondansetron (ZOFRAN) injection 4 mg (4 mg Intravenous Given 4/6/20 0155)     Drips infusing:  No  For the majority of the shift, the patient's behavior Green. Interventions performed were N/A.    Sepsis treatment initiated: No       ED Nurse Name/Phone Number: Hillary Alexander RN,   2:51  AM    RECEIVING UNIT ED HANDOFF REVIEW    Above ED Nurse Handoff Report was reviewed: No  Reviewed by: Jaquelin Connell RN on April 6, 2020 at 4:20 AM

## 2020-04-06 NOTE — PROGRESS NOTES
[facility]  Hospitalist Progress Note  Yeny Broderick MD 04/06/20  Text Page  Pager: [phone] (7am-6pm)    Reason for Stay (Diagnosis): N/V, hyponatremia         Assessment and Plan:      Summary of Stay: Caden Bush is a 59 year old male with past medical history of IDDM2, insomnia, schizoaffective disorder who was admitted on 4/6/2020 with chest pain, nausea, emesis and was found to have significant hyponatremia.  This morning patient had recurrent emesis and epigastric abdominal pain.    Problem List/Assessment and Plan:     1. Hypotonic Hyponatremia: Sodium was 119 on admission.  He was given a liter of normal saline in the emergency room and sodium did improve to 120.  He has hypo-tonic hypovolemia.  His urine sodium is 57.  Urine osmolality is 275.  I am checking thyroid function tests and will also check a morning cortisol.  If these are within normal limits then this would be most consistent with SIADH.  The patient is currently n.p.o. due to vomiting and GI issues so I do have him on maintenance fluids but once his GI issues improve then would stop IV fluids and place on fluid restriction.  -Check TSH  -Check a.m. cortisol  -Normal saline at 75 cc/h  -Sodium should be no higher than 127 tomorrow morning (currently 122)  -Every 8 hour sodium checks    2. Nausea/Emesis/Epigastric Abdominal Pain: Had emesis on day of admission, this morning had further emesis.  Today he also has epigastric abdominal pain.  LFTs on admission showed elevated bilirubin but normal AST/ALT.  His lipase was not elevated.  Could have a viral gastroenteritis.  His symptoms also sound like they could be related to gastritis.  Changing back to n.p.o. given persistent nausea and vomiting.  He has a rather benign abdominal exam so I do not think he needs a CT scan currently.  We will add a PPI and try GI cocktail.  -IV PPI  -N.p.o. with IV fluids  -PRN GI cocktail  -Repeat CMP tomorrow    3. Chest Pain: Had left sided  chest pain on admission.  EKG without acute ischemic changes.  Serial troponins negative.  Do not expect a cardiac cause of his CP, seems more likely GI related (see above).    4. IDDM2: PTA on Lantus 15 units daily, Aspart 5 units with lunch and dinner and sliding scale insulin with meals.  HgbA1c 5.7.  He had emesis this morning and is NPO due to GI symptoms at this point so will decrease Lantus dosing.  -Decrease Lantus to 7 units daily  -Change to Medium n.p.o. sliding scale insulin    5. Schizoaffective Disorder: Prior to admission on Risperdal and Effexor.  Will continue both of these medications (I do not think that Effexor is causing his hyponatremia and will have withdrawal symptoms from holding this, can be reassessed if sodium does not improve).    Diet: NPO for Medical/Clinical Reasons Except for: Meds, Ice Chips    DVT Prophylaxis: Pneumatic Compression Devices  Rouse Catheter: not present  Code Status: Full Code      Disposition Plan   Expected discharge: 1-2 days, recommended to prior living arrangement once improving sodium, advancment of diet.  Entered: Yeny Broderick MD 04/06/2020, 10:43 AM       The patient's care was discussed with the Bedside Nurse and Patient.    Hospitalist Service  [facility]          Interval History (Subjective):      Patient was seen this morning with his bedside nurse.  He had two episodes of emesis this morning which was reportedly bile colored and pink tinged.  The patient states he is having some mild epigastric pain.  This was not present yesterday.  He feels nauseated and has no appetite.  He denies CP or SOB.  No cough.  States he is constipated, cannot tell me when his last BM was, estimates a few days ago.  Urinating fine.      Notes that he normally drinks a lot of water.  He is not sure how much but states he has a large water bottle that he drinks 2-3 of daily (? 1L bottle).  Sometimes he feels dizzy so he drinks water, denies current  dizziness.                  Physical Exam:      Last Vital Signs:  BP (!) 157/73 (BP Location: Right arm)   Pulse 98   Temp 99.8  F (37.7  C) (Oral)   Resp 16   Ht 1.829 m (6')   Wt 76.7 kg (169 lb 1.6 oz)   SpO2 99%   BMI 22.93 kg/m      General: Alert, awake, no acute distress.  HEENT: Normocephalic and atraumatic, eyes anicteric and without scleral injection, EOMI, face symmetric, MMM.  Cardiac: RRR, normal S1, S2. No m/g/r, no LE edema.  Pulmonary: Normal chest rise, normal work of breathing.  Lungs CTAB without crackles or wheezing.  Abdomen: soft, mild tenderness in epigastric region, non-distended.  Hypoactive bowel sounds, no guarding or rebound tenderness.  Extremities: no deformities.  Warm, well perfused.  Skin: no rashes or lesions.  Warm and Dry.  Neuro: No focal deficits.  Speech clear.  Coordination and strength grossly normal.  Psych: Alert and oriented x3. Appropriate affect.         Medications:      All current medications were reviewed with changes reflected in problem list.         Data:      All new lab and imaging data was reviewed.   Labs:  Recent Labs   Lab 04/06/20  0557 04/06/20  0150   WBC  --  8.4   HGB  --  13.3   HCT  --  37.3*   MCV  --  88   * 119*     Recent Labs   Lab 04/06/20  0557 04/06/20  0150   * 119*   POTASSIUM  --  3.6   CHLORIDE  --  86*   CO2  --  22   ANIONGAP  --  11   GLC  --  234*   BUN  --  15   CR 0.80 0.85   GFRESTIMATED >90 >90   GFRESTBLACK >90 >90   PEE  --  8.6   PROTTOTAL  --  6.6*   ALBUMIN  --  4.1   BILITOTAL  --  1.8*   ALKPHOS  --  113   AST  --  29   ALT  --  34      Imaging:   Recent Results (from the past 24 hour(s))   Chest XR,  PA & LAT    Narrative    EXAM: XR CHEST 2 VW  LOCATION: [facility]  DATE/TIME: 4/6/2020 2:11 AM    INDICATION: Chest pain  COMPARISON: None.      Impression    IMPRESSION: Minimal linear atelectasis left lung base laterally. Lungs otherwise clear. No focal infiltrate or consolidation. Normal  heart size. Normal pulmonary vascularity. No overt osseous abnormality.       Yeny Broderick MD

## 2020-04-06 NOTE — PLAN OF CARE
A/O x 4, flat affect. VSS on RA. Denied pain. , 107, and 109. Na: 131. Tele SR, denied CP . LS dim, no SOB reported. PIV to left intact, infusing w/ 1/2 NS @ 100 ml/hr for 5 hours per order, recheck Na 0000. Up SBA GB, voiding well. Tolerating clear liq diet, denies nausea and no reported emesis. Possible discharge 1-2 days.  Will continue POC.

## 2020-04-06 NOTE — PHARMACY-ADMISSION MEDICATION HISTORY
Admission medication history interview status for this patient is complete. See [facility] admission navigator for allergy information, prior to admission medications and immunization status.     Medication history interview source(s):Patient and Caregiver  Medication history resources (including written lists, pill bottles, clinic record): MAR from [facility]  Primary pharmacy: [facility] [phone]    Changes made to PTA medication list:  Added: Tylenol, Maalox, dulcolax, Robitussin, Novolog sliding scale, Imodium, MOM, Neosporin, Fleets Enema, Tegaderm, A & D ointment  Deleted: EC Aspirin, Zocor 40 mg  Changed: split Novolog with meals into 6 units breakfast and 5 units lunch/dinner    Actions taken by pharmacist (provider contacted, etc):None     Additional medication history information:None    Medication reconciliation/reorder completed by provider prior to medication history? No    For patients on insulin therapy: Yes   Basaglar dose:  _15 units_   in AM daily   Sliding scale Novolog Y  If Yes, do you have a baseline novolog pre-meal dose:  _6_units with breakfast, 5 units with lunch and dinner   Patients eat three meals a day:   yes, resides at care facility     Any Barriers to therapy:  cost of medications/comfortable with giving injections (if applicable)/ comfortable and confident with current diabetes regimen       Prior to Admission medications    Medication Sig Last Dose Taking? Auth Provider   acetaminophen (TYLENOL) 325 MG tablet Take 325-650 mg by mouth every 8 hours as needed for mild pain  Yes Unknown, Entered By History   alum & mag hydroxide-simethicone (MAALOX) 200-200-20 MG/5ML SUSP suspension Take 20 mLs by mouth every 6 hours as needed (gastric upset)  Yes Unknown, Entered By History   aspirin (ASA) 81 MG chewable tablet Take 81 mg by mouth daily 4/5/2020 at Unknown time Yes Unknown, Entered By History   bisacodyl (DULCOLAX) 10 MG suppository Place 10 mg rectally daily as needed for  constipation If Milk of Magnesia not effective  Yes Unknown, Entered By History   cholecalciferol (VITAMIN D) 1000 UNIT tablet Take 1 tablet by mouth daily. 2020 at Unknown time Yes Reported, Patient   guaiFENesin (ROBITUSSIN) 100 MG/5ML SYRP Take 10 mLs by mouth every 4 hours as needed for cough  Yes Unknown, Entered By History   insulin aspart (NOVOLOG FLEXPEN) 100 UNIT/ML pen Inject 6 Units Subcutaneous daily (with breakfast) 2020 at Unknown time Yes Unknown, Entered By History   insulin aspart (NOVOLOG FLEXPEN) 100 UNIT/ML pen Inject 5 Units Subcutaneous 2 times daily (with meals) With lunch and dinner 2020 at Unknown time Yes Unknown, Entered By History   insulin aspart (NOVOLOG FLEXPEN) 100 UNIT/ML pen Inject 0-4 Units Subcutaneous 4 times daily (with meals and nightly) Sliding Scale for B-250 = 1 unit  251-300 = 2 units  301-350 = 3 units  >350 = 4 units 2020 at Unknown time Yes Unknown, Entered By History   insulin glargine (BASAGLAR KWIKPEN) 100 UNIT/ML pen Inject 15 Units Subcutaneous daily 2020 at Unknown time Yes Annie Hart MD   loperamide (IMODIUM) 2 MG capsule Take 2 mg by mouth 4 times daily as needed for diarrhea 4 mg after first loose stool, then 2 mg after each subsequent loose stool.  Max 8 mg (4 capsules) daily.  Yes Unknown, Entered By History   magnesium hydroxide (MILK OF MAGNESIA) 400 MG/5ML suspension Take 30 mLs by mouth daily as needed for constipation  Yes Unknown, Entered By History   neomycin-bacitracin-polymyxin (NEOSPORIN) 5-400-5000 ointment Apply topically as needed (minor cuts/abrasions)  Yes Unknown, Entered By History   risperiDONE (RISPERDAL) 3 MG tablet Take 1 tablet (3 mg) by mouth At Bedtime 2020 at Unknown time Yes Miguelangel Parks DO   Silodosin (RAPAFLO) 8 MG CAPS capsule Take 1 capsule (8 mg) by mouth daily 2020 at Unknown time Yes Annie Ye PA-C   simvastatin (ZOCOR) 20 MG tablet Take 20 mg by mouth At Bedtime  4/5/2020 at Unknown time Yes Unknown, Entered By History   sodium phosphate (FLEET ENEMA) 7-19 GM/118ML rectal enema Place 1 enema rectally once as needed for constipation If Milk of Magnesia and Bisacodyl Suppository not effective.  Yes Unknown, Entered By History   Transparent Dressings (TEGADERM IV TRANSPARENT DRESSING) MISC Apply topically as needed for skin protection (to skin tears or abrasions)  Yes Unknown, Entered By History   traZODone (DESYREL) 50 MG tablet Take 1.5 tablets (75 mg) by mouth At Bedtime 4/5/2020 at Unknown time Yes Miguelangel Parks, DO   venlafaxine (EFFEXOR XR) 75 MG 24 hr capsule Take 1 capsule (75 mg) by mouth daily TAKE WITH  Effexor 150 mg for a total dose of 225 mg daily 4/5/2020 at Unknown time Yes Miguelangel Parks, DO   venlafaxine (EFFEXOR-XR) 150 MG 24 hr capsule TAKE 1 CAPSULE BY MOUTH DAILY ALONG WITH A 75 MG FOR A TOTAL  MG DAILY 4/5/2020 at Unknown time Yes Miguelangel Parks, DO   vitamin A & D (BABY) external ointment Apply topically as needed for irritation  Yes Unknown, Entered By History

## 2020-04-06 NOTE — ED PROVIDER NOTES
History     Chief Complaint:  Chest Pain    The history is provided by the patient and the EMS personnel. The history is limited by the condition of the patient (due to the patient's schizoaffective disorder).      Caden Bush is a 59 year old male with a history of insulin dependent diabetes and schizoaffective disorder who presents via EMS from his care facility for evaluation of chest pain. Tonight, he vomited once at his care facility at which time he also complained of left sided chest pain. This prompted staff to call 911 to bring the patient to the ED. On arrival, the patient vomited once as well. After vomiting, he is no longer complaining of chest pain. He also denies any diarrhea, fevers, or abdominal pain.     Allergies:  Flomax [Tamsulosin]  Lisinopril    Medications:    Baby aspirin  Insulin (Novolog, Basaglar)  Risperdal   Sildosin   Simvastatin   Trazodone  Effexor XR    Past Medical History:    Arthritis  Type I diabetes mellitus  Eczema  Adenomatous polyp of colon x2  Retinopathy, bilateral    Schizoaffective disorder  Seasonal affective disorder  Vitamin D deficiency   Depression     Past Surgical History:    Appendectomy   Colonoscopy     Family History:    Osteoporosis  Cancer  Asthma  Depression   Anxiety     Social History:  Marital Status:  Single   Negative for tobacco use.  Negative for alcohol use.  Negative for drug use.      Review of Systems   Constitutional: Negative for fever.   Cardiovascular: Positive for chest pain.   Gastrointestinal: Positive for vomiting. Negative for abdominal pain and diarrhea.   All other systems reviewed and are negative.        Physical Exam     Patient Vitals for the past 24 hrs:   BP Temp Temp src Heart Rate Resp SpO2   04/06/20 0245 164/79 -- -- 94 -- 100 %   04/06/20 0230 156/76 -- -- 89 -- 100 %   04/06/20 0200 181/87 -- -- 89 -- 99 %   04/06/20 0143 190/96 97.7  F (36.5  C) Oral 94 16 100 %      Physical Exam  Nursing note and vitals  reviewed.  Constitutional: Cooperative.   HENT:   Mouth/Throat: Mucous membranes are dry.   Eyes: Pupils are equal, round, and reactive to light. EOMI  Cardiovascular: Normal rate, regular rhythm and normal heart sounds.  No murmur.  Pulmonary/Chest: Effort normal and breath sounds normal. No respiratory distress. No wheezes. No rales.   Abdominal: Soft. Normal appearance and bowel sounds are normal. No distension. There is no tenderness. There is no rigidity and no guarding.   Musculoskeletal: No LE edema   Neurological: Alert. CN 2-12 intact.  Strength and sensation intact and symmetric.   Skin: Skin is warm and dry. No rash noted. Slightly pale.   Psychiatric: Normal mood and affect.     Emergency Department Course   ECG:  Indication: Chest Pain  Time: 0143  Vent. Rate 95 bpm. CA interval 198. QRS duration 86. QT/QTc 366/459. P-R-T axis 69 -14 71.    Normal sinus rhythm.  Possible left atrial enlargement. Read time: 0146.    Imaging:  Radiographic findings were communicated with the patient who voiced understanding of the findings.  XR Chest PA & LAT:   Minimal linear atelectasis left lung base laterally. Lungs otherwise clear. No focal infiltrate or consolidation. Normal heart size. Normal pulmonary vascularity. No overt osseous abnormality, as per radiology.     Laboratory:  CBC: WBC: 8.4, HGB: 13.3, PLT: 119 (L)  BMP: Glucose 234 (H), Na: 119 (LL), Cl: 86 (L), o/w WNL (Creatinine: 0.85)    Hepatic Panel: Bilirubin direct: 0.4 (H), Bilirubin total: 1.8 (H), Protein total: 6.6 (H), o/w WNL  Lipase: 19 (L)  Alcohol ethyl: <0.01    0150 Troponin: <0.015     Ketone beta hydroxybutyrate: 1.4 (H)  Blood gas venous and oxyhgb: pCO2: 38 (L), o/w WNL    Creatinine random urine: pending  Sodium random urine: pending    Osmolality: pending    UA with Microscopic: pending    Interventions:  0154 Toradol, 15 mg, IV injection  0155 Zofran, 4 mg, IV injection   0207 NS 1L IV     Emergency Department Course:  Nursing notes and  "vitals reviewed.   0149: I performed an exam of the patient as documented above.      IV was inserted and blood was drawn for laboratory testing, results above. Medicine administered as documented above.   EKG obtained in the ED, see results above.   The patient was sent for a chest x-ray while in the emergency department, results above.      0251: I rechecked the patient and discussed the results of his workup thus far. He reports he has been drinking a lot of water in an attempt to keep his blood sugars down.     0256: I consulted with Dr. Curran of the hospitalist services. She is in agreement to accept the patient for admission.    Findings and plan explained to the Patient who consents to admission. Discussed the patient with Dr. Curran, who will admit the patient to a medical bed for further monitoring, evaluation, and treatment.    I personally reviewed the laboratory and imaging results with the Patient and answered all related questions prior to admission.    Impression & Plan      Medical Decision Making:  Caden Bush is a 59 year old male with schizoaffective disorder who presents with history of vomiting, chest pain, and what seems to be mild confusion. There is a verbal report of a fever, but their documented temperature is of 99 so there has never been an objective fever. He denies any other infectious symptoms. Of note, he has been drinking a lot of free water in an effort to \"get his sugars down\". He is not in DKA. He has a benign abdominal exam. Initial EKG, troponin, chest x-ray are all reassuring. Given his metabolic work up, I doubt a primary cardiogenic or neurologic cause. SIADH is certainly in the differential but with his excess free water intake, this is likely the cause of his low sodium. No seizure activity, though some of his mild confusion may be related to the hyponatremia. We have begun correction with a liter of normal saline with admission to the hospitalist service at this time.  "     Diagnosis:    ICD-10-CM   1. Hyponatremia  E87.1   2. Non-intractable vomiting with nausea R11.2   3. Schizoaffective disorder, bipolar type  F25.0       Disposition:  Admitted to medicine under the care of Dr. Bina Graham Disclosure:  I, Swetha Luciano, am serving as a scribe on 4/6/2020 at 1:49 AM to personally document services performed by Tal Valdez MD based on my observations and the provider's statements to me.     4/6/2020   [facility] EMERGENCY DEPARTMENT       Tal Valdez MD  04/06/20 0336

## 2020-04-06 NOTE — PROVIDER NOTIFICATION
"Dr. Broderick paged: Pt bile-pink emesis x2 after po Zofran and Compazine given. Can we get order for Reglan? Thanks!    Addendum: Dr. Broderick ordered IV ativan for nausea. Made pt NPO and decreased lantus dosage. Stated to call if pt develops abd pain.    Addendum 2: Pt now c/o 6/10 \"achy\" medial upper abdomen pain, tender. Thanks! Dr. Broderick assessed pt ordered GI cocktail.   "

## 2020-04-06 NOTE — H&P
[facility]    History and Physical  Hospitalist       Date of Admission:  4/6/2020    Assessment and Plan:      59 year old male with a history of insulin dependent diabetes and schizoaffective disorder who presents via EMS from his care facility for evaluation of chest pain with nausea and vomiting he was found to have significant hyponatremia and admitted.      Hyponatremia, concerning for SIADH. I will hold his venlafaxine.  Fluid restrict 1.5 L.  Monitor sodium every 8 hours urine osmole serum osmole's and urine sodium ordered.    Chest pain and nausea and vomiting: possibly reflux elated from his emesis episodes secondary to above. Continue to trend his cardiac enzymes EKG normal sinus rhythm.  May consider stress test inpatient versus outpatient pending his results and symptoms after improvement of his hyponatremia  - continue aspirin, switched simvastatin to Lipitor 40 mg     Diabetes mellitus type .  Insulin glargine 15 units at home, resume, also takes insulin aspart 6 units before meals, will change to insulin aspart 5 units before meals, with insulin sliding scale and diabetic diet. Beta hydroxybutyrate is elevated, but no evidence of DKA currently. monitor    Schizoaffective disorder.  Above will have to hold his venlafaxine, resume his other psychiatric medications once reconciled.  Will need to discuss with psychiatry about his medication regimen, and arrange close follow-up with a psychiatrist upon discharge.       ---------     # Code status: Full  # Anticipated discharge date and Disposition:1-2 days  # DVT: lovenox  # IVF: none                        Love Curran MD  Text Page (7am - 6pm, M-F)          Primary Care Physician   Leny Lee    Chief Complaint   Chest pain    History is obtained from the patient    History of Present Illness   59 year old male with a history of insulin dependent diabetes and schizoaffective disorder who presents via EMS from his care facility for  evaluation of chest pain with nausea and vomiting.  Patient is a poor historian, able to give complete history is unsure when his last episode of chest pain was or describe it.  The only thing he is currently complaining of is the nausea and vomiting.  Most history has been obtained from chart, chart he has been having nausea and vomiting today associated with chest pain thus he was sent to the ED. Otherwise, he has no other complaints    Past Medical History    I have reviewed this patient's medical history and updated it with pertinent information if needed.   Past Medical History:   Diagnosis Date     Arthritis      Diabetes mellitus      Eczema 2011    biopsy leg      Polyp of colon, adenomatous 6/25/2012     Retinopathy     HFA ophtho DR HARLEY     Schizoaffective disorder      Seasonal affective disorder      Vitamin D deficiency     replaced       Past Surgical History   I have reviewed this patient's surgical history and updated it with pertinent information if needed.  Past Surgical History:   Procedure Laterality Date     APPENDECTOMY       COLONOSCOPY  2012    2 adenomatous polyps       Prior to Admission Medications   Prior to Admission Medications   Prescriptions Last Dose Informant Patient Reported? Taking?   Silodosin (RAPAFLO) 8 MG CAPS capsule   No No   Sig: Take 1 capsule (8 mg) by mouth daily   aspirin (ASA) 81 MG chewable tablet   Yes No   aspirin 81 MG EC tablet   Yes No   Sig: Take 81 mg by mouth daily.   cholecalciferol (VITAMIN D) 1000 UNIT tablet   Yes No   Sig: Take 1 tablet by mouth daily.   insulin aspart (NOVOLOG PEN) 100 UNIT/ML pen   Yes No   Sig: Inject 6 units before breakfast,  5 units before lunch and 5 units before dinner.   insulin glargine (BASAGLAR KWIKPEN) 100 UNIT/ML pen   No No   Sig: Inject 15 Units Subcutaneous daily   risperiDONE (RISPERDAL) 3 MG tablet   No No   Sig: Take 1 tablet (3 mg) by mouth At Bedtime   simvastatin (ZOCOR) 20 MG tablet   Yes No   simvastatin (ZOCOR)  40 MG tablet   No No   Sig: Take 0.5 tablets (20 mg) by mouth At Bedtime   traZODone (DESYREL) 50 MG tablet   No No   Sig: Take 1.5 tablets (75 mg) by mouth At Bedtime   venlafaxine (EFFEXOR XR) 75 MG 24 hr capsule   No No   Sig: Take 1 capsule (75 mg) by mouth daily TAKE WITH  Effexor 150 mg for a total dose of 225 mg daily   venlafaxine (EFFEXOR-XR) 150 MG 24 hr capsule   No No   Sig: TAKE 1 CAPSULE BY MOUTH DAILY ALONG WITH A 75 MG FOR A TOTAL  MG DAILY      Facility-Administered Medications: None     Allergies   Allergies   Allergen Reactions     Flomax [Tamsulosin] Unknown     Lisinopril Unknown       Social History   I have reviewed this patient's social history and updated it with pertinent information if needed. Caden Bush  reports that he has never smoked. He has never used smokeless tobacco. He reports that he does not drink alcohol or use drugs.    Family History   Family history reviewed with patient and is noncontributory.    ROS  A 12 point review of system discussed with patient and negative as per HPI    Physical Exam   Temp: 97.7  F (36.5  C) Temp src: Oral BP: (!) 164/79 Pulse: 93 Heart Rate: 94 Resp: 16 SpO2: 100 %      Vital Signs with Ranges  Temp:  [97.7  F (36.5  C)] 97.7  F (36.5  C)  Pulse:  [91-94] 93  Heart Rate:  [89-94] 94  Resp:  [16] 16  BP: (156-190)/(76-96) 164/79  SpO2:  [99 %-100 %] 100 %  0 lbs 0 oz    General: Pt in NAD, normal appearance  HEENT: PERRLA, EOMI, normocephalic / atraumatic no cervical LAD, no bruit, no pallor, WNL oropharynx, neck supple  Cardiac: +S1, S2, RRR, no MRG, no edema  Lungs: Clear to Auscultation Bilateral, normal breathing without accessory muscle usage, no wheezing, rhonchi or crackles  GI: soft NT/ND +bowel sounds all quadrants, no hepatosplenomegaly  Psych: normal mood and affect, A&Ox3  Neurological: A&O x3, CN II-XII grossly intact, sensation intact  Skin: warm, dry, normal turgor, no rash    Data   Data reviewed today:  I personally  reviewed the EKG tracing showing NSR.  Recent Labs   Lab 04/06/20  0150   WBC 8.4   HGB 13.3   MCV 88   *   *   POTASSIUM 3.6   CHLORIDE 86*   CO2 22   BUN 15   CR 0.85   ANIONGAP 11   PEE 8.6   *   ALBUMIN 4.1   PROTTOTAL 6.6*   BILITOTAL 1.8*   ALKPHOS 113   ALT 34   AST 29   LIPASE 19*   TROPI <0.015       Recent Results (from the past 24 hour(s))   Chest XR,  PA & LAT    Narrative    EXAM: XR CHEST 2 VW  LOCATION: [facility]  DATE/TIME: 4/6/2020 2:11 AM    INDICATION: Chest pain  COMPARISON: None.      Impression    IMPRESSION: Minimal linear atelectasis left lung base laterally. Lungs otherwise clear. No focal infiltrate or consolidation. Normal heart size. Normal pulmonary vascularity. No overt osseous abnormality.

## 2020-04-06 NOTE — ED NOTES
"Pt rounding done. As I entered the pt's room the pt appeared to be on the end of the bed saturated in his own urine. I assisted the patient into new clothing, changed the linen, and reinforced use of call light if he needs to urinate in the future. Pt given warm blanket w/ call light in reach and side rails up. Pt also mentioned \"tonight will be my time\" and \"I will die tonight\". I informed the pt he is in the right place to receive care and that his vitals are stable. I provided therapeutic communication and remained with the patient until calmed.   "

## 2020-04-06 NOTE — PLAN OF CARE
Low grade temp other VSS on RA. Tele SR. A&O x4. Aching epigastric pain to abd, abd soft and epigastric tender. Emesis x2 post zofran and compazine, Dr. Broderick ordered IV ativan and GI cocktail with decrease in nausea. NPO, continuous IVF.  and 203. Na 120. Ambulated in santos x1 this shift. SBA gait belt. Discharge tbd.

## 2020-04-06 NOTE — ED NOTES
Pt's sister, Alissa, called at [phone], per pt request. She was given an update on pt's status and plan of care. She verbalized understanding. Pt updated.

## 2020-04-06 NOTE — ED TRIAGE NOTES
Pt arrives via EMS from [facility] where he had complaints of left sided anterior CP and fever. Upon arrival to ER pt began vomiting. Afebrile upon arrival. A&O x4.

## 2020-04-06 NOTE — PROGRESS NOTES
Discharge Planner   Discharge Plans in progress: Isis received a call from Bear at [facility] where the pt resides.  Bear was looking for an update on the pt's discharge status P: [phone] F: [phone].  Isis updated her that the pt may be discharge ready in the 1-2 days.  Bear said that they monitor the pt's blood sugars, his meds, and do behavioral interventions for him.  Otherwise, the pt is independent at [facility].    Barriers to discharge plan: None known at this time.  Follow up plan: Isis will continue with discharge planning and will be available as needed until discharge.       Entered by: Renate Escamilla 04/06/2020 11:38 AM     STERLING Montaño, [facility]  Inpatient Care Coordination  [facility]  [phone]

## 2020-04-07 VITALS
HEIGHT: 72 IN | SYSTOLIC BLOOD PRESSURE: 133 MMHG | WEIGHT: 156.9 LBS | OXYGEN SATURATION: 98 % | RESPIRATION RATE: 16 BRPM | TEMPERATURE: 97.3 F | BODY MASS INDEX: 21.25 KG/M2 | HEART RATE: 98 BPM | DIASTOLIC BLOOD PRESSURE: 67 MMHG

## 2020-04-07 DIAGNOSIS — Z76.0 ENCOUNTER FOR MEDICATION REFILL: Primary | ICD-10-CM

## 2020-04-07 DIAGNOSIS — N40.0 BPH (BENIGN PROSTATIC HYPERPLASIA): ICD-10-CM

## 2020-04-07 LAB
ALBUMIN SERPL-MCNC: 3.4 G/DL (ref 3.4–5)
ALP SERPL-CCNC: 92 U/L (ref 40–150)
ALT SERPL W P-5'-P-CCNC: 29 U/L (ref 0–70)
ANION GAP SERPL CALCULATED.3IONS-SCNC: 4 MMOL/L (ref 3–14)
ANION GAP SERPL CALCULATED.3IONS-SCNC: 4 MMOL/L (ref 3–14)
AST SERPL W P-5'-P-CCNC: 32 U/L (ref 0–45)
BILIRUB SERPL-MCNC: 0.6 MG/DL (ref 0.2–1.3)
BUN SERPL-MCNC: 11 MG/DL (ref 7–30)
BUN SERPL-MCNC: 13 MG/DL (ref 7–30)
CALCIUM SERPL-MCNC: 8.7 MG/DL (ref 8.5–10.1)
CALCIUM SERPL-MCNC: 8.7 MG/DL (ref 8.5–10.1)
CHLORIDE SERPL-SCNC: 106 MMOL/L (ref 94–109)
CHLORIDE SERPL-SCNC: 106 MMOL/L (ref 94–109)
CO2 SERPL-SCNC: 25 MMOL/L (ref 20–32)
CO2 SERPL-SCNC: 25 MMOL/L (ref 20–32)
CORTIS SERPL-MCNC: 19.6 UG/DL (ref 4–22)
CREAT SERPL-MCNC: 1.19 MG/DL (ref 0.66–1.25)
CREAT SERPL-MCNC: 1.2 MG/DL (ref 0.66–1.25)
ERYTHROCYTE [DISTWIDTH] IN BLOOD BY AUTOMATED COUNT: 12 % (ref 10–15)
GFR SERPL CREATININE-BSD FRML MDRD: 65 ML/MIN/{1.73_M2}
GFR SERPL CREATININE-BSD FRML MDRD: 66 ML/MIN/{1.73_M2}
GLUCOSE BLDC GLUCOMTR-MCNC: 116 MG/DL (ref 70–99)
GLUCOSE BLDC GLUCOMTR-MCNC: 151 MG/DL (ref 70–99)
GLUCOSE BLDC GLUCOMTR-MCNC: 218 MG/DL (ref 70–99)
GLUCOSE BLDC GLUCOMTR-MCNC: 75 MG/DL (ref 70–99)
GLUCOSE SERPL-MCNC: 244 MG/DL (ref 70–99)
GLUCOSE SERPL-MCNC: 289 MG/DL (ref 70–99)
HCT VFR BLD AUTO: 35.1 % (ref 40–53)
HGB BLD-MCNC: 12.2 G/DL (ref 13.3–17.7)
INTERPRETATION ECG - MUSE: NORMAL
MCH RBC QN AUTO: 31.2 PG (ref 26.5–33)
MCHC RBC AUTO-ENTMCNC: 34.8 G/DL (ref 31.5–36.5)
MCV RBC AUTO: 90 FL (ref 78–100)
PLATELET # BLD AUTO: 143 10E9/L (ref 150–450)
POTASSIUM SERPL-SCNC: 4.3 MMOL/L (ref 3.4–5.3)
POTASSIUM SERPL-SCNC: 4.7 MMOL/L (ref 3.4–5.3)
PROT SERPL-MCNC: 5.5 G/DL (ref 6.8–8.8)
RBC # BLD AUTO: 3.91 10E12/L (ref 4.4–5.9)
SODIUM SERPL-SCNC: 135 MMOL/L (ref 133–144)
SODIUM SERPL-SCNC: 135 MMOL/L (ref 133–144)
SODIUM SERPL-SCNC: 137 MMOL/L (ref 133–144)
WBC # BLD AUTO: 4 10E9/L (ref 4–11)

## 2020-04-07 PROCEDURE — 25000131 ZZH RX MED GY IP 250 OP 636 PS 637: Mod: GY | Performed by: INTERNAL MEDICINE

## 2020-04-07 PROCEDURE — 25800030 ZZH RX IP 258 OP 636: Performed by: INTERNAL MEDICINE

## 2020-04-07 PROCEDURE — 00000146 ZZHCL STATISTIC GLUCOSE BY METER IP

## 2020-04-07 PROCEDURE — 99239 HOSP IP/OBS DSCHRG MGMT >30: CPT | Performed by: INTERNAL MEDICINE

## 2020-04-07 PROCEDURE — 84295 ASSAY OF SERUM SODIUM: CPT | Performed by: INTERNAL MEDICINE

## 2020-04-07 PROCEDURE — 25000128 H RX IP 250 OP 636: Performed by: INTERNAL MEDICINE

## 2020-04-07 PROCEDURE — 36415 COLL VENOUS BLD VENIPUNCTURE: CPT | Performed by: HOSPITALIST

## 2020-04-07 PROCEDURE — 99207 ZZC CDG-CODE INCORRECT PER BILLING BASED ON TIME: CPT | Performed by: INTERNAL MEDICINE

## 2020-04-07 PROCEDURE — 85027 COMPLETE CBC AUTOMATED: CPT | Performed by: INTERNAL MEDICINE

## 2020-04-07 PROCEDURE — 36415 COLL VENOUS BLD VENIPUNCTURE: CPT | Performed by: INTERNAL MEDICINE

## 2020-04-07 PROCEDURE — 82533 TOTAL CORTISOL: CPT | Performed by: INTERNAL MEDICINE

## 2020-04-07 PROCEDURE — C9113 INJ PANTOPRAZOLE SODIUM, VIA: HCPCS | Performed by: INTERNAL MEDICINE

## 2020-04-07 PROCEDURE — 25000132 ZZH RX MED GY IP 250 OP 250 PS 637: Mod: GY | Performed by: INTERNAL MEDICINE

## 2020-04-07 PROCEDURE — 84295 ASSAY OF SERUM SODIUM: CPT | Performed by: HOSPITALIST

## 2020-04-07 PROCEDURE — 80053 COMPREHEN METABOLIC PANEL: CPT | Performed by: INTERNAL MEDICINE

## 2020-04-07 PROCEDURE — 80048 BASIC METABOLIC PNL TOTAL CA: CPT | Performed by: INTERNAL MEDICINE

## 2020-04-07 RX ORDER — DEXTROSE MONOHYDRATE 50 MG/ML
INJECTION, SOLUTION INTRAVENOUS CONTINUOUS
Status: DISCONTINUED | OUTPATIENT
Start: 2020-04-07 | End: 2020-04-07

## 2020-04-07 RX ORDER — ONDANSETRON 4 MG/1
4 TABLET, FILM COATED ORAL EVERY 6 HOURS PRN
Qty: 10 TABLET | Refills: 0 | Status: SHIPPED | OUTPATIENT
Start: 2020-04-07

## 2020-04-07 RX ORDER — DESMOPRESSIN ACETATE 4 UG/ML
2 INJECTION, SOLUTION INTRAVENOUS; SUBCUTANEOUS ONCE
Status: DISCONTINUED | OUTPATIENT
Start: 2020-04-07 | End: 2020-04-07

## 2020-04-07 RX ORDER — DESMOPRESSIN ACETATE 4 UG/ML
2 INJECTION, SOLUTION INTRAVENOUS; SUBCUTANEOUS EVERY 6 HOURS
Status: DISCONTINUED | OUTPATIENT
Start: 2020-04-07 | End: 2020-04-07

## 2020-04-07 RX ADMIN — INSULIN GLARGINE 15 UNITS: 100 INJECTION, SOLUTION SUBCUTANEOUS at 09:47

## 2020-04-07 RX ADMIN — INSULIN ASPART 3 UNITS: 100 INJECTION, SOLUTION INTRAVENOUS; SUBCUTANEOUS at 09:28

## 2020-04-07 RX ADMIN — DESMOPRESSIN ACETATE 2 MCG: 4 INJECTION INTRAVENOUS at 02:28

## 2020-04-07 RX ADMIN — DEXTROSE MONOHYDRATE: 50 INJECTION, SOLUTION INTRAVENOUS at 02:26

## 2020-04-07 RX ADMIN — DEXTROSE MONOHYDRATE: 50 INJECTION, SOLUTION INTRAVENOUS at 04:46

## 2020-04-07 RX ADMIN — VENLAFAXINE HYDROCHLORIDE 225 MG: 150 CAPSULE, EXTENDED RELEASE ORAL at 09:25

## 2020-04-07 RX ADMIN — PANTOPRAZOLE SODIUM 40 MG: 40 INJECTION, POWDER, FOR SOLUTION INTRAVENOUS at 09:25

## 2020-04-07 RX ADMIN — DOXAZOSIN 1 MG: 1 TABLET ORAL at 09:24

## 2020-04-07 ASSESSMENT — ACTIVITIES OF DAILY LIVING (ADL)
ADLS_ACUITY_SCORE: 21
ADLS_ACUITY_SCORE: 21
ADLS_ACUITY_SCORE: 22
ADLS_ACUITY_SCORE: 21

## 2020-04-07 ASSESSMENT — MIFFLIN-ST. JEOR: SCORE: 1559.69

## 2020-04-07 NOTE — PROVIDER NOTIFICATION
"MD notified \"Na recheck is 136 from 131. The concern was his sodium rising too fast.  Please advise as needed, thanks\"    Orders received  "

## 2020-04-07 NOTE — PROGRESS NOTES
Cross Cover Hospitalist Note:    This writer was paged for Na 131, rise from 122, faster than would be ideal. Stop NS and start 1/2 NS at 100 ml/hr for 5 hr and recheck Na in 4 hr.    Tal Hayes DO MPH  [facility] Hospitalist  [address] [place] [address]  Pager: [phone]  04/06/2020

## 2020-04-07 NOTE — PROGRESS NOTES
X-cover    Called for Na level of 137 (up 18 points from 23 hours ago).  Reviewed UTD to treat overcorrection.  Goal would be 127 now, and 131 in am.     dexmopressin 2 mcg q 6 hours x 2, D5 200 ml/hour x 2 hours, recheck Na and determine further D5.  Goal would be to lower Na 1 meq per hour to goal of 131 by morning.      Will recheck Na at 4:15

## 2020-04-07 NOTE — PROVIDER NOTIFICATION
"MD paged: \"I see in Dr. Broderick's note from today IVF NS should be 75 cc/hr, order is 100 cc/hr. Can this be changed? Also, I see in her note Na tomorrow AM should not be over 127. Na from 1934 tonight is 131, FYI. Please advise as needed, thx!\"  "

## 2020-04-07 NOTE — PROGRESS NOTES
Discharge Planner   Discharge Plans in progress: The pt will discharge back to [facility] today.  Shabana spoke with SHARDA Sifuentes at [facility] P: [phone] F: [phone].  Bear expressed no concerns with the pt's return.  Bear requested that shabana update the pt that he will be on a 14 day quarantine upon return to [place] for precautionary measures.  Bear also requested that the pt be sent home with a mask.  Shabana faxed the discharge orders to [facility].  Shabana updated the bedside nurse.  Shabana met with the pt to update him on the quarantine and mask.  He had no concerns at this time.  Pt said he would walk home, but he doesn't have any shoes.  Pt also said that he does not have any money to pay for a cab.  Shabana told the pt that they can arrange a taxi ride for him.  Shabana had the pt sign the release of information for them to tell [employer] the pt's address.  The pt made note about following a diabetic diet at [place].  Shabana followed-up with Bear who said that the pt monitors his own diet and is in close contact with their pharmacist to manage his diet.  Shabana will arrange the taxi ride once the bedside nurse tells sw that he is ready.       04/07/20 1347   Final Resources   Resources List Assisted Living   Assisted Living Care [facility] [phone], Fax: [phone]     Barriers to discharge plan: None known at this time.  Follow up plan: Sw will continue to be available as needed until discharge.       Entered by: Renate Escamilla 04/07/2020 1:47 PM     STERLING Montaño, [facility]  Inpatient Care Coordination  [facility]  [phone]    ADDENDUM 1447:  Shabana arranged a [employer] ride for the pt at 1510.  Shabana sent the VIDAL to patient relations.

## 2020-04-07 NOTE — PLAN OF CARE
Patient is in stable condition, VSS, denies pain and nausea. Patient to discharge home to assisted living today.  No barriers to discharge identified.  All discharge education reviewed with patient in regards to: diet, activity, safety, signs and symptoms to report, medications and Rx, follow up appointments.  Questions were answered and patient verbalized understanding.  All patient belongings were sent with patient, discharge via wheelchair by staff to main entrance to be taken to assisted living via taxi service.

## 2020-04-07 NOTE — PLAN OF CARE
VSS on RA. Denies pain. A&O x4. Tele SR. Good appetite on cardiac and mod carb diet. Denies nausea.  and 116. Na 135. SBA gait belt. Discharge tbd.

## 2020-04-07 NOTE — PLAN OF CARE
A & O x4, VSS, denies pain, RA, LS diminished, clear liquid diet, SBA, tele: SR, D5 going at 125.  Q8 Na.  Last Na 135, admission 122. BS 75 then recheck 151.  Discharge possible 1-2 days.

## 2020-04-07 NOTE — PROVIDER NOTIFICATION
Dr. Cody: CALEB advanced diet to mod carb and cardiac (diet yesterday). Do we want fluid restriction? Please adjust if so! Thanks!

## 2020-04-09 RX ORDER — SILODOSIN 8 MG/1
8 CAPSULE ORAL DAILY
Qty: 90 CAPSULE | Refills: 0 | Status: SHIPPED | OUTPATIENT
Start: 2020-04-09

## 2020-04-15 NOTE — DISCHARGE SUMMARY
[facility]    Discharge Summary  Hospitalist    Date of Admission:  4/6/2020  Date of Discharge:  4/7/2020  2:56 PM  Discharging Provider: Matthew Cody MD  Date of Service (when I saw the patient): 4/7/2020    Discharge Diagnoses      1. Hypotonic Hyponatremia     2. Nausea/Emesis/Epigastric Abdominal Pain     3. Chest Pain, without acute ischemic changes     4. IDDM2     5. Schizoaffective Disorder    History of Present Illness   Caden Bush is an 60 year old male who presented with chest pain, nausea, emesis and was found to have significant hyponatremia.    Hospital Course     Summary of Stay: Caden Bush is a 59 year old male with past medical history of IDDM2, insomnia, schizoaffective disorder who was admitted on 4/6/2020 with chest pain, nausea, emesis and was found to have significant hyponatremia.  This morning patient had recurrent emesis and epigastric abdominal pain.     Problem List/Assessment and Plan:      1. Hypotonic Hyponatremia: Sodium was 119 on admission.  He was given a liter of normal saline in the emergency room and sodium did improve to 120.  He has hypo-tonic hypovolemia.  His urine sodium is 57.  Urine osmolality is 275. His TSH and serum cortisol level was normal. He was put on Normal saline at 75 cc/h. His sodium level was monitored. His Na was 135 on discharge. Patient had improvement of his symptoms. He was discharged in a stable condition.      2. Nausea/Emesis/Epigastric Abdominal Pain: Had emesis on day of admission, this morning had further emesis. Today he also has epigastric abdominal pain.  LFTs on admission showed elevated bilirubin but normal AST/ALT. His lipase was not elevated.  Could have a viral gastroenteritis.  His symptoms also sound like they could be related to gastritis. Changing back to n.p.o. given persistent nausea and vomiting.  He has a rather benign abdominal exam so I do not think he needs a CT scan currently.  Improved with PPI and  try GI cocktail.    3. Chest Pain: Had left sided chest pain on admission. EKG without acute ischemic changes. Serial troponins negative.     4. IDDM2: PTA on Lantus 15 units daily, Aspart 5 units with lunch and dinner and sliding scale insulin with meals.  HgbA1c 5.7.  He had emesis this morning and is NPO due to GI symptoms at this point so will decrease Lantus dosing.  -He was put on  Lantus to 7 units daily and Medium n.p.o. sliding scale insulin. His insulin was resumed on discharge.      5. Schizoaffective Disorder: Prior to admission on Risperdal and Effexor.  Continued on both of these medications .     Significant Results and Procedures   Results for orders placed or performed during the hospital encounter of 04/06/20   Chest XR,  PA & LAT    Narrative    EXAM: XR CHEST 2 VW  LOCATION: [facility]  DATE/TIME: 4/6/2020 2:11 AM    INDICATION: Chest pain  COMPARISON: None.      Impression    IMPRESSION: Minimal linear atelectasis left lung base laterally. Lungs otherwise clear. No focal infiltrate or consolidation. Normal heart size. Normal pulmonary vascularity. No overt osseous abnormality.         Pending Results   None  Code Status   Full Code       Primary Care Physician   Leny Lee        Discharge Disposition   Discharged to home  Condition at discharge: Stable    Consultations This Hospital Stay   None    Time Spent on this Encounter   I, Matthew Cody MD, MD, personally saw the patient today and spent greater than 30 minutes discharging this patient.    Discharge Orders      Reason for your hospital stay    Hyponatremia  vomiting     Follow-up and recommended labs and tests     Follow up with primary care provider, Leny Lee, within 7 days     Activity    Your activity upon discharge: activity as tolerated     Diet    Follow this diet upon discharge: Orders Placed This Encounter      Advance Diet as Tolerated: Full Liquid Diet     Discharge Medications   Discharge  Medication List as of 2020  2:34 PM      START taking these medications    Details   ondansetron (ZOFRAN) 4 MG tablet Take 1 tablet (4 mg) by mouth every 6 hours as needed for nausea, Disp-10 tablet,R-0, E-Prescribe         CONTINUE these medications which have NOT CHANGED    Details   acetaminophen (TYLENOL) 325 MG tablet Take 325-650 mg by mouth every 8 hours as needed for mild pain, Historical      alum & mag hydroxide-simethicone (MAALOX) 200-200-20 MG/5ML SUSP suspension Take 20 mLs by mouth every 6 hours as needed (gastric upset), Historical      aspirin (ASA) 81 MG chewable tablet Take 81 mg by mouth daily, Historical      bisacodyl (DULCOLAX) 10 MG suppository Place 10 mg rectally daily as needed for constipation If Milk of Magnesia not effective, Historical      cholecalciferol (VITAMIN D) 1000 UNIT tablet Take 1 tablet by mouth daily., Historical      guaiFENesin (ROBITUSSIN) 100 MG/5ML SYRP Take 10 mLs by mouth every 4 hours as needed for cough, Historical      !! insulin aspart (NOVOLOG FLEXPEN) 100 UNIT/ML pen Inject 6 Units Subcutaneous daily (with breakfast), Historical      !! insulin aspart (NOVOLOG FLEXPEN) 100 UNIT/ML pen Inject 5 Units Subcutaneous 2 times daily (with meals) With lunch and dinner, Historical      !! insulin aspart (NOVOLOG FLEXPEN) 100 UNIT/ML pen Inject 0-4 Units Subcutaneous 4 times daily (with meals and nightly) Sliding Scale for B-250 = 1 unit  251-300 = 2 units  301-350 = 3 units  >350 = 4 units, Historical      insulin glargine (BASAGLAR KWIKPEN) 100 UNIT/ML pen Inject 15 Units Subcutaneous daily, Disp-15 mL, R-3, E-PrescribeIf Basaglar is not covered by insurance, may substitute Lantus at same dose and frequency.        loperamide (IMODIUM) 2 MG capsule Take 2 mg by mouth 4 times daily as needed for diarrhea 4 mg after first loose stool, then 2 mg after each subsequent loose stool.  Max 8 mg (4 capsules) daily., Historical      magnesium hydroxide (MILK OF  MAGNESIA) 400 MG/5ML suspension Take 30 mLs by mouth daily as needed for constipation, Historical      neomycin-bacitracin-polymyxin (NEOSPORIN) 5-400-5000 ointment Apply topically as needed (minor cuts/abrasions)Historical      risperiDONE (RISPERDAL) 3 MG tablet Take 1 tablet (3 mg) by mouth At Bedtime, Disp-90 tablet, R-0, E-Prescribe      simvastatin (ZOCOR) 20 MG tablet Take 20 mg by mouth At Bedtime, Historical      sodium phosphate (FLEET ENEMA) 7-19 GM/118ML rectal enema Place 1 enema rectally once as needed for constipation If Milk of Magnesia and Bisacodyl Suppository not effective., Historical      Transparent Dressings (TEGADERM IV TRANSPARENT DRESSING) MISC Apply topically as needed for skin protection (to skin tears or abrasions), Historical      traZODone (DESYREL) 50 MG tablet Take 1.5 tablets (75 mg) by mouth At Bedtime, Disp-135 tablet, R-0, E-Prescribe      !! venlafaxine (EFFEXOR XR) 75 MG 24 hr capsule Take 1 capsule (75 mg) by mouth daily TAKE WITH  Effexor 150 mg for a total dose of 225 mg daily, Disp-90 capsule, R-0, E-Prescribe      !! venlafaxine (EFFEXOR-XR) 150 MG 24 hr capsule TAKE 1 CAPSULE BY MOUTH DAILY ALONG WITH A 75 MG FOR A TOTAL  MG DAILY, Disp-90 capsule, R-0, E-Prescribe      vitamin A & D (BABY) external ointment Apply topically as needed for irritationHistorical      Silodosin (RAPAFLO) 8 MG CAPS capsule Take 1 capsule (8 mg) by mouth daily, Disp-90 capsule, R-0, E-Prescribe       !! - Potential duplicate medications found. Please discuss with provider.          Allergies   Allergies   Allergen Reactions     Flomax [Tamsulosin] Unknown     Lisinopril Unknown     Data   Most Recent 3 CBC's:  Recent Labs   Lab Test 04/07/20  0410 04/06/20  0557 04/06/20  0150 01/22/20   WBC 4.0  --  8.4 4.4   HGB 12.2*  --  13.3 13.1*   MCV 90  --  88 93   * 116* 119* 127*      Most Recent 3 BMP's:  Recent Labs   Lab Test 04/07/20  0751 04/07/20  0410 04/07/20  0013  04/06/20  0557  04/06/20  0150    135 137   < > 120* 119*   POTASSIUM 4.3 4.7  --   --   --  3.6   CHLORIDE 106 106  --   --   --  86*   CO2 25 25  --   --   --  22   BUN 11 13  --   --   --  15   CR 1.19 1.20  --   --  0.80 0.85   ANIONGAP 4 4  --   --   --  11   PEE 8.7 8.7  --   --   --  8.6   * 244*  --   --   --  234*    < > = values in this interval not displayed.     Most Recent 2 LFT's:  Recent Labs   Lab Test 04/07/20  0410 04/06/20  0150   AST 32 29   ALT 29 34   ALKPHOS 92 113   BILITOTAL 0.6 1.8*     Most Recent INR's and Anticoagulation Dosing History:  Anticoagulation Dose History     There is no flowsheet data to display.        Most Recent 3 Troponin's:  Recent Labs   Lab Test 04/06/20  1220 04/06/20  0834 04/06/20  0557   TROPI <0.015 <0.015 <0.015     Most Recent Cholesterol Panel:  Recent Labs   Lab Test 01/22/20   CHOL 103   LDL 41   HDL 56   TRIG 30     Most Recent 6 Bacteria Isolates From Any Culture (See EPIC Reports for Culture Details):No lab results found.  Most Recent TSH, T4 and A1c Labs:  Recent Labs   Lab Test 04/06/20  1220 04/06/20  0557  02/13/19  1625   TSH 0.93  --    < >  --    T4  --   --   --  1.30   A1C  --  5.7*   < >  --     < > = values in this interval not displayed.

## 2020-04-16 ENCOUNTER — VIRTUAL VISIT (OUTPATIENT)
Dept: PSYCHIATRY | Facility: CLINIC | Age: 60
End: 2020-04-16
Attending: PSYCHIATRY & NEUROLOGY
Payer: MEDICARE

## 2020-04-16 DIAGNOSIS — F25.0 SCHIZOAFFECTIVE DISORDER, BIPOLAR TYPE (H): Primary | ICD-10-CM

## 2020-04-16 NOTE — PROGRESS NOTES
TELEPHONE VISIT    Phone call duration: 30 minutes    Telemedicine Type: Billable telephone visit.    Reason for Telemedicine Visit: Social distancing due to global pandemic of COVID-19    Originating Site (Patient Location): Patient's home    Distant Site (Provider Location): Provider remote setting    Consent: This telephone visit will be conducted via a call between the patient and his/her physician/provider. We have found that certain health care needs can be provided without the need for a physical exam. This service lets us provide the care the patient needs with a short phone conversation.  If a prescription is necessary we can send it directly to the patient's pharmacy.  If lab work is needed we can place an order for that and the patient can then stop by our lab to have the test done at a later time. If during the course of the call the physician/provider feels a telephone visit is not appropriate, the patient will not be charged for this service. The patient/guardian has verbally consented to: the potential risks and benefits of telemedicine (phone) versus in person care; bill my insurance or make self-payment for services provided; and responsibility for payment of non-covered services.     How would the patient like to obtain the AVS?  [place]    As the provider I attest to compliance with applicable laws and regulations related to telemedicine.       [facility]  Psychiatry Clinic  PSYCHIATRIC PROGRESS NOTE     CARE TEAM:  PCP- Annie Hart   Therapist-  Yeny Goff at [facility]  Ophthalmologist: Jacob Lieberman with [facility]: Dr. Zendejas-Clover, [phone] ext [phone] Community support: Christa GORDON, [facility], [phone]     Caden Bush is a 58 year old male who prefers the name Caden & pronouns he.  The initial diagnostic evaluation was on 11/26/08 .   Date of the most recent transfer of care eval is 08/01/2019.      Pertinent  Background:  This patient first experienced mental health issues in his late 20s , initially obtained care at that time and has received treatment for Schizoaffective Disorder, bipolar type. His diagnosis has changed through the time and afterreviewing all the evidence in 9/2018 the current diagnosis was confirmed (several episodes of moderate to severe depression and one episode of two weeks long period of grandiosity,  pressured speech, overspending and sleeplessness with increased social anxiety and paranoia).   Previously had a CCM until 2011. Has SW consult on 10/20/15, not interested in CM or [facility] worker services at this time and seems to be managing well on his own.     Of note, patient is found to be very hesitant to change any medication regimen or engaged in the treatment programs.  Luckily, he has been engaged in care with his therapist.     #Mood/psychosis:  - Notably, this pt was stable on regimen of Lithium, Effexor and Risperdal since 2011. However due to complications of chronic diabetes, Lithium was discontinued in 2017.   -  reviewed the chart. She recommended [facility] for groups and activities for him.   - We even tried going down on Risperidone for other reasons but he immediately got destabilized.     #Insomnia: The etiology of his insomnia is not well understood and the sleep study was discussed and encouraged. Sleep has improved on 3 mg of risperidone while psychotic sxs are also better managed.      Psych critical item history includes suicidal ideation, psychosis [sxs include AH and paranoia], mutiple psychotropic trials and psych hosp (>5).     INTERIM HISTORY      [4, 4]   The patient reports good treatment adherence.  History was provided by the patient who was a good historian. Patient's sister was also present.    Since the last visit:     -His memory has been alright. He denies any worsening. He has problems with biographical memory pertaining to his childhood. This started  around 1 year ago. It did not occur prior. It is from the 50s on.   -No focal neurological signs, including HA, change in vision, loss of balance, dizziness, numbness, tingling, weakness, gait disturbance, change in hearing, difficulty communicating or problems voiding.  -He went to the hospital recently for a viral bug. He does not have coronavirus.  -Patient is in quarantine due to being sick. He will be able to resume activities on Tuesday.  -He is doing well on current regimen.  -No safety concerns.  -He would like to continue his medications as prescribed.    RECENT PSYCH ROS:   Depression:  None  Elevated:  none  Psychosis:  none  Anxiety:  none  Panic Attack:  none  Trauma Related:  none  Dysregulation:  none  Eating Disorder: no   Cognitive: remote memory deficits (biographical), difficulty with names  Pertinent Negative Symptoms:  NO suicidal ideation, self-injurious behavior/urges, violent ideation, suicidal and violent ideation, aggression, psychosis, hallucinations, delusions, wolf and hypomania    RECENT SUBSTANCE USE:     ALCOHOL-  never   TOBACCO- none  CAFFEINE- 1 cup/day of coffee, 1 soda/ in a while  OPIOIDS- none      NARCAN KIT- N/A          CANNABIS- none  OTHER ILLICIT DRUGS- none      CURRENT SOCIAL HISTORY:  FINANCIAL SUPPORT- SSDI for schizoaffective disorder and diabetes  CHILDREN- none      LIVING SITUATION- Currently living in an assisted living facility ([facility]) in [facility] 6/19. Receives assistance with managing medications and administering insulin.  SOCIAL/ SPIRITUAL SUPPORT- few friends and family. 2 sisters (55 and 62). Mom, alive (85). Father, alive (88).  FEELS SAFE AT HOME- yes       MEDICAL ROS (2,10):  Reports Arthritis pain (ongoing, unchanged) Denies Pain, headache, dizziness, GI [nausea, diarrhea, constipation, sedation, tremor and confusion.    PAST PSYCHOTROPIC MED TRIALS                                                           *     Drug / Start Date    Dose (mg)   Helpful   Adverse Effects  DC Reason / Date     Lithium   900           Risperdal         current     Abilify             Seroquel             Effexor         current     Zoloft             Wellbutrin             Buspar             trazodone         current     hydroxyzine             Ambien                Maybe Zyprexa, and Prozac, can't remember    July: Increased Risperidone dose  During months of fall and winter, patient was hesitant to make changes to medications. Modified risperidone and trazodone dose several times (minor changes). Not successful in complete resolution of sxs.     MEDICAL / SURGICAL HISTORY                                     Patient Active Problem List   Diagnosis     Localized osteoarthritis of hands, bilateral     Retinopathy     Moderate episode of recurrent major depressive disorder (H)     Polyp of colon, adenomatous     Health Care Home     Hx of psychiatric care     Neuropathy of left upper extremity     Type 1 diabetes mellitus with retinopathy of both eyes (H)     Schizoaffective disorder, bipolar type (H)     Heart murmur on physical examination     Hyponatremia       Major Surgery-  Past Surgical History:   Procedure Laterality Date     APPENDECTOMY       COLONOSCOPY  2012    2 adenomatous polyps       ALLERGY       Flomax [tamsulosin] and Lisinopril  MEDICATIONS        Current Outpatient Medications   Medication Sig Dispense Refill     acetaminophen (TYLENOL) 325 MG tablet Take 325-650 mg by mouth every 8 hours as needed for mild pain       alum & mag hydroxide-simethicone (MAALOX) 200-200-20 MG/5ML SUSP suspension Take 20 mLs by mouth every 6 hours as needed (gastric upset)       aspirin (ASA) 81 MG chewable tablet Take 81 mg by mouth daily       bisacodyl (DULCOLAX) 10 MG suppository Place 10 mg rectally daily as needed for constipation If Milk of Magnesia not effective       cholecalciferol (VITAMIN D) 1000 UNIT tablet Take 1 tablet by mouth daily.        guaiFENesin (ROBITUSSIN) 100 MG/5ML SYRP Take 10 mLs by mouth every 4 hours as needed for cough       insulin aspart (NOVOLOG FLEXPEN) 100 UNIT/ML pen Inject 6 Units Subcutaneous daily (with breakfast)       insulin aspart (NOVOLOG FLEXPEN) 100 UNIT/ML pen Inject 5 Units Subcutaneous 2 times daily (with meals) With lunch and dinner       insulin aspart (NOVOLOG FLEXPEN) 100 UNIT/ML pen Inject 0-4 Units Subcutaneous 4 times daily (with meals and nightly) Sliding Scale for B-250 = 1 unit  251-300 = 2 units  301-350 = 3 units  >350 = 4 units       insulin glargine (BASAGLAR KWIKPEN) 100 UNIT/ML pen Inject 15 Units Subcutaneous daily 15 mL 3     loperamide (IMODIUM) 2 MG capsule Take 2 mg by mouth 4 times daily as needed for diarrhea 4 mg after first loose stool, then 2 mg after each subsequent loose stool.  Max 8 mg (4 capsules) daily.       magnesium hydroxide (MILK OF MAGNESIA) 400 MG/5ML suspension Take 30 mLs by mouth daily as needed for constipation       neomycin-bacitracin-polymyxin (NEOSPORIN) 5-400-5000 ointment Apply topically as needed (minor cuts/abrasions)       ondansetron (ZOFRAN) 4 MG tablet Take 1 tablet (4 mg) by mouth every 6 hours as needed for nausea 10 tablet 0     risperiDONE (RISPERDAL) 3 MG tablet Take 1 tablet (3 mg) by mouth At Bedtime 90 tablet 0     Silodosin (RAPAFLO) 8 MG CAPS capsule Take 1 capsule (8 mg) by mouth daily 90 capsule 0     simvastatin (ZOCOR) 20 MG tablet Take 20 mg by mouth At Bedtime       sodium phosphate (FLEET ENEMA) 7-19 GM/118ML rectal enema Place 1 enema rectally once as needed for constipation If Milk of Magnesia and Bisacodyl Suppository not effective.       Transparent Dressings (TEGADERM IV TRANSPARENT DRESSING) MISC Apply topically as needed for skin protection (to skin tears or abrasions)       traZODone (DESYREL) 50 MG tablet Take 1.5 tablets (75 mg) by mouth At Bedtime 135 tablet 0     venlafaxine (EFFEXOR XR) 75 MG 24 hr capsule Take 1 capsule (75  "mg) by mouth daily TAKE WITH  Effexor 150 mg for a total dose of 225 mg daily 90 capsule 0     venlafaxine (EFFEXOR-XR) 150 MG 24 hr capsule TAKE 1 CAPSULE BY MOUTH DAILY ALONG WITH A 75 MG FOR A TOTAL  MG DAILY 90 capsule 0     vitamin A & D (BABY) external ointment Apply topically as needed for irritation       VITALS      [3, 3]   There were no vitals taken for this visit.   MENTAL STATUS EXAM      [9, 14 cog gs]     Alertness: alert and oriented  Appearance: Not evaluated due to visit being conducted over telephone  Behavior/Demeanor: cooperative, pleasant and calm  Speech: normal and regular rate and rhythm  Language: intact and no problems  Psychomotor: Not evaluated due to visit being conducted over telephone  Mood: \"Fine.\"  Affect: Not evaluated due to visit being conducted over telephone  Thought Process/Associations: Linear  Thought Content: Denies SI or HI.   Perception: Denies AVH  Insight: Fair  Judgment: Good  Cognition: (6) Difficulty with remote memory, normal MoCA  Gait and Station: Not evaluated due to visit being conducted over telephone    LABS and DATA     AIMS:  0: 3/1/2018, 0: 08/02/2019    PHQ9 TODAY = NA  PHQ-9 SCORE 10/3/2019 10/30/2019 12/19/2019   PHQ-9 Total Score - - -   PHQ-9 Total Score 1 3 6   PHQ-9 Total Score - - -     MOCA (8/19): 26/30  (including 0 points added for education)  - Visuospatial/executive:  5/5  - Naming: 3/3  - Attention: 5/6  - Language: 2/3  - Abstraction: 1/2   - Delayed recall: 4/5  - Orientation: 6/6    ANTIPSYCHOTIC LABS  [glu, A1C, lipids (ezequiel LDL), liver enzymes, WBC, ANEU, Hgb, plts]  q12 mo  Recent Labs   Lab Test 04/07/20  0751 04/07/20  0410 04/06/20  0557 04/06/20  0150 01/22/20 11/29/19 07/25/19  1415 05/15/19  1045   * 244*  --  234* 103   < >  --   --   --    A1C  --   --  5.7*  --   --   --  5.8* 6.6* 7.9*    < > = values in this interval not displayed.     Recent Labs   Lab Test 01/22/20 01/16/20  1459 01/02/19  1045 04/17/18  1356 "   CHOL 103 113 127 139   TRIG 30 61 51 45   LDL 41 34 33 45   HDL 56 67 84 85     Recent Labs   Lab Test 04/07/20  0410 04/06/20  0150 01/22/20 01/16/20  1459   AST 32 29 18 12   ALT 29 34 19 34   ALKPHOS 92 113 102 129     Recent Labs   Lab Test 04/07/20  0410 04/06/20  0557 04/06/20  0150 01/22/20 01/16/20  1459  06/09/16  1030 12/21/15  1413   WBC 4.0  --  8.4 4.4 4.1   < > 4.7 4.6   ANEU  --   --  7.6  --  3.1  --  3.5 3.4   HGB 12.2*  --  13.3 13.1* 14.4   < > 14.7 13.4   * 116* 119* 127* 135*   < > 175 155    < > = values in this interval not displayed.       PSYCHOTROPIC DRUG INTERACTIONS     VENLAFAXINE and ANTIPLATELET AGENTS may result in an increased risk of bleeding.  RISPERIDONE and SIMVASTATIN may result in increased simvastatin serum concentrations -with an increased risk of myopathy or rhabdomyolysis.  TRAZODONE and VENLAFAXINE may result in an increased risk of serotonin syndrome.    MANAGEMENT:  Monitoring for adverse effects    RISK STATEMENT for SAFETY     Caden Bush denies any SI /SIB.  In addition, he has notable risk factors for self-harm including feels trapped, hopelessness, lives alone/ isolated, hallucinations and male.  However, risk is mitigated by no h/o suicide attempt, describes a safety plan, none to minimal alcohol use , commitment to family and stable housing.  Based on all available evidence he does not appear to be at imminent risk for self-harm therefore does not meet criteria for a 72-hr hold/  involuntary hospitalization.  However, based on degree of symptoms voluntary hospitalization, day treatment and close psych FU was recommended which the pt did agree to Day Treatment and close follow up.  Additional steps to minimize risk include: SAFETY PLAN completed 5/10/2019.  Safety Plan placed in AVS: Yes.    DIAGNOSIS     Schizoaffective disorder, bipolar type, in remission (on medications and with therapy)  Seasonal affective disorder    ASSESSMENT      [m2, h3]      TODAY    Patient stable on his current medication regimen with no psychotic and minimal mood symptoms. Performed a MoCA recently to screen for cognitive impairment with normal results. Originally suspected his sense of forgetfulness and change in cognitive awareness was potentially related to long-term history of a neurocognitive disorder such as schizoaffective disorder, in addition to Risperidone, which can cause cognitive blunting. Further evaluation with collateral from sister shows that patient's memory loss is isolated to biographical content with patient having minimal memories before his 50s. It began around 1 year ago and has been relatively stable. Given the acute onset and isolated nature of memory loss being remote with a normal MoCA, will pursue additional workup to ensure no underlying medical cause like a prior stroke or microvascular disease. Patient has no acute neurological symptoms to warrant more urgent evaluation. He was referred to neuropsychological testing to help determine the nature of his cognitive limitations and potentially identify a diagnosis. He was referred to neurology with no troublesome findings. An MRI was ordered with no findings of any acute changes and appropriate age related changes. In addition, will order labs (Thiamine, Folic Acid, RPR) today with routine antipsychotic monitoring labs to further rule out any possible causes.    Doubt that medication is playing a role given that patient has been on Risperidone for at least 8 years if not longer. In addition reducing Risperidone is not a good option at this point given that this has led to destabilization in the past. Will continue medications as prescribed.    Ordered a light box therapy given his tend to have worsening of mood over the winter, decreased energy, and lack of drive, which correspond with seasonal affective disorder. Patient using intermittently with transitioning off due to it being spring. He also will  try to start brain training exercises.      #History of Leukopenia, unspecified type #Low PLT count #Anemia  - PCP is aware. Possibly due to Risperdal. Had a physical with PCP on Jan 2nd when he was also going to evaluate hematologic abnormalities. Receives iron replacement  #DM II: On insulin, last A1c 6.6  # Vitamin D def> replacement  #Benign adrenal incidentaloma     PLAN      [m2, h3]     1) PSYCHOTROPIC MEDICATIONS:  - Continue Risperidone 3 mg QHS  - Continue Effexor  mg daily  - Continue Trazodone 75 mg at bedtime  - Stop bright light therapy    - Stop Fish Oil 1 g daily per patient's discussion with pharmacy. Will explore Vascepa as alternative   - Continue B12 1000 mcg daily  - Continue Vitamin D 1,000 international unit(s) daily       2) THERAPY:  Seeing psychologist Yeny Goff at [facility] since 11/[facility]. Sees once/twice a month. Finishing day treatment      3) LABS NEXT DUE: Antipsychotic labs next due 01/2021      RATING SCALES: AIMS 8/20      MoCA: 8/20      4) REFERRALS:  None     7) RTC: 1 month      8) CRISIS NUMBERS:  See AVS    9) [place]  was not checked today.    10) Goals:  - Find one meaningful friendship this year, patient is accomplishing  - Work on cognition through [employer] or [employer]     TREATMENT RISK STATEMENT:  The risks, benefits, alternatives and potential adverse effects have been discussed and are understood by the pt. The pt understands the risks of using street drugs or alcohol. There are no medical contraindications, the pt agrees to treatment with the ability to do so. The pt knows to call the clinic for any problems or to access emergency care if needed.  Medical and substance use concerns are documented above.  Psychotropic drug interaction check was done, including changes made today.     PROVIDER: Miguelangel Parks, DO    Patient staffed in clinic with Dr. Phillips who will sign the note.  Supervisor is Dr. Phillips.    TELEHEALTH ATTENDING ATTESTATION  Following the  ACGME guidelines on telemedicine and direct supervision due to COVID-19, I was concurrently participating in and/or monitoring the patient care through appropriate telecommunication technology.  I discussed the key portions of the service with the resident, including the mental status examination and developing the plan of care. I reviewed key portions of the history with the resident. I agree with the findings and plan as documented in this note.   Antolin Phillips MD

## 2020-04-17 ENCOUNTER — MEDICAL CORRESPONDENCE (OUTPATIENT)
Dept: FAMILY MEDICINE | Facility: CLINIC | Age: 60
End: 2020-04-17

## 2020-05-04 ENCOUNTER — ALLIED HEALTH/NURSE VISIT (OUTPATIENT)
Dept: PHARMACY | Facility: PHYSICIAN GROUP | Age: 60
End: 2020-05-04
Payer: MEDICAID

## 2020-05-04 DIAGNOSIS — E87.1 HYPONATREMIA: ICD-10-CM

## 2020-05-04 DIAGNOSIS — E10.9 TYPE 1 DIABETES MELLITUS WITHOUT COMPLICATION (H): Primary | ICD-10-CM

## 2020-05-04 PROCEDURE — 99607 MTMS BY PHARM ADDL 15 MIN: CPT | Performed by: PHARMACIST

## 2020-05-04 PROCEDURE — 99606 MTMS BY PHARM EST 15 MIN: CPT | Performed by: PHARMACIST

## 2020-05-04 NOTE — Clinical Note
Here is my note from Monday with Caden- since his cab didn't work out, can we see if staff can coordinate getting a BMP drawn at his facility?     Aliyah Angeles, Pharm.D, [facility]  Medication Therapy Management Pharmacist  [phone]

## 2020-05-07 RX ORDER — INSULIN ASPART 100 [IU]/ML
6 INJECTION, SOLUTION INTRAVENOUS; SUBCUTANEOUS
Qty: 15 ML | Refills: 0
Start: 2020-05-07 | End: 2020-07-13

## 2020-05-19 ENCOUNTER — VIRTUAL VISIT (OUTPATIENT)
Dept: PSYCHIATRY | Facility: CLINIC | Age: 60
End: 2020-05-19
Attending: PSYCHIATRY & NEUROLOGY
Payer: MEDICARE

## 2020-05-19 DIAGNOSIS — F25.0 SCHIZOAFFECTIVE DISORDER, BIPOLAR TYPE (H): Primary | ICD-10-CM

## 2020-05-19 RX ORDER — VENLAFAXINE HYDROCHLORIDE 150 MG/1
CAPSULE, EXTENDED RELEASE ORAL
Qty: 90 CAPSULE | Refills: 0 | Status: SHIPPED | OUTPATIENT
Start: 2020-05-19 | End: 2020-07-27

## 2020-05-19 RX ORDER — VENLAFAXINE HYDROCHLORIDE 75 MG/1
75 CAPSULE, EXTENDED RELEASE ORAL DAILY
Qty: 90 CAPSULE | Refills: 0 | Status: SHIPPED | OUTPATIENT
Start: 2020-05-19 | End: 2020-07-27

## 2020-05-19 RX ORDER — RISPERIDONE 3 MG/1
3 TABLET ORAL AT BEDTIME
Qty: 90 TABLET | Refills: 0 | Status: SHIPPED | OUTPATIENT
Start: 2020-05-19 | End: 2020-07-27

## 2020-05-19 RX ORDER — TRAZODONE HYDROCHLORIDE 50 MG/1
75 TABLET, FILM COATED ORAL AT BEDTIME
Qty: 135 TABLET | Refills: 0 | Status: SHIPPED | OUTPATIENT
Start: 2020-05-19 | End: 2020-07-27

## 2020-05-19 NOTE — PROGRESS NOTES
TELEPHONE VISIT    Phone call duration: 30 minutes    Telemedicine Type: Billable telephone visit.    Reason for Telemedicine Visit: Social distancing due to global pandemic of COVID-19    Originating Site (Patient Location): Patient's home    Distant Site (Provider Location): Provider remote setting    Consent: This telephone visit will be conducted via a call between the patient and his/her physician/provider. We have found that certain health care needs can be provided without the need for a physical exam. This service lets us provide the care the patient needs with a short phone conversation.  If a prescription is necessary we can send it directly to the patient's pharmacy.  If lab work is needed we can place an order for that and the patient can then stop by our lab to have the test done at a later time. If during the course of the call the physician/provider feels a telephone visit is not appropriate, the patient will not be charged for this service. The patient/guardian has verbally consented to: the potential risks and benefits of telemedicine (phone) versus in person care; bill my insurance or make self-payment for services provided; and responsibility for payment of non-covered services.     How would the patient like to obtain the AVS?  [place]    As the provider I attest to compliance with applicable laws and regulations related to telemedicine.       [facility]  PSYCHIATRIC PROGRESS NOTE     CARE TEAM:  PCP- Annie Hart   Therapist-  Yeny Goff at [facility]  Ophthalmologist: Jacob Lieberman with [facility]: Dr. Zendejas-Clover, [phone] ext [phone] Community support: Christa GORDON, [facility], [phone]     Caden Bush is a 58 year old male who prefers the name Caden & pronouns he.  The initial diagnostic evaluation was on 11/26/08 .   Date of the most recent transfer of care eval is 08/01/2019.      Pertinent  Background:  This patient first experienced mental health issues in his late 20s , initially obtained care at that time and has received treatment for Schizoaffective Disorder, bipolar type. His diagnosis has changed through the time and afterreviewing all the evidence in 9/2018 the current diagnosis was confirmed (several episodes of moderate to severe depression and one episode of two weeks long period of grandiosity,  pressured speech, overspending and sleeplessness with increased social anxiety and paranoia).   Previously had a CCM until 2011. Has SW consult on 10/20/15, not interested in CM or [facility] worker services at this time and seems to be managing well on his own.     Of note, patient is found to be very hesitant to change any medication regimen or engaged in the treatment programs.  Luckily, he has been engaged in care with his therapist.     #Mood/psychosis:  - Notably, this pt was stable on regimen of Lithium, Effexor and Risperdal since 2011. However due to complications of chronic diabetes, Lithium was discontinued in 2017.   -  reviewed the chart. She recommended [facility] for groups and activities for him.   - We even tried going down on Risperidone for other reasons but he immediately got destabilized.     #Insomnia: The etiology of his insomnia is not well understood and the sleep study was discussed and encouraged. Sleep has improved on 3 mg of risperidone while psychotic sxs are also better managed.      Psych critical item history includes suicidal ideation, psychosis [sxs include AH and paranoia], mutiple psychotropic trials and psych hosp (>5).     INTERIM HISTORY      [4, 4]   The patient reports good treatment adherence.  History was provided by the patient who was a good historian.     Since the last visit:     -His memory has been alright. He denies any worsening. He has problems with biographical memory pertaining to his childhood. This started around 1 year ago. It did not occur  prior. It is from the 50s on.   -No focal neurological signs, including HA, change in vision, loss of balance, dizziness, numbness, tingling, weakness, gait disturbance, change in hearing, difficulty communicating or problems voiding.  -He is doing well on current regimen.  -No safety concerns.  -He would like to continue his medications as prescribed.    RECENT PSYCH ROS:   Depression:  None  Elevated:  none  Psychosis:  none  Anxiety:  none  Panic Attack:  none  Trauma Related:  none  Dysregulation:  none  Eating Disorder: no   Cognitive: remote memory deficits (biographical), difficulty with names  Pertinent Negative Symptoms:  NO suicidal ideation, self-injurious behavior/urges, violent ideation, suicidal and violent ideation, aggression, psychosis, hallucinations, delusions, wolf and hypomania    RECENT SUBSTANCE USE:     ALCOHOL-  never   TOBACCO- none  CAFFEINE- 1 cup/day of coffee, 1 soda/ in a while  OPIOIDS- none      NARCAN KIT- N/A          CANNABIS- none  OTHER ILLICIT DRUGS- none      CURRENT SOCIAL HISTORY:  FINANCIAL SUPPORT- SSDI for schizoaffective disorder and diabetes  CHILDREN- none      LIVING SITUATION- Currently living in an assisted living facility ([facility] in [facility] 6/19. Receives assistance with managing medications and administering insulin.  SOCIAL/ SPIRITUAL SUPPORT- few friends and family. 2 sisters (55 and 62). Mom, alive (85). Father, alive (88).  FEELS SAFE AT HOME- yes       MEDICAL ROS (2,10):  Reports Arthritis pain (ongoing, unchanged) Denies Pain, headache, dizziness, GI [nausea, diarrhea, constipation, sedation, tremor and confusion.      PSYCH and CD Critical Summary Points since July 2019            No changes.     PAST PSYCHOTROPIC MED TRIALS                                                           *     Drug / Start Date   Dose (mg)   Helpful   Adverse Effects  DC Reason / Date     Lithium   900           Risperdal         current     Abilify              Seroquel             Effexor         current     Zoloft             Wellbutrin             Buspar             trazodone         current     hydroxyzine             Ambien                Maybe Zyprexa, and Prozac, can't remember    July: Increased Risperidone dose  During months of fall and winter, patient was hesitant to make changes to medications. Modified risperidone and trazodone dose several times (minor changes). Not successful in complete resolution of sxs.     MEDICAL / SURGICAL HISTORY                                     Patient Active Problem List   Diagnosis     Localized osteoarthritis of hands, bilateral     Retinopathy     Moderate episode of recurrent major depressive disorder (H)     Polyp of colon, adenomatous     Health Care Home     Hx of psychiatric care     Neuropathy of left upper extremity     Type 1 diabetes mellitus with retinopathy of both eyes (H)     Schizoaffective disorder, bipolar type (H)     Heart murmur on physical examination     Hyponatremia       Major Surgery-  Past Surgical History:   Procedure Laterality Date     APPENDECTOMY       COLONOSCOPY  2012    2 adenomatous polyps       ALLERGY       Flomax [tamsulosin] and Lisinopril  MEDICATIONS        Current Outpatient Medications   Medication Sig Dispense Refill     acetaminophen (TYLENOL) 325 MG tablet Take 325-650 mg by mouth every 8 hours as needed for mild pain       aspirin (ASA) 81 MG chewable tablet Take 81 mg by mouth daily       cholecalciferol (VITAMIN D) 1000 UNIT tablet Take 1 tablet by mouth daily.       insulin aspart (NOVOLOG FLEXPEN) 100 UNIT/ML pen Inject 6 Units Subcutaneous daily (with breakfast) Injecting 6 units in the morning with breakfast, 5 units with lunch and 5 units with dinner. 15 mL 0     insulin glargine (BASAGLAR KWIKPEN) 100 UNIT/ML pen Inject 15 Units Subcutaneous daily 15 mL 3     loperamide (IMODIUM) 2 MG capsule Take 2 mg by mouth 4 times daily as needed for diarrhea 4 mg after first  "loose stool, then 2 mg after each subsequent loose stool.  Max 8 mg (4 capsules) daily.       magnesium hydroxide (MILK OF MAGNESIA) 400 MG/5ML suspension Take 30 mLs by mouth daily as needed for constipation       risperiDONE (RISPERDAL) 3 MG tablet Take 1 tablet (3 mg) by mouth At Bedtime 90 tablet 0     Silodosin (RAPAFLO) 8 MG CAPS capsule Take 1 capsule (8 mg) by mouth daily 90 capsule 0     simvastatin (ZOCOR) 20 MG tablet Take 20 mg by mouth At Bedtime       traZODone (DESYREL) 50 MG tablet Take 1.5 tablets (75 mg) by mouth At Bedtime 135 tablet 0     venlafaxine (EFFEXOR XR) 75 MG 24 hr capsule Take 1 capsule (75 mg) by mouth daily TAKE WITH  Effexor 150 mg for a total dose of 225 mg daily 90 capsule 0     venlafaxine (EFFEXOR-XR) 150 MG 24 hr capsule TAKE 1 CAPSULE BY MOUTH DAILY ALONG WITH A 75 MG FOR A TOTAL  MG DAILY 90 capsule 0     ondansetron (ZOFRAN) 4 MG tablet Take 1 tablet (4 mg) by mouth every 6 hours as needed for nausea (Patient not taking: Reported on 5/4/2020) 10 tablet 0     VITALS      [3, 3]   There were no vitals taken for this visit.   MENTAL STATUS EXAM      [9, 14 cog gs]     Alertness: alert and oriented  Appearance: Not evaluated due to visit being conducted over telephone  Behavior/Demeanor: cooperative, pleasant and calm  Speech: normal and regular rate and rhythm  Language: intact and no problems  Psychomotor: Not evaluated due to visit being conducted over telephone  Mood: \"Fine.\"  Affect: Not evaluated due to visit being conducted over telephone  Thought Process/Associations: Linear  Thought Content: Denies SI or HI.   Perception: Denies AVH  Insight: Fair  Judgment: Good  Cognition: (6) Difficulty with remote memory, normal MoCA  Gait and Station: Not evaluated due to visit being conducted over telephone    LABS and DATA     AIMS:  0: 3/1/2018, 0: 08/02/2019    PHQ9 TODAY = NA  PHQ-9 SCORE 10/3/2019 10/30/2019 12/19/2019   PHQ-9 Total Score - - -   PHQ-9 Total Score 1 3 6 "   PHQ-9 Total Score - - -     MOCA (8/19): 26/30  (including 0 points added for education)  - Visuospatial/executive:  5/5  - Naming: 3/3  - Attention: 5/6  - Language: 2/3  - Abstraction: 1/2   - Delayed recall: 4/5  - Orientation: 6/6    ANTIPSYCHOTIC LABS  [glu, A1C, lipids (ezequiel LDL), liver enzymes, WBC, ANEU, Hgb, plts]  q12 mo  Recent Labs   Lab Test 04/07/20  0751 04/07/20  0410 04/06/20  0557 04/06/20  0150 01/22/20 11/29/19 07/25/19  1415 05/15/19  1045   * 244*  --  234* 103   < >  --   --   --    A1C  --   --  5.7*  --   --   --  5.8* 6.6* 7.9*    < > = values in this interval not displayed.     Recent Labs   Lab Test 01/22/20 01/16/20  1459 01/02/19  1045 04/17/18  1356   CHOL 103 113 127 139   TRIG 30 61 51 45   LDL 41 34 33 45   HDL 56 67 84 85     Recent Labs   Lab Test 04/07/20  0410 04/06/20  0150 01/22/20 01/16/20  1459   AST 32 29 18 12   ALT 29 34 19 34   ALKPHOS 92 113 102 129     Recent Labs   Lab Test 04/07/20  0410 04/06/20  0557 04/06/20  0150 01/22/20 01/16/20  1459  06/09/16  1030 12/21/15  1413   WBC 4.0  --  8.4 4.4 4.1   < > 4.7 4.6   ANEU  --   --  7.6  --  3.1  --  3.5 3.4   HGB 12.2*  --  13.3 13.1* 14.4   < > 14.7 13.4   * 116* 119* 127* 135*   < > 175 155    < > = values in this interval not displayed.       PSYCHOTROPIC DRUG INTERACTIONS     VENLAFAXINE and ANTIPLATELET AGENTS may result in an increased risk of bleeding.  RISPERIDONE and SIMVASTATIN may result in increased simvastatin serum concentrations -with an increased risk of myopathy or rhabdomyolysis.  TRAZODONE and VENLAFAXINE may result in an increased risk of serotonin syndrome.    MANAGEMENT:  Monitoring for adverse effects    RISK STATEMENT for SAFETY     Caden Bush denies any SI /SIB.  In addition, he has notable risk factors for self-harm including feels trapped, hopelessness, lives alone/ isolated, hallucinations and male.  However, risk is mitigated by no h/o suicide attempt, describes a safety  plan, none to minimal alcohol use , commitment to family and stable housing.  Based on all available evidence he does not appear to be at imminent risk for self-harm therefore does not meet criteria for a 72-hr hold/  involuntary hospitalization.  However, based on degree of symptoms voluntary hospitalization, day treatment and close psych FU was recommended which the pt did agree to Day Treatment and close follow up.  Additional steps to minimize risk include: SAFETY PLAN completed 5/10/2019.  Safety Plan placed in AVS: Yes.    DIAGNOSIS     Schizoaffective disorder, bipolar type, in remission (on medications and with therapy)  Seasonal affective disorder    ASSESSMENT      [m2, h3]     TODAY    Patient stable on his current medication regimen with no psychotic and minimal mood symptoms. Performed a MoCA recently to screen for cognitive impairment with normal results. Originally suspected his sense of forgetfulness and change in cognitive awareness was potentially related to long-term history of a neurocognitive disorder such as schizoaffective disorder, in addition to Risperidone, which can cause cognitive blunting. Further evaluation with collateral from sister shows that patient's memory loss is isolated to biographical content with patient having minimal memories before his 50s. It began around 1 year ago and has been relatively stable. Given the acute onset and isolated nature of memory loss being remote with a normal MoCA, will pursue additional workup to ensure no underlying medical cause like a prior stroke or microvascular disease. Patient has no acute neurological symptoms to warrant more urgent evaluation. He was referred to neuropsychological testing to help determine the nature of his cognitive limitations and potentially identify a diagnosis. He was referred to neurology with no troublesome findings. An MRI was ordered with no findings of any acute changes and appropriate age related changes. In  addition, will order labs (Thiamine, Folic Acid, RPR) today with routine antipsychotic monitoring labs to further rule out any possible causes.    Doubt that medication is playing a role given that patient has been on Risperidone for at least 8 years if not longer. In addition reducing Risperidone is not a good option at this point given that this has led to destabilization in the past. Will continue medications as prescribed.    Ordered a light box therapy given his tend to have worsening of mood over the winter, decreased energy, and lack of drive, which correspond with seasonal affective disorder. Patient using intermittently with transitioning off due to it being spring. He also will try to start brain training exercises.      #History of Leukopenia, unspecified type #Low PLT count #Anemia  - PCP is aware. Possibly due to Risperdal. Had a physical with PCP on Jan 2nd when he was also going to evaluate hematologic abnormalities. Receives iron replacement  #DM II: On insulin, last A1c 6.6  # Vitamin D def> replacement  #Benign adrenal incidentaloma     PLAN      [m2, h3]     1) PSYCHOTROPIC MEDICATIONS:  - Continue Risperidone 3 mg QHS  - Continue Effexor  mg daily  - Continue Trazodone 75 mg at bedtime  - Bright light therapy during winter     - Continue B12 1000 mcg daily  - Continue Vitamin D 1,000 international unit(s) daily       2) THERAPY:  Seeing psychologist Yeny Goff at [facility] since 11/2015. Sees once/twice a month. Finishing day treatment      3) LABS NEXT DUE: Antipsychotic labs next due 01/2021      RATING SCALES: AIMS 8/20      MoCA: 8/20      4) REFERRALS:  None     7) RTC: 1 month      8) CRISIS NUMBERS:  See AVS    9) MN  was not checked today.    10) Goals:  - Find one meaningful friendship this year, patient is accomplishing  - Work on cognition through [employer] or [employer]     TREATMENT RISK STATEMENT:  The risks, benefits, alternatives and potential adverse effects have  been discussed and are understood by the pt. The pt understands the risks of using street drugs or alcohol. There are no medical contraindications, the pt agrees to treatment with the ability to do so. The pt knows to call the clinic for any problems or to access emergency care if needed.  Medical and substance use concerns are documented above.  Psychotropic drug interaction check was done, including changes made today.     PROVIDER: Miguelangel Parks, DO    Patient staffed in clinic with Dr. Phillips who will sign the note.  Supervisor is Dr. Phillips.    TELEHEALTH ATTENDING ATTESTATION  Following the ACGME guidelines on telemedicine and direct supervision due to COVID-19, I was concurrently participating in and/or monitoring the patient care through appropriate telecommunication technology.  I discussed the key portions of the service with the resident, including the mental status examination and developing the plan of care. I reviewed key portions of the history with the resident. I agree with the findings and plan as documented in this note.   Antolin Phillips MD

## 2020-06-26 ENCOUNTER — VIRTUAL VISIT (OUTPATIENT)
Dept: PSYCHIATRY | Facility: CLINIC | Age: 60
End: 2020-06-26
Attending: PSYCHIATRY & NEUROLOGY
Payer: MEDICARE

## 2020-06-26 DIAGNOSIS — F25.0 SCHIZOAFFECTIVE DISORDER, BIPOLAR TYPE (H): Primary | ICD-10-CM

## 2020-06-26 NOTE — PROGRESS NOTES
TELEPHONE VISIT    Phone call duration: 30 minutes    Telemedicine Type: Billable telephone visit.    Reason for Telemedicine Visit: Social distancing due to global pandemic of COVID-19    Originating Site (Patient Location): Patient's home    Distant Site (Provider Location): Provider remote setting    Consent: This telephone visit will be conducted via a call between the patient and his/her physician/provider. We have found that certain health care needs can be provided without the need for a physical exam. This service lets us provide the care the patient needs with a short phone conversation.  If a prescription is necessary we can send it directly to the patient's pharmacy.  If lab work is needed we can place an order for that and the patient can then stop by our lab to have the test done at a later time. If during the course of the call the physician/provider feels a telephone visit is not appropriate, the patient will not be charged for this service. The patient/guardian has verbally consented to: the potential risks and benefits of telemedicine (phone) versus in person care; bill my insurance or make self-payment for services provided; and responsibility for payment of non-covered services.     How would the patient like to obtain the AVS?  [place]    As the provider I attest to compliance with applicable laws and regulations related to telemedicine.       [facility]  Psychiatry Clinic  PSYCHIATRIC PROGRESS NOTE     CARE TEAM:  PCP- Annie Hart   Therapist-  Yeny Goff at [facility]  Ophthalmologist: Jacob Lieberman with [facility]: Dr. Zendejas-Clover, [phone] ext [phone] Community support: Christa GORDON, [facility], [phone]     Caden Bush is a 58 year old male who prefers the name Caden & pronouns he.  The initial diagnostic evaluation was on 11/26/08 .   Date of the most recent transfer of care eval is 08/01/2019.      Pertinent  Background:  This patient first experienced mental health issues in his late 20s , initially obtained care at that time and has received treatment for Schizoaffective Disorder, bipolar type. His diagnosis has changed through the time and afterreviewing all the evidence in 9/2018 the current diagnosis was confirmed (several episodes of moderate to severe depression and one episode of two weeks long period of grandiosity,  pressured speech, overspending and sleeplessness with increased social anxiety and paranoia).   Previously had a CCM until 2011. Has SW consult on 10/20/15, not interested in CM or ARM worker services at this time and seems to be managing well on his own. Patient decompensates quickly if decreasing Risperidone. Recommend continuing life long treatment. Patient is hesitant to make changes.     Psych critical item history includes suicidal ideation, psychosis [sxs include AH and paranoia], mutiple psychotropic trials and psych hosp (>5).     INTERIM HISTORY      [4, 4]   The patient reports good treatment adherence.  History was provided by the patient who was a reliable historian for present state.    Since the last visit:     -His memory has been alright. He denies any worsening. He has problems with biographical memory pertaining to his childhood. This started around 1 year ago. It did not occur prior. It is from the 50s on.   -He is doing well on current regimen.  -No safety concerns.  -He would like to continue his medications as prescribed.  -He is trying to be active.  -No worsening of memory.    RECENT PSYCH ROS:   Depression:  None  Elevated:  none  Psychosis:  none  Anxiety:  none  Panic Attack:  none  Trauma Related:  none  Dysregulation:  none  Eating Disorder: no   Cognitive: remote memory deficits (biographical), difficulty with names  Pertinent Negative Symptoms:  NO suicidal ideation, self-injurious behavior/urges, violent ideation, suicidal and violent ideation, aggression,  psychosis, hallucinations, delusions, wolf and hypomania    RECENT SUBSTANCE USE:     ALCOHOL-  never   TOBACCO- none  CAFFEINE- 1 cup/day of coffee, 1 soda/ in a while  OPIOIDS- none      NARCAN KIT- N/A          CANNABIS- none  OTHER ILLICIT DRUGS- none      CURRENT SOCIAL HISTORY:  FINANCIAL SUPPORT- SSDI for schizoaffective disorder and diabetes  CHILDREN- none      LIVING SITUATION- Currently living in an assisted living facility ([facility] in [facility] 6/19. Receives assistance with managing medications and administering insulin.  SOCIAL/ SPIRITUAL SUPPORT- few friends and family. 2 sisters (55 and 62). Mom, alive (85). Father, alive (88).  FEELS SAFE AT HOME- yes       MEDICAL ROS (2,10):  Reports Arthritis pain (ongoing, unchanged) Denies Pain, headache, dizziness, GI [nausea, diarrhea, constipation, sedation, tremor and confusion.    PSYCH and CD Critical Summary Points since July 2019 8/19-3/20: Patient was evaluated for biographical retrograde amnesia that began in 2018/19 and is isolated to events prior to around the age of 49. Workup, including MoCA (26/30) blood work, neurology evaluation, and neuropsychology testing (see note 2/27/20) was negative for any acute or chronic neurological dysfunction like a stroke or dementia process. He was found to have cognitive deficits found in schizophrenia type illnesses. His memory loss was deemed likely the function of psychiatric etiology.     PAST PSYCHOTROPIC MED TRIALS                                                           *     Drug / Start Date   Dose (mg)   Helpful   Adverse Effects  DC Reason / Date     Lithium   900           Risperdal         current     Abilify             Seroquel             Effexor         current     Zoloft             Wellbutrin             Buspar             trazodone         current     hydroxyzine             Ambien                Maybe Zyprexa, and Prozac, can't remember    July: Increased  Risperidone dose  During months of fall and winter, patient was hesitant to make changes to medications. Modified risperidone and trazodone dose several times (minor changes). Not successful in complete resolution of sxs.     MEDICAL / SURGICAL HISTORY                                     Patient Active Problem List   Diagnosis     Localized osteoarthritis of hands, bilateral     Retinopathy     Moderate episode of recurrent major depressive disorder (H)     Polyp of colon, adenomatous     Health Care Home     Hx of psychiatric care     Neuropathy of left upper extremity     Type 1 diabetes mellitus with retinopathy of both eyes (H)     Schizoaffective disorder, bipolar type (H)     Heart murmur on physical examination     Hyponatremia       Major Surgery-  Past Surgical History:   Procedure Laterality Date     APPENDECTOMY       COLONOSCOPY  2012    2 adenomatous polyps       ALLERGY       Flomax [tamsulosin] and Lisinopril  MEDICATIONS        Current Outpatient Medications   Medication Sig Dispense Refill     acetaminophen (TYLENOL) 325 MG tablet Take 325-650 mg by mouth every 8 hours as needed for mild pain       aspirin (ASA) 81 MG chewable tablet Take 81 mg by mouth daily       cholecalciferol (VITAMIN D) 1000 UNIT tablet Take 1 tablet by mouth daily.       insulin aspart (NOVOLOG FLEXPEN) 100 UNIT/ML pen Inject 6 Units Subcutaneous daily (with breakfast) Injecting 6 units in the morning with breakfast, 5 units with lunch and 5 units with dinner. 15 mL 0     insulin glargine (BASAGLAR KWIKPEN) 100 UNIT/ML pen Inject 15 Units Subcutaneous daily 15 mL 3     loperamide (IMODIUM) 2 MG capsule Take 2 mg by mouth 4 times daily as needed for diarrhea 4 mg after first loose stool, then 2 mg after each subsequent loose stool.  Max 8 mg (4 capsules) daily.       magnesium hydroxide (MILK OF MAGNESIA) 400 MG/5ML suspension Take 30 mLs by mouth daily as needed for constipation       ondansetron (ZOFRAN) 4 MG tablet Take 1  "tablet (4 mg) by mouth every 6 hours as needed for nausea (Patient not taking: Reported on 5/4/2020) 10 tablet 0     risperiDONE (RISPERDAL) 3 MG tablet Take 1 tablet (3 mg) by mouth At Bedtime 90 tablet 0     Silodosin (RAPAFLO) 8 MG CAPS capsule Take 1 capsule (8 mg) by mouth daily 90 capsule 0     simvastatin (ZOCOR) 20 MG tablet Take 20 mg by mouth At Bedtime       traZODone (DESYREL) 50 MG tablet Take 1.5 tablets (75 mg) by mouth At Bedtime 135 tablet 0     venlafaxine (EFFEXOR XR) 75 MG 24 hr capsule Take 1 capsule (75 mg) by mouth daily TAKE WITH  Effexor 150 mg for a total dose of 225 mg daily 90 capsule 0     venlafaxine (EFFEXOR-XR) 150 MG 24 hr capsule TAKE 1 CAPSULE BY MOUTH DAILY ALONG WITH A 75 MG FOR A TOTAL  MG DAILY 90 capsule 0     VITALS      [3, 3]   There were no vitals taken for this visit.   MENTAL STATUS EXAM      [9, 14 cog gs]     Alertness: alert and oriented  Appearance: Not evaluated due to visit being conducted over telephone  Behavior/Demeanor: cooperative, pleasant and calm  Speech: normal and regular rate and rhythm  Language: intact and no problems  Psychomotor: Not evaluated due to visit being conducted over telephone  Mood: \"Fine.\"  Affect: Not evaluated due to visit being conducted over telephone  Thought Process/Associations: Linear  Thought Content: Denies SI or HI.   Perception: Denies AVH  Insight: Fair  Judgment: Good  Cognition: (6) Difficulty with remote memory, normal MoCA  Gait and Station: Not evaluated due to visit being conducted over telephone    LABS and DATA     AIMS:  0: 3/1/2018, 0: 08/02/2019    PHQ9 TODAY = NA  PHQ-9 SCORE 10/3/2019 10/30/2019 12/19/2019   PHQ-9 Total Score - - -   PHQ-9 Total Score 1 3 6   PHQ-9 Total Score - - -     MOCA (8/19): 26/30  (including 0 points added for education)  - Visuospatial/executive:  5/5  - Naming: 3/3  - Attention: 5/6  - Language: 2/3  - Abstraction: 1/2   - Delayed recall: 4/5  - Orientation: 6/6    ANTIPSYCHOTIC " LABS  [glu, A1C, lipids (ezequiel LDL), liver enzymes, WBC, ANEU, Hgb, plts]  q12 mo  Recent Labs   Lab Test 04/07/20  0751 04/07/20  0410 04/06/20  0557 04/06/20  0150 01/22/20 11/29/19 07/25/19  1415 05/15/19  1045   * 244*  --  234* 103   < >  --   --   --    A1C  --   --  5.7*  --   --   --  5.8* 6.6* 7.9*    < > = values in this interval not displayed.     Recent Labs   Lab Test 01/22/20 01/16/20  1459 01/02/19  1045 04/17/18  1356   CHOL 103 113 127 139   TRIG 30 61 51 45   LDL 41 34 33 45   HDL 56 67 84 85     Recent Labs   Lab Test 04/07/20  0410 04/06/20  0150 01/22/20 01/16/20  1459   AST 32 29 18 12   ALT 29 34 19 34   ALKPHOS 92 113 102 129     Recent Labs   Lab Test 04/07/20  0410 04/06/20  0557 04/06/20  0150 01/22/20 01/16/20  1459  06/09/16  1030 12/21/15  1413   WBC 4.0  --  8.4 4.4 4.1   < > 4.7 4.6   ANEU  --   --  7.6  --  3.1  --  3.5 3.4   HGB 12.2*  --  13.3 13.1* 14.4   < > 14.7 13.4   * 116* 119* 127* 135*   < > 175 155    < > = values in this interval not displayed.       PSYCHOTROPIC DRUG INTERACTIONS     VENLAFAXINE and ANTIPLATELET AGENTS may result in an increased risk of bleeding.  RISPERIDONE and SIMVASTATIN may result in increased simvastatin serum concentrations -with an increased risk of myopathy or rhabdomyolysis.  TRAZODONE and VENLAFAXINE may result in an increased risk of serotonin syndrome.    MANAGEMENT:  Monitoring for adverse effects    RISK STATEMENT for SAFETY     Caden Bush denies any SI /SIB.  In addition, he has notable risk factors for self-harm including feels trapped, hopelessness, lives alone/ isolated, hallucinations and male.  However, risk is mitigated by no h/o suicide attempt, describes a safety plan, none to minimal alcohol use , commitment to family and stable housing.  Based on all available evidence he does not appear to be at imminent risk for self-harm therefore does not meet criteria for a 72-hr hold/  involuntary hospitalization.  However,  based on degree of symptoms voluntary hospitalization, day treatment and close psych FU was recommended which the pt did agree to Day Treatment and close follow up.  Additional steps to minimize risk include: SAFETY PLAN completed 5/10/2019.  Safety Plan placed in AVS: Yes.    DIAGNOSIS     Schizoaffective disorder, bipolar type, in remission (on medications and with therapy)  Seasonal affective disorder  Retrograde amnesia (likely secondary to psychiatric conditions)    ASSESSMENT      [m2, h3]     TODAY    Patient is stable on current regimen. No worsening of memory loss. His medications will be continued as a result. Recommend he continue engaging in social activities.    Future considerations:  1. [facility]  2. Group therapy     PLAN      [m2, h3]     1) PSYCHOTROPIC MEDICATIONS:  - Continue Risperidone 3 mg QHS  - Continue Effexor  mg daily  - Continue Trazodone 75 mg at bedtime  - Bright light therapy during winter     - Continue B12 1000 mcg daily  - Continue Vitamin D 1,000 international unit(s) daily       2) THERAPY:  Seeing psychologist Yeny Goff at [facility] since 11/2015. Sees once/twice a month. Finished day treatment earlier in 2020.      3) LABS NEXT DUE: Antipsychotic labs next due 01/2021      RATING SCALES: AIMS 8/20      MoCA: 8/20      4) REFERRALS:  None     7) RTC: 1-2 months with Dr. Smith      8) CRISIS NUMBERS:  See AVS    9) MN  was not checked today.    10) Goals:  - Find one meaningful friendship this year, patient is accomplishing  - Work on cognition through [facility] or [employer]     TREATMENT RISK STATEMENT:  The risks, benefits, alternatives and potential adverse effects have been discussed and are understood by the pt. The pt understands the risks of using street drugs or alcohol. There are no medical contraindications, the pt agrees to treatment with the ability to do so. The pt knows to call the clinic for any problems or to access emergency care if  needed.  Medical and substance use concerns are documented above.  Psychotropic drug interaction check was done, including changes made today.     PROVIDER: Miguelangel Parks, DO    Patient staffed in clinic with Dr. Porras who will sign the note.  Supervisor is Dr. Phillips.    TELEHEALTH ATTENDING ATTESTATION  Following the ACGME guidelines on telemedicine and direct supervision due to COVID-19, I was concurrently participating in and/or monitoring the patient care through appropriate telecommunication technology.  I discussed the key portions of the service with the resident, including the mental status examination and developing the plan of care. I reviewed key portions of the history with the resident. I agree with the findings and plan as documented in this note.   Maty Porras MD

## 2020-07-13 DIAGNOSIS — E10.9 TYPE 1 DIABETES MELLITUS WITHOUT COMPLICATION (H): ICD-10-CM

## 2020-07-13 RX ORDER — INSULIN ASPART 100 [IU]/ML
INJECTION, SOLUTION INTRAVENOUS; SUBCUTANEOUS
Qty: 6 ML | Refills: 10 | Status: SHIPPED | OUTPATIENT
Start: 2020-07-13

## 2020-07-23 ENCOUNTER — RECORDS - HEALTHEAST (OUTPATIENT)
Dept: LAB | Facility: CLINIC | Age: 60
End: 2020-07-23

## 2020-07-24 LAB
ALBUMIN SERPL-MCNC: 3.7 G/DL (ref 3.5–5)
ALP SERPL-CCNC: 96 U/L (ref 45–120)
ALT SERPL W P-5'-P-CCNC: 29 U/L (ref 0–45)
ANION GAP SERPL CALCULATED.3IONS-SCNC: 5 MMOL/L (ref 5–18)
AST SERPL W P-5'-P-CCNC: 18 U/L (ref 0–40)
BASOPHILS # BLD AUTO: 0 THOU/UL (ref 0–0.2)
BASOPHILS NFR BLD AUTO: 1 % (ref 0–2)
BILIRUB SERPL-MCNC: 0.4 MG/DL (ref 0–1)
BUN SERPL-MCNC: 19 MG/DL (ref 8–22)
CALCIUM SERPL-MCNC: 9.3 MG/DL (ref 8.5–10.5)
CHLORIDE BLD-SCNC: 109 MMOL/L (ref 98–107)
CO2 SERPL-SCNC: 28 MMOL/L (ref 22–31)
CREAT SERPL-MCNC: 1.23 MG/DL (ref 0.7–1.3)
EOSINOPHIL # BLD AUTO: 0.1 THOU/UL (ref 0–0.4)
EOSINOPHIL NFR BLD AUTO: 3 % (ref 0–6)
ERYTHROCYTE [DISTWIDTH] IN BLOOD BY AUTOMATED COUNT: 11.7 % (ref 11–14.5)
GFR SERPL CREATININE-BSD FRML MDRD: 60 ML/MIN/1.73M2
GLUCOSE BLD-MCNC: 71 MG/DL (ref 70–125)
HBA1C MFR BLD: 5.8 %
HCT VFR BLD AUTO: 43.1 % (ref 40–54)
HGB BLD-MCNC: 14.2 G/DL (ref 14–18)
LYMPHOCYTES # BLD AUTO: 1 THOU/UL (ref 0.8–4.4)
LYMPHOCYTES NFR BLD AUTO: 34 % (ref 20–40)
MCH RBC QN AUTO: 31.2 PG (ref 27–34)
MCHC RBC AUTO-ENTMCNC: 32.9 G/DL (ref 32–36)
MCV RBC AUTO: 95 FL (ref 80–100)
MONOCYTES # BLD AUTO: 0.1 THOU/UL (ref 0–0.9)
MONOCYTES NFR BLD AUTO: 5 % (ref 2–10)
NEUTROPHILS # BLD AUTO: 1.6 THOU/UL (ref 2–7.7)
NEUTROPHILS NFR BLD AUTO: 57 % (ref 50–70)
PLATELET # BLD AUTO: 167 THOU/UL (ref 140–440)
PMV BLD AUTO: 9.9 FL (ref 8.5–12.5)
POTASSIUM BLD-SCNC: 4.3 MMOL/L (ref 3.5–5)
PROT SERPL-MCNC: 6 G/DL (ref 6–8)
RBC # BLD AUTO: 4.55 MILL/UL (ref 4.4–6.2)
SODIUM SERPL-SCNC: 142 MMOL/L (ref 136–145)
WBC: 2.8 THOU/UL (ref 4–11)

## 2020-07-27 ENCOUNTER — VIRTUAL VISIT (OUTPATIENT)
Dept: PSYCHIATRY | Facility: CLINIC | Age: 60
End: 2020-07-27
Attending: PSYCHIATRY & NEUROLOGY
Payer: MEDICARE

## 2020-07-27 DIAGNOSIS — E10.9 TYPE 1 DIABETES MELLITUS WITHOUT COMPLICATION (H): Primary | ICD-10-CM

## 2020-07-27 DIAGNOSIS — F25.0 SCHIZOAFFECTIVE DISORDER, BIPOLAR TYPE (H): ICD-10-CM

## 2020-07-27 LAB — 25(OH)D3 SERPL-MCNC: 31.1 NG/ML (ref 30–80)

## 2020-07-27 RX ORDER — VENLAFAXINE HYDROCHLORIDE 150 MG/1
CAPSULE, EXTENDED RELEASE ORAL
Qty: 30 CAPSULE | Refills: 2 | Status: SHIPPED | OUTPATIENT
Start: 2020-07-27 | End: 2020-11-08

## 2020-07-27 RX ORDER — TRAZODONE HYDROCHLORIDE 50 MG/1
75 TABLET, FILM COATED ORAL AT BEDTIME
Qty: 45 TABLET | Refills: 2 | Status: SHIPPED | OUTPATIENT
Start: 2020-07-27 | End: 2020-11-08

## 2020-07-27 RX ORDER — ASPIRIN 81 MG/1
TABLET, CHEWABLE ORAL
Qty: 36 TABLET | Refills: 10 | Status: SHIPPED | OUTPATIENT
Start: 2020-07-27

## 2020-07-27 RX ORDER — RISPERIDONE 3 MG/1
3 TABLET ORAL AT BEDTIME
Qty: 30 TABLET | Refills: 2 | Status: SHIPPED | OUTPATIENT
Start: 2020-07-27 | End: 2020-11-08

## 2020-07-27 RX ORDER — VENLAFAXINE HYDROCHLORIDE 75 MG/1
75 CAPSULE, EXTENDED RELEASE ORAL DAILY
Qty: 30 CAPSULE | Refills: 2 | Status: SHIPPED | OUTPATIENT
Start: 2020-07-27 | End: 2020-11-08

## 2020-07-27 NOTE — PATIENT INSTRUCTIONS
Thank you for coming to the PSYCHIATRY CLINIC.    From today: Continue current medications    Lab Testing:  If you had lab testing today and your results are reassuring or normal they will be mailed to you or sent through [employer] within 7 days. If the lab tests need quick action we will call you with the results. The phone number we will call with results is # [phone] (home) . If this is not the best number please call our clinic and change the number.    Medication Refills:  If you need any refills please call your pharmacy and they will contact us. Our fax number for refills is [phone]. Please allow three business for refill processing. If you need to  your refill at a new pharmacy, please contact the new pharmacy directly. The new pharmacy will help you get your medications transferred.     Scheduling:  If you have any concerns about today's visit or wish to schedule another appointment please call our office during normal business hours [phone] (8-5:00 M-F)    Contact Us:  Please call [phone] during business hours (8-5:00 M-F).  If after clinic hours, or on the weekend, please call  [phone].    Financial Assistance [phone]  [facility] [phone]  [place] Billing Office, [facility]: [phone]  [facility] [phone]  Medical Records [phone]      MENTAL HEALTH CRISIS NUMBERS:  For a medical emergency please call  911 or go to the nearest ER.     [facility]:   [facility] -[phone]   Crisis Residence [facility] -[phone]   Walk-In Counseling [facility] -[phone]   COPE 24/7 [place] Mobile Team -[phone] (adults)/[phone] (child)  CHILD: [facility] needs assessment team - [phone]      [facility]:   [facility] - [phone]   Walk-in counseling [facility] - [phone]   Walk-in counseling [facility] - [phone]   Crisis Residence [facility]  [facility] - [phone]  Urgent Care Adult [facility]-[phone] mobile unit/ 24/7 crisis line    National Crisis Numbers:   National Suicide Prevention Lifeline: 5-326-440-TALK (213-691-4228)  Poison Control Center - 8-801-304-1441  [employer]/resources for a list of additional resources (SOS)  Trans Lifeline a hotline for transgender people 6-472-407-0752  [employer] a hotline for LGBT youth 0-957-735-2047  Crisis Text Line: For any crisis 24/7   To: 399000  see www.crisistextline.org  - IF MAKING A CALL FEELS TOO HARD, send a text!         Again thank you for choosing PSYCHIATRY CLINIC and please let us know how we can best partner with you to improve you and your family's health.    You may be receiving a survey regarding this appointment. We would love to have your feedback, both positive and negative. The survey is done by an external company, so your answers are anonymous.

## 2020-07-27 NOTE — PROGRESS NOTES
TELEPHONE VISIT  Caden Bush is a 60 year old pt. who is being evaluated via a billable telephone visit.      The patient has been notified of the following:    We have found that certain health care needs can be provided without the need for a physical exam. This service lets us provide the care you need with a short phone conversation. If a prescription is necessary we can send it directly to your pharmacy. If lab work is needed we can place an order for that and you can then stop by our lab to have the test done at a later time. Insurers are generally covering virtual visits as they would in-office visits so billing should not be different than normal.  If for some reason you do get billed incorrectly, you should contact the billing office to correct it and that number is in the AVS .    Patient has given verbal consent for a telephone visit?:  Yes   How would the pt like to obtain the AVS?:  [employer]  AVS SmartPhrase [PsychAVS] has been placed in 'Patient Instructions':  Yes     Start Time:  10:00 AM         End Time:  10:50 AM         [facility]  Psychiatry Clinic  TRANSFER of CARE DIAGNOSTIC ASSESSMENT     CARE TEAM:  PCP- Annie Hart   Therapist-  Yeny Goff at [facility], [facility]  Ophthalmologist: Jacob Lieberman with [facility]: Dr. Russell, [phone] ext [phone] Community support: Christa GORDON, [facility], [phone]     Caden Bush is a 60 year old male who prefers the name Caden & pronouns he.  The initial diagnostic evaluation was on 11/26/08 .   Date of the most recent transfer of care eval is 08/01/2019.    PERTINENT BACKGROUND                   [most recent eval 07/27/20]   This patient first experienced mental health issues in his late 20s , initially obtained care at that time and has received treatment for Schizoaffective Disorder, bipolar type. His diagnosis has changed through the time and  "afterreviewing all the evidence in 9/2018 the current diagnosis was confirmed (several episodes of moderate to severe depression and one episode of two weeks long period of grandiosity,  pressured speech, overspending and sleeplessness with increased social anxiety and paranoia).   Previously had a CCM until 2011. Has SW consult on 10/20/15, not interested in CM or [facility] worker services at this time and seems to be managing well on his own. Patient decompensates quickly if decreasing Risperidone. Recommend continuing life long treatment. Patient is hesitant to make changes.    Psych critical item history includes suicidal ideation, psychosis [sxs include AH and paranoia], mutiple psychotropic trials and psych hosp (>5).     INTERIM HISTORY                                   [4, 4]   History was provided by the patient who was a good historian. Treatment adherence is good.  Since the last visit:   - \"Fine\", says mental health has been good  - Mood is pretty stable, \"normal\", denies manic or depressive symptoms  - No hallucinations, paranoia. Says it has been years since he had hallucinations.   - Living in assisted living facility is going well, does activities (bingo, bird watches out window), goes for walks outside  - No side effects to medications, thinks they are working well  - Says memory isn't great, sometimes can't remember what he did the previous week. Does not think this is getting worse. Counseled on results of neuropsych testing from February.   - Needs to eat alone in facility which he doesn't like. Appetite is good, weight loss due to reduced sugar intake.     RECENT PSYCH ROS:   Depression:  None  Elevated:  none  Psychosis:  none  Anxiety:  None  Trauma Related:  none  Dysregulation:  none  Eating Disorder: no     Adverse Effects:  None to medications  Pertinent Negative Symptoms: No self-injurious behavior/urges, suicidal and violent ideation, psychosis and wolf    RECENT SUBSTANCE USE:     ALCOHOL- "  never   TOBACCO- none  CAFFEINE- 1 cup/day of coffee, 1 soda/ in a while  OPIOIDS- none      NARCAN KIT- N/A          CANNABIS- none  OTHER ILLICIT DRUGS- none    FAMILY and SOCIAL HISTORY             [1ea, 1ea]       patient reported                    SOCIAL HX:  FINANCIAL SUPPORT- SSDI for schizoaffective disorder and diabetes  CHILDREN- none      LIVING SITUATION- Currently living in an assisted living facility ([facility]) in [place], [facility] 6/19. Receives assistance with managing medications and administering insulin.  SOCIAL/ SPIRITUAL SUPPORT- few friends and family. 2 sisters (55 and 62). Mom, alive (85). Father, alive (88).  FEELS SAFE AT HOME- yes   Trauma History (self-report)- none  Legal- none, but some financial problems for which he is working with   Social/Spiritual Support- family (parents and sister) and people in his apartment building.   Early History/Education-  Born in [place], lived in [place]. Lived in [place] from 1st grade, then came back and finished at [employer]. Went to [employer], then vocational school, then went to [employer], then [employer], then graduated [employer] with BA in Business Administration.   Family Mental Health History-  Parents had issues with depression, unsure of diagnoses; Great grandfather with alcoholism, possible OCD.   PSYCHIATRIC HISTORY                                                            SIB [method, most recent]- none  Suicidal Ideation Hx [passive, active]- Remote hx of passive SI  Suicide Attempt [#, recent, method]:   #- N/A   Most Recent- N/A   Violence/Aggression Hx- none  Psychosis Hx- hx of auditory hallucinations and paranoia  Psych Hosp [ #, most recent, committed]- about 4-5 times in the past. Last hospitalization was aug 1, 2012 due to lIthium toxicity. Last mental health admission was June 2009.   ECT [#, most recent]- none  Pertinent Outpatient Treatment: Has had DBT in the past, as well as group therapy and individual  therapy. In Day Treatment with [facility].  Eating Disorder: none    Past Psychotropic Med Trials- see next section    PAST MED TRIALS                                                               Drug / Start Date   Dose (mg)   Helpful   Adverse Effects  DC Reason / Date     Lithium   900           Risperdal         current     Abilify             Seroquel             Effexor         current     Zoloft             Wellbutrin             Buspar             trazodone         current     hydroxyzine             Ambien                Maybe Zyprexa, and Prozac, can't remember     July: Increased Risperidone dose  During months of fall and winter, patient was hesitant to make changes to medications. Modified risperidone and trazodone dose several times (minor changes). Not successful in complete resolution of sxs.     SUBSTANCE USE HISTORY     Past Use- none  Treatment [#, most recent] - none  Medical Consequences [withdrawal, sz etc] - none  HIV/Hepatitis- none  Legal Consequences- none    MEDICAL HISTORY and ALLERGY     ALLERGIES: Flomax [tamsulosin] and Lisinopril     Patient Active Problem List   Diagnosis     Localized osteoarthritis of hands, bilateral     Retinopathy     Moderate episode of recurrent major depressive disorder (H)     Polyp of colon, adenomatous     Health Care Home     Hx of psychiatric care     Neuropathy of left upper extremity     Type 1 diabetes mellitus with retinopathy of both eyes (H)     Schizoaffective disorder, bipolar type (H)     Heart murmur on physical examination     Hyponatremia         MEDICAL REVIEW OF SYSTEMS         [2, 10]   Pregnant or breastfeeding- N/A      Contraception- Abstinent    A comprehensive review of systems was performed and is negative other than noted in the HPI.    MEDICATIONS        Current Outpatient Medications   Medication Sig Dispense Refill     acetaminophen (TYLENOL) 325 MG tablet Take 325-650 mg by mouth every 8 hours as needed for mild pain       aspirin  (ASA) 81 MG chewable tablet Take 81 mg by mouth daily       cholecalciferol (VITAMIN D) 1000 UNIT tablet Take 1 tablet by mouth daily.       insulin glargine (BASAGLAR KWIKPEN) 100 UNIT/ML pen Inject 15 Units Subcutaneous daily 15 mL 3     loperamide (IMODIUM) 2 MG capsule Take 2 mg by mouth 4 times daily as needed for diarrhea 4 mg after first loose stool, then 2 mg after each subsequent loose stool.  Max 8 mg (4 capsules) daily.       magnesium hydroxide (MILK OF MAGNESIA) 400 MG/5ML suspension Take 30 mLs by mouth daily as needed for constipation       NOVOLOG FLEXPEN 100 UNIT/ML soln INJECT 6 UNITS WITH BREAKFAST;5UNITS WITH LUNCH & DINNER;SLIDING SCALE:200-250=1U;251-300=2U;301-350=3U;>350=4U 6 mL 10     ondansetron (ZOFRAN) 4 MG tablet Take 1 tablet (4 mg) by mouth every 6 hours as needed for nausea (Patient not taking: Reported on 5/4/2020) 10 tablet 0     risperiDONE (RISPERDAL) 3 MG tablet Take 1 tablet (3 mg) by mouth At Bedtime 90 tablet 0     Silodosin (RAPAFLO) 8 MG CAPS capsule Take 1 capsule (8 mg) by mouth daily 90 capsule 0     simvastatin (ZOCOR) 20 MG tablet Take 20 mg by mouth At Bedtime       traZODone (DESYREL) 50 MG tablet Take 1.5 tablets (75 mg) by mouth At Bedtime 135 tablet 0     venlafaxine (EFFEXOR XR) 75 MG 24 hr capsule Take 1 capsule (75 mg) by mouth daily TAKE WITH  Effexor 150 mg for a total dose of 225 mg daily 90 capsule 0     venlafaxine (EFFEXOR-XR) 150 MG 24 hr capsule TAKE 1 CAPSULE BY MOUTH DAILY ALONG WITH A 75 MG FOR A TOTAL  MG DAILY 90 capsule 0     VITALS                                                                [3, 3]   There were no vitals taken for this visit.     Pulse Readings from Last 3 Encounters:   04/06/20 98   03/06/20 101   01/27/20 78     Wt Readings from Last 3 Encounters:   04/07/20 71.2 kg (156 lb 14.4 oz)   03/06/20 72.9 kg (160 lb 12.8 oz)   01/27/20 73.5 kg (162 lb)     BP Readings from Last 3 Encounters:   04/07/20 133/67   03/06/20 106/69  "  01/27/20 130/74       MENTAL STATUS EXAM                       [9, 14 cog gs]     Alertness: alert   Appearance: N/A (phone visit)  Behavior/Demeanor: cooperative, pleasant and calm, with N/A (phone visit) eye contact   Speech: increased latency of response and slowed  Language: intact  Psychomotor: N/A (phone visit)  Mood: \"stable\"  Affect: N/A (phone visit); congruent to: mood- yes, content- yes  Thought Process/Associations: unremarkable  Thought Content:  Reports none;  Denies suicidal & violent ideation and delusions and delusions   Perception:  Reports none;  Denies hallucinations  Insight: adequate  Judgment: good  Cognition: (6) oriented: time, person, and place  attention span: intact  concentration: fair  recent memory: limited  remote memory: fair  fund of knowledge: appropriate  Gait and Station: N/A (telehealth)    LABS and DATA     PHQ9 TODAY = n/a  PHQ 10/30/2019 12/19/2019 1/16/2020   PHQ-9 Total Score 3 6 -   Q9: Thoughts of better off dead/self-harm past 2 weeks Not at all Not at all Not at all       Recent Labs   Lab Test 04/07/20  0751 04/07/20  0410 04/06/20  0557   CR 1.19 1.20 0.80   GFRESTIMATED 66 65 >90     ANTIPSYCHOTIC LABS  [glu, A1C, lipids (ezequiel LDL), liver enzymes, WBC, ANEU, Hgb, plts]  q12 mo  Recent Labs   Lab Test 04/07/20  0751 04/07/20  0410 04/06/20  0557 04/06/20  0150 01/22/20 11/29/19 07/25/19  1415 05/15/19  1045   * 244*  --  234* 103   < >  --   --   --    A1C  --   --  5.7*  --   --   --  5.8* 6.6* 7.9*    < > = values in this interval not displayed.     Recent Labs   Lab Test 01/22/20 01/16/20  1459 01/02/19  1045 04/17/18  1356   CHOL 103 113 127 139   TRIG 30 61 51 45   LDL 41 34 33 45   HDL 56 67 84 85     Recent Labs   Lab Test 04/07/20  0410 04/06/20  0150 01/22/20 01/16/20  1459   AST 32 29 18 12   ALT 29 34 19 34   ALKPHOS 92 113 102 129     Recent Labs   Lab Test 04/07/20  0410 04/06/20  0557 04/06/20  0150 01/22/20 01/16/20  1459  06/09/16  1030 " 12/21/15  1413   WBC 4.0  --  8.4 4.4 4.1   < > 4.7 4.6   ANEU  --   --  7.6  --  3.1  --  3.5 3.4   HGB 12.2*  --  13.3 13.1* 14.4   < > 14.7 13.4   * 116* 119* 127* 135*   < > 175 155    < > = values in this interval not displayed.         PSYCHOTROPIC DRUG INTERACTIONS     VENLAFAXINE and ANTIPLATELET AGENTS may result in an increased risk of bleeding.  RISPERIDONE and SIMVASTATIN may result in increased simvastatin serum concentrations -with an increased risk of myopathy or rhabdomyolysis.  TRAZODONE and VENLAFAXINE may result in an increased risk of serotonin syndrome.     MANAGEMENT:  Monitoring for adverse effects    RISK STATEMENT for SAFETY     Caden Bush did not appear to be an imminent safety risk to self or others.    DIAGNOSES                                           [m2, h3]    Schizoaffective disorder, bipolar type, in remission (on medications and with therapy)  Seasonal affective disorder  Retrograde amnesia (likely secondary to psychiatric conditions)    ASSESSMENT                                        [m2, h3]        Today: Caden reported he is overall stable with regard to his mental health and is coping with coronavirus stressors well. He denies any bothersome depressive or manic symptoms and denies hallucinations, delusions, paranoia. Says medications are helping and denies side effects. Decided to continue current medication regimen.     MNPMP review was not needed today.    PLAN                                                            [m2, h3]                                               1) Meds  - Continue Risperidone 3 mg QHS  - Continue Effexor  mg daily  - Continue Trazodone 75 mg at bedtime  - Bright light therapy during winter      - Continue B12 1000 mcg daily  - Continue Vitamin D 1,000 international unit(s) daily     2) Therapy-  Seeing psychologist Yeny Goff at [facility] since 11/2015. Sees once/twice a month. Finished day treatment earlier in  2020.    3) Next Due   LABS NEXT DUE: Antipsychotic labs next due 01/2021      RATING SCALES: AIMS 8/20      MoCA: 8/20    5) Referrals  No Referrals needed     3) RTC: 2 months    4) Crisis Numbers:   Provided routinely in AVS     After hours:  [phone]      TREATMENT RISK STATEMENT:  The risks, benefits, alternatives and potential adverse effects have been discussed and are understood by the pt. The pt understands the risks of using street drugs or alcohol. There are no medical contraindications, the pt agrees to treatment with the ability to do so. The pt knows to call the clinic for any problems or to access emergency care if needed.  Medical and substance use concerns are documented above.  Psychotropic drug interaction check was done, including changes made today.    PROVIDER: Ck Bergman MD    Patient staffed via telephone with Dr. Phillips who will sign the note.  Supervisor is Dr. Bradley.    TELEHEALTH ATTENDING ATTESTATION  Following the ACGME guidelines on telemedicine and direct supervision due to COVID-19, I was concurrently participating in and/or monitoring the patient care through appropriate telecommunication technology.  I discussed the key portions of the service with the resident, including history, presentation, the mental status examination and developing the plan of care. I reviewed key portions of the history with the resident. I agree with the findings and plan as documented in this note.   Antolin Phillips MD

## 2020-11-05 NOTE — PROGRESS NOTES
[facility] ENCOUNTER  SUBJECTIVE/OBJECTIVE:                           Caden Bush is a 60 year old male called for a follow-up visit. He was referred to me from Leny Lee NP.  Today's visit is a follow-up MTM visit from 3/3 and hospital follow up from 4/6 for hyponatremia.     Patient consented to a telehealth visit: yes  Telemedicine Visit Details  Type of service:  Telephone visit  Start Time: 11:00AM  End Time: 11:21 AM  Originating Location (pt. Location): Home  Distant Location (provider location):  [place]  Mode of Communication:  Telephone    Chief Complaint: DM recheck.    Allergies/ADRs: Reviewed in Epic  Tobacco:  reports that he has never smoked. He has never used smokeless tobacco.  Alcohol: not currently using  Caffeine: no caffeine  PMH: Reviewed in Epic    Medication Adherence/Access: no issues reported      Type 1 Diabetes:  Pt currently taking Basaglar 15 units in the AM plus Novolog 6 units with breakfast and 5 units with lunch/dinner.   Using sliding scale pre-meal as well=>  200-250 = add 1 unit   251- 300= add 2 units  301- 350= add 3 units  >350 = add 4 units    Was previously evaluated by endo and they stopped Januvia and pioglitazone on 5/3/19. Plan was to give him insulin/carb ratio with sliding scale however he reports that he is not confident with his carb counting and not able to convert a carb amount to insulin dose. Thus he is on fixed dose with meals plus sliding scale started since stopping orals.  *Note that the NEDRA antibodies were positive.   C-peptide= 2.1ng/ml with glucose of 346mg/dl.     Using Dexcom G6 sensors. Not synced to clinic, so was hoping to download data when coming to clinic this afternoon.     Lab Results   Component Value Date    A1C 5.7 04/06/2020    A1C 5.8 11/29/2019    A1C 6.6 07/25/2019    A1C 7.9 05/15/2019    A1C 6.7 01/02/2019       Hyponatremia: Was hospitalized for this - thought to be caused by acute illness of vomitting and diarrhea  which have resolved now. Discharge Sodium was 135mmol/L- is due to be rechecked.     Today's Vitals: There were no vitals taken for this visit.- phone visit    ASSESSMENT:                            Medication Adherence: good, no issues identified    Type 1 Diabetes: Stable. Patient is meeting A1c goal of < 7%. Will have staff download meter later today when in the office.    Hyponatremia: Due for updated BMP.     PLAN:                          Post Discharge Medication Reconciliation Status: discharge medications reconciled and changed, per note/orders (see AVS).    1. BMP today if able to get to clinic, if his cab does not work out, he could get labs drawn at his facility.       I spent 21 minutes with this patient today. All changes were made via collaborative practice agreement with Leny Lee NP. A copy of the visit note was provided to the patient's primary care provider.    Will follow up in 1 month.    The patient was sent via [employer] a summary of these recommendations.     Aliyah Angeles, Pharm.D, BCACP  Medication Therapy Management Pharmacist  [phone]             Yes

## 2020-11-07 DIAGNOSIS — F25.0 SCHIZOAFFECTIVE DISORDER, BIPOLAR TYPE (H): ICD-10-CM

## 2020-11-09 RX ORDER — TRAZODONE HYDROCHLORIDE 50 MG/1
75 TABLET, FILM COATED ORAL AT BEDTIME
Qty: 45 TABLET | Refills: 0 | Status: SHIPPED | OUTPATIENT
Start: 2020-11-09 | End: 2020-12-07

## 2020-11-09 RX ORDER — RISPERIDONE 3 MG/1
3 TABLET ORAL AT BEDTIME
Qty: 30 TABLET | Refills: 0 | Status: SHIPPED | OUTPATIENT
Start: 2020-11-09 | End: 2020-12-07

## 2020-11-09 RX ORDER — VENLAFAXINE HYDROCHLORIDE 150 MG/1
CAPSULE, EXTENDED RELEASE ORAL
Qty: 30 CAPSULE | Refills: 0 | Status: SHIPPED | OUTPATIENT
Start: 2020-11-09 | End: 2020-12-07

## 2020-11-09 RX ORDER — VENLAFAXINE HYDROCHLORIDE 75 MG/1
75 CAPSULE, EXTENDED RELEASE ORAL DAILY
Qty: 30 CAPSULE | Refills: 0 | Status: SHIPPED | OUTPATIENT
Start: 2020-11-09 | End: 2020-12-07

## 2020-11-09 NOTE — TELEPHONE ENCOUNTER
Medication requested:  venlafaxine (EFFEXOR XR) 75 MG 24 hr capsule  Last refilled:  7/27/20  Qty: 30/2  venlafaxine (EFFEXOR-XR) 150 MG 24 hr capsule  Last refilled: 7/27/20  Qty: 30/2  risperiDONE (RISPERDAL) 3 MG tablet  Last refilled: 7/27/20  Qty: 30/2   traZODone (DESYREL) 50 MG tablet  Last refilled: 7/27/20  Qty: 45/2      Last seen: 7/27/20  RTC: 2 months  Cancel: 3 ( x 1 provider not available)  No-show: 0  Next appt: 12/7/20    Refill decision:   Refill pended and routed to the provider for review/determination due to  cancelled appt x 2( next 12/7/20)

## 2020-12-07 ENCOUNTER — VIRTUAL VISIT (OUTPATIENT)
Dept: PSYCHIATRY | Facility: CLINIC | Age: 60
End: 2020-12-07
Attending: PSYCHIATRY & NEUROLOGY
Payer: MEDICAID

## 2020-12-07 DIAGNOSIS — F25.0 SCHIZOAFFECTIVE DISORDER, BIPOLAR TYPE (H): ICD-10-CM

## 2020-12-07 PROCEDURE — 99441 PR PHYSICIAN TELEPHONE EVALUATION 5-10 MIN: CPT | Mod: GC | Performed by: STUDENT IN AN ORGANIZED HEALTH CARE EDUCATION/TRAINING PROGRAM

## 2020-12-07 RX ORDER — TRAZODONE HYDROCHLORIDE 50 MG/1
75 TABLET, FILM COATED ORAL AT BEDTIME
Qty: 45 TABLET | Refills: 2 | Status: SHIPPED | OUTPATIENT
Start: 2020-12-07

## 2020-12-07 RX ORDER — VENLAFAXINE HYDROCHLORIDE 75 MG/1
75 CAPSULE, EXTENDED RELEASE ORAL DAILY
Qty: 30 CAPSULE | Refills: 2 | Status: SHIPPED | OUTPATIENT
Start: 2020-12-07

## 2020-12-07 RX ORDER — VENLAFAXINE HYDROCHLORIDE 150 MG/1
CAPSULE, EXTENDED RELEASE ORAL
Qty: 30 CAPSULE | Refills: 2 | Status: SHIPPED | OUTPATIENT
Start: 2020-12-07

## 2020-12-07 RX ORDER — RISPERIDONE 3 MG/1
3 TABLET ORAL AT BEDTIME
Qty: 30 TABLET | Refills: 2 | Status: SHIPPED | OUTPATIENT
Start: 2020-12-07

## 2020-12-07 NOTE — PROGRESS NOTES
TELEPHONE VISIT  Caden Bush is a 60 year old pt. who is being evaluated via a billable telephone visit.      The patient has been notified of the following:    We have found that certain health care needs can be provided without the need for a physical exam. This service lets us provide the care you need with a short phone conversation. If a prescription is necessary we can send it directly to your pharmacy. If lab work is needed we can place an order for that and you can then stop by our lab to have the test done at a later time. Insurers are generally covering virtual visits as they would in-office visits so billing should not be different than normal.  If for some reason you do get billed incorrectly, you should contact the billing office to correct it and that number is in the AVS .    Patient has given verbal consent for a telephone visit?:  Yes   How would the pt like to obtain the AVS?:  [employer]  AVS SmartPhrase [PsychAVS] has been placed in 'Patient Instructions':  Yes     Start Time:  2:00  PM         End Time:  2:30 PM         [facility]  Psychiatry Clinic  PSYCHIATRY PROGRESS NOTE     CARE TEAM:  PCP- Annie Hart   Therapist-  [facility] at [facility]  Ophthalmologist: Jacob Lieberman with [facility]: Dr. Zendejas-Clover, [phone] ext [phone] Community support: Christa GORDON, [facility], [phone]     Caden Bush is a 60 year old male who prefers the name Caden & pronouns he.  The initial diagnostic evaluation was on 11/26/08 .   Date of the most recent transfer of care eval is 08/01/2019.    PERTINENT BACKGROUND                   [most recent eval 07/27/20]     Psych critical item history includes suicidal ideation, psychosis [sxs include AH and paranoia], mutiple psychotropic trials and psych hosp (>5).     INTERIM HISTORY                                   [4, 4]   History was provided by the patient who  "was a good historian. Treatment adherence is good.  Since the last visit:   - \"been going fine\", no major updates. Still living at [facility], on SSDI.  - Mood is \"pretty good\", sleeping more than usual. Wakes up at 4AM, falls asleep around 7 PM, sometimes takes a nap. Encouraged him to try to stay up later and take trazodone a little later.   - Denies SI, SIB, AH, VH, paranoia  - Not doing as much during the day as activities are cancelled  - Still seeing therapist, says this is helpful    - Reports medications are going well, denies any SE. Wishes to continue current regimen.    RECENT PSYCH ROS:   Depression:  None  Elevated:  none  Psychosis:  none  Anxiety:  None  Trauma Related:  none  Dysregulation:  none  Eating Disorder: no     Adverse Effects:  None to medications  Pertinent Negative Symptoms: No self-injurious behavior/urges, suicidal and violent ideation, psychosis and wolf    RECENT SUBSTANCE USE:     ALCOHOL-  never   TOBACCO- none  CAFFEINE- 1 cup/day of coffee, 1 soda/ in a while  OPIOIDS- none      NARCAN KIT- N/A          CANNABIS- none  OTHER ILLICIT DRUGS- none    SOCIAL HX:  FINANCIAL SUPPORT- SSDI for schizoaffective disorder and diabetes  CHILDREN- none      LIVING SITUATION- Currently living in an assisted living facility ([facility]) in [facility] 6/19. Receives assistance with managing medications and administering insulin.  SOCIAL/ SPIRITUAL SUPPORT- few friends and family. 2 sisters (55 and 62). Mom, alive (85). Father, alive (88).  FEELS SAFE AT HOME- yes     PAST MED TRIALS                                                               Drug / Start Date   Dose (mg)   Helpful   Adverse Effects  DC Reason / Date     Lithium   900           Risperdal         current     Abilify             Seroquel             Effexor         current     Zoloft             Wellbutrin             Buspar             trazodone         current     hydroxyzine             Ambien           "      Maybe Zyprexa, and Prozac, can't remember     July: Increased Risperidone dose  During months of fall and winter, patient was hesitant to make changes to medications. Modified risperidone and trazodone dose several times (minor changes). Not successful in complete resolution of sxs.       PSYCH and SUBSTANCE USE Critical Summary Points since July 2020         None      MEDICAL HISTORY and ALLERGY     ALLERGIES: Flomax [tamsulosin] and Lisinopril     Patient Active Problem List   Diagnosis     Localized osteoarthritis of hands, bilateral     Retinopathy     Moderate episode of recurrent major depressive disorder (H)     Polyp of colon, adenomatous     Health Care Home     Hx of psychiatric care     Neuropathy of left upper extremity     Type 1 diabetes mellitus with retinopathy of both eyes (H)     Schizoaffective disorder, bipolar type (H)     Heart murmur on physical examination     Hyponatremia         MEDICAL REVIEW OF SYSTEMS         [2, 10]   Pregnant or breastfeeding- N/A      Contraception- Abstinent    A comprehensive review of systems was performed and is negative other than noted in the HPI.    MEDICATIONS        Current Outpatient Medications   Medication Sig Dispense Refill     acetaminophen (TYLENOL) 325 MG tablet Take 325-650 mg by mouth every 8 hours as needed for mild pain       aspirin (ASA) 81 MG chewable tablet TAKE ONE TABLET BY MOUTH DAILY 36 tablet 10     cholecalciferol (VITAMIN D) 1000 UNIT tablet Take 1 tablet by mouth daily.       insulin glargine (BASAGLAR KWIKPEN) 100 UNIT/ML pen Inject 15 Units Subcutaneous daily 15 mL 3     loperamide (IMODIUM) 2 MG capsule Take 2 mg by mouth 4 times daily as needed for diarrhea 4 mg after first loose stool, then 2 mg after each subsequent loose stool.  Max 8 mg (4 capsules) daily.       magnesium hydroxide (MILK OF MAGNESIA) 400 MG/5ML suspension Take 30 mLs by mouth daily as needed for constipation       NOVOLOG FLEXPEN 100 UNIT/ML soln INJECT 6  "UNITS WITH BREAKFAST;5UNITS WITH LUNCH & DINNER;SLIDING SCALE:200-250=1U;251-300=2U;301-350=3U;>350=4U 6 mL 10     ondansetron (ZOFRAN) 4 MG tablet Take 1 tablet (4 mg) by mouth every 6 hours as needed for nausea (Patient not taking: Reported on 5/4/2020) 10 tablet 0     risperiDONE (RISPERDAL) 3 MG tablet Take 1 tablet (3 mg) by mouth At Bedtime 30 tablet 0     Silodosin (RAPAFLO) 8 MG CAPS capsule Take 1 capsule (8 mg) by mouth daily 90 capsule 0     simvastatin (ZOCOR) 20 MG tablet Take 20 mg by mouth At Bedtime       traZODone (DESYREL) 50 MG tablet Take 1.5 tablets (75 mg) by mouth At Bedtime 45 tablet 0     venlafaxine (EFFEXOR XR) 75 MG 24 hr capsule Take 1 capsule (75 mg) by mouth daily TAKE WITH  Effexor 150 mg for a total dose of 225 mg daily 30 capsule 0     venlafaxine (EFFEXOR-XR) 150 MG 24 hr capsule TAKE 1 CAPSULE BY MOUTH DAILY ALONG WITH A 75 MG FOR A TOTAL  MG DAILY 30 capsule 0     VITALS                                                                [3, 3]   There were no vitals taken for this visit.     Pulse Readings from Last 3 Encounters:   04/06/20 98   03/06/20 101   01/27/20 78     Wt Readings from Last 3 Encounters:   04/07/20 71.2 kg (156 lb 14.4 oz)   03/06/20 72.9 kg (160 lb 12.8 oz)   01/27/20 73.5 kg (162 lb)     BP Readings from Last 3 Encounters:   04/07/20 133/67   03/06/20 106/69   01/27/20 130/74       MENTAL STATUS EXAM                       [9, 14 cog gs]     Alertness: alert   Appearance: N/A (phone visit)  Behavior/Demeanor: cooperative, pleasant and calm, with N/A (phone visit) eye contact   Speech: increased latency of response and slowed  Language: intact  Psychomotor: N/A (phone visit)  Mood: \"stable\"  Affect: N/A (phone visit); congruent to: mood- yes, content- yes  Thought Process/Associations: unremarkable  Thought Content:  Reports none;  Denies suicidal & violent ideation and delusions and delusions   Perception:  Reports none;  Denies hallucinations  Insight: " adequate  Judgment: good  Cognition: (6) oriented: time, person, and place  attention span: intact  concentration: fair  recent memory: limited  remote memory: fair  fund of knowledge: appropriate  Gait and Station: N/A (telehealth)    LABS and DATA     PHQ9 TODAY = n/a  PHQ 10/30/2019 12/19/2019 1/16/2020   PHQ-9 Total Score 3 6 -   Q9: Thoughts of better off dead/self-harm past 2 weeks Not at all Not at all Not at all       Recent Labs   Lab Test 04/07/20  0751 04/07/20  0410 04/06/20  0557   CR 1.19 1.20 0.80   GFRESTIMATED 66 65 >90     ANTIPSYCHOTIC LABS  [glu, A1C, lipids (ezequiel LDL), liver enzymes, WBC, ANEU, Hgb, plts]  q12 mo  Recent Labs   Lab Test 04/07/20  0751 04/07/20  0410 04/06/20  0557 04/06/20  0150 01/22/20 11/29/19  0000 11/29/19 07/25/19  1415 05/15/19  1045   * 244*  --  234* 103   < >  --   --   --    A1C  --   --  5.7*  --   --   --  5.8* 6.6* 7.9*    < > = values in this interval not displayed.     Recent Labs   Lab Test 01/22/20 01/16/20  1459 01/02/19  1045 04/17/18  1356   CHOL 103 113 127 139   TRIG 30 61 51 45   LDL 41 34 33 45   HDL 56 67 84 85     Recent Labs   Lab Test 04/07/20  0410 04/06/20  0150 01/22/20 01/16/20  1459   AST 32 29 18 12   ALT 29 34 19 34   ALKPHOS 92 113 102 129     Recent Labs   Lab Test 04/07/20  0410 04/06/20  0557 04/06/20  0150 01/22/20 01/16/20  1459 06/09/16  1030 06/09/16  1030 12/21/15  1413   WBC 4.0  --  8.4 4.4 4.1   < > 4.7 4.6   ANEU  --   --  7.6  --  3.1  --  3.5 3.4   HGB 12.2*  --  13.3 13.1* 14.4   < > 14.7 13.4   * 116* 119* 127* 135*   < > 175 155    < > = values in this interval not displayed.         PSYCHOTROPIC DRUG INTERACTIONS     VENLAFAXINE and ANTIPLATELET AGENTS may result in an increased risk of bleeding.  RISPERIDONE and SIMVASTATIN may result in increased simvastatin serum concentrations -with an increased risk of myopathy or rhabdomyolysis.  TRAZODONE and VENLAFAXINE may result in an increased risk of serotonin  syndrome.     MANAGEMENT:  Monitoring for adverse effects    RISK STATEMENT for SAFETY     Caden Bush did not appear to be an imminent safety risk to self or others.    DIAGNOSES                                           [m2, h3]    Schizoaffective disorder, bipolar type, in remission (on medications and with therapy)  Seasonal affective disorder  Retrograde amnesia (likely secondary to psychiatric conditions)    ASSESSMENT                                        [m2, h3]        Today: Caden reported he is overall stable with regard to his mental health and is coping with coronavirus stressors well. He denies any bothersome depressive or manic symptoms and denies hallucinations, delusions, paranoia. Says medications are helping and denies side effects. Decided to continue current medication regimen.     MNPMP review was not needed today.    PLAN                                                            [m2, h3]                                               1) Meds  - Continue Risperidone 3 mg QHS  - Continue Effexor  mg daily  - Continue Trazodone 75 mg at bedtime  - Bright light therapy during winter      - Continue B12 1000 mcg daily  - Continue Vitamin D 1,000 international unit(s) daily     2) Therapy-  Seeing psychologist Yeny Goff at [facility] since 11/2015. Sees once/twice a month. Finished day treatment earlier in 2020.    3) Next Due   LABS NEXT DUE: Antipsychotic labs next due 01/2021      RATING SCALES: AIMS 8/20      MoCA: 8/20    5) Referrals  No Referrals needed     3) RTC: 2 months    4) Crisis Numbers:   Provided routinely in AVS     After hours:  [phone]      TREATMENT RISK STATEMENT:  The risks, benefits, alternatives and potential adverse effects have been discussed and are understood by the pt. The pt understands the risks of using street drugs or alcohol. There are no medical contraindications, the pt agrees to treatment with the ability to do so. The pt knows to call the  clinic for any problems or to access emergency care if needed.  Medical and substance use concerns are documented above.  Psychotropic drug interaction check was done, including changes made today.    PROVIDER: Ck Bergman MD    Patient staffed via telephone with Dr. Phillips who will sign the note.  Supervisor is Dr. Bradley.    TELEHEALTH ATTENDING ATTESTATION  Following the ACGME guidelines on telemedicine and direct supervision due to COVID-19, I was concurrently participating in and/or monitoring the patient care through appropriate telecommunication technology.  I discussed the key portions of the service with the resident, including the mental status examination and developing the plan of care. I reviewed key portions of the history with the resident. I agree with the findings and plan as documented in this note.   Antolin Phillips MD

## 2020-12-09 ENCOUNTER — RECORDS - HEALTHEAST (OUTPATIENT)
Dept: LAB | Facility: CLINIC | Age: 60
End: 2020-12-09

## 2020-12-09 ENCOUNTER — TELEPHONE (OUTPATIENT)
Dept: PSYCHIATRY | Facility: CLINIC | Age: 60
End: 2020-12-09

## 2020-12-09 NOTE — TELEPHONE ENCOUNTER
Writer received message from provider, see below, to coordinate labs to be done at his living facility - [facility]. Writer left a DVM at [phone], Loyda Patel, to return writer's call. Direct line given as CB number.Nannette Park LPN

## 2020-12-09 NOTE — TELEPHONE ENCOUNTER
----- Message from Ck Bergman MD sent at 12/7/2020  3:09 PM CST -----  Regarding: AP Labs at current living facility?  Shmuel Brunson,    I saw this patient today and he is due for routine anti-psychotic labs (CMP, Lipid Panel, CBC w/ Diff, A1c). He said he usually gets labs drawn where he lives. Is there a way to coordinate labs with his living facility? Thanks.    Ck

## 2020-12-10 LAB
ALBUMIN SERPL-MCNC: 3.9 G/DL (ref 3.5–5)
ALP SERPL-CCNC: 94 U/L (ref 45–120)
ALT SERPL W P-5'-P-CCNC: 28 U/L (ref 0–45)
ANION GAP SERPL CALCULATED.3IONS-SCNC: 7 MMOL/L (ref 5–18)
AST SERPL W P-5'-P-CCNC: 17 U/L (ref 0–40)
BASOPHILS # BLD AUTO: 0 THOU/UL (ref 0–0.2)
BASOPHILS NFR BLD AUTO: 1 % (ref 0–2)
BILIRUB SERPL-MCNC: 0.4 MG/DL (ref 0–1)
BUN SERPL-MCNC: 19 MG/DL (ref 8–22)
CALCIUM SERPL-MCNC: 9 MG/DL (ref 8.5–10.5)
CHLORIDE BLD-SCNC: 106 MMOL/L (ref 98–107)
CHOLEST SERPL-MCNC: 115 MG/DL
CO2 SERPL-SCNC: 26 MMOL/L (ref 22–31)
CREAT SERPL-MCNC: 1.21 MG/DL (ref 0.7–1.3)
EOSINOPHIL # BLD AUTO: 0.1 THOU/UL (ref 0–0.4)
EOSINOPHIL NFR BLD AUTO: 4 % (ref 0–6)
ERYTHROCYTE [DISTWIDTH] IN BLOOD BY AUTOMATED COUNT: 11.7 % (ref 11–14.5)
FASTING STATUS PATIENT QL REPORTED: NORMAL
GFR SERPL CREATININE-BSD FRML MDRD: >60 ML/MIN/1.73M2
GLUCOSE BLD-MCNC: 131 MG/DL (ref 70–125)
HBA1C MFR BLD: 5.5 %
HCT VFR BLD AUTO: 41.1 % (ref 40–54)
HDLC SERPL-MCNC: 63 MG/DL
HGB BLD-MCNC: 13.8 G/DL (ref 14–18)
IMM GRANULOCYTES # BLD: 0 THOU/UL
IMM GRANULOCYTES NFR BLD: 0 %
LDLC SERPL CALC-MCNC: 42 MG/DL
LYMPHOCYTES # BLD AUTO: 0.9 THOU/UL (ref 0.8–4.4)
LYMPHOCYTES NFR BLD AUTO: 30 % (ref 20–40)
MCH RBC QN AUTO: 31.4 PG (ref 27–34)
MCHC RBC AUTO-ENTMCNC: 33.6 G/DL (ref 32–36)
MCV RBC AUTO: 93 FL (ref 80–100)
MONOCYTES # BLD AUTO: 0.2 THOU/UL (ref 0–0.9)
MONOCYTES NFR BLD AUTO: 6 % (ref 2–10)
NEUTROPHILS # BLD AUTO: 1.8 THOU/UL (ref 2–7.7)
NEUTROPHILS NFR BLD AUTO: 59 % (ref 50–70)
PLATELET # BLD AUTO: 140 THOU/UL (ref 140–440)
PMV BLD AUTO: 9.7 FL (ref 8.5–12.5)
POTASSIUM BLD-SCNC: 5 MMOL/L (ref 3.5–5)
PROT SERPL-MCNC: 5.9 G/DL (ref 6–8)
RBC # BLD AUTO: 4.4 MILL/UL (ref 4.4–6.2)
SODIUM SERPL-SCNC: 139 MMOL/L (ref 136–145)
TRIGL SERPL-MCNC: 48 MG/DL
TSH SERPL DL<=0.005 MIU/L-ACNC: 0.66 UIU/ML (ref 0.3–5)
WBC: 3.1 THOU/UL (ref 4–11)

## 2020-12-21 DIAGNOSIS — E10.9 TYPE 1 DIABETES MELLITUS WITHOUT COMPLICATIONS (H): ICD-10-CM

## 2020-12-21 RX ORDER — INSULIN GLARGINE 100 [IU]/ML
INJECTION, SOLUTION SUBCUTANEOUS
Qty: 3 ML | Refills: 0 | Status: SHIPPED | OUTPATIENT
Start: 2020-12-21

## 2020-12-21 NOTE — TELEPHONE ENCOUNTER
LOV  1/27/2020. Due for diabetic visit. Hospitalization in April. Basaglar refilled but needs appointment for further refills.    VONDA Burks CNP on 12/21/2020 at 1:11 PM

## 2020-12-30 DIAGNOSIS — E78.00 HYPERCHOLESTEREMIA: Primary | ICD-10-CM

## 2020-12-30 NOTE — TELEPHONE ENCOUNTER
LOV 1/27/2020. Different dose of Simvastatin on list and not addressed by provider. Need dose verified.    VONDA Burks CNP on 12/30/2020 at 2:47 PM

## 2020-12-31 RX ORDER — SIMVASTATIN 20 MG
20 TABLET ORAL AT BEDTIME
Qty: 30 TABLET | Refills: 0 | Status: SHIPPED | OUTPATIENT
Start: 2020-12-31 | End: 2021-03-02

## 2021-01-01 NOTE — TELEPHONE ENCOUNTER
----- Message from Becca Pimentel sent at 3/21/2018  3:45 PM CDT -----  Regarding: lab work/[place]  Contact: [phone]  Pt is the caller. He is looking to talk to Dr Kessler about his lab work because it didn't get done. Ok to leave detailed message.  
Last seen:   3/1/18    Per last visit note:   LABS NEXT DUE: Anti-psychotic, Li labs Jan 2018 - ordered    Per med tab:   Lithium level ordered    Plan:   -Will forward to covering provider to verify if anti-psychotic labs are needed  -If so, will obtain specific lab orders for this  -Called pt at [phone] but this number is not in service  -Called pt at home number ([phone]) and lvm that writer rec'd call and would verify labs to order  -Once information known will call pt to discuss   
Per Dr. Meredith:    Looks like not due for labs until July. A1c was recently done and is being followed by PCP. (Routing comment)   
Per provider, at this time no other anti-psychotic labs are needed. Awaiting call from pt regarding Li labs, which pt had missed.   
Statement Selected

## 2021-02-11 DIAGNOSIS — Z76.0 ENCOUNTER FOR MEDICATION REFILL: Primary | ICD-10-CM

## 2021-02-11 RX ORDER — ACETAMINOPHEN 325 MG/1
TABLET ORAL
Qty: 100 TABLET | Refills: 10 | Status: SHIPPED | OUTPATIENT
Start: 2021-02-11

## 2021-02-12 ENCOUNTER — TELEPHONE (OUTPATIENT)
Dept: PSYCHIATRY | Facility: CLINIC | Age: 61
End: 2021-02-12

## 2021-02-12 DIAGNOSIS — Z79.899 OTHER LONG TERM (CURRENT) DRUG THERAPY: Primary | ICD-10-CM

## 2021-02-12 NOTE — TELEPHONE ENCOUNTER
----- Message from Nannette Park LPN sent at 2/12/2021 10:18 AM CST -----  Regarding: RE: AP Labs at current living facility?  Thanks!    Nannette  ----- Message -----  From: Ck Gallego MD  Sent: 12/7/2020   3:09 PM CST  To: Nannette Park LPN  Subject: AP Labs at current living facility?              Shmuel Brunson,    I saw this patient today and he is due for routine anti-psychotic labs (CMP, Lipid Panel, CBC w/ Diff, A1c). He said he usually gets labs drawn where he lives. Is there a way to coordinate labs with his living facility? Thanks.    Ck

## 2021-02-12 NOTE — TELEPHONE ENCOUNTER
Labs ordered per provider request.    Contacted [facility] to request that labs be drawn in facility.

## 2021-02-12 NOTE — TELEPHONE ENCOUNTER
Called [facility] in [place] and patient's nurse stated that patient has transferred psychiatry care to [facility] and will not longer be seeing our providers.

## 2021-02-26 DIAGNOSIS — F25.0 SCHIZOAFFECTIVE DISORDER, BIPOLAR TYPE (H): ICD-10-CM

## 2021-02-26 RX ORDER — VENLAFAXINE HYDROCHLORIDE 150 MG/1
CAPSULE, EXTENDED RELEASE ORAL
Qty: 30 CAPSULE | Refills: 2 | OUTPATIENT
Start: 2021-02-26

## 2021-02-26 RX ORDER — VENLAFAXINE HYDROCHLORIDE 75 MG/1
75 CAPSULE, EXTENDED RELEASE ORAL DAILY
Qty: 30 CAPSULE | Refills: 2 | OUTPATIENT
Start: 2021-02-26

## 2021-02-26 RX ORDER — RISPERIDONE 3 MG/1
3 TABLET ORAL AT BEDTIME
Qty: 30 TABLET | Refills: 2 | OUTPATIENT
Start: 2021-02-26

## 2021-02-26 RX ORDER — TRAZODONE HYDROCHLORIDE 50 MG/1
75 TABLET, FILM COATED ORAL AT BEDTIME
Qty: 45 TABLET | Refills: 2 | OUTPATIENT
Start: 2021-02-26

## 2021-03-01 DIAGNOSIS — E78.00 HYPERCHOLESTEREMIA: ICD-10-CM

## 2021-03-01 NOTE — TELEPHONE ENCOUNTER
Simvastatin. Last addressed 1/27/20. Needs CPX.   Cholesterol   Date Value Ref Range Status   01/22/2020 103 0 - 199 mg/dL Final   01/16/2020 113 <200 mg/dL Final     HDL Cholesterol   Date Value Ref Range Status   01/22/2020 56 40 mg/dL Final   01/16/2020 67 >39 mg/dL Final     LDL Cholesterol Calculated   Date Value Ref Range Status   01/22/2020 41 0 - 129 mg/dL Final   01/16/2020 34 <100 mg/dL Final     Comment:     Desirable:       <100 mg/dl     Triglycerides   Date Value Ref Range Status   01/22/2020 30 0 - 149 mg/dL Final   01/16/2020 61 <150 mg/dL Final     Cholesterol/HDL Ratio   Date Value Ref Range Status   08/07/2013 3.0 0.0 - 5.0 Final   06/10/2009 2.0 0.0 - 5.0 Final         hyperchol  Discussed current lipid results, previous results (if available) current guidelines (NCEP) for treatment and goals for lipids.  Discussed lifestyle modification, dietary changes (low fat, low simple carb) and regular aerobic exercise.  Discussed the link between dysmetabolic syndrome and impaired glucose tolerance seen in certain patterns of lipids.  Briefly discussed medication used for lipid lowering, including the statins are their possible side effects of myalgias, rhabdomyolysis, and liver toxicity.     -     simvastatin (ZOCOR) 40 MG tablet; Take 0.5 tablets (20 mg) by mouth At Bedtime

## 2021-03-02 RX ORDER — SIMVASTATIN 20 MG
20 TABLET ORAL AT BEDTIME
Qty: 30 TABLET | Refills: 0 | Status: SHIPPED | OUTPATIENT
Start: 2021-03-02 | End: 2021-03-24

## 2021-03-11 ENCOUNTER — HOSPITAL ENCOUNTER (EMERGENCY)
Facility: CLINIC | Age: 61
Discharge: NURSING, REHAB OR LONG-TERM CARE | End: 2021-03-11
Attending: EMERGENCY MEDICINE | Admitting: EMERGENCY MEDICINE
Payer: MEDICARE

## 2021-03-11 ENCOUNTER — APPOINTMENT (OUTPATIENT)
Dept: GENERAL RADIOLOGY | Facility: CLINIC | Age: 61
End: 2021-03-11
Attending: EMERGENCY MEDICINE
Payer: MEDICARE

## 2021-03-11 ENCOUNTER — APPOINTMENT (OUTPATIENT)
Dept: CT IMAGING | Facility: CLINIC | Age: 61
End: 2021-03-11
Attending: EMERGENCY MEDICINE
Payer: MEDICARE

## 2021-03-11 VITALS
RESPIRATION RATE: 14 BRPM | TEMPERATURE: 98.6 F | OXYGEN SATURATION: 100 % | DIASTOLIC BLOOD PRESSURE: 90 MMHG | HEART RATE: 81 BPM | SYSTOLIC BLOOD PRESSURE: 159 MMHG

## 2021-03-11 DIAGNOSIS — N39.0 URINARY TRACT INFECTION WITH HEMATURIA, SITE UNSPECIFIED: ICD-10-CM

## 2021-03-11 DIAGNOSIS — Z20.822 SUSPECTED COVID-19 VIRUS INFECTION: ICD-10-CM

## 2021-03-11 DIAGNOSIS — R31.9 URINARY TRACT INFECTION WITH HEMATURIA, SITE UNSPECIFIED: ICD-10-CM

## 2021-03-11 LAB
ALBUMIN SERPL-MCNC: 3.9 G/DL (ref 3.4–5)
ALBUMIN UR-MCNC: NEGATIVE MG/DL
ALP SERPL-CCNC: 93 U/L (ref 40–150)
ALT SERPL W P-5'-P-CCNC: 33 U/L (ref 0–70)
ANION GAP SERPL CALCULATED.3IONS-SCNC: 5 MMOL/L (ref 3–14)
APPEARANCE UR: CLEAR
AST SERPL W P-5'-P-CCNC: 11 U/L (ref 0–45)
BASE EXCESS BLDV CALC-SCNC: 2.4 MMOL/L
BASOPHILS # BLD AUTO: 0 10E9/L (ref 0–0.2)
BASOPHILS NFR BLD AUTO: 0.4 %
BILIRUB SERPL-MCNC: 0.6 MG/DL (ref 0.2–1.3)
BILIRUB UR QL STRIP: NEGATIVE
BUN SERPL-MCNC: 17 MG/DL (ref 7–30)
CALCIUM SERPL-MCNC: 9.4 MG/DL (ref 8.5–10.1)
CHLORIDE SERPL-SCNC: 107 MMOL/L (ref 94–109)
CO2 SERPL-SCNC: 27 MMOL/L (ref 20–32)
COLOR UR AUTO: ABNORMAL
CREAT SERPL-MCNC: 1.1 MG/DL (ref 0.66–1.25)
DIFFERENTIAL METHOD BLD: ABNORMAL
EOSINOPHIL # BLD AUTO: 0 10E9/L (ref 0–0.7)
EOSINOPHIL NFR BLD AUTO: 0.8 %
ERYTHROCYTE [DISTWIDTH] IN BLOOD BY AUTOMATED COUNT: 11.9 % (ref 10–15)
FLUAV RNA RESP QL NAA+PROBE: NEGATIVE
FLUBV RNA RESP QL NAA+PROBE: NEGATIVE
GFR SERPL CREATININE-BSD FRML MDRD: 72 ML/MIN/{1.73_M2}
GLUCOSE BLDC GLUCOMTR-MCNC: 147 MG/DL (ref 70–99)
GLUCOSE SERPL-MCNC: 165 MG/DL (ref 70–99)
GLUCOSE UR STRIP-MCNC: 150 MG/DL
HCO3 BLDV-SCNC: 28 MMOL/L (ref 21–28)
HCT VFR BLD AUTO: 44.3 % (ref 40–53)
HGB BLD-MCNC: 14.4 G/DL (ref 13.3–17.7)
HGB UR QL STRIP: ABNORMAL
IMM GRANULOCYTES # BLD: 0 10E9/L (ref 0–0.4)
IMM GRANULOCYTES NFR BLD: 0.4 %
INTERPRETATION ECG - MUSE: NORMAL
KETONES UR STRIP-MCNC: NEGATIVE MG/DL
LABORATORY COMMENT REPORT: NORMAL
LACTATE BLD-SCNC: 0.8 MMOL/L (ref 0.7–2)
LEUKOCYTE ESTERASE UR QL STRIP: NEGATIVE
LIPASE SERPL-CCNC: 31 U/L (ref 73–393)
LYMPHOCYTES # BLD AUTO: 0.5 10E9/L (ref 0.8–5.3)
LYMPHOCYTES NFR BLD AUTO: 21.5 %
MCH RBC QN AUTO: 31.2 PG (ref 26.5–33)
MCHC RBC AUTO-ENTMCNC: 32.5 G/DL (ref 31.5–36.5)
MCV RBC AUTO: 96 FL (ref 78–100)
MONOCYTES # BLD AUTO: 0.1 10E9/L (ref 0–1.3)
MONOCYTES NFR BLD AUTO: 4.5 %
NEUTROPHILS # BLD AUTO: 1.8 10E9/L (ref 1.6–8.3)
NEUTROPHILS NFR BLD AUTO: 72.4 %
NITRATE UR QL: NEGATIVE
NRBC # BLD AUTO: 0 10*3/UL
NRBC BLD AUTO-RTO: 0 /100
O2/TOTAL GAS SETTING VFR VENT: NORMAL %
PCO2 BLDV: 48 MM HG (ref 40–50)
PH BLDV: 7.38 PH (ref 7.32–7.43)
PH UR STRIP: 6 PH (ref 5–7)
PLATELET # BLD AUTO: 137 10E9/L (ref 150–450)
PO2 BLDV: 37 MM HG (ref 25–47)
POTASSIUM SERPL-SCNC: 4.3 MMOL/L (ref 3.4–5.3)
PROT SERPL-MCNC: 6.4 G/DL (ref 6.8–8.8)
RBC # BLD AUTO: 4.61 10E12/L (ref 4.4–5.9)
RBC #/AREA URNS AUTO: 27 /HPF (ref 0–2)
RSV RNA SPEC QL NAA+PROBE: NORMAL
SARS-COV-2 RNA RESP QL NAA+PROBE: NEGATIVE
SODIUM SERPL-SCNC: 139 MMOL/L (ref 133–144)
SOURCE: ABNORMAL
SP GR UR STRIP: 1.02 (ref 1–1.03)
SPECIMEN SOURCE: NORMAL
TROPONIN I SERPL-MCNC: <0.015 UG/L (ref 0–0.04)
UROBILINOGEN UR STRIP-MCNC: NORMAL MG/DL (ref 0–2)
WBC # BLD AUTO: 2.4 10E9/L (ref 4–11)
WBC #/AREA URNS AUTO: 0 /HPF (ref 0–5)

## 2021-03-11 PROCEDURE — 96360 HYDRATION IV INFUSION INIT: CPT

## 2021-03-11 PROCEDURE — 71045 X-RAY EXAM CHEST 1 VIEW: CPT

## 2021-03-11 PROCEDURE — 74176 CT ABD & PELVIS W/O CONTRAST: CPT | Mod: ME

## 2021-03-11 PROCEDURE — 83605 ASSAY OF LACTIC ACID: CPT | Performed by: EMERGENCY MEDICINE

## 2021-03-11 PROCEDURE — 84484 ASSAY OF TROPONIN QUANT: CPT | Performed by: EMERGENCY MEDICINE

## 2021-03-11 PROCEDURE — 87086 URINE CULTURE/COLONY COUNT: CPT | Performed by: EMERGENCY MEDICINE

## 2021-03-11 PROCEDURE — 258N000003 HC RX IP 258 OP 636: Performed by: EMERGENCY MEDICINE

## 2021-03-11 PROCEDURE — 85025 COMPLETE CBC W/AUTO DIFF WBC: CPT | Performed by: EMERGENCY MEDICINE

## 2021-03-11 PROCEDURE — 99285 EMERGENCY DEPT VISIT HI MDM: CPT | Mod: 25

## 2021-03-11 PROCEDURE — 81001 URINALYSIS AUTO W/SCOPE: CPT | Performed by: EMERGENCY MEDICINE

## 2021-03-11 PROCEDURE — 87636 SARSCOV2 & INF A&B AMP PRB: CPT | Performed by: EMERGENCY MEDICINE

## 2021-03-11 PROCEDURE — 250N000013 HC RX MED GY IP 250 OP 250 PS 637: Performed by: EMERGENCY MEDICINE

## 2021-03-11 PROCEDURE — 83690 ASSAY OF LIPASE: CPT | Performed by: EMERGENCY MEDICINE

## 2021-03-11 PROCEDURE — 82803 BLOOD GASES ANY COMBINATION: CPT | Performed by: EMERGENCY MEDICINE

## 2021-03-11 PROCEDURE — C9803 HOPD COVID-19 SPEC COLLECT: HCPCS

## 2021-03-11 PROCEDURE — 999N001017 HC STATISTIC GLUCOSE BY METER IP

## 2021-03-11 PROCEDURE — 80053 COMPREHEN METABOLIC PANEL: CPT | Performed by: EMERGENCY MEDICINE

## 2021-03-11 RX ORDER — CEPHALEXIN 500 MG/1
500 CAPSULE ORAL 2 TIMES DAILY
Qty: 14 CAPSULE | Refills: 0 | Status: SHIPPED | OUTPATIENT
Start: 2021-03-11 | End: 2021-03-11

## 2021-03-11 RX ORDER — CEPHALEXIN 500 MG/1
500 CAPSULE ORAL 2 TIMES DAILY
Qty: 14 CAPSULE | Refills: 0 | Status: SHIPPED | OUTPATIENT
Start: 2021-03-11 | End: 2021-03-18

## 2021-03-11 RX ORDER — LIDOCAINE HYDROCHLORIDE 10 MG/ML
INJECTION, SOLUTION EPIDURAL; INFILTRATION; INTRACAUDAL; PERINEURAL
Status: DISCONTINUED
Start: 2021-03-11 | End: 2021-03-11 | Stop reason: HOSPADM

## 2021-03-11 RX ORDER — IBUPROFEN 600 MG/1
600 TABLET, FILM COATED ORAL ONCE
Status: COMPLETED | OUTPATIENT
Start: 2021-03-11 | End: 2021-03-11

## 2021-03-11 RX ADMIN — IBUPROFEN 600 MG: 600 TABLET ORAL at 11:29

## 2021-03-11 RX ADMIN — SODIUM CHLORIDE 1000 ML: 9 INJECTION, SOLUTION INTRAVENOUS at 11:28

## 2021-03-11 ASSESSMENT — ENCOUNTER SYMPTOMS
FEVER: 1
CHILLS: 1
MYALGIAS: 1

## 2021-03-11 NOTE — DISCHARGE INSTRUCTIONS
POSSIBLE EARLY UTI. PLEASE TAKE ANTIBIOTICS PRESCRIBED. RETURN FOR WORSENING PAIN, VOMITING     Discharge Instructions  COVID-19    COVID-19 is the disease caused by a new coronavirus. The virus spreads from person-to-person primarily by droplets when an infected person coughs or sneezes and the droplet either lands on another person or that other person touches a surface with the droplet on it. There are tests available to diagnose COVID-19. There is no specific treatment or medicine for the disease.    You may have been diagnosed with COVID, may be being tested for COVID and have a pending test result, or may have been exposed to COVID.    Symptoms of COVID-19    Many people have no symptoms or mild symptoms.  Symptoms may usually appear 4 to 5 days (up to 14 days) after contact with a person with COVID-19. Some people will get severe symptoms and pneumonia. Usual symptoms are:     ? Fever  ? Cough  ? Trouble breathing    Less common symptoms are: Headache, body aches, sore throat, sneezing, diarrhea, loss of taste or smell.    Isolation and Quarantine    You were seen because you have symptoms, had an exposure, or had some other concern about possible COVID. The best way to stop the spread of the virus is to avoid contact with others.  Isolation refers to sick people staying away from people who are not sick. A person in quarantine is limiting activity because they were exposed and are waiting to see if they might become sick.    If you test positive for COVID, you should stay home (isolation) for at least 10 days after your symptoms began, and for 24 hours with no fever and improvement of symptoms--whichever is longer. (Your fever should be gone for 24 hours without using fever-reducing medicine). If you have no symptoms, you should stay home (isolation) for 10 days from the day of the test.    For example, if you have a fever and cough for 6 days, you need to stay home 4 more days with no fever for a total of  10 days. Or, if you have a fever and cough for 10 days, you need to stay home one more day with no fever for a total of 11 days.    If you have a high-risk exposure to COVID (you spent 15 minutes or more within six feet of somebody who has COVID), you should stay home (quarantine) for 14 days. Even if you test negative for COVID, the CDC recommends a 14-day quarantine from the time of your last exposure to that individual. There are options for a shortened (<14 day quarantine) you can review at:    https://www.health.[facility]./diseases/coronavirus/close.html#long    If you have symptoms but a negative test, you should stay at home until you are symptom-free and without fever for 24 hours, using the same judgment you would for when it is safe to return to work/school from strep throat, influenza, or the common cold. If you worsen, you should consider being re-evaluated.    If you are being tested for COVID and your test is pending, you should stay home until you know your test result.    How should I protect myself and others?    Do not go to work or school. Have a friend or relative do your shopping. Do not use public transportation (bus, train) or ridesharing (Lyft, Uber).    Separate yourself from other people in your home. As much as possible, you should stay in one room and away from other people in your home. Also, use a separate bathroom, if possible. Avoid handling pets or other animals while sick.     Wear a facemask if you need to be around other people and cover your mouth and nose with a tissue when you cough or sneeze.     Avoid sharing personal household items. You should not share dishes, drinking glasses, forks/knives/spoons, towels, or bedding with other people in your home. After using these items, they should be washed with soap and water. Clean parts of your home that are touched often (doorknobs, faucets, countertops, etc.) daily.     Wash your hands often with soap and water for at least 20  seconds or use an alcohol-based hand  containing at least 60% alcohol.     Avoid touching your face.    Treat your symptoms. You can take Acetaminophen (Tylenol) to treat body aches and fever as needed for comfort. Ibuprofen (Advil or Motrin) can be used as well if you still have symptoms after taking Tylenol. Drink fluids. Rest.    Watch for worsening symptoms such as shortness of breath/difficulty breathing or very severe weakness.    Employers/workplaces are being asked by the Centers for Disease Control (CDC) to not request notes/documentation for you to return to work or prove that you were ill. You may choose to show your employer this paperwork. Also, repeat testing should not be required to return to work.    Return to the Emergency Department if:    If you are developing worsening breathing, shortness of breath, or feel worse you should seek medical attention.  If you are uncertain, contact your health care provider/clinic. If you need emergency medical attention, call 911 and tell them you have been ill.

## 2021-03-11 NOTE — ED NOTES
Bed: ED10  Expected date: 3/11/21  Expected time: 11:03 AM  Means of arrival: Ambulance  Comments:  BV3  50yoM fever/chest pain

## 2021-03-11 NOTE — ED TRIAGE NOTES
Presents to ED from [facility] for fever, chest pressure. Pt went to clinic yesterday for chills, body aches, and dizziness. Clinician increased anxiety meds. This morning, staff noticed patient febrile at 100 and reports generalized chest pressure. EKG sinus tach and patient hypertensive. Pt has baseline malaise mentality. Pt took Tylenol prior to arrival. ABCs intact. Alert, but does not answer questions appropriately.

## 2021-03-11 NOTE — ED PROVIDER NOTES
History   Chief Complaint:  Fever     The history is provided by the EMS personnel. The history is limited by the condition of the patient.      Caden Bush is a 60 year old male with history of type I diabetes and schizoaffective disorder who presents with fever. Per EMS, patient was seen at clinic yesterday for chills, myalgias, and headaches. The clinician increased his anxiety medications. This morning, staff noticed he was more malaise than his baseline and he complained of chest pressure and a low-grade fever of 100 F. He presents hypertensive of 160 systolic and with a blood sugar of 198. He states he took two Tylenol tablets today and no longer is having chest pain. He complained of lower abdominal pain upon exam. He denies back pain, cough, difficulty breathing or other symptoms.  No recent falls.     Review of Systems   Unable to perform ROS: Other (underlying schizoaffective disorder)   Constitutional: Positive for chills and fever.   Musculoskeletal: Positive for myalgias.       Allergies:  Flomax [Tamsulosin]  Lisinopril    Medications:  Aspirin 81 mg   Insulin Glargine   Imodium   Zofran   Risperidone  Silodosin   Zocor  Trazodone   Effexor     Past Medical History:    Arthritis   Type I Diabetes   Eczema   Polyp of Colon - Adenomatous   Retinopathy   Schizoaffective Disorder      Past Surgical History:    Appendectomy   Colonoscopy      Family History:    Father - Osteoporosis , Breast Cancer  Father - Osteoporosis, Asthma, Colorectal Cancer, Depression, Anxiety Disorder    Social History:  Patient arrives via EMS from [facility]     Physical Exam     Patient Vitals for the past 24 hrs:   BP Temp Temp src Pulse Resp SpO2   03/11/21 1315 (!) 146/69 -- -- 74 -- 99 %   03/11/21 1300 (!) 151/82 -- -- 67 -- 99 %   03/11/21 1245 (!) 154/78 -- -- 73 -- 99 %   03/11/21 1230 (!) 159/82 -- -- 72 -- 100 %   03/11/21 1215 (!) 151/86 -- -- 71 -- 99 %   03/11/21 1200 (!) 150/81 -- -- 82 -- 100 %    03/11/21 1145 (!) 156/86 -- -- 87 -- 100 %   03/11/21 1130 (!) 158/85 -- -- 98 -- 99 %   03/11/21 1115 (!) 157/91 98.6  F (37  C) Oral 94 14 99 %       Physical Exam  Nursing note and vitals reviewed.  Constitutional: Well nourished.   Eyes: Conjunctiva normal.  Pupils are equal, round, and reactive to light.   ENT: Nose normal. Mucous membranes pink and dry  Neck: Normal range of motion.  CVS: Normal rate, regular rhythm.  Normal heart sounds.  No murmur.  Pulmonary: Lungs clear to auscultation bilaterally. No wheezes/rales/rhonchi.  GI: Abdomen soft.  Mild suprapubic tenderness. No rigidity or guarding.  Mild bilateral CVA tenderness  MSK: No calf tenderness or swelling.  Neuro: Alert to person, place and tells me birthday.  Withdrawn, moves all extremities.  Skin: Skin is warm and dry. No rash noted.   Psychiatric: Flat affect      Emergency Department Course   ECG  ECG taken at 1112, ECG read at 1112  Sinus rhythm with 1st degree AV block   Otherwise normal ECG    Rate 96 bpm. TX interval 212 ms. QRS duration 78 ms. QT/QTc 344/434 ms. P-R-T axes 76 20 70.     Imaging:  XR Chest Port 1 View:  No acute cardiopulmonary disease.  Reading per radiology    Abd/Pelvis CT w/o Contrast - Stone Protocol:  1.  No hydronephrosis or obstructing urolithiasis. Small  nonobstructing stone at the left upper kidney.  2.  Splenomegaly.  3.  Mild wall thickening of the bladder. Correlate clinically with any  evidence of urinary tract infection and cystitis.  4.  Enlarged prostate.  5.  Wall thickening of the rectum is nonspecific. Correlate with  endoscopic assessment for any underlying bowel masses.  Reading per radiology    Laboratory:  CBC: WBC 2.4 (L), HGB 14.4,  (L)   CMP: Glucose 165 (H), Protein Total 6.4 (L), o/w WNL (Creatinine: 1.10)    Glucose by meter (Collected 1122): 147 (H)     Lipase: 31 (L)  Lactic Acid (Resulted 1146): 0.8  Troponin (Resulted 1331): <0.015    Blood Gas Venous: pH: 7.38, PCO2: 48, PO2: 37,  Bicarbonate: 28, FIO2 Room Air, base excess 2.4     UA with Microscopic: Glucose: 150 (A), Blood: Moderate (A), RBC: 27 (H)  Urine Culture Aerobic Bacterial: Pending    Symptomatic Influenza A/B & SARS-COVIS-19 Virus PCR: SARS COVID-19 Negative, Influenza A Negative, Influenza B Negative    Emergency Department Course:    Reviewed:  I reviewed nursing notes, past medical history and care everywhere    Assessments:  1107 I obtained history and examined the patient as noted above.   1445 I called Lucero of his care facility and informed him of his condition and place on return.     Interventions:  1128 0.9% NaCl bolus 1,000 mL IV  1129 Ibuprofen 600 mg PO    Disposition:  The patient was discharged to home.       Impression & Plan     Medical Decision Making:  Patient is a 60-year-old male presenting with reported chest pain, myalgias and fever though he states that the symptoms have resolved prior to arrival.  EKG without focal ischemia.  Screening troponin negative.  Clinically much lower suspicion for ACS.  Chest x-ray without focal pneumonia, fluid overload, widened mediastinum or pneumothorax.  Labs with mild leukopenia, possibly reactive.  Otherwise no significant electrolyte derangements.  Mild hyperglycemia though no DKA.  UA with noted hematuria though no definitive UTI.  His UA was sent for culture.  He did undergo formal CT which is fortunately without evidence of obstructive uropathy or intra-abdominal catastrophe. Mild wall thickening noted to bladder. Patient did have mild suprapubic tenderness however and in light of reported low grade fever I question if mild UTI.  Will plan keflex on dispo.  Patient also was tested for COVID 19 in light of his symptoms though this resulted negative.  No evidence of severe sepsis/hypoxia.  I spoke to patient's care facility regarding his presentation.  No indication for admission at this time.  Will plan antibiotics on dispo for possible early UTI and facility made  aware that could be false COVID test given tested early in symptom course.  Return precautions given.       Covid-19  Caden Bush was evaluated during a global COVID-19 pandemic, which necessitated consideration that the patient might be at risk for infection with the SARS-CoV-2 virus that causes COVID-19.   Applicable protocols for evaluation were followed during the patient's care. COVID-19 was considered as part of the patient's evaluation. The plan for testing is: a test was obtained during this visit.    Diagnosis:    ICD-10-CM    1. Suspected COVID-19 virus infection  Z20.822 Troponin I   2. Urinary tract infection with hematuria, site unspecified  N39.0     R31.9     possible mild early UTI       Discharge Medications:  New Prescriptions    CEPHALEXIN (KEFLEX) 500 MG CAPSULE    Take 1 capsule (500 mg) by mouth 2 times daily for 7 days       Scribe Disclosure:  Manpreet SANDOVAL, am serving as a scribe at 11:11 AM on 3/11/2021 to document services personally performed by Catina Atwood, based on my observations and the provider's statements to me.          Catina Atwood,   03/11/21 7001

## 2021-03-12 LAB
BACTERIA SPEC CULT: NO GROWTH
Lab: NORMAL
SPECIMEN SOURCE: NORMAL

## 2021-03-12 NOTE — RESULT ENCOUNTER NOTE
Emergency Dept/Urgent Care discharge antibiotic (if prescribed): Cephalexin (Keflex) 500 mg capsule, 1 capsule (500 mg) by mouth 2 times daily for 7 days.  Date of Rx (if applicable):  3/11/21  No changes in treatment per Urine culture protocol.

## 2021-03-14 ENCOUNTER — HOSPITAL ENCOUNTER (EMERGENCY)
Facility: CLINIC | Age: 61
Discharge: HOME | End: 2021-03-14
Attending: NURSE PRACTITIONER | Admitting: NURSE PRACTITIONER
Payer: MEDICARE

## 2021-03-14 ENCOUNTER — APPOINTMENT (OUTPATIENT)
Dept: CT IMAGING | Facility: CLINIC | Age: 61
End: 2021-03-14
Attending: NURSE PRACTITIONER
Payer: MEDICARE

## 2021-03-14 VITALS
RESPIRATION RATE: 16 BRPM | SYSTOLIC BLOOD PRESSURE: 161 MMHG | DIASTOLIC BLOOD PRESSURE: 83 MMHG | HEART RATE: 74 BPM | TEMPERATURE: 98.3 F | OXYGEN SATURATION: 98 %

## 2021-03-14 DIAGNOSIS — K59.00 CONSTIPATION: ICD-10-CM

## 2021-03-14 DIAGNOSIS — R31.9 HEMATURIA: ICD-10-CM

## 2021-03-14 DIAGNOSIS — N40.0 PROSTATE ENLARGEMENT: ICD-10-CM

## 2021-03-14 DIAGNOSIS — N20.0 KIDNEY STONE: ICD-10-CM

## 2021-03-14 DIAGNOSIS — R16.1 SPLENOMEGALY: ICD-10-CM

## 2021-03-14 LAB
ALBUMIN SERPL-MCNC: 3.6 G/DL (ref 3.4–5)
ALBUMIN UR-MCNC: NEGATIVE MG/DL
ALP SERPL-CCNC: 99 U/L (ref 40–150)
ALT SERPL W P-5'-P-CCNC: 32 U/L (ref 0–70)
ANION GAP SERPL CALCULATED.3IONS-SCNC: 6 MMOL/L (ref 3–14)
APPEARANCE UR: CLEAR
AST SERPL W P-5'-P-CCNC: 17 U/L (ref 0–45)
BACTERIA #/AREA URNS HPF: ABNORMAL /HPF
BILIRUB SERPL-MCNC: 0.6 MG/DL (ref 0.2–1.3)
BILIRUB UR QL STRIP: NEGATIVE
BUN SERPL-MCNC: 16 MG/DL (ref 7–30)
CALCIUM SERPL-MCNC: 9.2 MG/DL (ref 8.5–10.1)
CHLORIDE SERPL-SCNC: 101 MMOL/L (ref 94–109)
CO2 SERPL-SCNC: 29 MMOL/L (ref 20–32)
COLOR UR AUTO: ABNORMAL
CREAT SERPL-MCNC: 1.14 MG/DL (ref 0.66–1.25)
ERYTHROCYTE [DISTWIDTH] IN BLOOD BY AUTOMATED COUNT: 11.7 % (ref 10–15)
GFR SERPL CREATININE-BSD FRML MDRD: 69 ML/MIN/{1.73_M2}
GLUCOSE SERPL-MCNC: 164 MG/DL (ref 70–99)
GLUCOSE UR STRIP-MCNC: NEGATIVE MG/DL
HCT VFR BLD AUTO: 41.1 % (ref 40–53)
HGB BLD-MCNC: 13.5 G/DL (ref 13.3–17.7)
HGB UR QL STRIP: ABNORMAL
KETONES UR STRIP-MCNC: NEGATIVE MG/DL
LEUKOCYTE ESTERASE UR QL STRIP: ABNORMAL
MCH RBC QN AUTO: 31.3 PG (ref 26.5–33)
MCHC RBC AUTO-ENTMCNC: 32.8 G/DL (ref 31.5–36.5)
MCV RBC AUTO: 95 FL (ref 78–100)
NITRATE UR QL: NEGATIVE
PH UR STRIP: 6.5 PH (ref 5–7)
PLATELET # BLD AUTO: 125 10E9/L (ref 150–450)
POTASSIUM SERPL-SCNC: 4.1 MMOL/L (ref 3.4–5.3)
PROT SERPL-MCNC: 6.2 G/DL (ref 6.8–8.8)
RBC # BLD AUTO: 4.32 10E12/L (ref 4.4–5.9)
RBC #/AREA URNS AUTO: 89 /HPF (ref 0–2)
SODIUM SERPL-SCNC: 136 MMOL/L (ref 133–144)
SOURCE: ABNORMAL
SP GR UR STRIP: 1 (ref 1–1.03)
SPERM #/AREA URNS HPF: PRESENT /HPF
SQUAMOUS #/AREA URNS AUTO: <1 /HPF (ref 0–1)
UROBILINOGEN UR STRIP-MCNC: NORMAL MG/DL (ref 0–2)
WBC # BLD AUTO: 3.4 10E9/L (ref 4–11)
WBC #/AREA URNS AUTO: 11 /HPF (ref 0–5)

## 2021-03-14 PROCEDURE — 85027 COMPLETE CBC AUTOMATED: CPT | Performed by: NURSE PRACTITIONER

## 2021-03-14 PROCEDURE — 99284 EMERGENCY DEPT VISIT MOD MDM: CPT

## 2021-03-14 PROCEDURE — 80053 COMPREHEN METABOLIC PANEL: CPT | Performed by: NURSE PRACTITIONER

## 2021-03-14 PROCEDURE — 81001 URINALYSIS AUTO W/SCOPE: CPT | Performed by: EMERGENCY MEDICINE

## 2021-03-14 PROCEDURE — 87086 URINE CULTURE/COLONY COUNT: CPT | Performed by: NURSE PRACTITIONER

## 2021-03-14 PROCEDURE — 74176 CT ABD & PELVIS W/O CONTRAST: CPT | Mod: ME

## 2021-03-14 ASSESSMENT — ENCOUNTER SYMPTOMS
HEMATURIA: 1
FEVER: 0
ABDOMINAL PAIN: 0
SHORTNESS OF BREATH: 0
CHILLS: 0

## 2021-03-14 NOTE — ED PROVIDER NOTES
History   Chief Complaint:  Hematuria    HPI   Caden Bush is a 60 year old male with history of diabetes mellitus who presents with hematuria for the past few days. Per chart review, the patient was recently evaluated in the emergency department for abdominal pain. During that visit he has laboratory studies done as well as x-ray and CT imaging. He had a catheter placed and was discharged home with Keflex for a suspected UTI. Today the patient reports that for the last 4 days he has been having hematuria. He states that when he urinates it is mostly blood. The patient has a history of an enlarged prostate and diabetes. His last blood sugar level was 224 before he called EMS. He is not currently on a blood thinner and is not having any abdominal pain.     [facility] 03/11/2021  XR Chest Port 1 View:  No acute cardiopulmonary disease.  Reading per radiology     Abd/Pelvis CT w/o Contrast - Stone Protocol:  1.  No hydronephrosis or obstructing urolithiasis. Small  nonobstructing stone at the left upper kidney.  2.  Splenomegaly.  3.  Mild wall thickening of the bladder. Correlate clinically with any  evidence of urinary tract infection and cystitis.  4.  Enlarged prostate.  5.  Wall thickening of the rectum is nonspecific. Correlate with  endoscopic assessment for any underlying bowel masses.  Reading per radiology     Laboratory:  CBC: WBC 2.4 (L), HGB 14.4,  (L)   CMP: Glucose 165 (H), Protein Total 6.4 (L), o/w WNL (Creatinine: 1.10)  Glucose by meter (Collected 1122): 147 (H)   Lipase: 31 (L)  Lactic Acid (Resulted 1146): 0.8  Troponin (Resulted 1331): <0.015  Blood Gas Venous: pH: 7.38, PCO2: 48, PO2: 37, Bicarbonate: 28, FIO2 Room Air, base excess 2.4   UA with Microscopic: Glucose: 150 (A), Blood: Moderate (A), RBC: 27 (H)  Urine Culture Aerobic Bacterial: Pending  Symptomatic Influenza A/B & SARS-COVIS-19 Virus PCR: SARS COVID-19 Negative, Influenza A Negative, Influenza B  Negative    Review of Systems   Constitutional: Negative for chills and fever.   Respiratory: Negative for shortness of breath.    Gastrointestinal: Negative for abdominal pain.   Genitourinary: Positive for hematuria.   All other systems reviewed and are negative.    Allergies:  Flomax [Tamsulosin]  Lisinopril    Medications:  Aspirin 81 mg Keflex  Basaglar   Imodium  Milk of magnesia  Novolog  Zofran  Risperdal  Rapaflo  Zocor  Desyrel  Effexor     Past Medical History:    Arthritis  Diabetes mellitus  Colon polyp  Retinopathy  Schizoaffective disorder  Seasonal affective disorder  Vitamin D deficiency       Past Surgical History:    Appendectomy  Colonoscopy    Family History:    Mother: osteoporosis, breast cancer  Father: osteoporosis, asthma, colorectal cancer, depression,   Sister: anxiety disorder     Social History:  The patient presents via EMS.  The patient lives in a group home.      Physical Exam     Patient Vitals for the past 24 hrs:   BP Temp Temp src Pulse Resp SpO2   03/14/21 1645 (!) 161/83 -- -- 74 -- 98 %   03/14/21 1630 (!) 159/84 -- -- 70 -- 99 %   03/14/21 1615 (!) 170/84 -- -- 79 -- 99 %   03/14/21 1600 (!) 159/83 -- -- 76 -- 99 %   03/14/21 1545 -- -- -- -- -- 98 %   03/14/21 1530 (!) 151/81 -- -- 80 -- 99 %   03/14/21 1515 (!) 152/89 -- -- 78 -- 100 %   03/14/21 1500 (!) 177/83 -- -- 82 -- 100 %   03/14/21 1447 (!) 166/97 98.3  F (36.8  C) Oral 92 16 100 %       Physical Exam  General: Alert, No obvious discomfort, well kept  Eyes: PERRL, conjunctivae pink no scleral icterus or conjunctival injection  ENT:   Moist mucus membranes, posterior oropharynx clear without erythema or exudates, No lymphadenopathy, Normal voice  Resp:  Lungs clear to auscultation bilaterally, no crackles/rubs/wheezes. Good air movement  CV:  Normal rate and rhythm, no murmurs/rubs/gallops  GI:  Abdomen soft and non-distended.  Normoactive BS.  No tenderness, guarding or rebound, No masses  Skin:  Warm, dry.  No  "rashes or petechiae  Musculoskeletal: No peripheral edema or calf tenderness, Normal gross ROM   Neuro: Alert and oriented to person/place/time, normal sensation  Psychiatric: Flat affect, cooperative, poor eye contact      Emergency Department Course     Imaging:  CT Abdomen Pelvis No Contrast- Stone Protocol  1.  Mild thickening of the wall of the sigmoid colon and also the  distal rectum could be due to incomplete distention. Neoplastic  infiltration is difficult to entirely exclude in the appropriate  clinical setting. I recommend colonoscopy if not recently done.  2.  Moderate to large amount of stool in rectum.  3.  Nonobstructive left intrarenal calculus.  4.  Urinary bladder is markedly distended but otherwise unremarkable.  Prostate gland is enlarged and indents the posterior inferior aspect  of the urinary bladder.  5.  Right renal cyst is again seen.  6.  Spleen is persistently mildly enlarged.  Reading per radiology.    Laboratory:  CBC: WBC 3.4 (L), HGB 13.5,  (L)   CMP: Glucose 164 (H), Protein Albumin 6.2 (L) o/w WNL (Creatinine 1.14)     UA with microscopic: Blood large, Leukocyte Esterase trace, WBC 11 (H), RBC 89 (H), Bacteria few, Sperm present o/w WNL      Urine Culture Aerobic Bacterial: Pending     Emergency Department Course:    Reviewed:  I reviewed nursing notes, vitals and past medical history    Assessments:  1517 I obtained history and examined the patient as noted above.   1620 I rechecked the patient and explained findings.   1700 I rechecked and updated the patient.     Disposition:  The patient was discharged to home.       Impression & Plan     Medical Decision Making:  Caden Bush is a 60 year old male who presents today for evaluation of hematuria.  He lives in a care facility that states that he has been having some increasing paranoia with his schizoaffective disorder. They \"just let him come in whenever he asks so it is not an argument.\"  He did have a catheter that was " removed in the last 24 to 48 hours.  This is likely the source of his hematuria.  His laboratory studies do not show sign of infection.  I did obtain a CT as above which does show a renal stone that is unlikely the source of his hematuria as it is within the renal pelvis.  He did have a fair amount of stool in his rectum that was pressing on his bladder that likely could have been the source of his discomfort today prior to receiving an enema he did pass a large bowel movement and felt much improved.  He has a large spleen that is unchanged from previous.  Again is noted to have an enlarged prostate.  He does not have signs or symptoms that are concerning for neoplasm at this time.  Follow-up can be obtained with primary care provider.  Again he feels improved and his main concern was for kidney damage which again there was no evidence of on evaluation today.  He is a advised of all of this and all of his questions were answered.  He appears to be safe and appropriate for outpatient management and is discharged back to his care facility.      Diagnosis:    ICD-10-CM    1. Hematuria  R31.9    2. Constipation  K59.00    3. Kidney stone  N20.0    4. Splenomegaly  R16.1    5. Prostate enlargement  N40.0        Discharge Medications:  None    Scribe Disclosure:  I, Jaden Guillory, am serving as a scribe at 3:06 PM on 3/14/2021 to document services personally performed by Kb Montana APRN* based on my observations and the provider's statements to me.                Kb Montana APRN CNP  03/14/21 8631

## 2021-03-14 NOTE — DISCHARGE INSTRUCTIONS
Again there was no signs of any compromise to your kidney.  Your blood in your urine is most likely due to the catheter that you had recently removed.  Increase the amount of fruits and fibers vegetables as well as fiber in your diet.  Drink plenty of water.  Follow-up with your primary care provider for further concerns.

## 2021-03-14 NOTE — ED NOTES
Patient was able to have a large BM without enema.  Provider notified and withholding enema at this time.

## 2021-03-14 NOTE — ED TRIAGE NOTES
Patient seen in the ED on 3-11-21 for abdominal pain.  Had a catheter placed at that time.      Since then the abdominal pain has resolved but patient has continued to have blood in urine.      ABCs intact.  Alert and oriented x 4.

## 2021-03-15 ENCOUNTER — RECORDS - HEALTHEAST (OUTPATIENT)
Dept: LAB | Facility: CLINIC | Age: 61
End: 2021-03-15

## 2021-03-15 LAB
BACTERIA SPEC CULT: NORMAL
Lab: NORMAL
SPECIMEN SOURCE: NORMAL

## 2021-03-16 ENCOUNTER — RECORDS - HEALTHEAST (OUTPATIENT)
Dept: LAB | Facility: CLINIC | Age: 61
End: 2021-03-16

## 2021-03-16 LAB
ALBUMIN SERPL-MCNC: 3.9 G/DL (ref 3.5–5)
ALP SERPL-CCNC: 83 U/L (ref 45–120)
ALT SERPL W P-5'-P-CCNC: 24 U/L (ref 0–45)
ANION GAP SERPL CALCULATED.3IONS-SCNC: 9 MMOL/L (ref 5–18)
AST SERPL W P-5'-P-CCNC: 18 U/L (ref 0–40)
BASOPHILS # BLD AUTO: 0 THOU/UL (ref 0–0.2)
BASOPHILS NFR BLD AUTO: 0 % (ref 0–2)
BILIRUB SERPL-MCNC: 0.8 MG/DL (ref 0–1)
BUN SERPL-MCNC: 13 MG/DL (ref 8–22)
CALCIUM SERPL-MCNC: 9 MG/DL (ref 8.5–10.5)
CHLORIDE BLD-SCNC: 96 MMOL/L (ref 98–107)
CO2 SERPL-SCNC: 24 MMOL/L (ref 22–31)
CREAT SERPL-MCNC: 0.99 MG/DL (ref 0.7–1.3)
EOSINOPHIL # BLD AUTO: 0 THOU/UL (ref 0–0.4)
EOSINOPHIL NFR BLD AUTO: 1 % (ref 0–6)
ERYTHROCYTE [DISTWIDTH] IN BLOOD BY AUTOMATED COUNT: 11.8 % (ref 11–14.5)
GFR SERPL CREATININE-BSD FRML MDRD: >60 ML/MIN/1.73M2
GLUCOSE BLD-MCNC: 193 MG/DL (ref 70–125)
HBA1C MFR BLD: 6 %
HCT VFR BLD AUTO: 38.3 % (ref 40–54)
HGB BLD-MCNC: 13.3 G/DL (ref 14–18)
IMM GRANULOCYTES # BLD: 0 THOU/UL
IMM GRANULOCYTES NFR BLD: 0 %
LYMPHOCYTES # BLD AUTO: 0.4 THOU/UL (ref 0.8–4.4)
LYMPHOCYTES NFR BLD AUTO: 14 % (ref 20–40)
MCH RBC QN AUTO: 31.4 PG (ref 27–34)
MCHC RBC AUTO-ENTMCNC: 34.7 G/DL (ref 32–36)
MCV RBC AUTO: 91 FL (ref 80–100)
MONOCYTES # BLD AUTO: 0.2 THOU/UL (ref 0–0.9)
MONOCYTES NFR BLD AUTO: 5 % (ref 2–10)
NEUTROPHILS # BLD AUTO: 2.4 THOU/UL (ref 2–7.7)
NEUTROPHILS NFR BLD AUTO: 80 % (ref 50–70)
PLATELET # BLD AUTO: 102 THOU/UL (ref 140–440)
PMV BLD AUTO: 9.6 FL (ref 8.5–12.5)
POTASSIUM BLD-SCNC: 4 MMOL/L (ref 3.5–5)
PROT SERPL-MCNC: 5.9 G/DL (ref 6–8)
RBC # BLD AUTO: 4.23 MILL/UL (ref 4.4–6.2)
SARS-COV-2 PCR COMMENT: NORMAL
SARS-COV-2 RNA SPEC QL NAA+PROBE: NEGATIVE
SARS-COV-2 VIRUS SPECIMEN SOURCE: NORMAL
SODIUM SERPL-SCNC: 129 MMOL/L (ref 136–145)
WBC: 3.1 THOU/UL (ref 4–11)

## 2021-03-18 ENCOUNTER — RECORDS - HEALTHEAST (OUTPATIENT)
Dept: LAB | Facility: CLINIC | Age: 61
End: 2021-03-18

## 2021-03-19 LAB
ANION GAP SERPL CALCULATED.3IONS-SCNC: 7 MMOL/L (ref 5–18)
BASOPHILS # BLD AUTO: 0 THOU/UL (ref 0–0.2)
BASOPHILS NFR BLD AUTO: 0 % (ref 0–2)
BUN SERPL-MCNC: 8 MG/DL (ref 8–22)
CALCIUM SERPL-MCNC: 9.3 MG/DL (ref 8.5–10.5)
CHLORIDE BLD-SCNC: 104 MMOL/L (ref 98–107)
CO2 SERPL-SCNC: 28 MMOL/L (ref 22–31)
CREAT SERPL-MCNC: 0.94 MG/DL (ref 0.7–1.3)
EOSINOPHIL # BLD AUTO: 0.1 THOU/UL (ref 0–0.4)
EOSINOPHIL NFR BLD AUTO: 3 % (ref 0–6)
ERYTHROCYTE [DISTWIDTH] IN BLOOD BY AUTOMATED COUNT: 11.9 % (ref 11–14.5)
GFR SERPL CREATININE-BSD FRML MDRD: >60 ML/MIN/1.73M2
GLUCOSE BLD-MCNC: 90 MG/DL (ref 70–125)
HCT VFR BLD AUTO: 40.2 % (ref 40–54)
HGB BLD-MCNC: 14 G/DL (ref 14–18)
IMM GRANULOCYTES # BLD: 0 THOU/UL
IMM GRANULOCYTES NFR BLD: 0 %
LYMPHOCYTES # BLD AUTO: 1 THOU/UL (ref 0.8–4.4)
LYMPHOCYTES NFR BLD AUTO: 39 % (ref 20–40)
MCH RBC QN AUTO: 31.4 PG (ref 27–34)
MCHC RBC AUTO-ENTMCNC: 34.8 G/DL (ref 32–36)
MCV RBC AUTO: 90 FL (ref 80–100)
MONOCYTES # BLD AUTO: 0.2 THOU/UL (ref 0–0.9)
MONOCYTES NFR BLD AUTO: 8 % (ref 2–10)
NEUTROPHILS # BLD AUTO: 1.3 THOU/UL (ref 2–7.7)
NEUTROPHILS NFR BLD AUTO: 51 % (ref 50–70)
PLATELET # BLD AUTO: 128 THOU/UL (ref 140–440)
PMV BLD AUTO: 9.9 FL (ref 8.5–12.5)
POTASSIUM BLD-SCNC: 4.5 MMOL/L (ref 3.5–5)
RBC # BLD AUTO: 4.46 MILL/UL (ref 4.4–6.2)
SODIUM SERPL-SCNC: 139 MMOL/L (ref 136–145)
WBC: 2.6 THOU/UL (ref 4–11)

## 2021-03-24 DIAGNOSIS — E78.00 HYPERCHOLESTEREMIA: ICD-10-CM

## 2021-03-24 RX ORDER — SIMVASTATIN 20 MG
TABLET ORAL
Qty: 28 TABLET | Refills: 10 | Status: SHIPPED | OUTPATIENT
Start: 2021-03-24

## 2021-03-24 NOTE — TELEPHONE ENCOUNTER
Simvastatin. Last address 1/27/20. Due for CPX.  Cholesterol   Date Value Ref Range Status   01/22/2020 103 0 - 199 mg/dL Final   01/16/2020 113 <200 mg/dL Final     HDL Cholesterol   Date Value Ref Range Status   01/22/2020 56 40 mg/dL Final   01/16/2020 67 >39 mg/dL Final     LDL Cholesterol Calculated   Date Value Ref Range Status   01/22/2020 41 0 - 129 mg/dL Final   01/16/2020 34 <100 mg/dL Final     Comment:     Desirable:       <100 mg/dl     Triglycerides   Date Value Ref Range Status   01/22/2020 30 0 - 149 mg/dL Final   01/16/2020 61 <150 mg/dL Final     Cholesterol/HDL Ratio   Date Value Ref Range Status   08/07/2013 3.0 0.0 - 5.0 Final   06/10/2009 2.0 0.0 - 5.0 Final         hyperchol  Discussed current lipid results, previous results (if available) current guidelines (NCEP) for treatment and goals for lipids.  Discussed lifestyle modification, dietary changes (low fat, low simple carb) and regular aerobic exercise.  Discussed the link between dysmetabolic syndrome and impaired glucose tolerance seen in certain patterns of lipids.  Briefly discussed medication used for lipid lowering, including the statins are their possible side effects of myalgias, rhabdomyolysis, and liver toxicity.     -     simvastatin (ZOCOR) 40 MG tablet; Take 0.5 tablets (20 mg) by mouth At Bedtime

## 2021-08-11 ENCOUNTER — HOSPITAL ENCOUNTER (EMERGENCY)
Facility: CLINIC | Age: 61
Discharge: HOME | End: 2021-08-11
Attending: EMERGENCY MEDICINE | Admitting: EMERGENCY MEDICINE
Payer: MEDICARE

## 2021-08-11 ENCOUNTER — APPOINTMENT (OUTPATIENT)
Dept: CT IMAGING | Facility: CLINIC | Age: 61
End: 2021-08-11
Attending: EMERGENCY MEDICINE
Payer: MEDICARE

## 2021-08-11 VITALS
HEART RATE: 80 BPM | RESPIRATION RATE: 15 BRPM | SYSTOLIC BLOOD PRESSURE: 147 MMHG | OXYGEN SATURATION: 98 % | TEMPERATURE: 98 F | DIASTOLIC BLOOD PRESSURE: 79 MMHG

## 2021-08-11 DIAGNOSIS — R10.31 RIGHT LOWER QUADRANT ABDOMINAL PAIN: ICD-10-CM

## 2021-08-11 LAB
ALBUMIN SERPL-MCNC: 3.8 G/DL (ref 3.4–5)
ALBUMIN UR-MCNC: NEGATIVE MG/DL
ALP SERPL-CCNC: 98 U/L (ref 40–150)
ALT SERPL W P-5'-P-CCNC: 48 U/L (ref 0–70)
ANION GAP SERPL CALCULATED.3IONS-SCNC: <1 MMOL/L (ref 3–14)
APPEARANCE UR: CLEAR
AST SERPL W P-5'-P-CCNC: 16 U/L (ref 0–45)
ATRIAL RATE - MUSE: 73 BPM
BASOPHILS # BLD AUTO: 0 10E3/UL (ref 0–0.2)
BASOPHILS NFR BLD AUTO: 1 %
BILIRUB SERPL-MCNC: 0.5 MG/DL (ref 0.2–1.3)
BILIRUB UR QL STRIP: NEGATIVE
BUN SERPL-MCNC: 23 MG/DL (ref 7–30)
CALCIUM SERPL-MCNC: 9.3 MG/DL (ref 8.5–10.1)
CHLORIDE BLD-SCNC: 108 MMOL/L (ref 94–109)
CO2 SERPL-SCNC: 31 MMOL/L (ref 20–32)
COLOR UR AUTO: NORMAL
CREAT SERPL-MCNC: 1.18 MG/DL (ref 0.66–1.25)
DIASTOLIC BLOOD PRESSURE - MUSE: NORMAL MMHG
EOSINOPHIL # BLD AUTO: 0.1 10E3/UL (ref 0–0.7)
EOSINOPHIL NFR BLD AUTO: 3 %
ERYTHROCYTE [DISTWIDTH] IN BLOOD BY AUTOMATED COUNT: 11.3 % (ref 10–15)
GFR SERPL CREATININE-BSD FRML MDRD: 66 ML/MIN/1.73M2
GLUCOSE BLD-MCNC: 171 MG/DL (ref 70–99)
GLUCOSE BLDC GLUCOMTR-MCNC: 106 MG/DL (ref 70–99)
GLUCOSE UR STRIP-MCNC: NEGATIVE MG/DL
HCT VFR BLD AUTO: 42.7 % (ref 40–53)
HGB BLD-MCNC: 14.2 G/DL (ref 13.3–17.7)
HGB UR QL STRIP: NEGATIVE
IMM GRANULOCYTES # BLD: 0 10E3/UL
IMM GRANULOCYTES NFR BLD: 0 %
INTERPRETATION ECG - MUSE: NORMAL
KETONES UR STRIP-MCNC: NEGATIVE MG/DL
LEUKOCYTE ESTERASE UR QL STRIP: NEGATIVE
LIPASE SERPL-CCNC: 20 U/L (ref 73–393)
LYMPHOCYTES # BLD AUTO: 0.7 10E3/UL (ref 0.8–5.3)
LYMPHOCYTES NFR BLD AUTO: 24 %
MCH RBC QN AUTO: 32.9 PG (ref 26.5–33)
MCHC RBC AUTO-ENTMCNC: 33.3 G/DL (ref 31.5–36.5)
MCV RBC AUTO: 99 FL (ref 78–100)
MONOCYTES # BLD AUTO: 0.2 10E3/UL (ref 0–1.3)
MONOCYTES NFR BLD AUTO: 5 %
NEUTROPHILS # BLD AUTO: 2 10E3/UL (ref 1.6–8.3)
NEUTROPHILS NFR BLD AUTO: 67 %
NITRATE UR QL: NEGATIVE
NRBC # BLD AUTO: 0 10E3/UL
NRBC BLD AUTO-RTO: 0 /100
P AXIS - MUSE: 71 DEGREES
PH UR STRIP: 6 [PH] (ref 5–7)
PLATELET # BLD AUTO: 121 10E3/UL (ref 150–450)
POTASSIUM BLD-SCNC: 4.6 MMOL/L (ref 3.4–5.3)
PR INTERVAL - MUSE: 214 MS
PROT SERPL-MCNC: 6.4 G/DL (ref 6.8–8.8)
QRS DURATION - MUSE: 78 MS
QT - MUSE: 382 MS
QTC - MUSE: 420 MS
R AXIS - MUSE: -1 DEGREES
RBC # BLD AUTO: 4.32 10E6/UL (ref 4.4–5.9)
RBC URINE: <1 /HPF
SODIUM SERPL-SCNC: 139 MMOL/L (ref 133–144)
SP GR UR STRIP: 1.02 (ref 1–1.03)
SYSTOLIC BLOOD PRESSURE - MUSE: NORMAL MMHG
T AXIS - MUSE: 82 DEGREES
TROPONIN I SERPL-MCNC: <0.015 UG/L (ref 0–0.04)
UROBILINOGEN UR STRIP-MCNC: NORMAL MG/DL
VENTRICULAR RATE- MUSE: 73 BPM
WBC # BLD AUTO: 3 10E3/UL (ref 4–11)
WBC URINE: 0 /HPF

## 2021-08-11 PROCEDURE — 51701 INSERT BLADDER CATHETER: CPT

## 2021-08-11 PROCEDURE — 80053 COMPREHEN METABOLIC PANEL: CPT | Performed by: EMERGENCY MEDICINE

## 2021-08-11 PROCEDURE — 93005 ELECTROCARDIOGRAM TRACING: CPT | Mod: 59

## 2021-08-11 PROCEDURE — 258N000003 HC RX IP 258 OP 636: Performed by: EMERGENCY MEDICINE

## 2021-08-11 PROCEDURE — 36415 COLL VENOUS BLD VENIPUNCTURE: CPT | Performed by: EMERGENCY MEDICINE

## 2021-08-11 PROCEDURE — 96375 TX/PRO/DX INJ NEW DRUG ADDON: CPT

## 2021-08-11 PROCEDURE — 250N000009 HC RX 250: Performed by: EMERGENCY MEDICINE

## 2021-08-11 PROCEDURE — 250N000011 HC RX IP 250 OP 636: Performed by: EMERGENCY MEDICINE

## 2021-08-11 PROCEDURE — 250N000009 HC RX 250

## 2021-08-11 PROCEDURE — 85025 COMPLETE CBC W/AUTO DIFF WBC: CPT | Performed by: EMERGENCY MEDICINE

## 2021-08-11 PROCEDURE — 83690 ASSAY OF LIPASE: CPT | Performed by: EMERGENCY MEDICINE

## 2021-08-11 PROCEDURE — 99285 EMERGENCY DEPT VISIT HI MDM: CPT | Mod: 25

## 2021-08-11 PROCEDURE — 74177 CT ABD & PELVIS W/CONTRAST: CPT | Mod: MG

## 2021-08-11 PROCEDURE — 96361 HYDRATE IV INFUSION ADD-ON: CPT | Mod: 59

## 2021-08-11 PROCEDURE — 96374 THER/PROPH/DIAG INJ IV PUSH: CPT | Mod: 59

## 2021-08-11 PROCEDURE — 84484 ASSAY OF TROPONIN QUANT: CPT | Performed by: EMERGENCY MEDICINE

## 2021-08-11 PROCEDURE — 96376 TX/PRO/DX INJ SAME DRUG ADON: CPT

## 2021-08-11 PROCEDURE — 81001 URINALYSIS AUTO W/SCOPE: CPT | Performed by: EMERGENCY MEDICINE

## 2021-08-11 RX ORDER — IOPAMIDOL 755 MG/ML
500 INJECTION, SOLUTION INTRAVASCULAR ONCE
Status: COMPLETED | OUTPATIENT
Start: 2021-08-11 | End: 2021-08-11

## 2021-08-11 RX ORDER — LIDOCAINE HYDROCHLORIDE 20 MG/ML
JELLY TOPICAL
Status: COMPLETED
Start: 2021-08-11 | End: 2021-08-11

## 2021-08-11 RX ORDER — ONDANSETRON 2 MG/ML
4 INJECTION INTRAMUSCULAR; INTRAVENOUS EVERY 30 MIN PRN
Status: DISCONTINUED | OUTPATIENT
Start: 2021-08-11 | End: 2021-08-11 | Stop reason: HOSPADM

## 2021-08-11 RX ORDER — SODIUM CHLORIDE 9 MG/ML
INJECTION, SOLUTION INTRAVENOUS CONTINUOUS
Status: DISCONTINUED | OUTPATIENT
Start: 2021-08-11 | End: 2021-08-11 | Stop reason: HOSPADM

## 2021-08-11 RX ORDER — HYDROMORPHONE HYDROCHLORIDE 1 MG/ML
0.5 INJECTION, SOLUTION INTRAMUSCULAR; INTRAVENOUS; SUBCUTANEOUS
Status: COMPLETED | OUTPATIENT
Start: 2021-08-11 | End: 2021-08-11

## 2021-08-11 RX ADMIN — HYDROMORPHONE HYDROCHLORIDE 0.5 MG: 1 INJECTION, SOLUTION INTRAMUSCULAR; INTRAVENOUS; SUBCUTANEOUS at 12:05

## 2021-08-11 RX ADMIN — HYDROMORPHONE HYDROCHLORIDE 0.5 MG: 1 INJECTION, SOLUTION INTRAMUSCULAR; INTRAVENOUS; SUBCUTANEOUS at 08:20

## 2021-08-11 RX ADMIN — IOPAMIDOL 79 ML: 755 INJECTION, SOLUTION INTRAVENOUS at 09:26

## 2021-08-11 RX ADMIN — SODIUM CHLORIDE 1000 ML: 9 INJECTION, SOLUTION INTRAVENOUS at 08:19

## 2021-08-11 RX ADMIN — LIDOCAINE HYDROCHLORIDE: 20 JELLY TOPICAL at 11:00

## 2021-08-11 RX ADMIN — HYDROMORPHONE HYDROCHLORIDE 0.5 MG: 1 INJECTION, SOLUTION INTRAMUSCULAR; INTRAVENOUS; SUBCUTANEOUS at 09:53

## 2021-08-11 RX ADMIN — SODIUM CHLORIDE 61 ML: 9 INJECTION, SOLUTION INTRAVENOUS at 09:26

## 2021-08-11 RX ADMIN — ONDANSETRON 4 MG: 2 INJECTION INTRAMUSCULAR; INTRAVENOUS at 08:20

## 2021-08-11 ASSESSMENT — ENCOUNTER SYMPTOMS
ABDOMINAL PAIN: 1
FEVER: 0
FREQUENCY: 0
HEMATURIA: 0
NAUSEA: 0
DYSURIA: 0
DIARRHEA: 0
CONSTIPATION: 0
DIFFICULTY URINATING: 0

## 2021-08-11 ASSESSMENT — ACTIVITIES OF DAILY LIVING (ADL): DEPENDENT_IADLS:: SHOPPING;MEAL PREPARATION;MEDICATION MANAGEMENT;TRANSPORTATION

## 2021-08-11 NOTE — ED PROVIDER NOTES
History   Chief Complaint:  Abdominal Pain       HPI   Caden Bush is a 61 year old male with history of type 1 diabetes who presents from an assisted living for constant RLQ abdominal pain that started this morning. The patient was given some Tylenol with no relief. He notes that after he ate a salad yesterday he drank some water with his night medications and developed some cramping that went away overnight. Last bowel movement was yesterday. Denies fever, testicular pain, nausea, urinary or bowel symptoms. Most recent blood sugar was 201.     Review of Systems   Constitutional: Negative for fever.   Gastrointestinal: Positive for abdominal pain. Negative for constipation, diarrhea and nausea.   Genitourinary: Negative for difficulty urinating, dysuria, frequency, hematuria, testicular pain and urgency.   All other systems reviewed and are negative.    Allergies:  Flomax [Tamsulosin]  Lisinopril    Medications:  Simvastatin  Aspirin 81 mg  Novolog  Basaglar  Imodium  Zofran  Risperdal  Rapaflo  Desyrel  Effexor    Past Medical History:    Arthritis  Diabetes mellitus  Eczema  Polyp of colon  Retinopathy  Schizoaffective disorder  SAD  Hyponatremia    Past Surgical History:    Appendectomy  Colonoscopy    Family History:    Mother: Osteoporosis, breast cancer  Father: Osteoporosis, asthma, colorectal cancer, depression  Sister: Anxiety    Social History:  Patient presents to the ED via EMS.  Lives at assisted living    Physical Exam     Patient Vitals for the past 24 hrs:   BP Temp Temp src Pulse Resp SpO2   08/11/21 1030 -- -- -- 101 11 97 %   08/11/21 1015 (!) 152/79 -- -- 73 11 95 %   08/11/21 1000 (!) 157/88 -- -- 81 18 96 %   08/11/21 0945 (!) 156/78 -- -- 74 28 99 %   08/11/21 0915 138/76 -- -- 69 -- 99 %   08/11/21 0900 135/73 -- -- 71 -- 99 %   08/11/21 0845 127/68 -- -- 76 -- 99 %   08/11/21 0830 124/66 -- -- 74 -- 98 %   08/11/21 0815 134/77 -- -- 80 -- 99 %   08/11/21 0808 138/76 -- -- -- -- --    08/11/21 0806 -- 98  F (36.7  C) Oral 75 16 98 %       Physical Exam  VS: Reviewed per above  HENT: normal speech  EYES: sclera anicteric  CV: Rate as noted, regular rhythm.   RESP: Effort normal. Breath sounds are normal bilaterally.  GI: RLQ tenderness without rebound/guarding, not distended.  NEURO: Alert, moving all extremities  MSK: No deformity of the extremities  SKIN: Warm and dry, no abdominal wall rashes or lesions      Emergency Department Course   ECG  ECG taken at 0945, ECG read at 0955  Sinus rhythm with 1st degree AV block. Otherwise normal ECG.   No significant changes as compared to prior, dated 4/6/20.  Rate 73 bpm. WV interval 214 ms. QRS duration 78 ms. QT/QTc 382/420 ms. P-R-T axes 71 -1 82.     Imaging:  CT Abdomen/Pelvis with IV contrast:   1.  No acute abnormality is seen. No obstructing urolithiasis or  hydronephrosis. Stable tiny nonobstructing left upper intrarenal  stone.  2.  Moderate stool throughout the colon and rectum.  3.  Enlarged prostate.  4.  Stable enlarged spleen.  5.  Mild wall thickening of the urinary bladder. Correlate with  urinalysis to assess for urinary tract infection.   As per radiology.    Laboratory:   CBC: WBC: 3.0 (L), HGB: 14.2, PLT: 121 (L)    CMP: Glucose 171 (H), Anion Gap: <1 (A), Protein Total: 6.4 (L), o/w WNL (Creatinine: 1.18)    Lipase: 20 (L)    Troponin (Collected 0953): <0.015    UA: Negative    Emergency Department Course:    Reviewed:  I reviewed nursing notes, vitals, and past medical history.    Assessments:  0840 I obtained history and examined the patient as noted above.    ED Course as of Aug 11 1154   Wed Aug 11, 2021   1042 Pt reassessed. No longer having chest pain- recalls it being left sided but does not remember how long it lasted or any associated sx/modifying factors.        Interventions:  0819 NS 1L IV  0820 Zofran 4 mg IV  0820 Dilaudid 0.5 mg IV  0953 Dilaudid 0.5 mg IV    Disposition:  The patient was discharged to home.      Impression & Plan     Medical Decision Making:  Patient presents to the ER for evaluation of right-sided abdominal pain.  Vital signs are reassuring.  On exam there are no peritoneal signs.  Labs are without leukocytosis or significant kidney injury or signs of DKA or pancreatitis or hepatitis or obstructive LFT pattern or UTI.  CT of the abdomen is negative for acute process.  Patient symptoms improved although not totally resolved.  Discussed close primary care follow-up to ensure complete resolution of sx.  Return precautions discussed prior to discharge.    Diagnosis:    ICD-10-CM    1. Right lower quadrant abdominal pain  R10.31        Scribe Disclosure:  ICk, am serving as a scribe at 8:05 AM on 8/11/2021 to document services personally performed by Anthnoy Perez MD based on my observations and the provider's statements to me.              Anthony Perez MD  08/11/21 1507

## 2021-08-11 NOTE — ED TRIAGE NOTES
"Pt arrives via EMS, EMS reports [facility] where last night he had a drink of water and had \"cramping\" in the RLQ. PT denies any change in bowel or bladder. PT VSS and ABC's intact  "

## 2021-08-11 NOTE — CONSULTS
See ED Note for Care Management Consult.     STERLING Sierra, [facility]  , ED Care Management   [phone]

## 2021-08-11 NOTE — ED NOTES
Called report to [facility] to give report, report given to Anne ROBIN [phone], and ready to accept pt at this time. Sister ramos informed of pt discharge and verbalized understanding

## 2021-08-11 NOTE — ED NOTES
Care Management Initial Consult    General Information  Assessment completed with: Patient, Patient   Type of CM/SW Visit: Initial Assessment    Primary Care Provider verified and updated as needed: Yes   Readmission within the last 30 days: no previous admission in last 30 days      Reason for Consult: discharge planning  Advance Care Planning:            Communication Assessment  Patient's communication style: spoken language (English or Bilingual)             Cognitive  Cognitive/Neuro/Behavioral: WDL                      Living Environment:   People in home: alone     Current living Arrangements: assisted living  Name [facility] Facility: [facility]   Able to return to prior arrangements: yes       Family/Social Support:  Care provided by: self  Provides care for: no one     Parent(s), Sibling(s)          Description of Support System: Supportive, Involved    Support Assessment: Adequate family and caregiver support    Current Resources:   Patient receiving home care services: No     Community Resources: None  Equipment currently used at home: none  Supplies currently used at home: None    Employment/Financial:  Employment Status:          Financial Concerns:        Finance Comments: Has CADI Waiver     Lifestyle & Psychosocial Needs:  Social Determinants of Health     Tobacco Use:      Smoking Tobacco Use:      Smokeless Tobacco Use:    Alcohol Use:      Frequency of Alcohol Consumption:      Average Number of Drinks:      Frequency of Binge Drinking:    Financial Resource Strain:      Difficulty of Paying Living Expenses:    Food Insecurity:      Worried About Running Out of Food in the Last Year:      Ran Out of Food in the Last Year:    Transportation Needs:      Lack of Transportation (Medical):      Lack of Transportation (Non-Medical):    Physical Activity:      Days of Exercise per Week:      Minutes of Exercise per Session:    Stress:      Feeling of Stress :    Social Connections:      Frequency of  Communication with Friends and Family:      Frequency of Social Gatherings with Friends and Family:      Attends Tenriism Services:      Active Member of Clubs or Organizations:      Attends Club or Organization Meetings:      Marital Status:    Intimate Partner Violence:      Fear of Current or Ex-Partner:      Emotionally Abused:      Physically Abused:      Sexually Abused:    Depression: Not at risk     PHQ-2 Score: 0   Housing Stability:      Unable to Pay for Housing in the Last Year:      Number of Places Lived in the Last Year:      Unstable Housing in the Last Year:        Functional Status:  Prior to admission patient needed assistance:   Dependent ADLs:: Independent  Dependent IADLs:: Shopping, Meal Preparation, Medication Management, Transportation  Assesssment of Functional Status: Needs assistance with shopping, Needs assistance with medications, Needs assistance with bathing, Needs assistance with meals, Needs assistance with transportation    Mental Health Status:          Chemical Dependency Status:                Values/Beliefs:  Spiritual, Cultural Beliefs, Tenriism Practices, Values that affect care:                 Additional Information:  Patient present alone.     Patient is from [phone]. Patient lives in an assisted living. A nurse and the aid administer his medications. He gets all meals provided for. Patient is able to complete all ADLs without assistance. Patient does not use a walker or a cane. Patient does not drive and would need assistance in getting a ride upon discharge.     Patient reported most of his medical providers come to his home for visits. Patient is not getting home care at this time.     Patient has a CADI Waiver. Patient could not recall who his SW is for his waiver.     Verified with [facility] that he resides there and let them know he is in ED (patient verbalized okay to speak with them).     PCP verified.     STERLING Sierra,  LGSW  , ED Care Management   [phone]

## 2021-09-07 ENCOUNTER — LAB REQUISITION (OUTPATIENT)
Dept: LAB | Facility: CLINIC | Age: 61
End: 2021-09-07
Payer: MEDICARE

## 2021-09-07 DIAGNOSIS — Z79.899 OTHER LONG TERM (CURRENT) DRUG THERAPY: ICD-10-CM

## 2021-09-08 PROCEDURE — 83036 HEMOGLOBIN GLYCOSYLATED A1C: CPT | Mod: ORL | Performed by: NURSE PRACTITIONER

## 2021-09-08 PROCEDURE — 80164 ASSAY DIPROPYLACETIC ACD TOT: CPT | Mod: ORL | Performed by: NURSE PRACTITIONER

## 2021-09-08 PROCEDURE — P9603 ONE-WAY ALLOW PRORATED MILES: HCPCS | Mod: ORL | Performed by: NURSE PRACTITIONER

## 2021-09-08 PROCEDURE — 80061 LIPID PANEL: CPT | Mod: ORL | Performed by: NURSE PRACTITIONER

## 2021-09-08 PROCEDURE — 85027 COMPLETE CBC AUTOMATED: CPT | Mod: ORL | Performed by: NURSE PRACTITIONER

## 2021-09-08 PROCEDURE — 36415 COLL VENOUS BLD VENIPUNCTURE: CPT | Mod: ORL | Performed by: NURSE PRACTITIONER

## 2021-09-09 ENCOUNTER — LAB REQUISITION (OUTPATIENT)
Dept: LAB | Facility: CLINIC | Age: 61
End: 2021-09-09
Payer: MEDICARE

## 2021-09-09 DIAGNOSIS — Z79.899 OTHER LONG TERM (CURRENT) DRUG THERAPY: ICD-10-CM

## 2021-09-09 LAB
CHOLEST SERPL-MCNC: 148 MG/DL
ERYTHROCYTE [DISTWIDTH] IN BLOOD BY AUTOMATED COUNT: 11.5 % (ref 10–15)
FASTING STATUS PATIENT QL REPORTED: NORMAL
HBA1C MFR BLD: 6.2 %
HCT VFR BLD AUTO: 42.7 % (ref 40–53)
HDLC SERPL-MCNC: 74 MG/DL
HGB BLD-MCNC: 14 G/DL (ref 13.3–17.7)
LDLC SERPL CALC-MCNC: 65 MG/DL
MCH RBC QN AUTO: 32 PG (ref 26.5–33)
MCHC RBC AUTO-ENTMCNC: 32.8 G/DL (ref 31.5–36.5)
MCV RBC AUTO: 98 FL (ref 78–100)
PLATELET # BLD AUTO: 152 10E3/UL (ref 150–450)
RBC # BLD AUTO: 4.37 10E6/UL (ref 4.4–5.9)
TRIGL SERPL-MCNC: 46 MG/DL
VALPROATE SERPL-MCNC: 29.7 UG/ML
WBC # BLD AUTO: 2.8 10E3/UL (ref 4–11)

## 2021-09-10 LAB
ALBUMIN SERPL-MCNC: 3.6 G/DL (ref 3.5–5)
ALP SERPL-CCNC: 91 U/L (ref 45–120)
ALT SERPL W P-5'-P-CCNC: 29 U/L (ref 0–45)
ANION GAP SERPL CALCULATED.3IONS-SCNC: 7 MMOL/L (ref 5–18)
AST SERPL W P-5'-P-CCNC: 15 U/L (ref 0–40)
BILIRUB SERPL-MCNC: 0.9 MG/DL (ref 0–1)
BUN SERPL-MCNC: 21 MG/DL (ref 8–22)
CALCIUM SERPL-MCNC: 9.3 MG/DL (ref 8.5–10.5)
CHLORIDE BLD-SCNC: 99 MMOL/L (ref 98–107)
CO2 SERPL-SCNC: 25 MMOL/L (ref 22–31)
CREAT SERPL-MCNC: 1.09 MG/DL (ref 0.7–1.3)
GFR SERPL CREATININE-BSD FRML MDRD: 72 ML/MIN/1.73M2
GLUCOSE BLD-MCNC: 198 MG/DL (ref 70–125)
POTASSIUM BLD-SCNC: 4.4 MMOL/L (ref 3.5–5)
PROT SERPL-MCNC: 5.9 G/DL (ref 6–8)
SODIUM SERPL-SCNC: 131 MMOL/L (ref 136–145)

## 2021-09-10 PROCEDURE — 80053 COMPREHEN METABOLIC PANEL: CPT | Mod: ORL | Performed by: NURSE PRACTITIONER

## 2021-09-10 PROCEDURE — 36415 COLL VENOUS BLD VENIPUNCTURE: CPT | Mod: ORL | Performed by: NURSE PRACTITIONER

## 2021-09-10 PROCEDURE — P9603 ONE-WAY ALLOW PRORATED MILES: HCPCS | Mod: ORL | Performed by: NURSE PRACTITIONER

## 2021-09-27 ENCOUNTER — LAB REQUISITION (OUTPATIENT)
Dept: LAB | Facility: CLINIC | Age: 61
End: 2021-09-27
Payer: MEDICARE

## 2021-09-27 DIAGNOSIS — E87.1 HYPO-OSMOLALITY AND HYPONATREMIA: ICD-10-CM

## 2021-09-28 LAB — SODIUM SERPL-SCNC: 139 MMOL/L (ref 136–145)

## 2021-09-28 PROCEDURE — 84295 ASSAY OF SERUM SODIUM: CPT | Mod: ORL | Performed by: NURSE PRACTITIONER

## 2021-09-28 PROCEDURE — P9604 ONE-WAY ALLOW PRORATED TRIP: HCPCS | Mod: ORL | Performed by: NURSE PRACTITIONER

## 2021-09-28 PROCEDURE — 36415 COLL VENOUS BLD VENIPUNCTURE: CPT | Mod: ORL | Performed by: NURSE PRACTITIONER

## 2021-11-17 ENCOUNTER — APPOINTMENT (OUTPATIENT)
Dept: GENERAL RADIOLOGY | Facility: CLINIC | Age: 61
End: 2021-11-17
Attending: PHYSICIAN ASSISTANT
Payer: MEDICARE

## 2021-11-17 ENCOUNTER — HOSPITAL ENCOUNTER (EMERGENCY)
Facility: CLINIC | Age: 61
Discharge: HOME | End: 2021-11-17
Attending: PHYSICIAN ASSISTANT | Admitting: PHYSICIAN ASSISTANT
Payer: MEDICARE

## 2021-11-17 ENCOUNTER — APPOINTMENT (OUTPATIENT)
Dept: CT IMAGING | Facility: CLINIC | Age: 61
End: 2021-11-17
Attending: PHYSICIAN ASSISTANT
Payer: MEDICARE

## 2021-11-17 VITALS
DIASTOLIC BLOOD PRESSURE: 80 MMHG | RESPIRATION RATE: 18 BRPM | OXYGEN SATURATION: 99 % | TEMPERATURE: 98.1 F | SYSTOLIC BLOOD PRESSURE: 150 MMHG | HEART RATE: 80 BPM

## 2021-11-17 DIAGNOSIS — R53.1 GENERALIZED WEAKNESS: ICD-10-CM

## 2021-11-17 DIAGNOSIS — R53.83 FATIGUE: ICD-10-CM

## 2021-11-17 LAB
ALBUMIN SERPL-MCNC: 3.6 G/DL (ref 3.4–5)
ALBUMIN UR-MCNC: NEGATIVE MG/DL
ALP SERPL-CCNC: 111 U/L (ref 40–150)
ALT SERPL W P-5'-P-CCNC: 46 U/L (ref 0–70)
ANION GAP SERPL CALCULATED.3IONS-SCNC: 3 MMOL/L (ref 3–14)
APPEARANCE UR: CLEAR
AST SERPL W P-5'-P-CCNC: 13 U/L (ref 0–45)
ATRIAL RATE - MUSE: 81 BPM
BASOPHILS # BLD AUTO: 0 10E3/UL (ref 0–0.2)
BASOPHILS NFR BLD AUTO: 0 %
BILIRUB SERPL-MCNC: 0.5 MG/DL (ref 0.2–1.3)
BILIRUB UR QL STRIP: NEGATIVE
BUN SERPL-MCNC: 23 MG/DL (ref 7–30)
CALCIUM SERPL-MCNC: 8.8 MG/DL (ref 8.5–10.1)
CHLORIDE BLD-SCNC: 109 MMOL/L (ref 94–109)
CO2 SERPL-SCNC: 28 MMOL/L (ref 20–32)
COLOR UR AUTO: ABNORMAL
CREAT SERPL-MCNC: 1.16 MG/DL (ref 0.66–1.25)
DIASTOLIC BLOOD PRESSURE - MUSE: NORMAL MMHG
EOSINOPHIL # BLD AUTO: 0.1 10E3/UL (ref 0–0.7)
EOSINOPHIL NFR BLD AUTO: 2 %
ERYTHROCYTE [DISTWIDTH] IN BLOOD BY AUTOMATED COUNT: 11.2 % (ref 10–15)
ETHANOL SERPL-MCNC: <0.01 G/DL
GFR SERPL CREATININE-BSD FRML MDRD: 67 ML/MIN/1.73M2
GLUCOSE BLD-MCNC: 174 MG/DL (ref 70–99)
GLUCOSE UR STRIP-MCNC: >=1000 MG/DL
HCO3 BLDV-SCNC: 29 MMOL/L (ref 21–28)
HCT VFR BLD AUTO: 44.8 % (ref 40–53)
HGB BLD-MCNC: 14.5 G/DL (ref 13.3–17.7)
HGB UR QL STRIP: NEGATIVE
HOLD SPECIMEN: NORMAL
HOLD SPECIMEN: NORMAL
IMM GRANULOCYTES # BLD: 0 10E3/UL
IMM GRANULOCYTES NFR BLD: 0 %
INTERPRETATION ECG - MUSE: NORMAL
KETONES UR STRIP-MCNC: NEGATIVE MG/DL
LACTATE BLD-SCNC: 0.7 MMOL/L
LEUKOCYTE ESTERASE UR QL STRIP: NEGATIVE
LYMPHOCYTES # BLD AUTO: 0.8 10E3/UL (ref 0.8–5.3)
LYMPHOCYTES NFR BLD AUTO: 19 %
MCH RBC QN AUTO: 31.9 PG (ref 26.5–33)
MCHC RBC AUTO-ENTMCNC: 32.4 G/DL (ref 31.5–36.5)
MCV RBC AUTO: 99 FL (ref 78–100)
MONOCYTES # BLD AUTO: 0.2 10E3/UL (ref 0–1.3)
MONOCYTES NFR BLD AUTO: 4 %
NEUTROPHILS # BLD AUTO: 3 10E3/UL (ref 1.6–8.3)
NEUTROPHILS NFR BLD AUTO: 75 %
NITRATE UR QL: NEGATIVE
NRBC # BLD AUTO: 0 10E3/UL
NRBC BLD AUTO-RTO: 0 /100
P AXIS - MUSE: 81 DEGREES
PCO2 BLDV: 52 MM HG (ref 40–50)
PH BLDV: 7.35 [PH] (ref 7.32–7.43)
PH UR STRIP: 6.5 [PH] (ref 5–7)
PLATELET # BLD AUTO: 147 10E3/UL (ref 150–450)
PO2 BLDV: 24 MM HG (ref 25–47)
POTASSIUM BLD-SCNC: 4.4 MMOL/L (ref 3.4–5.3)
PR INTERVAL - MUSE: 190 MS
PROT SERPL-MCNC: 6.6 G/DL (ref 6.8–8.8)
QRS DURATION - MUSE: 80 MS
QT - MUSE: 358 MS
QTC - MUSE: 415 MS
R AXIS - MUSE: 63 DEGREES
RBC # BLD AUTO: 4.55 10E6/UL (ref 4.4–5.9)
RBC URINE: <1 /HPF
SAO2 % BLDV: 38 % (ref 94–100)
SARS-COV-2 RNA RESP QL NAA+PROBE: NEGATIVE
SODIUM SERPL-SCNC: 140 MMOL/L (ref 133–144)
SP GR UR STRIP: 1.02 (ref 1–1.03)
SYSTOLIC BLOOD PRESSURE - MUSE: NORMAL MMHG
T AXIS - MUSE: 81 DEGREES
TROPONIN I SERPL-MCNC: <0.015 UG/L (ref 0–0.04)
TSH SERPL DL<=0.005 MIU/L-ACNC: 0.99 MU/L (ref 0.4–4)
UROBILINOGEN UR STRIP-MCNC: NORMAL MG/DL
VALPROATE SERPL-MCNC: 29 MG/L
VENTRICULAR RATE- MUSE: 81 BPM
WBC # BLD AUTO: 4.1 10E3/UL (ref 4–11)
WBC URINE: <1 /HPF

## 2021-11-17 PROCEDURE — 99285 EMERGENCY DEPT VISIT HI MDM: CPT | Mod: 25

## 2021-11-17 PROCEDURE — 71045 X-RAY EXAM CHEST 1 VIEW: CPT

## 2021-11-17 PROCEDURE — 87635 SARS-COV-2 COVID-19 AMP PRB: CPT | Performed by: PHYSICIAN ASSISTANT

## 2021-11-17 PROCEDURE — 258N000003 HC RX IP 258 OP 636: Performed by: PHYSICIAN ASSISTANT

## 2021-11-17 PROCEDURE — 81001 URINALYSIS AUTO W/SCOPE: CPT | Performed by: PHYSICIAN ASSISTANT

## 2021-11-17 PROCEDURE — 84443 ASSAY THYROID STIM HORMONE: CPT | Performed by: PHYSICIAN ASSISTANT

## 2021-11-17 PROCEDURE — 82040 ASSAY OF SERUM ALBUMIN: CPT | Performed by: PHYSICIAN ASSISTANT

## 2021-11-17 PROCEDURE — 93005 ELECTROCARDIOGRAM TRACING: CPT

## 2021-11-17 PROCEDURE — 70450 CT HEAD/BRAIN W/O DYE: CPT | Mod: ME

## 2021-11-17 PROCEDURE — C9803 HOPD COVID-19 SPEC COLLECT: HCPCS

## 2021-11-17 PROCEDURE — 84484 ASSAY OF TROPONIN QUANT: CPT | Performed by: PHYSICIAN ASSISTANT

## 2021-11-17 PROCEDURE — 82803 BLOOD GASES ANY COMBINATION: CPT

## 2021-11-17 PROCEDURE — 36415 COLL VENOUS BLD VENIPUNCTURE: CPT | Performed by: PHYSICIAN ASSISTANT

## 2021-11-17 PROCEDURE — 85025 COMPLETE CBC W/AUTO DIFF WBC: CPT | Performed by: PHYSICIAN ASSISTANT

## 2021-11-17 PROCEDURE — 82077 ASSAY SPEC XCP UR&BREATH IA: CPT | Performed by: PHYSICIAN ASSISTANT

## 2021-11-17 PROCEDURE — 80164 ASSAY DIPROPYLACETIC ACD TOT: CPT | Performed by: EMERGENCY MEDICINE

## 2021-11-17 RX ADMIN — SODIUM CHLORIDE 1000 ML: 9 INJECTION, SOLUTION INTRAVENOUS at 15:12

## 2021-11-17 NOTE — ED NOTES
Bed: FT02  Expected date: 11/17/21  Expected time: 1:56 PM  Means of arrival: Ambulance  Comments:  BM Covid+

## 2021-11-17 NOTE — ED TRIAGE NOTES
Presents to ED from [facility] with COVID-like symptoms. EMS reports being sick for one week. . Denies diarrhea or vomiting. Sister, Alissa, is POA. Pt A&OX4. Awaiting COVID test results.

## 2021-11-17 NOTE — ED PROVIDER NOTES
History     Chief Complaint:  Weakness, fatigue    HPI history obtained with assistance of EMS and in discussion with nursing staff from facility  Caden Bush is a 61 year old male presents from assisted living via EMS for evaluation of weakness, fatigue, low-grade fever.  Per facility staff the patient has a history of fatigue and episodes of feeling very tired at baseline.  They note that over the last week these have become more frequent.  The symptoms are thought to be secondary to the patient's medications.  Today the nurse at the assisted living facility checked the patient's temperature and noted a low-grade fever of 99.5.  The patient states he feels tired but otherwise denies any pain, cough, shortness of breath, vomiting, or diarrhea.  Facility staff of additionally not noted any other complaints or symptoms.  No recent known falls.  The patient is ambulatory at baseline.    Review of Systems   All other systems reviewed and are negative.    Allergies:  Flomax [Tamsulosin]  Lisinopril    Medications:    Insulin  Imodium  Milk of magnesia  Zofran  Risperdal  Rapaflo  Zocor  Desyrel  Effexor-XR    Past Medical History:    Arthritis  Type I diabetes  Eczema  Polyp of colon  Retinopathy  Schizoaffective disorder  Seasonal affective disorder  Vitamin D deficiency  Hyponatremia  Heart murmur  Neuropathy of left upper extremity  Localized osteoarthritis of hands     Past Surgical History:    Appendectomy  Colonoscopy    Family History:    Osteoporosis  Breast cancer  Asthma  Colorectal cancer  Depression  Anxiety    Social History:  Lives in assisted living  Denies alcohol use    Physical Exam     Patient Vitals for the past 24 hrs:   BP Temp Temp src Pulse Resp SpO2   11/17/21 1900 (!) 152/81 -- -- 88 -- --   11/17/21 1830 (!) 155/87 -- -- 86 -- --   11/17/21 1800 (!) 167/125 -- -- 89 -- --   11/17/21 1700 (!) 151/80 -- -- 89 -- 100 %   11/17/21 1630 (!) 171/105 -- -- 101 -- 100 %   11/17/21 1600 (!) 153/83  -- -- 74 -- 99 %   11/17/21 1545 (!) 150/82 -- -- 73 -- 100 %   11/17/21 1500 (!) 144/83 -- -- -- -- 99 %   11/17/21 1405 (!) 144/86 98.1  F (36.7  C) Temporal 89 18 97 %       Physical Exam  General: Awake, alert, pleasant, tired appearing  Head:  Scalp is NC/AT  Eyes:  Conjunctiva normal, PERRL  ENT:  The external nose and ears are normal.     Oropharynx clear  Neck:  Normal range of motion without rigidity.  CV:  Regular rate and rhythm    No pathologic murmur, rubs, or gallops.  Resp:  Breath sounds are clear bilaterally    Non-labored, no retractions or accessory muscle use  Abdomen: Abdomen is soft, no distension, no tenderness, no masses.  MS:  No lower extremity edema or asymmetric calf swelling. Normal ROM in all joints without effusions.    No midline cervical, thoracic, or lumbar tenderness  Skin:  Warm and dry, No rash or lesions noted. 2+ peripheral pulses in all extremities  Neuro: Alert and oriented x3.  GCS 15 5/5 strength BL in UE and LE, normal sensation to touch.  Cranial nerves 2-12 intact.  Normal finger to nose testing.  No asterixis.  Gait normal  Psych: Awake. Alert. Normal affect. Appropriate interactions.      Emergency Department Course     ECG:  ECG taken at 1449, ECG read at 1450  Sinus rhythm with sinus arrhythmia    Normal ECG  Rate 81 bpm. IA interval 190 ms. QRS duration 80 ms. QT/QTc 358/415 ms. P-R-T axes 81 63 81.     Imaging:  CT Head w/o Contrast   Final Result   IMPRESSION:   1.  No acute intracranial abnormality.      2.  Mild age-related change.      XR Chest Port 1 View   Final Result   IMPRESSION: Negative chest.      NEGRITO GARVEY MD            SYSTEM ID:  [id number]          Laboratory:  Labs Ordered and Resulted from Time of ED Arrival to Time of ED Departure   COMPREHENSIVE METABOLIC PANEL - Abnormal       Result Value    Sodium 140      Potassium 4.4      Chloride 109      Carbon Dioxide (CO2) 28      Anion Gap 3      Urea Nitrogen 23      Creatinine 1.16      Calcium  8.8      Glucose 174 (*)     Alkaline Phosphatase 111      AST 13      ALT 46      Protein Total 6.6 (*)     Albumin 3.6      Bilirubin Total 0.5      GFR Estimate 67     ROUTINE UA WITH MICROSCOPIC REFLEX TO CULTURE - Abnormal    Color Urine Light Yellow      Appearance Urine Clear      Glucose Urine >=1000 (*)     Bilirubin Urine Negative      Ketones Urine Negative      Specific Gravity Urine 1.016      Blood Urine Negative      pH Urine 6.5      Protein Albumin Urine Negative      Urobilinogen Urine Normal      Nitrite Urine Negative      Leukocyte Esterase Urine Negative      RBC Urine <1      WBC Urine <1     CBC WITH PLATELETS AND DIFFERENTIAL - Abnormal    WBC Count 4.1      RBC Count 4.55      Hemoglobin 14.5      Hematocrit 44.8      MCV 99      MCH 31.9      MCHC 32.4      RDW 11.2      Platelet Count 147 (*)     % Neutrophils 75      % Lymphocytes 19      % Monocytes 4      % Eosinophils 2      % Basophils 0      % Immature Granulocytes 0      NRBCs per 100 WBC 0      Absolute Neutrophils 3.0      Absolute Lymphocytes 0.8      Absolute Monocytes 0.2      Absolute Eosinophils 0.1      Absolute Basophils 0.0      Absolute Immature Granulocytes 0.0      Absolute NRBCs 0.0     ISTAT GASES LACTATE VENOUS POCT - Abnormal    Lactic Acid POCT 0.7      Bicarbonate Venous POCT 29 (*)     O2 Sat, Venous POCT 38 (*)     pCO2V Venous POCT 52 (*)     pH Venous POCT 7.35      pO2 Venous POCT 24 (*)    TROPONIN I - Normal    Troponin I <0.015     COVID-19 VIRUS (CORONAVIRUS) BY PCR - Normal    SARS CoV2 PCR Negative     TSH WITH FREE T4 REFLEX - Normal    TSH 0.99     ETHYL ALCOHOL LEVEL - Normal    Alcohol ethyl <0.01     VALPROIC ACID - Normal    Valproic acid 29       Emergency Department Course:  Reviewed:  I reviewed nursing notes, vitals, past history and care everywhere    Assessments:  1420 I obtained history and examined the patient as noted above.   1730 I rechecked the patient and explained findings. Patient  ate and ambulated without difficulty in ED.  1900    I re-checked patient.  Patient denies additional complaints.  Will discharge back to facility    Consults:   2380 I spoke with Teresita, a nurse from the patients care facility, and discussed findings and plan of care.    Interventions:  Medications   0.9% sodium chloride BOLUS (0 mLs Intravenous Stopped 11/17/21 1542)     Disposition:  The patient was discharged to home.    Impression & Plan      Medical Decision Making:  This is a 61-year-old female who presents with fatigue, reported low-grade fever at facility.  On examination here he is well-appearing and afebrile with unremarkable vitals.  He is not hypoxic.  His glucose and electrolytes are unremarkable.  CT scan of the head is negative for bleed or acute pathology and his neurologic exam is nonfocal and not suggestive of stroke, spinal cord lesion, GBS, transverse myelitis, etc.  He was able to ambulate here in the ED.  His white blood cell count and lactate are normal, chest x-ray clear, UA not suggestive of infection, and he does not have any other evidence of serious bacterial infection or sepsis.  COVID-19 test is negative.  His VBG shows slightly elevated PCO2 though not significantly changed from baseline and his pH is normal.  His TSH is normal.  His alcohol is negative.  He does not have any other evidence of toxidrome or ingestion.      He is tired appearing but otherwise appears well.  Per discussion with nurse at facility he does frequently have episodes of fatigue though they have been more frequently recently.  His Depakote level is not supratherapeutic.  At this time the patient would like to go home which I think is reasonable.  I will have him follow-up within the next 2 to 3 days with his primary care provider and return precautions for new or worsening symptoms.  He was discharged back to facility.        Covid-19  Caden Bush was evaluated during a global COVID-19 pandemic, which  necessitated consideration that the patient might be at risk for infection with the SARS-CoV-2 virus that causes COVID-19.   Applicable protocols for evaluation were followed during the patient's care.   COVID-19 was considered as part of the patient's evaluation. The plan for testing is:  a test was obtained during this visit.    Diagnosis:    ICD-10-CM    1. Fatigue  R53.83    2. Generalized weakness  R53.1        Discharge Medications:  New Prescriptions    No medications on file     Scribe Disclosure:  I, He Godinez, am serving as a scribe at 3:08 PM on 11/17/2021 to document services personally performed by Poli Mcdonald PA-C based on my observations and the provider's statements to me.           Poli Mcdonald PA-C  11/17/21 1953

## 2021-11-18 NOTE — ED NOTES
Report called to Yandy, on-call nurse with [facility] facility in [place]. Pt ready to be transported back to facility.

## 2021-12-12 ENCOUNTER — APPOINTMENT (OUTPATIENT)
Dept: GENERAL RADIOLOGY | Facility: CLINIC | Age: 61
End: 2021-12-12
Attending: EMERGENCY MEDICINE
Payer: MEDICARE

## 2021-12-12 ENCOUNTER — HOSPITAL ENCOUNTER (EMERGENCY)
Facility: CLINIC | Age: 61
Discharge: HOME | End: 2021-12-12
Attending: EMERGENCY MEDICINE | Admitting: EMERGENCY MEDICINE
Payer: MEDICARE

## 2021-12-12 VITALS
OXYGEN SATURATION: 97 % | TEMPERATURE: 98.1 F | HEART RATE: 75 BPM | WEIGHT: 152.8 LBS | BODY MASS INDEX: 20.72 KG/M2 | RESPIRATION RATE: 18 BRPM | SYSTOLIC BLOOD PRESSURE: 149 MMHG | DIASTOLIC BLOOD PRESSURE: 80 MMHG

## 2021-12-12 DIAGNOSIS — F20.9 SCHIZOPHRENIA, UNSPECIFIED TYPE (H): ICD-10-CM

## 2021-12-12 DIAGNOSIS — R06.02 SOB (SHORTNESS OF BREATH): ICD-10-CM

## 2021-12-12 LAB
ANION GAP SERPL CALCULATED.3IONS-SCNC: 5 MMOL/L (ref 3–14)
BASE EXCESS BLDV CALC-SCNC: 2.6 MMOL/L (ref -7.7–1.9)
BASOPHILS # BLD AUTO: 0 10E3/UL (ref 0–0.2)
BASOPHILS NFR BLD AUTO: 0 %
BUN SERPL-MCNC: 17 MG/DL (ref 7–30)
CALCIUM SERPL-MCNC: 9.3 MG/DL (ref 8.5–10.1)
CHLORIDE BLD-SCNC: 108 MMOL/L (ref 94–109)
CO2 SERPL-SCNC: 25 MMOL/L (ref 20–32)
CREAT SERPL-MCNC: 1.1 MG/DL (ref 0.66–1.25)
D DIMER PPP FEU-MCNC: <0.27 UG/ML FEU (ref 0–0.5)
EOSINOPHIL # BLD AUTO: 0.1 10E3/UL (ref 0–0.7)
EOSINOPHIL NFR BLD AUTO: 2 %
ERYTHROCYTE [DISTWIDTH] IN BLOOD BY AUTOMATED COUNT: 11.3 % (ref 10–15)
FLUAV RNA SPEC QL NAA+PROBE: NEGATIVE
FLUBV RNA RESP QL NAA+PROBE: NEGATIVE
GFR SERPL CREATININE-BSD FRML MDRD: 72 ML/MIN/1.73M2
GLUCOSE BLD-MCNC: 313 MG/DL (ref 70–99)
HCO3 BLDV-SCNC: 27 MMOL/L (ref 21–28)
HCT VFR BLD AUTO: 43.5 % (ref 40–53)
HGB BLD-MCNC: 14.9 G/DL (ref 13.3–17.7)
HOLD SPECIMEN: NORMAL
IMM GRANULOCYTES # BLD: 0 10E3/UL
IMM GRANULOCYTES NFR BLD: 0 %
LYMPHOCYTES # BLD AUTO: 0.7 10E3/UL (ref 0.8–5.3)
LYMPHOCYTES NFR BLD AUTO: 24 %
MCH RBC QN AUTO: 32.6 PG (ref 26.5–33)
MCHC RBC AUTO-ENTMCNC: 34.3 G/DL (ref 31.5–36.5)
MCV RBC AUTO: 95 FL (ref 78–100)
MONOCYTES # BLD AUTO: 0.2 10E3/UL (ref 0–1.3)
MONOCYTES NFR BLD AUTO: 7 %
NEUTROPHILS # BLD AUTO: 2 10E3/UL (ref 1.6–8.3)
NEUTROPHILS NFR BLD AUTO: 67 %
NRBC # BLD AUTO: 0 10E3/UL
NRBC BLD AUTO-RTO: 0 /100
NT-PROBNP SERPL-MCNC: 43 PG/ML (ref 0–900)
O2/TOTAL GAS SETTING VFR VENT: 0 %
PCO2 BLDV: 40 MM HG (ref 40–50)
PH BLDV: 7.44 [PH] (ref 7.32–7.43)
PLATELET # BLD AUTO: 133 10E3/UL (ref 150–450)
PO2 BLDV: 38 MM HG (ref 25–47)
POTASSIUM BLD-SCNC: 4.3 MMOL/L (ref 3.4–5.3)
RBC # BLD AUTO: 4.57 10E6/UL (ref 4.4–5.9)
SARS-COV-2 RNA RESP QL NAA+PROBE: NEGATIVE
SODIUM SERPL-SCNC: 138 MMOL/L (ref 133–144)
TROPONIN I SERPL HS-MCNC: 6 NG/L
WBC # BLD AUTO: 2.9 10E3/UL (ref 4–11)

## 2021-12-12 PROCEDURE — 82803 BLOOD GASES ANY COMBINATION: CPT | Performed by: EMERGENCY MEDICINE

## 2021-12-12 PROCEDURE — 90791 PSYCH DIAGNOSTIC EVALUATION: CPT

## 2021-12-12 PROCEDURE — 80048 BASIC METABOLIC PNL TOTAL CA: CPT | Performed by: EMERGENCY MEDICINE

## 2021-12-12 PROCEDURE — 85025 COMPLETE CBC W/AUTO DIFF WBC: CPT | Performed by: EMERGENCY MEDICINE

## 2021-12-12 PROCEDURE — 99285 EMERGENCY DEPT VISIT HI MDM: CPT | Mod: 25

## 2021-12-12 PROCEDURE — 84484 ASSAY OF TROPONIN QUANT: CPT | Performed by: EMERGENCY MEDICINE

## 2021-12-12 PROCEDURE — 87636 SARSCOV2 & INF A&B AMP PRB: CPT | Performed by: EMERGENCY MEDICINE

## 2021-12-12 PROCEDURE — 36415 COLL VENOUS BLD VENIPUNCTURE: CPT | Performed by: EMERGENCY MEDICINE

## 2021-12-12 PROCEDURE — C9803 HOPD COVID-19 SPEC COLLECT: HCPCS

## 2021-12-12 PROCEDURE — 93005 ELECTROCARDIOGRAM TRACING: CPT

## 2021-12-12 PROCEDURE — 83880 ASSAY OF NATRIURETIC PEPTIDE: CPT | Performed by: EMERGENCY MEDICINE

## 2021-12-12 PROCEDURE — 85379 FIBRIN DEGRADATION QUANT: CPT | Performed by: EMERGENCY MEDICINE

## 2021-12-12 PROCEDURE — 71046 X-RAY EXAM CHEST 2 VIEWS: CPT

## 2021-12-12 ASSESSMENT — ENCOUNTER SYMPTOMS
COUGH: 0
VOMITING: 0
SHORTNESS OF BREATH: 1
FATIGUE: 1
NAUSEA: 0

## 2021-12-12 NOTE — ED TRIAGE NOTES
Patient arrives via EMS from assisted living facility, patient reports increased SOB today and patient feels as if he can't catch his breath.  Patients vitals within normal limits upon arrival O2 sat 99% on room air.  Patient alert and orientated X 4

## 2021-12-12 NOTE — Clinical Note
"1) Warning Signs.   Thoughts of death/suicide  Not following medication regiment    2) Things that make me feel better.   The patient reports he enjoys being around his family and friend Valeria    3) People and social settings that provide distraction.    Visiting parents, sister  Lunch with friend Theres    4) People whom I can ask for help.   a) Who can I talk to that makes me feel better?  Sister Alissa  Friend Thereses  Mom  Nephew and niece     5) Professionals or agencies I can contact during a cri sis.   a) List names/numbers of the identified providers, as well as any additional crisis lines or professional contacts.   Latrice Rodríguez- therapist- [facility]- apt scheduled for Tuesday 12/14 @ 10 am    CARLOS Paredes- [facility] Suicide Prevention Lifeline  [phone]    Crisis Text Line  Text \"[place]\" to  780141    Warmline  664.616.8015 or text  Support  to 67487   The [place] Warmline provides a [id number] approach to mental health recovery, support and wellness .   Mon-Sat- 5pm-10pm.     6) Ways to improve my environment or surroundings.   Discuss any concerns surrounding mental health with Latrice Rodríguez and ELISHA Paredes. Also talk to nursing staff at [facility] and your family and friends"

## 2021-12-12 NOTE — ED PROVIDER NOTES
History     Chief Complaint:  Shortness of Breath      HPI   Caden Bush is a 61 year old male past medical history of type 1 diabetes, schizophrenia and depression who presents with shortness of breath.  Patient is brought in from assisted living for dyspnea for the past 48 hours.  He denies any chest pain, fever, cough, nausea or vomiting.  He has not had similar symptoms like this before in the past.  Denies history of asthma or COPD.  Denies any smoking history.  Denies any recent Covid exposures.  Patient notes he is vaccinated with his booster.  Denies any increasing chest pain with exertion.  Reports last time he had chest pain was in November and he was evaluated at this emergency department.    Review of Systems   Constitutional: Positive for fatigue.   Respiratory: Positive for shortness of breath. Negative for cough.    Cardiovascular: Negative for chest pain and leg swelling.   Gastrointestinal: Negative for nausea and vomiting.   All other systems reviewed and are negative.    Allergies:  Flomax [Tamsulosin]  Lisinopril    Medications:    acetaminophen (TYLENOL) 325 MG tablet  aspirin (ASA) 81 MG chewable tablet  cholecalciferol (VITAMIN D) 1000 UNIT tablet  insulin glargine (BASAGLAR KWIKPEN) 100 UNIT/ML pen  loperamide (IMODIUM) 2 MG capsule  magnesium hydroxide (MILK OF MAGNESIA) 400 MG/5ML suspension  NOVOFINE 30 30G X 8 MM insulin pen needle  NOVOLOG FLEXPEN 100 UNIT/ML soln  ondansetron (ZOFRAN) 4 MG tablet  risperiDONE (RISPERDAL) 3 MG tablet  Silodosin (RAPAFLO) 8 MG CAPS capsule  simvastatin (ZOCOR) 20 MG tablet  traZODone (DESYREL) 50 MG tablet  venlafaxine (EFFEXOR XR) 75 MG 24 hr capsule  venlafaxine (EFFEXOR-XR) 150 MG 24 hr capsule      Past Medical History:    Past Medical History:   Diagnosis Date     Arthritis      Diabetes mellitus      Eczema 2011     Polyp of colon, adenomatous 6/25/2012     Retinopathy      Schizoaffective disorder      Seasonal affective disorder      Vitamin D  deficiency      Patient Active Problem List    Diagnosis Date Noted     Hyponatremia 04/06/2020     Priority: Medium     Schizoaffective disorder, bipolar type (H) 09/05/2018     Priority: Medium     Heart murmur on physical examination 05/01/2018     Priority: Medium     Type 1 diabetes mellitus with retinopathy of both eyes (H) 04/17/2018     Priority: Medium     Neuropathy of left upper extremity 06/09/2017     Priority: Medium     Localized osteoarthritis of hands, bilateral 05/17/2017     Priority: Medium     biopsy leg        Hx of psychiatric care 08/01/2016     Priority: Medium        PAST PSYCH MED TRIALS                                               Drug / Start Date   Dose (mg)   Helpful   Adverse Effects  DC Reason / Date     Lithium   900       current     Risperdal         current     Abilify             Seroquel             Effexor         current     Zoloft             Wellbutrin             Buspar             trazodone         current     hydroxyzine             Ambien             Maybe Zyprexa, and Prozac, can't remember               Health Care Home 12/18/2013     Priority: Medium     niño Tier Level:  Tier 2             Polyp of colon, adenomatous 06/25/2012     Priority: Medium     Due for repeat 5/2015       Moderate episode of recurrent major depressive disorder (H) 11/21/2011     Priority: Medium     Retinopathy      Priority: Medium     HFA ophtho DR HARLEY          Past Surgical History:    Past Surgical History:   Procedure Laterality Date     APPENDECTOMY       COLONOSCOPY  2012    2 adenomatous polyps       Family History:    Family History   Problem Relation Age of Onset     Osteoporosis Mother      Breast Cancer Mother 78     Osteoporosis Father      Asthma Father      Cancer - colorectal Father 70     Depression Father      Colon Cancer Father      Anxiety Disorder Sister      Social History:  Presents the emergency department alone    Physical Exam     Patient Vitals for the past 24  hrs:   BP Temp Temp src Pulse Resp SpO2 Weight   12/12/21 1400 (!) 149/80 -- -- 75 -- 97 % --   12/12/21 1300 (!) 172/98 -- -- -- -- 95 % --   12/12/21 1256 (!) 172/98 98.1  F (36.7  C) Oral 94 18 97 % 69.3 kg (152 lb 12.8 oz)     Physical Exam  General: Patient is awake, alert  Head: The scalp, face, and head appear normal  Eyes: The pupils are equal, round, and reactive to light. Conjunctivae and sclerae are normal  ENT: External acoustic canals are normal. The oropharynx is normal without erythema. Uvula is in the midline  Neck: Normal range of motion.   CV: Regular rate and rhythm.   Resp: Lungs are clear without wheezes or rales. No respiratory distress.   GI: Abdomen is soft, no rigidity, guarding, or rebound. No distension. No tenderness to palpation in any quadrant.     MS: Normal tone. Joints grossly normal without effusions. No asymmetric leg swelling, calf or thigh tenderness.    Skin: No rash or lesions noted. Normal capillary refill noted  Neuro: Speech is normal and fluent. Face is symmetric. Moving all extremities.   Psych:  flat affect.  Appropriate interactions.    Emergency Department Course   ECG:  ECG taken at 1353, ECG read at 1356  Normal sinus rhythm with occasional sinus arrhythmia.  Ventricular rate 79  MN interval 202  QRS 74  QT/QTc 356/405  No significant change when compared with EKG on 11/17/2021    Imaging:  XR Chest 2 Views   Final Result   IMPRESSION: Negative chest.             Laboratory:  Labs Ordered and Resulted from Time of ED Arrival to Time of ED Departure   BASIC METABOLIC PANEL - Abnormal       Result Value    Sodium 138      Potassium 4.3      Chloride 108      Carbon Dioxide (CO2) 25      Anion Gap 5      Urea Nitrogen 17      Creatinine 1.10      Calcium 9.3      Glucose 313 (*)     GFR Estimate 72     BLOOD GAS VENOUS - Abnormal    pH Venous 7.44 (*)     pCO2 Venous 40      pO2 Venous 38      Bicarbonate Venous 27      Base Excess/Deficit (+/-) 2.6 (*)     FIO2 0      CBC WITH PLATELETS AND DIFFERENTIAL - Abnormal    WBC Count 2.9 (*)     RBC Count 4.57      Hemoglobin 14.9      Hematocrit 43.5      MCV 95      MCH 32.6      MCHC 34.3      RDW 11.3      Platelet Count 133 (*)     % Neutrophils 67      % Lymphocytes 24      % Monocytes 7      % Eosinophils 2      % Basophils 0      % Immature Granulocytes 0      NRBCs per 100 WBC 0      Absolute Neutrophils 2.0      Absolute Lymphocytes 0.7 (*)     Absolute Monocytes 0.2      Absolute Eosinophils 0.1      Absolute Basophils 0.0      Absolute Immature Granulocytes 0.0      Absolute NRBCs 0.0     D DIMER QUANTITATIVE - Normal    D-Dimer Quantitative <0.27     TROPONIN I - Normal    Troponin I High Sensitivity 6     NT PROBNP INPATIENT - Normal    N terminal Pro BNP Inpatient 43     INFLUENZA A/B & SARS-COV2 PCR MULTIPLEX - Normal    Influenza A PCR Negative      Influenza B PCR Negative      SARS CoV2 PCR Negative         Emergency Department Course:    Reviewed:  I reviewed nursing notes, vitals, past history and care everywhere    Disposition:  The patient was discharged to home.    Impression & Plan      Medical Decision Making:  Caden is a 61-year-old gentleman with past medical history of type 1 diabetes, schizophrenia and depression who presents to the emergency department with shortness of breath and fatigue.  Upon initial evaluation he is hemodynamically stable with normal vital signs.  He is afebrile.  He is oxygenating well on room air.  He does not display any acute respiratory distress or increased work of breathing on my initial exam.  However his affect is quite flat and he is slow to answer questions.  He answers questions appropriately and does not appear confused or disoriented.  However this did raise my suspicion for psychiatric component to his presentation.  Broad work-up was initiated to investigate the patient's complaints.  Ultimately he was found to have a very reassuring work-up with no clear medical  inciting cause for his symptoms.  In speaking with his group home and sister I do have some concerns that depression may be playing an increasing role in the patient's fatigue and generalized weakness. DEC evaluation was undertaken.  Patient does not meet criteria for inpatient admission and is already well connected with therapist and psychiatry.  In review of the patient's chart he recently had his risperidone decreased.  It may be pertinent to increase this dosage if he continues to have worsening anxiety and depression.  At this point, patient is stable both from a psychiatric and medical standpoint for discharge home.  Covid-19  Caden Bush was evaluated during a global COVID-19 pandemic, which necessitated consideration that the patient might be at risk for infection with the SARS-CoV-2 virus that causes COVID-19.   Applicable protocols for evaluation were followed during the patient's care.   COVID-19 was considered as part of the patient's evaluation. The plan for testing is:  a test was obtained during this visit.    Diagnosis:    ICD-10-CM    1. Schizophrenia, unspecified type (H)  F20.9    2. SOB (shortness of breath)  R06.02        Discharge Medications:  Discharge Medication List as of 12/12/2021  4:50 PM          MD Jonathan Alexandra Christopher Joseph, MD  12/13/21 0830

## 2021-12-12 NOTE — ED NOTES
12/12/2021  Caden Bush 1960     [facility] Crisis Assessment    Patient was assessed: remote  Patient location: [facility] ED    Referral Data and Chief Complaint  Caden Bush is a 61 year old who uses he/his pronouns. Patient presented to the ED via EMS and was referred to the ED by self. Patient is presenting to the ED for the following concerns: shortness of breath.      Informed Consent and Assessment Methods    Patient is his own guardian. Writer met with patient and explained the crisis assessment process, including applicable information disclosures and limits to confidentiality, assessed understanding of the process, and obtained consent to proceed with the assessment. Patient was observed to be able to participate in the assessment as evidenced by Lei KATZ, LICSW. Assessment methods included conducting a formal interview with patient, review of medical records, collaboration with medical staff, and obtaining relevant collateral information from family and community providers when available.    Narrative Summary of Presenting Problem and Current Functioning  What led to the patient presenting for crisis services, factors that make the crisis life threatening or complex, stressors, how is this disrupting the patient's life, and how current functioning is in comparison to baseline. How is patient presenting during the assessment.     The patient presents to the ED today via EMS from his residence at [facility] for complaints of shortness of breathe. The patient states he is not sure if this was a panic attack or not. He complains of weakness and fatigue. He is calm, cooperative and oriented X4. He states he is not sure if he is depressed or not. He intermittently cries but states he does not know why he is crying. The patient denies S/I, thoughts of death, H/I, psychosis, SIB or access to weapons. He has a hx of Schizoaffective Disorder, Bipolar Type and Seasonal Affective Disorder. He currently is  "compliant with medications and is prescribed Trazodone (as needed), Risperdal, Effexor-ER and Depakote. In addition he takes vitamin D3 and medications for diabetes. Per the patient's RN Case Manager Lucero- Risperdal was decreased to 3.5mg on 11/23/2021 by his ANGELO.JORDEN Paredes at [facility].     History of the Crisis  Duration of the current crisis, coping skills attempted to reduce the crisis, community resources used, and past presentations.    The patient has hx dx of Schizophrenia, Bipolar Type and Seasonal Affective Disorder. He has been hospitalized for mental health 7/2008 and 6/2009 at [facility]. The patient denies any previous suicide attempts. He currently resides at [facility] in [place] and has been a resident for two years. His medications are administered to him daily. He receives services for psychiatry (Nurse Practitioner Magy Paredes, last seen 11/23/2021) and primary care (Dr. Garg, last seen 11/2021) through [facility]. He currently meets with a therapist, Latrice Rodríguez weekly. He last met with her on Thursday 12/9/2021 and per patient has an appointment on Tuesday 12/14 @ 10 am. He has been meeting with this therapist since September 2021.    Collateral Information  This writer spoke with RN Case Manager Lucero at [facility]. She reports the patient has \"episodes where he gets very tired and weak and then usually snaps out of it but today he said he couldn't breath so we sent him in.\" The patient has not been acting differently at the facility. It is not out of the ordinary for him to have a flat affect and limited appetite. They manage his medications and he is compliant. Nurse Lucero notes the family has expressed some increased anxiety and irritability when they have taken him home for visits. Per nurse, Risperdal was decreased on 11/23/2021, she has sent an e-mail to [facility] for medication evaluation. Nurse Barker has no concerns for " suicide or imminent risk to self or others for the patient. The patient receives 24/7 care and is welcome to return to [facility].    Risk Assessment    Risk of Harm to Self     ESS-6  1.a. Over the past 2 weeks, have you had thoughts of killing yourself? No  1.b. Have you ever attempted to kill yourself and, if yes, when did this last happen? No   2. Recent or current suicide plan? No   3. Recent or current intent to act on ideation? No  4. Lifetime psychiatric hospitalization? Yes  5. Pattern of excessive substance use? No  6. Current irritability, agitation, or aggression? No  Scoring note: BOTH 1a and 1b must be yes for it to score 1 point, if both are not yes it is zero. All others are 1 point per number. If all questions 1a/1b - 6 are no, risk is negligible. If one of 1a/1b is yes, then risk is mild. If either question 2 or 3, but not both, is yes, then risk is automatically moderate regardless of total score. If both 2 and 3 are yes, risk is automatically high regardless of total score.     Score: 1, mild risk    The patient has the following risk factors for suicide: depressive symptoms and health stressors    Is the patient experiencing current suicidal ideation: No    Is the patient engaging in preparatory suicide behaviors (formulating how to act on plan, giving away possessions, saying goodbye, displaying dramatic behavior changes, etc)? No    Does the patient have access to firearms or other lethal means? no    The patient has the following protective factors: social support, established relationship community mental health provider(s) and safe/stable housing    Support system information: The patient identifies helpful relationships with established providers. He is close with his sister Alissa, mother, nephew and niece. He also has a friend Valeria who he gets together with.    Patient strengths: The patient resides in a safe facility with 24/7 cares. He is compliant with medication regiment. He  identifies as Judaism and is close with his family and has supportive friends. He denies S/I, H/I, psychosis, SIB or access to weapons.    Does the patient engage in non-suicidal self-injurious behavior (NSSI/SIB)? no    Is the patient vulnerable to sexual exploitation?  No    Is the patient experiencing abuse or neglect? no    Is the patient a vulnerable adult? Yes      Risk of Harm to Others  The patient has the following risk factors of harm to others: no risk factors identified    Does the patient have thoughts of harming others? No    Is the patient engaging in sexually inappropriate behavior?  no       Current Substance Abuse    Is there recent substance abuse? no    Was a urine drug screen or blood alcohol level obtained: No    CAGE AID  Have you felt you ought to cut down on your drinking or drug use?  No  Have people annoyed you by criticizing your drinking or drug use? No  Have you felt bad or guilty about your drinking or drug use? No  Have you ever had a drink or used drugs first thing in the morning to steady your nerves or to get rid of a hangover? No  Score: 0/4       Current Symptoms/Concerns    Symptoms  Attention, hyperactivity, and impulsivity symptoms present: No    Anxiety symptoms present: Yes: Generalized Symptoms: Avoidance and Excessive worry      Appetite symptoms present: Yes: Loss of Appetite     Behavioral difficulties present: No     Cognitive impairment symptoms present: No    Depressive symptoms present: Yes Crying or feels like crying and Depressed mood     Eating disorder symptoms present: No    Learning disabilities, cognitive challenges, and/or developmental disorder symptoms present: No     Manic/hypomanic symptoms present: No    Personality and interpersonal functioning difficulties present : No    Psychosis symptoms present: No      Sleep difficulties present: Yes: Excessive sleep     Substance abuse disorder symptoms present: No     Trauma and stressor related symptoms  present: No           Mental Status Exam   Affect: Flat   Appearance: Disheveled    Attention Span/Concentration: Attentive? - at times have to repeat questions and prompt patient   Eye Contact: Variable   Fund of Knowledge: Appropriate  - however a bit delayed  Language /Speech Content: Fluent   Language /Speech Volume: Soft    Language /Speech Rate/Productions: Slow    Recent Memory: Variable   Remote Memory: Variable   Mood: Apathetic, Depressed and Sad    Orientation to Person: Yes    Orientation to Place: Yes   Orientation to Time of Day: Yes    Orientation to Date: Yes    Situation (Do they understand why they are here?): Yes    Psychomotor Behavior: Normal    Thought Content: Clear   Thought Form: Intact       Mental Health and Substance Abuse History    History  Current and historical diagnoses or mental health concerns: Schizophrenia- Bipolar Type, Seasonal Affective Disorder    Prior MH services (inpatient, programmatic care, outpatient, etc) : Yes 6/2009 and 7/2008    History of substance abuse: No    Prior PACO services (inpatient, programmatic care, detox, outpatient, etc) : No    History of commitment: No    Family history of MH/PACO: No    Trauma history: No    Medication  Psychotropic medications: Yes. Pt is currently taking Risperdal 3.5 mg, Trazodone 50mg as needed for sleep, Effexor XR 225mg, Depakote 125mg 2x daily. Medication compliant: Yes. Recent medication changes: Yes Risperdal was decreased 11/23/2021    Current Care Team  Primary Care Provider: Yes. Name: Dr. Garg. Location: [facility]. Date of last visit: 11/2021. Frequency: as needed. Perceived helpfulness: Yes.    Psychiatrist: Yes. Name: ELISHA Paredes. Location: [facility]. Date of last visit: 11/23/2021. Frequency: Unknown. Perceived helpfulness: Yes.    Therapist: Yes. Name: Latrice Rodríguez. Location: [facility]. Date of last visit: 12/9/2021. Frequency: weekly. Perceived helpfulness: Yes.    : No    CTSS or ARMHS:  No    ACT Team: No    Other: No    Release of Information  Was a release of information signed: Yes. Providers included on the release: primary care, [facility], psychiatry and therapist      Biopsychosocial Information    Socioeconomic Information  Current living situation: The patient resides at an Assisted Living Facility    Employment/income source: SSDI    Relevant legal issues: None    Cultural, Pentecostal, or spiritual influences on mental health care: Baptism    Is the patient active in the  or a : No      Relevant Medical Concerns   Patient identifies concerns with completing ADLs? No- medications are administered as well as meals provided for patient, he receives 24/7 care    Patient can ambulate independently? Yes - however unsteady gate    Other medical concerns? Yes Diabetes, arthritis and cataracts     History of concussion or TBI? No        Diagnosis    295.70  (F25) Schizoaffective Disorder Bipolar Type - by history     F33.2 Seasonal Affective Disorder, by history      Therapeutic Intervention  The following therapeutic methodologies were employed when working with the patient: establishing rapport, active listening, assessing dimensions of crisis, identifying additional supports and alternative coping skills, establishing a discharge plan, safety planning and brief supportive therapy. Patient response to intervention: patient was flat for the duration of the interview. No safety concerns noted for patient, he is not an imminent threat to harm himself or others. He feels safe at his facility, complains of shortness of breathe and feeling weak.      Disposition  Recommended disposition: Individual Therapy and Medication Management      Reviewed case and recommendations with attending provider. Attending Name: Jonathan Santiago      Attending concurs with disposition: Yes      Patient concurs with disposition: Yes      Guardian concurs with disposition: NA     Final disposition:  Individual therapy  and Medication management.     Inpatient Details (if applicable):  Is patient admitted voluntarily:N/A    Patient aware of potential for transfer if there is not appropriate placement? NA     [facility]      Behavioral Intake Notified? NA       Clinical Substantiation of Recommendations   Rationale with supporting factors for disposition and diagnosis.     The patient does not pose any imminent threat to harm self or others. He denies S/I, H/I, SIB, psychosis and access to weapons. He is compliant with prescribed medications and receives 24/7 care at his residence at [facility]. This writer spoke with Nurse Lucero who believes patient is able to return back to the facility. She was concerned about his complaints of shortness of breathe. Lucero has reached out to [facility] for medication evaluation since Risperdal was decreased on 11/23 and the family has noted some increase in irritability and anxiety. Patient has not made any threats to harm self or others. Patient will follow up with current providers. He has therapy scheduled on Tuesday 12/14 with Latrice Rodríguez.        Assessment Details  Patient interview started at: 3:50pm and completed at: 4:25pm.    Total duration spent on the patient case in minutes: 1.50 hrs     CPT code(s) utilized: 59527 - Psychotherapy for Crisis - 60 (30-74*) min       Aftercare and Safety Planning  Follow up plans with MH/PACO services: Yes patient will attend therapy on 12/14 with established therapist Latrice Rodríguez. He will also follow up with CARLOS Paredes for medication evaluation.      Aftercare plan placed in the AVS and provided to patient: Yes. Given to patient by ED staff    JERICHO Covarrubias      Aftercare Plan  If I am feeling unsafe or I am in a crisis, I will:   Contact my established care providers   Call the National Suicide Prevention Lifeline: 387.318.4616   Go to the nearest  "emergency room   Call 911     Warning signs that I or other people might notice when a crisis is developing for me: suicidal thoughts  Noncompliance with medications  Psychotic symptoms    Things I am able to do on my own to cope or help me feel better: Call family/friends, go down to dining room for meals with peers at residence     Things that I am able to do with others to cope or help me better: Go to lunch, home visits with family members     Things I can use or do for distraction: Call mom, sister, niece, nephew, visit with friend Valeria, watch television, listen to music     Changes I can make to support my mental health and wellness: medication evaluation with Magy Paredes     People in my life that I can ask for help: Staff at residence, sister, friends, mother     Your [facility] has a mental health crisis team you can call 24/7: [facility] Crisis  [phone]       Crisis Lines  Crisis Text Line  Text 714176  You will be connected with a trained live crisis counselor to provide support.    National Hope Line  1.800.SUICIDE [3585026]      Community Resources  Fast Tracker  Linking people to mental health and substance use disorder resources  fasttrackermn.org     [place] Mental Health Warm Line  Peer to peer support  Monday thru Saturday, 12 pm to 10 pm  210.031.3206 or 6.115.845.8271  Text \"Support\" to 62677    National [place] on Mental Illness ([place]ASIA)  610.919.8733 or 1.888.NYASIA.HELPS    Mental Health Apps  My3  https://myRebiotixpp.org/    VirtualHopeBox  https://Microbank [email or web address]/apps/virtual-hope-box/    JERICHO Covarrubias   12/12/2021  5:29 PM            "

## 2021-12-12 NOTE — DISCHARGE INSTRUCTIONS
"Aftercare Plan  If I am feeling unsafe or I am in a crisis, I will:   Contact my established care providers   Call the National Suicide Prevention Lifeline: 766.665.5436   Go to the nearest emergency room   Call 911     Warning signs that I or other people might notice when a crisis is developing for me: suicidal thoughts  Noncompliance with medications  Psychotic symptoms    Things I am able to do on my own to cope or help me feel better: Call family/friends, go down to dining room for meals with peers at residence     Things that I am able to do with others to cope or help me better: Go to lunch, home visits with family members     Things I can use or do for distraction: Call mom, sister, niece, nephew, visit with friend Valeria, watch television, listen to music     Changes I can make to support my mental health and wellness: medication evaluation with Magy Paredes     People in my life that I can ask for help: Staff at residence, sister, friends, mother     Your [facility] has a mental health crisis team you can call 24/7: [facility] Crisis  [phone]       Crisis Lines  Crisis Text Line  Text 817397  You will be connected with a trained live crisis counselor to provide support.    National Hope Line  1.800.SUICIDE [7006078]      Community Resources  Fast Tracker  Linking people to mental health and substance use disorder resources  SimilarSites.com.org     [place] Mental Health Warm Line  Peer to peer support  Monday thru Saturday, 12 pm to 10 pm  406.060.2481 or 5.183.131.5287  Text \"Support\" to 54626    National [place] on Mental Illness ([place]ASIA)  601.001.8692 or 1.888.NYASIA.HELPS    Mental Health Apps  My3  https://my3app.org/    VirtualHopeBox  https://SnapShop.org/apps/virtual-hope-box  "

## 2021-12-13 LAB
ATRIAL RATE - MUSE: 79 BPM
DIASTOLIC BLOOD PRESSURE - MUSE: NORMAL MMHG
INTERPRETATION ECG - MUSE: NORMAL
P AXIS - MUSE: 79 DEGREES
PR INTERVAL - MUSE: 202 MS
QRS DURATION - MUSE: 74 MS
QT - MUSE: 354 MS
QTC - MUSE: 405 MS
R AXIS - MUSE: 8 DEGREES
SYSTOLIC BLOOD PRESSURE - MUSE: NORMAL MMHG
T AXIS - MUSE: 76 DEGREES
VENTRICULAR RATE- MUSE: 79 BPM

## 2022-01-05 ENCOUNTER — LAB REQUISITION (OUTPATIENT)
Dept: LAB | Facility: CLINIC | Age: 62
End: 2022-01-05
Payer: MEDICARE

## 2022-01-05 DIAGNOSIS — E11.3399 TYPE 2 DIABETES MELLITUS WITH MODERATE NONPROLIFERATIVE DIABETIC RETINOPATHY WITHOUT MACULAR EDEMA, UNSPECIFIED EYE (H): ICD-10-CM

## 2022-01-05 DIAGNOSIS — F25.0 SCHIZOAFFECTIVE DISORDER, BIPOLAR TYPE (H): ICD-10-CM

## 2022-01-08 LAB
ALBUMIN SERPL-MCNC: 3.7 G/DL (ref 3.5–5)
ALP SERPL-CCNC: 110 U/L (ref 45–120)
ALT SERPL W P-5'-P-CCNC: 39 U/L (ref 0–45)
ANION GAP SERPL CALCULATED.3IONS-SCNC: 8 MMOL/L (ref 5–18)
AST SERPL W P-5'-P-CCNC: 22 U/L (ref 0–40)
BILIRUB SERPL-MCNC: 0.6 MG/DL (ref 0–1)
BUN SERPL-MCNC: 21 MG/DL (ref 8–22)
CALCIUM SERPL-MCNC: 9.4 MG/DL (ref 8.5–10.5)
CHLORIDE BLD-SCNC: 98 MMOL/L (ref 98–107)
CO2 SERPL-SCNC: 26 MMOL/L (ref 22–31)
CREAT SERPL-MCNC: 1.07 MG/DL (ref 0.7–1.3)
DEPRECATED CALCIDIOL+CALCIFEROL SERPL-MC: 30 UG/L (ref 30–80)
ERYTHROCYTE [DISTWIDTH] IN BLOOD BY AUTOMATED COUNT: 11.4 % (ref 10–15)
GFR SERPL CREATININE-BSD FRML MDRD: 78 ML/MIN/1.73M2
GLUCOSE BLD-MCNC: 378 MG/DL (ref 70–125)
HCT VFR BLD AUTO: 41.6 % (ref 40–53)
HGB BLD-MCNC: 13.9 G/DL (ref 13.3–17.7)
MCH RBC QN AUTO: 31.7 PG (ref 26.5–33)
MCHC RBC AUTO-ENTMCNC: 33.4 G/DL (ref 31.5–36.5)
MCV RBC AUTO: 95 FL (ref 78–100)
PLATELET # BLD AUTO: 120 10E3/UL (ref 150–450)
POTASSIUM BLD-SCNC: 4.8 MMOL/L (ref 3.5–5)
PROT SERPL-MCNC: 5.9 G/DL (ref 6–8)
RBC # BLD AUTO: 4.39 10E6/UL (ref 4.4–5.9)
SODIUM SERPL-SCNC: 132 MMOL/L (ref 136–145)
TSH SERPL DL<=0.005 MIU/L-ACNC: 1.1 UIU/ML (ref 0.3–5)
VALPROATE SERPL-MCNC: 20.7 UG/ML
WBC # BLD AUTO: 3.2 10E3/UL (ref 4–11)

## 2022-01-08 PROCEDURE — 80164 ASSAY DIPROPYLACETIC ACD TOT: CPT | Mod: ORL | Performed by: NURSE PRACTITIONER

## 2022-01-08 PROCEDURE — 80053 COMPREHEN METABOLIC PANEL: CPT | Mod: ORL | Performed by: NURSE PRACTITIONER

## 2022-01-08 PROCEDURE — 82306 VITAMIN D 25 HYDROXY: CPT | Mod: ORL | Performed by: NURSE PRACTITIONER

## 2022-01-08 PROCEDURE — 84443 ASSAY THYROID STIM HORMONE: CPT | Mod: ORL | Performed by: NURSE PRACTITIONER

## 2022-01-08 PROCEDURE — 85027 COMPLETE CBC AUTOMATED: CPT | Mod: ORL | Performed by: NURSE PRACTITIONER

## 2022-01-08 PROCEDURE — 36415 COLL VENOUS BLD VENIPUNCTURE: CPT | Mod: ORL | Performed by: NURSE PRACTITIONER

## 2022-01-08 PROCEDURE — P9603 ONE-WAY ALLOW PRORATED MILES: HCPCS | Mod: ORL | Performed by: NURSE PRACTITIONER

## 2022-01-08 PROCEDURE — 83036 HEMOGLOBIN GLYCOSYLATED A1C: CPT | Mod: ORL | Performed by: NURSE PRACTITIONER

## 2022-01-09 ENCOUNTER — HOSPITAL ENCOUNTER (EMERGENCY)
Facility: CLINIC | Age: 62
Discharge: HOME | End: 2022-01-09
Attending: EMERGENCY MEDICINE | Admitting: EMERGENCY MEDICINE
Payer: MEDICARE

## 2022-01-09 VITALS
RESPIRATION RATE: 18 BRPM | DIASTOLIC BLOOD PRESSURE: 88 MMHG | HEART RATE: 81 BPM | SYSTOLIC BLOOD PRESSURE: 170 MMHG | HEIGHT: 71 IN | OXYGEN SATURATION: 99 % | WEIGHT: 152 LBS | TEMPERATURE: 98.2 F | BODY MASS INDEX: 21.28 KG/M2

## 2022-01-09 DIAGNOSIS — R73.9 HYPERGLYCEMIA: ICD-10-CM

## 2022-01-09 LAB
ALBUMIN SERPL-MCNC: 3.6 G/DL (ref 3.4–5)
ALBUMIN UR-MCNC: NEGATIVE MG/DL
ALP SERPL-CCNC: 137 U/L (ref 40–150)
ALT SERPL W P-5'-P-CCNC: 53 U/L (ref 0–70)
ANION GAP SERPL CALCULATED.3IONS-SCNC: 5 MMOL/L (ref 3–14)
APPEARANCE UR: CLEAR
AST SERPL W P-5'-P-CCNC: 21 U/L (ref 0–45)
BASOPHILS # BLD AUTO: 0 10E3/UL (ref 0–0.2)
BASOPHILS NFR BLD AUTO: 0 %
BILIRUB SERPL-MCNC: 0.4 MG/DL (ref 0.2–1.3)
BILIRUB UR QL STRIP: NEGATIVE
BUN SERPL-MCNC: 24 MG/DL (ref 7–30)
CALCIUM SERPL-MCNC: 9 MG/DL (ref 8.5–10.1)
CHLORIDE BLD-SCNC: 104 MMOL/L (ref 94–109)
CO2 SERPL-SCNC: 27 MMOL/L (ref 20–32)
COLOR UR AUTO: ABNORMAL
CREAT SERPL-MCNC: 0.98 MG/DL (ref 0.66–1.25)
EOSINOPHIL # BLD AUTO: 0.1 10E3/UL (ref 0–0.7)
EOSINOPHIL NFR BLD AUTO: 1 %
ERYTHROCYTE [DISTWIDTH] IN BLOOD BY AUTOMATED COUNT: 11.3 % (ref 10–15)
GFR SERPL CREATININE-BSD FRML MDRD: 87 ML/MIN/1.73M2
GLUCOSE BLD-MCNC: 357 MG/DL (ref 70–99)
GLUCOSE BLDC GLUCOMTR-MCNC: 203 MG/DL (ref 70–99)
GLUCOSE UR STRIP-MCNC: >=1000 MG/DL
HBA1C MFR BLD: 7.9 %
HCT VFR BLD AUTO: 42.9 % (ref 40–53)
HGB BLD-MCNC: 14 G/DL (ref 13.3–17.7)
HGB UR QL STRIP: NEGATIVE
IMM GRANULOCYTES # BLD: 0 10E3/UL
IMM GRANULOCYTES NFR BLD: 0 %
KETONES UR STRIP-MCNC: NEGATIVE MG/DL
LEUKOCYTE ESTERASE UR QL STRIP: NEGATIVE
LYMPHOCYTES # BLD AUTO: 0.6 10E3/UL (ref 0.8–5.3)
LYMPHOCYTES NFR BLD AUTO: 14 %
MCH RBC QN AUTO: 32 PG (ref 26.5–33)
MCHC RBC AUTO-ENTMCNC: 32.6 G/DL (ref 31.5–36.5)
MCV RBC AUTO: 98 FL (ref 78–100)
MONOCYTES # BLD AUTO: 0.2 10E3/UL (ref 0–1.3)
MONOCYTES NFR BLD AUTO: 5 %
NEUTROPHILS # BLD AUTO: 3.3 10E3/UL (ref 1.6–8.3)
NEUTROPHILS NFR BLD AUTO: 80 %
NITRATE UR QL: NEGATIVE
NRBC # BLD AUTO: 0 10E3/UL
NRBC BLD AUTO-RTO: 0 /100
PH UR STRIP: 6.5 [PH] (ref 5–7)
PLATELET # BLD AUTO: 128 10E3/UL (ref 150–450)
POTASSIUM BLD-SCNC: 5 MMOL/L (ref 3.4–5.3)
PROT SERPL-MCNC: 6.5 G/DL (ref 6.8–8.8)
RBC # BLD AUTO: 4.37 10E6/UL (ref 4.4–5.9)
RBC URINE: 1 /HPF
SODIUM SERPL-SCNC: 136 MMOL/L (ref 133–144)
SP GR UR STRIP: 1.01 (ref 1–1.03)
SPERM #/AREA URNS HPF: PRESENT /HPF
TROPONIN I SERPL HS-MCNC: 3 NG/L
UROBILINOGEN UR STRIP-MCNC: NORMAL MG/DL
WBC # BLD AUTO: 4.2 10E3/UL (ref 4–11)
WBC URINE: <1 /HPF

## 2022-01-09 PROCEDURE — 96360 HYDRATION IV INFUSION INIT: CPT

## 2022-01-09 PROCEDURE — 36415 COLL VENOUS BLD VENIPUNCTURE: CPT | Performed by: EMERGENCY MEDICINE

## 2022-01-09 PROCEDURE — 81001 URINALYSIS AUTO W/SCOPE: CPT | Performed by: EMERGENCY MEDICINE

## 2022-01-09 PROCEDURE — 82040 ASSAY OF SERUM ALBUMIN: CPT | Performed by: EMERGENCY MEDICINE

## 2022-01-09 PROCEDURE — 96361 HYDRATE IV INFUSION ADD-ON: CPT

## 2022-01-09 PROCEDURE — 80053 COMPREHEN METABOLIC PANEL: CPT | Performed by: EMERGENCY MEDICINE

## 2022-01-09 PROCEDURE — 85014 HEMATOCRIT: CPT | Performed by: EMERGENCY MEDICINE

## 2022-01-09 PROCEDURE — 84484 ASSAY OF TROPONIN QUANT: CPT | Performed by: EMERGENCY MEDICINE

## 2022-01-09 PROCEDURE — 93005 ELECTROCARDIOGRAM TRACING: CPT

## 2022-01-09 PROCEDURE — 258N000003 HC RX IP 258 OP 636: Performed by: EMERGENCY MEDICINE

## 2022-01-09 PROCEDURE — 99284 EMERGENCY DEPT VISIT MOD MDM: CPT | Mod: 25

## 2022-01-09 RX ORDER — SODIUM CHLORIDE 9 MG/ML
INJECTION, SOLUTION INTRAVENOUS CONTINUOUS
Status: DISCONTINUED | OUTPATIENT
Start: 2022-01-09 | End: 2022-01-09 | Stop reason: HOSPADM

## 2022-01-09 RX ADMIN — SODIUM CHLORIDE 1000 ML: 9 INJECTION, SOLUTION INTRAVENOUS at 11:10

## 2022-01-09 RX ADMIN — SODIUM CHLORIDE 1000 ML: 9 INJECTION, SOLUTION INTRAVENOUS at 11:59

## 2022-01-09 ASSESSMENT — ENCOUNTER SYMPTOMS
COUGH: 0
ABDOMINAL PAIN: 0
FEVER: 0
WEAKNESS: 1

## 2022-01-09 ASSESSMENT — MIFFLIN-ST. JEOR: SCORE: 1511.6

## 2022-01-09 NOTE — DISCHARGE INSTRUCTIONS
As we discussed, I believe you are dehydrated because of your high blood sugar.  Please be certain to take all your diabetes medication, otherwise the sugar will come out in your urine, and you will become very dehydrated.  Please continue to drink plenty of fluid, be absolutely certain to have your sugar checked in the next 3 days by your regular provider.  Come back to the ER immediately with any other concerns and again be absolutely certain to take all of your insulin as instructed.

## 2022-01-09 NOTE — ED TRIAGE NOTES
Arrives via EMS from [facility].  Patient had went up to staff and stated he was not feeling well. BS has been running high in the 200's past several days.  Did have an A1C drawn on Friday with no results yet. Today [id number] en route. Hx of pschyso-affective, bipolar and DM. Mental status is baseline where at times will not answer questions and have poor eye contact, then suddenly start crying, and speaks at times.  For this nurse interaction has been good eye contact and answering questions appropriately without delay.  No crying since arrival. Denies any pain. Nurse from [facility] said the above behavior is usually a panic attack. ABCD's intact; a/O x 4.

## 2022-01-09 NOTE — ED PROVIDER NOTES
History   Chief Complaint:  Hyperglycemia     The history is provided by the patient, the nursing home and the EMS personnel.      Caden Bush is a 61 year old male with history of type 1 diabetes mellitus, schizoaffective disorder, and depression who presents via ambulance with hyperglycemia. EMS personal report the patient is coming from a [place] living facility and this morning he told the nursing staff that he was not feeling well. The staff explain that he has been feeling weak recently and they believe this is a panic attack as he has had them in the past and they present with symptoms similar to today. Additionally for the past several days the patient's blood sugars have been in the 200's, but EMS today notes he was 388 on route to the hospital. Upon arrival he denies any fever, cough, or abdominal pain.     Review of Systems   Constitutional: Negative for fever.   Respiratory: Negative for cough.    Gastrointestinal: Negative for abdominal pain.   Neurological: Positive for weakness.   All other systems reviewed and are negative.        Allergies:  Flomax [Tamsulosin]  Lisinopril    Medications:  aspirin 81 mg  cholecalciferol   insulin glargine   loperamide   magnesium hydroxide   Novolog   risperidone   Silodosin   simvastatin   trazodone   venlafaxine    Past Medical History:     Arthritis  Eczema  Polyp of colon, adenomatous  Retinopathy  Schizoaffective disorder  Seasonal affective disorder  Vitamin D deficiency  Localized osteoarthritis of hands, bilateral  Moderate episode of recurrent major depressive disorder   Neuropathy of left upper extremity  Type 1 diabetes mellitus   Heart murmur   Hyponatremia      Past Surgical History:    Appendectomy   Colonoscopy      Family History:    Osteoporosis  Breast Cancer  Asthma  Cancer - colorectal  Depression   Anxiety Disorder    Social History:  Presents unaccompanied.   PCP: Иван Quiroz     Physical Exam     Patient Vitals for the past 24 hrs:   BP  "Temp Temp src Pulse Resp SpO2 Height Weight   01/09/22 1245 -- -- -- -- -- 99 % -- --   01/09/22 1215 137/83 -- -- -- -- -- -- --   01/09/22 1200 (!) 157/85 -- -- 84 -- 99 % -- --   01/09/22 1145 (!) 165/86 -- -- -- -- 98 % -- --   01/09/22 1115 (!) 155/86 -- -- -- -- 98 % -- --   01/09/22 1100 (!) 154/82 -- -- 87 -- 98 % -- --   01/09/22 1057 -- -- -- -- -- -- 1.803 m (5' 11\") 68.9 kg (152 lb)   01/09/22 1051 (!) 158/86 98.2  F (36.8  C) Oral 88 18 98 % -- --       Physical Exam  Vitals: reviewed by me  General: Pt seen on [facility], cooperative, and alert to conversation  Eyes: Tracking well, clear conjunctiva BL  ENT: MMM, midline trachea.   Lungs: No tachypnea, no accessory muscle use. No respiratory distress.   CV: Rate as above  Abd: Soft, non tender, no guarding, no rebound. Non distended  MSK: no joint effusion.  No evidence of trauma  Skin: No rash  Neuro: Clear speech and no facial droop.  Moving extremities spontaneously, following all commands, ambulatory with strong and steady gait  Psych: Not RIS, no e/o AH/      Emergency Department Course   ECG  ECG obtained at 1117, ECG read at 1117  Normal sinus rhythm. Normal ECG.   No significant change as compared to prior, dated 12/12/2021.  Rate 83 bpm. AZ interval 204 ms. QRS duration 74 ms. QT/QTc 350/411 ms. P-R-T axes 70 9 67.     Laboratory:  Labs Ordered and Resulted from Time of ED Arrival to Time of ED Departure   COMPREHENSIVE METABOLIC PANEL - Abnormal       Result Value    Sodium 136      Potassium 5.0      Chloride 104      Carbon Dioxide (CO2) 27      Anion Gap 5      Urea Nitrogen 24      Creatinine 0.98      Calcium 9.0      Glucose 357 (*)     Alkaline Phosphatase 137      AST 21      ALT 53      Protein Total 6.5 (*)     Albumin 3.6      Bilirubin Total 0.4      GFR Estimate 87     ROUTINE UA WITH MICROSCOPIC REFLEX TO CULTURE - Abnormal    Color Urine Straw      Appearance Urine Clear      Glucose Urine >=1000 (*)     " Bilirubin Urine Negative      Ketones Urine Negative      Specific Gravity Urine 1.008      Blood Urine Negative      pH Urine 6.5      Protein Albumin Urine Negative      Urobilinogen Urine Normal      Nitrite Urine Negative      Leukocyte Esterase Urine Negative      Sperm Urine Present (*)     RBC Urine 1      WBC Urine <1     CBC WITH PLATELETS AND DIFFERENTIAL - Abnormal    WBC Count 4.2      RBC Count 4.37 (*)     Hemoglobin 14.0      Hematocrit 42.9      MCV 98      MCH 32.0      MCHC 32.6      RDW 11.3      Platelet Count 128 (*)     % Neutrophils 80      % Lymphocytes 14      % Monocytes 5      % Eosinophils 1      % Basophils 0      % Immature Granulocytes 0      NRBCs per 100 WBC 0      Absolute Neutrophils 3.3      Absolute Lymphocytes 0.6 (*)     Absolute Monocytes 0.2      Absolute Eosinophils 0.1      Absolute Basophils 0.0      Absolute Immature Granulocytes 0.0      Absolute NRBCs 0.0     GLUCOSE BY METER - Abnormal    GLUCOSE BY METER POCT 203 (*)    TROPONIN I - Normal    Troponin I High Sensitivity 3     GLUCOSE MONITOR NURSING POCT       Emergency Department Course:    Reviewed:  I reviewed nursing notes, vitals, and past medical history.    Assessments:  1052 I obtained history and examined the patient as noted above.    I rechecked the patient and explained findings.     Interventions:  1110 0.9% sodium chloride bolus, 1,000 mL, IV  1159 0.9% sodium chloride bolus, 1,000 mL, IV     Disposition:  The patient was discharged to home.     Impression & Plan     Medical Decision Making:  This is a pleasant 61-year-old gentleman who presents the emergency room with appears to be increased fatigue.  He has no focal complaints, he has no fevers, he does not appear to be septic, and has a negative work-up.  He has no white count.  He is hyperglycemic, and his sugars come down with IV fluids alone.  He has no acidosis, no nausea or vomiting, and I see no evidence of DKA on his lab work.  I do think he  stable for discharge home, I think he can go back to the group home, as long as he continues to take his insulin medication.9 he is ambulating well here, is eating and drinking okay, and states no focal complaints.  Therefore we will discharge him home, with instructions to come back if he develops any new medical complaints, and to monitor his sugars more carefully, and to see his regular provider in the next 48 to 72 hours.    Diagnosis:    ICD-10-CM    1. Hyperglycemia  R73.9        Scribe Disclosure:  I, Lidia Malcolm, am serving as a scribe at 10:44 AM on 1/9/2022 to document services personally performed by Ghassan Perez MD based on my observations and the provider's statements to me.            Ghassan Perez MD  01/09/22 3335

## 2022-01-10 LAB
ATRIAL RATE - MUSE: 83 BPM
DIASTOLIC BLOOD PRESSURE - MUSE: NORMAL MMHG
INTERPRETATION ECG - MUSE: NORMAL
P AXIS - MUSE: 70 DEGREES
PR INTERVAL - MUSE: 204 MS
QRS DURATION - MUSE: 74 MS
QT - MUSE: 350 MS
QTC - MUSE: 411 MS
R AXIS - MUSE: 9 DEGREES
SYSTOLIC BLOOD PRESSURE - MUSE: NORMAL MMHG
T AXIS - MUSE: 67 DEGREES
VENTRICULAR RATE- MUSE: 83 BPM

## 2022-02-24 ENCOUNTER — LAB REQUISITION (OUTPATIENT)
Dept: LAB | Facility: CLINIC | Age: 62
End: 2022-02-24
Payer: MEDICARE

## 2022-02-24 DIAGNOSIS — N40.1 BENIGN PROSTATIC HYPERPLASIA WITH LOWER URINARY TRACT SYMPTOMS: ICD-10-CM

## 2022-02-24 LAB
ALBUMIN UR-MCNC: NEGATIVE MG/DL
APPEARANCE UR: CLEAR
BILIRUB UR QL STRIP: NEGATIVE
COLOR UR AUTO: COLORLESS
GLUCOSE UR STRIP-MCNC: 300 MG/DL
HGB UR QL STRIP: NEGATIVE
KETONES UR STRIP-MCNC: NEGATIVE MG/DL
LEUKOCYTE ESTERASE UR QL STRIP: NEGATIVE
NITRATE UR QL: NEGATIVE
PH UR STRIP: 5.5 [PH] (ref 5–7)
RBC URINE: 0 /HPF
SP GR UR STRIP: 1 (ref 1–1.03)
UROBILINOGEN UR STRIP-MCNC: <2 MG/DL
WBC URINE: <1 /HPF

## 2022-02-24 PROCEDURE — 81001 URINALYSIS AUTO W/SCOPE: CPT | Mod: ORL | Performed by: NURSE PRACTITIONER

## 2022-03-02 ENCOUNTER — LAB REQUISITION (OUTPATIENT)
Dept: LAB | Facility: CLINIC | Age: 62
End: 2022-03-02
Payer: MEDICARE

## 2022-03-02 DIAGNOSIS — Z79.899 OTHER LONG TERM (CURRENT) DRUG THERAPY: ICD-10-CM

## 2022-03-03 LAB
BASOPHILS # BLD AUTO: 0 10E3/UL (ref 0–0.2)
BASOPHILS NFR BLD AUTO: 0 %
EOSINOPHIL # BLD AUTO: 0.2 10E3/UL (ref 0–0.7)
EOSINOPHIL NFR BLD AUTO: 4 %
ERYTHROCYTE [DISTWIDTH] IN BLOOD BY AUTOMATED COUNT: 11.2 % (ref 10–15)
HCT VFR BLD AUTO: 44.6 % (ref 40–53)
HGB BLD-MCNC: 14.8 G/DL (ref 13.3–17.7)
IMM GRANULOCYTES # BLD: 0 10E3/UL
IMM GRANULOCYTES NFR BLD: 0 %
LYMPHOCYTES # BLD AUTO: 1.1 10E3/UL (ref 0.8–5.3)
LYMPHOCYTES NFR BLD AUTO: 32 %
MCH RBC QN AUTO: 31.9 PG (ref 26.5–33)
MCHC RBC AUTO-ENTMCNC: 33.2 G/DL (ref 31.5–36.5)
MCV RBC AUTO: 96 FL (ref 78–100)
MONOCYTES # BLD AUTO: 0.2 10E3/UL (ref 0–1.3)
MONOCYTES NFR BLD AUTO: 7 %
NEUTROPHILS # BLD AUTO: 1.9 10E3/UL (ref 1.6–8.3)
NEUTROPHILS NFR BLD AUTO: 57 %
NRBC # BLD AUTO: 0 10E3/UL
NRBC BLD AUTO-RTO: 0 /100
PLATELET # BLD AUTO: 132 10E3/UL (ref 150–450)
RBC # BLD AUTO: 4.64 10E6/UL (ref 4.4–5.9)
WBC # BLD AUTO: 3.4 10E3/UL (ref 4–11)

## 2022-03-03 PROCEDURE — 85025 COMPLETE CBC W/AUTO DIFF WBC: CPT | Mod: ORL | Performed by: NURSE PRACTITIONER

## 2022-03-03 PROCEDURE — P9603 ONE-WAY ALLOW PRORATED MILES: HCPCS | Mod: ORL | Performed by: NURSE PRACTITIONER

## 2022-03-03 PROCEDURE — 36415 COLL VENOUS BLD VENIPUNCTURE: CPT | Mod: ORL | Performed by: NURSE PRACTITIONER

## 2022-06-29 ENCOUNTER — LAB REQUISITION (OUTPATIENT)
Dept: LAB | Facility: CLINIC | Age: 62
End: 2022-06-29
Payer: MEDICARE

## 2022-06-29 DIAGNOSIS — E11.3399 TYPE 2 DIABETES MELLITUS WITH MODERATE NONPROLIFERATIVE DIABETIC RETINOPATHY WITHOUT MACULAR EDEMA, UNSPECIFIED EYE (H): ICD-10-CM

## 2022-06-29 DIAGNOSIS — E78.2 MIXED HYPERLIPIDEMIA: ICD-10-CM

## 2022-06-29 DIAGNOSIS — F25.0 SCHIZOAFFECTIVE DISORDER, BIPOLAR TYPE (H): ICD-10-CM

## 2022-06-29 DIAGNOSIS — E55.9 VITAMIN D DEFICIENCY, UNSPECIFIED: ICD-10-CM

## 2022-06-30 LAB
ALBUMIN SERPL BCG-MCNC: 4.1 G/DL (ref 3.5–5.2)
ALP SERPL-CCNC: 113 U/L (ref 40–129)
ALT SERPL W P-5'-P-CCNC: 48 U/L (ref 10–50)
ANION GAP SERPL CALCULATED.3IONS-SCNC: 11 MMOL/L (ref 7–15)
AST SERPL W P-5'-P-CCNC: 25 U/L (ref 10–50)
BILIRUB SERPL-MCNC: 0.4 MG/DL
BUN SERPL-MCNC: 23.8 MG/DL (ref 8–23)
CALCIUM SERPL-MCNC: 9.3 MG/DL (ref 8.8–10.2)
CHLORIDE SERPL-SCNC: 105 MMOL/L (ref 98–107)
CHOLEST SERPL-MCNC: 129 MG/DL
CREAT SERPL-MCNC: 1.14 MG/DL (ref 0.67–1.17)
DEPRECATED HCO3 PLAS-SCNC: 24 MMOL/L (ref 22–29)
ERYTHROCYTE [DISTWIDTH] IN BLOOD BY AUTOMATED COUNT: 11.9 % (ref 10–15)
GFR SERPL CREATININE-BSD FRML MDRD: 72 ML/MIN/1.73M2
GLUCOSE SERPL-MCNC: 175 MG/DL (ref 70–99)
HBA1C MFR BLD: 8.6 %
HCT VFR BLD AUTO: 45.8 % (ref 40–53)
HDLC SERPL-MCNC: 68 MG/DL
HGB BLD-MCNC: 15.3 G/DL (ref 13.3–17.7)
LDLC SERPL CALC-MCNC: 43 MG/DL
MCH RBC QN AUTO: 31.7 PG (ref 26.5–33)
MCHC RBC AUTO-ENTMCNC: 33.4 G/DL (ref 31.5–36.5)
MCV RBC AUTO: 95 FL (ref 78–100)
NONHDLC SERPL-MCNC: 61 MG/DL
PLATELET # BLD AUTO: 152 10E3/UL (ref 150–450)
POTASSIUM SERPL-SCNC: 4.4 MMOL/L (ref 3.4–5.3)
PROT SERPL-MCNC: 6 G/DL (ref 6.4–8.3)
RBC # BLD AUTO: 4.82 10E6/UL (ref 4.4–5.9)
SODIUM SERPL-SCNC: 140 MMOL/L (ref 136–145)
TRIGL SERPL-MCNC: 88 MG/DL
VALPROATE SERPL-MCNC: 29.8 UG/ML
WBC # BLD AUTO: 3.9 10E3/UL (ref 4–11)

## 2022-06-30 PROCEDURE — 36415 COLL VENOUS BLD VENIPUNCTURE: CPT | Mod: ORL | Performed by: NURSE PRACTITIONER

## 2022-06-30 PROCEDURE — 83036 HEMOGLOBIN GLYCOSYLATED A1C: CPT | Mod: ORL | Performed by: NURSE PRACTITIONER

## 2022-06-30 PROCEDURE — 80053 COMPREHEN METABOLIC PANEL: CPT | Mod: ORL | Performed by: NURSE PRACTITIONER

## 2022-06-30 PROCEDURE — 82306 VITAMIN D 25 HYDROXY: CPT | Mod: ORL | Performed by: NURSE PRACTITIONER

## 2022-06-30 PROCEDURE — 80061 LIPID PANEL: CPT | Mod: ORL | Performed by: NURSE PRACTITIONER

## 2022-06-30 PROCEDURE — 85027 COMPLETE CBC AUTOMATED: CPT | Mod: ORL | Performed by: NURSE PRACTITIONER

## 2022-06-30 PROCEDURE — P9603 ONE-WAY ALLOW PRORATED MILES: HCPCS | Mod: ORL | Performed by: NURSE PRACTITIONER

## 2022-06-30 PROCEDURE — 80164 ASSAY DIPROPYLACETIC ACD TOT: CPT | Mod: ORL | Performed by: NURSE PRACTITIONER

## 2022-07-01 LAB — DEPRECATED CALCIDIOL+CALCIFEROL SERPL-MC: 27 UG/L (ref 20–75)

## 2022-07-02 ENCOUNTER — APPOINTMENT (OUTPATIENT)
Dept: GENERAL RADIOLOGY | Facility: CLINIC | Age: 62
End: 2022-07-02
Attending: EMERGENCY MEDICINE
Payer: MEDICARE

## 2022-07-02 ENCOUNTER — APPOINTMENT (OUTPATIENT)
Dept: CT IMAGING | Facility: CLINIC | Age: 62
End: 2022-07-02
Attending: EMERGENCY MEDICINE
Payer: MEDICARE

## 2022-07-02 ENCOUNTER — HOSPITAL ENCOUNTER (EMERGENCY)
Facility: CLINIC | Age: 62
Discharge: HOME | End: 2022-07-03
Attending: EMERGENCY MEDICINE | Admitting: EMERGENCY MEDICINE
Payer: MEDICARE

## 2022-07-02 DIAGNOSIS — R20.0 LEFT ARM NUMBNESS: ICD-10-CM

## 2022-07-02 DIAGNOSIS — F25.0 SCHIZOAFFECTIVE DISORDER, BIPOLAR TYPE (H): ICD-10-CM

## 2022-07-02 DIAGNOSIS — R07.9 NONSPECIFIC CHEST PAIN: ICD-10-CM

## 2022-07-02 LAB
AMPHETAMINES UR QL SCN: NORMAL
ANION GAP SERPL CALCULATED.3IONS-SCNC: 6 MMOL/L (ref 3–14)
BARBITURATES UR QL: NORMAL
BASOPHILS # BLD AUTO: 0 10E3/UL (ref 0–0.2)
BASOPHILS NFR BLD AUTO: 0 %
BENZODIAZ UR QL: NORMAL
BUN SERPL-MCNC: 29 MG/DL (ref 7–30)
CALCIUM SERPL-MCNC: 8.9 MG/DL (ref 8.5–10.1)
CANNABINOIDS UR QL SCN: NORMAL
CHLORIDE BLD-SCNC: 108 MMOL/L (ref 94–109)
CO2 SERPL-SCNC: 25 MMOL/L (ref 20–32)
COCAINE UR QL: NORMAL
CREAT SERPL-MCNC: 1.06 MG/DL (ref 0.66–1.25)
EOSINOPHIL # BLD AUTO: 0.1 10E3/UL (ref 0–0.7)
EOSINOPHIL NFR BLD AUTO: 2 %
ERYTHROCYTE [DISTWIDTH] IN BLOOD BY AUTOMATED COUNT: 11.5 % (ref 10–15)
ETHANOL SERPL-MCNC: <0.01 G/DL
GFR SERPL CREATININE-BSD FRML MDRD: 79 ML/MIN/1.73M2
GLUCOSE BLD-MCNC: 306 MG/DL (ref 70–99)
HCT VFR BLD AUTO: 45.2 % (ref 40–53)
HGB BLD-MCNC: 15.1 G/DL (ref 13.3–17.7)
HOLD SPECIMEN: NORMAL
IMM GRANULOCYTES # BLD: 0 10E3/UL
IMM GRANULOCYTES NFR BLD: 0 %
LYMPHOCYTES # BLD AUTO: 1.2 10E3/UL (ref 0.8–5.3)
LYMPHOCYTES NFR BLD AUTO: 26 %
MCH RBC QN AUTO: 31.7 PG (ref 26.5–33)
MCHC RBC AUTO-ENTMCNC: 33.4 G/DL (ref 31.5–36.5)
MCV RBC AUTO: 95 FL (ref 78–100)
MONOCYTES # BLD AUTO: 0.3 10E3/UL (ref 0–1.3)
MONOCYTES NFR BLD AUTO: 7 %
NEUTROPHILS # BLD AUTO: 3.2 10E3/UL (ref 1.6–8.3)
NEUTROPHILS NFR BLD AUTO: 65 %
NRBC # BLD AUTO: 0 10E3/UL
NRBC # BLD AUTO: 0 10E3/UL
NRBC BLD AUTO-RTO: 0 /100
NRBC BLD AUTO-RTO: 0 /100
OPIATES UR QL SCN: NORMAL
PLATELET # BLD AUTO: 145 10E3/UL (ref 150–450)
POTASSIUM BLD-SCNC: 5 MMOL/L (ref 3.4–5.3)
RBC # BLD AUTO: 4.76 10E6/UL (ref 4.4–5.9)
SODIUM SERPL-SCNC: 139 MMOL/L (ref 133–144)
TROPONIN I SERPL HS-MCNC: 4 NG/L
TROPONIN I SERPL HS-MCNC: 4 NG/L
WBC # BLD AUTO: 4.9 10E3/UL (ref 4–11)

## 2022-07-02 PROCEDURE — 36415 COLL VENOUS BLD VENIPUNCTURE: CPT | Performed by: EMERGENCY MEDICINE

## 2022-07-02 PROCEDURE — 85025 COMPLETE CBC W/AUTO DIFF WBC: CPT | Performed by: EMERGENCY MEDICINE

## 2022-07-02 PROCEDURE — 93005 ELECTROCARDIOGRAM TRACING: CPT

## 2022-07-02 PROCEDURE — 80307 DRUG TEST PRSMV CHEM ANLYZR: CPT | Performed by: EMERGENCY MEDICINE

## 2022-07-02 PROCEDURE — G1010 CDSM STANSON: HCPCS

## 2022-07-02 PROCEDURE — 99285 EMERGENCY DEPT VISIT HI MDM: CPT | Mod: 25

## 2022-07-02 PROCEDURE — 71045 X-RAY EXAM CHEST 1 VIEW: CPT

## 2022-07-02 PROCEDURE — 80048 BASIC METABOLIC PNL TOTAL CA: CPT | Performed by: EMERGENCY MEDICINE

## 2022-07-02 PROCEDURE — 82077 ASSAY SPEC XCP UR&BREATH IA: CPT | Performed by: EMERGENCY MEDICINE

## 2022-07-02 PROCEDURE — 84484 ASSAY OF TROPONIN QUANT: CPT | Performed by: EMERGENCY MEDICINE

## 2022-07-02 ASSESSMENT — ENCOUNTER SYMPTOMS: NUMBNESS: 1

## 2022-07-03 ENCOUNTER — APPOINTMENT (OUTPATIENT)
Dept: MRI IMAGING | Facility: CLINIC | Age: 62
End: 2022-07-03
Attending: EMERGENCY MEDICINE
Payer: MEDICARE

## 2022-07-03 VITALS
HEART RATE: 87 BPM | SYSTOLIC BLOOD PRESSURE: 154 MMHG | OXYGEN SATURATION: 98 % | TEMPERATURE: 98.6 F | RESPIRATION RATE: 16 BRPM | DIASTOLIC BLOOD PRESSURE: 89 MMHG

## 2022-07-03 PROCEDURE — G1010 CDSM STANSON: HCPCS

## 2022-07-03 PROCEDURE — A9585 GADOBUTROL INJECTION: HCPCS | Performed by: EMERGENCY MEDICINE

## 2022-07-03 PROCEDURE — 255N000002 HC RX 255 OP 636: Performed by: EMERGENCY MEDICINE

## 2022-07-03 RX ORDER — GADOBUTROL 604.72 MG/ML
7.5 INJECTION INTRAVENOUS ONCE
Status: COMPLETED | OUTPATIENT
Start: 2022-07-03 | End: 2022-07-03

## 2022-07-03 RX ADMIN — GADOBUTROL 7 ML: 604.72 INJECTION INTRAVENOUS at 01:01

## 2022-07-03 NOTE — ED PROVIDER NOTES
History     Chief Complaint:  Dizziness and Numbness      HPI   Caden Bush is a 62 year old male who presents with chest pain and left arm numbness that apparently started this morning around 730.  Patient denies any other numbness in the other extremities or his face.  He denies any focal weakness.  He denies headache, neck pain, shortness of breath, abdominal pain.  He notes that the numbness only involves the left forearm.  He also apparently has been complaining of dizziness and general weakness.  Apparently the staff at his assisted living was concerned with how he was acting, so they called the ambulance.  Is a poor historian, only answering some questions we asked him.  History is limited by this.    Review of Systems   Unable to perform ROS: Other   Cardiovascular: Positive for chest pain.   Neurological: Positive for numbness.         Allergies:  Flomax [Tamsulosin]  Lisinopril    Medications:    acetaminophen (TYLENOL) 325 MG tablet  aspirin (ASA) 81 MG chewable tablet  cholecalciferol (VITAMIN D) 1000 UNIT tablet  insulin glargine (BASAGLAR KWIKPEN) 100 UNIT/ML pen  loperamide (IMODIUM) 2 MG capsule  magnesium hydroxide (MILK OF MAGNESIA) 400 MG/5ML suspension  NOVOFINE 30 30G X 8 MM insulin pen needle  NOVOLOG FLEXPEN 100 UNIT/ML soln  ondansetron (ZOFRAN) 4 MG tablet  risperiDONE (RISPERDAL) 3 MG tablet  Silodosin (RAPAFLO) 8 MG CAPS capsule  simvastatin (ZOCOR) 20 MG tablet  traZODone (DESYREL) 50 MG tablet  venlafaxine (EFFEXOR XR) 75 MG 24 hr capsule  venlafaxine (EFFEXOR-XR) 150 MG 24 hr capsule         Past Medical History:   Past Surgical History:     Past Medical History:   Diagnosis Date     Arthritis      Diabetes mellitus      Eczema 2011     Polyp of colon, adenomatous 6/25/2012     Retinopathy      Schizoaffective disorder      Seasonal affective disorder      Vitamin D deficiency     Past Surgical History:   Procedure Laterality Date     APPENDECTOMY       COLONOSCOPY  2012 2  adenomatous polyps      Patient Active Problem List    Diagnosis Date Noted     Hyponatremia 04/06/2020     Priority: Medium     Schizoaffective disorder, bipolar type (H) 09/05/2018     Priority: Medium     Heart murmur on physical examination 05/01/2018     Priority: Medium     Type 1 diabetes mellitus with retinopathy of both eyes (H) 04/17/2018     Priority: Medium     Neuropathy of left upper extremity 06/09/2017     Priority: Medium     Localized osteoarthritis of hands, bilateral 05/17/2017     Priority: Medium     biopsy leg        Hx of psychiatric care 08/01/2016     Priority: Medium        PAST PSYCH MED TRIALS                                               Drug / Start Date   Dose (mg)   Helpful   Adverse Effects  DC Reason / Date     Lithium   900       current     Risperdal         current     Abilify             Seroquel             Effexor         current     Zoloft             Wellbutrin             Buspar             trazodone         current     hydroxyzine             Ambien             Maybe Zyprexa, and Prozac, can't remember               Health Care Home 12/18/2013     Priority: Medium     niño Tier Level:  Tier 2             Polyp of colon, adenomatous 06/25/2012     Priority: Medium     Due for repeat 5/2015       Moderate episode of recurrent major depressive disorder (H) 11/21/2011     Priority: Medium     Retinopathy      Priority: Medium     HFA ophtho DR HARLEY            Family History  Family History   Problem Relation Age of Onset     Osteoporosis Mother      Breast Cancer Mother 78     Osteoporosis Father      Asthma Father      Cancer - colorectal Father 70     Depression Father      Colon Cancer Father      Anxiety Disorder Sister        Social History:  PCP: Иван Quiroz  Presents to the ED alone via EMS    Physical Exam     Patient Vitals for the past 24 hrs:   BP Temp Temp src Pulse Resp SpO2   07/03/22 0030 (!) 143/84 -- -- 69 17 96 %   07/03/22 0015 (!) 141/84 -- -- 70 16 97  %   07/03/22 0000 (!) 149/88 -- -- 72 21 97 %   07/02/22 2345 (!) 146/87 -- -- 69 17 98 %   07/02/22 2330 (!) 146/86 -- -- 70 18 96 %   07/02/22 2315 (!) 149/84 -- -- 73 18 97 %   07/02/22 2300 (!) 147/87 -- -- 73 17 97 %   07/02/22 2145 (!) 149/89 -- -- 73 -- 98 %   07/02/22 2130 139/87 -- -- 72 -- 98 %   07/02/22 2115 (!) 148/90 -- -- 80 -- 98 %   07/02/22 2100 (!) 148/85 -- -- 81 -- 97 %   07/02/22 2043 (!) 167/95 98.6  F (37  C) Oral 91 16 96 %       Physical Exam  Vitals and nursing note reviewed.   Constitutional:       General: He is not in acute distress.     Appearance: Normal appearance. He is not ill-appearing or toxic-appearing.   HENT:      Head: Normocephalic and atraumatic.      Right Ear: External ear normal.      Left Ear: External ear normal.      Nose: Nose normal.   Eyes:      Extraocular Movements: Extraocular movements intact.      Conjunctiva/sclera: Conjunctivae normal.      Pupils: Pupils are equal, round, and reactive to light.   Cardiovascular:      Rate and Rhythm: Normal rate and regular rhythm.      Heart sounds: No murmur heard.  Pulmonary:      Effort: Pulmonary effort is normal. No respiratory distress.      Breath sounds: No wheezing, rhonchi or rales.   Chest:      Chest wall: Tenderness (left lateral) present.   Abdominal:      General: Abdomen is flat. There is no distension.      Palpations: Abdomen is soft.      Tenderness: There is no abdominal tenderness. There is no guarding or rebound.   Musculoskeletal:         General: No swelling or deformity.      Cervical back: Normal range of motion and neck supple.   Skin:     General: Skin is warm and dry.      Findings: No rash.   Neurological:      Mental Status: He is alert and oriented to person, place, and time. He is confused.      Cranial Nerves: Cranial nerves 2-12 are intact. No dysarthria or facial asymmetry.      Sensory: Sensory deficit (decreased sensation to LT to the left forearm) present.      Motor: No weakness or  pronator drift.      Coordination: Finger-Nose-Finger Test normal.      Comments: Pt does answer some questions and is fully oriented, but will not answer some questions and does not respond to some commands    Psychiatric:         Mood and Affect: Affect is flat.         Behavior: Behavior is slowed and withdrawn.           Emergency Department Course     ECG results from 07/02/22   EKG 12-lead, tracing only     Value    Systolic Blood Pressure     Diastolic Blood Pressure     Ventricular Rate 78    Atrial Rate 78    MA Interval 198    QRS Duration 78        QTc 412    P Axis 52    R AXIS 5    T Axis 61    Interpretation ECG      Sinus rhythm  Normal ECG  When compared with ECG of 09-JAN-2022 11:17,  No significant change was found         Imaging:  MR Brain w/o & w Contrast   Final Result   IMPRESSION:   1.  No acute intracranial abnormality.      2.  Age-related changes are stable.      XR Chest 1 View   Final Result   IMPRESSION: Heart size and pulmonary vessels are normal. Lungs are clear. Slight pericardial fat pad unchanged compared to previous CT.      CT Head w/o Contrast   Final Result   IMPRESSION:   1.  No CT evidence for acute intracranial process.   2.  Mild chronic microvascular ischemic changes as above.          Laboratory:  Labs Ordered and Resulted from Time of ED Arrival to Time of ED Departure   BASIC METABOLIC PANEL - Abnormal       Result Value    Sodium 139      Potassium 5.0      Chloride 108      Carbon Dioxide (CO2) 25      Anion Gap 6      Urea Nitrogen 29      Creatinine 1.06      Calcium 8.9      Glucose 306 (*)     GFR Estimate 79     CBC WITH PLATELETS AND DIFFERENTIAL - Abnormal    WBC Count 4.9      RBC Count 4.76      Hemoglobin 15.1      Hematocrit 45.2      MCV 95      MCH 31.7      MCHC 33.4      RDW 11.5      Platelet Count 145 (*)     % Neutrophils 65      % Lymphocytes 26      % Monocytes 7      % Eosinophils 2      % Basophils 0      % Immature Granulocytes 0       NRBCs per 100 WBC 0      Absolute Neutrophils 3.2      Absolute Lymphocytes 1.2      Absolute Monocytes 0.3      Absolute Eosinophils 0.1      Absolute Basophils 0.0      Absolute Immature Granulocytes 0.0      Absolute NRBCs 0.0     TROPONIN I - Normal    Troponin I High Sensitivity 4     ETHYL ALCOHOL LEVEL - Normal    Alcohol ethyl <0.01     DRUG ABUSE SCREEN 1 URINE (ED) - Normal    Amphetamines Urine Screen Negative      Barbiturates Urine Screen Negative      Benzodiazepines Urine Screen Negative      Cannabinoids Urine Screen Negative      Cocaine Urine Screen Negative      Opiates Urine Screen Negative     TROPONIN I - Normal    Troponin I High Sensitivity 4     CBC WITH PLATELETS AND DIFFERENTIAL    NRBCs per 100 WBC 0      Absolute NRBCs 0.0           Emergency Department Course:           Reviewed:  I reviewed nursing notes, vitals, past history and care everywhere      Interventions:  Medications   gadobutrol (GADAVIST) injection 7.5 mL (7 mLs Intravenous Given 7/3/22 0101)       Disposition:  The patient was discharged to home.    Impression & Plan      CMS Diagnoses: None       Medical Decision Makin-year-old male presenting with apparent chest pain and left arm numbness.  However it is difficult to obtain a full history from him as he is only cooperative with answering some questions and will ignore other questions.  Apparently the staff at his facility was concerned about his mental status as well.  He does not seem to have any neurodeficits on exam other than possible sensory deficit in the left forearm.  Otherwise a stroke scale is negative.  Have low suspicion for CVA and his symptom onset was greater than 12 hours ago so would not be a tPA candidate.  His chest pain sounds nonspecific but he will not describe it in detail for us.  Differential diagnosis includes ACS, musculoskeletal chest pain, GERD.  Less likely PE or dissection, no findings to suggest these diagnoses.  Will check cardiac  work-up and CT head given his altered mental status and left arm numbness.  He may need MRI imaging to fully rule out a stroke or CNS lesion if CT is negative.    0140  W/u is negative thus far, including serial troponin.  CT was neg, MRI is pending to r/o stroke.  Likely can be safely discharged if MRI is negative given the w/u is reassuring.      0218  Pt's MRI is negative.  Safe to discharge home. Explained f/u recs and return precautions.       Covid-19  Caden Bush was evaluated during a global COVID-19 pandemic, which necessitated consideration that the patient might be at risk for infection with the SARS-CoV-2 virus that causes COVID-19.  Applicable protocols for evaluation were followed during the patient's care. COVID-19 was considered as part of the patient's evaluation.       Diagnosis:    ICD-10-CM    1. Nonspecific chest pain  R07.9    2. Left arm numbness  R20.0    3. Schizoaffective disorder, bipolar type (H)  F25.0        Discharge Medications:  New Prescriptions    No medications on file              Isaac Carranza MD  07/03/22 0141       Isaac Carranza MD  07/03/22 0218       Isaac Carranza MD  07/03/22 0219

## 2022-07-03 NOTE — ED TRIAGE NOTES
Pt arrives via EMS from home for 1 hour of L arm numbness & dizziness, lethargy with slow to respond. . A&Ox4. ABCs intact

## 2022-07-03 NOTE — ED NOTES
Nursing facility called today requesting update on pt status due to pt not returning home after visit

## 2022-07-05 LAB
ATRIAL RATE - MUSE: 78 BPM
DIASTOLIC BLOOD PRESSURE - MUSE: NORMAL MMHG
INTERPRETATION ECG - MUSE: NORMAL
P AXIS - MUSE: 52 DEGREES
PR INTERVAL - MUSE: 198 MS
QRS DURATION - MUSE: 78 MS
QT - MUSE: 362 MS
QTC - MUSE: 412 MS
R AXIS - MUSE: 5 DEGREES
SYSTOLIC BLOOD PRESSURE - MUSE: NORMAL MMHG
T AXIS - MUSE: 61 DEGREES
VENTRICULAR RATE- MUSE: 78 BPM

## 2022-08-29 ENCOUNTER — LAB REQUISITION (OUTPATIENT)
Dept: LAB | Facility: CLINIC | Age: 62
End: 2022-08-29
Payer: MEDICARE

## 2022-08-29 DIAGNOSIS — Z79.899 OTHER LONG TERM (CURRENT) DRUG THERAPY: ICD-10-CM

## 2022-09-06 ENCOUNTER — LAB REQUISITION (OUTPATIENT)
Dept: LAB | Facility: CLINIC | Age: 62
End: 2022-09-06
Payer: MEDICARE

## 2022-09-06 DIAGNOSIS — Z79.899 OTHER LONG TERM (CURRENT) DRUG THERAPY: ICD-10-CM

## 2022-09-06 LAB
ALBUMIN SERPL BCG-MCNC: 4 G/DL (ref 3.5–5.2)
ALP SERPL-CCNC: 115 U/L (ref 40–129)
ALT SERPL W P-5'-P-CCNC: 31 U/L (ref 10–50)
ANION GAP SERPL CALCULATED.3IONS-SCNC: 11 MMOL/L (ref 7–15)
AST SERPL W P-5'-P-CCNC: 20 U/L (ref 10–50)
BILIRUB SERPL-MCNC: 0.5 MG/DL
BUN SERPL-MCNC: 17.4 MG/DL (ref 8–23)
CALCIUM SERPL-MCNC: 9.4 MG/DL (ref 8.8–10.2)
CHLORIDE SERPL-SCNC: 103 MMOL/L (ref 98–107)
CREAT SERPL-MCNC: 1.21 MG/DL (ref 0.67–1.17)
DEPRECATED HCO3 PLAS-SCNC: 26 MMOL/L (ref 22–29)
ERYTHROCYTE [DISTWIDTH] IN BLOOD BY AUTOMATED COUNT: 11.4 % (ref 10–15)
GFR SERPL CREATININE-BSD FRML MDRD: 67 ML/MIN/1.73M2
GLUCOSE SERPL-MCNC: 181 MG/DL (ref 70–99)
HCT VFR BLD AUTO: 44.5 % (ref 40–53)
HGB BLD-MCNC: 14.7 G/DL (ref 13.3–17.7)
MCH RBC QN AUTO: 31.4 PG (ref 26.5–33)
MCHC RBC AUTO-ENTMCNC: 33 G/DL (ref 31.5–36.5)
MCV RBC AUTO: 95 FL (ref 78–100)
PLATELET # BLD AUTO: 141 10E3/UL (ref 150–450)
POTASSIUM SERPL-SCNC: 4.5 MMOL/L (ref 3.4–5.3)
PROT SERPL-MCNC: 5.7 G/DL (ref 6.4–8.3)
RBC # BLD AUTO: 4.68 10E6/UL (ref 4.4–5.9)
SODIUM SERPL-SCNC: 140 MMOL/L (ref 136–145)
VALPROATE SERPL-MCNC: 48.3 UG/ML
WBC # BLD AUTO: 4.4 10E3/UL (ref 4–11)

## 2022-09-06 PROCEDURE — 85027 COMPLETE CBC AUTOMATED: CPT | Mod: ORL | Performed by: NURSE PRACTITIONER

## 2022-09-06 PROCEDURE — 80164 ASSAY DIPROPYLACETIC ACD TOT: CPT | Mod: ORL | Performed by: NURSE PRACTITIONER

## 2022-09-06 PROCEDURE — P9603 ONE-WAY ALLOW PRORATED MILES: HCPCS | Mod: ORL | Performed by: NURSE PRACTITIONER

## 2022-09-06 PROCEDURE — 36415 COLL VENOUS BLD VENIPUNCTURE: CPT | Mod: ORL | Performed by: NURSE PRACTITIONER

## 2022-09-06 PROCEDURE — 80053 COMPREHEN METABOLIC PANEL: CPT | Mod: ORL | Performed by: NURSE PRACTITIONER

## 2022-11-30 ENCOUNTER — LAB REQUISITION (OUTPATIENT)
Dept: LAB | Facility: CLINIC | Age: 62
End: 2022-11-30
Payer: MEDICARE

## 2022-11-30 DIAGNOSIS — D69.6 THROMBOCYTOPENIA, UNSPECIFIED (H): ICD-10-CM

## 2022-11-30 DIAGNOSIS — F25.0 SCHIZOAFFECTIVE DISORDER, BIPOLAR TYPE (H): ICD-10-CM

## 2022-11-30 DIAGNOSIS — E11.3399 TYPE 2 DIABETES MELLITUS WITH MODERATE NONPROLIFERATIVE DIABETIC RETINOPATHY WITHOUT MACULAR EDEMA, UNSPECIFIED EYE (H): ICD-10-CM

## 2022-11-30 DIAGNOSIS — E87.1 HYPO-OSMOLALITY AND HYPONATREMIA: ICD-10-CM

## 2022-11-30 DIAGNOSIS — E55.9 VITAMIN D DEFICIENCY, UNSPECIFIED: ICD-10-CM

## 2022-12-01 LAB
ALBUMIN SERPL BCG-MCNC: 3.9 G/DL (ref 3.5–5.2)
ALP SERPL-CCNC: 117 U/L (ref 40–129)
ALT SERPL W P-5'-P-CCNC: 24 U/L (ref 10–50)
ANION GAP SERPL CALCULATED.3IONS-SCNC: 17 MMOL/L (ref 7–15)
AST SERPL W P-5'-P-CCNC: 19 U/L (ref 10–50)
BILIRUB SERPL-MCNC: 0.4 MG/DL
BUN SERPL-MCNC: 25.9 MG/DL (ref 8–23)
CALCIUM SERPL-MCNC: 9.4 MG/DL (ref 8.8–10.2)
CHLORIDE SERPL-SCNC: 105 MMOL/L (ref 98–107)
CREAT SERPL-MCNC: 1.19 MG/DL (ref 0.67–1.17)
DEPRECATED CALCIDIOL+CALCIFEROL SERPL-MC: 34 UG/L (ref 20–75)
DEPRECATED HCO3 PLAS-SCNC: 19 MMOL/L (ref 22–29)
ERYTHROCYTE [DISTWIDTH] IN BLOOD BY AUTOMATED COUNT: 11.6 % (ref 10–15)
GFR SERPL CREATININE-BSD FRML MDRD: 69 ML/MIN/1.73M2
GLUCOSE SERPL-MCNC: 306 MG/DL (ref 70–99)
HBA1C MFR BLD: 7.9 %
HCT VFR BLD AUTO: 46.2 % (ref 40–53)
HGB BLD-MCNC: 15.5 G/DL (ref 13.3–17.7)
MCH RBC QN AUTO: 31.9 PG (ref 26.5–33)
MCHC RBC AUTO-ENTMCNC: 33.5 G/DL (ref 31.5–36.5)
MCV RBC AUTO: 95 FL (ref 78–100)
PLATELET # BLD AUTO: 139 10E3/UL (ref 150–450)
POTASSIUM SERPL-SCNC: 5.4 MMOL/L (ref 3.4–5.3)
PROT SERPL-MCNC: 6.2 G/DL (ref 6.4–8.3)
RBC # BLD AUTO: 4.86 10E6/UL (ref 4.4–5.9)
SODIUM SERPL-SCNC: 141 MMOL/L (ref 136–145)
VALPROATE SERPL-MCNC: 48.6 UG/ML
WBC # BLD AUTO: 4.5 10E3/UL (ref 4–11)

## 2022-12-01 PROCEDURE — 80164 ASSAY DIPROPYLACETIC ACD TOT: CPT | Mod: ORL

## 2022-12-01 PROCEDURE — 83036 HEMOGLOBIN GLYCOSYLATED A1C: CPT | Mod: ORL

## 2022-12-01 PROCEDURE — 85027 COMPLETE CBC AUTOMATED: CPT | Mod: ORL

## 2022-12-01 PROCEDURE — 82306 VITAMIN D 25 HYDROXY: CPT | Mod: ORL

## 2022-12-01 PROCEDURE — P9604 ONE-WAY ALLOW PRORATED TRIP: HCPCS | Mod: ORL

## 2022-12-01 PROCEDURE — 80053 COMPREHEN METABOLIC PANEL: CPT | Mod: ORL

## 2022-12-01 PROCEDURE — 36415 COLL VENOUS BLD VENIPUNCTURE: CPT | Mod: ORL

## 2023-02-28 ENCOUNTER — LAB REQUISITION (OUTPATIENT)
Dept: LAB | Facility: CLINIC | Age: 63
End: 2023-02-28
Payer: MEDICARE

## 2023-02-28 DIAGNOSIS — R31.0 GROSS HEMATURIA: ICD-10-CM

## 2023-02-28 LAB
ALBUMIN UR-MCNC: NEGATIVE MG/DL
APPEARANCE UR: CLEAR
BILIRUB UR QL STRIP: NEGATIVE
COLOR UR AUTO: ABNORMAL
GLUCOSE UR STRIP-MCNC: >=1000 MG/DL
HGB UR QL STRIP: NEGATIVE
KETONES UR STRIP-MCNC: NEGATIVE MG/DL
LEUKOCYTE ESTERASE UR QL STRIP: NEGATIVE
MUCOUS THREADS #/AREA URNS LPF: PRESENT /LPF
NITRATE UR QL: NEGATIVE
PH UR STRIP: 7 [PH] (ref 5–7)
RBC URINE: 1 /HPF
SP GR UR STRIP: 1.02 (ref 1–1.03)
SQUAMOUS EPITHELIAL: 1 /HPF
UROBILINOGEN UR STRIP-MCNC: NORMAL MG/DL
WBC URINE: <1 /HPF

## 2023-02-28 PROCEDURE — 81001 URINALYSIS AUTO W/SCOPE: CPT | Mod: ORL | Performed by: FAMILY MEDICINE

## 2023-02-28 PROCEDURE — 87086 URINE CULTURE/COLONY COUNT: CPT | Mod: ORL | Performed by: FAMILY MEDICINE

## 2023-03-02 LAB — BACTERIA UR CULT: NORMAL

## 2023-03-28 ENCOUNTER — LAB REQUISITION (OUTPATIENT)
Dept: LAB | Facility: CLINIC | Age: 63
End: 2023-03-28
Payer: MEDICARE

## 2023-03-28 DIAGNOSIS — R63.5 ABNORMAL WEIGHT GAIN: ICD-10-CM

## 2023-03-28 DIAGNOSIS — E78.2 MIXED HYPERLIPIDEMIA: ICD-10-CM

## 2023-03-28 DIAGNOSIS — E11.3399 TYPE 2 DIABETES MELLITUS WITH MODERATE NONPROLIFERATIVE DIABETIC RETINOPATHY WITHOUT MACULAR EDEMA, UNSPECIFIED EYE (H): ICD-10-CM

## 2023-03-30 LAB
ALBUMIN SERPL BCG-MCNC: 3.7 G/DL (ref 3.5–5.2)
ALP SERPL-CCNC: 102 U/L (ref 40–129)
ALT SERPL W P-5'-P-CCNC: 10 U/L (ref 10–50)
ANION GAP SERPL CALCULATED.3IONS-SCNC: 12 MMOL/L (ref 7–15)
AST SERPL W P-5'-P-CCNC: 21 U/L (ref 10–50)
BILIRUB SERPL-MCNC: 0.4 MG/DL
BUN SERPL-MCNC: 21.1 MG/DL (ref 8–23)
CALCIUM SERPL-MCNC: 9.2 MG/DL (ref 8.8–10.2)
CHLORIDE SERPL-SCNC: 102 MMOL/L (ref 98–107)
CHOLEST SERPL-MCNC: 106 MG/DL
CREAT SERPL-MCNC: 1.2 MG/DL (ref 0.67–1.17)
DEPRECATED HCO3 PLAS-SCNC: 23 MMOL/L (ref 22–29)
ERYTHROCYTE [DISTWIDTH] IN BLOOD BY AUTOMATED COUNT: 11.4 % (ref 10–15)
GFR SERPL CREATININE-BSD FRML MDRD: 68 ML/MIN/1.73M2
GLUCOSE SERPL-MCNC: 235 MG/DL (ref 70–99)
HBA1C MFR BLD: 9.4 %
HCT VFR BLD AUTO: 46.2 % (ref 40–53)
HDLC SERPL-MCNC: 44 MG/DL
HGB BLD-MCNC: 14.9 G/DL (ref 13.3–17.7)
LDLC SERPL CALC-MCNC: 46 MG/DL
MCH RBC QN AUTO: 30.7 PG (ref 26.5–33)
MCHC RBC AUTO-ENTMCNC: 32.3 G/DL (ref 31.5–36.5)
MCV RBC AUTO: 95 FL (ref 78–100)
NONHDLC SERPL-MCNC: 62 MG/DL
PLATELET # BLD AUTO: 129 10E3/UL (ref 150–450)
POTASSIUM SERPL-SCNC: 5 MMOL/L (ref 3.4–5.3)
PROT SERPL-MCNC: 5.8 G/DL (ref 6.4–8.3)
RBC # BLD AUTO: 4.86 10E6/UL (ref 4.4–5.9)
SODIUM SERPL-SCNC: 137 MMOL/L (ref 136–145)
TRIGL SERPL-MCNC: 82 MG/DL
TSH SERPL DL<=0.005 MIU/L-ACNC: 1.24 UIU/ML (ref 0.3–4.2)
WBC # BLD AUTO: 3.7 10E3/UL (ref 4–11)

## 2023-03-30 PROCEDURE — 84443 ASSAY THYROID STIM HORMONE: CPT | Mod: ORL | Performed by: NURSE PRACTITIONER

## 2023-03-30 PROCEDURE — 80053 COMPREHEN METABOLIC PANEL: CPT | Mod: ORL | Performed by: NURSE PRACTITIONER

## 2023-03-30 PROCEDURE — 85027 COMPLETE CBC AUTOMATED: CPT | Mod: ORL | Performed by: NURSE PRACTITIONER

## 2023-03-30 PROCEDURE — 83036 HEMOGLOBIN GLYCOSYLATED A1C: CPT | Mod: ORL | Performed by: NURSE PRACTITIONER

## 2023-03-30 PROCEDURE — P9604 ONE-WAY ALLOW PRORATED TRIP: HCPCS | Mod: ORL | Performed by: NURSE PRACTITIONER

## 2023-03-30 PROCEDURE — 80061 LIPID PANEL: CPT | Mod: ORL | Performed by: NURSE PRACTITIONER

## 2023-03-30 PROCEDURE — 36415 COLL VENOUS BLD VENIPUNCTURE: CPT | Mod: ORL | Performed by: NURSE PRACTITIONER

## 2023-04-24 ENCOUNTER — LAB REQUISITION (OUTPATIENT)
Dept: LAB | Facility: CLINIC | Age: 63
End: 2023-04-24
Payer: MEDICARE

## 2023-04-24 DIAGNOSIS — R31.0 GROSS HEMATURIA: ICD-10-CM

## 2023-04-24 LAB
ALBUMIN UR-MCNC: NEGATIVE MG/DL
APPEARANCE UR: CLEAR
BILIRUB UR QL STRIP: NEGATIVE
COLOR UR AUTO: ABNORMAL
GLUCOSE UR STRIP-MCNC: 300 MG/DL
HGB UR QL STRIP: ABNORMAL
KETONES UR STRIP-MCNC: NEGATIVE MG/DL
LEUKOCYTE ESTERASE UR QL STRIP: NEGATIVE
MUCOUS THREADS #/AREA URNS LPF: PRESENT /LPF
NITRATE UR QL: NEGATIVE
PH UR STRIP: 7 [PH] (ref 5–7)
RBC URINE: <1 /HPF
SP GR UR STRIP: 1 (ref 1–1.03)
UROBILINOGEN UR STRIP-MCNC: NORMAL MG/DL
WBC URINE: <1 /HPF

## 2023-04-24 PROCEDURE — 87086 URINE CULTURE/COLONY COUNT: CPT | Mod: ORL | Performed by: NURSE PRACTITIONER

## 2023-04-24 PROCEDURE — 81001 URINALYSIS AUTO W/SCOPE: CPT | Mod: ORL | Performed by: NURSE PRACTITIONER

## 2023-04-26 LAB — BACTERIA UR CULT: NO GROWTH

## 2023-05-05 NOTE — PROGRESS NOTES
SUBJECTIVE/OBJECTIVE:                Caden Bush is a 58 year old male called for a follow-up visit for Medication Therapy Management.  He was referred to me from .     Chief Complaint: Follow up from our visit on 4/30.  Started insulin last visit.    Tobacco: No tobacco use  Alcohol: not currently using    Medication Adherence/Access:  no issues reported    Diabetes:  Pt currently taking metformin ER 500mg- 2 BID, Pioglitazone 45mg daily and basaglar 12 units in the AM. Pt is not experiencing side effects. Doing well with the injection, denies issues.   Stopped Glipizide 5mg with the start of insulin per PCP.  SMBG: one time daily.   Ranges (patient reported):  Fasting (oldest to newest): 162, 130, 214, 266, 169, 239,   Two mid-day readings were 77, 76  Patient is not experiencing hypoglycemia, but did feel sweaty and weak in the 70s.   Recent symptoms of high blood sugar? none  Eye exam: up to date  Foot exam: up to date  Pt is not taking an ACEi/ARB at this time- hx of lithium toxicity (just off this in Jan 2018).  Aspirin: Taking 81mg daily and denies side effects  Meals:   9am breakfast,   12 lunch  5pm dinner, meat salad usually  Sometimes a snack- fruit    ASSESSMENT:              Current medications were reviewed today as discussed above.      Medication Adherence: good, no issues identified    Diabetes: Needs Improvement. Patient is not meeting A1c goal of < 7%. Self monitoring of blood glucose is not at goal of fasting  mg/dL. Pt would benefit from moving basal insulin to bedtime to see if he gets better fasting readings with this. Not going up on on dose today due to readings in the 70s during the day.      PLAN:                  1. Move basaglar to HS keep at 12 units.    I spent 10 mins with this patient today. All changes were made via collaborative practice agreement with Annie Hart. A copy of the visit note was provided to the patient's primary care provider.     Will  follow up in 1 week.    The patient declined a summary of these recommendations as an after visit summary.    Aliyah Angeles, Pharm.D, [facility]  Medication Therapy Management Pharmacist  [phone]     Minocycline Counseling: Patient advised regarding possible photosensitivity and discoloration of the teeth, skin, lips, tongue and gums.  Patient instructed to avoid sunlight, if possible.  When exposed to sunlight, patients should wear protective clothing, sunglasses, and sunscreen.  The patient was instructed to call the office immediately if the following severe adverse effects occur:  hearing changes, easy bruising/bleeding, severe headache, or vision changes.  The patient verbalized understanding of the proper use and possible adverse effects of minocycline.  All of the patient's questions and concerns were addressed.

## 2023-06-13 ENCOUNTER — LAB REQUISITION (OUTPATIENT)
Dept: LAB | Facility: CLINIC | Age: 63
End: 2023-06-13
Payer: MEDICARE

## 2023-06-13 DIAGNOSIS — Z79.899 OTHER LONG TERM (CURRENT) DRUG THERAPY: ICD-10-CM

## 2023-06-15 LAB
ALBUMIN SERPL BCG-MCNC: 3.8 G/DL (ref 3.5–5.2)
ALP SERPL-CCNC: 107 U/L (ref 40–129)
ALT SERPL W P-5'-P-CCNC: 15 U/L (ref 0–70)
ANION GAP SERPL CALCULATED.3IONS-SCNC: 10 MMOL/L (ref 7–15)
AST SERPL W P-5'-P-CCNC: 23 U/L (ref 0–45)
BASOPHILS # BLD AUTO: 0 10E3/UL (ref 0–0.2)
BASOPHILS NFR BLD AUTO: 1 %
BILIRUB SERPL-MCNC: 0.3 MG/DL
BUN SERPL-MCNC: 20.3 MG/DL (ref 8–23)
CALCIUM SERPL-MCNC: 9.4 MG/DL (ref 8.8–10.2)
CHLORIDE SERPL-SCNC: 103 MMOL/L (ref 98–107)
CREAT SERPL-MCNC: 1.25 MG/DL (ref 0.67–1.17)
DEPRECATED HCO3 PLAS-SCNC: 24 MMOL/L (ref 22–29)
EOSINOPHIL # BLD AUTO: 0.1 10E3/UL (ref 0–0.7)
EOSINOPHIL NFR BLD AUTO: 3 %
ERYTHROCYTE [DISTWIDTH] IN BLOOD BY AUTOMATED COUNT: 11.9 % (ref 10–15)
GFR SERPL CREATININE-BSD FRML MDRD: 64 ML/MIN/1.73M2
GLUCOSE SERPL-MCNC: 188 MG/DL (ref 70–99)
HCT VFR BLD AUTO: 45.4 % (ref 40–53)
HGB BLD-MCNC: 15 G/DL (ref 13.3–17.7)
IMM GRANULOCYTES # BLD: 0 10E3/UL
IMM GRANULOCYTES NFR BLD: 0 %
LYMPHOCYTES # BLD AUTO: 1.2 10E3/UL (ref 0.8–5.3)
LYMPHOCYTES NFR BLD AUTO: 30 %
MCH RBC QN AUTO: 30.5 PG (ref 26.5–33)
MCHC RBC AUTO-ENTMCNC: 33 G/DL (ref 31.5–36.5)
MCV RBC AUTO: 93 FL (ref 78–100)
MONOCYTES # BLD AUTO: 0.3 10E3/UL (ref 0–1.3)
MONOCYTES NFR BLD AUTO: 7 %
NEUTROPHILS # BLD AUTO: 2.5 10E3/UL (ref 1.6–8.3)
NEUTROPHILS NFR BLD AUTO: 59 %
NRBC # BLD AUTO: 0 10E3/UL
NRBC BLD AUTO-RTO: 0 /100
PLATELET # BLD AUTO: 164 10E3/UL (ref 150–450)
POTASSIUM SERPL-SCNC: 4.7 MMOL/L (ref 3.4–5.3)
PROT SERPL-MCNC: 6 G/DL (ref 6.4–8.3)
RBC # BLD AUTO: 4.91 10E6/UL (ref 4.4–5.9)
SODIUM SERPL-SCNC: 137 MMOL/L (ref 136–145)
VALPROATE SERPL-MCNC: 48.1 UG/ML
WBC # BLD AUTO: 4.1 10E3/UL (ref 4–11)

## 2023-06-15 PROCEDURE — P9603 ONE-WAY ALLOW PRORATED MILES: HCPCS | Mod: ORL | Performed by: NURSE PRACTITIONER

## 2023-06-15 PROCEDURE — 80053 COMPREHEN METABOLIC PANEL: CPT | Mod: ORL | Performed by: NURSE PRACTITIONER

## 2023-06-15 PROCEDURE — 80164 ASSAY DIPROPYLACETIC ACD TOT: CPT | Mod: ORL | Performed by: NURSE PRACTITIONER

## 2023-06-15 PROCEDURE — 36415 COLL VENOUS BLD VENIPUNCTURE: CPT | Mod: ORL | Performed by: NURSE PRACTITIONER

## 2023-06-15 PROCEDURE — 85025 COMPLETE CBC W/AUTO DIFF WBC: CPT | Mod: ORL | Performed by: NURSE PRACTITIONER

## 2023-06-27 ENCOUNTER — LAB REQUISITION (OUTPATIENT)
Dept: LAB | Facility: CLINIC | Age: 63
End: 2023-06-27
Payer: MEDICARE

## 2023-06-27 DIAGNOSIS — E11.3399 TYPE 2 DIABETES MELLITUS WITH MODERATE NONPROLIFERATIVE DIABETIC RETINOPATHY WITHOUT MACULAR EDEMA, UNSPECIFIED EYE (H): ICD-10-CM

## 2023-06-29 LAB — HBA1C MFR BLD: 8 %

## 2023-06-29 PROCEDURE — 36415 COLL VENOUS BLD VENIPUNCTURE: CPT | Mod: ORL | Performed by: NURSE PRACTITIONER

## 2023-06-29 PROCEDURE — P9603 ONE-WAY ALLOW PRORATED MILES: HCPCS | Mod: ORL | Performed by: NURSE PRACTITIONER

## 2023-06-29 PROCEDURE — 83036 HEMOGLOBIN GLYCOSYLATED A1C: CPT | Mod: ORL | Performed by: NURSE PRACTITIONER

## 2023-11-29 ENCOUNTER — LAB REQUISITION (OUTPATIENT)
Dept: LAB | Facility: CLINIC | Age: 63
End: 2023-11-29
Payer: MEDICARE

## 2023-11-29 DIAGNOSIS — E11.3399 TYPE 2 DIABETES MELLITUS WITH MODERATE NONPROLIFERATIVE DIABETIC RETINOPATHY WITHOUT MACULAR EDEMA, UNSPECIFIED EYE (H): ICD-10-CM

## 2023-11-30 LAB
ANION GAP SERPL CALCULATED.3IONS-SCNC: 9 MMOL/L (ref 7–15)
BUN SERPL-MCNC: 25.8 MG/DL (ref 8–23)
CALCIUM SERPL-MCNC: 9.3 MG/DL (ref 8.8–10.2)
CHLORIDE SERPL-SCNC: 103 MMOL/L (ref 98–107)
CREAT SERPL-MCNC: 1.27 MG/DL (ref 0.67–1.17)
DEPRECATED HCO3 PLAS-SCNC: 27 MMOL/L (ref 22–29)
EGFRCR SERPLBLD CKD-EPI 2021: 63 ML/MIN/1.73M2
ERYTHROCYTE [DISTWIDTH] IN BLOOD BY AUTOMATED COUNT: 12.1 % (ref 10–15)
GLUCOSE SERPL-MCNC: 162 MG/DL (ref 70–99)
HBA1C MFR BLD: 9.2 %
HCT VFR BLD AUTO: 46.5 % (ref 40–53)
HGB BLD-MCNC: 15.3 G/DL (ref 13.3–17.7)
MCH RBC QN AUTO: 31 PG (ref 26.5–33)
MCHC RBC AUTO-ENTMCNC: 32.9 G/DL (ref 31.5–36.5)
MCV RBC AUTO: 94 FL (ref 78–100)
PLATELET # BLD AUTO: 156 10E3/UL (ref 150–450)
POTASSIUM SERPL-SCNC: 4.6 MMOL/L (ref 3.4–5.3)
RBC # BLD AUTO: 4.93 10E6/UL (ref 4.4–5.9)
SODIUM SERPL-SCNC: 139 MMOL/L (ref 135–145)
WBC # BLD AUTO: 3.9 10E3/UL (ref 4–11)

## 2023-11-30 PROCEDURE — 83036 HEMOGLOBIN GLYCOSYLATED A1C: CPT | Mod: ORL | Performed by: NURSE PRACTITIONER

## 2023-11-30 PROCEDURE — P9603 ONE-WAY ALLOW PRORATED MILES: HCPCS | Mod: ORL | Performed by: NURSE PRACTITIONER

## 2023-11-30 PROCEDURE — 36415 COLL VENOUS BLD VENIPUNCTURE: CPT | Mod: ORL | Performed by: NURSE PRACTITIONER

## 2023-11-30 PROCEDURE — 80048 BASIC METABOLIC PNL TOTAL CA: CPT | Mod: ORL | Performed by: NURSE PRACTITIONER

## 2023-11-30 PROCEDURE — 85027 COMPLETE CBC AUTOMATED: CPT | Mod: ORL | Performed by: NURSE PRACTITIONER

## 2024-01-10 ENCOUNTER — LAB REQUISITION (OUTPATIENT)
Dept: LAB | Facility: CLINIC | Age: 64
End: 2024-01-10
Payer: MEDICARE

## 2024-01-10 DIAGNOSIS — F25.0 SCHIZOAFFECTIVE DISORDER, BIPOLAR TYPE (H): ICD-10-CM

## 2024-01-11 LAB — VALPROATE SERPL-MCNC: 44.4 UG/ML

## 2024-01-11 PROCEDURE — 80164 ASSAY DIPROPYLACETIC ACD TOT: CPT | Mod: ORL | Performed by: NURSE PRACTITIONER

## 2024-01-11 PROCEDURE — 36415 COLL VENOUS BLD VENIPUNCTURE: CPT | Mod: ORL | Performed by: NURSE PRACTITIONER

## 2024-01-11 PROCEDURE — P9604 ONE-WAY ALLOW PRORATED TRIP: HCPCS | Mod: ORL | Performed by: NURSE PRACTITIONER

## 2024-03-06 ENCOUNTER — LAB REQUISITION (OUTPATIENT)
Dept: LAB | Facility: CLINIC | Age: 64
End: 2024-03-06
Payer: MEDICARE

## 2024-03-06 DIAGNOSIS — E11.3399 TYPE 2 DIABETES MELLITUS WITH MODERATE NONPROLIFERATIVE DIABETIC RETINOPATHY WITHOUT MACULAR EDEMA, UNSPECIFIED EYE (H): ICD-10-CM

## 2024-03-07 LAB — HBA1C MFR BLD: 7.9 %

## 2024-03-07 PROCEDURE — 36415 COLL VENOUS BLD VENIPUNCTURE: CPT | Mod: ORL | Performed by: NURSE PRACTITIONER

## 2024-03-07 PROCEDURE — P9603 ONE-WAY ALLOW PRORATED MILES: HCPCS | Mod: ORL | Performed by: NURSE PRACTITIONER

## 2024-03-07 PROCEDURE — 83036 HEMOGLOBIN GLYCOSYLATED A1C: CPT | Mod: ORL | Performed by: NURSE PRACTITIONER

## 2024-05-08 ENCOUNTER — LAB REQUISITION (OUTPATIENT)
Dept: LAB | Facility: CLINIC | Age: 64
End: 2024-05-08
Payer: MEDICARE

## 2024-05-08 DIAGNOSIS — E78.2 MIXED HYPERLIPIDEMIA: ICD-10-CM

## 2024-05-08 DIAGNOSIS — F25.0 SCHIZOAFFECTIVE DISORDER, BIPOLAR TYPE (H): ICD-10-CM

## 2024-05-08 DIAGNOSIS — E55.9 VITAMIN D DEFICIENCY, UNSPECIFIED: ICD-10-CM

## 2024-05-08 DIAGNOSIS — E11.3399 TYPE 2 DIABETES MELLITUS WITH MODERATE NONPROLIFERATIVE DIABETIC RETINOPATHY WITHOUT MACULAR EDEMA, UNSPECIFIED EYE (H): ICD-10-CM

## 2024-05-09 LAB
ALBUMIN SERPL BCG-MCNC: 4.1 G/DL (ref 3.5–5.2)
ALP SERPL-CCNC: 107 U/L (ref 40–150)
ALT SERPL W P-5'-P-CCNC: 30 U/L (ref 0–70)
ANION GAP SERPL CALCULATED.3IONS-SCNC: 12 MMOL/L (ref 7–15)
AST SERPL W P-5'-P-CCNC: 24 U/L (ref 0–45)
BASOPHILS # BLD AUTO: 0 10E3/UL (ref 0–0.2)
BASOPHILS NFR BLD AUTO: 0 %
BILIRUB SERPL-MCNC: 0.4 MG/DL
BUN SERPL-MCNC: 25.9 MG/DL (ref 8–23)
CALCIUM SERPL-MCNC: 9.5 MG/DL (ref 8.8–10.2)
CHLORIDE SERPL-SCNC: 104 MMOL/L (ref 98–107)
CHOLEST SERPL-MCNC: 129 MG/DL
CREAT SERPL-MCNC: 1.2 MG/DL (ref 0.67–1.17)
DEPRECATED HCO3 PLAS-SCNC: 25 MMOL/L (ref 22–29)
EGFRCR SERPLBLD CKD-EPI 2021: 67 ML/MIN/1.73M2
EOSINOPHIL # BLD AUTO: 0.1 10E3/UL (ref 0–0.7)
EOSINOPHIL NFR BLD AUTO: 2 %
ERYTHROCYTE [DISTWIDTH] IN BLOOD BY AUTOMATED COUNT: 12.2 % (ref 10–15)
FASTING STATUS PATIENT QL REPORTED: YES
FASTING STATUS PATIENT QL REPORTED: YES
GLUCOSE SERPL-MCNC: 220 MG/DL (ref 70–99)
HBA1C MFR BLD: 7.7 %
HCT VFR BLD AUTO: 47.9 % (ref 40–53)
HDLC SERPL-MCNC: 38 MG/DL
HGB BLD-MCNC: 16.5 G/DL (ref 13.3–17.7)
IMM GRANULOCYTES # BLD: 0 10E3/UL
IMM GRANULOCYTES NFR BLD: 0 %
LDLC SERPL CALC-MCNC: 47 MG/DL
LYMPHOCYTES # BLD AUTO: 1.4 10E3/UL (ref 0.8–5.3)
LYMPHOCYTES NFR BLD AUTO: 32 %
MCH RBC QN AUTO: 32 PG (ref 26.5–33)
MCHC RBC AUTO-ENTMCNC: 34.4 G/DL (ref 31.5–36.5)
MCV RBC AUTO: 93 FL (ref 78–100)
MONOCYTES # BLD AUTO: 0.3 10E3/UL (ref 0–1.3)
MONOCYTES NFR BLD AUTO: 6 %
NEUTROPHILS # BLD AUTO: 2.7 10E3/UL (ref 1.6–8.3)
NEUTROPHILS NFR BLD AUTO: 60 %
NONHDLC SERPL-MCNC: 91 MG/DL
NRBC # BLD AUTO: 0 10E3/UL
NRBC BLD AUTO-RTO: 0 /100
PLATELET # BLD AUTO: 152 10E3/UL (ref 150–450)
POTASSIUM SERPL-SCNC: 4.5 MMOL/L (ref 3.4–5.3)
PROT SERPL-MCNC: 6.6 G/DL (ref 6.4–8.3)
RBC # BLD AUTO: 5.16 10E6/UL (ref 4.4–5.9)
SODIUM SERPL-SCNC: 141 MMOL/L (ref 135–145)
TRIGL SERPL-MCNC: 222 MG/DL
VALPROATE SERPL-MCNC: 33.7 UG/ML
VIT D+METAB SERPL-MCNC: 39 NG/ML (ref 20–50)
WBC # BLD AUTO: 4.5 10E3/UL (ref 4–11)

## 2024-05-09 PROCEDURE — 36415 COLL VENOUS BLD VENIPUNCTURE: CPT | Mod: ORL | Performed by: NURSE PRACTITIONER

## 2024-05-09 PROCEDURE — 80053 COMPREHEN METABOLIC PANEL: CPT | Mod: ORL | Performed by: NURSE PRACTITIONER

## 2024-05-09 PROCEDURE — 85025 COMPLETE CBC W/AUTO DIFF WBC: CPT | Mod: ORL | Performed by: NURSE PRACTITIONER

## 2024-05-09 PROCEDURE — 80061 LIPID PANEL: CPT | Mod: ORL | Performed by: NURSE PRACTITIONER

## 2024-05-09 PROCEDURE — 80164 ASSAY DIPROPYLACETIC ACD TOT: CPT | Mod: ORL | Performed by: NURSE PRACTITIONER

## 2024-05-09 PROCEDURE — 82306 VITAMIN D 25 HYDROXY: CPT | Mod: ORL | Performed by: NURSE PRACTITIONER

## 2024-05-09 PROCEDURE — 83036 HEMOGLOBIN GLYCOSYLATED A1C: CPT | Mod: ORL | Performed by: NURSE PRACTITIONER

## 2024-05-09 PROCEDURE — P9604 ONE-WAY ALLOW PRORATED TRIP: HCPCS | Mod: ORL | Performed by: NURSE PRACTITIONER

## 2024-11-13 ENCOUNTER — LAB REQUISITION (OUTPATIENT)
Dept: LAB | Facility: CLINIC | Age: 64
End: 2024-11-13
Payer: MEDICARE

## 2024-11-13 DIAGNOSIS — E11.69 TYPE 2 DIABETES MELLITUS WITH OTHER SPECIFIED COMPLICATION (H): ICD-10-CM

## 2024-11-14 LAB
ANION GAP SERPL CALCULATED.3IONS-SCNC: 9 MMOL/L (ref 7–15)
BASOPHILS # BLD AUTO: 0 10E3/UL (ref 0–0.2)
BASOPHILS NFR BLD AUTO: 1 %
BUN SERPL-MCNC: 20.9 MG/DL (ref 8–23)
CALCIUM SERPL-MCNC: 9.1 MG/DL (ref 8.8–10.4)
CHLORIDE SERPL-SCNC: 105 MMOL/L (ref 98–107)
CREAT SERPL-MCNC: 1.04 MG/DL (ref 0.67–1.17)
EGFRCR SERPLBLD CKD-EPI 2021: 80 ML/MIN/1.73M2
EOSINOPHIL # BLD AUTO: 0.1 10E3/UL (ref 0–0.7)
EOSINOPHIL NFR BLD AUTO: 2 %
ERYTHROCYTE [DISTWIDTH] IN BLOOD BY AUTOMATED COUNT: 11.8 % (ref 10–15)
EST. AVERAGE GLUCOSE BLD GHB EST-MCNC: 174 MG/DL
GLUCOSE SERPL-MCNC: 202 MG/DL (ref 70–99)
HBA1C MFR BLD: 7.7 %
HCO3 SERPL-SCNC: 24 MMOL/L (ref 22–29)
HCT VFR BLD AUTO: 44.3 % (ref 40–53)
HGB BLD-MCNC: 14.7 G/DL (ref 13.3–17.7)
IMM GRANULOCYTES # BLD: 0 10E3/UL
IMM GRANULOCYTES NFR BLD: 0 %
LYMPHOCYTES # BLD AUTO: 1.3 10E3/UL (ref 0.8–5.3)
LYMPHOCYTES NFR BLD AUTO: 32 %
MCH RBC QN AUTO: 31 PG (ref 26.5–33)
MCHC RBC AUTO-ENTMCNC: 33.2 G/DL (ref 31.5–36.5)
MCV RBC AUTO: 94 FL (ref 78–100)
MONOCYTES # BLD AUTO: 0.2 10E3/UL (ref 0–1.3)
MONOCYTES NFR BLD AUTO: 6 %
NEUTROPHILS # BLD AUTO: 2.5 10E3/UL (ref 1.6–8.3)
NEUTROPHILS NFR BLD AUTO: 60 %
NRBC # BLD AUTO: 0 10E3/UL
NRBC BLD AUTO-RTO: 0 /100
PLATELET # BLD AUTO: 138 10E3/UL (ref 150–450)
POTASSIUM SERPL-SCNC: 4.5 MMOL/L (ref 3.4–5.3)
RBC # BLD AUTO: 4.74 10E6/UL (ref 4.4–5.9)
SODIUM SERPL-SCNC: 138 MMOL/L (ref 135–145)
WBC # BLD AUTO: 4.1 10E3/UL (ref 4–11)

## 2024-11-14 PROCEDURE — P9604 ONE-WAY ALLOW PRORATED TRIP: HCPCS | Mod: ORL | Performed by: NURSE PRACTITIONER

## 2024-11-14 PROCEDURE — 83036 HEMOGLOBIN GLYCOSYLATED A1C: CPT | Mod: ORL | Performed by: NURSE PRACTITIONER

## 2024-11-14 PROCEDURE — 80048 BASIC METABOLIC PNL TOTAL CA: CPT | Mod: ORL | Performed by: NURSE PRACTITIONER

## 2024-11-14 PROCEDURE — 36415 COLL VENOUS BLD VENIPUNCTURE: CPT | Mod: ORL | Performed by: NURSE PRACTITIONER

## 2024-11-14 PROCEDURE — 85025 COMPLETE CBC W/AUTO DIFF WBC: CPT | Mod: ORL | Performed by: NURSE PRACTITIONER

## 2025-02-27 ENCOUNTER — APPOINTMENT (OUTPATIENT)
Dept: CT IMAGING | Facility: CLINIC | Age: 65
End: 2025-02-27
Attending: EMERGENCY MEDICINE
Payer: MEDICARE

## 2025-02-27 ENCOUNTER — HOSPITAL ENCOUNTER (OUTPATIENT)
Facility: CLINIC | Age: 65
Setting detail: OBSERVATION
End: 2025-02-27
Attending: EMERGENCY MEDICINE | Admitting: HOSPITALIST
Payer: MEDICARE

## 2025-02-27 VITALS
HEART RATE: 114 BPM | WEIGHT: 217.5 LBS | OXYGEN SATURATION: 92 % | SYSTOLIC BLOOD PRESSURE: 171 MMHG | TEMPERATURE: 99.6 F | BODY MASS INDEX: 30.34 KG/M2 | RESPIRATION RATE: 20 BRPM | DIASTOLIC BLOOD PRESSURE: 111 MMHG

## 2025-02-27 DIAGNOSIS — J18.9 PNEUMONIA DUE TO INFECTIOUS ORGANISM, UNSPECIFIED LATERALITY, UNSPECIFIED PART OF LUNG: ICD-10-CM

## 2025-02-27 DIAGNOSIS — I15.9 SECONDARY HYPERTENSION: Primary | ICD-10-CM

## 2025-02-27 DIAGNOSIS — E10.319 TYPE 1 DIABETES MELLITUS WITH RETINOPATHY OF BOTH EYES, MACULAR EDEMA PRESENCE UNSPECIFIED, UNSPECIFIED RETINOPATHY SEVERITY (H): ICD-10-CM

## 2025-02-27 DIAGNOSIS — A08.11 NOROVIRUS: ICD-10-CM

## 2025-02-27 DIAGNOSIS — J96.01 ACUTE RESPIRATORY FAILURE WITH HYPOXIA (H): ICD-10-CM

## 2025-02-27 DIAGNOSIS — R16.1 SPLENOMEGALY: ICD-10-CM

## 2025-02-27 LAB
ALBUMIN SERPL BCG-MCNC: 4 G/DL (ref 3.5–5.2)
ALBUMIN UR-MCNC: NEGATIVE MG/DL
ALP SERPL-CCNC: 106 U/L (ref 40–150)
ALT SERPL W P-5'-P-CCNC: 27 U/L (ref 0–70)
ANION GAP SERPL CALCULATED.3IONS-SCNC: 12 MMOL/L (ref 7–15)
APPEARANCE UR: CLEAR
AST SERPL W P-5'-P-CCNC: 17 U/L (ref 0–45)
ATRIAL RATE - MUSE: 121 BPM
BACTERIA BLD CULT: NORMAL
BACTERIA BLD CULT: NORMAL
BASOPHILS # BLD AUTO: 0 10E3/UL (ref 0–0.2)
BASOPHILS NFR BLD AUTO: 0 %
BILIRUB SERPL-MCNC: 0.6 MG/DL
BILIRUB UR QL STRIP: NEGATIVE
BUN SERPL-MCNC: 28.2 MG/DL (ref 8–23)
CALCIUM SERPL-MCNC: 9.1 MG/DL (ref 8.8–10.4)
CHLORIDE SERPL-SCNC: 99 MMOL/L (ref 98–107)
COLOR UR AUTO: ABNORMAL
CREAT SERPL-MCNC: 0.99 MG/DL (ref 0.67–1.17)
D DIMER PPP FEU-MCNC: 0.51 UG/ML FEU (ref 0–0.5)
DIASTOLIC BLOOD PRESSURE - MUSE: NORMAL MMHG
EGFRCR SERPLBLD CKD-EPI 2021: 85 ML/MIN/1.73M2
EOSINOPHIL # BLD AUTO: 0 10E3/UL (ref 0–0.7)
EOSINOPHIL NFR BLD AUTO: 0 %
ERYTHROCYTE [DISTWIDTH] IN BLOOD BY AUTOMATED COUNT: 11.7 % (ref 10–15)
GLUCOSE BLDC GLUCOMTR-MCNC: 246 MG/DL (ref 70–99)
GLUCOSE BLDC GLUCOMTR-MCNC: 267 MG/DL (ref 70–99)
GLUCOSE BLDC GLUCOMTR-MCNC: 307 MG/DL (ref 70–99)
GLUCOSE BLDC GLUCOMTR-MCNC: 311 MG/DL (ref 70–99)
GLUCOSE SERPL-MCNC: 286 MG/DL (ref 70–99)
GLUCOSE UR STRIP-MCNC: >=1000 MG/DL
HCO3 SERPL-SCNC: 23 MMOL/L (ref 22–29)
HCT VFR BLD AUTO: 47.2 % (ref 40–53)
HGB BLD-MCNC: 16.6 G/DL (ref 13.3–17.7)
HGB UR QL STRIP: NEGATIVE
HOLD SPECIMEN: NORMAL
IMM GRANULOCYTES # BLD: 0 10E3/UL
IMM GRANULOCYTES NFR BLD: 0 %
INTERPRETATION ECG - MUSE: NORMAL
KETONES UR STRIP-MCNC: 40 MG/DL
LACTATE SERPL-SCNC: 1.8 MMOL/L (ref 0.7–2)
LACTATE SERPL-SCNC: 2.2 MMOL/L (ref 0.7–2)
LEUKOCYTE ESTERASE UR QL STRIP: NEGATIVE
LIPASE SERPL-CCNC: 9 U/L (ref 13–60)
LYMPHOCYTES # BLD AUTO: 0.4 10E3/UL (ref 0.8–5.3)
LYMPHOCYTES NFR BLD AUTO: 5 %
MAGNESIUM SERPL-MCNC: 1.7 MG/DL (ref 1.7–2.3)
MCH RBC QN AUTO: 31.4 PG (ref 26.5–33)
MCHC RBC AUTO-ENTMCNC: 35.2 G/DL (ref 31.5–36.5)
MCV RBC AUTO: 89 FL (ref 78–100)
MONOCYTES # BLD AUTO: 0.2 10E3/UL (ref 0–1.3)
MONOCYTES NFR BLD AUTO: 3 %
MONOCYTES NFR BLD AUTO: NEGATIVE %
MUCOUS THREADS #/AREA URNS LPF: PRESENT /LPF
NEUTROPHILS # BLD AUTO: 6.5 10E3/UL (ref 1.6–8.3)
NEUTROPHILS NFR BLD AUTO: 91 %
NITRATE UR QL: NEGATIVE
NRBC # BLD AUTO: 0 10E3/UL
NRBC BLD AUTO-RTO: 0 /100
P AXIS - MUSE: 39 DEGREES
PH UR STRIP: 7.5 [PH] (ref 5–7)
PLATELET # BLD AUTO: 151 10E3/UL (ref 150–450)
POTASSIUM SERPL-SCNC: 4.5 MMOL/L (ref 3.4–5.3)
PR INTERVAL - MUSE: 184 MS
PROCALCITONIN SERPL IA-MCNC: 0.12 NG/ML
PROT SERPL-MCNC: 6.4 G/DL (ref 6.4–8.3)
QRS DURATION - MUSE: 72 MS
QT - MUSE: 294 MS
QTC - MUSE: 417 MS
R AXIS - MUSE: -1 DEGREES
RBC # BLD AUTO: 5.29 10E6/UL (ref 4.4–5.9)
RBC URINE: 1 /HPF
SODIUM SERPL-SCNC: 134 MMOL/L (ref 135–145)
SP GR UR STRIP: 1.01 (ref 1–1.03)
SYSTOLIC BLOOD PRESSURE - MUSE: NORMAL MMHG
T AXIS - MUSE: 72 DEGREES
TROPONIN T SERPL HS-MCNC: 11 NG/L
TROPONIN T SERPL HS-MCNC: 9 NG/L
TSH SERPL DL<=0.005 MIU/L-ACNC: 0.74 UIU/ML (ref 0.3–4.2)
UROBILINOGEN UR STRIP-MCNC: NORMAL MG/DL
VALPROATE SERPL-MCNC: 24 UG/ML
VENTRICULAR RATE- MUSE: 121 BPM
WBC # BLD AUTO: 7.2 10E3/UL (ref 4–11)
WBC URINE: <1 /HPF

## 2025-02-27 PROCEDURE — 84145 PROCALCITONIN (PCT): CPT | Performed by: HOSPITALIST

## 2025-02-27 PROCEDURE — 96360 HYDRATION IV INFUSION INIT: CPT | Mod: 59

## 2025-02-27 PROCEDURE — 84484 ASSAY OF TROPONIN QUANT: CPT | Performed by: EMERGENCY MEDICINE

## 2025-02-27 PROCEDURE — 83605 ASSAY OF LACTIC ACID: CPT | Performed by: EMERGENCY MEDICINE

## 2025-02-27 PROCEDURE — 83735 ASSAY OF MAGNESIUM: CPT | Performed by: EMERGENCY MEDICINE

## 2025-02-27 PROCEDURE — 96361 HYDRATE IV INFUSION ADD-ON: CPT | Mod: 59

## 2025-02-27 PROCEDURE — 82040 ASSAY OF SERUM ALBUMIN: CPT | Performed by: EMERGENCY MEDICINE

## 2025-02-27 PROCEDURE — G0378 HOSPITAL OBSERVATION PER HR: HCPCS

## 2025-02-27 PROCEDURE — 83690 ASSAY OF LIPASE: CPT | Performed by: EMERGENCY MEDICINE

## 2025-02-27 PROCEDURE — 250N000011 HC RX IP 250 OP 636: Performed by: EMERGENCY MEDICINE

## 2025-02-27 PROCEDURE — 250N000013 HC RX MED GY IP 250 OP 250 PS 637: Performed by: HOSPITALIST

## 2025-02-27 PROCEDURE — 82962 GLUCOSE BLOOD TEST: CPT

## 2025-02-27 PROCEDURE — 74177 CT ABD & PELVIS W/CONTRAST: CPT

## 2025-02-27 PROCEDURE — 86308 HETEROPHILE ANTIBODY SCREEN: CPT | Performed by: EMERGENCY MEDICINE

## 2025-02-27 PROCEDURE — 84132 ASSAY OF SERUM POTASSIUM: CPT | Performed by: EMERGENCY MEDICINE

## 2025-02-27 PROCEDURE — 80053 COMPREHEN METABOLIC PANEL: CPT | Performed by: EMERGENCY MEDICINE

## 2025-02-27 PROCEDURE — 80164 ASSAY DIPROPYLACETIC ACD TOT: CPT | Performed by: EMERGENCY MEDICINE

## 2025-02-27 PROCEDURE — 87507 IADNA-DNA/RNA PROBE TQ 12-25: CPT | Performed by: HOSPITALIST

## 2025-02-27 PROCEDURE — 93005 ELECTROCARDIOGRAM TRACING: CPT

## 2025-02-27 PROCEDURE — 96375 TX/PRO/DX INJ NEW DRUG ADDON: CPT

## 2025-02-27 PROCEDURE — 81001 URINALYSIS AUTO W/SCOPE: CPT | Performed by: EMERGENCY MEDICINE

## 2025-02-27 PROCEDURE — 85025 COMPLETE CBC W/AUTO DIFF WBC: CPT | Performed by: EMERGENCY MEDICINE

## 2025-02-27 PROCEDURE — 250N000011 HC RX IP 250 OP 636: Performed by: HOSPITALIST

## 2025-02-27 PROCEDURE — 96367 TX/PROPH/DG ADDL SEQ IV INF: CPT

## 2025-02-27 PROCEDURE — 96365 THER/PROPH/DIAG IV INF INIT: CPT

## 2025-02-27 PROCEDURE — 36415 COLL VENOUS BLD VENIPUNCTURE: CPT | Performed by: EMERGENCY MEDICINE

## 2025-02-27 PROCEDURE — 99222 1ST HOSP IP/OBS MODERATE 55: CPT | Mod: AI | Performed by: HOSPITALIST

## 2025-02-27 PROCEDURE — 99291 CRITICAL CARE FIRST HOUR: CPT | Mod: 25

## 2025-02-27 PROCEDURE — 258N000003 HC RX IP 258 OP 636: Performed by: EMERGENCY MEDICINE

## 2025-02-27 PROCEDURE — 87040 BLOOD CULTURE FOR BACTERIA: CPT | Performed by: EMERGENCY MEDICINE

## 2025-02-27 PROCEDURE — 250N000009 HC RX 250: Performed by: EMERGENCY MEDICINE

## 2025-02-27 PROCEDURE — 84443 ASSAY THYROID STIM HORMONE: CPT | Performed by: EMERGENCY MEDICINE

## 2025-02-27 PROCEDURE — 250N000012 HC RX MED GY IP 250 OP 636 PS 637: Performed by: HOSPITALIST

## 2025-02-27 PROCEDURE — 71275 CT ANGIOGRAPHY CHEST: CPT

## 2025-02-27 PROCEDURE — 85379 FIBRIN DEGRADATION QUANT: CPT | Performed by: EMERGENCY MEDICINE

## 2025-02-27 RX ORDER — HYDROXYZINE HYDROCHLORIDE 25 MG/1
25 TABLET, FILM COATED ORAL 2 TIMES DAILY
Status: DISPENSED | OUTPATIENT
Start: 2025-02-27

## 2025-02-27 RX ORDER — VENLAFAXINE HYDROCHLORIDE 75 MG/1
225 CAPSULE, EXTENDED RELEASE ORAL DAILY
Status: DISPENSED | OUTPATIENT
Start: 2025-02-27

## 2025-02-27 RX ORDER — CALCIUM CARBONATE 500 MG/1
2 TABLET, CHEWABLE ORAL 3 TIMES DAILY PRN
Status: ON HOLD | COMMUNITY

## 2025-02-27 RX ORDER — DIVALPROEX SODIUM 125 MG/1
250 CAPSULE, COATED PELLETS ORAL 2 TIMES DAILY
Status: DISPENSED | OUTPATIENT
Start: 2025-02-27

## 2025-02-27 RX ORDER — SIMVASTATIN 10 MG
10 TABLET ORAL AT BEDTIME
Status: DISPENSED | OUTPATIENT
Start: 2025-02-27

## 2025-02-27 RX ORDER — AMLODIPINE BESYLATE 2.5 MG/1
2.5 TABLET ORAL AT BEDTIME
Status: ON HOLD | COMMUNITY

## 2025-02-27 RX ORDER — ALFUZOSIN HYDROCHLORIDE 10 MG/1
10 TABLET, EXTENDED RELEASE ORAL DAILY
Status: DISPENSED | OUTPATIENT
Start: 2025-02-27

## 2025-02-27 RX ORDER — SIMVASTATIN 10 MG
10 TABLET ORAL AT BEDTIME
Status: ON HOLD | COMMUNITY

## 2025-02-27 RX ORDER — INSULIN GLARGINE 100 [IU]/ML
47 INJECTION, SOLUTION SUBCUTANEOUS DAILY
Status: ON HOLD | COMMUNITY

## 2025-02-27 RX ORDER — AMLODIPINE BESYLATE 2.5 MG/1
2.5 TABLET ORAL AT BEDTIME
Status: DISPENSED | OUTPATIENT
Start: 2025-02-27

## 2025-02-27 RX ORDER — ONDANSETRON 2 MG/ML
4 INJECTION INTRAMUSCULAR; INTRAVENOUS EVERY 6 HOURS PRN
Status: DISPENSED | OUTPATIENT
Start: 2025-02-27

## 2025-02-27 RX ORDER — HYDROXYZINE HYDROCHLORIDE 25 MG/1
25 TABLET, FILM COATED ORAL 2 TIMES DAILY
Status: ON HOLD | COMMUNITY

## 2025-02-27 RX ORDER — DEXTROSE MONOHYDRATE 25 G/50ML
25-50 INJECTION, SOLUTION INTRAVENOUS
Status: ACTIVE | OUTPATIENT
Start: 2025-02-27

## 2025-02-27 RX ORDER — ACETAMINOPHEN 325 MG/1
650 TABLET ORAL EVERY 4 HOURS PRN
Status: DISPENSED | OUTPATIENT
Start: 2025-02-27

## 2025-02-27 RX ORDER — PROCHLORPERAZINE MALEATE 5 MG/1
5 TABLET ORAL EVERY 6 HOURS PRN
Status: ACTIVE | OUTPATIENT
Start: 2025-02-27

## 2025-02-27 RX ORDER — LABETALOL HYDROCHLORIDE 5 MG/ML
10 INJECTION, SOLUTION INTRAVENOUS
Status: DISPENSED | OUTPATIENT
Start: 2025-02-27

## 2025-02-27 RX ORDER — ONDANSETRON 4 MG/1
4 TABLET, ORALLY DISINTEGRATING ORAL EVERY 6 HOURS PRN
Status: ACTIVE | OUTPATIENT
Start: 2025-02-27

## 2025-02-27 RX ORDER — AMOXICILLIN 250 MG
2 CAPSULE ORAL 2 TIMES DAILY PRN
Status: ACTIVE | OUTPATIENT
Start: 2025-02-27

## 2025-02-27 RX ORDER — ACETAMINOPHEN 650 MG/1
650 SUPPOSITORY RECTAL EVERY 4 HOURS PRN
Status: ACTIVE | OUTPATIENT
Start: 2025-02-27

## 2025-02-27 RX ORDER — AZITHROMYCIN 500 MG/5ML
500 INJECTION, POWDER, LYOPHILIZED, FOR SOLUTION INTRAVENOUS ONCE
Status: COMPLETED | OUTPATIENT
Start: 2025-02-27 | End: 2025-02-27

## 2025-02-27 RX ORDER — AMOXICILLIN 250 MG
1 CAPSULE ORAL 2 TIMES DAILY PRN
Status: ACTIVE | OUTPATIENT
Start: 2025-02-27

## 2025-02-27 RX ORDER — ALFUZOSIN HYDROCHLORIDE 10 MG/1
10 TABLET, EXTENDED RELEASE ORAL DAILY
Status: ON HOLD | COMMUNITY

## 2025-02-27 RX ORDER — AMOXICILLIN 250 MG
1 CAPSULE ORAL DAILY
Status: ON HOLD | COMMUNITY

## 2025-02-27 RX ORDER — VITAMINS A AND D OINTMENT 15.5; 53.4 G/100G; G/100G
OINTMENT TOPICAL PRN
Status: ON HOLD | COMMUNITY

## 2025-02-27 RX ORDER — INSULIN LISPRO 100 [IU]/ML
4 INJECTION, SOLUTION INTRAVENOUS; SUBCUTANEOUS
Status: ON HOLD | COMMUNITY

## 2025-02-27 RX ORDER — CEFTRIAXONE 2 G/1
2 INJECTION, POWDER, FOR SOLUTION INTRAMUSCULAR; INTRAVENOUS ONCE
Status: COMPLETED | OUTPATIENT
Start: 2025-02-27 | End: 2025-02-27

## 2025-02-27 RX ORDER — MAGNESIUM HYDROXIDE/ALUMINUM HYDROXICE/SIMETHICONE 120; 1200; 1200 MG/30ML; MG/30ML; MG/30ML
20 SUSPENSION ORAL EVERY 6 HOURS PRN
Status: ON HOLD | COMMUNITY

## 2025-02-27 RX ORDER — NICOTINE POLACRILEX 4 MG
15-30 LOZENGE BUCCAL
Status: ACTIVE | OUTPATIENT
Start: 2025-02-27

## 2025-02-27 RX ORDER — BISACODYL 10 MG
10 SUPPOSITORY, RECTAL RECTAL DAILY PRN
Status: ON HOLD | COMMUNITY

## 2025-02-27 RX ORDER — ACETAMINOPHEN 500 MG
1000 TABLET ORAL 3 TIMES DAILY
Status: ON HOLD | COMMUNITY

## 2025-02-27 RX ORDER — INSULIN LISPRO 100 [IU]/ML
6 INJECTION, SOLUTION INTRAVENOUS; SUBCUTANEOUS
Status: ON HOLD | COMMUNITY

## 2025-02-27 RX ORDER — IOPAMIDOL 755 MG/ML
500 INJECTION, SOLUTION INTRAVASCULAR ONCE
Status: COMPLETED | OUTPATIENT
Start: 2025-02-27 | End: 2025-02-27

## 2025-02-27 RX ORDER — IBUPROFEN 200 MG
CAPSULE ORAL PRN
Status: ON HOLD | COMMUNITY

## 2025-02-27 RX ORDER — DIVALPROEX SODIUM 125 MG/1
250 CAPSULE, COATED PELLETS ORAL 2 TIMES DAILY
Status: ON HOLD | COMMUNITY

## 2025-02-27 RX ORDER — CODEINE PHOSPHATE AND GUAIFENESIN 10; 100 MG/5ML; MG/5ML
2 SOLUTION ORAL EVERY 4 HOURS PRN
Status: ON HOLD | COMMUNITY

## 2025-02-27 RX ADMIN — LABETALOL HYDROCHLORIDE 10 MG: 5 INJECTION, SOLUTION INTRAVENOUS at 16:54

## 2025-02-27 RX ADMIN — SODIUM CHLORIDE 1000 ML: 0.9 INJECTION, SOLUTION INTRAVENOUS at 08:06

## 2025-02-27 RX ADMIN — INSULIN ASPART 4 UNITS: 100 INJECTION, SOLUTION INTRAVENOUS; SUBCUTANEOUS at 16:32

## 2025-02-27 RX ADMIN — VENLAFAXINE HYDROCHLORIDE 225 MG: 75 CAPSULE, EXTENDED RELEASE ORAL at 16:33

## 2025-02-27 RX ADMIN — ALFUZOSIN HYDROCHLORIDE 10 MG: 10 TABLET, EXTENDED RELEASE ORAL at 16:33

## 2025-02-27 RX ADMIN — AMLODIPINE BESYLATE 2.5 MG: 2.5 TABLET ORAL at 21:02

## 2025-02-27 RX ADMIN — IOPAMIDOL 100 ML: 755 INJECTION, SOLUTION INTRAVENOUS at 09:36

## 2025-02-27 RX ADMIN — INSULIN GLARGINE 30 UNITS: 100 INJECTION, SOLUTION SUBCUTANEOUS at 16:33

## 2025-02-27 RX ADMIN — ACETAMINOPHEN 650 MG: 325 TABLET, FILM COATED ORAL at 20:29

## 2025-02-27 RX ADMIN — AZITHROMYCIN MONOHYDRATE 500 MG: 500 INJECTION, POWDER, LYOPHILIZED, FOR SOLUTION INTRAVENOUS at 13:00

## 2025-02-27 RX ADMIN — CEFTRIAXONE 2 G: 2 INJECTION, POWDER, FOR SOLUTION INTRAMUSCULAR; INTRAVENOUS at 14:00

## 2025-02-27 RX ADMIN — HYDROXYZINE HYDROCHLORIDE 25 MG: 25 TABLET, FILM COATED ORAL at 20:26

## 2025-02-27 RX ADMIN — ONDANSETRON 4 MG: 2 INJECTION INTRAMUSCULAR; INTRAVENOUS at 20:57

## 2025-02-27 RX ADMIN — SIMVASTATIN 10 MG: 10 TABLET, FILM COATED ORAL at 21:02

## 2025-02-27 RX ADMIN — SODIUM CHLORIDE 90 ML: 9 INJECTION, SOLUTION INTRAVENOUS at 09:36

## 2025-02-27 RX ADMIN — INSULIN ASPART 4 UNITS: 100 INJECTION, SOLUTION INTRAVENOUS; SUBCUTANEOUS at 14:15

## 2025-02-27 RX ADMIN — SODIUM CHLORIDE 1000 ML: 0.9 INJECTION, SOLUTION INTRAVENOUS at 12:56

## 2025-02-27 RX ADMIN — RISPERIDONE 3.5 MG: 1 TABLET, FILM COATED ORAL at 21:03

## 2025-02-27 RX ADMIN — DIVALPROEX SODIUM 250 MG: 125 CAPSULE, COATED PELLETS ORAL at 20:26

## 2025-02-27 RX ADMIN — SODIUM CHLORIDE 1000 ML: 0.9 INJECTION, SOLUTION INTRAVENOUS at 13:07

## 2025-02-27 ASSESSMENT — COLUMBIA-SUICIDE SEVERITY RATING SCALE - C-SSRS
1. IN THE PAST MONTH, HAVE YOU WISHED YOU WERE DEAD OR WISHED YOU COULD GO TO SLEEP AND NOT WAKE UP?: NO
6. HAVE YOU EVER DONE ANYTHING, STARTED TO DO ANYTHING, OR PREPARED TO DO ANYTHING TO END YOUR LIFE?: NO
2. HAVE YOU ACTUALLY HAD ANY THOUGHTS OF KILLING YOURSELF IN THE PAST MONTH?: NO

## 2025-02-27 ASSESSMENT — ACTIVITIES OF DAILY LIVING (ADL)
ADLS_ACUITY_SCORE: 49
ADLS_ACUITY_SCORE: 57
ADLS_ACUITY_SCORE: 49
ADLS_ACUITY_SCORE: 59
ADLS_ACUITY_SCORE: 57
ADLS_ACUITY_SCORE: 51
ADLS_ACUITY_SCORE: 49
ADLS_ACUITY_SCORE: 49
ADLS_ACUITY_SCORE: 57
ADLS_ACUITY_SCORE: 49

## 2025-02-27 NOTE — PHARMACY-ADMISSION MEDICATION HISTORY
Pharmacist Admission Medication History    Admission medication history is complete. The information provided in this note is only as accurate as the sources available at the time of the update.    Information Source(s): Caregiver and Facility (TCU/NH/) medication list/MAR via  med list from facility     Pertinent Information: Patient resides at [facility]  [phone]    Patient does NOT do sliding scale insulin - just fixed dosing    Changes made to PTA medication list:  Added: tylenol scheduled, amlodipine, divalproex, diclofenac, hydroxyzine, metformin, senna/docusate, A&D, mylanta, tums, robitussin, dulcolax, triple antibiotic oint  Deleted: asa, rapaflo, trazodone  Changed: Effexor to one entry, zocor, Rispeidone, humalog, insulin glargine, vitamin d      Medication History Completed By: Margot Proctor [facility] 2/27/2025 2:51 PM    PTA Med List   Medication Sig Last Dose/Taking    acetaminophen (TYLENOL) 325 MG tablet TAKE ONE TO TWO TABLETS BY MOUTH EVERY EIGHT  HOURS AS NEEDED FOR PAIN Taking    acetaminophen (TYLENOL) 500 MG tablet Take 1,000 mg by mouth 3 times daily. 2/26/2025 Evening    alfuzosin ER (UROXATRAL) 10 MG 24 hr tablet Take 10 mg by mouth daily. 2/26/2025 Morning    alum & mag hydroxide-simethicone (MAALOX) 200-200-20 MG/5ML SUSP suspension Take 20 mLs by mouth every 6 hours as needed for indigestion. Taking As Needed    amLODIPine (NORVASC) 2.5 MG tablet Take 2.5 mg by mouth at bedtime. 2/26/2025 Evening    bisacodyl (DULCOLAX) 10 MG suppository Place 10 mg rectally daily as needed for constipation. Taking As Needed    calcium carbonate (TUMS) 500 MG chewable tablet Take 2 chew tab by mouth 3 times daily as needed for heartburn. Taking As Needed    diclofenac (VOLTAREN) 1 % topical gel Apply 4 g topically daily. 2/26/2025 Morning    divalproex sodium delayed-release (DEPAKOTE SPRINKLE) 125 MG DR capsule Take 250 mg by mouth 2 times daily. 2/26/2025 Evening    guaiFENesin-codeine  (ROBITUSSIN AC) 100-10 MG/5ML solution Take 2 teaspoonful by mouth every 4 hours as needed for cough. Taking As Needed    hydrOXYzine HCl (ATARAX) 25 MG tablet Take 25 mg by mouth 2 times daily. 2/26/2025 Evening    insulin glargine 100 UNIT/ML pen Inject 47 Units subcutaneously daily. 2/26/2025    insulin lispro (HUMALOG KWIKPEN) 100 UNIT/ML (1 unit dial) KWIKPEN Inject 6 Units subcutaneously daily (with lunch). 2/26/2025 Noon    insulin lispro (HUMALOG KWIKPEN) 100 UNIT/ML (1 unit dial) KWIKPEN Inject 4 Units subcutaneously 2 times daily (before meals). Breakfast  and supper 2/26/2025 Evening    loperamide (IMODIUM) 2 MG capsule Take 2 mg by mouth 4 times daily as needed for diarrhea 4 mg after first loose stool, then 2 mg after each subsequent loose stool.  Max 8 mg (4 capsules) daily. Taking As Needed    magnesium hydroxide (MILK OF MAGNESIA) 400 MG/5ML suspension Take 30 mLs by mouth daily as needed for constipation Taking As Needed    metFORMIN (GLUCOPHAGE) 500 MG tablet Take 500 mg by mouth 2 times daily (with meals). 2/26/2025 Evening    Neomycin-Bacitracin-Polymyxin (TRIPLE ANTIBIOTIC) 3.5-400-5000 OINT ointment Externally apply topically as needed. Taking As Needed    ondansetron (ZOFRAN) 4 MG tablet Take 1 tablet (4 mg) by mouth every 6 hours as needed for nausea Taking As Needed    RISPERIDONE PO Take 3.5 mg by mouth at bedtime. 2/26/2025 Evening    senna-docusate (SENOKOT-S/PERICOLACE) 8.6-50 MG tablet Take 1 tablet by mouth daily. 2/26/2025 Morning    simvastatin (ZOCOR) 10 MG tablet Take 10 mg by mouth at bedtime. 2/26/2025 Evening    VENLAFAXINE HCL ER PO Take 225 mg by mouth daily. 2/26/2025 Morning    vitamin D3 (CHOLECALCIFEROL) 50 mcg (2000 units) tablet Take 1 tablet by mouth daily. 2/26/2025 Morning    Vitamins A & D (VITAMIN A & D) external ointment Apply topically as needed. Taking As Needed

## 2025-02-27 NOTE — ED TRIAGE NOTES
Here for generalized weakness and non traumatic bilateral leg pain since Tuesday. Unable to get out of bed due to weakness. Denies any fall. Ems blood sugar 279. ABCs intact.      Triage Assessment (Adult)       Row Name 02/27/25 0757          Triage Assessment    Airway WDL WDL        Respiratory WDL    Respiratory WDL WDL        Cardiac WDL    Cardiac WDL WDL

## 2025-02-27 NOTE — ED NOTES
Pt unable to sit on the edge of the bed without support from staff. Pt is incontinent of bowel and bladder. He has soiled his bed x 2 - even with purewick in place. Pt was on a bedpan without success and has wet the bed a 3rd time which prompted a 3rd bed change. RN & Tech have cleansed skin with bath wipes and applied a new purewick and changed linens. Provider updated.

## 2025-02-27 NOTE — ED PROVIDER NOTES
Emergency Department Note      History of Present Illness     Chief Complaint   Generalized Weakness      HPI   Caden Bush is a 64 year old male who who comes via EMS from his assisted living for generalized weakness.  The patient says over the last 2 days has been having abdominal pain and generalized weakness.  He was weak to the point that he had trouble getting out of bed this morning and was unable to make it to the bathroom during the night.  He says he tried to walk today and had to sit down.  He does become dyspneic on exertion as well.  He is been having palpitations but no chest pain.  No vomiting or diarrhea.  He says he has been able to eat without difficulty.  He has chronic pain in his legs which is unchanged.  No new medications.    Independent Historian   EMS note that the patient was weak and had difficulty walking.  Blood sugar was normal en route.    Past Medical History     Medical History and Problem List   Past Medical History:   Diagnosis Date    Arthritis     Diabetes mellitus     Eczema 2011    Polyp of colon, adenomatous 6/25/2012    Retinopathy     Schizoaffective disorder     Seasonal affective disorder     Vitamin D deficiency        Medications   acetaminophen (TYLENOL) 325 MG tablet  aspirin (ASA) 81 MG chewable tablet  cholecalciferol (VITAMIN D) 1000 UNIT tablet  insulin glargine (BASAGLAR KWIKPEN) 100 UNIT/ML pen  loperamide (IMODIUM) 2 MG capsule  magnesium hydroxide (MILK OF MAGNESIA) 400 MG/5ML suspension  NOVOFINE 30 30G X 8 MM insulin pen needle  NOVOLOG FLEXPEN 100 UNIT/ML soln  ondansetron (ZOFRAN) 4 MG tablet  risperiDONE (RISPERDAL) 3 MG tablet  Silodosin (RAPAFLO) 8 MG CAPS capsule  simvastatin (ZOCOR) 20 MG tablet  traZODone (DESYREL) 50 MG tablet  venlafaxine (EFFEXOR XR) 75 MG 24 hr capsule  venlafaxine (EFFEXOR-XR) 150 MG 24 hr capsule        Surgical History   Past Surgical History:   Procedure Laterality Date    APPENDECTOMY      COLONOSCOPY  2012 2  adenomatous polyps       Physical Exam     Patient Vitals for the past 24 hrs:   BP Temp Temp src Pulse Resp SpO2 Weight   02/27/25 1300 (!) 174/103 -- -- 116 -- 93 % --   02/27/25 1245 (!) 170/100 -- -- 117 -- 93 % --   02/27/25 1230 (!) 174/106 -- -- (!) 124 -- 95 % --   02/27/25 1215 -- -- -- 118 -- 92 % --   02/27/25 1211 -- -- -- 120 -- 93 % --   02/27/25 1208 -- -- -- 118 -- (!) 91 % --   02/27/25 1145 (!) 187/108 -- -- 120 -- 94 % --   02/27/25 1130 (!) 185/97 -- -- -- -- -- --   02/27/25 1110 (!) 174/110 -- -- 116 -- 92 % --   02/27/25 1100 (!) 169/114 -- -- 115 -- 93 % --   02/27/25 1045 (!) 170/109 -- -- 117 -- 92 % --   02/27/25 1030 (!) 177/116 -- -- 115 -- 93 % --   02/27/25 1015 (!) 184/103 -- -- 116 -- 95 % --   02/27/25 1000 (!) 167/98 -- -- 116 -- 94 % --   02/27/25 0930 (!) 157/99 -- -- (!) 122 -- 93 % --   02/27/25 0915 (!) 183/106 -- -- (!) 121 -- 94 % --   02/27/25 0900 (!) 182/103 -- -- (!) 121 -- 94 % --   02/27/25 0845 (!) 179/112 -- -- 119 -- 93 % --   02/27/25 0830 (!) 179/112 -- -- (!) 122 -- 92 % --   02/27/25 0815 (!) 187/106 -- -- (!) 124 -- 92 % --   02/27/25 0758 (!) 147/104 97.7  F (36.5  C) Temporal (!) 133 22 93 % 99.8 kg (220 lb 0.3 oz)     Physical Exam  Constitutional:       General: He is not in acute distress.     Appearance: Normal appearance. He is not toxic-appearing.   HENT:      Head: Atraumatic.      Right Ear: External ear normal.      Left Ear: External ear normal.      Mouth/Throat:      Mouth: Mucous membranes are dry.   Eyes:      General: No scleral icterus.     Conjunctiva/sclera: Conjunctivae normal.   Cardiovascular:      Rate and Rhythm: Regular rhythm. Tachycardia present.      Heart sounds: Normal heart sounds.   Pulmonary:      Effort: Pulmonary effort is normal. No respiratory distress.      Breath sounds: Normal breath sounds.   Abdominal:      General: There is no distension.      Palpations: Abdomen is soft.      Comments: Mild diffuse central abdominal  tenderness.  No guarding.  No mass.   Musculoskeletal:         General: No deformity.      Cervical back: Neck supple.      Right lower leg: No edema.      Left lower leg: No edema.   Skin:     General: Skin is warm.      Capillary Refill: Capillary refill takes less than 2 seconds.      Findings: No rash.   Neurological:      General: No focal deficit present.      Mental Status: He is alert and oriented to person, place, and time.   Psychiatric:         Mood and Affect: Mood normal.         Behavior: Behavior normal.           Diagnostics     Lab Results   Labs Ordered and Resulted from Time of ED Arrival to Time of ED Departure   D DIMER QUANTITATIVE - Abnormal       Result Value    D-Dimer Quantitative 0.51 (*)    COMPREHENSIVE METABOLIC PANEL - Abnormal    Sodium 134 (*)     Potassium 4.5      Carbon Dioxide (CO2) 23      Anion Gap 12      Urea Nitrogen 28.2 (*)     Creatinine 0.99      GFR Estimate 85      Calcium 9.1      Chloride 99      Glucose 286 (*)     Alkaline Phosphatase 106      AST 17      ALT 27      Protein Total 6.4      Albumin 4.0      Bilirubin Total 0.6     LACTIC ACID WHOLE BLOOD WITH 1X REPEAT IN 2 HR WHEN >2 - Abnormal    Lactic Acid, Initial 2.2 (*)    LIPASE - Abnormal    Lipase 9 (*)    ROUTINE UA WITH MICROSCOPIC REFLEX TO CULTURE - Abnormal    Color Urine Straw      Appearance Urine Clear      Glucose Urine >=1000 (*)     Bilirubin Urine Negative      Ketones Urine 40 (*)     Specific Gravity Urine 1.010      Blood Urine Negative      pH Urine 7.5 (*)     Protein Albumin Urine Negative      Urobilinogen Urine Normal      Nitrite Urine Negative      Leukocyte Esterase Urine Negative      Mucus Urine Present (*)     RBC Urine 1      WBC Urine <1     CBC WITH PLATELETS AND DIFFERENTIAL - Abnormal    WBC Count 7.2      RBC Count 5.29      Hemoglobin 16.6      Hematocrit 47.2      MCV 89      MCH 31.4      MCHC 35.2      RDW 11.7      Platelet Count 151      % Neutrophils 91      %  Lymphocytes 5      % Monocytes 3      % Eosinophils 0      % Basophils 0      % Immature Granulocytes 0      NRBCs per 100 WBC 0      Absolute Neutrophils 6.5      Absolute Lymphocytes 0.4 (*)     Absolute Monocytes 0.2      Absolute Eosinophils 0.0      Absolute Basophils 0.0      Absolute Immature Granulocytes 0.0      Absolute NRBCs 0.0     VALPROIC ACID - Abnormal    Valproic acid 24.0 (*)    GLUCOSE BY METER - Abnormal    GLUCOSE BY METER POCT 267 (*)    TROPONIN T, HIGH SENSITIVITY - Normal    Troponin T, High Sensitivity 11     MAGNESIUM - Normal    Magnesium 1.7     TSH WITH FREE T4 REFLEX - Normal    TSH 0.74     LACTIC ACID WHOLE BLOOD - Normal    Lactic Acid 1.8     TROPONIN T, HIGH SENSITIVITY - Normal    Troponin T, High Sensitivity 9     MONONUCLEOSIS SCREEN - Normal    Mononucleosis Screen Negative     PROCALCITONIN - Normal    Procalcitonin 0.12     GLUCOSE MONITOR NURSING POCT   GLUCOSE MONITOR NURSING POCT   GLUCOSE MONITOR NURSING POCT   BLOOD CULTURE   BLOOD CULTURE   ENTERIC BACTERIA AND VIRUS PANEL BY PCR       Imaging   CT Chest PE Abdomen Pelvis w Contrast   Final Result   IMPRESSION:   1.  No pulmonary embolism demonstrated.   2.  Minimal probable atelectasis versus less likely infiltrate.   3.  Splenomegaly at 15.5 cm.   4.  Prominent rectal stool.             EKG   ECG results from 02/27/25   EKG 12-lead, tracing only     Value    Systolic Blood Pressure     Diastolic Blood Pressure     Ventricular Rate 121    Atrial Rate 121    MO Interval 184    QRS Duration 72        QTc 417    P Axis 39    R AXIS -1    T Axis 72    Interpretation ECG      Sinus tachycardia  Otherwise normal ECG  When compared with ECG of 02-Jul-2022 21:20,  Vent. rate has increased by  43 bpm  Unconfirmed report - interpretation of this ECG is computer generated - see medical record for final interpretation  Confirmed by - EMERGENCY ROOM, PHYSICIAN ([id number]),  CARSON PERRY ([id number]) on 2/27/2025 11:39:09 AM           ED Course      Medications Administered   Medications   sodium chloride 0.9% BOLUS 1,000 mL (1,000 mLs Intravenous $New Bag 2/27/25 1307)   cefTRIAXone (ROCEPHIN) 2 g vial to attach to  ml bag for ADULTS or NS 50 ml bag for PEDS (2 g Intravenous $New Bag 2/27/25 1400)   senna-docusate (SENOKOT-S/PERICOLACE) 8.6-50 MG per tablet 1 tablet (has no administration in time range)     Or   senna-docusate (SENOKOT-S/PERICOLACE) 8.6-50 MG per tablet 2 tablet (has no administration in time range)   ondansetron (ZOFRAN ODT) ODT tab 4 mg (has no administration in time range)     Or   ondansetron (ZOFRAN) injection 4 mg (has no administration in time range)   prochlorperazine (COMPAZINE) injection 5 mg (has no administration in time range)     Or   prochlorperazine (COMPAZINE) tablet 5 mg (has no administration in time range)   acetaminophen (TYLENOL) tablet 650 mg (has no administration in time range)     Or   acetaminophen (TYLENOL) Suppository 650 mg (has no administration in time range)   melatonin tablet 1 mg (has no administration in time range)   glucose gel 15-30 g (has no administration in time range)     Or   dextrose 50 % injection 25-50 mL (has no administration in time range)     Or   glucagon injection 1 mg (has no administration in time range)   insulin aspart (NovoLOG) injection (RAPID ACTING) (has no administration in time range)   insulin aspart (NovoLOG) injection (RAPID ACTING) (has no administration in time range)   insulin aspart (NovoLOG) injection (RAPID ACTING) (has no administration in time range)   sodium chloride 0.9% BOLUS 1,000 mL (0 mLs Intravenous Stopped 2/27/25 1139)   iopamidol (ISOVUE-370) solution 500 mL (100 mLs Intravenous $Given 2/27/25 0936)   CT scan flush use (90 mLs As instructed $Given 2/27/25 0936)   sodium chloride 0.9% BOLUS 1,000 mL (1,000 mLs Intravenous $New Bag 2/27/25 1256)   azithromycin (ZITHROMAX) 500 mg in  mL intermittent infusion (0 mg Intravenous  Stopped 2/27/25 1354)       Medical Decision Making / Diagnosis     CMS Diagnoses: The patient has signs of sepsis   Sepsis ED evaluation   The patient has signs of sepsis as evidenced by:  1. Presence of 2 SIRS criteria, suspected infection, AND  2. Organ dysfunction: Lactic Acidosis with value >2.0 due to sepsis    Sepsis Care Initiation: Starting at  1210 PM on 02/27/25, until specified. Prior to this documentation, sepsis, severe sepsis, or septic shock was NOT thought to be a significant cause of illness. This order represents the first time infection was seriously considered to be affecting the patient.    Lactic Acid Results:  Recent Labs   Lab Test 02/27/25  0956 02/27/25  0803 11/17/21  1507   LACT 1.8 2.2* 0.7       3 Hour Bundle 6 Hour Bundle (Reassessment)   Blood Cultures before IV Antibiotics: Yes  Antibiotics given: see below  Prehospital fluid volume (mL):                     Total fluids given (ED +Pre-hospital):  Full 30 mL/kg bolus given based on weight: 2,990 mL   Repeat Lactic Acid Level: Ordered by reflex for 2 hours after initial lactic acid collection.  Vasopressors: MAP>65 after initial IVF bolus, will continue to monitor fluid status and vital signs.  Repeat perfusion exam: I attest to having performed a repeat sepsis exam and assessment of perfusion at  1300 PM.   BMI Readings from Last 1 Encounters:   02/27/25 30.69 kg/m        Anti-infectives (From admission through now)      Start     Dose/Rate Route Frequency Ordered Stop    02/27/25 1210  cefTRIAXone (ROCEPHIN) 2 g vial to attach to  ml bag for ADULTS or NS 50 ml bag for PEDS         2 g  over 30 Minutes Intravenous ONCE 02/27/25 1206      02/27/25 1210  azithromycin (ZITHROMAX) 500 mg in  mL intermittent infusion         500 mg  over 1 Hours Intravenous ONCE 02/27/25 1206 02/27/25 1354                MIPS   MIPS: CT for PE was ordered because the patient had an abnormal d-dimer.       TAMIE Bush is a 64 year old  male who presents to the ED for evaluation of generalized weakness.  He is hypoxic and tachycardic.  D-dimer was elevated so he had a CT scan that fortunately does not demonstrate a PE.  There is an area of likely pneumonia or potentially atelectasis.  Given the hypoxia and weakness he was covered for community-acquired pneumonia.  Lactate was elevated but is downtrending.  He has not required advanced airway management or BiPAP.    CT does note splenomegaly.  Monoscreen is negative.  This may be reactive to a viral infection.    We attempted to get the patient up to road test him, and he is still significantly weak and is a fall risk.  Therefore he will be admitted to the hospital service for further management.    Disposition   The patient was admitted to the hospital.     Diagnosis     ICD-10-CM    1. Acute respiratory failure with hypoxia (H)  J96.01       2. Pneumonia due to infectious organism, unspecified laterality, unspecified part of lung  J18.9       3. Splenomegaly  R16.1             Luis Anthony MD  02/27/25 6590

## 2025-02-27 NOTE — H&P
[facility]    History and Physical  Hospitalist       Date of Admission:  2/27/2025    Assessment & Plan   Caden Bush is a 64 year old male with a PMH of schizoaffective disorder, HL, IDDM2, obesity who presents with generalized weakness and diarrhea.    # Generalized weakness, diarrhea: Patient comes in from his assisted living facility with generalized weakness for several days.  He has also had loose stools.  He has had difficulty controlling his stools and his urine.  He denies any falls.  He uses a walker at baseline.  He denies any chest pain.  Breathing is okay.  He denies any cough.  No clear fevers.  He has been eating and drinking.  He denies any recent changes in his medications.  He does not have any paperwork from his assisted living facility.  I did attempt to call his sister, Alissa but there was no answer.  -ED, pt afebrile, tachycardic and hypertensive.  Saturating OK on RA.   CBC unremarkable and without leukocytosis.  BMP with mild hyponatremia but unremarkable. Lactic acid mildly elevated at 2.2 and improved on recheck.  HS troponin negative.  ECG showed sinus tachycardia without clear ischemic changes.  CT Chest PE A/P without PE or acute process.  Splenomegaly noted and prominent rectal stool.  Pt given 3L NS and ceftriaxone and azithro.  -Patient does not have clear infiltrate on his CT scan.  He has no white count.  He is on room air.  We will check a procalcitonin but hold on further antibiotics at this point.  -Given his diarrheal illness, will check an enteric panel and place him on enteric precautions.  Concern for norovirus given recent outbreak.  -He has received 3 L of IV fluid.  Bolus as needed but encourage p.o. intake at this point.  -Monitor his sinus tachycardia on telemetry.  -Physical therapy consulted.    #IDDM2 with hyperglycemia: We will await pharmacy med rec.  Resume reduced home Lantus once verified and titrate given likely reduced PO intake in  hospital.  We will also have carb count with meals.  Sliding scale insulin.    #Schizoaffective D/O: Await pharmacy med rec.  Resume his home meds once verified.    #Pseudohyponatremia: Monitor BMP.  Control blood sugars as above.    #Obesity: Complicates cares.     DVT Prophylaxis: Pneumatic Compression Devices  Code Status: Full Code  Medically Ready for Discharge: Anticipated Tomorrow    I tried to call Sister Alissa at number listed but there was no answer.  Caden Stockton MD    Primary Care Physician   Иван Garg    Chief Complaint   Weakness, diarrhea    History is obtained from the patient, patient's chart, discussed with ER physician    History of Present Illness   Caden Bush is a 64 year old male with a PMH of schizoaffective disorder, HL, IDDM2, obesity who presents with generalized weakness and diarrhea.    Patient comes in from his assisted living facility with generalized weakness for several days.  He has also had loose stools.  He has had difficulty controlling his stools and his urine.  He denies any falls.  He uses a walker at baseline.  He denies any chest pain.  Breathing is okay.  He denies any cough.  No clear fevers.  He has been eating and drinking.  He denies any recent changes in his medications.  He does not have any paperwork from his assisted living facility.  I did attempt to call his sister, Alissa but there was no answer.    In the ED, pt afebrile, tachycardic and hypertensive.  Saturating OK on RA.   CBC unremarkable and without leukocytosis.  BMP with mild hyponatremia but unremarkable. Lactic acid mildly elevated at 2.2 and improved on recheck.  HS troponin negative.  ECG showed sinus tachycardia without clear ischemic changes.  CT Chest PE A/P without PE or acute process.  Splenomegaly noted and prominent rectal stool.  Pt given 3L NS and ceftriaxone and azithro.    Past Medical History    I have reviewed this patient's medical history and updated it with pertinent information if  needed.   Past Medical History:   Diagnosis Date    Arthritis     Diabetes mellitus     Eczema 2011    biopsy leg     Polyp of colon, adenomatous 6/25/2012    Retinopathy     HFA ophtho DR HARLEY    Schizoaffective disorder     Seasonal affective disorder     Vitamin D deficiency     replaced       Past Surgical History   I have reviewed this patient's surgical history and updated it with pertinent information if needed.  Past Surgical History:   Procedure Laterality Date    APPENDECTOMY      COLONOSCOPY  2012    2 adenomatous polyps       Prior to Admission Medications   Prior to Admission Medications   Prescriptions Last Dose Informant Patient Reported? Taking?   NOVOFINE 30 30G X 8 MM insulin pen needle   No No   Sig: USE AS DIRECTED TO INJECT INSULIN FOUR TIMES DAILY   NOVOLOG FLEXPEN 100 UNIT/ML soln   No No   Sig: INJECT 6 UNITS WITH BREAKFAST;5UNITS WITH LUNCH & DINNER;SLIDING SCALE:200-250=1U;251-300=2U;301-350=3U;>350=4U   Silodosin (RAPAFLO) 8 MG CAPS capsule   No No   Sig: Take 1 capsule (8 mg) by mouth daily   acetaminophen (TYLENOL) 325 MG tablet   No No   Sig: TAKE ONE TO TWO TABLETS BY MOUTH EVERY EIGHT  HOURS AS NEEDED FOR PAIN   aspirin (ASA) 81 MG chewable tablet   No No   Sig: TAKE ONE TABLET BY MOUTH DAILY   cholecalciferol (VITAMIN D) 1000 UNIT tablet   Yes No   Sig: Take 1 tablet by mouth daily.   insulin glargine (BASAGLAR KWIKPEN) 100 UNIT/ML pen   No No   Sig: INJECT 15 UNITS SUB Q EVERY MORNING   loperamide (IMODIUM) 2 MG capsule   Yes No   Sig: Take 2 mg by mouth 4 times daily as needed for diarrhea 4 mg after first loose stool, then 2 mg after each subsequent loose stool.  Max 8 mg (4 capsules) daily.   magnesium hydroxide (MILK OF MAGNESIA) 400 MG/5ML suspension   Yes No   Sig: Take 30 mLs by mouth daily as needed for constipation   ondansetron (ZOFRAN) 4 MG tablet   No No   Sig: Take 1 tablet (4 mg) by mouth every 6 hours as needed for nausea   Patient not taking: Reported on 5/4/2020    risperiDONE (RISPERDAL) 3 MG tablet   No No   Sig: Take 1 tablet (3 mg) by mouth At Bedtime   simvastatin (ZOCOR) 20 MG tablet   No No   Sig: TAKE ONE TABLET BY MOUTH AT BEDTIME   traZODone (DESYREL) 50 MG tablet   No No   Sig: Take 1.5 tablets (75 mg) by mouth At Bedtime   venlafaxine (EFFEXOR XR) 75 MG 24 hr capsule   No No   Sig: Take 1 capsule (75 mg) by mouth daily TAKE WITH  Effexor 150 mg for a total dose of 225 mg daily   venlafaxine (EFFEXOR-XR) 150 MG 24 hr capsule   No No   Sig: TAKE 1 CAPSULE BY MOUTH DAILY ALONG WITH A 75 MG FOR A TOTAL  MG DAILY      Facility-Administered Medications: None     Allergies   Allergies   Allergen Reactions    Flomax [Tamsulosin] Unknown    Lisinopril Unknown       Social History   I have reviewed this patient's social history and updated it with pertinent information if needed. Caden Bush  reports that he has never smoked. He has never used smokeless tobacco. He reports that he does not drink alcohol and does not use drugs.    Family History   I have reviewed this patient's family history and updated it with pertinent information if needed.   Family History   Problem Relation Age of Onset    Osteoporosis Mother     Breast Cancer Mother 78    Osteoporosis Father     Asthma Father     Cancer - colorectal Father 70    Depression Father     Colon Cancer Father     Anxiety Disorder Sister        Review of Systems   The 10 point Review of Systems is negative other than noted in the HPI or here.     Physical Exam   Temp: 97.7  F (36.5  C) Temp src: Temporal BP: (!) 187/108 Pulse: 120   Resp: 22 SpO2: 93 % O2 Device: None (Room air)    Vital Signs with Ranges  Temp:  [97.7  F (36.5  C)] 97.7  F (36.5  C)  Pulse:  [115-133] 120  Resp:  [22] 22  BP: (147-187)/() 187/108  SpO2:  [91 %-95 %] 93 %  220 lbs .31 oz    Constitutional: Obese, calm and cooperative.  Pleasant.  Answers appropriately.  HEENT: Normocephalic, MMM, no elevation of JVD noted  Respiratory: Nl WOB,  Clear bilaterally, No wheezes, no crackles  Cardiovascular: Tachycardic, regular.  No murmur.  GI: Obese, mild distention.  Bowel sounds appreciated.  No focal tenderness.  No rebound or guarding.  Lymph/Hematologic: No bruising. No cervical LAD  Skin: No rash  Musculoskeletal: Nl Tone, No edema noted.  Some chronic venous stasis changes noted.  Neurologic: A&Ox3, Answers appropriately. CNII-XII intact. Moves all extremities.  He has 5 out of 5 strength in lower extremities bilaterally.  Sensation is intact in distal extremities.  Plantar and dorsiflexion intact.  No tremor  Psychiatric: Calm    Data   Data reviewed today:  I personally reviewed   Recent Labs   Lab 02/27/25  0956 02/27/25  0803   WBC  --  7.2   HGB  --  16.6   MCV  --  89   PLT  --  151   NA  --  134*   POTASSIUM  --  4.5   CHLORIDE  --  99   CO2  --  23   BUN  --  28.2*   CR  --  0.99   ANIONGAP  --  12   PEE  --  9.1   * 286*   ALBUMIN  --  4.0   PROTTOTAL  --  6.4   BILITOTAL  --  0.6   ALKPHOS  --  106   ALT  --  27   AST  --  17   LIPASE  --  9*       Recent Results (from the past 24 hours)   CT Chest PE Abdomen Pelvis w Contrast    Narrative    EXAM: CT CHEST PE ABDOMEN PELVIS W CONTRAST  LOCATION: [facility]  DATE: 2/27/2025    INDICATION: Elevated d dimer, chest and abd pain  COMPARISON: Abdomen pelvis CT from 8/11/2021  TECHNIQUE: CT chest pulmonary angiogram and routine CT abdomen pelvis with IV contrast. Arterial phase through the chest and venous phase through the abdomen and pelvis. Multiplanar reformats and MIP reconstructions were performed. Dose reduction   techniques were used.   CONTRAST: 100mL Isovue 370    FINDINGS:  ANGIOGRAM CHEST: Pulmonary arteries are normal caliber and negative for pulmonary emboli. Thoracic aorta is negative for dissection. No CT evidence of right heart strain.     LUNGS AND PLEURA: Minimal probable atelectasis versus less likely infiltrate. No  effusions.    MEDIASTINUM/AXILLAE: No adenopathy or thoracic aortic aneurysm.    CORONARY ARTERY CALCIFICATION: None.    HEPATOBILIARY: Diffuse hepatic steatosis. No significant mass. No bile duct dilatation. No calcified gallstones.    PANCREAS: No significant mass, duct dilatation, or inflammatory change.    SPLEEN: Enlarged at 15.5 cm.    ADRENAL GLANDS: 1.3 cm nodule in the left adrenal gland is stable since 2021 compatible with a benign adenoma requiring no follow-up. Right adrenal gland unremarkable.    KIDNEYS/BLADDER: No significant mass, stones, or hydronephrosis. There are simple or benign cysts. No follow up is needed.    BOWEL: No obstruction or inflammatory change. Prominent rectal stool.    LYMPH NODES: No lymphadenopathy.    VASCULATURE: No abdominal aortic aneurysm.    PELVIC ORGANS: Normal.    MUSCULOSKELETAL: No frankly destructive bony lesions.      Impression    IMPRESSION:  1.  No pulmonary embolism demonstrated.  2.  Minimal probable atelectasis versus less likely infiltrate.  3.  Splenomegaly at 15.5 cm.  4.  Prominent rectal stool.         Clinically Significant Risk Factors Present on Admission         # Hyponatremia: Lowest Na = 134 mmol/L in last 2 days, will monitor as appropriate         # Drug Induced Platelet Defect: home medication list includes an antiplatelet medication

## 2025-02-27 NOTE — ED NOTES
[facility]  ED Nurse Handoff Report    ED Chief complaint: Generalized Weakness  . ED Diagnosis:   Final diagnoses:   None       Allergies:   Allergies   Allergen Reactions    Flomax [Tamsulosin] Unknown    Lisinopril Unknown       Code Status: Full Code    Activity level - Baseline/Home:  assist of 1.  Activity Level - Current:   assist of 2.   Lift room needed: No.   Bariatric: No   Needed: No   Isolation: No.   Infection: Not Applicable.     Respiratory status: Room air    Vital Signs (within 30 minutes):   Vitals:    02/27/25 1100 02/27/25 1110 02/27/25 1130 02/27/25 1145   BP: (!) 169/114 (!) 174/110 (!) 185/97 (!) 187/108   Pulse: 115 116  120   Resp:       Temp:       TempSrc:       SpO2: 93% 92%  94%   Weight:           Cardiac Rhythm:  ,      Pain level:    Patient confused: Yes.   Patient Falls Risk: nonskid shoes/slippers when out of bed, patient and family education, activity supervised, and mobility aid in reach.   Elimination Status: Has voided     Patient Report - Initial Complaint: generalized weakness.   Focused Assessment: weakness, incontinent of bowel and bladder.      Abnormal Results:   Labs Ordered and Resulted from Time of ED Arrival to Time of ED Departure   D DIMER QUANTITATIVE - Abnormal       Result Value    D-Dimer Quantitative 0.51 (*)    COMPREHENSIVE METABOLIC PANEL - Abnormal    Sodium 134 (*)     Potassium 4.5      Carbon Dioxide (CO2) 23      Anion Gap 12      Urea Nitrogen 28.2 (*)     Creatinine 0.99      GFR Estimate 85      Calcium 9.1      Chloride 99      Glucose 286 (*)     Alkaline Phosphatase 106      AST 17      ALT 27      Protein Total 6.4      Albumin 4.0      Bilirubin Total 0.6     LACTIC ACID WHOLE BLOOD WITH 1X REPEAT IN 2 HR WHEN >2 - Abnormal    Lactic Acid, Initial 2.2 (*)    LIPASE - Abnormal    Lipase 9 (*)    CBC WITH PLATELETS AND DIFFERENTIAL - Abnormal    WBC Count 7.2      RBC Count 5.29      Hemoglobin 16.6       Hematocrit 47.2      MCV 89      MCH 31.4      MCHC 35.2      RDW 11.7      Platelet Count 151      % Neutrophils 91      % Lymphocytes 5      % Monocytes 3      % Eosinophils 0      % Basophils 0      % Immature Granulocytes 0      NRBCs per 100 WBC 0      Absolute Neutrophils 6.5      Absolute Lymphocytes 0.4 (*)     Absolute Monocytes 0.2      Absolute Eosinophils 0.0      Absolute Basophils 0.0      Absolute Immature Granulocytes 0.0      Absolute NRBCs 0.0     VALPROIC ACID - Abnormal    Valproic acid 24.0 (*)    GLUCOSE BY METER - Abnormal    GLUCOSE BY METER POCT 267 (*)    TROPONIN T, HIGH SENSITIVITY - Normal    Troponin T, High Sensitivity 11     MAGNESIUM - Normal    Magnesium 1.7     TSH WITH FREE T4 REFLEX - Normal    TSH 0.74     LACTIC ACID WHOLE BLOOD - Normal    Lactic Acid 1.8     TROPONIN T, HIGH SENSITIVITY - Normal    Troponin T, High Sensitivity 9     MONONUCLEOSIS SCREEN - Normal    Mononucleosis Screen Negative     ROUTINE UA WITH MICROSCOPIC REFLEX TO CULTURE   BLOOD CULTURE   BLOOD CULTURE        CT Chest PE Abdomen Pelvis w Contrast   Final Result   IMPRESSION:   1.  No pulmonary embolism demonstrated.   2.  Minimal probable atelectasis versus less likely infiltrate.   3.  Splenomegaly at 15.5 cm.   4.  Prominent rectal stool.             Treatments provided: IVF bolus, labs, meds  Family Comments: none at bedside Pt has cell phone  OBS brochure/video discussed/provided to patient:  No  ED Medications:   Medications   sodium chloride 0.9% BOLUS 1,000 mL (has no administration in time range)   sodium chloride 0.9% BOLUS 1,000 mL (has no administration in time range)   cefTRIAXone (ROCEPHIN) 2 g vial to attach to  ml bag for ADULTS or NS 50 ml bag for PEDS (has no administration in time range)   azithromycin (ZITHROMAX) 500 mg in  mL intermittent infusion (has no administration in time range)   sodium chloride 0.9% BOLUS 1,000 mL (0 mLs Intravenous Stopped 2/27/25 0411)    iopamidol (ISOVUE-370) solution 500 mL (100 mLs Intravenous $Given 2/27/25 0936)   CT scan flush use (90 mLs As instructed $Given 2/27/25 0936)       Drips infusing:  Yes - bolus  For the majority of the shift this patient was Green.   Interventions performed were n/a.    Sepsis treatment initiated: No    Cares/treatment/interventions/medications to be completed following ED care: admit for PT/OT, IV Abx.     ED Nurse Name: Buffy Martinez RN  12:08 PM  RECEIVING UNIT ED HANDOFF REVIEW    Above ED Nurse Handoff Report was reviewed: Yes  Reviewed by: Sheri Romero RN on February 27, 2025 at 3:17 PM

## 2025-02-28 LAB
ADV 40+41 DNA STL QL NAA+NON-PROBE: NEGATIVE
ANION GAP SERPL CALCULATED.3IONS-SCNC: 10 MMOL/L (ref 7–15)
ASTRO TYP 1-8 RNA STL QL NAA+NON-PROBE: NEGATIVE
BUN SERPL-MCNC: 34.6 MG/DL (ref 8–23)
C CAYETANENSIS DNA STL QL NAA+NON-PROBE: NEGATIVE
CALCIUM SERPL-MCNC: 8.7 MG/DL (ref 8.8–10.4)
CAMPYLOBACTER DNA SPEC NAA+PROBE: NEGATIVE
CHLORIDE SERPL-SCNC: 104 MMOL/L (ref 98–107)
CREAT SERPL-MCNC: 0.92 MG/DL (ref 0.67–1.17)
CRYPTOSP DNA STL QL NAA+NON-PROBE: NEGATIVE
E COLI O157 DNA STL QL NAA+NON-PROBE: ABNORMAL
E HISTOLYT DNA STL QL NAA+NON-PROBE: NEGATIVE
EAEC ASTA GENE ISLT QL NAA+PROBE: NEGATIVE
EC STX1+STX2 GENES STL QL NAA+NON-PROBE: NEGATIVE
EGFRCR SERPLBLD CKD-EPI 2021: >90 ML/MIN/1.73M2
EPEC EAE GENE STL QL NAA+NON-PROBE: NEGATIVE
ERYTHROCYTE [DISTWIDTH] IN BLOOD BY AUTOMATED COUNT: 11.6 % (ref 10–15)
EST. AVERAGE GLUCOSE BLD GHB EST-MCNC: 171 MG/DL
ETEC LTA+ST1A+ST1B TOX ST NAA+NON-PROBE: NEGATIVE
G LAMBLIA DNA STL QL NAA+NON-PROBE: NEGATIVE
GLUCOSE BLDC GLUCOMTR-MCNC: 210 MG/DL (ref 70–99)
GLUCOSE BLDC GLUCOMTR-MCNC: 214 MG/DL (ref 70–99)
GLUCOSE BLDC GLUCOMTR-MCNC: 235 MG/DL (ref 70–99)
GLUCOSE SERPL-MCNC: 273 MG/DL (ref 70–99)
HBA1C MFR BLD: 7.6 %
HCO3 SERPL-SCNC: 22 MMOL/L (ref 22–29)
HCT VFR BLD AUTO: 45.4 % (ref 40–53)
HGB BLD-MCNC: 16 G/DL (ref 13.3–17.7)
MCH RBC QN AUTO: 31.4 PG (ref 26.5–33)
MCHC RBC AUTO-ENTMCNC: 35.2 G/DL (ref 31.5–36.5)
MCV RBC AUTO: 89 FL (ref 78–100)
NOROVIRUS GI+II RNA STL QL NAA+NON-PROBE: POSITIVE
P SHIGELLOIDES DNA STL QL NAA+NON-PROBE: NEGATIVE
PLATELET # BLD AUTO: 132 10E3/UL (ref 150–450)
POTASSIUM SERPL-SCNC: 4.3 MMOL/L (ref 3.4–5.3)
RBC # BLD AUTO: 5.1 10E6/UL (ref 4.4–5.9)
RVA RNA STL QL NAA+NON-PROBE: NEGATIVE
SALMONELLA SP RPOD STL QL NAA+PROBE: NEGATIVE
SAPO I+II+IV+V RNA STL QL NAA+NON-PROBE: NEGATIVE
SHIGELLA SP+EIEC IPAH ST NAA+NON-PROBE: NEGATIVE
SODIUM SERPL-SCNC: 136 MMOL/L (ref 135–145)
V CHOLERAE DNA SPEC QL NAA+PROBE: NEGATIVE
VIBRIO DNA SPEC NAA+PROBE: NEGATIVE
WBC # BLD AUTO: 9.7 10E3/UL (ref 4–11)
Y ENTEROCOL DNA STL QL NAA+PROBE: NEGATIVE

## 2025-02-28 PROCEDURE — 36415 COLL VENOUS BLD VENIPUNCTURE: CPT | Performed by: HOSPITALIST

## 2025-02-28 PROCEDURE — 250N000013 HC RX MED GY IP 250 OP 250 PS 637: Performed by: HOSPITALIST

## 2025-02-28 PROCEDURE — 82962 GLUCOSE BLOOD TEST: CPT

## 2025-02-28 PROCEDURE — 250N000011 HC RX IP 250 OP 636: Performed by: PHYSICIAN ASSISTANT

## 2025-02-28 PROCEDURE — 99233 SBSQ HOSP IP/OBS HIGH 50: CPT | Performed by: PHYSICIAN ASSISTANT

## 2025-02-28 PROCEDURE — G0378 HOSPITAL OBSERVATION PER HR: HCPCS

## 2025-02-28 PROCEDURE — 120N000001 HC R&B MED SURG/OB

## 2025-02-28 PROCEDURE — 82435 ASSAY OF BLOOD CHLORIDE: CPT | Performed by: HOSPITALIST

## 2025-02-28 PROCEDURE — 85027 COMPLETE CBC AUTOMATED: CPT | Performed by: HOSPITALIST

## 2025-02-28 PROCEDURE — 83036 HEMOGLOBIN GLYCOSYLATED A1C: CPT | Performed by: PHYSICIAN ASSISTANT

## 2025-02-28 PROCEDURE — 250N000013 HC RX MED GY IP 250 OP 250 PS 637: Performed by: PHYSICIAN ASSISTANT

## 2025-02-28 PROCEDURE — 250N000011 HC RX IP 250 OP 636: Performed by: HOSPITALIST

## 2025-02-28 PROCEDURE — 258N000003 HC RX IP 258 OP 636: Performed by: PHYSICIAN ASSISTANT

## 2025-02-28 PROCEDURE — 80048 BASIC METABOLIC PNL TOTAL CA: CPT | Performed by: HOSPITALIST

## 2025-02-28 PROCEDURE — 250N000009 HC RX 250: Performed by: PHYSICIAN ASSISTANT

## 2025-02-28 RX ORDER — ACETAMINOPHEN 325 MG/1
650 TABLET ORAL EVERY 6 HOURS PRN
Status: DISCONTINUED | OUTPATIENT
Start: 2025-02-28 | End: 2025-03-01

## 2025-02-28 RX ORDER — ENOXAPARIN SODIUM 100 MG/ML
40 INJECTION SUBCUTANEOUS EVERY 24 HOURS
Status: DISCONTINUED | OUTPATIENT
Start: 2025-02-28 | End: 2025-03-04 | Stop reason: HOSPADM

## 2025-02-28 RX ORDER — INSULIN LISPRO 100 [IU]/ML
4 INJECTION, SOLUTION INTRAVENOUS; SUBCUTANEOUS
Status: DISCONTINUED | OUTPATIENT
Start: 2025-02-28 | End: 2025-03-01

## 2025-02-28 RX ORDER — CALCIUM CARBONATE 500 MG/1
1000 TABLET, CHEWABLE ORAL 3 TIMES DAILY PRN
Status: DISCONTINUED | OUTPATIENT
Start: 2025-02-28 | End: 2025-03-04 | Stop reason: HOSPADM

## 2025-02-28 RX ORDER — INSULIN LISPRO 100 [IU]/ML
6 INJECTION, SOLUTION INTRAVENOUS; SUBCUTANEOUS
Status: DISCONTINUED | OUTPATIENT
Start: 2025-03-01 | End: 2025-03-01

## 2025-02-28 RX ORDER — AMLODIPINE BESYLATE 5 MG/1
5 TABLET ORAL AT BEDTIME
Status: DISCONTINUED | OUTPATIENT
Start: 2025-02-28 | End: 2025-03-04 | Stop reason: HOSPADM

## 2025-02-28 RX ORDER — AMLODIPINE BESYLATE 2.5 MG/1
2.5 TABLET ORAL ONCE
Status: COMPLETED | OUTPATIENT
Start: 2025-02-28 | End: 2025-02-28

## 2025-02-28 RX ORDER — CODEINE PHOSPHATE AND GUAIFENESIN 10; 100 MG/5ML; MG/5ML
10 SOLUTION ORAL EVERY 4 HOURS PRN
Status: DISCONTINUED | OUTPATIENT
Start: 2025-02-28 | End: 2025-03-04 | Stop reason: HOSPADM

## 2025-02-28 RX ORDER — BISACODYL 10 MG
10 SUPPOSITORY, RECTAL RECTAL DAILY PRN
Status: DISCONTINUED | OUTPATIENT
Start: 2025-02-28 | End: 2025-03-04 | Stop reason: HOSPADM

## 2025-02-28 RX ORDER — ONDANSETRON 4 MG/1
4 TABLET, FILM COATED ORAL EVERY 6 HOURS PRN
Status: DISCONTINUED | OUTPATIENT
Start: 2025-02-28 | End: 2025-03-04 | Stop reason: HOSPADM

## 2025-02-28 RX ORDER — LABETALOL HYDROCHLORIDE 5 MG/ML
10 INJECTION, SOLUTION INTRAVENOUS
Status: DISCONTINUED | OUTPATIENT
Start: 2025-02-28 | End: 2025-03-04 | Stop reason: HOSPADM

## 2025-02-28 RX ORDER — SIMETHICONE 80 MG
80 TABLET,CHEWABLE ORAL 4 TIMES DAILY
Status: COMPLETED | OUTPATIENT
Start: 2025-02-28 | End: 2025-03-03

## 2025-02-28 RX ORDER — ACETAMINOPHEN 500 MG
1000 TABLET ORAL 3 TIMES DAILY
Status: DISCONTINUED | OUTPATIENT
Start: 2025-02-28 | End: 2025-03-04 | Stop reason: HOSPADM

## 2025-02-28 RX ORDER — SCOPOLAMINE 1 MG/3D
1 PATCH, EXTENDED RELEASE TRANSDERMAL
Status: DISCONTINUED | OUTPATIENT
Start: 2025-02-28 | End: 2025-03-04 | Stop reason: HOSPADM

## 2025-02-28 RX ADMIN — LABETALOL HYDROCHLORIDE 10 MG: 5 INJECTION, SOLUTION INTRAVENOUS at 15:06

## 2025-02-28 RX ADMIN — INSULIN GLARGINE 30 UNITS: 100 INJECTION, SOLUTION SUBCUTANEOUS at 09:35

## 2025-02-28 RX ADMIN — RISPERIDONE 3.5 MG: 2 TABLET, FILM COATED ORAL at 21:56

## 2025-02-28 RX ADMIN — INSULIN ASPART 2 UNITS: 100 INJECTION, SOLUTION INTRAVENOUS; SUBCUTANEOUS at 09:34

## 2025-02-28 RX ADMIN — SIMETHICONE 80 MG: 80 TABLET, CHEWABLE ORAL at 16:20

## 2025-02-28 RX ADMIN — INSULIN ASPART 2 UNITS: 100 INJECTION, SOLUTION INTRAVENOUS; SUBCUTANEOUS at 17:58

## 2025-02-28 RX ADMIN — METFORMIN HYDROCHLORIDE 500 MG: 500 TABLET ORAL at 17:58

## 2025-02-28 RX ADMIN — ONDANSETRON 4 MG: 4 TABLET, ORALLY DISINTEGRATING ORAL at 08:32

## 2025-02-28 RX ADMIN — SIMETHICONE 80 MG: 80 TABLET, CHEWABLE ORAL at 20:35

## 2025-02-28 RX ADMIN — ACETAMINOPHEN 1000 MG: 500 TABLET, FILM COATED ORAL at 20:35

## 2025-02-28 RX ADMIN — DIVALPROEX SODIUM 250 MG: 125 CAPSULE, COATED PELLETS ORAL at 20:35

## 2025-02-28 RX ADMIN — HYDROXYZINE HYDROCHLORIDE 25 MG: 25 TABLET ORAL at 20:36

## 2025-02-28 RX ADMIN — INSULIN ASPART 2 UNITS: 100 INJECTION, SOLUTION INTRAVENOUS; SUBCUTANEOUS at 14:02

## 2025-02-28 RX ADMIN — VENLAFAXINE HYDROCHLORIDE 225 MG: 75 CAPSULE, EXTENDED RELEASE ORAL at 08:31

## 2025-02-28 RX ADMIN — ACETAMINOPHEN 1000 MG: 500 TABLET, FILM COATED ORAL at 14:54

## 2025-02-28 RX ADMIN — ALFUZOSIN HYDROCHLORIDE 10 MG: 10 TABLET, EXTENDED RELEASE ORAL at 08:32

## 2025-02-28 RX ADMIN — HYDROXYZINE HYDROCHLORIDE 25 MG: 25 TABLET ORAL at 08:32

## 2025-02-28 RX ADMIN — AMLODIPINE BESYLATE 5 MG: 5 TABLET ORAL at 21:56

## 2025-02-28 RX ADMIN — SCOPOLAMINE 1 PATCH: 1.5 PATCH, EXTENDED RELEASE TRANSDERMAL at 16:20

## 2025-02-28 RX ADMIN — SODIUM CHLORIDE 1000 ML: 0.9 INJECTION, SOLUTION INTRAVENOUS at 09:34

## 2025-02-28 RX ADMIN — DIVALPROEX SODIUM 250 MG: 125 CAPSULE, COATED PELLETS ORAL at 08:32

## 2025-02-28 RX ADMIN — AMLODIPINE BESYLATE 2.5 MG: 2.5 TABLET ORAL at 14:54

## 2025-02-28 RX ADMIN — PROCHLORPERAZINE MALEATE 5 MG: 5 TABLET ORAL at 04:31

## 2025-02-28 RX ADMIN — ENOXAPARIN SODIUM 40 MG: 40 INJECTION SUBCUTANEOUS at 16:20

## 2025-02-28 RX ADMIN — SIMVASTATIN 10 MG: 10 TABLET, FILM COATED ORAL at 21:56

## 2025-02-28 ASSESSMENT — ACTIVITIES OF DAILY LIVING (ADL)
DEPENDENT_IADLS:: CLEANING;COOKING;LAUNDRY;SHOPPING;MEAL PREPARATION;MEDICATION MANAGEMENT;MONEY MANAGEMENT;TRANSPORTATION
ADLS_ACUITY_SCORE: 61
ADLS_ACUITY_SCORE: 60
ADLS_ACUITY_SCORE: 64
ADLS_ACUITY_SCORE: 63
ADLS_ACUITY_SCORE: 60
ADLS_ACUITY_SCORE: 61
ADLS_ACUITY_SCORE: 64
ADLS_ACUITY_SCORE: 61
ADLS_ACUITY_SCORE: 64
ADLS_ACUITY_SCORE: 60
ADLS_ACUITY_SCORE: 63
ADLS_ACUITY_SCORE: 64
ADLS_ACUITY_SCORE: 61
ADLS_ACUITY_SCORE: 63
ADLS_ACUITY_SCORE: 60
ADLS_ACUITY_SCORE: 62
ADLS_ACUITY_SCORE: 61
ADLS_ACUITY_SCORE: 63
ADLS_ACUITY_SCORE: 63
ADLS_ACUITY_SCORE: 61
ADLS_ACUITY_SCORE: 60
ADLS_ACUITY_SCORE: 60
ADLS_ACUITY_SCORE: 61

## 2025-02-28 NOTE — PLAN OF CARE
PRIMARY DIAGNOSIS: Generalized weakness, Diarrhea.     OUTPATIENT/OBSERVATION GOALS TO BE MET BEFORE DISCHARGE  1. Orthostatic performed: No    2. Tolerating PO medications: Yes    3. Return to near baseline physical activity: No    4. Cleared for discharge by consultants (if involved): No    Discharge Planner Nurse   Safe discharge environment identified: Yes  Barriers to discharge: Yes  A & O X 4. Lung sounds diminished. No wheezes, or crackles auscultated. Denies chills, shortness of breath, dizziness , or diarrhea. C/O nausea, prn Zofran, and Compazine given X 1-See MAR. Bowel sounds present and active. Formed bowel movement reported this shift. Incontinent of bowel and bladder. On enteric precaution . Enteric panel results positive for Norovirus. X-Cover Provider notified. C/O pain, prn Tylenol given X 1. Patient on blood glucose monitoring, Lantus and sliding scale insulin for DM 2 management. On cardiac monitor, cardiac rhythm ST  per telemetry tech. Low grade fever of temp of 100.7. PT consulted.  Will continue to provide supportive cares.  BP (!) 161/96 (BP Location: Right arm)   Pulse 107   Temp 99.2  F (37.3  C) (Oral)   Resp 18   Wt 98.7 kg (217 lb 8 oz)   SpO2 92%   BMI 30.34 kg/m          Entered by: Berenice Gupta RN 02/28/2025 5:21 AM     Problem: Adult Inpatient Plan of Care  Goal: Plan of Care Review  Description: The Plan of Care Review/Shift note should be completed every shift.  The Outcome Evaluation is a brief statement about your assessment that the patient is improving, declining, or no change.  This information will be displayed automatically on your shift  note.  2/28/2025 0510 by Berenice Gupta RN  Outcome: Progressing  Flowsheets (Taken 2/28/2025 0510)  Plan of Care Reviewed With: patient  2/28/2025 0508 by Berenice Gupta RN  Outcome: Progressing  Flowsheets (Taken 2/28/2025 0508)  Plan of Care Reviewed With: patient  Goal: Patient-Specific Goal  "(Individualized)  Description: You can add care plan individualizations to a care plan. Examples of Individualization might be:  \"Parent requests to be called daily at 9am for status\", \"I have a hard time hearing out of my right ear\", or \"Do not touch me to wake me up as it startles  me\".  2/28/2025 0510 by Berenice Gupta RN  Outcome: Progressing  2/28/2025 0508 by Berenice Gupta RN  Outcome: Progressing  Goal: Absence of Hospital-Acquired Illness or Injury  2/28/2025 0510 by Berenice Gupta RN  Outcome: Progressing  2/28/2025 0508 by Berenice Gupta RN  Outcome: Progressing  Intervention: Identify and Manage Fall Risk  Recent Flowsheet Documentation  Taken 2/27/2025 2018 by Berenice Gupta RN  Safety Promotion/Fall Prevention:   activity supervised   patient and family education   supervised activity   safety round/check completed   clutter free environment maintained  Intervention: Prevent Skin Injury  Recent Flowsheet Documentation  Taken 2/27/2025 2018 by Berenice Gupta RN  Body Position:   supine   supine, legs elevated   supine, head elevated  Skin Protection: tubing/devices free from skin contact  Intervention: Prevent and Manage VTE (Venous Thromboembolism) Risk  Recent Flowsheet Documentation  Taken 2/27/2025 2018 by Berenice Gupta RN  VTE Prevention/Management: SCDs off (sequential compression devices)  Intervention: Prevent Infection  Recent Flowsheet Documentation  Taken 2/27/2025 2018 by Berenice Gupta RN  Infection Prevention:   hand hygiene promoted   personal protective equipment utilized   rest/sleep promoted   single patient room provided  Goal: Optimal Comfort and Wellbeing  2/28/2025 0510 by Berenice Gupta RN  Outcome: Progressing  2/28/2025 0508 by Berenice Gupta RN  Outcome: Progressing  Intervention: Monitor Pain and Promote Comfort  Recent Flowsheet Documentation  Taken 2/27/2025 2018 by Berenice Gupta RN  Pain Management " Interventions: medication (see MAR)  Goal: Readiness for Transition of Care  2/28/2025 0510 by Berenice Gupta RN  Outcome: Progressing  2/28/2025 0508 by Berenice Gupta, RN  Outcome: Progressing     Please review provider order for any additional goals.   Nurse to notify provider when observation goals have been met and patient is ready for discharge.      Plan of Care Reviewed With: patient

## 2025-02-28 NOTE — CONSULTS
Care Management Initial Consult    General Information  Assessment completed with: Patient, Caregiver,    Type of CM/SW Visit: Initial Assessment    Primary Care Provider verified and updated as needed: Yes   Readmission within the last 30 days: no previous admission in last 30 days      Reason for Consult: discharge planning    Communication Assessment  Patient's communication style: spoken language (English or Bilingual)    Hearing Difficulty or Deaf: no   Wear Glasses or Blind: no    Cognitive  Cognitive/Neuro/Behavioral: WDL                      Living Environment:   People in home: facility resident     Current living Arrangements: assisted living      Able to return to prior arrangements: yes     Family/Social Support:  Care provided by:  (Facility staff)  Provides care for: no one  Support system: Facility resident(s)/Staff            Current Resources:   Patient receiving home care services:  [facility] just discharged 2/25    Equipment currently used at home: walker, standard    Does the patient's insurance plan have a 3 day qualifying hospital stay waiver?  No    Lifestyle & Psychosocial Needs:  Social Drivers of Health     Food Insecurity: Low Risk  (2/27/2025)    Food Insecurity     Within the past 12 months, did you worry that your food would run out before you got money to buy more?: No     Within the past 12 months, did the food you bought just not last and you didn t have money to get more?: No   Depression: Not at risk (1/27/2020)    PHQ-2     PHQ-2 Score: 0   Housing Stability: Low Risk  (2/27/2025)    Housing Stability     Do you have housing? : Yes     Are you worried about losing your housing?: No   Tobacco Use: Low Risk  (9/22/2019)    Patient History     Smoking Tobacco Use: Never     Smokeless Tobacco Use: Never     Passive Exposure: Not on file   Financial Resource Strain: Low Risk  (2/27/2025)    Financial Resource Strain     Within the past 12 months, have you or your family members you  live with been unable to get utilities (heat, electricity) when it was really needed?: No   Alcohol Use: Not on file   Transportation Needs: Low Risk  (2/27/2025)    Transportation Needs     Within the past 12 months, has lack of transportation kept you from medical appointments, getting your medicines, non-medical meetings or appointments, work, or from getting things that you need?: No   Physical Activity: Not on file   Interpersonal Safety: Low Risk  (2/27/2025)    Interpersonal Safety     Do you feel physically and emotionally safe where you currently live?: Yes     Within the past 12 months, have you been hit, slapped, kicked or otherwise physically hurt by someone?: No     Within the past 12 months, have you been humiliated or emotionally abused in other ways by your partner or ex-partner?: No   Stress: Not on file   Social Connections: Not on file   Health Literacy: Not on file       Functional Status:  Prior to admission patient needed assistance:   Dependent ADLs:: Ambulation-walker, Bathing, Grooming  Dependent IADLs:: Cleaning, Cooking, Laundry, Shopping, Meal Preparation, Medication Management, Money Management, Transportation         Discussed  Partnership in Safe Discharge Planning  document with patient/family: No    Additional Information:  RN CC/SW is following/consulted for discharge planning. Pt admitted with weakness, Norovirus. Per chart review, pt resides in an Assisted Living Facility. Spoke to pt to verify pt s residence at [facility]. Verbal permission was received to speak to the facility to verify services and to discuss the discharge plan of care. A call was placed to [facility] and services were verified.     Patient receives services as follows: Med management, housekeeping, laundry, bathing, meals.    [facility] contact (name/number): [phone]  Fax #: [phone]  Barriers to pt returning: None  Baseline mobility: Up with walker  Time of preferred return: Prefer M-F. On  weekend, can call and discuss return with Triage/on-call nurse.  New meds/prescriptions/Pharmacy: [facility]  Transportation: W. Reviewed out of pocket cost for [facility] transport, $90.70 base and $6.08 per mile to the destination. Pt expressed understanding and is agreeable to this.      Other: Pt stated he was receiving Home PT through [facility]. Called to verify and [facility] discharged pt 2/25/2025    PT assessment pending.    RN CC/SW will continue to follow for discharge planning and return to facility.     Next Steps: Continue to follow for discharge planning. Follow up on PT recommendation.    She Rosario RN   Inpatient Care Coordination     Phone: [phone]

## 2025-02-28 NOTE — PROVIDER NOTIFICATION
DATE/TIME OF CALL RECEIVED FROM LAB:  02/28/25 at 3:12 AM   LAB TEST:  Stool sample  LAB VALUE:  Positive for Norovirus   PROVIDER NOTIFIED?: Yes  PROVIDER NAME: Dr. Brenner   DATE/TIME LAB VALUE REPORTED TO PROVIDER: 03:07  MECHANISM OF PROVIDER NOTIFICATION: Page  PROVIDER RESPONSE: Awaiting

## 2025-02-28 NOTE — PLAN OF CARE
"Patient is alert and oriented x 4 & able to follow commands. VS documented on the FS. BP elevated in the 180s & HR elevated in the 120s. Provider notified. PRN labetalol offered & scheduled BP meds. On tele monitor & continuous pulse ox. Patient is on RA. Reports feeling SOB- O2 SATs ranging between 93-95%. Denies chest pain. Enteric precautions. Active bowel sounds with LBM today. Patient denies any pain, urgency, and frequency when voiding. ACHS BS checks & carb count with meals. Pt is tolerating mod carb diet. IV SL. Patient rating 2/10 abdominal pain. Incontinent of bowel and bladder. Patient is X 1 assist with walker and gait belt & cares. Patient comes from [facility].       Plan: Collect stool. PT consulted. Encourage PO intake    Goal Outcome Evaluation:      Plan of Care Reviewed With: patient    Overall Patient Progress: no changeOverall Patient Progress: no change      Problem: Adult Inpatient Plan of Care  Goal: Plan of Care Review  Description: The Plan of Care Review/Shift note should be completed every shift.  The Outcome Evaluation is a brief statement about your assessment that the patient is improving, declining, or no change.  This information will be displayed automatically on your shift  note.  Outcome: Progressing  Flowsheets (Taken 2/27/2025 1819)  Plan of Care Reviewed With: patient  Overall Patient Progress: no change  Goal: Patient-Specific Goal (Individualized)  Description: You can add care plan individualizations to a care plan. Examples of Individualization might be:  \"Parent requests to be called daily at 9am for status\", \"I have a hard time hearing out of my right ear\", or \"Do not touch me to wake me up as it startles  me\".  Outcome: Progressing  Goal: Absence of Hospital-Acquired Illness or Injury  Outcome: Progressing  Intervention: Identify and Manage Fall Risk  Recent Flowsheet Documentation  Taken 2/27/2025 6784 by Sheri Romero RN  Safety Promotion/Fall Prevention:   activity " supervised   patient and family education   supervised activity   safety round/check completed  Intervention: Prevent Skin Injury  Recent Flowsheet Documentation  Taken 2/27/2025 1555 by Sheri Romero, RN  Body Position:   supine   supine, legs elevated   supine, head elevated  Goal: Optimal Comfort and Wellbeing  Outcome: Progressing  Goal: Readiness for Transition of Care  Outcome: Progressing  Intervention: Mutually Develop Transition Plan  Recent Flowsheet Documentation  Taken 2/27/2025 1555 by Sheri Romero, RN  Patient/Family Anticipates Transition to: home  Equipment Currently Used at Home: walker, standard

## 2025-02-28 NOTE — PLAN OF CARE
PRIMARY DIAGNOSIS: Generalized weakness, Diarrhea.     OUTPATIENT/OBSERVATION GOALS TO BE MET BEFORE DISCHARGE  1. Orthostatic performed: No    2. Tolerating PO medications: Yes    3. Return to near baseline physical activity: No    4. Cleared for discharge by consultants (if involved): No    Discharge Planner Nurse   Safe discharge environment identified: Yes  Barriers to discharge: Yes  A & O X 4. Lung sounds diminished. No wheezes, or crackles auscultated. Denies chills, shortness of breath, dizziness , or diarrhea. C/O nausea, prn Zofran given. Bowel sounds present and active. Formed bowel movement reported this shift. Incontinent of bowel and bladder. On enteric precaution to r/o Norovirus. Enteric panel results pending. C/O pain, prn Tylenol given X 1. Patient on blood glucose monitoring, Lantus and sliding scale insulin for DM 2 management. On cardiac monitor, cardiac rhythm ST  per telemetry tech. Low grade fever of temp of 100.7. PT consulted.  Will continue to provide supportive cares.  BP (!) 161/96 (BP Location: Right arm)   Pulse 107   Temp 99.2  F (37.3  C) (Oral)   Resp 18   Wt 98.7 kg (217 lb 8 oz)   SpO2 92%   BMI 30.34 kg/m          Entered by: Berenice Gupta RN 02/28/2025 5:11 AM     Problem: Adult Inpatient Plan of Care  Goal: Plan of Care Review  Description: The Plan of Care Review/Shift note should be completed every shift.  The Outcome Evaluation is a brief statement about your assessment that the patient is improving, declining, or no change.  This information will be displayed automatically on your shift  note.  2/28/2025 0510 by Berenice Gupta RN  Outcome: Progressing  Flowsheets (Taken 2/28/2025 0510)  Plan of Care Reviewed With: patient  2/28/2025 0508 by Berenice Gupta RN  Outcome: Progressing  Flowsheets (Taken 2/28/2025 0508)  Plan of Care Reviewed With: patient  Goal: Patient-Specific Goal (Individualized)  Description: You can add care plan  "individualizations to a care plan. Examples of Individualization might be:  \"Parent requests to be called daily at 9am for status\", \"I have a hard time hearing out of my right ear\", or \"Do not touch me to wake me up as it startles  me\".  2/28/2025 0510 by Berenice Gupta RN  Outcome: Progressing  2/28/2025 0508 by Berenice Gupta RN  Outcome: Progressing  Goal: Absence of Hospital-Acquired Illness or Injury  2/28/2025 0510 by Berenice Gupta RN  Outcome: Progressing  2/28/2025 0508 by Berenice Gupta RN  Outcome: Progressing  Intervention: Identify and Manage Fall Risk  Recent Flowsheet Documentation  Taken 2/27/2025 2018 by Berenice Gupta RN  Safety Promotion/Fall Prevention:   activity supervised   patient and family education   supervised activity   safety round/check completed   clutter free environment maintained  Intervention: Prevent Skin Injury  Recent Flowsheet Documentation  Taken 2/27/2025 2018 by Berenice Gupta RN  Body Position:   supine   supine, legs elevated   supine, head elevated  Skin Protection: tubing/devices free from skin contact  Intervention: Prevent and Manage VTE (Venous Thromboembolism) Risk  Recent Flowsheet Documentation  Taken 2/27/2025 2018 by Berenice Gupta RN  VTE Prevention/Management: SCDs off (sequential compression devices)  Intervention: Prevent Infection  Recent Flowsheet Documentation  Taken 2/27/2025 2018 by Berenice Gupta RN  Infection Prevention:   hand hygiene promoted   personal protective equipment utilized   rest/sleep promoted   single patient room provided  Goal: Optimal Comfort and Wellbeing  2/28/2025 0510 by Berenice Gupta RN  Outcome: Progressing  2/28/2025 0508 by Berenice Gupta RN  Outcome: Progressing  Intervention: Monitor Pain and Promote Comfort  Recent Flowsheet Documentation  Taken 2/27/2025 2018 by Berenice Gupta RN  Pain Management Interventions: medication (see MAR)  Goal: Readiness for " Transition of Care  2/28/2025 0510 by Berenice Gupta, RN  Outcome: Progressing  2/28/2025 0508 by Berenice Gupta, RN  Outcome: Progressing     Please review provider order for any additional goals.   Nurse to notify provider when observation goals have been met and patient is ready for discharge.      Plan of Care Reviewed With: patient

## 2025-02-28 NOTE — PLAN OF CARE
Goal Outcome Evaluation:      Plan of Care Reviewed With: patient    Overall Patient Progress: no changeOverall Patient Progress: no change    Outcome Evaluation: Anticipate discharging back to [facility] when medically ready.

## 2025-02-28 NOTE — PROGRESS NOTES
[facility]    Medicine Progress Note - Hospitalist Service    Date of Admission:  2/27/2025    Assessment & Plan     Caden Bush is a 64 year old male with a PMH of schizoaffective disorder, HL, IDDM2, obesity who presents with generalized weakness and diarrhea.     Interval hx:  Enteric studies came back with +Norovirus      # Norovirus gastroenteritis   # Generalized weakness   - Pt with continuous stool overnight   - Has been weak and unable to get OOB though he does have low motivation to get OOB   - Aby CLD for now, c/o nausea   - Plan cont IVF for now  - Antiemetics, trial of scopolamine   - Significant gas distention, add Gas X and encouraged increasing ambulation   - Monitor stool output   - Supportive cares     #Tachycardia   - EKG shows sinus tach, s/p 3L on admission and 1L NS earlier this am  - Suspect due to stress response   - WIll cont IVF overnight   - Should improve with decreasing diarrhea     #HTN   - BP elevated   - Increase Norvasc to 5mg at bedtime, give 2.5 now  - IV PRN labetalol ordered, d/w RN     #IDDM2 with hyperglycemia:   - BG elevated but with dec PO intake   - Hgba1c 7.6  - Hold scheduled prandial novolog for now   - Will cont CHO counting 1u /10 CHO plus med resistance sliding scale insulin till po intake improves   - OK to resume full dose Lantus of 45uQAM      #Schizoaffective D/O:   - Cont Effexor, Risperdal, atarax and Depakote      #Pseudohyponatremia:  - Resolved      #Obesity: Complicates cares.      DVT Prophylaxis: Lovenox   Code Status: Full Code  Medically Ready for Discharge: Change to IP status   Medically Ready for Discharge: Anticipated in 2-4 Days          Diet: Moderate Consistent Carb (60 g CHO per Meal) Diet    DVT Prophylaxis: Enoxaparin (Lovenox) SQ  Rouse Catheter: Not present  Lines: None     Cardiac Monitoring: None  Code Status: Full Code      Clinically Significant Risk Factors Present on Admission      # Hyponatremia: Lowest Na = 134  "mmol/L in last 2 days, will monitor as appropriate                  # Obesity: Estimated body mass index is 30.34 kg/m  as calculated from the following:    Height as of this encounter: 1.803 m (5' 11\").    Weight as of this encounter: 98.7 kg (217 lb 8 oz).              Disposition Plan     Medically Ready for Discharge: Anticipated in 2-4 Days    The patient's care was discussed with the Patient's Family.    Luz Maria Diaz PA-C  Hospitalist Service  [facility]  Securely message with [employer] (more info)  Text page via [facility] Paging/Directory   ______________________________________________________________________    Interval History   Continues to have diarrhea overnight > 6+ episodes   C/o nausea   Feels too \"tired to move\"     Physical Exam   Vital Signs: Temp: 98.7  F (37.1  C) Temp src: Oral BP: (!) 157/105 Pulse: 116   Resp: 18 SpO2: 93 % O2 Device: None (Room air)    Weight: 217 lbs 8 oz  GENERAL:  Comfortable.  PSYCH: pleasant, oriented, No acute distress.  HEENT:  PERRLA. Normal conjunctiva, normal hearing, nasal mucosa and Oropharynx are normal.  NECK:  Supple, no neck vein distention, adenopathy or bruits, normal thyroid.  HEART:  Normal S1, S2 with no murmur, no pericardial rub, gallops or S3 or S4.  LUNGS:  Clear to auscultation, normal Respiratory effort. No wheezing, rales or ronchi.  ABDOMEN:  Very distended, tympanitic, but non tender.   EXTREMITIES:  No pedal edema, +2 pulses bilateral and equal.  SKIN:  Dry to touch, No rash, wound or ulcerations.  NEUROLOGIC:  CN 2-12 intact, BL 5/5 symmetric upper and lower extremity strength, sensation is intact with no focal deficits.     Medical Decision Making       60 MINUTES SPENT BY ME on the date of service doing chart review, history, exam, documentation & further activities per the note.      Data     I have personally reviewed the following data over the past 24 hrs:    9.7  \   16.0   / 132 (L)     136 104 34.6 (H) /  273 (H) "   4.3 22 0.92 \     TSH: N/A T4: N/A A1C: 7.6 (H)       Imaging results reviewed over the past 24 hrs:   No results found for this or any previous visit (from the past 24 hours).

## 2025-02-28 NOTE — CARE PLAN
ROOM # 215    Living Situation (if not independent, order SW consult):  Facility name:  : sister    Activity level at baseline: Ind with walker  Activity level on admit: A X 1 with walker & GB    Who will be transporting you at discharge: TBD    Patient registered to observation; given Patient Bill of Rights; given the opportunity to ask questions about observation status and their plan of care.  Patient has been oriented to the observation room, bathroom and call light is in place.    Discussed discharge goals and expectations with patient/family.

## 2025-03-01 ENCOUNTER — APPOINTMENT (OUTPATIENT)
Dept: PHYSICAL THERAPY | Facility: CLINIC | Age: 65
End: 2025-03-01
Attending: HOSPITALIST
Payer: MEDICARE

## 2025-03-01 PROBLEM — A08.11 NOROVIRUS: Status: ACTIVE | Noted: 2025-03-01

## 2025-03-01 LAB
GLUCOSE BLDC GLUCOMTR-MCNC: 122 MG/DL (ref 70–99)
GLUCOSE BLDC GLUCOMTR-MCNC: 130 MG/DL (ref 70–99)
GLUCOSE BLDC GLUCOMTR-MCNC: 177 MG/DL (ref 70–99)
GLUCOSE BLDC GLUCOMTR-MCNC: 194 MG/DL (ref 70–99)
GLUCOSE BLDC GLUCOMTR-MCNC: 245 MG/DL (ref 70–99)

## 2025-03-01 PROCEDURE — 250N000011 HC RX IP 250 OP 636: Mod: JW | Performed by: PHYSICIAN ASSISTANT

## 2025-03-01 PROCEDURE — 250N000013 HC RX MED GY IP 250 OP 250 PS 637: Performed by: HOSPITALIST

## 2025-03-01 PROCEDURE — 97530 THERAPEUTIC ACTIVITIES: CPT | Mod: GP

## 2025-03-01 PROCEDURE — 120N000001 HC R&B MED SURG/OB

## 2025-03-01 PROCEDURE — 99232 SBSQ HOSP IP/OBS MODERATE 35: CPT | Performed by: PHYSICIAN ASSISTANT

## 2025-03-01 PROCEDURE — 97161 PT EVAL LOW COMPLEX 20 MIN: CPT | Mod: GP

## 2025-03-01 PROCEDURE — 250N000013 HC RX MED GY IP 250 OP 250 PS 637: Performed by: PHYSICIAN ASSISTANT

## 2025-03-01 RX ADMIN — SIMVASTATIN 10 MG: 10 TABLET, FILM COATED ORAL at 22:20

## 2025-03-01 RX ADMIN — ACETAMINOPHEN 1000 MG: 500 TABLET, FILM COATED ORAL at 20:03

## 2025-03-01 RX ADMIN — ENOXAPARIN SODIUM 40 MG: 40 INJECTION SUBCUTANEOUS at 17:24

## 2025-03-01 RX ADMIN — METFORMIN HYDROCHLORIDE 500 MG: 500 TABLET ORAL at 17:24

## 2025-03-01 RX ADMIN — AMLODIPINE BESYLATE 5 MG: 5 TABLET ORAL at 22:20

## 2025-03-01 RX ADMIN — ACETAMINOPHEN 1000 MG: 500 TABLET, FILM COATED ORAL at 09:33

## 2025-03-01 RX ADMIN — LABETALOL HYDROCHLORIDE 10 MG: 5 INJECTION, SOLUTION INTRAVENOUS at 03:20

## 2025-03-01 RX ADMIN — RISPERIDONE 3.5 MG: 2 TABLET, FILM COATED ORAL at 22:20

## 2025-03-01 RX ADMIN — DIVALPROEX SODIUM 250 MG: 125 CAPSULE, COATED PELLETS ORAL at 20:03

## 2025-03-01 RX ADMIN — DIVALPROEX SODIUM 250 MG: 125 CAPSULE, COATED PELLETS ORAL at 09:33

## 2025-03-01 RX ADMIN — VENLAFAXINE HYDROCHLORIDE 225 MG: 75 CAPSULE, EXTENDED RELEASE ORAL at 09:33

## 2025-03-01 RX ADMIN — HYDROXYZINE HYDROCHLORIDE 25 MG: 25 TABLET ORAL at 09:33

## 2025-03-01 RX ADMIN — SIMETHICONE 80 MG: 80 TABLET, CHEWABLE ORAL at 13:21

## 2025-03-01 RX ADMIN — ALFUZOSIN HYDROCHLORIDE 10 MG: 10 TABLET, EXTENDED RELEASE ORAL at 09:33

## 2025-03-01 RX ADMIN — SIMETHICONE 80 MG: 80 TABLET, CHEWABLE ORAL at 09:35

## 2025-03-01 RX ADMIN — INSULIN ASPART 2 UNITS: 100 INJECTION, SOLUTION INTRAVENOUS; SUBCUTANEOUS at 09:36

## 2025-03-01 RX ADMIN — HYDROXYZINE HYDROCHLORIDE 25 MG: 25 TABLET ORAL at 20:04

## 2025-03-01 RX ADMIN — SIMETHICONE 80 MG: 80 TABLET, CHEWABLE ORAL at 17:24

## 2025-03-01 RX ADMIN — SIMETHICONE 80 MG: 80 TABLET, CHEWABLE ORAL at 20:04

## 2025-03-01 RX ADMIN — INSULIN ASPART 2 UNITS: 100 INJECTION, SOLUTION INTRAVENOUS; SUBCUTANEOUS at 13:21

## 2025-03-01 RX ADMIN — METFORMIN HYDROCHLORIDE 500 MG: 500 TABLET ORAL at 09:33

## 2025-03-01 ASSESSMENT — ACTIVITIES OF DAILY LIVING (ADL)
ADLS_ACUITY_SCORE: 59
ADLS_ACUITY_SCORE: 63
ADLS_ACUITY_SCORE: 63
ADLS_ACUITY_SCORE: 59
ADLS_ACUITY_SCORE: 63
ADLS_ACUITY_SCORE: 63
ADLS_ACUITY_SCORE: 59
ADLS_ACUITY_SCORE: 63
ADLS_ACUITY_SCORE: 59
ADLS_ACUITY_SCORE: 63
ADLS_ACUITY_SCORE: 63
ADLS_ACUITY_SCORE: 59
ADLS_ACUITY_SCORE: 63
ADLS_ACUITY_SCORE: 63
ADLS_ACUITY_SCORE: 59
ADLS_ACUITY_SCORE: 63
ADLS_ACUITY_SCORE: 59
ADLS_ACUITY_SCORE: 63
ADLS_ACUITY_SCORE: 59
ADLS_ACUITY_SCORE: 63
ADLS_ACUITY_SCORE: 63

## 2025-03-01 NOTE — PLAN OF CARE
"Pt is Aox4 and calm. IV is saline locked. Pt has not been OOB for writer. Pt does not call when has to go to the bathroom, checking brief whenever in room recommended. BP was elevated at 172/105 and was given PRN IV labetalol, BP came down to 147/99. Pt denies pain. Pt is on enteric precautions for Norovirus. Comes from [facility]. PT and SW following. Continue to follow plan of care.     BP (!) 147/99 (BP Location: Right arm)   Pulse 106   Temp 98.5  F (36.9  C) (Oral)   Resp 18   Ht 1.803 m (5' 11\")   Wt 98.7 kg (217 lb 8 oz)   SpO2 92%   BMI 30.34 kg/m           Goal Outcome Evaluation:      Plan of Care Reviewed With: patient    Overall Patient Progress: no changeOverall Patient Progress: no change    Outcome Evaluation: anticipating DC back to [facility] when medically ready for DC      Problem: Adult Inpatient Plan of Care  Goal: Plan of Care Review  Description: The Plan of Care Review/Shift note should be completed every shift.  The Outcome Evaluation is a brief statement about your assessment that the patient is improving, declining, or no change.  This information will be displayed automatically on your shift  note.  Outcome: Progressing  Flowsheets (Taken 3/1/2025 0412)  Outcome Evaluation: anticipating DC back to [facility] when medically ready for DC  Plan of Care Reviewed With: patient  Overall Patient Progress: no change  Goal: Patient-Specific Goal (Individualized)  Description: You can add care plan individualizations to a care plan. Examples of Individualization might be:  \"Parent requests to be called daily at 9am for status\", \"I have a hard time hearing out of my right ear\", or \"Do not touch me to wake me up as it startles  me\".  Outcome: Progressing  Goal: Absence of Hospital-Acquired Illness or Injury  Outcome: Progressing  Intervention: Identify and Manage Fall Risk  Recent Flowsheet Documentation  Taken 2/28/2025 2036 by Harlan Chacon RN  Safety " Promotion/Fall Prevention:   assistive device/personal items within reach   clutter free environment maintained   nonskid shoes/slippers when out of bed   safety round/check completed  Intervention: Prevent Skin Injury  Recent Flowsheet Documentation  Taken 2/28/2025 2036 by Harlan Chacon RN  Body Position:   supine   weight shifting  Intervention: Prevent and Manage VTE (Venous Thromboembolism) Risk  Recent Flowsheet Documentation  Taken 2/28/2025 2036 by Harlan Chacon RN  VTE Prevention/Management: SCDs off (sequential compression devices)  Goal: Optimal Comfort and Wellbeing  Outcome: Progressing  Intervention: Monitor Pain and Promote Comfort  Recent Flowsheet Documentation  Taken 2/28/2025 2036 by Harlan Chacon RN  Pain Management Interventions: medication (see MAR)  Goal: Readiness for Transition of Care  Outcome: Progressing     Problem: Pain Acute  Goal: Optimal Pain Control and Function  Outcome: Progressing  Intervention: Develop Pain Management Plan  Recent Flowsheet Documentation  Taken 2/28/2025 2036 by Harlan Chacon RN  Pain Management Interventions: medication (see MAR)  Intervention: Prevent or Manage Pain  Recent Flowsheet Documentation  Taken 2/28/2025 2036 by Harlan Chacon RN  Medication Review/Management: medications reviewed     Problem: Fall Injury Risk  Goal: Absence of Fall and Fall-Related Injury  Outcome: Progressing  Intervention: Identify and Manage Contributors  Recent Flowsheet Documentation  Taken 2/28/2025 2036 by Harlan Chacon RN  Medication Review/Management: medications reviewed  Intervention: Promote Injury-Free Environment  Recent Flowsheet Documentation  Taken 2/28/2025 2036 by Harlan Chacon RN  Safety Promotion/Fall Prevention:   assistive device/personal items within reach   clutter free environment maintained   nonskid shoes/slippers when out of bed   safety round/check completed     Problem: Diarrhea  Goal: Effective Diarrhea Management  Outcome:  Progressing  Intervention: Manage Diarrhea  Recent Flowsheet Documentation  Taken 2/28/2025 2036 by Harlan Chacon, RN  Isolation Precautions: enteric precautions maintained  Medication Review/Management: medications reviewed

## 2025-03-01 NOTE — CONSULTS
CM received consult for unplanned readmission risk of 20%. CM already following for discharge planning. PT evaluated and is recommending TCU at discharge, discussed with provider who would like CM to hold on discussing and follow-up on 3/2 after PT re-evaluates.     Lizzette Delatorre RN BSN   Inpatient Care Coordination  [facility]   Phone [phone]

## 2025-03-01 NOTE — PROGRESS NOTES
[facility]  Internal Medicine  Progress Note    Date of Service: 3/1/2025    Patient: Caden Bush  MRN: [id number]  Admission Date: 2/27/2025      Assessment & Plan: Caden Bush is a 64 year old male with a history of Schizoaffective Disorder, HLD, DM Type 2, Obesity who presented to the ED on 2/27/25 with generalized weakness and diarrhea and found to have Norovirus.      Norovirus  Generalized Weakness  Pt presented from [facility] on 2/27 with generalized weakness and non bloody diarrhea.  Pt has remained afebrile and hemodynamically stable with normal wbc.  Lactic Acid 2.2 on admission resolved with IVF.  CT c/a/p revealed atelectasis, splenomegaly and prominent rectal stool.  Enteric panel is positive for norovirus.  - pt with improvement in appetite and weakness today.  Only one stool overnight.  Tolerating po intake.  - evaluated by PT who recommends TCU.  I anticipate patient will continue to improve and may be able to return to his custodial as his acute illness improves.  - plan to advance diet as tolerated.  Will have PT reassess tomorrow  - SW/CC following  - blood culture NGTD    HTN  - continue Amlodipine (pta 2.5mg was increased to 5mg daily on 2/28)    DM Type 2  Hgb A1C 7.6.  -235  - increase Lantus to home dose of 47units daily  - continue Metformin  - continue Novolog carb coverage  - continue ISS protocol    HLD  - continue Simvastatin    BPH  - continue Alfuzosin    Schizoaffective Disorder  - continue Venlafaxine, Risperidone, Atarax, Depakote    Obesity, BMI 30  - encourage weight loss and health lifestyle    Splenomegaly  Incidentally noted on CT imaging is splenomegaly of 15.5cm.  - follow up with Hematology outpatient         Adrenal Nodule  Incidentally noted on CT imaging is a 1.3 cm nodule in the left adrenal gland is stable since 2021 compatible with a benign adenoma requiring no follow-up per radiology.  - follow up monitoring with PCP       CODE: full  DVT:  "lovenox  Diet/fluids: regular  Medically Ready for Discharge: Anticipated Tomorrow        Emelyn Haynes MS MARCELLA  Hospitalist Physician Assistant  [facility]      Subjective & Interval Hx:    Patient denies abdominal pain.  Reports nausea has improved.  Tolerated breakfast.  One stool overnight.  Feels weakness is improving.  Worked with PT who recommends TCU today.    Last 24 hr care team notes reviewed.   ROS:  4 point ROS including Respiratory, CV, GI and , other than that noted in the HPI, is negative.    Physical Exam:    Blood pressure 130/76, pulse 98, temperature 97.6  F (36.4  C), temperature source Oral, resp. rate 17, height 1.803 m (5' 11\"), weight 98.7 kg (217 lb 8 oz), SpO2 93%.  General: Alert, interactive, NAD  HEENT: AT/NC  Resp: clear to auscultation bilaterally, no crackles or wheezes  Cardiac: regular rate and rhythm, no murmur  Abdomen: Soft, nontender, distended. +BS.   Extremities: No LE edema  Skin: Warm and dry, no jaundice or rash  Neuro: Alert & oriented x 3, Cns 2-12 intact, moves all extremities equally    Labs & Images:  Reviewed in Epic   Medications:    Current Facility-Administered Medications   Medication Dose Route Frequency Provider Last Rate Last Admin    acetaminophen (TYLENOL) tablet 650 mg  650 mg Oral Q4H PRN Caden Stockton MD   650 mg at 02/27/25 2029    Or    acetaminophen (TYLENOL) Suppository 650 mg  650 mg Rectal Q4H PRN Caden Stockton MD        acetaminophen (TYLENOL) tablet 1,000 mg  1,000 mg Oral TID Diaz, Luz Maria Medel PA-C   1,000 mg at 03/01/25 0933    acetaminophen (TYLENOL) tablet 650 mg  650 mg Oral Q6H PRN Diaz, Luz Maria Medel PA-C        alfuzosin ER (UROXATRAL) 24 hr tablet 10 mg  10 mg Oral Daily Caden Stockton MD   10 mg at 03/01/25 0933    amLODIPine (NORVASC) tablet 5 mg  5 mg Oral At Bedtime Diaz, Luz Maria Medel PA-C   5 mg at 02/28/25 2156    bisacodyl (DULCOLAX) suppository 10 mg  10 mg Rectal Daily PRN Diaz, Luz Maria Medel PA-C        calcium " carbonate (TUMS) chewable tablet 1,000 mg  1,000 mg Oral TID PRN Diaz, Luz Maria Medel PA-C        glucose gel 15-30 g  15-30 g Oral Q15 Min PRN Caden Stockton MD        Or    dextrose 50 % injection 25-50 mL  25-50 mL Intravenous Q15 Min PRN Caden Stockton MD        Or    glucagon injection 1 mg  1 mg Subcutaneous Q15 Min PRN Caden Stockton MD        divalproex sodium delayed-release (DEPAKOTE SPRINKLE) DR capsule 250 mg  250 mg Oral BID Caden Stockton MD   250 mg at 03/01/25 0933    enoxaparin ANTICOAGULANT (LOVENOX) injection 40 mg  40 mg Subcutaneous Q24H Diaz, Luz Maria Medel PA-C   40 mg at 02/28/25 1620    guaiFENesin-codeine (ROBITUSSIN AC) 100-10 MG/5ML solution 10 mL  10 mL Oral Q4H PRN Diaz, Luz Maria Medel PA-C        hydrOXYzine HCl (ATARAX) tablet 25 mg  25 mg Oral BID Caden Stockton MD   25 mg at 03/01/25 0933    insulin aspart (NovoLOG) injection (RAPID ACTING)  1-7 Units Subcutaneous TID AC Caden Stockton MD   2 Units at 03/01/25 0936    insulin aspart (NovoLOG) injection (RAPID ACTING)  1-5 Units Subcutaneous At Bedtime Caden Stockton MD   1 Units at 02/28/25 2157    insulin aspart (NovoLOG) injection (RAPID ACTING)   Subcutaneous TID w/meals Caden Stockton MD   6 Units at 03/01/25 0936    insulin glargine (LANTUS PEN) injection 45 Units  45 Units Subcutaneous Daily DiazLuz Maria PA-C   45 Units at 03/01/25 0936    [Held by provider] insulin lispro (HumaLOG KWIKPEN) injection (RAPID ACTING) 4 Units  4 Units Subcutaneous BID AC DiazLuz Maria metzger PA-C        [Held by provider] insulin lispro (HumaLOG KWIKPEN) injection (RAPID ACTING) 6 Units  6 Units Subcutaneous Daily with lunch Diaz, Luz Maria Medel PA-C        labetalol (NORMODYNE/TRANDATE) injection 10 mg  10 mg Intravenous Q2H PRN Diaz, Luz Maria Medel PA-C   10 mg at 03/01/25 0320    melatonin tablet 1 mg  1 mg Oral At Bedtime Caden Avila MD        metFORMIN (GLUCOPHAGE) tablet 500 mg  500 mg Oral BID w/meals Diaz, Luz Maria Medel PA-C   500 mg at 03/01/25  0933    ondansetron (ZOFRAN ODT) ODT tab 4 mg  4 mg Oral Q6H PRN Caden Stockton MD   4 mg at 02/28/25 0832    Or    ondansetron (ZOFRAN) injection 4 mg  4 mg Intravenous Q6H PRN Caden Stockton MD   4 mg at 02/27/25 2057    ondansetron (ZOFRAN) tablet 4 mg  4 mg Oral Q6H PRN Diaz, Luz Maria Medel PA-C        prochlorperazine (COMPAZINE) injection 5 mg  5 mg Intravenous Q6H PRN Caden Stockton MD        Or    prochlorperazine (COMPAZINE) tablet 5 mg  5 mg Oral Q6H PRN Caden Stockton MD   5 mg at 02/28/25 0431    risperiDONE (risperDAL) tablet 3.5 mg  3.5 mg Oral At Bedtime Caden Stockton MD   3.5 mg at 02/28/25 2156    scopolamine (TRANSDERM) 72 hr patch 1 patch  1 patch Transdermal Q72H Luz Maria Diaz PA-C   1 patch at 02/28/25 1620    And    scopolamine (TRANSDERM-SCOP) Patch in Place   Transdermal Q8H Diaz, Luz Maria Medel PA-C        senna-docusate (SENOKOT-S/PERICOLACE) 8.6-50 MG per tablet 1 tablet  1 tablet Oral BID PRN Caden Stockton MD        Or    senna-docusate (SENOKOT-S/PERICOLACE) 8.6-50 MG per tablet 2 tablet  2 tablet Oral BID PRN Caden Stockton MD        simethicone (MYLICON) chewable tablet 80 mg  80 mg Oral 4x Daily Diaz, Luz Maria Medel PA-C   80 mg at 03/01/25 0935    simvastatin (ZOCOR) tablet 10 mg  10 mg Oral At Bedtime Caden Stockton MD   10 mg at 02/28/25 2156    venlafaxine (EFFEXOR XR) 24 hr capsule 225 mg  225 mg Oral Daily Caden Stockton MD   225 mg at 03/01/25 0933

## 2025-03-01 NOTE — PROGRESS NOTES
"   03/01/25 0848   Appointment Info   Signing Clinician's Name / Credentials (PT) Luis De Santiago DPT   Living Environment   People in Home facility resident   Current Living Arrangements assisted living   Home Accessibility no concerns   Living Environment Comments Pt lives in [facility], has assistance with medictions, meals, house keeping, laundry and bathing.   Self-Care   Usual Activity Tolerance moderate   Current Activity Tolerance fair   Equipment Currently Used at Home walker, rolling   Fall history within last six months yes   Number of times patient has fallen within last six months 1   Activity/Exercise/Self-Care Comment IND with dressing and ambulation, using 4WW for gait. Has assistance with bathing.   General Information   Onset of Illness/Injury or Date of Surgery 02/27/25   Referring Physician Caden Stockton MD   Pertinent History of Current Problem (include personal factors and/or comorbidities that impact the POC) Pt is 63 yo male who, per chart, \" PMH of schizoaffective disorder, HL, IDDM2, obesity who presents with generalized weakness and diarrhea. \" found + for norovirus.   Existing Precautions/Restrictions fall   Cognition   Affect/Mental Status (Cognition) flat/blunted affect   Orientation Status (Cognition) oriented to;person;place;time   Follows Commands (Cognition) WFL   Pain Assessment   Patient Currently in Pain   (no pain at rest, hx of B knee pain)   Integumentary/Edema   Integumentary/Edema Comments pt has B pitting edema, 2+ in the R foot/ankle, 1+ in R lower leg; L lower leg and foot with 1+ pitting mostly in lateral aspect of foot.   Posture    Posture Forward head position   Range of Motion (ROM)   Range of Motion ROM is WFL   Strength (Manual Muscle Testing)   Strength (Manual Muscle Testing) Able to perform R SLR;Able to perform L SLR;Deficits observed during functional mobility   Bed Mobility   Comment, (Bed Mobility) supine > sit with Parrish, from elevated HOB, using R bed rail. "   Transfers   Comment, (Transfers) STS with FWW and Parrish, pushing up from walker.   Gait/Stairs (Locomotion)   Comment, (Gait/Stairs) 10' with FWW and Parrish, shaky in B LEs but no actual buckling, shuffling gait and slow speed, mild forward flexed posture.   Balance   Balance Comments needing Ax1 with walker for OOB mobility, unsteady with LE weakness.   Sensory Examination   Sensory Perception Comments light touch intact to distal B LEs per screen.   Clinical Impression   Criteria for Skilled Therapeutic Intervention Yes, treatment indicated   PT Diagnosis (PT) impaired functional mobility   Influenced by the following impairments decreased strength and activity tolerance, impaired balance   Functional limitations due to impairments difficulty with bed mobility, transfers, and ambulation   Clinical Presentation (PT Evaluation Complexity) stable   Clinical Presentation Rationale clinical judgment   Clinical Decision Making (Complexity) low complexity   Planned Therapy Interventions (PT) balance training;bed mobility training;gait training;home exercise program;patient/family education;strengthening;stretching;ROM (range of motion);transfer training;progressive activity/exercise   Risk & Benefits of therapy have been explained evaluation/treatment results reviewed;care plan/treatment goals reviewed;risks/benefits reviewed;current/potential barriers reviewed;participants voiced agreement with care plan;participants included;patient   PT Total Evaluation Time   PT Eval, Low Complexity Minutes (02400) 10   Physical Therapy Goals   PT Frequency 5x/week   PT Predicted Duration/Target Date for Goal Attainment 03/08/25   PT Goals Bed Mobility;Transfers;Gait   PT: Bed Mobility Supervision/stand-by assist;Supine to/from sit   PT: Transfers Supervision/stand-by assist;Sit to/from stand;Assistive device   PT: Gait Supervision/stand-by assist;Rolling walker;100 feet   Interventions   Interventions Quick Adds Therapeutic  Activity;Gait Training   Therapeutic Activity   Therapeutic Activities: dynamic activities to improve functional performance Minutes (11106) 24   Symptoms Noted During/After Treatment Fatigue;Dizziness   Treatment Detail/Skilled Intervention Pt supine in bed upon PT arrival, on RA throughout session, SpO2 spot checked. In low-90s throughout. Pt performed STS x4 during session, FWW and Parrish from EOB, and modA from toilet using grab bars for safety, cues for reaching for grab bars. Pt needing Ax1 with doffing/donning brief, had small BM in brief, unable to void or have BM on toilet. Pt ambulated bathroom > bed ~15' with FWW and Parrish, shaky and slow with movement, reported some dizziness during short bout back to bed, attempted to check BPs in standing after, but unable to obtain. Pt performed sit > supine wiht SBA, Parrish for repositioning in bed. Pt educated on getting to bathroom regularly for BMs as able and education on use of call light. Pt at 90-92% SpO2 on RA at end of session.   PT Discharge Planning   PT Plan progress IND with bed mob, repeat STS, increase gait distance (recommend brief for OOB mob).   PT Discharge Recommendation (DC Rec) Transitional Care Facility   PT Rationale for DC Rec Pt at baseline lives in [facility], has assistance with IADLs (meds, cleaning, laundry, meals) and with bathing but is ambulatory with 4WW at baseline. Currently, pt is below baseline, weakness and decreased activity tolerance, impaired balance. Needing Ax1 for all mobility. Recommend TCF at this time.   PT Brief overview of current status Ax1 with FWW bed <> bathroom   Physical Therapy Time and Intention   Timed Code Treatment Minutes 24   Total Session Time (sum of timed and untimed services) 34

## 2025-03-01 NOTE — PLAN OF CARE
"Care from [id number]      Inpatient Progress Note:  For complete assessment see flow sheet documentation.       /76 (BP Location: Left arm)   Pulse 98   Temp 97.6  F (36.4  C) (Oral)   Resp 17   Ht 1.803 m (5' 11\")   Wt 98.7 kg (217 lb 8 oz)   SpO2 93%   BMI 30.34 kg/m         Neuro: A&O x4 but flat.  Vital Signs: VSS, less tachy today.  Pain/Comfort: Denies pain, has scheduled tylenol.  Diet/po intake: Diabetic diets, with meals.  Output: Up to bathroom but incontinent at times.   Activity/Ambulation: Ax 1 w/ walker and GB.   Pertinent assessments: ACHS checks, cont pulse ox.   Infusions: N/A  Major Shift Events: N/A  Plan (Upcoming Events): Work with PT tomorrow.   Discharge/Transfer Needs: SW following for possible discharge to TCU or [facility].     Will continue with POC.          Will continue to monitor and provide cares.    Goal Outcome Evaluation:      Plan of Care Reviewed With: patient    Overall Patient Progress: no changeOverall Patient Progress: no change    Outcome Evaluation: A&O x4. PT is following.      Problem: Adult Inpatient Plan of Care  Goal: Plan of Care Review  Description: The Plan of Care Review/Shift note should be completed every shift.  The Outcome Evaluation is a brief statement about your assessment that the patient is improving, declining, or no change.  This information will be displayed automatically on your shift  note.  Outcome: Progressing  Flowsheets (Taken 3/1/2025 1328)  Outcome Evaluation: A&O x4. PT is following.  Plan of Care Reviewed With: patient  Goal: Patient-Specific Goal (Individualized)  Description: You can add care plan individualizations to a care plan. Examples of Individualization might be:  \"Parent requests to be called daily at 9am for status\", \"I have a hard time hearing out of my right ear\", or \"Do not touch me to wake me up as it startles  me\".  Outcome: Progressing  Goal: Absence of Hospital-Acquired Illness or Injury  Outcome: " Progressing  Goal: Optimal Comfort and Wellbeing  Outcome: Progressing  Goal: Readiness for Transition of Care  Outcome: Progressing     Problem: Pain Acute  Goal: Optimal Pain Control and Function  Outcome: Progressing     Problem: Fall Injury Risk  Goal: Absence of Fall and Fall-Related Injury  Outcome: Progressing     Problem: Diarrhea  Goal: Effective Diarrhea Management  Outcome: Progressing

## 2025-03-01 NOTE — CONSULTS
CLINICAL NUTRITION SERVICES - ASSESSMENT NOTE    Malnutrition Status:    Malnutrition Diagnosis: Patient does not meet two of the established criteria necessary for diagnosing malnutrition    Malnutrition Present on Admission: No    Registered Dietitian Interventions:  Continue current diet order per provider    Nutrition supplements: Glucerna chocolate w/ lunch per pt request    Will monitor intakes acutely       REASON FOR ASSESSMENT  Positive admission nutrition risk screen of 2 for unsure weight loss    PMH: schizoaffective disorder, HL, IDDM2      Per EMR, pt presented to ED on 2/27 with generalized weakness and diarrhea.   Pt was found to have Norovirus.    SUBJECTIVE INFORMATION  Assessed patient in room.    NUTRITION HISTORY  - Information obtained from chart review and pt in room.  - Diet at home: Pt does not follow any dietary restrictions usually  - Usual intakes: Pt reports that he usually eats 3 fairly large meals per day. States that he typically eats 100% of them, but will occasionally only have 75%  - Barriers to PO intakes: Acutely, feeling nauseous, but he notes this has begun to resolve. At baseline, does not have any issues with intakes. He stated that up until Friday, he was experiencing no barriers.   - Use of oral supplements: None noted.  - Chewing/swallowing issues: None noted apart from sometimes when he tries to swallow bread, he says it's too dry.   - Meal preparation/grocery shopping: [facility]  - Allergies: NKFA      CURRENT NUTRITION ORDERS  Diet: Mod CCHO    CURRENT INTAKE/TOLERANCE  Per nursing flowsheet, 75% intake and fair appetite on 2/27, 0-25% intake and poor appetite on 2/28.     Per [facility], pt has been ordering small, fairly plain meals since admission. Generally low in protein.     Per H&P, he has been eating and drinking.   Pt currently complaining of nausea.     Pt reports that over the last 2 days, he has been feeling nauseous, but that this has begun to resolve. He states  "that he feels confident in his ability to eat adequate amounts moving forward. Did express interest in trying a nutrition supplement - will order it once per day.  Encouraged pt to work on ordering well balanced meals and being consistent with intakes.     LABS  Nutrition-relevant labs: Reviewed  Noted: No labs from 3/1  From 2/28: BUN 34.6(H), , A1C, 7.6    MEDICATIONS  Nutrition-relevant medications: Reviewed  Noted: sliding scale insulin, 45 unites Lantus daily, metformin,       ANTHROPOMETRICS  Height: 180.3 cm (5' 11\")  Most Recent Weight: 98.7 kg (217 lb 8 oz)  IBW: 75.3 kg  % IBW: 131%  BMI (kg/m ): Body mass index is 30.34 kg/m . (Obesity Class I BMI 30-34.9)  Weight History:  Wt Readings from Last 10 Encounters:   02/27/25 98.7 kg (217 lb 8 oz)   01/09/22 68.9 kg (152 lb)   12/12/21 69.3 kg (152 lb 12.8 oz)   04/07/20 71.2 kg (156 lb 14.4 oz)   01/27/20 73.5 kg (162 lb)   12/19/19 75.6 kg (166 lb 9.6 oz)   07/25/19 69.9 kg (154 lb)   04/23/19 66.7 kg (147 lb)   04/09/19 66.7 kg (147 lb)   03/04/19 62.1 kg (137 lb)     No additional weight hx available upon chart review.    Upon discussion with pt, he stated that he feels as though he has lost weight because when he got to the hospital, he weighed 214 lbs and he usually weights 216-218 lbs.  Was understanding that this could have been related to losses associated with symptoms of norovirus.     ASSESSED NUTRITION NEEDS  Dosing Weight: 98.7 kg, based on actual wt  Estimated Energy Needs: 20-25 kcals/kg  Justification: Maintenance  Estimated Protein Needs: 1 - 1.2 grams of pro/kg  Justification: Maintenance  Estimated Fluid Needs: per provider    SYSTEM FINDINGS    Skin/wounds:  Edema: None currently documented  Pressure Injury: None currently documented  Non-Healing Wound(s): None currently documented  Surgical Wound(s): None currently documented    GI symptoms:   Stooling Patterns Reviewed - 4 BM yesterday    MALNUTRITION  % Weight Loss: Unable to " "assess  d/t lack of weight hx  % Intake: Decreased intake does not meet criteria  Subcutaneous Fat Loss: None observed  Muscle Loss: None observed  Fluid Accumulation/Edema: None noted  Malnutrition Diagnosis: Patient does not meet two of the established criteria necessary for diagnosing malnutrition  Malnutrition Present on Admission: No    NUTRITION DIAGNOSIS  Predicted inadequate oral intake related to nausea over the last few days (seems to be resolving) and potential for appetite to decrease pending clinical course.    INTERVENTIONS  See nutrition interventions above    Goals  Patient to consume % of nutritionally adequate meal trays TID, or the equivalent with supplements/snacks.     Monitoring/Evaluation  Progress toward goals will be monitored and evaluated per policy.        Argenis Deutsch RD, LD  Clinical Dietitian  [facility] Message Group: \"Dietitian [[place]]\"  Office Phone: [phone]  Pagers: 3rd floor/ICU: [phone]  All other floors: [phone]  Weekend/holiday: [phone]    "

## 2025-03-01 NOTE — PLAN OF CARE
"Care from [id number]      Inpatient Progress Note:  For complete assessment see flow sheet documentation.       BP (!) 156/97 (BP Location: Left arm)   Pulse 105   Temp 98.6  F (37  C) (Oral)   Resp 14   Ht 1.803 m (5' 11\")   Wt 98.7 kg (217 lb 8 oz)   SpO2 (!) 91%   BMI 30.34 kg/m         Neuro: A&O x4 , pt has flat affect and will not call for care needs.   Vital Signs: Pt has had elevated BP and has been tachy. PRN labetalol given. Increased Norvasc today.   Pain/Comfort: Complains of pain in abdomen and R knee. Tylenol given. Zofran given for nausea.   Diet/po intake: Pt is on a diabetic diet.  Pt has sliding scale insulin and carb coverage.   Output: Pt says he know when he has to urinate and have bowel movements, continues to be incontinent   Activity/Ambulation: Pt has not been out of bed yet. PT will see pt tomorrow.   Pertinent assessments: ACHS glucose checks, is on cont pulse ox.   Infusions: N/A  Major Shift Events:   Plan (Upcoming Events): Work with PT tomorrow.   Discharge/Transfer Needs: Will discharge back to [facility] when medically ready for discharge.    Will continue with POC.          Will continue to monitor and provide cares.    Goal Outcome Evaluation:      Plan of Care Reviewed With: patient    Overall Patient Progress: no changeOverall Patient Progress: no change    Outcome Evaluation: Pt is postive for noro. Large frequent stools. PT is following.      Problem: Adult Inpatient Plan of Care  Goal: Plan of Care Review  Description: The Plan of Care Review/Shift note should be completed every shift.  The Outcome Evaluation is a brief statement about your assessment that the patient is improving, declining, or no change.  This information will be displayed automatically on your shift  note.  2/28/2025 1852 by Lorene Antoine RN  Outcome: Progressing  Flowsheets (Taken 2/28/2025 1852)  Plan of Care Reviewed With: patient  Overall Patient Progress: no change  2/28/2025 1851 by " "Lorene Antoine RN  Outcome: Progressing  2/28/2025 1120 by Lorene Antoine RN  Outcome: Progressing  Flowsheets (Taken 2/28/2025 1120)  Outcome Evaluation: Pt is postive for noro. Large frequent stools. PT is following.  Plan of Care Reviewed With: patient  Overall Patient Progress: no change  Goal: Patient-Specific Goal (Individualized)  Description: You can add care plan individualizations to a care plan. Examples of Individualization might be:  \"Parent requests to be called daily at 9am for status\", \"I have a hard time hearing out of my right ear\", or \"Do not touch me to wake me up as it startles  me\".  2/28/2025 1852 by Lorene Antoine RN  Outcome: Progressing  2/28/2025 1851 by Lorene Antoine RN  Outcome: Progressing  2/28/2025 1120 by Lorene Antoine RN  Outcome: Progressing  Goal: Absence of Hospital-Acquired Illness or Injury  2/28/2025 1852 by Lorene Antoine RN  Outcome: Progressing  2/28/2025 1851 by Lorene Antoine RN  Outcome: Progressing  2/28/2025 1120 by Lorene Antoine RN  Outcome: Progressing  Intervention: Identify and Manage Fall Risk  Recent Flowsheet Documentation  Taken 2/28/2025 0815 by Lorene Antoine RN  Safety Promotion/Fall Prevention:   activity supervised   patient and family education   supervised activity   safety round/check completed   clutter free environment maintained  Intervention: Prevent Skin Injury  Recent Flowsheet Documentation  Taken 2/28/2025 0832 by Lorene Antoine RN  Body Position:   supine   weight shifting  Taken 2/28/2025 0815 by Lorene Antoine RN  Body Position:   supine   supine, legs elevated   supine, head elevated  Intervention: Prevent and Manage VTE (Venous Thromboembolism) Risk  Recent Flowsheet Documentation  Taken 2/28/2025 0815 by Lorene Antoine RN  VTE Prevention/Management: SCDs off (sequential compression devices)  Intervention: Prevent Infection  Recent Flowsheet Documentation  Taken 2/28/2025 0815 by Elina" Lorene YANCEY RN  Infection Prevention:   hand hygiene promoted   personal protective equipment utilized   rest/sleep promoted   single patient room provided  Goal: Optimal Comfort and Wellbeing  2/28/2025 1852 by Lorene Antoine RN  Outcome: Progressing  2/28/2025 1851 by Lorene Antoine RN  Outcome: Progressing  2/28/2025 1120 by Lorene Antoine RN  Outcome: Progressing  Goal: Readiness for Transition of Care  2/28/2025 1852 by Lorene Antoine RN  Outcome: Progressing  2/28/2025 1851 by Lorene Antoine RN  Outcome: Progressing  2/28/2025 1120 by Lorene Antoine RN  Outcome: Progressing     Problem: Pain Acute  Goal: Optimal Pain Control and Function  Outcome: Progressing  Intervention: Prevent or Manage Pain  Recent Flowsheet Documentation  Taken 2/28/2025 0815 by Lorene Antoine RN  Medication Review/Management: medications reviewed     Problem: Fall Injury Risk  Goal: Absence of Fall and Fall-Related Injury  Outcome: Progressing  Intervention: Identify and Manage Contributors  Recent Flowsheet Documentation  Taken 2/28/2025 0815 by Lorene Antoine RN  Medication Review/Management: medications reviewed  Intervention: Promote Injury-Free Environment  Recent Flowsheet Documentation  Taken 2/28/2025 0815 by Lorene Antoine RN  Safety Promotion/Fall Prevention:   activity supervised   patient and family education   supervised activity   safety round/check completed   clutter free environment maintained     Problem: Diarrhea  Goal: Effective Diarrhea Management  Outcome: Progressing

## 2025-03-02 LAB
ANION GAP SERPL CALCULATED.3IONS-SCNC: 11 MMOL/L (ref 7–15)
BUN SERPL-MCNC: 33.6 MG/DL (ref 8–23)
CALCIUM SERPL-MCNC: 9.2 MG/DL (ref 8.8–10.4)
CHLORIDE SERPL-SCNC: 100 MMOL/L (ref 98–107)
CREAT SERPL-MCNC: 0.93 MG/DL (ref 0.67–1.17)
EGFRCR SERPLBLD CKD-EPI 2021: >90 ML/MIN/1.73M2
GLUCOSE BLDC GLUCOMTR-MCNC: 105 MG/DL (ref 70–99)
GLUCOSE BLDC GLUCOMTR-MCNC: 153 MG/DL (ref 70–99)
GLUCOSE BLDC GLUCOMTR-MCNC: 198 MG/DL (ref 70–99)
GLUCOSE BLDC GLUCOMTR-MCNC: 87 MG/DL (ref 70–99)
GLUCOSE BLDC GLUCOMTR-MCNC: 93 MG/DL (ref 70–99)
GLUCOSE BLDC GLUCOMTR-MCNC: 98 MG/DL (ref 70–99)
GLUCOSE SERPL-MCNC: 213 MG/DL (ref 70–99)
HCO3 SERPL-SCNC: 23 MMOL/L (ref 22–29)
MAGNESIUM SERPL-MCNC: 1.9 MG/DL (ref 1.7–2.3)
PHOSPHATE SERPL-MCNC: 2.9 MG/DL (ref 2.5–4.5)
POTASSIUM SERPL-SCNC: 4.2 MMOL/L (ref 3.4–5.3)
SODIUM SERPL-SCNC: 134 MMOL/L (ref 135–145)

## 2025-03-02 PROCEDURE — 80048 BASIC METABOLIC PNL TOTAL CA: CPT | Performed by: PHYSICIAN ASSISTANT

## 2025-03-02 PROCEDURE — 120N000001 HC R&B MED SURG/OB

## 2025-03-02 PROCEDURE — 82310 ASSAY OF CALCIUM: CPT | Performed by: PHYSICIAN ASSISTANT

## 2025-03-02 PROCEDURE — 250N000013 HC RX MED GY IP 250 OP 250 PS 637: Performed by: PHYSICIAN ASSISTANT

## 2025-03-02 PROCEDURE — 250N000013 HC RX MED GY IP 250 OP 250 PS 637: Performed by: HOSPITALIST

## 2025-03-02 PROCEDURE — 250N000011 HC RX IP 250 OP 636: Performed by: PHYSICIAN ASSISTANT

## 2025-03-02 PROCEDURE — 84100 ASSAY OF PHOSPHORUS: CPT | Performed by: PHYSICIAN ASSISTANT

## 2025-03-02 PROCEDURE — 36415 COLL VENOUS BLD VENIPUNCTURE: CPT | Performed by: PHYSICIAN ASSISTANT

## 2025-03-02 PROCEDURE — 83735 ASSAY OF MAGNESIUM: CPT | Performed by: PHYSICIAN ASSISTANT

## 2025-03-02 PROCEDURE — 99232 SBSQ HOSP IP/OBS MODERATE 35: CPT | Performed by: PHYSICIAN ASSISTANT

## 2025-03-02 RX ORDER — MAGNESIUM OXIDE 400 MG/1
400 TABLET ORAL EVERY 4 HOURS
Status: COMPLETED | OUTPATIENT
Start: 2025-03-02 | End: 2025-03-02

## 2025-03-02 RX ADMIN — ACETAMINOPHEN 1000 MG: 500 TABLET, FILM COATED ORAL at 19:15

## 2025-03-02 RX ADMIN — ENOXAPARIN SODIUM 40 MG: 40 INJECTION SUBCUTANEOUS at 16:23

## 2025-03-02 RX ADMIN — INSULIN ASPART 2 UNITS: 100 INJECTION, SOLUTION INTRAVENOUS; SUBCUTANEOUS at 11:54

## 2025-03-02 RX ADMIN — HYDROXYZINE HYDROCHLORIDE 25 MG: 25 TABLET ORAL at 08:30

## 2025-03-02 RX ADMIN — Medication 400 MG: at 19:16

## 2025-03-02 RX ADMIN — DIVALPROEX SODIUM 250 MG: 125 CAPSULE, COATED PELLETS ORAL at 19:15

## 2025-03-02 RX ADMIN — AMLODIPINE BESYLATE 5 MG: 5 TABLET ORAL at 21:34

## 2025-03-02 RX ADMIN — VENLAFAXINE HYDROCHLORIDE 225 MG: 75 CAPSULE, EXTENDED RELEASE ORAL at 08:30

## 2025-03-02 RX ADMIN — RISPERIDONE 3.5 MG: 2 TABLET, FILM COATED ORAL at 21:34

## 2025-03-02 RX ADMIN — SIMVASTATIN 10 MG: 10 TABLET, FILM COATED ORAL at 21:34

## 2025-03-02 RX ADMIN — INSULIN ASPART 1 UNITS: 100 INJECTION, SOLUTION INTRAVENOUS; SUBCUTANEOUS at 08:31

## 2025-03-02 RX ADMIN — HYDROXYZINE HYDROCHLORIDE 25 MG: 25 TABLET ORAL at 19:15

## 2025-03-02 RX ADMIN — Medication 400 MG: at 16:21

## 2025-03-02 RX ADMIN — DIVALPROEX SODIUM 250 MG: 125 CAPSULE, COATED PELLETS ORAL at 08:30

## 2025-03-02 RX ADMIN — SIMETHICONE 80 MG: 80 TABLET, CHEWABLE ORAL at 08:29

## 2025-03-02 RX ADMIN — ACETAMINOPHEN 1000 MG: 500 TABLET, FILM COATED ORAL at 08:29

## 2025-03-02 RX ADMIN — SIMETHICONE 80 MG: 80 TABLET, CHEWABLE ORAL at 11:53

## 2025-03-02 RX ADMIN — SIMETHICONE 80 MG: 80 TABLET, CHEWABLE ORAL at 19:15

## 2025-03-02 RX ADMIN — ALFUZOSIN HYDROCHLORIDE 10 MG: 10 TABLET, EXTENDED RELEASE ORAL at 08:30

## 2025-03-02 RX ADMIN — METFORMIN HYDROCHLORIDE 500 MG: 500 TABLET ORAL at 08:29

## 2025-03-02 RX ADMIN — SIMETHICONE 80 MG: 80 TABLET, CHEWABLE ORAL at 16:21

## 2025-03-02 RX ADMIN — METFORMIN HYDROCHLORIDE 500 MG: 500 TABLET ORAL at 18:18

## 2025-03-02 ASSESSMENT — ACTIVITIES OF DAILY LIVING (ADL)
ADLS_ACUITY_SCORE: 59

## 2025-03-02 NOTE — PLAN OF CARE
"Pt is Aox4 and calm. IV is saline locked. Pt moved to the bathroom Ax1 with walker and GB. No PRN's given this shift. Pt denies pain. Pt is on enteric precautions for Norovirus. Pt tolerating mod card diet. Pt on continuous pulse oximetry. Comes from [facility]. PT and SW following. Continue to follow plan of care.     /80 (BP Location: Left arm)   Pulse 97   Temp 98.2  F (36.8  C) (Oral)   Resp 18   Ht 1.803 m (5' 11\")   Wt 98.7 kg (217 lb 8 oz)   SpO2 92%   BMI 30.34 kg/m           Goal Outcome Evaluation:      Plan of Care Reviewed With: patient    Overall Patient Progress: no changeOverall Patient Progress: no change    Outcome Evaluation: AOx4 and PT is following      Problem: Adult Inpatient Plan of Care  Goal: Plan of Care Review  Description: The Plan of Care Review/Shift note should be completed every shift.  The Outcome Evaluation is a brief statement about your assessment that the patient is improving, declining, or no change.  This information will be displayed automatically on your shift  note.  3/2/2025 0343 by Harlan Chacon, RN  Outcome: Progressing  Flowsheets (Taken 3/2/2025 0343)  Outcome Evaluation: AOx4 and PT is following  3/2/2025 0343 by Harlan Chacon RN  Outcome: Progressing  Flowsheets (Taken 3/2/2025 0343)  Outcome Evaluation: AOx4 and PT is following  Plan of Care Reviewed With: patient  Overall Patient Progress: no change  Goal: Patient-Specific Goal (Individualized)  Description: You can add care plan individualizations to a care plan. Examples of Individualization might be:  \"Parent requests to be called daily at 9am for status\", \"I have a hard time hearing out of my right ear\", or \"Do not touch me to wake me up as it startles  me\".  3/2/2025 0343 by Harlan Chacon RN  Outcome: Progressing  3/2/2025 0343 by Harlan Chacon, RN  Outcome: Progressing  Goal: Absence of Hospital-Acquired Illness or Injury  3/2/2025 0343 by Harlan Chacon RN  Outcome: " Progressing  3/2/2025 0343 by Harlan Chacon RN  Outcome: Progressing  Intervention: Identify and Manage Fall Risk  Recent Flowsheet Documentation  Taken 3/1/2025 2031 by Harlan Chacon RN  Safety Promotion/Fall Prevention:   assistive device/personal items within reach   clutter free environment maintained   nonskid shoes/slippers when out of bed   safety round/check completed  Taken 3/1/2025 2004 by Harlan Chacon RN  Safety Promotion/Fall Prevention:   assistive device/personal items within reach   clutter free environment maintained   nonskid shoes/slippers when out of bed   safety round/check completed  Intervention: Prevent Skin Injury  Recent Flowsheet Documentation  Taken 3/1/2025 2004 by Harlan Chacon RN  Body Position:   position changed independently   position maintained  Intervention: Prevent and Manage VTE (Venous Thromboembolism) Risk  Recent Flowsheet Documentation  Taken 3/1/2025 2004 by Harlan Chacon RN  VTE Prevention/Management: SCDs off (sequential compression devices)  Goal: Optimal Comfort and Wellbeing  3/2/2025 0343 by Harlan Chacon RN  Outcome: Progressing  3/2/2025 0343 by Harlan Chacon RN  Outcome: Progressing  Intervention: Monitor Pain and Promote Comfort  Recent Flowsheet Documentation  Taken 3/1/2025 2004 by Harlan Chacon RN  Pain Management Interventions: medication (see MAR)  Goal: Readiness for Transition of Care  3/2/2025 0343 by Harlan Chacon RN  Outcome: Progressing  3/2/2025 0343 by Harlan Chacon RN  Outcome: Progressing     Problem: Pain Acute  Goal: Optimal Pain Control and Function  3/2/2025 0343 by Harlan Chacon RN  Outcome: Progressing  3/2/2025 0343 by Harlan Chacon RN  Outcome: Progressing  Intervention: Optimize Psychosocial Wellbeing  Recent Flowsheet Documentation  Taken 3/1/2025 2004 by Harlan Chacon RN  Diversional Activities: television  Intervention: Develop Pain Management Plan  Recent Flowsheet Documentation  Taken 3/1/2025 2004 by Harlan Chacon  RN  Pain Management Interventions: medication (see MAR)  Intervention: Prevent or Manage Pain  Recent Flowsheet Documentation  Taken 3/1/2025 2004 by Harlan Chacon RN  Medication Review/Management: medications reviewed     Problem: Fall Injury Risk  Goal: Absence of Fall and Fall-Related Injury  3/2/2025 0343 by Harlan Chacon RN  Outcome: Progressing  3/2/2025 0343 by Harlan Chacon RN  Outcome: Progressing  Intervention: Identify and Manage Contributors  Recent Flowsheet Documentation  Taken 3/1/2025 2004 by Harlan Chacon RN  Medication Review/Management: medications reviewed  Intervention: Promote Injury-Free Environment  Recent Flowsheet Documentation  Taken 3/1/2025 2031 by Harlan Chacon RN  Safety Promotion/Fall Prevention:   assistive device/personal items within reach   clutter free environment maintained   nonskid shoes/slippers when out of bed   safety round/check completed  Taken 3/1/2025 2004 by Harlan Chacon RN  Safety Promotion/Fall Prevention:   assistive device/personal items within reach   clutter free environment maintained   nonskid shoes/slippers when out of bed   safety round/check completed     Problem: Diarrhea  Goal: Effective Diarrhea Management  3/2/2025 0343 by Harlan Chacon RN  Outcome: Progressing  3/2/2025 0343 by Harlan Chacon RN  Outcome: Progressing  Intervention: Manage Diarrhea  Recent Flowsheet Documentation  Taken 3/1/2025 2031 by Harlan Chacon RN  Isolation Precautions: enteric precautions maintained  Taken 3/1/2025 2004 by Harlan Chacon RN  Isolation Precautions: enteric precautions maintained  Medication Review/Management: medications reviewed

## 2025-03-02 NOTE — PROGRESS NOTES
[facility]  Internal Medicine  Progress Note    Date of Service: 3/2/2025    Patient: Caden Bush  MRN: [id number]  Admission Date: 2/27/2025      Assessment & Plan: Caden Bush is a 64 year old male with a history of Schizoaffective Disorder, HLD, DM Type 2, Obesity who presented to the ED on 2/27/25 with generalized weakness and diarrhea and found to have Norovirus.        Norovirus  Generalized Weakness  Pt presented from [facility] on 2/27 with generalized weakness and non bloody diarrhea.  Pt has remained afebrile and hemodynamically stable with normal wbc.  Lactic Acid 2.2 on admission resolved with IVF.  CT c/a/p revealed atelectasis, splenomegaly and prominent rectal stool.  Enteric panel is positive for norovirus.  - pt with continued improvement in appetite and weakness today.  Diarrhea resolved.  Tolerating po intake.  - evaluated by PT who recommends TCU.  Pt continues to mobilize below his baseline today, SW consulted to assist with TCU placement.  - Will have PT reassess tomorrow  - SW/CC following  - blood culture NGTD     HTN  - continue Amlodipine (pta 2.5mg was increased to 5mg daily on 2/28)     DM Type 2  Hgb A1C 7.6.  BS   - per pharmacy recs, will decrease Lantus to 38 units daily and start Novolog 8 units tid with meals.  - continue Metformin  - continue ISS protocol     HLD  - continue Simvastatin     BPH  - continue Alfuzosin     Schizoaffective Disorder  - continue Venlafaxine, Risperidone, Atarax, Depakote     Obesity, BMI 30  - encourage weight loss and health lifestyle     Splenomegaly  Incidentally noted on CT imaging is splenomegaly of 15.5cm.  - follow up with Hematology outpatient          Adrenal Nodule  Incidentally noted on CT imaging is a 1.3 cm nodule in the left adrenal gland is stable since 2021 compatible with a benign adenoma requiring no follow-up per radiology.  - follow up monitoring with PCP       CODE: Full  DVT: lovenox  Diet/fluids: regular  Medically  "Ready for Discharge: Anticipated Tomorrow        Emelyn Haynes MS MARCELLA  Hospitalist Physician Assistant  [facility]      Subjective & Interval Hx:    Patient reports he is tolerating po intake.  Denies any nausea, emesis or diarrhea.  Still feels weakness and is ambulating below his baseline.      Last 24 hr care team notes reviewed.   ROS:  4 point ROS including Respiratory, CV, GI and , other than that noted in the HPI, is negative.    Physical Exam:    Blood pressure (!) 140/90, pulse 107, temperature 98.5  F (36.9  C), temperature source Oral, resp. rate 18, height 1.803 m (5' 11\"), weight 98.7 kg (217 lb 8 oz), SpO2 92%.  General: Alert, interactive, NAD  HEENT: AT/NC  Resp: clear to auscultation bilaterally, no crackles or wheezes  Cardiac: regular rate and rhythm, no murmur  Abdomen: Soft, nontender, distended. +BS.   Extremities: No LE edema  Skin: Warm and dry, no jaundice or rash  Neuro: Alert & oriented x 3, Cns 2-12 intact, moves all extremities equally    Labs & Images:  Reviewed in Epic   Medications:    Current Facility-Administered Medications   Medication Dose Route Frequency Provider Last Rate Last Admin    acetaminophen (TYLENOL) tablet 1,000 mg  1,000 mg Oral TID Diaz, Luz Maria Medel PA-C   1,000 mg at 03/02/25 0829    acetaminophen (TYLENOL) tablet 650 mg  650 mg Oral Q4H PRN Caden Stockton MD   650 mg at 02/27/25 2029    alfuzosin ER (UROXATRAL) 24 hr tablet 10 mg  10 mg Oral Daily Caden Stockton MD   10 mg at 03/02/25 0830    amLODIPine (NORVASC) tablet 5 mg  5 mg Oral At Bedtime DiazLuz Maria PA-C   5 mg at 03/01/25 2220    bisacodyl (DULCOLAX) suppository 10 mg  10 mg Rectal Daily PRN Luz Maria Diaz PA-C        calcium carbonate (TUMS) chewable tablet 1,000 mg  1,000 mg Oral TID PRN Luz Maria Diaz PA-C        glucose gel 15-30 g  15-30 g Oral Q15 Min PRN Caden Stockton MD        Or    dextrose 50 % injection 25-50 mL  25-50 mL Intravenous Q15 Min PRN Caden Stockton MD "        Or    glucagon injection 1 mg  1 mg Subcutaneous Q15 Min PRN Caden Stockton MD        divalproex sodium delayed-release (DEPAKOTE SPRINKLE) DR capsule 250 mg  250 mg Oral BID Caden Stockton MD   250 mg at 03/02/25 0830    enoxaparin ANTICOAGULANT (LOVENOX) injection 40 mg  40 mg Subcutaneous Q24H Diaz, Luz Maria Medel PA-C   40 mg at 03/01/25 1724    guaiFENesin-codeine (ROBITUSSIN AC) 100-10 MG/5ML solution 10 mL  10 mL Oral Q4H PRN Diaz, Luz Maria Medel PA-C        hydrOXYzine HCl (ATARAX) tablet 25 mg  25 mg Oral BID Caden Stockton MD   25 mg at 03/02/25 0830    insulin aspart (NovoLOG) injection (RAPID ACTING)  1-7 Units Subcutaneous TID AC Caden Stockton MD   1 Units at 03/02/25 0831    insulin aspart (NovoLOG) injection (RAPID ACTING)  1-5 Units Subcutaneous At Bedtime Caden Stockton MD   1 Units at 02/28/25 2157    insulin aspart (NovoLOG) injection (RAPID ACTING)   Subcutaneous TID w/meals Caden Stockton MD   5 Units at 03/02/25 0831    insulin glargine (LANTUS PEN) injection 47 Units  47 Units Subcutaneous Daily Emelyn Haynes PA-C   47 Units at 03/02/25 0831    labetalol (NORMODYNE/TRANDATE) injection 10 mg  10 mg Intravenous Q2H PRN Diaz, Luz Maria Medel PA-C   10 mg at 03/01/25 0320    melatonin tablet 1 mg  1 mg Oral At Bedtime PRN Caden Stockton MD        metFORMIN (GLUCOPHAGE) tablet 500 mg  500 mg Oral BID w/meals DiazLuz Maria PA-C   500 mg at 03/02/25 0829    ondansetron (ZOFRAN ODT) ODT tab 4 mg  4 mg Oral Q6H PRN Caden Stockton MD   4 mg at 02/28/25 0832    Or    ondansetron (ZOFRAN) injection 4 mg  4 mg Intravenous Q6H PRN Caden Stockton MD   4 mg at 02/27/25 2057    ondansetron (ZOFRAN) tablet 4 mg  4 mg Oral Q6H PRN Luz Maria Diaz PA-C        risperiDONE (risperDAL) tablet 3.5 mg  3.5 mg Oral At Bedtime Caden Stockton MD   3.5 mg at 03/01/25 2220    scopolamine (TRANSDERM) 72 hr patch 1 patch  1 patch Transdermal Q72H Luz Maria Diaz PA-C   1 patch at 02/28/25 1620    And    scopolamine  (TRANSDERM-SCOP) Patch in Place   Transdermal Q8H Luz Maria Diaz PA-C        senna-docusate (SENOKOT-S/PERICOLACE) 8.6-50 MG per tablet 1 tablet  1 tablet Oral BID PRN Caden Stockton MD        Or    senna-docusate (SENOKOT-S/PERICOLACE) 8.6-50 MG per tablet 2 tablet  2 tablet Oral BID PRN Caden Stockton MD        simethicone (MYLICON) chewable tablet 80 mg  80 mg Oral 4x Daily Luz Maria Diaz PA-C   80 mg at 03/02/25 0829    simvastatin (ZOCOR) tablet 10 mg  10 mg Oral At Bedtime Caden Stockton MD   10 mg at 03/01/25 2220    venlafaxine (EFFEXOR XR) 24 hr capsule 225 mg  225 mg Oral Daily Caden Stockton MD   225 mg at 03/02/25 0830

## 2025-03-02 NOTE — PROGRESS NOTES
Care Management Follow Up    Length of Stay (days): 2    Expected Discharge Date: 03/02/2025     Concerns to be Addressed: discharge planning     Patient plan of care discussed at interdisciplinary rounds: Yes    Anticipated Discharge Disposition: Assisted Living              Anticipated Discharge Services:  (TBD)  Anticipated Discharge DME:      Patient/family educated on Medicare website which has current facility and service quality ratings:    Education Provided on the Discharge Plan:    Patient/Family in Agreement with the Plan: yes    Referrals Placed by CM/SW:    Private pay costs discussed: Not applicable    Discussed  Partnership in Safe Discharge Planning  document with patient/family: No     Additional Information:  SW discussed TCU recommendations with pt. Pt agreeable and requested [facility] and [facility].  Referrals sent.    Next Steps: CM/SW will assist with coordination of care.    STERLING Santiago, LICLONI  Emergency Department/Inpatient Float  Care Coordination  [facility]        JERIHCO SANTIAGO

## 2025-03-02 NOTE — PLAN OF CARE
"Care from [id number]      Inpatient Progress Note:  For complete assessment see flow sheet documentation.       BP (!) 140/90 (BP Location: Left arm)   Pulse 107   Temp 98.5  F (36.9  C) (Oral)   Resp 18   Ht 1.803 m (5' 11\")   Wt 98.7 kg (217 lb 8 oz)   SpO2 92%   BMI 30.34 kg/m         Neuro: A&O x4, flat affect.   Vital Signs: VSS, is tachy on this shift again. Encouraging fluids.   Pain/Comfort: Denies pain at this time.   Diet/po intake: Pt is on a diabetic diet, w/ supplements. Sliding scale insulin with carb coverage.   Output: Up to bathroom for use.   Activity/Ambulation: Pt is Ax 1 w/ walker and GB.  Pertinent assessments: Pt placed on the high mag, phos and K protocol.   Infusions: N/A  Major Shift Events: Patient was straight cathed for 750 mL.  Plan (Upcoming Events): PT on Monday.  Discharge/Transfer Needs: SW following for TCU placement.     Will continue with POC.          Will continue to monitor and provide cares.    Goal Outcome Evaluation:      Plan of Care Reviewed With: patient    Overall Patient Progress: no changeOverall Patient Progress: no change    Outcome Evaluation: PT is recommending TCU/ SW following for placement.      Problem: Adult Inpatient Plan of Care  Goal: Plan of Care Review  Description: The Plan of Care Review/Shift note should be completed every shift.  The Outcome Evaluation is a brief statement about your assessment that the patient is improving, declining, or no change.  This information will be displayed automatically on your shift  note.  Outcome: Progressing  Flowsheets (Taken 3/2/2025 1119)  Outcome Evaluation: PT is recommending TCU/ SW following for placement.  Plan of Care Reviewed With: patient  Overall Patient Progress: no change  Goal: Patient-Specific Goal (Individualized)  Description: You can add care plan individualizations to a care plan. Examples of Individualization might be:  \"Parent requests to be called daily at 9am for status\", \"I have a hard " "time hearing out of my right ear\", or \"Do not touch me to wake me up as it startles  me\".  Outcome: Progressing  Goal: Absence of Hospital-Acquired Illness or Injury  Outcome: Progressing  Intervention: Prevent Infection  Recent Flowsheet Documentation  Taken 3/2/2025 1006 by Lorene Antoine, RN  Infection Prevention:   hand hygiene promoted   personal protective equipment utilized   rest/sleep promoted   single patient room provided  Goal: Optimal Comfort and Wellbeing  Outcome: Progressing  Goal: Readiness for Transition of Care  Outcome: Progressing     Problem: Pain Acute  Goal: Optimal Pain Control and Function  Outcome: Progressing     Problem: Fall Injury Risk  Goal: Absence of Fall and Fall-Related Injury  Outcome: Progressing     Problem: Diarrhea  Goal: Effective Diarrhea Management  Outcome: Progressing  Intervention: Manage Diarrhea  Recent Flowsheet Documentation  Taken 3/2/2025 1006 by Lorene Antoine RN  Isolation Precautions: enteric precautions maintained     "

## 2025-03-03 ENCOUNTER — APPOINTMENT (OUTPATIENT)
Dept: PHYSICAL THERAPY | Facility: CLINIC | Age: 65
DRG: 392 | End: 2025-03-03
Payer: MEDICARE

## 2025-03-03 LAB
GLUCOSE BLDC GLUCOMTR-MCNC: 113 MG/DL (ref 70–99)
GLUCOSE BLDC GLUCOMTR-MCNC: 129 MG/DL (ref 70–99)
GLUCOSE BLDC GLUCOMTR-MCNC: 145 MG/DL (ref 70–99)
GLUCOSE BLDC GLUCOMTR-MCNC: 151 MG/DL (ref 70–99)
GLUCOSE BLDC GLUCOMTR-MCNC: 94 MG/DL (ref 70–99)
MAGNESIUM SERPL-MCNC: 1.9 MG/DL (ref 1.7–2.3)
PHOSPHATE SERPL-MCNC: 3.9 MG/DL (ref 2.5–4.5)
POTASSIUM SERPL-SCNC: 4.1 MMOL/L (ref 3.4–5.3)

## 2025-03-03 PROCEDURE — 250N000013 HC RX MED GY IP 250 OP 250 PS 637: Performed by: PHYSICIAN ASSISTANT

## 2025-03-03 PROCEDURE — 99232 SBSQ HOSP IP/OBS MODERATE 35: CPT | Performed by: PHYSICIAN ASSISTANT

## 2025-03-03 PROCEDURE — 83735 ASSAY OF MAGNESIUM: CPT | Performed by: HOSPITALIST

## 2025-03-03 PROCEDURE — 250N000009 HC RX 250: Performed by: PHYSICIAN ASSISTANT

## 2025-03-03 PROCEDURE — 97530 THERAPEUTIC ACTIVITIES: CPT | Mod: GP | Performed by: PHYSICAL THERAPIST

## 2025-03-03 PROCEDURE — 250N000011 HC RX IP 250 OP 636: Performed by: PHYSICIAN ASSISTANT

## 2025-03-03 PROCEDURE — 36415 COLL VENOUS BLD VENIPUNCTURE: CPT | Performed by: HOSPITALIST

## 2025-03-03 PROCEDURE — 250N000013 HC RX MED GY IP 250 OP 250 PS 637: Performed by: HOSPITALIST

## 2025-03-03 PROCEDURE — 120N000001 HC R&B MED SURG/OB

## 2025-03-03 PROCEDURE — 84132 ASSAY OF SERUM POTASSIUM: CPT | Performed by: HOSPITALIST

## 2025-03-03 PROCEDURE — 97116 GAIT TRAINING THERAPY: CPT | Mod: GP | Performed by: PHYSICAL THERAPIST

## 2025-03-03 PROCEDURE — 84100 ASSAY OF PHOSPHORUS: CPT | Performed by: HOSPITALIST

## 2025-03-03 RX ADMIN — SCOPOLAMINE 1 PATCH: 1.5 PATCH, EXTENDED RELEASE TRANSDERMAL at 16:02

## 2025-03-03 RX ADMIN — INSULIN ASPART 1 UNITS: 100 INJECTION, SOLUTION INTRAVENOUS; SUBCUTANEOUS at 13:07

## 2025-03-03 RX ADMIN — HYDROXYZINE HYDROCHLORIDE 25 MG: 25 TABLET ORAL at 10:00

## 2025-03-03 RX ADMIN — ACETAMINOPHEN 1000 MG: 500 TABLET, FILM COATED ORAL at 20:32

## 2025-03-03 RX ADMIN — AMLODIPINE BESYLATE 5 MG: 5 TABLET ORAL at 22:54

## 2025-03-03 RX ADMIN — ACETAMINOPHEN 1000 MG: 500 TABLET, FILM COATED ORAL at 10:00

## 2025-03-03 RX ADMIN — METFORMIN HYDROCHLORIDE 500 MG: 500 TABLET ORAL at 10:00

## 2025-03-03 RX ADMIN — INSULIN ASPART 1 UNITS: 100 INJECTION, SOLUTION INTRAVENOUS; SUBCUTANEOUS at 10:07

## 2025-03-03 RX ADMIN — HYDROXYZINE HYDROCHLORIDE 25 MG: 25 TABLET ORAL at 20:32

## 2025-03-03 RX ADMIN — ACETAMINOPHEN 1000 MG: 500 TABLET, FILM COATED ORAL at 15:09

## 2025-03-03 RX ADMIN — ENOXAPARIN SODIUM 40 MG: 40 INJECTION SUBCUTANEOUS at 17:32

## 2025-03-03 RX ADMIN — SIMETHICONE 80 MG: 80 TABLET, CHEWABLE ORAL at 10:01

## 2025-03-03 RX ADMIN — SIMVASTATIN 10 MG: 10 TABLET, FILM COATED ORAL at 22:54

## 2025-03-03 RX ADMIN — DIVALPROEX SODIUM 250 MG: 125 CAPSULE, COATED PELLETS ORAL at 10:00

## 2025-03-03 RX ADMIN — VENLAFAXINE HYDROCHLORIDE 225 MG: 75 CAPSULE, EXTENDED RELEASE ORAL at 10:00

## 2025-03-03 RX ADMIN — DIVALPROEX SODIUM 250 MG: 125 CAPSULE, COATED PELLETS ORAL at 20:32

## 2025-03-03 RX ADMIN — RISPERIDONE 3.5 MG: 2 TABLET, FILM COATED ORAL at 22:53

## 2025-03-03 RX ADMIN — METFORMIN HYDROCHLORIDE 500 MG: 500 TABLET ORAL at 17:32

## 2025-03-03 RX ADMIN — SIMETHICONE 80 MG: 80 TABLET, CHEWABLE ORAL at 13:06

## 2025-03-03 RX ADMIN — ALFUZOSIN HYDROCHLORIDE 10 MG: 10 TABLET, EXTENDED RELEASE ORAL at 10:00

## 2025-03-03 ASSESSMENT — ACTIVITIES OF DAILY LIVING (ADL)
ADLS_ACUITY_SCORE: 61
ADLS_ACUITY_SCORE: 61
ADLS_ACUITY_SCORE: 66
ADLS_ACUITY_SCORE: 61
ADLS_ACUITY_SCORE: 66
ADLS_ACUITY_SCORE: 61
ADLS_ACUITY_SCORE: 66
ADLS_ACUITY_SCORE: 61
ADLS_ACUITY_SCORE: 66
ADLS_ACUITY_SCORE: 66
ADLS_ACUITY_SCORE: 61
ADLS_ACUITY_SCORE: 66
ADLS_ACUITY_SCORE: 65
ADLS_ACUITY_SCORE: 66
ADLS_ACUITY_SCORE: 65

## 2025-03-03 NOTE — PROGRESS NOTES
[facility]  Internal Medicine  Progress Note    Date of Service: 3/3/2025    Patient: Caden Bush  MRN: [id number]  Admission Date: 2/27/2025      Assessment & Plan:   Caden Bush is a 64 year old male with a history of Schizoaffective Disorder, HLD, DM Type 2, Obesity who presented to the ED on 2/27/25 with generalized weakness and diarrhea and found to have Norovirus.        Norovirus  Generalized Weakness  Pt presented from [facility] on 2/27 with generalized weakness and non bloody diarrhea.  Pt has remained afebrile and hemodynamically stable with normal wbc.  Lactic Acid 2.2 on admission resolved with IVF.  CT c/a/p revealed atelectasis, splenomegaly and prominent rectal stool.  Enteric panel is positive for norovirus.  - pt with continued improvement in appetite and weakness today.  Diarrhea resolved.  Tolerating po intake.  - evaluated by PT who recommends TCU.  Pt continues to mobilize below his baseline, SW consulted to assist with TCU placement.  - medically ready for discharge  - SW/CC following  - blood culture NGTD     HTN  - continue Amlodipine (pta 2.5mg was increased to 5mg daily on 2/28)     DM Type 2  Hgb A1C 7.6.  - per pharmacy recs, pta Lantus was decreased to 38 units daily and started Novolog 8 units tid with meals.  - continue Metformin  - continue ISS protocol     HLD  - continue Simvastatin     BPH  - continue Alfuzosin     Schizoaffective Disorder  - continue Venlafaxine, Risperidone, Atarax, Depakote     Obesity, BMI 30  - encourage weight loss and health lifestyle     Splenomegaly  Incidentally noted on CT imaging is splenomegaly of 15.5cm.  - follow up with Hematology outpatient          Adrenal Nodule  Incidentally noted on CT imaging is a 1.3 cm nodule in the left adrenal gland is stable since 2021 compatible with a benign adenoma requiring no follow-up per radiology.  - follow up monitoring with PCP     CODE: full  DVT: Lovenox  Diet/fluids: regular  Medically Ready for  "Discharge: Anticipated Today        Emelyn Haynes MS, PA-C  Hospitalist Physician Assistant  [facility]      Subjective & Interval Hx:    Patient has mild nausea today, but tolerated breakfast well.  Denies any abdominal pain or further stools.  Denies any chest pain or shortness of breath.      Last 24 hr care team notes reviewed.   ROS:  4 point ROS including Respiratory, CV, GI and , other than that noted in the HPI, is negative.    Physical Exam:    Blood pressure 138/84, pulse 114, temperature 98.1  F (36.7  C), temperature source Oral, resp. rate 18, height 1.803 m (5' 11\"), weight 98.7 kg (217 lb 8 oz), SpO2 (!) 91%.  General: Alert, interactive, NAD  HEENT: AT/NC  Resp: clear to auscultation bilaterally, no crackles or wheezes  Cardiac: regular rate and rhythm, no murmur  Abdomen: Soft, nontender, distended. +BS.   Extremities: No LE edema  Skin: Warm and dry, no jaundice or rash  Neuro: Alert & oriented x 3, Cns 2-12 intact, moves all extremities equally    Labs & Images:  Reviewed in Epic   Medications:    Current Facility-Administered Medications   Medication Dose Route Frequency Provider Last Rate Last Admin    acetaminophen (TYLENOL) tablet 1,000 mg  1,000 mg Oral TID Luz Maria Diaz PA-C   1,000 mg at 03/03/25 1000    acetaminophen (TYLENOL) tablet 650 mg  650 mg Oral Q4H PRN Caden Stockton MD   650 mg at 02/27/25 2029    alfuzosin ER (UROXATRAL) 24 hr tablet 10 mg  10 mg Oral Daily Caden Stockton MD   10 mg at 03/03/25 1000    amLODIPine (NORVASC) tablet 5 mg  5 mg Oral At Bedtime Emelyn Haynes PA-C   5 mg at 03/02/25 2134    bisacodyl (DULCOLAX) suppository 10 mg  10 mg Rectal Daily PRN Luz Maria Diaz PA-C        calcium carbonate (TUMS) chewable tablet 1,000 mg  1,000 mg Oral TID PRN Luz Maria Diaz PA-C        glucose gel 15-30 g  15-30 g Oral Q15 Min PRN Caden Stockton MD        Or    dextrose 50 % injection 25-50 mL  25-50 mL Intravenous Q15 Min PRN Caden Stockton MD     "    Or    glucagon injection 1 mg  1 mg Subcutaneous Q15 Min PRN Caden Stockton MD        divalproex sodium delayed-release (DEPAKOTE SPRINKLE) DR capsule 250 mg  250 mg Oral BID Caden Stockton MD   250 mg at 03/03/25 1000    enoxaparin ANTICOAGULANT (LOVENOX) injection 40 mg  40 mg Subcutaneous Q24H Diaz, Luz Maria Medel PA-C   40 mg at 03/02/25 1623    guaiFENesin-codeine (ROBITUSSIN AC) 100-10 MG/5ML solution 10 mL  10 mL Oral Q4H PRN Diaz, Luz Maria Medel PA-C        hydrOXYzine HCl (ATARAX) tablet 25 mg  25 mg Oral BID Caden Stockton MD   25 mg at 03/03/25 1000    insulin aspart (NovoLOG) injection (RAPID ACTING)  8 Units Subcutaneous TID w/meals Emelyn Haynes PA-C   8 Units at 03/03/25 1306    insulin aspart (NovoLOG) injection (RAPID ACTING)  1-7 Units Subcutaneous TID  Caden Stockton MD   1 Units at 03/03/25 1307    insulin aspart (NovoLOG) injection (RAPID ACTING)  1-5 Units Subcutaneous At Bedtime Caden Stockton MD   1 Units at 02/28/25 2157    insulin glargine (LANTUS PEN) injection 38 Units  38 Units Subcutaneous Daily Emelyn Haynes PA-C   38 Units at 03/03/25 1009    labetalol (NORMODYNE/TRANDATE) injection 10 mg  10 mg Intravenous Q2H PRN Diaz, Luz Maria Medel PA-C   10 mg at 03/01/25 0320    melatonin tablet 1 mg  1 mg Oral At Bedtime PRN Caden Stockton MD        metFORMIN (GLUCOPHAGE) tablet 500 mg  500 mg Oral BID w/meals Diaz, Luz Maria Medel PA-C   500 mg at 03/03/25 1000    ondansetron (ZOFRAN ODT) ODT tab 4 mg  4 mg Oral Q6H PRN Caden Stockton MD   4 mg at 02/28/25 0832    Or    ondansetron (ZOFRAN) injection 4 mg  4 mg Intravenous Q6H PRN Caden Stockton MD   4 mg at 02/27/25 2057    ondansetron (ZOFRAN) tablet 4 mg  4 mg Oral Q6H PRN Diaz, Luz Maria Ray, PA-C        risperiDONE (risperDAL) tablet 3.5 mg  3.5 mg Oral At Bedtime Caden Stockton MD   3.5 mg at 03/02/25 2134    scopolamine (TRANSDERM) 72 hr patch 1 patch  1 patch Transdermal Q72H Luz Maria Diaz PA-C   1 patch at 02/28/25 1620    And     scopolamine (TRANSDERM-SCOP) Patch in Place   Transdermal Q8H Diaz, Luz Maria Medel PA-C        senna-docusate (SENOKOT-S/PERICOLACE) 8.6-50 MG per tablet 1 tablet  1 tablet Oral BID PRN Caden Stockton MD        Or    senna-docusate (SENOKOT-S/PERICOLACE) 8.6-50 MG per tablet 2 tablet  2 tablet Oral BID PRN Caden Stockton MD        simvastatin (ZOCOR) tablet 10 mg  10 mg Oral At Bedtime Caden Stockton MD   10 mg at 03/02/25 2134    venlafaxine (EFFEXOR XR) 24 hr capsule 225 mg  225 mg Oral Daily Caden Stockton MD   225 mg at 03/03/25 1000

## 2025-03-03 NOTE — PLAN OF CARE
"Pt alert and oriented x4 with intermittent confusion. Reports pain 3/10 and was relieved by scheduled Tylenol. On BG checks. Gave  scheduled insulin. Pt on enteric precaution due to having Norovirus. Pt Tachycardic in the afternoon. Denies SOB. No nausea or vomiting. Ax1. Ambulated pt in room. Pt using urinal in room. Mod card diet. SL.   Plan: PT and SW following. PT recommending TCU.  Problem: Adult Inpatient Plan of Care  Goal: Plan of Care Review  Description: The Plan of Care Review/Shift note should be completed every shift.  The Outcome Evaluation is a brief statement about your assessment that the patient is improving, declining, or no change.  This information will be displayed automatically on your shift  note.  Outcome: Progressing  Flowsheets (Taken 3/3/2025 1437)  Outcome Evaluation: Pt alert and oriented x4 with intermittent confusion.  Plan of Care Reviewed With: patient  Overall Patient Progress: improving  Goal: Patient-Specific Goal (Individualized)  Description: You can add care plan individualizations to a care plan. Examples of Individualization might be:  \"Parent requests to be called daily at 9am for status\", \"I have a hard time hearing out of my right ear\", or \"Do not touch me to wake me up as it startles  me\".  Outcome: Progressing  Goal: Absence of Hospital-Acquired Illness or Injury  Outcome: Progressing  Intervention: Prevent and Manage VTE (Venous Thromboembolism) Risk  Recent Flowsheet Documentation  Taken 3/3/2025 0810 by Maria Dolores Koo RN  VTE Prevention/Management: SCDs off (sequential compression devices)  Intervention: Prevent Infection  Recent Flowsheet Documentation  Taken 3/3/2025 0810 by Maria Dolores Koo RN  Infection Prevention:   hand hygiene promoted   single patient room provided   rest/sleep promoted  Goal: Optimal Comfort and Wellbeing  Outcome: Progressing  Goal: Readiness for Transition of Care  Outcome: Progressing     Problem: Pain Acute  Goal: Optimal Pain Control " and Function  Outcome: Progressing     Problem: Fall Injury Risk  Goal: Absence of Fall and Fall-Related Injury  Outcome: Progressing     Problem: Diarrhea  Goal: Effective Diarrhea Management  Outcome: Progressing  Intervention: Manage Diarrhea  Recent Flowsheet Documentation  Taken 3/3/2025 8910 by Maria Dolores Koo RN  Isolation Precautions: enteric precautions maintained     Goal Outcome Evaluation:      Plan of Care Reviewed With: patient    Overall Patient Progress: improvingOverall Patient Progress: improving    Outcome Evaluation: Pt alert and oriented x4 with intermittent confusion.

## 2025-03-03 NOTE — PLAN OF CARE
"Pt is Aox4 and calm. IV is saline locked. Pt moving to the bathroom Ax1 with walker and GB. No PRN's given this shift. Pt denies pain. Pt is on enteric precautions for Norovirus. Pt tolerating mod carb diet. Pt on continuous pulse oximetry. Comes from [facility]. PT recommending TCU and SW is following up for placement. Mag, K, and Phos replacement protocols to be checked after AM lab draw. PT and SW following. Continue to follow plan of care.     BP (!) 152/93 (BP Location: Right arm)   Pulse 96   Temp 98.4  F (36.9  C) (Oral)   Resp 18   Ht 1.803 m (5' 11\")   Wt 98.7 kg (217 lb 8 oz)   SpO2 93%   BMI 30.34 kg/m           Goal Outcome Evaluation:      Plan of Care Reviewed With: patient    Overall Patient Progress: no changeOverall Patient Progress: no change    Outcome Evaluation: PT recommending TCU, SW following for placement      Problem: Adult Inpatient Plan of Care  Goal: Plan of Care Review  Description: The Plan of Care Review/Shift note should be completed every shift.  The Outcome Evaluation is a brief statement about your assessment that the patient is improving, declining, or no change.  This information will be displayed automatically on your shift  note.  Outcome: Progressing  Flowsheets (Taken 3/3/2025 7465)  Outcome Evaluation: PT recommending TCU, SW following for placement  Plan of Care Reviewed With: patient  Overall Patient Progress: no change  Goal: Patient-Specific Goal (Individualized)  Description: You can add care plan individualizations to a care plan. Examples of Individualization might be:  \"Parent requests to be called daily at 9am for status\", \"I have a hard time hearing out of my right ear\", or \"Do not touch me to wake me up as it startles  me\".  Outcome: Progressing  Goal: Absence of Hospital-Acquired Illness or Injury  Outcome: Progressing  Intervention: Identify and Manage Fall Risk  Recent Flowsheet Documentation  Taken 3/2/2025 6761 by Harlan Chacon RN  Safety " Promotion/Fall Prevention:   assistive device/personal items within reach   clutter free environment maintained   nonskid shoes/slippers when out of bed   safety round/check completed  Intervention: Prevent Skin Injury  Recent Flowsheet Documentation  Taken 3/2/2025 2329 by Harlan Chacon RN  Body Position: position changed independently  Intervention: Prevent and Manage VTE (Venous Thromboembolism) Risk  Recent Flowsheet Documentation  Taken 3/2/2025 2329 by Harlan Chacon RN  VTE Prevention/Management: SCDs off (sequential compression devices)  Goal: Optimal Comfort and Wellbeing  Outcome: Progressing  Goal: Readiness for Transition of Care  Outcome: Progressing     Problem: Pain Acute  Goal: Optimal Pain Control and Function  Outcome: Progressing  Intervention: Prevent or Manage Pain  Recent Flowsheet Documentation  Taken 3/2/2025 2329 by Harlan Chacon RN  Medication Review/Management: medications reviewed     Problem: Fall Injury Risk  Goal: Absence of Fall and Fall-Related Injury  Outcome: Progressing  Intervention: Identify and Manage Contributors  Recent Flowsheet Documentation  Taken 3/2/2025 2329 by Harlan Chacon RN  Medication Review/Management: medications reviewed  Intervention: Promote Injury-Free Environment  Recent Flowsheet Documentation  Taken 3/2/2025 2329 by Harlan Chacon RN  Safety Promotion/Fall Prevention:   assistive device/personal items within reach   clutter free environment maintained   nonskid shoes/slippers when out of bed   safety round/check completed     Problem: Diarrhea  Goal: Effective Diarrhea Management  Outcome: Progressing  Intervention: Manage Diarrhea  Recent Flowsheet Documentation  Taken 3/2/2025 2329 by Harlan Chacon RN  Isolation Precautions: enteric precautions maintained  Medication Review/Management: medications reviewed

## 2025-03-03 NOTE — PROGRESS NOTES
Care Management Follow Up    Length of Stay (days): 3    Expected Discharge Date: 03/02/2025     Concerns to be Addressed: discharge planning     Patient plan of care discussed at interdisciplinary rounds: Yes    Anticipated Discharge Disposition: Assisted Living vs TCU  Anticipated Discharge Services:  (TBD)  Anticipated Discharge DME:      Patient/family educated on Medicare website which has current facility and service quality ratings:  yes  Education Provided on the Discharge Plan:  yes  Patient/Family in Agreement with the Plan: yes    Referrals Placed by CM/SW:  Post Acute Facilities   Private pay costs discussed: Not applicable    Discussed  Partnership in Safe Discharge Planning  document with patient/family: No     Handoff Completed: No, handoff not indicated or clinically appropriate    Additional Information:  No accepting TCU has been secured yet, both referrals declined. CM updated patient at bedside, he was agreeable to sending additional referrals to [facility] on [address], CM sent. He would like to stay in the [place] area.     Next Steps: follow up on TCU referrals    Magalys Ramesh RN, BSN  Inpatient Care Coordination  [facility]  [phone]

## 2025-03-03 NOTE — PLAN OF CARE
"INPATIENT NOTE: 1500 - 2300     PRIMARY PROBLEM: Generalized Weakness/Norovirus     Vital Signs: Stable        Orientation: A/O x 4 with intermittent orientations  Neuro: Intact  Pain status: Denies  Resp: Lung sounds CTA, denies any SOB  Cardiac: WDL  GI: Bowel sounds active. Last BM 3/1  : Used urinal   Skin: Intact  LDA: Peripheral IV SL  Pertinent Labs/Imaging: Blood Glucose monitoring  Diet: Mode Carb  Activity: Assist x 1 W/G  Alarms/Safety: All alarms on and audible   Consults: PT & SW   Discharge Plan: PT recommends TCU, SW sent referrals, awaiting for response  Discharge Date: TBD     Will continue to monitor and provide cares.     Philly Jerez RN     Problem: Adult Inpatient Plan of Care  Goal: Plan of Care Review  Description: The Plan of Care Review/Shift note should be completed every shift.  The Outcome Evaluation is a brief statement about your assessment that the patient is improving, declining, or no change.  This information will be displayed automatically on your shift  note.  Outcome: Progressing  Goal: Patient-Specific Goal (Individualized)  Description: You can add care plan individualizations to a care plan. Examples of Individualization might be:  \"Parent requests to be called daily at 9am for status\", \"I have a hard time hearing out of my right ear\", or \"Do not touch me to wake me up as it startles  me\".  Outcome: Progressing  Goal: Absence of Hospital-Acquired Illness or Injury  Outcome: Progressing  Intervention: Identify and Manage Fall Risk  Recent Flowsheet Documentation  Taken 3/2/2025 1724 by Philly Jerez RN  Safety Promotion/Fall Prevention:   activity supervised   clutter free environment maintained   lighting adjusted   mobility aid in reach   nonskid shoes/slippers when out of bed  Intervention: Prevent Skin Injury  Recent Flowsheet Documentation  Taken 3/2/2025 1724 by Philly Jerez RN  Body Position: position changed independently  Intervention: Prevent " Infection  Recent Flowsheet Documentation  Taken 3/2/2025 1724 by Philly Jerez RN  Infection Prevention:   cohorting utilized   hand hygiene promoted   single patient room provided   rest/sleep promoted  Goal: Optimal Comfort and Wellbeing  Outcome: Progressing  Goal: Readiness for Transition of Care  Outcome: Progressing     Problem: Pain Acute  Goal: Optimal Pain Control and Function  Outcome: Progressing  Intervention: Prevent or Manage Pain  Recent Flowsheet Documentation  Taken 3/2/2025 1724 by Philly Jerez RN  Medication Review/Management: medications reviewed     Problem: Fall Injury Risk  Goal: Absence of Fall and Fall-Related Injury  Outcome: Progressing  Intervention: Identify and Manage Contributors  Recent Flowsheet Documentation  Taken 3/2/2025 1724 by Philly Jerez RN  Medication Review/Management: medications reviewed  Intervention: Promote Injury-Free Environment  Recent Flowsheet Documentation  Taken 3/2/2025 1724 by Philly Jerez RN  Safety Promotion/Fall Prevention:   activity supervised   clutter free environment maintained   lighting adjusted   mobility aid in reach   nonskid shoes/slippers when out of bed     Problem: Diarrhea  Goal: Effective Diarrhea Management  Outcome: Progressing  Intervention: Manage Diarrhea  Recent Flowsheet Documentation  Taken 3/2/2025 1724 by Philly Jerez RN  Isolation Precautions: enteric precautions maintained  Medication Review/Management: medications reviewed

## 2025-03-04 ENCOUNTER — DOCUMENTATION ONLY (OUTPATIENT)
Dept: GERIATRICS | Facility: CLINIC | Age: 65
End: 2025-03-04
Payer: MEDICARE

## 2025-03-04 VITALS
TEMPERATURE: 98.3 F | BODY MASS INDEX: 30.45 KG/M2 | OXYGEN SATURATION: 92 % | HEIGHT: 71 IN | HEART RATE: 97 BPM | WEIGHT: 217.5 LBS | RESPIRATION RATE: 20 BRPM | SYSTOLIC BLOOD PRESSURE: 142 MMHG | DIASTOLIC BLOOD PRESSURE: 79 MMHG

## 2025-03-04 LAB
BACTERIA BLD CULT: NO GROWTH
BACTERIA BLD CULT: NO GROWTH
GLUCOSE BLDC GLUCOMTR-MCNC: 115 MG/DL (ref 70–99)
GLUCOSE BLDC GLUCOMTR-MCNC: 145 MG/DL (ref 70–99)
GLUCOSE BLDC GLUCOMTR-MCNC: 159 MG/DL (ref 70–99)
GLUCOSE BLDC GLUCOMTR-MCNC: 92 MG/DL (ref 70–99)

## 2025-03-04 PROCEDURE — 250N000011 HC RX IP 250 OP 636: Performed by: PHYSICIAN ASSISTANT

## 2025-03-04 PROCEDURE — 250N000013 HC RX MED GY IP 250 OP 250 PS 637: Performed by: HOSPITALIST

## 2025-03-04 PROCEDURE — 99239 HOSP IP/OBS DSCHRG MGMT >30: CPT | Performed by: PHYSICIAN ASSISTANT

## 2025-03-04 PROCEDURE — 250N000013 HC RX MED GY IP 250 OP 250 PS 637: Performed by: PHYSICIAN ASSISTANT

## 2025-03-04 RX ORDER — SCOPOLAMINE 1 MG/3D
1 PATCH, EXTENDED RELEASE TRANSDERMAL
DISCHARGE
Start: 2025-03-06

## 2025-03-04 RX ORDER — AMLODIPINE BESYLATE 2.5 MG/1
5 TABLET ORAL AT BEDTIME
Qty: 30 TABLET | Refills: 0 | Status: SHIPPED | OUTPATIENT
Start: 2025-03-04

## 2025-03-04 RX ORDER — INSULIN GLARGINE 100 [IU]/ML
38 INJECTION, SOLUTION SUBCUTANEOUS DAILY
DISCHARGE
Start: 2025-03-04

## 2025-03-04 RX ADMIN — ENOXAPARIN SODIUM 40 MG: 40 INJECTION SUBCUTANEOUS at 16:21

## 2025-03-04 RX ADMIN — METFORMIN HYDROCHLORIDE 500 MG: 500 TABLET ORAL at 17:03

## 2025-03-04 RX ADMIN — INSULIN ASPART 1 UNITS: 100 INJECTION, SOLUTION INTRAVENOUS; SUBCUTANEOUS at 13:57

## 2025-03-04 RX ADMIN — INSULIN ASPART 1 UNITS: 100 INJECTION, SOLUTION INTRAVENOUS; SUBCUTANEOUS at 16:51

## 2025-03-04 RX ADMIN — DIVALPROEX SODIUM 250 MG: 125 CAPSULE, COATED PELLETS ORAL at 10:26

## 2025-03-04 RX ADMIN — HYDROXYZINE HYDROCHLORIDE 25 MG: 25 TABLET ORAL at 10:26

## 2025-03-04 RX ADMIN — VENLAFAXINE HYDROCHLORIDE 225 MG: 75 CAPSULE, EXTENDED RELEASE ORAL at 10:26

## 2025-03-04 RX ADMIN — ALFUZOSIN HYDROCHLORIDE 10 MG: 10 TABLET, EXTENDED RELEASE ORAL at 10:27

## 2025-03-04 RX ADMIN — ACETAMINOPHEN 1000 MG: 500 TABLET, FILM COATED ORAL at 13:56

## 2025-03-04 RX ADMIN — ACETAMINOPHEN 1000 MG: 500 TABLET, FILM COATED ORAL at 10:26

## 2025-03-04 RX ADMIN — METFORMIN HYDROCHLORIDE 500 MG: 500 TABLET ORAL at 10:26

## 2025-03-04 ASSESSMENT — ACTIVITIES OF DAILY LIVING (ADL)
ADLS_ACUITY_SCORE: 66
ADLS_ACUITY_SCORE: 69
ADLS_ACUITY_SCORE: 69
ADLS_ACUITY_SCORE: 66
ADLS_ACUITY_SCORE: 63
ADLS_ACUITY_SCORE: 66
ADLS_ACUITY_SCORE: 69
ADLS_ACUITY_SCORE: 69
ADLS_ACUITY_SCORE: 66
ADLS_ACUITY_SCORE: 63
ADLS_ACUITY_SCORE: 69
ADLS_ACUITY_SCORE: 66
ADLS_ACUITY_SCORE: 66
ADLS_ACUITY_SCORE: 69
ADLS_ACUITY_SCORE: 66
ADLS_ACUITY_SCORE: 66

## 2025-03-04 NOTE — PLAN OF CARE
Goal Outcome Evaluation:      Plan of Care Reviewed With: patient    Overall Patient Progress: improvingOverall Patient Progress: improving  Patient's After Visit Summary was reviewed with patient  Patient verbalized understanding of After Visit Summary, recommended follow up and was given an opportunity to ask questions.   Discharge medications sent home with patient/family: Not applicable TCU   Discharged with transport tech

## 2025-03-04 NOTE — DISCHARGE SUMMARY
"[facility]  Hospitalist Discharge Summary      Date of Admission:  2/27/2025  Date of Discharge:  3/4/2025  Discharging Provider: Luz Maria Diaz PA-C  Discharge Service: Hospitalist Service    Discharge Diagnoses   Norovirus gastroenteritis      Clinically Significant Risk Factors     # DMII: A1C = 7.6 % (Ref range: <5.7 %) within past 6 months  # Obesity: Estimated body mass index is 30.34 kg/m  as calculated from the following:    Height as of this encounter: 1.803 m (5' 11\").    Weight as of this encounter: 98.7 kg (217 lb 8 oz).       Follow-ups Needed After Discharge   Follow-up Appointments       Follow Up and recommended labs and tests      Follow up with Nursing home physician.  No follow up labs or test are needed.            {Additional follow-up instructions/to-do's for PCP    :***}    Unresulted Labs Ordered in the Past 30 Days of this Admission       No orders found from 1/28/2025 to 2/28/2025.        These results will be followed up by ***    Discharge Disposition   {Discharge to:8217775::\"Discharged to home\"}  Condition at discharge: {CONDITION:896090::\"Stable\"}    Hospital Course   {OPTIONAL -- use to select A&P section   :553453}    Consultations This Hospital Stay   PHYSICAL THERAPY ADULT IP CONSULT  CARE MANAGEMENT / SOCIAL WORK IP CONSULT  CARE MANAGEMENT / SOCIAL WORK IP CONSULT  PHYSICAL THERAPY ADULT IP CONSULT  OCCUPATIONAL THERAPY ADULT IP CONSULT    Code Status   Full Code    Time Spent on this Encounter   {How much time did you spend on discharge?:57062953}       Luz Maria Diaz PA-C  [facility] OBSERVATION DEPT  [address] [place] [address]  Phone: [phone]  ______________________________________________________________________    Physical Exam   Vital Signs: Temp: 97.3  F (36.3  C) Temp src: Oral BP: 125/78 Pulse: 106   Resp: 20 SpO2: 92 % O2 Device: None (Room air) Oxygen Delivery: 1 LPM  Weight: 217 lbs 8 oz  {Recommend " personal SmartPhrase or Notewriter for exam (OPTIONAL)   :058390}       Primary Care Physician   Иван Garg    Discharge Orders      General info for SNF    Length of Stay Estimate: Short Term Care: Estimated # of Days <30  Condition at Discharge: Improving  Level of care:skilled   Rehabilitation Potential: Good  Admission H&P remains valid and up-to-date: Yes  Recent Chemotherapy: N/A  Use Nursing Home Standing Orders: Yes     Mantoux instructions    Give two-step Mantoux (PPD) Per Facility Policy Yes     Follow Up and recommended labs and tests    Follow up with Nursing home physician.  No follow up labs or test are needed.     Reason for your hospital stay    You were admitted for concerns of diarrhea and was found to have Norovirus. You were treated with supportive cares and now need further rehab for strength. You are now able to discharge to transition care. We did have to increase your blood pressure medication as well as decreasing your lantus. Make appt wto see PCP or TCU provider in 1 week to evaluate BP and blood sugar.     Glucose monitor nursing POCT    Before meals and at bedtime     Activity - Up with nursing assistance     Physical Therapy Adult Consult    Evaluate and treat as clinically indicated.    Reason:  weakness and dec mobility     Occupational Therapy Adult Consult    Evaluate and treat as clinically indicated.    Reason:  assess adls and dispo needs     Diet    Follow this diet upon discharge: Current Diet:Orders Placed This Encounter      Snacks/Supplements Adult: Glucerna; With Meals      Moderate Consistent Carb (60 g CHO per Meal) Diet       Significant Results and Procedures   {Data for Discharge Summary:626338}    Discharge Medications   Current Discharge Medication List        START taking these medications    Details   scopolamine (TRANSDERM) 1 MG/3DAYS 72 hr patch Place 1 patch over 72 hours onto the skin every 72 hours.    Associated Diagnoses: Norovirus           CONTINUE these  medications which have CHANGED    Details   amLODIPine (NORVASC) 2.5 MG tablet Take 2 tablets (5 mg) by mouth at bedtime.  Qty: 30 tablet, Refills: 0    Associated Diagnoses: Secondary hypertension      insulin glargine 100 UNIT/ML pen Inject 38 Units subcutaneously daily.    Comments: If Lantus is not covered by insurance, may substitute Basaglar or Semglee or other insulin glargine product per insurance preference at same dose and frequency.    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 acetaminophen (TYLENOL) 325 MG tablet TAKE ONE TO TWO TABLETS BY MOUTH EVERY EIGHT  HOURS AS NEEDED FOR PAIN  Qty: 100 tablet, Refills: 10    Associated Diagnoses: Encounter for medication refill      !! acetaminophen (TYLENOL) 500 MG tablet Take 1,000 mg by mouth 3 times daily.      alfuzosin ER (UROXATRAL) 10 MG 24 hr tablet Take 10 mg by mouth daily.      alum & mag hydroxide-simethicone (MAALOX) 200-200-20 MG/5ML SUSP suspension Take 20 mLs by mouth every 6 hours as needed for indigestion.      bisacodyl (DULCOLAX) 10 MG suppository Place 10 mg rectally daily as needed for constipation.      calcium carbonate (TUMS) 500 MG chewable tablet Take 2 chew tab by mouth 3 times daily as needed for heartburn.      diclofenac (VOLTAREN) 1 % topical gel Apply 4 g topically daily.      divalproex sodium delayed-release (DEPAKOTE SPRINKLE) 125 MG DR capsule Take 250 mg by mouth 2 times daily.      guaiFENesin-codeine (ROBITUSSIN AC) 100-10 MG/5ML solution Take 2 teaspoonful by mouth every 4 hours as needed for cough.      hydrOXYzine HCl (ATARAX) 25 MG tablet Take 25 mg by mouth 2 times daily.      !! insulin lispro (HUMALOG KWIKPEN) 100 UNIT/ML (1 unit dial) KWIKPEN Inject 6 Units subcutaneously daily (with lunch).      !! insulin lispro (HUMALOG KWIKPEN)  100 UNIT/ML (1 unit dial) KWIKPEN Inject 4 Units subcutaneously 2 times daily (before meals). Breakfast  and supper      loperamide (IMODIUM) 2 MG capsule Take 2 mg by mouth 4 times daily as needed for diarrhea 4 mg after first loose stool, then 2 mg after each subsequent loose stool.  Max 8 mg (4 capsules) daily.      magnesium hydroxide (MILK OF MAGNESIA) 400 MG/5ML suspension Take 30 mLs by mouth daily as needed for constipation      metFORMIN (GLUCOPHAGE) 500 MG tablet Take 500 mg by mouth 2 times daily (with meals).      Neomycin-Bacitracin-Polymyxin (TRIPLE ANTIBIOTIC) 3.5-400-5000 OINT ointment Externally apply topically as needed.      ondansetron (ZOFRAN) 4 MG tablet Take 1 tablet (4 mg) by mouth every 6 hours as needed for nausea  Qty: 10 tablet, Refills: 0    Associated Diagnoses: Nausea      RISPERIDONE PO Take 3.5 mg by mouth at bedtime.      senna-docusate (SENOKOT-S/PERICOLACE) 8.6-50 MG tablet Take 1 tablet by mouth daily.      simvastatin (ZOCOR) 10 MG tablet Take 10 mg by mouth at bedtime.      VENLAFAXINE HCL ER PO Take 225 mg by mouth daily.      vitamin D3 (CHOLECALCIFEROL) 50 mcg (2000 units) tablet Take 1 tablet by mouth daily.      Vitamins A & D (VITAMIN A & D) external ointment Apply topically as needed.      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 - Potential duplicate medications found. Please discuss with provider.        Allergies   Allergies   Allergen Reactions    Flomax [Tamsulosin] Unknown    Lisinopril Unknown      normal caliber and negative for pulmonary emboli. Thoracic aorta is negative for dissection. No CT evidence of right heart strain.     LUNGS AND PLEURA: Minimal probable atelectasis versus less likely infiltrate. No effusions.    MEDIASTINUM/AXILLAE: No adenopathy or thoracic aortic aneurysm.    CORONARY ARTERY CALCIFICATION: None.    HEPATOBILIARY: Diffuse hepatic steatosis. No significant mass. No bile duct dilatation. No calcified gallstones.    PANCREAS: No significant mass, duct dilatation, or inflammatory change.    SPLEEN: Enlarged at 15.5 cm.    ADRENAL GLANDS: 1.3 cm nodule in the left adrenal gland is stable since 2021 compatible with a benign adenoma requiring no follow-up. Right adrenal gland unremarkable.    KIDNEYS/BLADDER: No significant mass, stones, or hydronephrosis. There are simple or benign cysts. No follow up is needed.    BOWEL: No obstruction or inflammatory change. Prominent rectal stool.    LYMPH NODES: No lymphadenopathy.    VASCULATURE: No abdominal aortic aneurysm.    PELVIC ORGANS: Normal.    MUSCULOSKELETAL: No frankly destructive bony lesions.      Impression    IMPRESSION:  1.  No pulmonary embolism demonstrated.  2.  Minimal probable atelectasis versus less likely infiltrate.  3.  Splenomegaly at 15.5 cm.  4.  Prominent rectal stool.         Discharge Medications   Current Discharge Medication List        START taking these medications    Details   scopolamine (TRANSDERM) 1 MG/3DAYS 72 hr patch Place 1 patch over 72 hours onto the skin every 72 hours.    Associated Diagnoses: Norovirus           CONTINUE these medications which have CHANGED    Details   amLODIPine (NORVASC) 2.5 MG tablet Take 2 tablets (5 mg) by mouth at bedtime.  Qty: 30 tablet, Refills: 0    Associated Diagnoses: Secondary hypertension      insulin glargine 100 UNIT/ML pen Inject 38 Units subcutaneously daily.    Comments: If Lantus is not covered by insurance, may substitute Basaglar or Semglee or other insulin  glargine product per insurance preference at same dose and frequency.    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 acetaminophen (TYLENOL) 325 MG tablet TAKE ONE TO TWO TABLETS BY MOUTH EVERY EIGHT  HOURS AS NEEDED FOR PAIN  Qty: 100 tablet, Refills: 10    Associated Diagnoses: Encounter for medication refill      !! acetaminophen (TYLENOL) 500 MG tablet Take 1,000 mg by mouth 3 times daily.      alfuzosin ER (UROXATRAL) 10 MG 24 hr tablet Take 10 mg by mouth daily.      alum & mag hydroxide-simethicone (MAALOX) 200-200-20 MG/5ML SUSP suspension Take 20 mLs by mouth every 6 hours as needed for indigestion.      bisacodyl (DULCOLAX) 10 MG suppository Place 10 mg rectally daily as needed for constipation.      calcium carbonate (TUMS) 500 MG chewable tablet Take 2 chew tab by mouth 3 times daily as needed for heartburn.      diclofenac (VOLTAREN) 1 % topical gel Apply 4 g topically daily.      divalproex sodium delayed-release (DEPAKOTE SPRINKLE) 125 MG DR capsule Take 250 mg by mouth 2 times daily.      guaiFENesin-codeine (ROBITUSSIN AC) 100-10 MG/5ML solution Take 2 teaspoonful by mouth every 4 hours as needed for cough.      hydrOXYzine HCl (ATARAX) 25 MG tablet Take 25 mg by mouth 2 times daily.      !! insulin lispro (HUMALOG KWIKPEN) 100 UNIT/ML (1 unit dial) KWIKPEN Inject 6 Units subcutaneously daily (with lunch).      !! insulin lispro (HUMALOG KWIKPEN) 100 UNIT/ML (1 unit dial) KWIKPEN Inject 4 Units subcutaneously 2 times daily (before meals). Breakfast  and supper      loperamide (IMODIUM) 2 MG capsule Take 2 mg by mouth 4 times daily as needed for diarrhea 4 mg after first loose stool, then 2 mg after each subsequent loose stool.  Max 8 mg (4 capsules) daily.      magnesium hydroxide (MILK OF MAGNESIA) 400 MG/5ML  suspension Take 30 mLs by mouth daily as needed for constipation      metFORMIN (GLUCOPHAGE) 500 MG tablet Take 500 mg by mouth 2 times daily (with meals).      Neomycin-Bacitracin-Polymyxin (TRIPLE ANTIBIOTIC) 3.5-400-5000 OINT ointment Externally apply topically as needed.      ondansetron (ZOFRAN) 4 MG tablet Take 1 tablet (4 mg) by mouth every 6 hours as needed for nausea  Qty: 10 tablet, Refills: 0    Associated Diagnoses: Nausea      RISPERIDONE PO Take 3.5 mg by mouth at bedtime.      senna-docusate (SENOKOT-S/PERICOLACE) 8.6-50 MG tablet Take 1 tablet by mouth daily.      simvastatin (ZOCOR) 10 MG tablet Take 10 mg by mouth at bedtime.      VENLAFAXINE HCL ER PO Take 225 mg by mouth daily.      vitamin D3 (CHOLECALCIFEROL) 50 mcg (2000 units) tablet Take 1 tablet by mouth daily.      Vitamins A & D (VITAMIN A & D) external ointment Apply topically as needed.      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 - Potential duplicate medications found. Please discuss with provider.        Allergies   Allergies   Allergen Reactions    Flomax [Tamsulosin] Unknown    Lisinopril Unknown

## 2025-03-04 NOTE — PLAN OF CARE
"Pt alert and oriented x4 with intermittent confusion. Reports pain 1/10 and was relieved by scheduled Tylenol. On BG checks. Gave  scheduled insulin. Pt on enteric precaution due to having Norovirus. Pt Tachycardic in the afternoon. Denies SOB. No nausea or vomiting. Pt had 4 loose stools occurrence since 04:00AM. Ax1. Ambulated pt in room. Pt using urinal in room. Mod card diet. SL.   Plan: PT and SW following. PT recommending TCU.  Goal Outcome Evaluation:      Plan of Care Reviewed With: patient    Overall Patient Progress: improvingOverall Patient Progress: improving    Outcome Evaluation: Pt alert and oriented x4. Reported pain 1/10 with sudden movements.      Problem: Adult Inpatient Plan of Care  Goal: Plan of Care Review  Description: The Plan of Care Review/Shift note should be completed every shift.  The Outcome Evaluation is a brief statement about your assessment that the patient is improving, declining, or no change.  This information will be displayed automatically on your shift  note.  Outcome: Progressing  Flowsheets (Taken 3/4/2025 3232)  Outcome Evaluation: Pt alert and oriented x4. Reported pain 1/10 with sudden movements.  Plan of Care Reviewed With: patient  Overall Patient Progress: improving  Goal: Patient-Specific Goal (Individualized)  Description: You can add care plan individualizations to a care plan. Examples of Individualization might be:  \"Parent requests to be called daily at 9am for status\", \"I have a hard time hearing out of my right ear\", or \"Do not touch me to wake me up as it startles  me\".  Outcome: Progressing  Goal: Absence of Hospital-Acquired Illness or Injury  Outcome: Progressing  Intervention: Identify and Manage Fall Risk  Recent Flowsheet Documentation  Taken 3/4/2025 4161 by Maria Dolores Koo RN  Safety Promotion/Fall Prevention:   activity supervised   clutter free environment maintained   nonskid shoes/slippers when out of bed   room near nurse's station   safety " round/check completed   supervised activity  Intervention: Prevent Skin Injury  Recent Flowsheet Documentation  Taken 3/4/2025 0755 by Maria Dolores Koo RN  Body Position: position changed independently  Goal: Optimal Comfort and Wellbeing  Outcome: Progressing  Goal: Readiness for Transition of Care  Outcome: Progressing     Problem: Pain Acute  Goal: Optimal Pain Control and Function  Outcome: Progressing     Problem: Fall Injury Risk  Goal: Absence of Fall and Fall-Related Injury  Outcome: Progressing  Intervention: Promote Injury-Free Environment  Recent Flowsheet Documentation  Taken 3/4/2025 0755 by Maria Dolores Koo RN  Safety Promotion/Fall Prevention:   activity supervised   clutter free environment maintained   nonskid shoes/slippers when out of bed   room near nurse's station   safety round/check completed   supervised activity     Problem: Diarrhea  Goal: Effective Diarrhea Management  Outcome: Progressing

## 2025-03-04 NOTE — PLAN OF CARE
"Vitals are Temp: 97.4  F (36.3  C) Temp src: Oral BP: (!) 150/89 Pulse: 94   Resp: 20 SpO2: 92 %.  Patient is Alert and Oriented x4. They are 1 Assist.Pt is a Diabetic diet.  They are denying pain.  Patient is Saline locked.inc ac&hs bs. Incontinent - loose bm. Distended and round abdo. Cont pulse ox. Mg/k/phos protocol. Enteric preacution for norovirus.      Goal Outcome Evaluation:      Plan of Care Reviewed With: patient    Overall Patient Progress: improvingOverall Patient Progress: improving    Outcome Evaluation: a&ox4      Problem: Adult Inpatient Plan of Care  Goal: Plan of Care Review  Description: The Plan of Care Review/Shift note should be completed every shift.  The Outcome Evaluation is a brief statement about your assessment that the patient is improving, declining, or no change.  This information will be displayed automatically on your shift  note.  Outcome: Progressing  Flowsheets (Taken 3/4/2025 0514)  Outcome Evaluation: a&ox4  Plan of Care Reviewed With: patient  Overall Patient Progress: improving  Goal: Patient-Specific Goal (Individualized)  Description: You can add care plan individualizations to a care plan. Examples of Individualization might be:  \"Parent requests to be called daily at 9am for status\", \"I have a hard time hearing out of my right ear\", or \"Do not touch me to wake me up as it startles  me\".  Outcome: Progressing  Goal: Absence of Hospital-Acquired Illness or Injury  Outcome: Progressing  Intervention: Identify and Manage Fall Risk  Recent Flowsheet Documentation  Taken 3/3/2025 2350 by Tri Abernathy RN  Safety Promotion/Fall Prevention:   assistive device/personal items within reach   clutter free environment maintained   nonskid shoes/slippers when out of bed   safety round/check completed  Intervention: Prevent Skin Injury  Recent Flowsheet Documentation  Taken 3/3/2025 2350 by Tri Abernathy RN  Body Position: position changed " independently  Intervention: Prevent and Manage VTE (Venous Thromboembolism) Risk  Recent Flowsheet Documentation  Taken 3/3/2025 2350 by Tri Abernathy RN  VTE Prevention/Management: SCDs off (sequential compression devices)  Intervention: Prevent Infection  Recent Flowsheet Documentation  Taken 3/3/2025 2350 by Tri Abernathy RN  Infection Prevention:   hand hygiene promoted   single patient room provided   rest/sleep promoted  Goal: Optimal Comfort and Wellbeing  Outcome: Progressing  Goal: Readiness for Transition of Care  Outcome: Progressing     Problem: Pain Acute  Goal: Optimal Pain Control and Function  Outcome: Progressing  Intervention: Prevent or Manage Pain  Recent Flowsheet Documentation  Taken 3/3/2025 2350 by Tri Abernathy RN  Medication Review/Management: medications reviewed     Problem: Diarrhea  Goal: Effective Diarrhea Management  Outcome: Progressing  Intervention: Manage Diarrhea  Recent Flowsheet Documentation  Taken 3/3/2025 2350 by Tri Abernathy RN  Isolation Precautions: enteric precautions maintained  Medication Review/Management: medications reviewed

## 2025-03-04 NOTE — PLAN OF CARE
"Goal Outcome Evaluation:      Plan of Care Reviewed With: patient    Overall Patient Progress: improvingOverall Patient Progress: improving     Vitals are Temp: 98.7  F (37.1  C) Temp src: Oral BP: (!) 154/94 Pulse: 104   Resp: 18 SpO2: 93 %.  Patient is Alert and Oriented x4 with intermittent confusion. denying pain.  Patient is Saline locked.    Patient is on Enteric precuations for Norovirus.  Pt is a diabetic diet. He is 1 Assist with Gait Belt and Walker. Mag, phos, k+ prtocol recheck in AM, SW following for TCU placement, incontinent of bowel and bladder, tachycardic 100-110s today, on continous  pulse ox,    Problem: Adult Inpatient Plan of Care  Goal: Plan of Care Review  Description: The Plan of Care Review/Shift note should be completed every shift.  The Outcome Evaluation is a brief statement about your assessment that the patient is improving, declining, or no change.  This information will be displayed automatically on your shift  note.  Outcome: Progressing  Flowsheets (Taken 3/3/2025 2224)  Plan of Care Reviewed With: patient  Overall Patient Progress: improving  Goal: Patient-Specific Goal (Individualized)  Description: You can add care plan individualizations to a care plan. Examples of Individualization might be:  \"Parent requests to be called daily at 9am for status\", \"I have a hard time hearing out of my right ear\", or \"Do not touch me to wake me up as it startles  me\".  Outcome: Progressing  Goal: Absence of Hospital-Acquired Illness or Injury  Outcome: Progressing  Intervention: Identify and Manage Fall Risk  Recent Flowsheet Documentation  Taken 3/3/2025 1551 by Selene Joyce, RN  Safety Promotion/Fall Prevention:   assistive device/personal items within reach   clutter free environment maintained   nonskid shoes/slippers when out of bed   safety round/check completed  Intervention: Prevent and Manage VTE (Venous Thromboembolism) Risk  Recent Flowsheet Documentation  Taken 3/3/2025 1551 " by Selene Joyce RN  VTE Prevention/Management: SCDs off (sequential compression devices)  Intervention: Prevent Infection  Recent Flowsheet Documentation  Taken 3/3/2025 1551 by Selene Joyce RN  Infection Prevention:   hand hygiene promoted   single patient room provided   rest/sleep promoted  Goal: Optimal Comfort and Wellbeing  Outcome: Progressing  Goal: Readiness for Transition of Care  Outcome: Progressing     Problem: Pain Acute  Goal: Optimal Pain Control and Function  Outcome: Progressing  Intervention: Prevent or Manage Pain  Recent Flowsheet Documentation  Taken 3/3/2025 1551 by Selene Joyce RN  Medication Review/Management: medications reviewed     Problem: Fall Injury Risk  Goal: Absence of Fall and Fall-Related Injury  Outcome: Progressing  Intervention: Identify and Manage Contributors  Recent Flowsheet Documentation  Taken 3/3/2025 1551 by Selene Joyce RN  Medication Review/Management: medications reviewed  Intervention: Promote Injury-Free Environment  Recent Flowsheet Documentation  Taken 3/3/2025 1551 by Selene Joyce RN  Safety Promotion/Fall Prevention:   assistive device/personal items within reach   clutter free environment maintained   nonskid shoes/slippers when out of bed   safety round/check completed     Problem: Diarrhea  Goal: Effective Diarrhea Management  Outcome: Progressing  Intervention: Manage Diarrhea  Recent Flowsheet Documentation  Taken 3/3/2025 1551 by Selene Joyce RN  Isolation Precautions: enteric precautions maintained  Medication Review/Management: medications reviewed

## 2025-03-04 NOTE — PLAN OF CARE
Physical Therapy Discharge Summary    Reason for therapy discharge:    Discharged to transitional care facility.    Progress towards therapy goal(s). See goals on Care Plan in [facility] electronic health record for goal details.  Goals not met.  Barriers to achieving goals:   discharge from facility.    Therapy recommendation(s):    Continued therapy is recommended.  Rationale/Recommendations:  to advance indepdence with functional mobility..      Summary completed from chart review patient not seen by documenting therapist

## 2025-03-04 NOTE — PROGRESS NOTES
Care Management Discharge Note    Discharge Date: 03/02/2025     Discharge Disposition: Transitional Care    Discharge Services: None    Discharge DME: None    Discharge Transportation: agency    Private pay costs discussed: transportation costs    PAS Confirmation Code: [id number]  Patient/family educated on Medicare website which has current facility and service quality ratings: yes    Education Provided on the Discharge Plan: Yes  Persons Notified of Discharge Plans: Pt  Patient/Family in Agreement with the Plan: yes    Handoff Referral Completed: No, handoff not indicated or clinically appropriate    Additional Information:  PT has been accepted to [facility] for today after 1300. [facility] ride scheduled today between [id number]. Updated pt at bedside, called and updated pt's sister Alissa per his request. Updated provider and bedside RN. Will send orders once complete.     Update 1540: Orders completed and sent.     Lizzette Delatorre RN BSN   Inpatient Care Coordination  [facility]   Phone [phone]

## 2025-03-06 ENCOUNTER — TRANSITIONAL CARE UNIT VISIT (OUTPATIENT)
Dept: GERIATRICS | Facility: CLINIC | Age: 65
End: 2025-03-06
Payer: MEDICARE

## 2025-03-06 ENCOUNTER — LAB REQUISITION (OUTPATIENT)
Dept: LAB | Facility: CLINIC | Age: 65
End: 2025-03-06

## 2025-03-06 VITALS
DIASTOLIC BLOOD PRESSURE: 85 MMHG | SYSTOLIC BLOOD PRESSURE: 160 MMHG | BODY MASS INDEX: 30.46 KG/M2 | RESPIRATION RATE: 18 BRPM | TEMPERATURE: 98.8 F | HEIGHT: 71 IN | WEIGHT: 217.6 LBS | HEART RATE: 92 BPM | OXYGEN SATURATION: 95 %

## 2025-03-06 DIAGNOSIS — Z11.1 ENCOUNTER FOR SCREENING FOR RESPIRATORY TUBERCULOSIS: ICD-10-CM

## 2025-03-06 DIAGNOSIS — J18.9 PNEUMONIA DUE TO INFECTIOUS ORGANISM, UNSPECIFIED LATERALITY, UNSPECIFIED PART OF LUNG: Primary | ICD-10-CM

## 2025-03-06 RX ORDER — CODEINE PHOSPHATE AND GUAIFENESIN 10; 100 MG/5ML; MG/5ML
2 SOLUTION ORAL EVERY 4 HOURS PRN
Qty: 118 ML | Refills: 0 | Status: SHIPPED | OUTPATIENT
Start: 2025-03-06

## 2025-03-06 NOTE — PROGRESS NOTES
[facility]    PRIMARY CARE PROVIDER AND CLINIC:  Иван Garg MD, [facility] PHYSICIAN [facility] *  Chief Complaint   Patient presents with    Hospital F/U      [facility] Medical Record Number:  [id number]  Place of Service where encounter took place:  [facility] ([facility]) [[id number]]      HPI:    ***    CODE STATUS/ADVANCE DIRECTIVES DISCUSSION:  Prior  CPR/Full code   ALLERGIES:   Allergies   Allergen Reactions    Flomax [Tamsulosin] Unknown    Lisinopril Unknown      PAST MEDICAL HISTORY:   Past Medical History:   Diagnosis Date    Arthritis     Diabetes mellitus     Eczema 2011    biopsy leg     Polyp of colon, adenomatous 6/25/2012    Retinopathy     HFA ophtho DR HARLEY    Schizoaffective disorder     Seasonal affective disorder     Vitamin D deficiency     replaced      PAST SURGICAL HISTORY:   has a past surgical history that includes appendectomy and colonoscopy (2012).  FAMILY HISTORY: family history includes Anxiety Disorder in his sister; Asthma in his father; Breast Cancer (age of onset: 78) in his mother; Cancer - colorectal (age of onset: 70) in his father; Colon Cancer in his father; Depression in his father; Osteoporosis in his father and mother.  SOCIAL HISTORY:   reports that he has never smoked. He has never used smokeless tobacco. He reports that he does not drink alcohol and does not use drugs.  Patient's living condition: lives in an assisted living facility    Post Discharge Medication Reconciliation Status:   MED REC REQUIRED{TIP  Click the link below to document or use med rec list, use list to pull in response :911804}  Post Medication Reconciliation Status: {MED REC LIST:614438}       Current Outpatient Medications   Medication Sig Dispense Refill    acetaminophen (TYLENOL) 325 MG tablet TAKE ONE TO TWO TABLETS BY MOUTH EVERY EIGHT  HOURS AS NEEDED FOR PAIN 100 tablet 10    acetaminophen (TYLENOL) 500 MG tablet Take 1,000 mg by mouth 3 times  daily.      alfuzosin ER (UROXATRAL) 10 MG 24 hr tablet Take 10 mg by mouth daily.      alum & mag hydroxide-simethicone (MAALOX) 200-200-20 MG/5ML SUSP suspension Take 20 mLs by mouth every 6 hours as needed for indigestion.      amLODIPine (NORVASC) 2.5 MG tablet Take 2 tablets (5 mg) by mouth at bedtime. 30 tablet 0    bisacodyl (DULCOLAX) 10 MG suppository Place 10 mg rectally daily as needed for constipation.      calcium carbonate (TUMS) 500 MG chewable tablet Take 2 chew tab by mouth 3 times daily as needed for heartburn.      diclofenac (VOLTAREN) 1 % topical gel Apply 4 g topically daily.      divalproex sodium delayed-release (DEPAKOTE SPRINKLE) 125 MG DR capsule Take 250 mg by mouth 2 times daily.      guaiFENesin-codeine (ROBITUSSIN AC) 100-10 MG/5ML solution Take 2 teaspoonful by mouth every 4 hours as needed for cough.      hydrOXYzine HCl (ATARAX) 25 MG tablet Take 25 mg by mouth 2 times daily.      insulin glargine 100 UNIT/ML pen Inject 38 Units subcutaneously daily.      insulin lispro (HUMALOG KWIKPEN) 100 UNIT/ML (1 unit dial) KWIKPEN Inject 6 Units subcutaneously daily (with lunch).      insulin lispro (HUMALOG KWIKPEN) 100 UNIT/ML (1 unit dial) KWIKPEN Inject 4 Units subcutaneously 2 times daily (before meals). Breakfast  and supper      loperamide (IMODIUM) 2 MG capsule Take 2 mg by mouth 4 times daily as needed for diarrhea 4 mg after first loose stool, then 2 mg after each subsequent loose stool.  Max 8 mg (4 capsules) daily.      magnesium hydroxide (MILK OF MAGNESIA) 400 MG/5ML suspension Take 30 mLs by mouth daily as needed for constipation      metFORMIN (GLUCOPHAGE) 500 MG tablet Take 500 mg by mouth 2 times daily (with meals).      Neomycin-Bacitracin-Polymyxin (TRIPLE ANTIBIOTIC) 3.5-400-5000 OINT ointment Externally apply topically as needed.      NOVOFINE 30 30G X 8 MM insulin pen needle USE AS DIRECTED TO INJECT INSULIN FOUR TIMES DAILY 100 each 10    ondansetron (ZOFRAN) 4 MG  "tablet Take 1 tablet (4 mg) by mouth every 6 hours as needed for nausea 10 tablet 0    RISPERIDONE PO Take 3.5 mg by mouth at bedtime.      scopolamine (TRANSDERM) 1 MG/3DAYS 72 hr patch Place 1 patch over 72 hours onto the skin every 72 hours.      senna-docusate (SENOKOT-S/PERICOLACE) 8.6-50 MG tablet Take 1 tablet by mouth daily.      simvastatin (ZOCOR) 10 MG tablet Take 10 mg by mouth at bedtime.      VENLAFAXINE HCL ER PO Take 225 mg by mouth daily.      vitamin D3 (CHOLECALCIFEROL) 50 mcg (2000 units) tablet Take 1 tablet by mouth daily.      Vitamins A & D (VITAMIN A & D) external ointment Apply topically as needed.       No current facility-administered medications for this visit.       ROS:  4 point ROS including Respiratory, CV, GI and , other than that noted in the HPI,  is negative    Vitals:  BP (!) 160/85   Pulse 92   Temp 98.8  F (37.1  C)   Resp 18   Ht 1.803 m (5' 11\")   Wt 98.7 kg (217 lb 9.6 oz)   SpO2 95%   BMI 30.35 kg/m    Exam:  {Nursing home physical exam :416911}    Lab/Diagnostic data:  Recent labs in [facility] reviewed by me today.  and Most Recent 3 CBC's:  Recent Labs   Lab Test 02/28/25  0709 02/27/25  0803 11/14/24  1006   WBC 9.7 7.2 4.1   HGB 16.0 16.6 14.7   MCV 89 89 94   * 151 138*     Most Recent 3 BMP's:  Recent Labs   Lab Test 03/04/25  1624 03/04/25  1222 03/04/25  0747 03/03/25  0758 03/03/25  0635 03/02/25  1712 03/02/25  1112 02/28/25  1259 02/28/25  0709 02/27/25  0956 02/27/25  0803   NA  --   --   --   --   --   --  134*  --  136  --  134*   POTASSIUM  --   --   --   --  4.1  --  4.2  --  4.3  --  4.5   CHLORIDE  --   --   --   --   --   --  100  --  104  --  99   CO2  --   --   --   --   --   --  23  --  22  --  23   BUN  --   --   --   --   --   --  33.6*  --  34.6*  --  28.2*   CR  --   --   --   --   --   --  0.93  --  0.92  --  0.99   ANIONGAP  --   --   --   --   --   --  11  --  10  --  12   PEE  --   --   --   --   --   --  9.2  --  8.7*  --  9.1 "   * 159* 92   < >  --    < > 213*   < > 273*   < > 286*    < > = values in this interval not displayed.     Most Recent 2 LFT's:  Recent Labs   Lab Test 02/27/25  0803 05/09/24  0755   AST 17 24   ALT 27 30   ALKPHOS 106 107   BILITOTAL 0.6 0.4     Most Recent 3 Troponin's:  Recent Labs   Lab Test 11/17/21  1508 08/11/21  0953 03/11/21  1127 04/06/20  1220 04/06/20  0834   TROPI  --   --  <0.015 <0.015 <0.015   TROPONIN <0.015 <0.015  --   --   --      7-Day Micro Results       Collected Updated Procedure Result Status      02/27/2025 1825 02/28/2025 0303 Enteric Bacteria and Virus Panel PCR [[id number]]    (Abnormal)   Stool from Per Rectum    Final result Component Value   Campylobacter species Negative   Salmonella species Negative   Vibrio species Negative   Vibrio cholerae Negative   Yersinia enterocolitica Negative   Enteropathogenic E. coli (EPEC) Negative   Shiga-like toxin-producing E. coli (STEC) Negative   Shigella/Enteroinvasive E. coli (EIEC) Negative   Cryptosporidium species Negative   Giardia lamblia Negative   Norovirus Gl/Gll Positive   Rotavirus A Negative   Plesiomonas shigelloides Negative   Enteroaggregative E. coli (EAEC) Negative   Enterotoxigenic E. coli (ETEC) Negative   E. coli O157 NA   Cyclospora cayetanensis Negative   Entamoeba histolytica Negative   Adenovirus F40/41 Negative   Astrovirus Negative   Sapovirus Negative            02/27/2025 0813 03/04/2025 1116 Blood Culture Hand, Right [[id number]]   Blood from Hand, Right    Final result Component Value   Culture No Growth               02/27/2025 0803 03/04/2025 1116 Blood Culture Peripheral Blood [[id number]]   Peripheral Blood    Final result Component Value   Culture No Growth                     Most Recent TSH and T4:  Recent Labs   Lab Test 02/27/25  0803 01/22/20  0000 02/13/19  1625   TSH 0.74   < >  --    T4  --   --  1.30    < > = values in this interval not displayed.     Most Recent Urinalysis:  Recent Labs    Lab Test 02/27/25  1105 04/06/20  0635 01/21/19  0949   COLOR Straw   < > Yellow   APPEARANCE Clear   < > Clear   URINEGLC >=1000*   < > 500*   URINEBILI Negative   < > Negative   URINEKETONE 40*   < > Negative   SG 1.010   < > 1.015   UBLD Negative   < > Negative   URINEPH 7.5*   < > 5.0   PROTEIN Negative   < > Negative   UROBILINOGEN  --   --  0.2   NITRITE Negative   < > Negative   LEUKEST Negative   < > Negative   RBCU 1   < >  --    WBCU <1   < >  --     < > = values in this interval not displayed.       ASSESSMENT/PLAN:    Norovirus infection  Generalized weakness  Presented with weakness, diarrhea. Culture positive for norovirus, improved with supportive management. Continues to mobilize below baseline.  -Continues with supportive management  -PT/OT evaluations  -SW for discharge planning    HTN/HLD  Chronic. Amlodipine increased while inpatient dt elevated BPs.  -Continues on amlodipine, statin    DMII  A1c 7.6%.  -Continues on lantus at reduced dose 38u/d, novolog 8u TID with meals  -Continues on metformin  -Monitor BG trend    BPH  Chronic, stable.  -Continue alfuzosin    Schizoaffective disorder  Chronic, stable.  -Continue venlafaxine, risperidone, atarax, depakote    Total time spent with patient visit at the skilled nursing facility was *** min including patient visit and review of past records.     Electronically signed by:  Joon Varner ***

## 2025-03-07 PROBLEM — J96.01 ACUTE RESPIRATORY FAILURE WITH HYPOXIA (H): Status: RESOLVED | Noted: 2025-02-27 | Resolved: 2025-03-07

## 2025-03-07 PROCEDURE — 86481 TB AG RESPONSE T-CELL SUSP: CPT | Performed by: PHYSICIAN ASSISTANT

## 2025-03-07 PROCEDURE — 36415 COLL VENOUS BLD VENIPUNCTURE: CPT | Performed by: PHYSICIAN ASSISTANT

## 2025-03-07 PROCEDURE — P9603 ONE-WAY ALLOW PRORATED MILES: HCPCS | Performed by: PHYSICIAN ASSISTANT

## 2025-03-08 LAB
GAMMA INTERFERON BACKGROUND BLD IA-ACNC: 0.07 IU/ML
M TB IFN-G BLD-IMP: NEGATIVE
M TB IFN-G CD4+ BCKGRND COR BLD-ACNC: 9.93 IU/ML
MITOGEN IGNF BCKGRD COR BLD-ACNC: 0.05 IU/ML
MITOGEN IGNF BCKGRD COR BLD-ACNC: 0.13 IU/ML
QUANTIFERON MITOGEN: 10 IU/ML
QUANTIFERON NIL TUBE: 0.07 IU/ML
QUANTIFERON TB1 TUBE: 0.2 IU/ML
QUANTIFERON TB2 TUBE: 0.12

## 2025-03-09 ENCOUNTER — LAB REQUISITION (OUTPATIENT)
Dept: LAB | Facility: CLINIC | Age: 65
End: 2025-03-09

## 2025-03-09 DIAGNOSIS — E87.1 HYPO-OSMOLALITY AND HYPONATREMIA: ICD-10-CM

## 2025-03-09 DIAGNOSIS — D69.6 THROMBOCYTOPENIA, UNSPECIFIED: ICD-10-CM

## 2025-03-10 LAB
ANION GAP SERPL CALCULATED.3IONS-SCNC: 16 MMOL/L (ref 7–15)
BUN SERPL-MCNC: 25.6 MG/DL (ref 8–23)
CALCIUM SERPL-MCNC: 9.2 MG/DL (ref 8.8–10.4)
CHLORIDE SERPL-SCNC: 102 MMOL/L (ref 98–107)
CREAT SERPL-MCNC: 1.09 MG/DL (ref 0.67–1.17)
EGFRCR SERPLBLD CKD-EPI 2021: 75 ML/MIN/1.73M2
ERYTHROCYTE [DISTWIDTH] IN BLOOD BY AUTOMATED COUNT: 12.1 % (ref 10–15)
GLUCOSE SERPL-MCNC: 159 MG/DL (ref 70–99)
HCO3 SERPL-SCNC: 22 MMOL/L (ref 22–29)
HCT VFR BLD AUTO: 41 % (ref 40–53)
HGB BLD-MCNC: 13.5 G/DL (ref 13.3–17.7)
MCH RBC QN AUTO: 30.5 PG (ref 26.5–33)
MCHC RBC AUTO-ENTMCNC: 32.9 G/DL (ref 31.5–36.5)
MCV RBC AUTO: 93 FL (ref 78–100)
PLATELET # BLD AUTO: 211 10E3/UL (ref 150–450)
POTASSIUM SERPL-SCNC: 4.3 MMOL/L (ref 3.4–5.3)
RBC # BLD AUTO: 4.43 10E6/UL (ref 4.4–5.9)
SODIUM SERPL-SCNC: 140 MMOL/L (ref 135–145)
WBC # BLD AUTO: 5.2 10E3/UL (ref 4–11)

## 2025-03-10 PROCEDURE — 85018 HEMOGLOBIN: CPT | Performed by: PHYSICIAN ASSISTANT

## 2025-03-10 PROCEDURE — P9604 ONE-WAY ALLOW PRORATED TRIP: HCPCS | Performed by: PHYSICIAN ASSISTANT

## 2025-03-10 PROCEDURE — 80048 BASIC METABOLIC PNL TOTAL CA: CPT | Performed by: PHYSICIAN ASSISTANT

## 2025-03-10 PROCEDURE — 82310 ASSAY OF CALCIUM: CPT | Performed by: PHYSICIAN ASSISTANT

## 2025-03-10 PROCEDURE — 36415 COLL VENOUS BLD VENIPUNCTURE: CPT | Performed by: PHYSICIAN ASSISTANT

## 2025-03-10 PROCEDURE — 82565 ASSAY OF CREATININE: CPT | Performed by: PHYSICIAN ASSISTANT

## 2025-03-11 ENCOUNTER — TRANSITIONAL CARE UNIT VISIT (OUTPATIENT)
Dept: GERIATRICS | Facility: CLINIC | Age: 65
End: 2025-03-11
Payer: MEDICARE

## 2025-03-11 VITALS
BODY MASS INDEX: 29.54 KG/M2 | HEIGHT: 71 IN | RESPIRATION RATE: 18 BRPM | OXYGEN SATURATION: 92 % | DIASTOLIC BLOOD PRESSURE: 91 MMHG | SYSTOLIC BLOOD PRESSURE: 154 MMHG | HEART RATE: 100 BPM | TEMPERATURE: 97.6 F | WEIGHT: 211 LBS

## 2025-03-11 DIAGNOSIS — F25.0 SCHIZOAFFECTIVE DISORDER, BIPOLAR TYPE (H): ICD-10-CM

## 2025-03-11 DIAGNOSIS — Z79.4 TYPE 2 DIABETES MELLITUS WITH OTHER SPECIFIED COMPLICATION, WITH LONG-TERM CURRENT USE OF INSULIN (H): ICD-10-CM

## 2025-03-11 DIAGNOSIS — A08.11 NOROVIRUS: Primary | ICD-10-CM

## 2025-03-11 DIAGNOSIS — M62.81 GENERALIZED MUSCLE WEAKNESS: ICD-10-CM

## 2025-03-11 DIAGNOSIS — E11.69 TYPE 2 DIABETES MELLITUS WITH OTHER SPECIFIED COMPLICATION, WITH LONG-TERM CURRENT USE OF INSULIN (H): ICD-10-CM

## 2025-03-11 DIAGNOSIS — N40.0 BENIGN PROSTATIC HYPERPLASIA, UNSPECIFIED WHETHER LOWER URINARY TRACT SYMPTOMS PRESENT: ICD-10-CM

## 2025-03-11 PROCEDURE — 99308 SBSQ NF CARE LOW MDM 20: CPT | Performed by: PHYSICIAN ASSISTANT

## 2025-03-11 NOTE — PROGRESS NOTES
"[facility] GERIATRICS    Chief Complaint   Patient presents with    RECHECK     HPI:  Caden Bush is a 64 year old  (1960), who is being seen today for an episodic care visit at: [facility] ([facility]) [[id number]].     Summary: 65yo male admitted at [facility] from 2/27 - 3/4, 2025 after presenting from [facility] for evaluation of n/v/d, weakness. Workup revealed acute norovirus infection. Symptoms improved with supportive management. Following therapy evaluations noted to be below baseline and referred to TCU for rehab, med management.    Today, pt is seen in follow-up. Sitting at edge of bed on interview, offers no complaints. Participating in therapies, slow improvement. Abdominal symptoms remain resolved. Denies cp, sob. Sleeping well. Staff note blood sugars have been low throughout the day, oftentimes 80s-90s on premeal checks.    On review of facility records, BPs ranging 137-159, HRs , BG values , weight 211#.    Allergies, and PMH/PSH reviewed in [facility] today.  REVIEW OF SYSTEMS:  4 point ROS including Respiratory, CV, GI and , other than that noted in the HPI,  is negative    Objective:   BP (!) 154/91   Pulse 100   Temp 97.6  F (36.4  C)   Resp 18   Ht 1.803 m (5' 11\")   Wt 95.7 kg (211 lb)   SpO2 92%   BMI 29.43 kg/m    GEN: well-developed, well-nourished, appears comfortable  HEENT: NCAT, EOM intact bilaterally, sclera clear, conjunctiva normal, nose & mouth patent, mucous membranes moist  CHEST: lungs CTA bilaterally, no increased work of breathing, no wheeze, crackles, rhonchi  HEART: RRR, S1 & S2  ABD: soft, nontender, nondistended, no guarding or rigidity, +BS in all 4 quadrants  MSK: AROM bilateral UE/LE  NEURO: awake, alert. CN II-XII grossly intact. Sensation grossly intact to light touch.   SKIN: warm & dry without rash, no pedal edema    Recent labs in [facility] reviewed by me today.  and Most Recent 3 CBC's:  Recent Labs   Lab Test 03/10/25  0815 02/28/25  0709 " 02/27/25  0803   WBC 5.2 9.7 7.2   HGB 13.5 16.0 16.6   MCV 93 89 89    132* 151     Most Recent 3 BMP's:  Recent Labs   Lab Test 03/10/25  0815 03/04/25  1624 03/04/25  1222 03/03/25  0758 03/03/25  0635 03/02/25  1712 03/02/25  1112 02/28/25  1259 02/28/25  0709     --   --   --   --   --  134*  --  136   POTASSIUM 4.3  --   --   --  4.1  --  4.2  --  4.3   CHLORIDE 102  --   --   --   --   --  100  --  104   CO2 22  --   --   --   --   --  23  --  22   BUN 25.6*  --   --   --   --   --  33.6*  --  34.6*   CR 1.09  --   --   --   --   --  0.93  --  0.92   ANIONGAP 16*  --   --   --   --   --  11  --  10   PEE 9.2  --   --   --   --   --  9.2  --  8.7*   * 145* 159*   < >  --    < > 213*   < > 273*    < > = values in this interval not displayed.       Assessment/Plan:    Norovirus infection  Generalized weakness  Presented with weakness, diarrhea. Culture positive for norovirus, improved with supportive management. Continues to mobilize below baseline.  -Continues with supportive management  -PT/OT evaluations  -SW for discharge planning     HTN/HLD  Chronic. Amlodipine increased while inpatient dt elevated BPs.  -Continues on amlodipine, statin     DMII  A1c 7.6%.  -Continues on lantus at reduced dose 38u/d  -Continues on novolog 8u TID with meals, hold for BG < 110  -Continues on metformin  -Monitor BG trend     BPH  Chronic, stable.  -Continue alfuzosin     Schizoaffective disorder, bipolar type  Chronic, stable.  -Continue venlafaxine, risperidone, atarax, depakote    MED REC REQUIRED  Post Medication Reconciliation Status: medication reconcilation previously completed during another office visit      Electronically signed by: Christiano Quintanilla PA-C

## 2025-03-12 VITALS
HEART RATE: 99 BPM | RESPIRATION RATE: 16 BRPM | BODY MASS INDEX: 29.43 KG/M2 | TEMPERATURE: 98.4 F | OXYGEN SATURATION: 95 % | DIASTOLIC BLOOD PRESSURE: 80 MMHG | SYSTOLIC BLOOD PRESSURE: 139 MMHG | WEIGHT: 211 LBS

## 2025-03-13 ENCOUNTER — TRANSITIONAL CARE UNIT VISIT (OUTPATIENT)
Dept: GERIATRICS | Facility: CLINIC | Age: 65
End: 2025-03-13
Payer: MEDICARE

## 2025-03-13 DIAGNOSIS — E11.69 TYPE 2 DIABETES MELLITUS WITH OTHER SPECIFIED COMPLICATION, WITH LONG-TERM CURRENT USE OF INSULIN (H): ICD-10-CM

## 2025-03-13 DIAGNOSIS — F25.0 SCHIZOAFFECTIVE DISORDER, BIPOLAR TYPE (H): ICD-10-CM

## 2025-03-13 DIAGNOSIS — Z79.4 TYPE 2 DIABETES MELLITUS WITH OTHER SPECIFIED COMPLICATION, WITH LONG-TERM CURRENT USE OF INSULIN (H): ICD-10-CM

## 2025-03-13 DIAGNOSIS — A08.11 NOROVIRUS: Primary | ICD-10-CM

## 2025-03-13 DIAGNOSIS — N40.0 BENIGN PROSTATIC HYPERPLASIA, UNSPECIFIED WHETHER LOWER URINARY TRACT SYMPTOMS PRESENT: ICD-10-CM

## 2025-03-13 DIAGNOSIS — M62.81 GENERALIZED MUSCLE WEAKNESS: ICD-10-CM

## 2025-03-13 PROCEDURE — 99308 SBSQ NF CARE LOW MDM 20: CPT | Performed by: PHYSICIAN ASSISTANT

## 2025-03-13 NOTE — PROGRESS NOTES
[facility] GERIATRICS    Chief Complaint   Patient presents with    RECHECK     HPI:  Caden Bush is a 64 year old  (1960), who is being seen today for an episodic care visit at: [facility] ([facility]) [[id number]].     Summary: ***    Today, pt is seen in follow-up. ***    On review of facility records, BPs ranging ***, HRs ***, BG values ***, weight ***.    Allergies, and PMH/PSH reviewed in [facility] today.  REVIEW OF SYSTEMS:  4 point ROS including Respiratory, CV, GI and , other than that noted in the HPI,  is negative    Objective:   /80   Pulse 99   Temp 98.4  F (36.9  C)   Resp 16   Wt 95.7 kg (211 lb)   SpO2 95%   BMI 29.43 kg/m    ***    {fgslab:771271}    Assessment/Plan:    ***    MED REC REQUIRED{TIP  Click the link below to document or use med rec list, use list to pull in response :[id number]}  Post Medication Reconciliation Status: {MED REC LIST:238091}      Electronically signed by: Kylah Aguilar          WBC 5.2 9.7 7.2   HGB 13.5 16.0 16.6   MCV 93 89 89    132* 151     Most Recent 3 BMP's:  Recent Labs   Lab Test 03/10/25  0815 03/04/25  1624 03/04/25  1222 03/03/25  0758 03/03/25  0635 03/02/25  1712 03/02/25  1112 02/28/25  1259 02/28/25  0709     --   --   --   --   --  134*  --  136   POTASSIUM 4.3  --   --   --  4.1  --  4.2  --  4.3   CHLORIDE 102  --   --   --   --   --  100  --  104   CO2 22  --   --   --   --   --  23  --  22   BUN 25.6*  --   --   --   --   --  33.6*  --  34.6*   CR 1.09  --   --   --   --   --  0.93  --  0.92   ANIONGAP 16*  --   --   --   --   --  11  --  10   PEE 9.2  --   --   --   --   --  9.2  --  8.7*   * 145* 159*   < >  --    < > 213*   < > 273*    < > = values in this interval not displayed.       Assessment/Plan:    Norovirus infection  Generalized weakness  Presented with weakness, diarrhea. Culture positive for norovirus, improved with supportive management. Continues to mobilize below baseline.  -Continues with supportive management  -PT/OT evaluations  -SW for discharge planning     HTN/HLD  Chronic. Amlodipine increased while inpatient dt elevated BPs.  -Continues on amlodipine, statin     DMII  A1c 7.6%.  -Continues on lantus at reduced dose 38u/d  -Continues on novolog 4u with breakfast, dinner and 6u with lunch  -Continues on metformin  -Monitor BG trend     BPH  Chronic, stable.  -Continue alfuzosin     Schizoaffective disorder, bipolar type  Chronic, stable.  -Continue venlafaxine, risperidone, atarax, depakote    MED REC REQUIRED  Post Medication Reconciliation Status: medication reconcilation previously completed during another office visit      Electronically signed by: Christiano Quintanilla PA-C

## 2025-03-17 ENCOUNTER — DISCHARGE SUMMARY NURSING HOME (OUTPATIENT)
Dept: GERIATRICS | Facility: CLINIC | Age: 65
End: 2025-03-17
Payer: MEDICARE

## 2025-03-17 VITALS
DIASTOLIC BLOOD PRESSURE: 87 MMHG | HEART RATE: 87 BPM | WEIGHT: 211 LBS | OXYGEN SATURATION: 92 % | TEMPERATURE: 98 F | SYSTOLIC BLOOD PRESSURE: 137 MMHG | BODY MASS INDEX: 29.54 KG/M2 | HEIGHT: 71 IN | RESPIRATION RATE: 18 BRPM

## 2025-03-17 DIAGNOSIS — M62.81 GENERALIZED MUSCLE WEAKNESS: ICD-10-CM

## 2025-03-17 DIAGNOSIS — F25.0 SCHIZOAFFECTIVE DISORDER, BIPOLAR TYPE (H): ICD-10-CM

## 2025-03-17 DIAGNOSIS — N40.0 BENIGN PROSTATIC HYPERPLASIA, UNSPECIFIED WHETHER LOWER URINARY TRACT SYMPTOMS PRESENT: ICD-10-CM

## 2025-03-17 DIAGNOSIS — Z79.4 TYPE 2 DIABETES MELLITUS WITH OTHER SPECIFIED COMPLICATION, WITH LONG-TERM CURRENT USE OF INSULIN (H): ICD-10-CM

## 2025-03-17 DIAGNOSIS — E11.69 TYPE 2 DIABETES MELLITUS WITH OTHER SPECIFIED COMPLICATION, WITH LONG-TERM CURRENT USE OF INSULIN (H): ICD-10-CM

## 2025-03-17 DIAGNOSIS — A08.11 NOROVIRUS: Primary | ICD-10-CM

## 2025-03-17 PROCEDURE — 99316 NF DSCHRG MGMT 30 MIN+: CPT | Performed by: PHYSICIAN ASSISTANT

## 2025-03-17 NOTE — PROGRESS NOTES
[facility] GERIATRICS DISCHARGE SUMMARY  PATIENT'S NAME: Caden Bush  YOB: 1960  MEDICAL RECORD NUMBER:  [id number]  Place of Service where encounter took place:  [facility] ([facility]) [[id number]]    PRIMARY CARE PROVIDER AND CLINIC RESPONSIBLE AFTER TRANSFER:   Иван Garg MD, [facility] PHYSICIAN [facility] [address] [facility] / [facility] [address]*    Non-FM[facility] Provider     Transferring providers: Christiano Quintanilla PA-C, Dr. Gene MD  Recent Hospitalization/ED:  [facility] stay 2/27/25 to 3/4/25.  Date of SNF Admission:  3/4/25  Date of SNF (anticipated) Discharge:  3/18/25  Discharged to: previous assisted living  Cognitive Scores: SLUMS: 25/30  Physical Function: TUG 20.55 and Ambulating 250 ft with 4WW, SUP  DME: SNF  coordinating DME needs     CODE STATUS/ADVANCE DIRECTIVES DISCUSSION:  Prior   ALLERGIES: Flomax [tamsulosin] and Lisinopril    NURSING FACILITY COURSE   Medication Changes/Rationale:   none    Summary of nursing facility stay:   65yo male admitted at [facility] from 2/27 - 3/4, 2025 after presenting from [facility] for evaluation of n/v/d, weakness. Workup revealed acute norovirus infection. Symptoms improved with supportive management. Following therapy evaluations noted to be below baseline and referred to TCU for rehab, med management.     Pt is seen today for discharge planning. Continues to deny ongoing diarrhea, constipation, sob, cp. Has progressed in therapies and will be returning to prior [facility]. No concerns for discharge. The following were managed during TCU stay:    Norovirus infection  Generalized weakness  Presented with weakness, diarrhea. Culture positive for norovirus, improved with supportive management. Continues to mobilize below baseline.  -Continues with supportive management  -PT/OT continuing     HTN/HLD  Chronic. Amlodipine increased while inpatient dt elevated BPs.  -Continues on amlodipine, statin     DMII  A1c  7.6%.  -Continues on lantus at reduced dose 38u/d  -Continues on novolog 4u with breakfast, dinner and 6u with lunch  -Continues on metformin  -Monitor BG trend     BPH  Chronic, stable.  -Continue alfuzosin     Schizoaffective disorder, bipolar type  Chronic, stable.  -Continue venlafaxine, risperidone, atarax, depakote    Discharge Medications:  MED REC REQUIRED  Post Medication Reconciliation Status: medication reconcilation previously completed during another office visit     Current Outpatient Medications   Medication Sig Dispense Refill    acetaminophen (TYLENOL) 325 MG tablet TAKE ONE TO TWO TABLETS BY MOUTH EVERY EIGHT  HOURS AS NEEDED FOR PAIN 100 tablet 10    acetaminophen (TYLENOL) 500 MG tablet Take 1,000 mg by mouth 3 times daily.      alfuzosin ER (UROXATRAL) 10 MG 24 hr tablet Take 10 mg by mouth daily.      alum & mag hydroxide-simethicone (MAALOX) 200-200-20 MG/5ML SUSP suspension Take 20 mLs by mouth every 6 hours as needed for indigestion.      amLODIPine (NORVASC) 2.5 MG tablet Take 2 tablets (5 mg) by mouth at bedtime. 30 tablet 0    bisacodyl (DULCOLAX) 10 MG suppository Place 10 mg rectally daily as needed for constipation.      calcium carbonate (TUMS) 500 MG chewable tablet Take 2 chew tab by mouth 3 times daily as needed for heartburn.      diclofenac (VOLTAREN) 1 % topical gel Apply 4 g topically daily.      divalproex sodium delayed-release (DEPAKOTE SPRINKLE) 125 MG DR capsule Take 250 mg by mouth 2 times daily.      guaiFENesin-codeine (ROBITUSSIN AC) 100-10 MG/5ML solution Take 10 mLs by mouth every 4 hours as needed for cough. 118 mL 0    hydrOXYzine HCl (ATARAX) 25 MG tablet Take 25 mg by mouth 2 times daily.      insulin glargine 100 UNIT/ML pen Inject 38 Units subcutaneously daily.      insulin lispro (HUMALOG KWIKPEN) 100 UNIT/ML (1 unit dial) KWIKPEN Inject 6 Units subcutaneously daily (with lunch).      insulin lispro (HUMALOG KWIKPEN) 100 UNIT/ML (1 unit dial) KWIKPEN Inject 4  "Units subcutaneously 2 times daily (before meals). Breakfast  and supper      loperamide (IMODIUM) 2 MG capsule Take 2 mg by mouth 4 times daily as needed for diarrhea 4 mg after first loose stool, then 2 mg after each subsequent loose stool.  Max 8 mg (4 capsules) daily.      magnesium hydroxide (MILK OF MAGNESIA) 400 MG/5ML suspension Take 30 mLs by mouth daily as needed for constipation      metFORMIN (GLUCOPHAGE) 500 MG tablet Take 500 mg by mouth 2 times daily (with meals).      Neomycin-Bacitracin-Polymyxin (TRIPLE ANTIBIOTIC) 3.5-400-5000 OINT ointment Externally apply topically as needed.      NOVOFINE 30 30G X 8 MM insulin pen needle USE AS DIRECTED TO INJECT INSULIN FOUR TIMES DAILY 100 each 10    ondansetron (ZOFRAN) 4 MG tablet Take 1 tablet (4 mg) by mouth every 6 hours as needed for nausea 10 tablet 0    RISPERIDONE PO Take 3.5 mg by mouth at bedtime.      scopolamine (TRANSDERM) 1 MG/3DAYS 72 hr patch Place 1 patch over 72 hours onto the skin every 72 hours.      senna-docusate (SENOKOT-S/PERICOLACE) 8.6-50 MG tablet Take 1 tablet by mouth daily.      simvastatin (ZOCOR) 10 MG tablet Take 10 mg by mouth at bedtime.      VENLAFAXINE HCL ER PO Take 225 mg by mouth daily.      vitamin D3 (CHOLECALCIFEROL) 50 mcg (2000 units) tablet Take 1 tablet by mouth daily.      Vitamins A & D (VITAMIN A & D) external ointment Apply topically as needed.         Controlled medications:   not applicable/none     Past Medical History:   Past Medical History:   Diagnosis Date    Arthritis     Diabetes mellitus     Eczema 2011    biopsy leg     Polyp of colon, adenomatous 6/25/2012    Retinopathy     HFA ophtho DR HARLEY    Schizoaffective disorder     Seasonal affective disorder     Vitamin D deficiency     replaced     Physical Exam:   Vitals: /87   Pulse 87   Temp 98  F (36.7  C)   Resp 18   Ht 1.803 m (5' 11\")   Wt 95.7 kg (211 lb)   SpO2 92%   BMI 29.43 kg/m    BMI: Body mass index is 29.43 kg/m .  GEN: " well-developed, well-nourished, appears comfortable  HEENT: NCAT, EOM intact bilaterally, sclera clear, conjunctiva normal, nose & mouth patent, mucous membranes moist  CHEST: lungs CTA bilaterally, no increased work of breathing, no wheeze, crackles, rhonchi  HEART: RRR, S1 & S2  ABD: soft, nontender, nondistended, no guarding or rigidity, +BS in all 4 quadrants  MSK: AROM bilateral UE/LE  NEURO: awake, alert. CN II-XII grossly intact. Sensation grossly intact to light touch.   SKIN: warm & dry without rash, no pedal edema    SNF labs: Recent labs in [facility] reviewed by me today.  and Most Recent 3 CBC's:  Recent Labs   Lab Test 03/10/25  0815 02/28/25  0709 02/27/25  0803   WBC 5.2 9.7 7.2   HGB 13.5 16.0 16.6   MCV 93 89 89    132* 151     Most Recent 3 BMP's:  Recent Labs   Lab Test 03/10/25  0815 03/04/25  1624 03/04/25  1222 03/03/25  0758 03/03/25  0635 03/02/25  1712 03/02/25  1112 02/28/25  1259 02/28/25  0709     --   --   --   --   --  134*  --  136   POTASSIUM 4.3  --   --   --  4.1  --  4.2  --  4.3   CHLORIDE 102  --   --   --   --   --  100  --  104   CO2 22  --   --   --   --   --  23  --  22   BUN 25.6*  --   --   --   --   --  33.6*  --  34.6*   CR 1.09  --   --   --   --   --  0.93  --  0.92   ANIONGAP 16*  --   --   --   --   --  11  --  10   PEE 9.2  --   --   --   --   --  9.2  --  8.7*   * 145* 159*   < >  --    < > 213*   < > 273*    < > = values in this interval not displayed.       DISCHARGE PLAN:  Follow up labs: No labs orders/due  Medical Follow Up:      Follow up with primary care provider in 1-2 weeks  [facility] scheduled appointments:     Discharge Services: Home Care:  Occupational Therapy and Physical Therapy  Discharge Instructions Verbalized to Patient at Discharge:   Monitor blood glucose monitoring 4 times a day. Keep a record and bring it to your next primary provider visit.     TOTAL DISCHARGE TIME:   Greater than 30min  Electronically signed  by:  Christiano Quintanilla PA-C     Documentation of Face to Face and Certification for Home Health Services    I certify that services are/were furnished while this patient was under the care of a physician and that a physician or an allowed non-physician practitioner (NPP), had a face-to-face encounter that meets the physician face-to-face encounter requirements. The encounter was in whole, or in part, related to the primary reason for home health. The patient is confined to his/her home and needs intermittent skilled nursing, physical therapy, speech-language pathology, or the continued need for occupational therapy. A plan of care has been established by a physician and is periodically reviewed by a physician.  Date of Face-to-Face Encounter: 3/17/2025.    I certify that, based on my findings, the following services are medically necessary home health services: Nursing, Occupational Therapy, and Physical Therapy.    My clinical findings support the need for the above skilled services because: Requires assistance of another person or specialized equipment to access medical services because patient: Range of motion limitations prevents ability to exit home safely...    Patient to re-establish plan of care with their PCP within 7-10 days after leaving the facility to reestablish care.  Medicare certified [facility] provider: Christiano Quintanilla PA-C  Date: March 17, 2025

## 2025-04-15 ENCOUNTER — LAB REQUISITION (OUTPATIENT)
Dept: LAB | Facility: CLINIC | Age: 65
End: 2025-04-15
Payer: MEDICARE

## 2025-04-15 DIAGNOSIS — R35.0 FREQUENCY OF MICTURITION: ICD-10-CM

## 2025-04-15 LAB
ALBUMIN UR-MCNC: 10 MG/DL
APPEARANCE UR: CLEAR
BACTERIA #/AREA URNS HPF: ABNORMAL /HPF
BILIRUB UR QL STRIP: NEGATIVE
COLOR UR AUTO: ABNORMAL
GLUCOSE UR STRIP-MCNC: 500 MG/DL
HGB UR QL STRIP: NEGATIVE
KETONES UR STRIP-MCNC: ABNORMAL MG/DL
LEUKOCYTE ESTERASE UR QL STRIP: ABNORMAL
MUCOUS THREADS #/AREA URNS LPF: PRESENT /LPF
NITRATE UR QL: NEGATIVE
PH UR STRIP: 5.5 [PH] (ref 5–7)
RBC URINE: 1 /HPF
SP GR UR STRIP: 1.01 (ref 1–1.03)
SQUAMOUS EPITHELIAL: <1 /HPF
UROBILINOGEN UR STRIP-MCNC: NORMAL MG/DL
WBC URINE: 13 /HPF

## 2025-04-15 PROCEDURE — 87086 URINE CULTURE/COLONY COUNT: CPT | Mod: ORL | Performed by: NURSE PRACTITIONER

## 2025-04-15 PROCEDURE — 81001 URINALYSIS AUTO W/SCOPE: CPT | Mod: ORL | Performed by: NURSE PRACTITIONER

## 2025-04-17 LAB — BACTERIA UR CULT: NO GROWTH

## 2025-05-22 ENCOUNTER — LAB REQUISITION (OUTPATIENT)
Dept: LAB | Facility: CLINIC | Age: 65
End: 2025-05-22
Payer: MEDICARE

## 2025-05-22 DIAGNOSIS — E78.2 MIXED HYPERLIPIDEMIA: ICD-10-CM

## 2025-05-22 DIAGNOSIS — F33.1 MAJOR DEPRESSIVE DISORDER, RECURRENT, MODERATE (H): ICD-10-CM

## 2025-05-22 DIAGNOSIS — D69.6 THROMBOCYTOPENIA, UNSPECIFIED: ICD-10-CM

## 2025-05-22 DIAGNOSIS — E55.9 VITAMIN D DEFICIENCY, UNSPECIFIED: ICD-10-CM

## 2025-05-22 DIAGNOSIS — E11.3399 TYPE 2 DIABETES MELLITUS WITH MODERATE NONPROLIFERATIVE DIABETIC RETINOPATHY WITHOUT MACULAR EDEMA, UNSPECIFIED EYE (H): ICD-10-CM

## 2025-05-22 DIAGNOSIS — F25.0 SCHIZOAFFECTIVE DISORDER, BIPOLAR TYPE (H): ICD-10-CM

## 2025-05-22 DIAGNOSIS — D69.2 OTHER NONTHROMBOCYTOPENIC PURPURA: ICD-10-CM

## 2025-05-29 LAB
ERYTHROCYTE [DISTWIDTH] IN BLOOD BY AUTOMATED COUNT: 12.2 % (ref 10–15)
HCT VFR BLD AUTO: 42.3 % (ref 40–53)
HGB BLD-MCNC: 13.8 G/DL (ref 13.3–17.7)
MCH RBC QN AUTO: 30.3 PG (ref 26.5–33)
MCHC RBC AUTO-ENTMCNC: 32.6 G/DL (ref 31.5–36.5)
MCV RBC AUTO: 93 FL (ref 78–100)
PLATELET # BLD AUTO: 177 10E3/UL (ref 150–450)
RBC # BLD AUTO: 4.55 10E6/UL (ref 4.4–5.9)
WBC # BLD AUTO: 5.3 10E3/UL (ref 4–11)

## 2025-05-29 PROCEDURE — 82306 VITAMIN D 25 HYDROXY: CPT | Mod: ORL | Performed by: NURSE PRACTITIONER

## 2025-05-29 PROCEDURE — P9604 ONE-WAY ALLOW PRORATED TRIP: HCPCS | Mod: ORL | Performed by: NURSE PRACTITIONER

## 2025-05-29 PROCEDURE — 84443 ASSAY THYROID STIM HORMONE: CPT | Mod: ORL | Performed by: NURSE PRACTITIONER

## 2025-05-29 PROCEDURE — 80061 LIPID PANEL: CPT | Mod: ORL | Performed by: NURSE PRACTITIONER

## 2025-05-29 PROCEDURE — 85027 COMPLETE CBC AUTOMATED: CPT | Mod: ORL | Performed by: NURSE PRACTITIONER

## 2025-05-29 PROCEDURE — 83036 HEMOGLOBIN GLYCOSYLATED A1C: CPT | Mod: ORL | Performed by: NURSE PRACTITIONER

## 2025-05-29 PROCEDURE — 36415 COLL VENOUS BLD VENIPUNCTURE: CPT | Mod: ORL | Performed by: NURSE PRACTITIONER

## 2025-05-29 PROCEDURE — 80053 COMPREHEN METABOLIC PANEL: CPT | Mod: ORL | Performed by: NURSE PRACTITIONER

## 2025-05-30 LAB
ALBUMIN SERPL BCG-MCNC: 3.6 G/DL (ref 3.5–5.2)
ALP SERPL-CCNC: 84 U/L (ref 40–150)
ALT SERPL W P-5'-P-CCNC: 16 U/L (ref 0–70)
ANION GAP SERPL CALCULATED.3IONS-SCNC: 8 MMOL/L (ref 7–15)
AST SERPL W P-5'-P-CCNC: 17 U/L (ref 0–45)
BILIRUB SERPL-MCNC: 0.3 MG/DL
BUN SERPL-MCNC: 25.6 MG/DL (ref 8–23)
CALCIUM SERPL-MCNC: 9.2 MG/DL (ref 8.8–10.4)
CHLORIDE SERPL-SCNC: 104 MMOL/L (ref 98–107)
CHOLEST SERPL-MCNC: 111 MG/DL
CREAT SERPL-MCNC: 0.95 MG/DL (ref 0.67–1.17)
EGFRCR SERPLBLD CKD-EPI 2021: 88 ML/MIN/1.73M2
EST. AVERAGE GLUCOSE BLD GHB EST-MCNC: 154 MG/DL
FASTING STATUS PATIENT QL REPORTED: ABNORMAL
GLUCOSE SERPL-MCNC: 192 MG/DL (ref 70–99)
HBA1C MFR BLD: 7 %
HCO3 SERPL-SCNC: 27 MMOL/L (ref 22–29)
HDLC SERPL-MCNC: 37 MG/DL
LDLC SERPL CALC-MCNC: 45 MG/DL
NONHDLC SERPL-MCNC: 74 MG/DL
POTASSIUM SERPL-SCNC: 4.6 MMOL/L (ref 3.4–5.3)
PROT SERPL-MCNC: 5.8 G/DL (ref 6.4–8.3)
SODIUM SERPL-SCNC: 139 MMOL/L (ref 135–145)
TRIGL SERPL-MCNC: 145 MG/DL
TSH SERPL DL<=0.005 MIU/L-ACNC: 0.91 UIU/ML (ref 0.3–4.2)
VIT D+METAB SERPL-MCNC: 32 NG/ML (ref 20–50)